# Patient Record
Sex: FEMALE | Race: BLACK OR AFRICAN AMERICAN | NOT HISPANIC OR LATINO | Employment: OTHER | URBAN - METROPOLITAN AREA
[De-identification: names, ages, dates, MRNs, and addresses within clinical notes are randomized per-mention and may not be internally consistent; named-entity substitution may affect disease eponyms.]

---

## 2017-02-03 ENCOUNTER — HOSPITAL ENCOUNTER (EMERGENCY)
Facility: HOSPITAL | Age: 82
Discharge: HOME | End: 2017-02-04
Attending: EMERGENCY MEDICINE
Payer: MEDICARE

## 2017-02-03 DIAGNOSIS — T82.9XXA PACEMAKER COMPLICATIONS, INITIAL ENCOUNTER: Primary | ICD-10-CM

## 2017-02-03 DIAGNOSIS — I50.9 CONGESTIVE HEART FAILURE, UNSPECIFIED CONGESTIVE HEART FAILURE CHRONICITY, UNSPECIFIED CONGESTIVE HEART FAILURE TYPE: ICD-10-CM

## 2017-02-03 DIAGNOSIS — Z95.0 PACEMAKER: ICD-10-CM

## 2017-02-03 DIAGNOSIS — R79.89 ELEVATED TROPONIN: ICD-10-CM

## 2017-02-03 LAB
ALBUMIN SERPL BCP-MCNC: 3.9 G/DL
ALP SERPL-CCNC: 96 U/L
ALT SERPL W/O P-5'-P-CCNC: 14 U/L
ANION GAP SERPL CALC-SCNC: 9 MMOL/L
APTT BLDCRRT: 28.6 SEC
AST SERPL-CCNC: 25 U/L
BASOPHILS # BLD AUTO: 0.04 K/UL
BASOPHILS NFR BLD: 0.7 %
BILIRUB SERPL-MCNC: 0.4 MG/DL
BNP SERPL-MCNC: 1046 PG/ML
BUN SERPL-MCNC: 29 MG/DL
CALCIUM SERPL-MCNC: 10 MG/DL
CHLORIDE SERPL-SCNC: 104 MMOL/L
CO2 SERPL-SCNC: 29 MMOL/L
CREAT SERPL-MCNC: 1.6 MG/DL
DIFFERENTIAL METHOD: ABNORMAL
EOSINOPHIL # BLD AUTO: 0.2 K/UL
EOSINOPHIL NFR BLD: 3.7 %
ERYTHROCYTE [DISTWIDTH] IN BLOOD BY AUTOMATED COUNT: 14.9 %
EST. GFR  (AFRICAN AMERICAN): 35 ML/MIN/1.73 M^2
EST. GFR  (NON AFRICAN AMERICAN): 30 ML/MIN/1.73 M^2
GLUCOSE SERPL-MCNC: 115 MG/DL
HCT VFR BLD AUTO: 36.4 %
HGB BLD-MCNC: 11.4 G/DL
INR PPP: 1
LYMPHOCYTES # BLD AUTO: 1.3 K/UL
LYMPHOCYTES NFR BLD: 24.3 %
MCH RBC QN AUTO: 29.8 PG
MCHC RBC AUTO-ENTMCNC: 31.3 %
MCV RBC AUTO: 95 FL
MONOCYTES # BLD AUTO: 0.7 K/UL
MONOCYTES NFR BLD: 12.6 %
NEUTROPHILS # BLD AUTO: 3.2 K/UL
NEUTROPHILS NFR BLD: 58.5 %
PLATELET # BLD AUTO: 126 K/UL
PMV BLD AUTO: 10.5 FL
POTASSIUM SERPL-SCNC: 3.9 MMOL/L
PROT SERPL-MCNC: 7.8 G/DL
PROTHROMBIN TIME: 10.8 SEC
RBC # BLD AUTO: 3.82 M/UL
SODIUM SERPL-SCNC: 142 MMOL/L
TROPONIN I SERPL DL<=0.01 NG/ML-MCNC: 0.1 NG/ML
WBC # BLD AUTO: 5.47 K/UL

## 2017-02-03 PROCEDURE — 81000 URINALYSIS NONAUTO W/SCOPE: CPT

## 2017-02-03 PROCEDURE — 85025 COMPLETE CBC W/AUTO DIFF WBC: CPT

## 2017-02-03 PROCEDURE — 85730 THROMBOPLASTIN TIME PARTIAL: CPT

## 2017-02-03 PROCEDURE — 99284 EMERGENCY DEPT VISIT MOD MDM: CPT

## 2017-02-03 PROCEDURE — 83880 ASSAY OF NATRIURETIC PEPTIDE: CPT

## 2017-02-03 PROCEDURE — 85610 PROTHROMBIN TIME: CPT

## 2017-02-03 PROCEDURE — 84484 ASSAY OF TROPONIN QUANT: CPT

## 2017-02-03 PROCEDURE — 80053 COMPREHEN METABOLIC PANEL: CPT

## 2017-02-03 NOTE — ED AVS SNAPSHOT
[facility]  [address] [place] [address]               Palmira Malave   2/3/2017  9:06 PM   ED    Description:  Female : 1935   Department:  [facility]           Your Care was Coordinated By:     Provider Role From To    Cheyenne Kay MD Attending Provider [phone] --      Reason for Visit     Pacemaker Problem           Diagnoses this Visit        Comments    Pacemaker complications, initial encounter    -  Primary     Pacemaker         Elevated troponin         Congestive heart failure, unspecified congestive heart failure chronicity, unspecified congestive heart failure type           ED Disposition     ED Disposition Condition Comment    Discharge             To Do List           Follow-up Information     Follow up with Tal Batres MD On 2017.    Specialty:  Cardiology    Why:  call for appointment ASAP    Contact information:    [address]  [place] [address]  [phone]        [facility] On Call     [facility] Nurse Care Line -  Assistance  Registered nurses in the [facility] provide clinical advisement, health education, appointment booking, and other advisory services.  Call for this free service at [phone].             Medications           Message regarding Medications     Verify the changes and/or additions to your medication regime listed below are the same as discussed with your clinician today.  If any of these changes or additions are incorrect, please notify your healthcare provider.             Verify that the below list of medications is an accurate representation of the medications you are currently taking.  If none reported, the list may be blank. If incorrect, please contact your healthcare provider. Carry this list with you in case of emergency.           Current Medications     allopurinol (ZYLOPRIM) 100 MG tablet Take 100 mg by mouth every morning.      "calcitRIOL (ROCALTROL) 0.25 MCG Cap     carvedilol (COREG) 25 MG tablet Take 0.5 tablets (12.5 mg total) by mouth 2 (two) times daily.    clopidogrel (PLAVIX) 75 mg tablet Take 75 mg by mouth once daily. On hold since 11-28-14, for procedure.    furosemide (LASIX) 40 MG tablet Take 0.5 tablets (20 mg total) by mouth once daily. Take half tablet twice daily    lovastatin (MEVACOR) 40 MG tablet Take 40 mg by mouth nightly. Takes 2 caps with supper every evening.    mirabegron (MYRBETRIQ) 50 mg Tb24 Take by mouth.    terazosin (HYTRIN) 2 MG capsule Take by mouth every evening.     albuterol 90 mcg/actuation inhaler Inhale 1-2 puffs into the lungs every 6 (six) hours as needed.    cranberry 400 mg Cap Take 1 capsule by mouth 2 (two) times daily.    fish oil-omega-3 fatty acids 300-1,000 mg capsule Take 2 g by mouth once daily. 2 caps every AM & 1 cap every PM.    gabapentin (NEURONTIN) 300 MG capsule Take 300 mg by mouth 2 (two) times daily.     hydrocodone-acetaminophen 5-325mg (NORCO) 5-325 mg per tablet     methylPREDNISolone (MEDROL DOSEPACK) 4 mg tablet     PREVNAR 13, PF, 0.5 mL Syrg     ranitidine (ZANTAC) 150 MG tablet     VOLTAREN 1 % Gel            Clinical Reference Information           Your Vitals Were     BP Pulse Temp Resp Height Weight    207/91 68 98.1 °F (36.7 °C) (Oral) 18 5' 3" (1.6 m) 90.7 kg (200 lb)    SpO2 BMI             98% 35.43 kg/m2         Allergies as of 2/4/2017        Reactions    Aspirin Swelling    Swelling and rash    Colace [Docusate Sodium] Rash    itching    Sulfa (Sulfonamide Antibiotics) Swelling    Swelling and rash    Iodine And Iodide Containing Products Rash    Penicillins Rash      Immunizations Administered on Date of Encounter - 2/4/2017     None      ED Micro, Lab, POCT     Start Ordered       Status Ordering Provider    02/03/17 2119 02/03/17 2119  CBC auto differential  STAT      Final result     02/03/17 2119 02/03/17 2119  Comprehensive metabolic panel  STAT      " Final result     02/03/17 2119 02/03/17 2119  Brain natriuretic peptide  STAT      Final result     02/03/17 2119 02/03/17 2119  Troponin I  STAT      Final result     02/03/17 2119 02/03/17 2119  Protime-INR  STAT      Final result     02/03/17 2119 02/03/17 2119  APTT  STAT      Final result     02/03/17 2119 02/03/17 2119  Urinalysis  STAT      In process     02/03/17 2119 02/03/17 2119  Urinalysis Microscopic  Once      In process       ED Imaging Orders     Start Ordered       Status Ordering Provider    02/03/17 2120 02/03/17 2119  X-Ray Chest 1 View  1 time imaging      Final result         Discharge Instructions       Call Dr. Batres regarding your ED visit on Monday morning to schedule an appointment.    Discharge References/Attachments     PACEMAKER MALFUNCTION (ENGLISH)      Your Scheduled Appointments     Feb 14, 2017  2:40 PM CST   Established Patient Visit with Tal Batres MD   [facility] ([facility])    [address]  [place] [address]   [phone]              [facility] Sign-Up     Activating your [facility] account is as easy as 1-2-3!     1) Visit [email or web address], select Sign Up Now, enter this activation code and your date of birth, then select Next.  [email or web address]  Expires: 3/21/2017 12:35 AM      2) Create a username and password to use when you visit [facility] in the future and select a security question in case you lose your password and select Next.    3) Enter your e-mail address and click Sign Up!    Additional Information  If you have questions, please e-mail [email or web address] or call [phone] to talk to our [facility] staff. Remember, [facility] is NOT to be used for urgent needs. For medical emergencies, dial 911.          [facility] complies with applicable Federal civil rights laws and does not discriminate on the basis of race, color, national origin, age, disability, or sex.        Language Assistance Services      ATTENTION: Language assistance services are available, free of charge. Please call [phone].      ATENCIÓN: Si habla español, tiene a rodriguez disposición servicios gratuitos de asistencia lingüística. Llame al [phone].     CHÚ Ý: N?u b?n nói Ti?ng Vi?t, có các d?ch v? h? tr? ngôn ng? mi?n phí dành cho b?n. G?i s? [phone].

## 2017-02-04 VITALS
OXYGEN SATURATION: 95 % | BODY MASS INDEX: 35.44 KG/M2 | HEART RATE: 68 BPM | TEMPERATURE: 98 F | WEIGHT: 200 LBS | DIASTOLIC BLOOD PRESSURE: 91 MMHG | SYSTOLIC BLOOD PRESSURE: 207 MMHG | RESPIRATION RATE: 17 BRPM | HEIGHT: 63 IN

## 2017-02-04 LAB
BACTERIA #/AREA URNS HPF: ABNORMAL /HPF
BILIRUB UR QL STRIP: NEGATIVE
CLARITY UR: CLEAR
COLOR UR: ABNORMAL
GLUCOSE UR QL STRIP: NEGATIVE
HGB UR QL STRIP: ABNORMAL
KETONES UR QL STRIP: NEGATIVE
LEUKOCYTE ESTERASE UR QL STRIP: NEGATIVE
MICROSCOPIC COMMENT: ABNORMAL
NITRITE UR QL STRIP: NEGATIVE
PH UR STRIP: 6 [PH] (ref 5–8)
PROT UR QL STRIP: NEGATIVE
RBC #/AREA URNS HPF: 3 /HPF (ref 0–4)
SP GR UR STRIP: 1.01 (ref 1–1.03)
SQUAMOUS #/AREA URNS HPF: 1 /HPF
URN SPEC COLLECT METH UR: ABNORMAL
UROBILINOGEN UR STRIP-ACNC: NEGATIVE EU/DL
WBC #/AREA URNS HPF: 2 /HPF (ref 0–5)

## 2017-02-04 NOTE — ED PROVIDER NOTES
"Encounter Date: 2/3/2017    SCRIBE #1 NOTE: I, Mari Morgan, am scribing for, and in the presence of,  Cheyenne Kay MD. I have scribed the following portions of the note - Other sections scribed: HPI, ROS, PE.       History     Chief Complaint   Patient presents with    Pacemaker Problem     "My pacemaker keep going off. I keep hearing a sound."     Review of patient's allergies indicates:   Allergen Reactions    Aspirin Swelling     Swelling and rash    Colace [docusate sodium] Rash     itching    Sulfa (sulfonamide antibiotics) Swelling     Swelling and rash    Iodine and iodide containing products Rash    Penicillins Rash     HPI Comments: CC: Defibrillator Problem    HPI: 81 year old female presents to the ED c/o her defibrillator alarming x 3 today. Pt states the alarm sounded like a long beep. The first episode occurred at 3:30 pm; second episode 4:00-4:30 pm; third episode 8:00 pm. Pt reports no pain or other complaints while the defibrillator alarmed. She denies feeling a shock or sharp pain in her chest. Approximately 4 months ago, she had an evaluation of the battery for her defibrillator and was also told of no changes or problems. She denies fever, chills, SOB, chest pain, numbness, dizziness, and any other associated symptoms.    She has had the defibrillator placed x 2. Her first one was placed in 2008; it was changed in 2014. She reports in 2008, the defibrillator alarmed and she notified her cardiologist. Her cardiologist told her there were no problems or changes. At baseline, pt is ambulatory with assistance of walker. Pt denies hx of MI.     Last echocardiogram 11/2015: EF 20-25%.    Cardiologist is Dr. Batres.    Pt has a history of CAD on Plavix, HTN, hypercholesteremia, norm disorder, and hx of gout.    [employer] ([phone]) cardiac defibrillator.  First implanted 10/13/08:  - Serial #: [id number] // Model #: [id number]; Serial #: [id number] // Model #: [id number]  Second implanted " 12/11/14:  - Serial #: [id number] // Model #: [id number]; Serial #: [id number] // Model #: [id number]    The history is provided by the patient. No  was used.     Past Medical History   Diagnosis Date    Anticoagulant long-term use     Coronary artery disease     Gout attack     Hypercholesteremia     Hypertension     Renal disorder      No past medical history pertinent negatives.  Past Surgical History   Procedure Laterality Date    Hysterectomy      Total knee arthroplasty Bilateral Lt.= 2005; Rt.=2006     Bilateral Knee scope    Defribillator Left 8/2008    Cholecystectomy      Appendectomy      Colonoscopy w/ polypectomy  10 or 11/ 2014     per Dr. Myrick    Joint replacement Bilateral 2005 & 2006     2 Knee Replacements=Lt. 1= 2005. Rt. 1= 2006    Cardiac defibrillator placement       2015     Family History   Problem Relation Age of Onset    Heart attack Mother 88    Hypertension Mother     Hyperlipidemia Mother     Diabetes Other     Hypertension Other      Social History   Substance Use Topics    Smoking status: Never Smoker    Smokeless tobacco: Never Used    Alcohol use No     Review of Systems   Constitutional: Negative for chills, diaphoresis and fever.   HENT: Negative for ear pain and sore throat.    Eyes: Negative for photophobia and visual disturbance.   Respiratory: Negative for cough and shortness of breath.    Cardiovascular: Negative for chest pain.        Defibrillator placement to left chest wall.   Gastrointestinal: Negative for abdominal pain, diarrhea, nausea and vomiting.   Genitourinary: Negative for dysuria.   Musculoskeletal: Negative for back pain and neck stiffness.   Skin: Negative for rash.   Neurological: Negative for headaches.       Physical Exam   Initial Vitals   BP Pulse Resp Temp SpO2   02/03/17 2105 02/03/17 2105 02/03/17 2105 02/03/17 2105 02/03/17 2105   187/84 82 18 98.1 °F (36.7 °C) 97 %     Physical Exam    Nursing note and vitals  reviewed.  Constitutional: She appears well-developed and well-nourished. She is not diaphoretic.  Non-toxic appearance. She does not appear ill. No distress.   Pt is awake and alert. Non-toxic appearing.   HENT:   Head: Normocephalic and atraumatic.   Mouth/Throat: Oropharynx is clear and moist.   Eyes: Conjunctivae and EOM are normal. Pupils are equal, round, and reactive to light.   Neck: Normal range of motion. Neck supple.   Cardiovascular: Normal rate and regular rhythm.   Murmur (2/6 systolic) heard.  Pulmonary/Chest: Effort normal and breath sounds normal. No respiratory distress. She has no wheezes. She has no rhonchi. She has no rales.   AICD to left chest wall.   Abdominal: Soft. Normal appearance and bowel sounds are normal. She exhibits no distension. There is no tenderness.   Musculoskeletal: Normal range of motion. She exhibits no edema or tenderness.   No lower extremity edema.   Neurological: She is alert and oriented to person, place, and time. She has normal strength.   Skin: Skin is warm and dry.   Psychiatric: She has a normal mood and affect.         ED Course   Procedures  Labs Reviewed   CBC W/ AUTO DIFFERENTIAL - Abnormal; Notable for the following:        Result Value    RBC 3.82 (*)     Hemoglobin 11.4 (*)     Hematocrit 36.4 (*)     MCHC 31.3 (*)     RDW 14.9 (*)     Platelets 126 (*)     All other components within normal limits   COMPREHENSIVE METABOLIC PANEL - Abnormal; Notable for the following:     Glucose 115 (*)     BUN, Bld 29 (*)     Creatinine 1.6 (*)     eGFR if  35 (*)     eGFR if non  30 (*)     All other components within normal limits   PROTIME-INR   APTT   B-TYPE NATRIURETIC PEPTIDE   TROPONIN I   URINALYSIS     EKG Readings: (Independently Interpreted)   AV dual paced rhythm, heart rate 70.  Nonspecific intraventricular conduction delay.  No STEMI.       X-Rays:   Independently Interpreted Readings:   Other Readings:  CXR cardiomegaly, L  "sided AICD, no significant pulmonary edema    Medical Decision Making:   History:   Old Medical Records: I decided to obtain old medical records.  Old Records Summarized: records from previous admission(s) and records from clinic visits.  Initial Assessment:   81 y.o. Female with alarm from AICD x 3 today. Was not shocked. No change to exercise tolerance. No CP. Feels well.  Differential Diagnosis:   Ddx includes lead fracture, battery failure, other impending or current device failure, malignant arrhythmia terminated by device,   Independently Interpreted Test(s):   I have ordered and independently interpreted X-rays - see prior notes.  I have ordered and independently interpreted EKG Reading(s) - see prior notes  Clinical Tests:   Lab Tests: Ordered and Reviewed  The following lab test(s) were unremarkable: Urinalysis, CMP, PTT, PT and CBC       <> Summary of Lab: BNP 1046, troponin 0.096.   Radiological Study: Reviewed and Ordered  Medical Tests: Ordered and Reviewed  ED Management:  Patient's labs show troponin 0.096; however, she has CKD, and troponins have been elevated here since 2014 (this is a lower troponin for her). BNP 1046, also within patient's range. Renal function, lytes, Hgb stable.   I called [employer] and device rep Khanda came to ED to interrogate device. She reported that there had been "Alarm trip for... Bad impedance on the SVC coil of the high voltage lead." She could not reproduce the event here in the ED despite doing calisthenics with the patient, etc. This alarm might be due to scar tissue on this particular coil, or because the coil has an impending fracture. There were no malignant arrhythmias on interrogation.  At this point, the patient's device is functioning as required, and she has no evidence of malignant arrhythmia or acutely decompensated CHF or ACS. She is stable for d/c with close f/u to Dr. Batres. I have sent Dr. Batres a message in the Epic system to alert him. I have " provided the patient and her daughter the originals of the [employer] interrogation report to bring to their appointment with Dr. Batres and a copy is to be placed in [facility] as well. The patient is aware her device may alert again, but as long as it makes the same noise and she is without symptoms, she is stable to be seen in cardiology clinic.   Other:   I have discussed this case with another health care provider.            Scribe Attestation:   Scribe #1: I performed the above scribed service and the documentation accurately describes the services I performed. I attest to the accuracy of the note.    Attending Attestation:           Physician Attestation for Scribe:  Physician Attestation Statement for Scribe #1: I, Cheyenne Kay MD, reviewed documentation, as scribed by Mari Mora in my presence, and it is both accurate and complete.                 ED Course     Clinical Impression:   The primary encounter diagnosis was Pacemaker complications, initial encounter. Diagnoses of Pacemaker, Elevated troponin, and Congestive heart failure, unspecified congestive heart failure chronicity, unspecified congestive heart failure type were also pertinent to this visit.          Cheyenne Kay MD  02/04/17 6518

## 2017-02-04 NOTE — ED TRIAGE NOTES
Pt presents to the ER because her defibrillator as alarmed 3 times this evening. Pt states no shock was delivered just a loud noise.  Pt denies any CP at this time.

## 2017-02-14 ENCOUNTER — OFFICE VISIT (OUTPATIENT)
Dept: CARDIOLOGY | Facility: CLINIC | Age: 82
End: 2017-02-14
Payer: MEDICARE

## 2017-02-14 VITALS
HEART RATE: 62 BPM | HEIGHT: 63 IN | WEIGHT: 200 LBS | BODY MASS INDEX: 35.44 KG/M2 | DIASTOLIC BLOOD PRESSURE: 77 MMHG | OXYGEN SATURATION: 95 % | SYSTOLIC BLOOD PRESSURE: 165 MMHG

## 2017-02-14 DIAGNOSIS — I25.10 CORONARY ARTERY DISEASE DUE TO CALCIFIED CORONARY LESION: ICD-10-CM

## 2017-02-14 DIAGNOSIS — E78.2 MIXED HYPERLIPIDEMIA: ICD-10-CM

## 2017-02-14 DIAGNOSIS — I50.22 CHRONIC SYSTOLIC HEART FAILURE: Primary | ICD-10-CM

## 2017-02-14 DIAGNOSIS — I10 ESSENTIAL HYPERTENSION: ICD-10-CM

## 2017-02-14 DIAGNOSIS — N18.30 CHRONIC KIDNEY DISEASE (CKD), STAGE III (MODERATE): ICD-10-CM

## 2017-02-14 DIAGNOSIS — I25.84 CORONARY ARTERY DISEASE DUE TO CALCIFIED CORONARY LESION: ICD-10-CM

## 2017-02-14 DIAGNOSIS — Z95.810 ICD (IMPLANTABLE CARDIOVERTER-DEFIBRILLATOR), BIVENTRICULAR, IN SITU: ICD-10-CM

## 2017-02-14 PROCEDURE — 99214 OFFICE O/P EST MOD 30 MIN: CPT | Mod: S$GLB,,, | Performed by: INTERNAL MEDICINE

## 2017-02-14 PROCEDURE — 1160F RVW MEDS BY RX/DR IN RCRD: CPT | Mod: S$GLB,,, | Performed by: INTERNAL MEDICINE

## 2017-02-14 PROCEDURE — 1157F ADVNC CARE PLAN IN RCRD: CPT | Mod: S$GLB,,, | Performed by: INTERNAL MEDICINE

## 2017-02-14 PROCEDURE — 3078F DIAST BP <80 MM HG: CPT | Mod: S$GLB,,, | Performed by: INTERNAL MEDICINE

## 2017-02-14 PROCEDURE — 1126F AMNT PAIN NOTED NONE PRSNT: CPT | Mod: S$GLB,,, | Performed by: INTERNAL MEDICINE

## 2017-02-14 PROCEDURE — 99999 PR PBB SHADOW E&M-EST. PATIENT-LVL III: CPT | Mod: PBBFAC,,, | Performed by: INTERNAL MEDICINE

## 2017-02-14 PROCEDURE — 1159F MED LIST DOCD IN RCRD: CPT | Mod: S$GLB,,, | Performed by: INTERNAL MEDICINE

## 2017-02-14 PROCEDURE — 93289 INTERROG DEVICE EVAL HEART: CPT | Mod: S$GLB,,, | Performed by: INTERNAL MEDICINE

## 2017-02-14 PROCEDURE — 3077F SYST BP >= 140 MM HG: CPT | Mod: S$GLB,,, | Performed by: INTERNAL MEDICINE

## 2017-02-14 RX ORDER — FLUTICASONE PROPIONATE 50 MCG
SPRAY, SUSPENSION (ML) NASAL
COMMUNITY
Start: 2017-01-23 | End: 2019-01-03

## 2017-02-14 NOTE — MR AVS SNAPSHOT
[facility] Cardiology  [address] [place] [address]  Phone: [phone]                  Palmira Malave   2017 2:40 PM   Office Visit    Description:  Female : 1935   Provider:  Tal Batres MD   Department:  [facility]           Reason for Visit     Coronary Artery Disease     Pacemaker Check                To Do List           Goals (5 Years of Data)     None      [facility]     [facility] On Call Nurse Care Line -  Assistance  Registered nurses in the [facility] provide clinical advisement, health education, appointment booking, and other advisory services.  Call for this free service at [phone].             Medications           Message regarding Medications     Verify the changes and/or additions to your medication regime listed below are the same as discussed with your clinician today.  If any of these changes or additions are incorrect, please notify your healthcare provider.        STOP taking these medications     albuterol 90 mcg/actuation inhaler Inhale 1-2 puffs into the lungs every 6 (six) hours as needed.    hydrocodone-acetaminophen 5-325mg (NORCO) 5-325 mg per tablet            Verify that the below list of medications is an accurate representation of the medications you are currently taking.  If none reported, the list may be blank. If incorrect, please contact your healthcare provider. Carry this list with you in case of emergency.           Current Medications     allopurinol (ZYLOPRIM) 100 MG tablet Take 100 mg by mouth every morning.     calcitRIOL (ROCALTROL) 0.25 MCG Cap     carvedilol (COREG) 25 MG tablet Take 0.5 tablets (12.5 mg total) by mouth 2 (two) times daily.    clopidogrel (PLAVIX) 75 mg tablet Take 75 mg by mouth once daily. On hold since 14, for procedure.    cranberry 400 mg Cap Take 1 capsule by mouth 2 (two) times daily.    fish oil-omega-3 fatty acids 300-1,000 mg capsule Take 2 g by mouth once  "daily. 2 caps every AM & 1 cap every PM.    fluticasone (FLONASE) 50 mcg/actuation nasal spray     furosemide (LASIX) 40 MG tablet Take 0.5 tablets (20 mg total) by mouth once daily. Take half tablet twice daily    gabapentin (NEURONTIN) 300 MG capsule Take 300 mg by mouth 2 (two) times daily.     lovastatin (MEVACOR) 40 MG tablet Take 40 mg by mouth nightly. Takes 2 caps with supper every evening.    methylPREDNISolone (MEDROL DOSEPACK) 4 mg tablet     mirabegron (MYRBETRIQ) 50 mg Tb24 Take by mouth.    PREVNAR 13, PF, 0.5 mL Syrg     ranitidine (ZANTAC) 150 MG tablet     terazosin (HYTRIN) 2 MG capsule Take by mouth every evening.     VOLTAREN 1 % Gel            Clinical Reference Information           Your Vitals Were     BP Pulse Height Weight SpO2 BMI    165/77 (BP Location: Left arm, Patient Position: Sitting, BP Method: Automatic) 62 5' 3" (1.6 m) 90.7 kg (200 lb) 95% 35.43 kg/m2      Blood Pressure          Most Recent Value    BP  (!)  165/77      Allergies as of 2/14/2017     Aspirin    Colace [Docusate Sodium]    Sulfa (Sulfonamide Antibiotics)    Iodine And Iodide Containing Products    Penicillins      Immunizations Administered on Date of Encounter - 2/14/2017     None      [facility] Sign-Up     Activating your [facility] account is as easy as 1-2-3!     1) Visit [email or web address], select Sign Up Now, enter this activation code and your date of birth, then select Next.  [email or web address]  Expires: 3/21/2017 12:35 AM      2) Create a username and password to use when you visit [facility] in the future and select a security question in case you lose your password and select Next.    3) Enter your e-mail address and click Sign Up!    Additional Information  If you have questions, please e-mail [email or web address] or call [phone] to talk to our [facility] staff. Remember, [facility] is NOT to be used for urgent needs. For medical emergencies, dial 911.         Language Assistance Services     ATTENTION: " Language assistance services are available, free of charge. Please call [phone].      ATENCIÓN: Si habla tay, tiene a rodriguez disposición servicios gratuitos de asistencia lingüística. Llame al [phone].     CHÚ Ý: N?u b?n nói Ti?ng Vi?t, có các d?ch v? h? tr? ngôn ng? mi?n phí dành cho b?n. G?i s? [phone].         [facility] complies with applicable Federal civil rights laws and does not discriminate on the basis of race, color, national origin, age, disability, or sex.

## 2017-02-21 NOTE — PROGRESS NOTES
Subjective:    Patient ID:  Palmira Malave is a 81 y.o. female who presents for follow-up of Coronary Artery Disease and Pacemaker Check      Coronary Artery Disease     Previous history:  Here for follow-up of congestive heart failure and device interrogation.  Feels good and can do everything she likes with upgrade to BIVE.  She isn't walking regularly due to construction front of her house but still feels like she is able to do all her for daily activities.  She denies any chest pain, shortness of breath or palpitations.  She's expands no PND, orthopnea or lower edema.  She denies any dizziness, presyncope or syncope.  Otherwise she's better usual state of health.  She's had no hospitalizations for volume overload since by V upgrade.    Today:  Here for follow-up of systolic heart failure and biventricular ICD interrogation.  She was recently in the emergency room and had an alarm sound on her device.  There was a high threshold noted in the previously placed right ventricular lead at outside hospital.  She recently undergone upgrade to biventricular device and postprocedure resting look good with the device check.  She is brought in for reevaluation and on device interrogation was found to have no significant issues at this juncture.  Some of her alarms Aguilar reconfigured.  Otherwise she denies any cardiopulmonary complaints.  She still feels well.  She denies any chest pain, shortness of breath or palpitations.  She's not expressing PND, orthopnea or lower edema.  She denies any dizziness, presyncope or syncope.    Review of Systems   Constitution: Negative.   HENT: Negative.    Eyes: Negative.    Cardiovascular: Negative for dyspnea on exertion, irregular heartbeat, near-syncope, orthopnea, paroxysmal nocturnal dyspnea and syncope.   Skin: Negative.    Musculoskeletal: Negative.    Gastrointestinal: Negative for abdominal pain, constipation and diarrhea.   Genitourinary: Negative for dysuria.   Neurological:  Negative.    Psychiatric/Behavioral: Negative.         Objective:    Physical Exam   Constitutional: She is oriented to person, place, and time. She appears well-developed and well-nourished.   HENT:   Head: Normocephalic and atraumatic.   Eyes: Conjunctivae and EOM are normal. Pupils are equal, round, and reactive to light.   Neck: Normal range of motion. Neck supple. No thyromegaly present.   Cardiovascular: Normal rate and regular rhythm.    No murmur heard.  Pulmonary/Chest: Effort normal and breath sounds normal. No respiratory distress.   Abdominal: Soft. Bowel sounds are normal.   Musculoskeletal: She exhibits no edema.   Neurological: She is alert and oriented to person, place, and time.   Skin: Skin is warm and dry.   Psychiatric: She has a normal mood and affect. Her behavior is normal.         Assessment:       1. Chronic systolic heart failure    2. ICD (implantable cardioverter-defibrillator), biventricular, in situ    3. Chronic kidney disease (CKD), stage III (moderate)    4. Coronary artery disease due to calcified coronary lesion    5. Essential hypertension    6. Mixed hyperlipidemia         Plan:       -cont med tx  -cont surveillance of device    RTC 6 mos          BIVE ICD ([employer])    DDDR 60     apced 24%, V paced 99%    A: 3.4 V, 437 ohms, 0.625 V @ 0.4 ms  RV: > 20 V, 494 ohms, 1 V @ 0.4 ms  LV: 399 ohms, 1 V @ 0.4 ms    No episodes  No changes    RTC 6 mos

## 2017-02-22 ENCOUNTER — TELEPHONE (OUTPATIENT)
Dept: CARDIOLOGY | Facility: CLINIC | Age: 82
End: 2017-02-22

## 2017-02-23 ENCOUNTER — TELEPHONE (OUTPATIENT)
Dept: CARDIOLOGY | Facility: CLINIC | Age: 82
End: 2017-02-23

## 2017-02-23 NOTE — TELEPHONE ENCOUNTER
----- Message from Alysa Randle sent at 2/23/2017  9:04 AM CST -----  Contact: self  Patient is calling for an appt today . Her alarm  defibrillator has been going off since yesterday . It is waking her up out of her sleep .     [phone]    LL

## 2017-05-09 ENCOUNTER — OFFICE VISIT (OUTPATIENT)
Dept: CARDIOLOGY | Facility: CLINIC | Age: 82
End: 2017-05-09
Payer: MEDICARE

## 2017-05-09 VITALS
SYSTOLIC BLOOD PRESSURE: 161 MMHG | HEART RATE: 82 BPM | DIASTOLIC BLOOD PRESSURE: 77 MMHG | BODY MASS INDEX: 34.02 KG/M2 | OXYGEN SATURATION: 100 % | WEIGHT: 192 LBS | HEIGHT: 63 IN

## 2017-05-09 DIAGNOSIS — I25.84 CORONARY ARTERY DISEASE DUE TO CALCIFIED CORONARY LESION: ICD-10-CM

## 2017-05-09 DIAGNOSIS — I25.10 CORONARY ARTERY DISEASE DUE TO CALCIFIED CORONARY LESION: ICD-10-CM

## 2017-05-09 DIAGNOSIS — E78.2 MIXED HYPERLIPIDEMIA: ICD-10-CM

## 2017-05-09 DIAGNOSIS — I10 ESSENTIAL HYPERTENSION: ICD-10-CM

## 2017-05-09 DIAGNOSIS — Z95.810 ICD (IMPLANTABLE CARDIOVERTER-DEFIBRILLATOR), BIVENTRICULAR, IN SITU: ICD-10-CM

## 2017-05-09 DIAGNOSIS — I50.22 CHRONIC SYSTOLIC HEART FAILURE: Primary | ICD-10-CM

## 2017-05-09 DIAGNOSIS — N18.30 CHRONIC KIDNEY DISEASE (CKD), STAGE III (MODERATE): ICD-10-CM

## 2017-05-09 PROCEDURE — 3078F DIAST BP <80 MM HG: CPT | Mod: S$GLB,,, | Performed by: INTERNAL MEDICINE

## 2017-05-09 PROCEDURE — 99214 OFFICE O/P EST MOD 30 MIN: CPT | Mod: S$GLB,,, | Performed by: INTERNAL MEDICINE

## 2017-05-09 PROCEDURE — 1160F RVW MEDS BY RX/DR IN RCRD: CPT | Mod: S$GLB,,, | Performed by: INTERNAL MEDICINE

## 2017-05-09 PROCEDURE — 93289 INTERROG DEVICE EVAL HEART: CPT | Mod: S$GLB,,, | Performed by: INTERNAL MEDICINE

## 2017-05-09 PROCEDURE — 1159F MED LIST DOCD IN RCRD: CPT | Mod: S$GLB,,, | Performed by: INTERNAL MEDICINE

## 2017-05-09 PROCEDURE — 1126F AMNT PAIN NOTED NONE PRSNT: CPT | Mod: S$GLB,,, | Performed by: INTERNAL MEDICINE

## 2017-05-09 PROCEDURE — 99999 PR PBB SHADOW E&M-EST. PATIENT-LVL III: CPT | Mod: PBBFAC,,, | Performed by: INTERNAL MEDICINE

## 2017-05-09 PROCEDURE — 3077F SYST BP >= 140 MM HG: CPT | Mod: S$GLB,,, | Performed by: INTERNAL MEDICINE

## 2017-05-09 NOTE — MR AVS SNAPSHOT
[facility]  [address] [place] [address]  Phone: [phone]                  Palmira Malave   2017 2:20 PM   Office Visit    Description:  Female : 1935   Provider:  Tal Batres MD   Department:  [facility]           Reason for Visit     Pacemaker Check                To Do List           Goals (5 Years of Data)     None      [facility]     [facility] Nurse Care Line -  Assistance  Unless otherwise directed by your provider, please contact [facility], our nurse care line that is available for  assistance.     Registered nurses in the [facility] provide: appointment scheduling, clinical advisement, health education, and other advisory services.  Call: [phone] (toll free)               Medications           Message regarding Medications     Verify the changes and/or additions to your medication regime listed below are the same as discussed with your clinician today.  If any of these changes or additions are incorrect, please notify your healthcare provider.             Verify that the below list of medications is an accurate representation of the medications you are currently taking.  If none reported, the list may be blank. If incorrect, please contact your healthcare provider. Carry this list with you in case of emergency.           Current Medications     allopurinol (ZYLOPRIM) 100 MG tablet Take 100 mg by mouth every morning.     calcitRIOL (ROCALTROL) 0.25 MCG Cap     carvedilol (COREG) 25 MG tablet Take 0.5 tablets (12.5 mg total) by mouth 2 (two) times daily.    clopidogrel (PLAVIX) 75 mg tablet Take 75 mg by mouth once daily. On hold since 14, for procedure.    cranberry 400 mg Cap Take 1 capsule by mouth 2 (two) times daily.    fish oil-omega-3 fatty acids 300-1,000 mg capsule Take 2 g by mouth once daily. 2 caps every AM & 1 cap every PM.    fluticasone (FLONASE) 50 mcg/actuation nasal spray      "furosemide (LASIX) 40 MG tablet Take 0.5 tablets (20 mg total) by mouth once daily. Take half tablet twice daily    gabapentin (NEURONTIN) 300 MG capsule Take 300 mg by mouth 2 (two) times daily.     lovastatin (MEVACOR) 40 MG tablet Take 40 mg by mouth nightly. Takes 2 caps with supper every evening.    methylPREDNISolone (MEDROL DOSEPACK) 4 mg tablet     mirabegron (MYRBETRIQ) 50 mg Tb24 Take by mouth.    PREVNAR 13, PF, 0.5 mL Syrg     ranitidine (ZANTAC) 150 MG tablet     terazosin (HYTRIN) 2 MG capsule Take by mouth every evening.     VOLTAREN 1 % Gel            Clinical Reference Information           Your Vitals Were     BP Pulse Height Weight SpO2 BMI    161/77 (BP Location: Left arm, Patient Position: Sitting, BP Method: Automatic) 82 5' 3" (1.6 m) 87.1 kg (192 lb 0.3 oz) 100% 34.01 kg/m2      Blood Pressure          Most Recent Value    BP  (!)  161/77      Allergies as of 5/9/2017     Aspirin    Colace [Docusate Sodium]    Sulfa (Sulfonamide Antibiotics)    Iodine And Iodide Containing Products    Penicillins      Immunizations Administered on Date of Encounter - 5/9/2017     None      [facility] Sign-Up     Activating your [facility] account is as easy as 1-2-3!     1) Visit [email or web address], select Sign Up Now, enter this activation code and your date of birth, then select Next.  [email or web address]  Expires: 6/23/2017  2:40 PM      2) Create a username and password to use when you visit [facility] in the future and select a security question in case you lose your password and select Next.    3) Enter your e-mail address and click Sign Up!    Additional Information  If you have questions, please e-mail [email or web address] or call [phone] to talk to our [facility] staff. Remember, [facility] is NOT to be used for urgent needs. For medical emergencies, dial 911.         Language Assistance Services     ATTENTION: Language assistance services are available, free of charge. Please call [phone].  "     ATENCIÓN: Si habla español, tiene a rodriguez disposición servicios gratuitos de asistencia lingüística. Alvin al [phone].     CHÚ Ý: N?u b?n nói Ti?ng Vi?t, có các d?ch v? h? tr? ngôn ng? mi?n phí dành cho b?n. G?i s? [phone].         [facility] complies with applicable Federal civil rights laws and does not discriminate on the basis of race, color, national origin, age, disability, or sex.

## 2017-05-09 NOTE — PROGRESS NOTES
Subjective:    Patient ID:  Palmira Malave is a 81 y.o. female who presents for follow-up of Pacemaker Check      Coronary Artery Disease       Previous history:  Here for follow-up of systolic heart failure and biventricular ICD interrogation.  She was recently in the emergency room and had an alarm sound on her device.  There was a high threshold noted in the previously placed right ventricular lead at outside hospital.  She recently undergone upgrade to biventricular device and postprocedure resting look good with the device check.  She is brought in for reevaluation and on device interrogation was found to have no significant issues at this juncture.  Some of her alarms Aguilar reconfigured.  Otherwise she denies any cardiopulmonary complaints.  She still feels well.  She denies any chest pain, shortness of breath or palpitations.  She's not expressing PND, orthopnea or lower edema.  She denies any dizziness, presyncope or syncope.    Today:  Here for follow-up of systolic heart failure and biventricular ICD interrogation.  She denies any worsening cardiopulmonary complaints since we last saw her.  She's had no issues with her device.  She is expressed no worsening cardiopulmonary complaints.  She denies any chest pain, shortness breath or palpitations.  She's not expressing PND, orthopnea or lower edema.  She denies any dizziness, presyncope or syncope.  Otherwise she's better usual state of health.    Review of Systems   Constitution: Negative.   HENT: Negative.    Eyes: Negative.    Cardiovascular: Negative for dyspnea on exertion, irregular heartbeat, near-syncope, orthopnea, paroxysmal nocturnal dyspnea and syncope.   Skin: Negative.    Musculoskeletal: Negative.    Gastrointestinal: Negative for abdominal pain, constipation and diarrhea.   Genitourinary: Negative for dysuria.   Neurological: Negative.    Psychiatric/Behavioral: Negative.         Objective:    Physical Exam   Constitutional: She is oriented  to person, place, and time. She appears well-developed and well-nourished.   HENT:   Head: Normocephalic and atraumatic.   Eyes: Conjunctivae and EOM are normal. Pupils are equal, round, and reactive to light.   Neck: Normal range of motion. Neck supple. No thyromegaly present.   Cardiovascular: Normal rate and regular rhythm.    No murmur heard.  Pulmonary/Chest: Effort normal and breath sounds normal. No respiratory distress.   Abdominal: Soft. Bowel sounds are normal.   Musculoskeletal: She exhibits no edema.   Neurological: She is alert and oriented to person, place, and time.   Skin: Skin is warm and dry.   Psychiatric: She has a normal mood and affect. Her behavior is normal.         Assessment:       1. Chronic systolic heart failure    2. ICD (implantable cardioverter-defibrillator), biventricular, in situ    3. Chronic kidney disease (CKD), stage III (moderate)    4. Coronary artery disease due to calcified coronary lesion    5. Essential hypertension    6. Mixed hyperlipidemia         Plan:       -cont med tx  -cont surveillance of device    RTC 6 mos          BIVE ICD (Medtronic)    DDDR 60     apced 24%, V paced 99%    A: 3.4 V, 437 ohms, 0.625 V @ 0.4 ms  RV: > 20 V, 494 ohms, 1 V @ 0.4 ms  LV: 399 ohms, 1 V @ 0.4 ms    No episodes  No changes    RTC 6 mos

## 2017-06-06 DIAGNOSIS — Z12.31 VISIT FOR SCREENING MAMMOGRAM: Primary | ICD-10-CM

## 2017-06-20 ENCOUNTER — HOSPITAL ENCOUNTER (OUTPATIENT)
Dept: RADIOLOGY | Facility: HOSPITAL | Age: 82
Discharge: HOME | End: 2017-06-20
Attending: INTERNAL MEDICINE
Payer: MEDICARE

## 2017-06-20 VITALS — WEIGHT: 187 LBS | HEIGHT: 63 IN | BODY MASS INDEX: 33.13 KG/M2

## 2017-06-20 DIAGNOSIS — Z12.31 VISIT FOR SCREENING MAMMOGRAM: ICD-10-CM

## 2017-06-20 PROCEDURE — 77067 SCR MAMMO BI INCL CAD: CPT | Mod: TC

## 2017-06-20 PROCEDURE — 77067 SCR MAMMO BI INCL CAD: CPT | Mod: 26,,, | Performed by: RADIOLOGY

## 2017-06-20 PROCEDURE — 77063 BREAST TOMOSYNTHESIS BI: CPT | Mod: 26,,, | Performed by: RADIOLOGY

## 2017-08-12 ENCOUNTER — HOSPITAL ENCOUNTER (EMERGENCY)
Facility: HOSPITAL | Age: 82
Discharge: HOME | End: 2017-08-12
Attending: EMERGENCY MEDICINE
Payer: MEDICARE

## 2017-08-12 VITALS
DIASTOLIC BLOOD PRESSURE: 64 MMHG | HEART RATE: 63 BPM | HEIGHT: 63 IN | OXYGEN SATURATION: 97 % | SYSTOLIC BLOOD PRESSURE: 136 MMHG | WEIGHT: 182 LBS | RESPIRATION RATE: 18 BRPM | TEMPERATURE: 99 F | BODY MASS INDEX: 32.25 KG/M2

## 2017-08-12 DIAGNOSIS — N39.0 URINARY TRACT INFECTION WITH HEMATURIA, SITE UNSPECIFIED: Primary | ICD-10-CM

## 2017-08-12 DIAGNOSIS — R31.9 URINARY TRACT INFECTION WITH HEMATURIA, SITE UNSPECIFIED: Primary | ICD-10-CM

## 2017-08-12 LAB
ALBUMIN SERPL BCP-MCNC: 3.7 G/DL
ALP SERPL-CCNC: 67 U/L
ALT SERPL W/O P-5'-P-CCNC: 13 U/L
ANION GAP SERPL CALC-SCNC: 10 MMOL/L
AST SERPL-CCNC: 31 U/L
BACTERIA #/AREA URNS HPF: ABNORMAL /HPF
BASOPHILS # BLD AUTO: 0.02 K/UL
BASOPHILS NFR BLD: 0.3 %
BILIRUB SERPL-MCNC: 0.8 MG/DL
BILIRUB UR QL STRIP: NEGATIVE
BUN SERPL-MCNC: 25 MG/DL
CALCIUM SERPL-MCNC: 10 MG/DL
CHLORIDE SERPL-SCNC: 106 MMOL/L
CLARITY UR: ABNORMAL
CO2 SERPL-SCNC: 25 MMOL/L
COLOR UR: YELLOW
CREAT SERPL-MCNC: 1.4 MG/DL
DIFFERENTIAL METHOD: ABNORMAL
EOSINOPHIL # BLD AUTO: 0.2 K/UL
EOSINOPHIL NFR BLD: 2.5 %
ERYTHROCYTE [DISTWIDTH] IN BLOOD BY AUTOMATED COUNT: 14.5 %
EST. GFR  (AFRICAN AMERICAN): 40 ML/MIN/1.73 M^2
EST. GFR  (NON AFRICAN AMERICAN): 35 ML/MIN/1.73 M^2
GLUCOSE SERPL-MCNC: 99 MG/DL
GLUCOSE UR QL STRIP: NEGATIVE
HCT VFR BLD AUTO: 34.1 %
HGB BLD-MCNC: 11 G/DL
HGB UR QL STRIP: ABNORMAL
HYALINE CASTS #/AREA URNS LPF: 0 /LPF
INR PPP: 1.1
KETONES UR QL STRIP: NEGATIVE
LEUKOCYTE ESTERASE UR QL STRIP: ABNORMAL
LYMPHOCYTES # BLD AUTO: 0.9 K/UL
LYMPHOCYTES NFR BLD: 14.3 %
MCH RBC QN AUTO: 30.7 PG
MCHC RBC AUTO-ENTMCNC: 32.3 G/DL
MCV RBC AUTO: 95 FL
MICROSCOPIC COMMENT: ABNORMAL
MONOCYTES # BLD AUTO: 0.6 K/UL
MONOCYTES NFR BLD: 8.8 %
NEUTROPHILS # BLD AUTO: 4.7 K/UL
NEUTROPHILS NFR BLD: 74.1 %
NITRITE UR QL STRIP: NEGATIVE
PH UR STRIP: 6 [PH] (ref 5–8)
PLATELET # BLD AUTO: 115 K/UL
PMV BLD AUTO: 11 FL
POTASSIUM SERPL-SCNC: 4.5 MMOL/L
PROT SERPL-MCNC: 7.6 G/DL
PROT UR QL STRIP: ABNORMAL
PROTHROMBIN TIME: 11.3 SEC
RBC # BLD AUTO: 3.58 M/UL
RBC #/AREA URNS HPF: >100 /HPF (ref 0–4)
SODIUM SERPL-SCNC: 141 MMOL/L
SP GR UR STRIP: 1.01 (ref 1–1.03)
URN SPEC COLLECT METH UR: ABNORMAL
UROBILINOGEN UR STRIP-ACNC: NEGATIVE EU/DL
WBC # BLD AUTO: 6.36 K/UL
WBC #/AREA URNS HPF: >100 /HPF (ref 0–5)

## 2017-08-12 PROCEDURE — 87077 CULTURE AEROBIC IDENTIFY: CPT

## 2017-08-12 PROCEDURE — 87186 SC STD MICRODIL/AGAR DIL: CPT

## 2017-08-12 PROCEDURE — 99283 EMERGENCY DEPT VISIT LOW MDM: CPT

## 2017-08-12 PROCEDURE — 81000 URINALYSIS NONAUTO W/SCOPE: CPT

## 2017-08-12 PROCEDURE — 80053 COMPREHEN METABOLIC PANEL: CPT

## 2017-08-12 PROCEDURE — 85025 COMPLETE CBC W/AUTO DIFF WBC: CPT

## 2017-08-12 PROCEDURE — 87086 URINE CULTURE/COLONY COUNT: CPT

## 2017-08-12 PROCEDURE — 87088 URINE BACTERIA CULTURE: CPT

## 2017-08-12 PROCEDURE — 85610 PROTHROMBIN TIME: CPT

## 2017-08-12 RX ORDER — CIPROFLOXACIN 250 MG/1
250 TABLET, FILM COATED ORAL 2 TIMES DAILY
Qty: 14 TABLET | Refills: 0 | Status: SHIPPED | OUTPATIENT
Start: 2017-08-12 | End: 2017-08-19

## 2017-08-12 NOTE — ED TRIAGE NOTES
"Pt arrives to ED via personal transportation with c/o one episode of hematuria this morning, also reports "when I got up from bed I had some lower back pain and then I went to urinate and saw blood in my urine today." denies dysuria, burning upon urination, chest pain, SOB, or any other symptoms at this time.  "

## 2017-08-14 LAB — BACTERIA UR CULT: NORMAL

## 2017-08-16 NOTE — ED PROVIDER NOTES
"Encounter Date: 8/12/2017       History     Chief Complaint   Patient presents with    Hematuria     2 episodes of bloody urine this morning; reports "pinkish urine;" reports lower back pain this morning; has cardiac defibrillator     Patient presents complaining of hematuria.  Occurred twice since this morning.  Mild burning associated.  No abdominal pain.  No flank pain.  No fever.  No nausea vomiting.  Denies any vaginal bleeding or rectal bleeding.  Denies any bleeding of the gums or bruising or other signs of bleeding.          Review of patient's allergies indicates:   Allergen Reactions    Aspirin Swelling     Swelling and rash    Colace [docusate sodium] Rash     itching    Sulfa (sulfonamide antibiotics) Swelling     Swelling and rash    Iodine and iodide containing products Rash    Penicillins Rash     Past Medical History:   Diagnosis Date    Anticoagulant long-term use     Coronary artery disease     Gout attack     Hypercholesteremia     Hypertension     Renal disorder      Past Surgical History:   Procedure Laterality Date    APPENDECTOMY      CARDIAC DEFIBRILLATOR PLACEMENT      2015    CHOLECYSTECTOMY      COLONOSCOPY W/ POLYPECTOMY  10 or 11/ 2014    per Dr. Elen ceja Left 8/2008    HYSTERECTOMY      JOINT REPLACEMENT Bilateral 2005 & 2006    2 Knee Replacements=Lt. 1= 2005. Rt. 1= 2006    OOPHORECTOMY      TOTAL KNEE ARTHROPLASTY Bilateral Lt.= 2005; Rt.=2006    Bilateral Knee scope     Family History   Problem Relation Age of Onset    Heart attack Mother 88    Hypertension Mother     Hyperlipidemia Mother     Diabetes Other     Hypertension Other      Social History   Substance Use Topics    Smoking status: Never Smoker    Smokeless tobacco: Never Used    Alcohol use Yes      Comment: rarely     Review of Systems   Constitutional: Negative for fever.   HENT: Negative for sore throat.    Eyes: Negative for visual disturbance.   Respiratory: Negative for " shortness of breath.    Cardiovascular: Negative for chest pain.   Gastrointestinal: Negative for abdominal pain.   Genitourinary: Negative for difficulty urinating.   Musculoskeletal: Negative for back pain.   Skin: Negative for rash.   Neurological: Negative for headaches.       Physical Exam     Initial Vitals [08/12/17 0821]   BP Pulse Resp Temp SpO2   (!) 167/78 76 20 99.4 °F (37.4 °C) 98 %      MAP       107.67         Physical Exam    Nursing note and vitals reviewed.  Constitutional: She appears well-developed and well-nourished.   Eyes: EOM are normal. Pupils are equal, round, and reactive to light.   Neck: Normal range of motion. Neck supple. No thyromegaly present. No JVD present.   Cardiovascular: Normal rate, regular rhythm, normal heart sounds and intact distal pulses. Exam reveals no gallop and no friction rub.    No murmur heard.  Pulmonary/Chest: Breath sounds normal. No respiratory distress.   Abdominal: Soft. Bowel sounds are normal. She exhibits no distension and no mass. There is no tenderness. There is no rebound and no guarding.   No CVA tenderness.   Musculoskeletal: Normal range of motion. She exhibits no edema or tenderness.   Neurological: She is alert and oriented to person, place, and time. She has normal strength.   Skin: Skin is warm and dry.   No bruising.         ED Course   Procedures  Labs Reviewed   URINALYSIS - Abnormal; Notable for the following:        Result Value    Appearance, UA Cloudy (*)     Protein, UA 1+ (*)     Occult Blood UA 3+ (*)     Leukocytes, UA 3+ (*)     All other components within normal limits   CBC W/ AUTO DIFFERENTIAL - Abnormal; Notable for the following:     RBC 3.58 (*)     Hemoglobin 11.0 (*)     Hematocrit 34.1 (*)     Platelets 115 (*)     Lymph # 0.9 (*)     Gran% 74.1 (*)     Lymph% 14.3 (*)     All other components within normal limits   COMPREHENSIVE METABOLIC PANEL - Abnormal; Notable for the following:     BUN, Bld 25 (*)     eGFR if African  American 40 (*)     eGFR if non  35 (*)     All other components within normal limits   URINALYSIS MICROSCOPIC - Abnormal; Notable for the following:     RBC, UA >100 (*)     WBC, UA >100 (*)     All other components within normal limits   CULTURE, URINE   PROTIME-INR        Patient is anemic and has mild renal insufficiency but is consistent with prior labs.  Urinalysis suggests infection.  We'll treat for UTI.  Likely hemorrhagic cystitis.  No clinical evidence of pyelonephritis.  Stable for discharge.  Patient is comfortable with plan.                       ED Course     Clinical Impression:   The encounter diagnosis was Urinary tract infection with hematuria, site unspecified.                           Nicola Meyer MD  08/16/17 2230

## 2017-11-14 ENCOUNTER — OFFICE VISIT (OUTPATIENT)
Dept: CARDIOLOGY | Facility: CLINIC | Age: 82
End: 2017-11-14
Payer: MEDICARE

## 2017-11-14 VITALS
OXYGEN SATURATION: 99 % | HEART RATE: 63 BPM | WEIGHT: 185.19 LBS | DIASTOLIC BLOOD PRESSURE: 73 MMHG | SYSTOLIC BLOOD PRESSURE: 151 MMHG | BODY MASS INDEX: 32.8 KG/M2

## 2017-11-14 DIAGNOSIS — I10 ESSENTIAL HYPERTENSION: ICD-10-CM

## 2017-11-14 DIAGNOSIS — Z95.810 ICD (IMPLANTABLE CARDIOVERTER-DEFIBRILLATOR), BIVENTRICULAR, IN SITU: ICD-10-CM

## 2017-11-14 DIAGNOSIS — I25.84 CORONARY ARTERY DISEASE DUE TO CALCIFIED CORONARY LESION: ICD-10-CM

## 2017-11-14 DIAGNOSIS — I50.22 CHRONIC SYSTOLIC HEART FAILURE: Primary | ICD-10-CM

## 2017-11-14 DIAGNOSIS — N18.30 CHRONIC KIDNEY DISEASE (CKD), STAGE III (MODERATE): ICD-10-CM

## 2017-11-14 DIAGNOSIS — E78.2 MIXED HYPERLIPIDEMIA: ICD-10-CM

## 2017-11-14 DIAGNOSIS — I25.10 CORONARY ARTERY DISEASE DUE TO CALCIFIED CORONARY LESION: ICD-10-CM

## 2017-11-14 PROCEDURE — 93289 INTERROG DEVICE EVAL HEART: CPT | Mod: S$GLB,,, | Performed by: INTERNAL MEDICINE

## 2017-11-14 PROCEDURE — 99214 OFFICE O/P EST MOD 30 MIN: CPT | Mod: 25,S$GLB,, | Performed by: INTERNAL MEDICINE

## 2017-11-14 PROCEDURE — 99999 PR PBB SHADOW E&M-EST. PATIENT-LVL III: CPT | Mod: PBBFAC,,, | Performed by: INTERNAL MEDICINE

## 2017-11-14 NOTE — PROGRESS NOTES
Subjective:    Patient ID:  Palmira Malave is a 82 y.o. female who presents for follow-up of No chief complaint on file.      Coronary Artery Disease       Previous history:  Here for follow-up of systolic heart failure and biventricular ICD interrogation.  She was recently in the emergency room and had an alarm sound on her device.  There was a high threshold noted in the previously placed right ventricular lead at outside hospital.  She recently undergone upgrade to biventricular device and postprocedure resting look good with the device check.  She is brought in for reevaluation and on device interrogation was found to have no significant issues at this juncture.  Some of her alarms Aguilar reconfigured.  Otherwise she denies any cardiopulmonary complaints.  She still feels well.  She denies any chest pain, shortness of breath or palpitations.  She's not expressing PND, orthopnea or lower edema.  She denies any dizziness, presyncope or syncope.    Today:  Here for follow-up of systolic heart failure and biventricular ICD interrogation.  She denies any worsening cardiopulmonary complaints since we last saw her.  She's had no issues with her device.  She is expressed no worsening cardiopulmonary complaints.  She denies any chest pain, shortness breath or palpitations.  She's not expressing PND, orthopnea or lower edema.  She denies any dizziness, presyncope or syncope.  Otherwise she's better usual state of health.    Review of Systems   Constitution: Negative.   HENT: Negative.    Eyes: Negative.    Cardiovascular: Negative for dyspnea on exertion, irregular heartbeat, near-syncope, orthopnea, paroxysmal nocturnal dyspnea and syncope.   Skin: Negative.    Musculoskeletal: Negative.    Gastrointestinal: Negative for abdominal pain, constipation and diarrhea.   Genitourinary: Negative for dysuria.   Neurological: Negative.    Psychiatric/Behavioral: Negative.         Objective:    Physical Exam   Constitutional: She  is oriented to person, place, and time. She appears well-developed and well-nourished.   HENT:   Head: Normocephalic and atraumatic.   Eyes: Conjunctivae and EOM are normal. Pupils are equal, round, and reactive to light.   Neck: Normal range of motion. Neck supple. No thyromegaly present.   Cardiovascular: Normal rate and regular rhythm.    No murmur heard.  Pulmonary/Chest: Effort normal and breath sounds normal. No respiratory distress.   Abdominal: Soft. Bowel sounds are normal.   Musculoskeletal: She exhibits no edema.   Neurological: She is alert and oriented to person, place, and time.   Skin: Skin is warm and dry.   Psychiatric: She has a normal mood and affect. Her behavior is normal.         Assessment:       1. Chronic systolic heart failure    2. ICD (implantable cardioverter-defibrillator), biventricular, in situ    3. Chronic kidney disease (CKD), stage III (moderate)    4. Coronary artery disease due to calcified coronary lesion    5. Essential hypertension    6. Mixed hyperlipidemia         Plan:       -cont med tx  -cont surveillance of device    RTC 6 mos          BIVE ICD (Medtronic)    DDDR 60     apced 24%, V paced 99%    A: 3.4 V, 437 ohms, 0.625 V @ 0.4 ms  RV: > 20 V, 494 ohms, 1 V @ 0.4 ms  LV: 399 ohms, 1 V @ 0.4 ms    No episodes  No changes    RTC 6 mos

## 2018-01-02 ENCOUNTER — HOSPITAL ENCOUNTER (EMERGENCY)
Facility: HOSPITAL | Age: 83
Discharge: HOME | End: 2018-01-02
Attending: EMERGENCY MEDICINE
Payer: MEDICARE

## 2018-01-02 VITALS
SYSTOLIC BLOOD PRESSURE: 142 MMHG | HEIGHT: 63 IN | BODY MASS INDEX: 31.89 KG/M2 | TEMPERATURE: 98 F | HEART RATE: 90 BPM | OXYGEN SATURATION: 95 % | DIASTOLIC BLOOD PRESSURE: 82 MMHG | RESPIRATION RATE: 16 BRPM | WEIGHT: 180 LBS

## 2018-01-02 DIAGNOSIS — S46.812A STRAIN OF LEFT TRAPEZIUS MUSCLE, INITIAL ENCOUNTER: Primary | ICD-10-CM

## 2018-01-02 PROCEDURE — 25000003 PHARM REV CODE 250: Performed by: PHYSICIAN ASSISTANT

## 2018-01-02 PROCEDURE — 99283 EMERGENCY DEPT VISIT LOW MDM: CPT

## 2018-01-02 RX ORDER — ACETAMINOPHEN 500 MG
500 TABLET ORAL
Status: COMPLETED | OUTPATIENT
Start: 2018-01-02 | End: 2018-01-02

## 2018-01-02 RX ORDER — ACETAMINOPHEN 500 MG
500 TABLET ORAL EVERY 6 HOURS PRN
Qty: 12 TABLET | Refills: 0 | Status: SHIPPED | OUTPATIENT
Start: 2018-01-02 | End: 2019-09-11

## 2018-01-02 RX ADMIN — ACETAMINOPHEN 500 MG: 500 TABLET ORAL at 10:01

## 2018-01-02 NOTE — ED TRIAGE NOTES
Pain to posterior neck left that radiates to back of head all day yesterday, denies dizziness , nausea, vomiting or diarrhea or changes in vision. Denies trauma but adds she's had whiplash problem for years.

## 2018-01-02 NOTE — ED PROVIDER NOTES
"Encounter Date: 1/2/2018    SCRIBE #1 NOTE: I, Ileana Kellogg, am scribing for, and in the presence of,  Martín Shaffer PA-C. I have scribed the following portions of the note - Other sections scribed: HPI and ROS.       History     Chief Complaint   Patient presents with    Neck Pain     left side of neck to back of head all day yesterday , better today.     CC: Neck Pain    HPI: This 82 y.o female who has CAD, Gout, Hypercholesteremia, Hypertension, Renal disorder, and Anticoagulant long term use presents to the ED c/o acute, constant "aching" left sided posterior neck pain for the past 4 days, which worsened last night.  Patient reports she has been having an intermittent sore throat, occipital headache, rhinorrhea, and cough, all of which she has been treating with Delsym.  Patient states she thinks her neck pain is a result of arthritis and reports having a whiplash injury a while ago, which did not required surgical intervention.  Patient denies ear pain, visual changes, chest pain, shortness of breath, abdominal pain, dysuria, urinary frequency, or any other associated symptoms.  She denies any recent falls, trauma, or injuries.  No alleviating factors.      The history is provided by the patient. No  was used.     Review of patient's allergies indicates:   Allergen Reactions    Aspirin Swelling     Swelling and rash    Colace [docusate sodium] Rash     itching    Sulfa (sulfonamide antibiotics) Swelling     Swelling and rash    Iodine and iodide containing products Rash    Penicillins Rash     Past Medical History:   Diagnosis Date    Anticoagulant long-term use     Coronary artery disease     Gout attack     Hypercholesteremia     Hypertension     Renal disorder      Past Surgical History:   Procedure Laterality Date    APPENDECTOMY      CARDIAC DEFIBRILLATOR PLACEMENT      2015    CHOLECYSTECTOMY      COLONOSCOPY W/ POLYPECTOMY  10 or 11/ 2014    per Dr. Myrick    " defribillator Left 8/2008    HYSTERECTOMY      JOINT REPLACEMENT Bilateral 2005 & 2006    2 Knee Replacements=Lt. 1= 2005. Rt. 1= 2006    OOPHORECTOMY      TOTAL KNEE ARTHROPLASTY Bilateral Lt.= 2005; Rt.=2006    Bilateral Knee scope     Family History   Problem Relation Age of Onset    Heart attack Mother 88    Hypertension Mother     Hyperlipidemia Mother     Diabetes Other     Hypertension Other      Social History   Substance Use Topics    Smoking status: Never Smoker    Smokeless tobacco: Never Used    Alcohol use Yes      Comment: rarely     Review of Systems   Constitutional: Negative for chills and fever.   HENT: Positive for rhinorrhea and sore throat. Negative for congestion and ear pain.    Eyes: Negative for pain and visual disturbance.   Respiratory: Positive for cough. Negative for shortness of breath.    Cardiovascular: Negative for chest pain.   Gastrointestinal: Negative for abdominal pain, diarrhea, nausea and vomiting.   Genitourinary: Negative for dysuria.   Musculoskeletal: Positive for neck pain. Negative for back pain.   Skin: Negative for rash.   Neurological: Positive for headaches. Negative for weakness and numbness.       Physical Exam     Initial Vitals [01/02/18 0930]   BP Pulse Resp Temp SpO2   (!) 169/80 67 16 98.3 °F (36.8 °C) 96 %      MAP       109.67         Physical Exam    Nursing note and vitals reviewed.  Constitutional: She appears well-developed and well-nourished. She is not diaphoretic. No distress.   HENT:   Head: Normocephalic and atraumatic.   Right Ear: Tympanic membrane, external ear and ear canal normal. No mastoid tenderness.   Left Ear: Tympanic membrane, external ear and ear canal normal. No mastoid tenderness.   Nose: Nose normal. No rhinorrhea. Right sinus exhibits no maxillary sinus tenderness and no frontal sinus tenderness. Left sinus exhibits no maxillary sinus tenderness and no frontal sinus tenderness.   Mouth/Throat: Uvula is midline and  oropharynx is clear and moist. No trismus in the jaw. No dental abscesses or uvula swelling. No oropharyngeal exudate, posterior oropharyngeal edema, posterior oropharyngeal erythema or tonsillar abscesses.   Eyes: Conjunctivae and EOM are normal. Right eye exhibits no discharge. Left eye exhibits no discharge.   Neck: Normal range of motion. No tracheal deviation present. No JVD present.   Cardiovascular: Normal rate, regular rhythm and normal heart sounds. Exam reveals no friction rub.    No murmur heard.  Pulmonary/Chest: Breath sounds normal. No stridor. No respiratory distress. She has no wheezes. She has no rhonchi. She has no rales. She exhibits no tenderness.   Musculoskeletal: Normal range of motion.   Mild reproducible TTP of the L trapezius muscle. No midline TTP to spine and has full cervical ROM. No skin changes.    Lymphadenopathy:     She has no cervical adenopathy.   Neurological: She is alert and oriented to person, place, and time. She displays no seizure activity. Coordination and gait normal. GCS eye subscore is 4. GCS verbal subscore is 5. GCS motor subscore is 6.   Skin: Skin is warm and dry. No rash and no abscess noted. No erythema. No pallor.         ED Course   Procedures  Labs Reviewed - No data to display          Medical Decision Making:   History:   Old Medical Records: I decided to obtain old medical records.    This is an emergent evaluation of a 82 y.o. female with a PMHx of CAD presenting to the ED for atraumatic L sided neck pain. Denies fever, emesis, visual disturbance. /80, vitals otherwise WNL, afebrile. Patient is non-toxic appearing and in no acute distress. Likely mild muscle strain s/p coughing from viral URI that appears to overall be improving; patient and family agree. I doubt meningitis, acute vertebral fracture, and HZV.     Discharged home with Tylenol. Instructed to follow up with PCP for reevaluation and management of symptoms.     I discussed with the patient  the diagnosis, treatment plan, indications for return to the emergency department, and for expected follow-up. The patient verbalized an understanding. The patient is asked if there are any questions or concerns. We discuss the case, until all issues are addressed to the patients satisfaction. Patient understands and is agreeable to the plan.     I discussed this patient with Dr. Son who is in agreement with my assessment and plan.           Scribe Attestation:   Scribe #1: I performed the above scribed service and the documentation accurately describes the services I performed. I attest to the accuracy of the note.    Attending Attestation:           Physician Attestation for Scribe:  Physician Attestation Statement for Scribe #1: I, Martín Shaffer PA-C, reviewed documentation, as scribed by Ileana Kellogg in my presence, and it is both accurate and complete.                 ED Course      Clinical Impression:   The encounter diagnosis was Strain of left trapezius muscle, initial encounter.    Disposition:   Disposition: Discharged  Condition: Stable                        Martín Shaffer PA-C  01/02/18 [id number]

## 2018-06-12 ENCOUNTER — OFFICE VISIT (OUTPATIENT)
Dept: CARDIOLOGY | Facility: CLINIC | Age: 83
End: 2018-06-12
Payer: MEDICARE

## 2018-06-12 VITALS
DIASTOLIC BLOOD PRESSURE: 78 MMHG | BODY MASS INDEX: 31.63 KG/M2 | SYSTOLIC BLOOD PRESSURE: 136 MMHG | OXYGEN SATURATION: 92 % | RESPIRATION RATE: 20 BRPM | HEART RATE: 81 BPM | WEIGHT: 178.56 LBS

## 2018-06-12 DIAGNOSIS — Z95.810 ICD (IMPLANTABLE CARDIOVERTER-DEFIBRILLATOR), BIVENTRICULAR, IN SITU: ICD-10-CM

## 2018-06-12 DIAGNOSIS — I10 ESSENTIAL HYPERTENSION: ICD-10-CM

## 2018-06-12 DIAGNOSIS — E78.2 MIXED HYPERLIPIDEMIA: ICD-10-CM

## 2018-06-12 DIAGNOSIS — I25.84 CORONARY ARTERY DISEASE DUE TO CALCIFIED CORONARY LESION: ICD-10-CM

## 2018-06-12 DIAGNOSIS — I50.22 CHRONIC SYSTOLIC HEART FAILURE: Primary | ICD-10-CM

## 2018-06-12 DIAGNOSIS — N18.30 CHRONIC KIDNEY DISEASE (CKD), STAGE III (MODERATE): ICD-10-CM

## 2018-06-12 DIAGNOSIS — I25.10 CORONARY ARTERY DISEASE DUE TO CALCIFIED CORONARY LESION: ICD-10-CM

## 2018-06-12 PROCEDURE — 99999 PR PBB SHADOW E&M-EST. PATIENT-LVL III: CPT | Mod: PBBFAC,,, | Performed by: INTERNAL MEDICINE

## 2018-06-12 PROCEDURE — 93289 INTERROG DEVICE EVAL HEART: CPT | Mod: 51,S$GLB,, | Performed by: INTERNAL MEDICINE

## 2018-06-12 PROCEDURE — 99214 OFFICE O/P EST MOD 30 MIN: CPT | Mod: 25,S$GLB,, | Performed by: INTERNAL MEDICINE

## 2018-06-12 PROCEDURE — 3078F DIAST BP <80 MM HG: CPT | Mod: CPTII,S$GLB,, | Performed by: INTERNAL MEDICINE

## 2018-06-12 PROCEDURE — 3075F SYST BP GE 130 - 139MM HG: CPT | Mod: CPTII,S$GLB,, | Performed by: INTERNAL MEDICINE

## 2018-06-12 NOTE — LETTER
June 12, 2018    Palmira Malave  [address] [id number]             [facility]  [address]  [facility] [address]  Phone: [phone]   Palmira Malave was seen by me on 6/12/2018 and is cleared for rehabilitation from cardiac stand point.     If you have any questions or concerns, please don't hesitate to call.    Sincerely,      Tal Batres MD

## 2018-06-12 NOTE — PROGRESS NOTES
Subjective:    Patient ID:  Palmira Malave is a 82 y.o. female who presents for follow-up of Follow-up ([employer]tr[employer]  cardiac rehab referral )      Coronary Artery Disease       Previous history:  Here for follow-up of systolic heart failure and biventricular ICD interrogation.  She was recently in the emergency room and had an alarm sound on her device.  There was a high threshold noted in the previously placed right ventricular lead at outside hospital.  She recently undergone upgrade to biventricular device and postprocedure resting look good with the device check.  She is brought in for reevaluation and on device interrogation was found to have no significant issues at this juncture.  Some of her alarms Aguilar reconfigured.  Otherwise she denies any cardiopulmonary complaints.  She still feels well.  She denies any chest pain, shortness of breath or palpitations.  She's not expressing PND, orthopnea or lower edema.  She denies any dizziness, presyncope or syncope.    Today:  Here for follow-up of systolic heart failure and biventricular ICD interrogation.  She denies any worsening cardiopulmonary complaints since we last saw her.  She's had no issues with her device.  She is expressed no worsening cardiopulmonary complaints.  She denies any chest pain, shortness breath or palpitations.  She's not expressing PND, orthopnea or lower edema.  She denies any dizziness, presyncope or syncope.  Otherwise she's better usual state of health.  He says she has a daughter the moved into scan early onset dementia she's having to take care of.  She tries to stay active by going to interactions with a female group.  She is seen by [facility] and does get injections.  He wants clearance for rehabilitation.    Review of Systems   Constitution: Negative.   HENT: Negative.    Eyes: Negative.    Cardiovascular: Negative for dyspnea on exertion, irregular heartbeat, near-syncope, orthopnea, paroxysmal nocturnal dyspnea and  syncope.   Skin: Negative.    Musculoskeletal: Positive for back pain.   Gastrointestinal: Negative for abdominal pain, constipation and diarrhea.   Genitourinary: Negative for dysuria.   Neurological: Negative.    Psychiatric/Behavioral: Negative.         Objective:    Physical Exam   Constitutional: She is oriented to person, place, and time. She appears well-developed and well-nourished.   HENT:   Head: Normocephalic and atraumatic.   Eyes: Conjunctivae and EOM are normal. Pupils are equal, round, and reactive to light.   Neck: Normal range of motion. Neck supple. No thyromegaly present.   Cardiovascular: Normal rate and regular rhythm.    No murmur heard.  Pulmonary/Chest: Effort normal and breath sounds normal. No respiratory distress.   Abdominal: Soft. Bowel sounds are normal.   Musculoskeletal: She exhibits no edema.   Neurological: She is alert and oriented to person, place, and time.   Skin: Skin is warm and dry.   Psychiatric: She has a normal mood and affect. Her behavior is normal.       catheter 2011 reported nonobstructive CAD    Assessment:       1. Chronic systolic heart failure    2. ICD (implantable cardioverter-defibrillator), biventricular, in situ    3. Chronic kidney disease (CKD), stage III (moderate)    4. Coronary artery disease due to calcified coronary lesion    5. Essential hypertension    6. Mixed hyperlipidemia         Plan:       -cont med tx for history of nonischemic heart myopathy  -Echo and carotid ultrasound  -cont surveillance of device  -Cleared for physical rehabilitation from CV standpoint    RTC 6 mos          BIVE ICD (Medtronic)    DDDR 60     Apaced 24%, V paced 99%    A: 3.4 V, 437 ohms, 0.625 V @ 0.4 ms  RV: > 20 V, 494 ohms, 1 V @ 0.4 ms  LV: 399 ohms, 1 V @ 0.4 ms    No episodes  No changes    RTC 6 mos

## 2018-06-20 DIAGNOSIS — Z12.39 SCREENING BREAST EXAMINATION: Primary | ICD-10-CM

## 2018-06-28 ENCOUNTER — HOSPITAL ENCOUNTER (OUTPATIENT)
Dept: RADIOLOGY | Facility: HOSPITAL | Age: 83
Discharge: HOME | End: 2018-06-28
Attending: INTERNAL MEDICINE
Payer: MEDICARE

## 2018-06-28 ENCOUNTER — HOSPITAL ENCOUNTER (OUTPATIENT)
Dept: CARDIOLOGY | Facility: HOSPITAL | Age: 83
Discharge: HOME | End: 2018-06-28
Attending: INTERNAL MEDICINE
Payer: MEDICARE

## 2018-06-28 DIAGNOSIS — I25.84 CORONARY ARTERY DISEASE DUE TO CALCIFIED CORONARY LESION: ICD-10-CM

## 2018-06-28 DIAGNOSIS — Z12.39 SCREENING BREAST EXAMINATION: ICD-10-CM

## 2018-06-28 DIAGNOSIS — I25.10 CORONARY ARTERY DISEASE DUE TO CALCIFIED CORONARY LESION: ICD-10-CM

## 2018-06-28 LAB
AORTIC VALVE STENOSIS: ABNORMAL
DIASTOLIC DYSFUNCTION: YES
ESTIMATED PA SYSTOLIC PRESSURE: 33.2
GLOBAL PERICARDIAL EFFUSION: ABNORMAL
MITRAL VALVE MOBILITY: NORMAL
MITRAL VALVE REGURGITATION: ABNORMAL
RETIRED EF AND QEF - SEE NOTES: 20 (ref 55–65)
TRICUSPID VALVE REGURGITATION: ABNORMAL

## 2018-06-28 PROCEDURE — 93306 TTE W/DOPPLER COMPLETE: CPT

## 2018-06-28 PROCEDURE — 77063 BREAST TOMOSYNTHESIS BI: CPT | Mod: 26,,, | Performed by: RADIOLOGY

## 2018-06-28 PROCEDURE — 77067 SCR MAMMO BI INCL CAD: CPT | Mod: 26,,, | Performed by: RADIOLOGY

## 2018-06-28 PROCEDURE — 93306 TTE W/DOPPLER COMPLETE: CPT | Mod: 26,,, | Performed by: INTERNAL MEDICINE

## 2018-06-28 PROCEDURE — 77067 SCR MAMMO BI INCL CAD: CPT | Mod: TC

## 2018-07-01 ENCOUNTER — HOSPITAL ENCOUNTER (EMERGENCY)
Facility: HOSPITAL | Age: 83
Discharge: HOME | End: 2018-07-01
Attending: EMERGENCY MEDICINE
Payer: MEDICARE

## 2018-07-01 VITALS
DIASTOLIC BLOOD PRESSURE: 70 MMHG | TEMPERATURE: 98 F | OXYGEN SATURATION: 99 % | HEART RATE: 64 BPM | BODY MASS INDEX: 31.89 KG/M2 | RESPIRATION RATE: 18 BRPM | HEIGHT: 63 IN | SYSTOLIC BLOOD PRESSURE: 160 MMHG | WEIGHT: 180 LBS

## 2018-07-01 DIAGNOSIS — R10.9 ABDOMINAL PAIN: ICD-10-CM

## 2018-07-01 DIAGNOSIS — R11.0 NAUSEA: Primary | ICD-10-CM

## 2018-07-01 LAB
ALBUMIN SERPL BCP-MCNC: 3.9 G/DL
ALP SERPL-CCNC: 71 U/L
ALT SERPL W/O P-5'-P-CCNC: 13 U/L
ANION GAP SERPL CALC-SCNC: 9 MMOL/L
AST SERPL-CCNC: 21 U/L
BASOPHILS # BLD AUTO: 0.02 K/UL
BASOPHILS NFR BLD: 0.4 %
BILIRUB SERPL-MCNC: 0.7 MG/DL
BUN SERPL-MCNC: 30 MG/DL
CALCIUM SERPL-MCNC: 10 MG/DL
CHLORIDE SERPL-SCNC: 103 MMOL/L
CO2 SERPL-SCNC: 27 MMOL/L
CREAT SERPL-MCNC: 1.5 MG/DL
DIFFERENTIAL METHOD: ABNORMAL
EOSINOPHIL # BLD AUTO: 0.1 K/UL
EOSINOPHIL NFR BLD: 2.2 %
ERYTHROCYTE [DISTWIDTH] IN BLOOD BY AUTOMATED COUNT: 13.4 %
EST. GFR  (AFRICAN AMERICAN): 37 ML/MIN/1.73 M^2
EST. GFR  (NON AFRICAN AMERICAN): 32 ML/MIN/1.73 M^2
GLUCOSE SERPL-MCNC: 124 MG/DL
HCT VFR BLD AUTO: 34.8 %
HGB BLD-MCNC: 11.2 G/DL
LIPASE SERPL-CCNC: 24 U/L
LYMPHOCYTES # BLD AUTO: 0.9 K/UL
LYMPHOCYTES NFR BLD: 17.4 %
MCH RBC QN AUTO: 31.5 PG
MCHC RBC AUTO-ENTMCNC: 32.2 G/DL
MCV RBC AUTO: 98 FL
MONOCYTES # BLD AUTO: 0.4 K/UL
MONOCYTES NFR BLD: 7.9 %
NEUTROPHILS # BLD AUTO: 3.6 K/UL
NEUTROPHILS NFR BLD: 72.1 %
PLATELET # BLD AUTO: 114 K/UL
PMV BLD AUTO: 10.5 FL
POTASSIUM SERPL-SCNC: 3.9 MMOL/L
PROT SERPL-MCNC: 7.2 G/DL
RBC # BLD AUTO: 3.55 M/UL
SODIUM SERPL-SCNC: 139 MMOL/L
WBC # BLD AUTO: 5.05 K/UL

## 2018-07-01 PROCEDURE — 96375 TX/PRO/DX INJ NEW DRUG ADDON: CPT

## 2018-07-01 PROCEDURE — 80053 COMPREHEN METABOLIC PANEL: CPT

## 2018-07-01 PROCEDURE — 85025 COMPLETE CBC W/AUTO DIFF WBC: CPT

## 2018-07-01 PROCEDURE — 25000003 PHARM REV CODE 250: Performed by: EMERGENCY MEDICINE

## 2018-07-01 PROCEDURE — 96372 THER/PROPH/DIAG INJ SC/IM: CPT | Mod: 59

## 2018-07-01 PROCEDURE — 96374 THER/PROPH/DIAG INJ IV PUSH: CPT

## 2018-07-01 PROCEDURE — 99285 EMERGENCY DEPT VISIT HI MDM: CPT | Mod: 25

## 2018-07-01 PROCEDURE — 63600175 PHARM REV CODE 636 W HCPCS: Performed by: EMERGENCY MEDICINE

## 2018-07-01 PROCEDURE — 83690 ASSAY OF LIPASE: CPT

## 2018-07-01 PROCEDURE — 96361 HYDRATE IV INFUSION ADD-ON: CPT

## 2018-07-01 RX ORDER — ONDANSETRON 2 MG/ML
4 INJECTION INTRAMUSCULAR; INTRAVENOUS
Status: COMPLETED | OUTPATIENT
Start: 2018-07-01 | End: 2018-07-01

## 2018-07-01 RX ORDER — ONDANSETRON 4 MG/1
4 TABLET, FILM COATED ORAL EVERY 8 HOURS PRN
Qty: 10 TABLET | Refills: 0 | Status: SHIPPED | OUTPATIENT
Start: 2018-07-01 | End: 2019-01-03

## 2018-07-01 RX ORDER — MORPHINE SULFATE 4 MG/ML
4 INJECTION, SOLUTION INTRAMUSCULAR; INTRAVENOUS
Status: COMPLETED | OUTPATIENT
Start: 2018-07-01 | End: 2018-07-01

## 2018-07-01 RX ORDER — DICYCLOMINE HYDROCHLORIDE 10 MG/ML
20 INJECTION INTRAMUSCULAR
Status: COMPLETED | OUTPATIENT
Start: 2018-07-01 | End: 2018-07-01

## 2018-07-01 RX ADMIN — SODIUM CHLORIDE 1000 ML: 0.9 INJECTION, SOLUTION INTRAVENOUS at 02:07

## 2018-07-01 RX ADMIN — MORPHINE SULFATE 4 MG: 4 INJECTION INTRAVENOUS at 02:07

## 2018-07-01 RX ADMIN — DICYCLOMINE HYDROCHLORIDE 20 MG: 10 INJECTION INTRAMUSCULAR at 02:07

## 2018-07-01 RX ADMIN — ONDANSETRON 4 MG: 2 INJECTION INTRAMUSCULAR; INTRAVENOUS at 02:07

## 2018-07-01 NOTE — DISCHARGE INSTRUCTIONS
Please follow up with your PCP this week for further evaluation and management.  Please return for new or worsening symptoms such as fever, black or bloody stools, intractable vomiting, severe worsening of abdominal pain.

## 2018-07-01 NOTE — ED NOTES
Pt laying in bed in nad. Pt denies any pain. Pt given cup of water, cup of apple juice, and santiago cracker for PO challenge.

## 2018-07-01 NOTE — ED NOTES
Pt ate crackers and drank juice with no issues. Pt laying in bed in no immediate distress. Pt states she feels much better. Dr. Escamilla made aware and states pt is ok for discharge and that urine test is not needed.

## 2018-07-01 NOTE — ED TRIAGE NOTES
Pt presents to ED with c/o generalazied abdominal cramping onset today around 11 AM. Pt also c/o nausea, denies vomiting or CP.

## 2018-07-01 NOTE — ED PROVIDER NOTES
Encounter Date: 7/1/2018  SORT MSE:  Pt is a 82 y.o. female who presents for emergent consideration for abd cramping. Pt will be moved to room when one is available, otherwise will wait in waiting room with triage nurse supervision.  Pt arrived by ambulatory. She is not in distress. Orders have been placed. LEANDRA Shields [facility]NP [facility] 07/01/2018 1342   SCRIBE #1 NOTE: IMartín, am scribing for, and in the presence of,  Erich Hickman Jr., MD. I have scribed the following portions of the note - Other sections scribed: HPI, ROS.       History     Chief Complaint   Patient presents with    Abdominal Pain     generalized abd cramping with nausea x2 hours     CC: Abdominal Pain    HPI: This 82 y.o. Female with CAD, gout attack, HTN and renal disorder presents to the ED c/o generalized abdominal pain and nausea which began this morning. Pt reports having mammogram and echocardiogram x3 days ago. Her most recent BM was yesterday. Pt has not taken any meds for her symptoms. She denies emesis, back pain, dysuria or black/bloody stool.      The history is provided by the patient. No  was used.     Review of patient's allergies indicates:   Allergen Reactions    Aspirin Swelling     Swelling and rash    Colace [docusate sodium] Rash     itching    Sulfa (sulfonamide antibiotics) Swelling     Swelling and rash    Iodine and iodide containing products Rash    Penicillins Rash     Past Medical History:   Diagnosis Date    Anticoagulant long-term use     Coronary artery disease     Gout attack     Hypercholesteremia     Hypertension     Renal disorder      Past Surgical History:   Procedure Laterality Date    APPENDECTOMY      CARDIAC DEFIBRILLATOR PLACEMENT      2015    CHOLECYSTECTOMY      COLONOSCOPY W/ POLYPECTOMY  10 or 11/ 2014    per Dr. Myrick    defglennlator Left 8/2008    HYSTERECTOMY      JOINT REPLACEMENT Bilateral 2005 & 2006    2 Knee Replacements=Lt. 1= 2005. Rt. 1=  2006    OOPHORECTOMY      TOTAL KNEE ARTHROPLASTY Bilateral Lt.= 2005; Rt.=2006    Bilateral Knee scope     Family History   Problem Relation Age of Onset    Heart attack Mother 88    Hypertension Mother     Hyperlipidemia Mother     Diabetes Other     Hypertension Other      Social History   Substance Use Topics    Smoking status: Never Smoker    Smokeless tobacco: Never Used    Alcohol use Yes      Comment: rarely     Review of Systems   Constitutional: Negative for chills and fever.   HENT: Negative for ear pain, rhinorrhea and sore throat.    Eyes: Negative for redness.   Respiratory: Negative for shortness of breath.    Cardiovascular: Negative for chest pain.   Gastrointestinal: Positive for abdominal pain and nausea. Negative for diarrhea and vomiting.   Genitourinary: Negative for dysuria and hematuria.   Musculoskeletal: Negative for back pain and neck pain.   Skin: Negative for rash.   Neurological: Negative for weakness, numbness and headaches.   Hematological: Does not bruise/bleed easily.   Psychiatric/Behavioral: The patient is not nervous/anxious.        Physical Exam     Initial Vitals [07/01/18 1346]   BP Pulse Resp Temp SpO2   (!) 142/90 94 18 98 °F (36.7 °C) 98 %      MAP       --         Physical Exam    Nursing note and vitals reviewed.  Constitutional: She appears well-developed and well-nourished. She is not diaphoretic. No distress.   HENT:   Head: Normocephalic and atraumatic.   Right Ear: External ear normal.   Left Ear: External ear normal.   Nose: Nose normal.   Mouth/Throat: Oropharynx is clear and moist.   Eyes: Conjunctivae and EOM are normal. Pupils are equal, round, and reactive to light. Right eye exhibits no discharge. Left eye exhibits no discharge. No scleral icterus.   Neck: Normal range of motion. Neck supple.   Cardiovascular: Normal rate, regular rhythm, normal heart sounds and intact distal pulses.   No murmur heard.  Pulmonary/Chest: Breath sounds normal. No  respiratory distress. She has no wheezes. She has no rhonchi. She has no rales.   Abdominal: Soft. Bowel sounds are normal. She exhibits no distension and no mass. There is no tenderness. There is no rebound and no guarding.   Abdomen is soft and nontender in all 4 quadrants.  No distention.  Normal bowel sounds. No focal tenderness.  No hernias or masses.   Musculoskeletal: Normal range of motion. She exhibits no edema or tenderness.   Neurological: She is alert and oriented to person, place, and time. She has normal strength. No cranial nerve deficit or sensory deficit.   Skin: Skin is warm and dry. No rash noted. No erythema. No pallor.   Psychiatric: She has a normal mood and affect. Her behavior is normal. Judgment and thought content normal.         ED Course   Procedures  Labs Reviewed   CBC W/ AUTO DIFFERENTIAL - Abnormal; Notable for the following:        Result Value    RBC 3.55 (*)     Hemoglobin 11.2 (*)     Hematocrit 34.8 (*)     MCH 31.5 (*)     Platelets 114 (*)     Lymph # 0.9 (*)     Lymph% 17.4 (*)     All other components within normal limits   COMPREHENSIVE METABOLIC PANEL - Abnormal; Notable for the following:     Glucose 124 (*)     BUN, Bld 30 (*)     Creatinine 1.5 (*)     eGFR if  37 (*)     eGFR if non  32 (*)     All other components within normal limits   LIPASE     EKG Readings: (Independently Interpreted)   Initial Reading: No STEMI.   EKG reviewed and interpreted by me shows a paced rhythm at a rate of 68.  Widened QRS and QT intervals.  Right axis.  No ST segment or T-wave ischemic findings.       Imaging Results    None          Medical Decision Making:   ED Management:  This is the emergent evaluation of a 82-year-old female presents emergency department with generalized abdominal pain and nausea that started this morning.  Differential diagnosis at the time of initial evaluation included, but was not limited to:   Small-bowel obstruction, viral  illness, metabolic disturbance, diverticulitis, I considered but doubt urinary tract infection.  Patient has no urinary symptoms at this time.  No significant findings of a baseline for this patient on laboratory evaluation.  Treatment of the patient's symptoms resulting complete resolution of symptoms. Re-examination of the patient's abdomen reveals no tenderness. I do not believe the patient needs emergent imaging given the above.  I believe she is safe and stable for discharge. She was advised to follow up with her PCP in take medications as prescribed.  She was advised return for new or worsening symptoms such as black or bloody stools, severe worsening abdominal pain, intractable vomiting, or fever.            Scribe Attestation:   Scribe #1: I performed the above scribed service and the documentation accurately describes the services I performed. I attest to the accuracy of the note.    Attending Attestation:           Physician Attestation for Scribe:  Physician Attestation Statement for Scribe #1: I, Erich Hickman Jr., MD, reviewed documentation, as scribed by Martín Walters in my presence, and it is both accurate and complete.                    Clinical Impression:   The primary encounter diagnosis was Nausea. A diagnosis of Abdominal pain was also pertinent to this visit.                             Erich Hickman Jr., MD  07/01/18 0826

## 2018-07-02 ENCOUNTER — PES CALL (OUTPATIENT)
Dept: ADMINISTRATIVE | Facility: CLINIC | Age: 83
End: 2018-07-02

## 2018-07-10 ENCOUNTER — HOSPITAL ENCOUNTER (OUTPATIENT)
Dept: CARDIOLOGY | Facility: HOSPITAL | Age: 83
Discharge: HOME | End: 2018-07-10
Attending: INTERNAL MEDICINE
Payer: MEDICARE

## 2018-07-10 DIAGNOSIS — I25.84 CORONARY ARTERY DISEASE DUE TO CALCIFIED CORONARY LESION: ICD-10-CM

## 2018-07-10 DIAGNOSIS — I25.10 CORONARY ARTERY DISEASE DUE TO CALCIFIED CORONARY LESION: ICD-10-CM

## 2018-07-10 LAB — INTERNAL CAROTID STENOSIS: ABNORMAL

## 2018-07-10 PROCEDURE — 93880 EXTRACRANIAL BILAT STUDY: CPT | Mod: 26,,, | Performed by: INTERNAL MEDICINE

## 2018-07-10 PROCEDURE — 93880 EXTRACRANIAL BILAT STUDY: CPT

## 2018-09-20 DIAGNOSIS — M54.50 LUMBAR BACK PAIN: Primary | ICD-10-CM

## 2018-10-02 ENCOUNTER — HOSPITAL ENCOUNTER (OUTPATIENT)
Facility: HOSPITAL | Age: 83
Discharge: HOME | End: 2018-10-03
Attending: EMERGENCY MEDICINE | Admitting: EMERGENCY MEDICINE
Payer: MEDICARE

## 2018-10-02 ENCOUNTER — NURSE TRIAGE (OUTPATIENT)
Dept: ADMINISTRATIVE | Facility: CLINIC | Age: 83
End: 2018-10-02

## 2018-10-02 DIAGNOSIS — I50.9 ACUTE ON CHRONIC CONGESTIVE HEART FAILURE, UNSPECIFIED HEART FAILURE TYPE: Primary | ICD-10-CM

## 2018-10-02 DIAGNOSIS — I50.9 CHF (CONGESTIVE HEART FAILURE): ICD-10-CM

## 2018-10-02 DIAGNOSIS — R06.02 SHORTNESS OF BREATH: ICD-10-CM

## 2018-10-02 LAB
ALBUMIN SERPL BCP-MCNC: 4.1 G/DL
ALP SERPL-CCNC: 79 U/L
ALT SERPL W/O P-5'-P-CCNC: 17 U/L
ANION GAP SERPL CALC-SCNC: 10 MMOL/L
AST SERPL-CCNC: 26 U/L
BASOPHILS # BLD AUTO: 0.02 K/UL
BASOPHILS NFR BLD: 0.3 %
BILIRUB SERPL-MCNC: 0.6 MG/DL
BILIRUB UR QL STRIP: NEGATIVE
BNP SERPL-MCNC: 1836 PG/ML
BUN SERPL-MCNC: 39 MG/DL
CALCIUM SERPL-MCNC: 10.4 MG/DL
CHLORIDE SERPL-SCNC: 102 MMOL/L
CLARITY UR: CLEAR
CO2 SERPL-SCNC: 26 MMOL/L
COLOR UR: COLORLESS
CREAT SERPL-MCNC: 1.5 MG/DL
DIFFERENTIAL METHOD: ABNORMAL
EOSINOPHIL # BLD AUTO: 0.2 K/UL
EOSINOPHIL NFR BLD: 3.3 %
ERYTHROCYTE [DISTWIDTH] IN BLOOD BY AUTOMATED COUNT: 14.5 %
EST. GFR  (AFRICAN AMERICAN): 37 ML/MIN/1.73 M^2
EST. GFR  (NON AFRICAN AMERICAN): 32 ML/MIN/1.73 M^2
GLUCOSE SERPL-MCNC: 88 MG/DL
GLUCOSE UR QL STRIP: NEGATIVE
HCT VFR BLD AUTO: 34.4 %
HGB BLD-MCNC: 11.3 G/DL
HGB UR QL STRIP: NEGATIVE
INR PPP: 1.1
KETONES UR QL STRIP: NEGATIVE
LEUKOCYTE ESTERASE UR QL STRIP: NEGATIVE
LYMPHOCYTES # BLD AUTO: 1.2 K/UL
LYMPHOCYTES NFR BLD: 19.6 %
MCH RBC QN AUTO: 31.3 PG
MCHC RBC AUTO-ENTMCNC: 32.8 G/DL
MCV RBC AUTO: 95 FL
MONOCYTES # BLD AUTO: 0.9 K/UL
MONOCYTES NFR BLD: 14.3 %
NEUTROPHILS # BLD AUTO: 3.8 K/UL
NEUTROPHILS NFR BLD: 62.5 %
NITRITE UR QL STRIP: NEGATIVE
PH UR STRIP: 6 [PH] (ref 5–8)
PLATELET # BLD AUTO: 127 K/UL
PMV BLD AUTO: 10.4 FL
POTASSIUM SERPL-SCNC: 3.7 MMOL/L
PROT SERPL-MCNC: 7.6 G/DL
PROT UR QL STRIP: NEGATIVE
PROTHROMBIN TIME: 11 SEC
RBC # BLD AUTO: 3.61 M/UL
SODIUM SERPL-SCNC: 138 MMOL/L
SP GR UR STRIP: 1 (ref 1–1.03)
TROPONIN I SERPL DL<=0.01 NG/ML-MCNC: 0.07 NG/ML
URN SPEC COLLECT METH UR: ABNORMAL
UROBILINOGEN UR STRIP-ACNC: NEGATIVE EU/DL
WBC # BLD AUTO: 6.02 K/UL

## 2018-10-02 PROCEDURE — 96374 THER/PROPH/DIAG INJ IV PUSH: CPT

## 2018-10-02 PROCEDURE — 83880 ASSAY OF NATRIURETIC PEPTIDE: CPT

## 2018-10-02 PROCEDURE — 80053 COMPREHEN METABOLIC PANEL: CPT

## 2018-10-02 PROCEDURE — 96376 TX/PRO/DX INJ SAME DRUG ADON: CPT

## 2018-10-02 PROCEDURE — 25000003 PHARM REV CODE 250: Performed by: PHYSICIAN ASSISTANT

## 2018-10-02 PROCEDURE — 85610 PROTHROMBIN TIME: CPT

## 2018-10-02 PROCEDURE — 25000003 PHARM REV CODE 250: Performed by: EMERGENCY MEDICINE

## 2018-10-02 PROCEDURE — 63600175 PHARM REV CODE 636 W HCPCS: Performed by: EMERGENCY MEDICINE

## 2018-10-02 PROCEDURE — 93010 ELECTROCARDIOGRAM REPORT: CPT | Mod: ,,, | Performed by: INTERNAL MEDICINE

## 2018-10-02 PROCEDURE — G0378 HOSPITAL OBSERVATION PER HR: HCPCS

## 2018-10-02 PROCEDURE — 93005 ELECTROCARDIOGRAM TRACING: CPT

## 2018-10-02 PROCEDURE — 99285 EMERGENCY DEPT VISIT HI MDM: CPT | Mod: 25

## 2018-10-02 PROCEDURE — 85025 COMPLETE CBC W/AUTO DIFF WBC: CPT

## 2018-10-02 PROCEDURE — 84484 ASSAY OF TROPONIN QUANT: CPT

## 2018-10-02 PROCEDURE — 81003 URINALYSIS AUTO W/O SCOPE: CPT

## 2018-10-02 RX ORDER — ONDANSETRON 2 MG/ML
4 INJECTION INTRAMUSCULAR; INTRAVENOUS EVERY 8 HOURS PRN
Status: DISCONTINUED | OUTPATIENT
Start: 2018-10-02 | End: 2018-10-03 | Stop reason: HOSPADM

## 2018-10-02 RX ORDER — LOVASTATIN 20 MG/1
40 TABLET ORAL NIGHTLY
Status: DISCONTINUED | OUTPATIENT
Start: 2018-10-02 | End: 2018-10-02

## 2018-10-02 RX ORDER — FUROSEMIDE 10 MG/ML
40 INJECTION INTRAMUSCULAR; INTRAVENOUS
Status: COMPLETED | OUTPATIENT
Start: 2018-10-02 | End: 2018-10-02

## 2018-10-02 RX ORDER — ALLOPURINOL 100 MG/1
100 TABLET ORAL NIGHTLY
Status: DISCONTINUED | OUTPATIENT
Start: 2018-10-02 | End: 2018-10-03 | Stop reason: HOSPADM

## 2018-10-02 RX ORDER — LOVASTATIN 20 MG/1
40 TABLET ORAL NIGHTLY
Status: DISCONTINUED | OUTPATIENT
Start: 2018-10-02 | End: 2018-10-03 | Stop reason: HOSPADM

## 2018-10-02 RX ORDER — CLOPIDOGREL BISULFATE 75 MG/1
75 TABLET ORAL DAILY
Status: DISCONTINUED | OUTPATIENT
Start: 2018-10-02 | End: 2018-10-03 | Stop reason: HOSPADM

## 2018-10-02 RX ORDER — CARVEDILOL 6.25 MG/1
12.5 TABLET ORAL 2 TIMES DAILY
Status: DISCONTINUED | OUTPATIENT
Start: 2018-10-02 | End: 2018-10-03 | Stop reason: HOSPADM

## 2018-10-02 RX ORDER — ACETAMINOPHEN 325 MG/1
650 TABLET ORAL EVERY 8 HOURS PRN
Status: DISCONTINUED | OUTPATIENT
Start: 2018-10-02 | End: 2018-10-03 | Stop reason: HOSPADM

## 2018-10-02 RX ORDER — FUROSEMIDE 10 MG/ML
40 INJECTION INTRAMUSCULAR; INTRAVENOUS 2 TIMES DAILY
Status: DISCONTINUED | OUTPATIENT
Start: 2018-10-02 | End: 2018-10-03 | Stop reason: HOSPADM

## 2018-10-02 RX ORDER — CLOPIDOGREL BISULFATE 75 MG/1
75 TABLET ORAL DAILY
Status: DISCONTINUED | OUTPATIENT
Start: 2018-10-03 | End: 2018-10-02

## 2018-10-02 RX ADMIN — NITROGLYCERIN 0.5 INCH: 20 OINTMENT TOPICAL at 07:10

## 2018-10-02 RX ADMIN — FUROSEMIDE 40 MG: 10 INJECTION, SOLUTION INTRAMUSCULAR; INTRAVENOUS at 07:10

## 2018-10-02 RX ADMIN — LOVASTATIN 40 MG: 20 TABLET ORAL at 09:10

## 2018-10-02 RX ADMIN — CARVEDILOL 12.5 MG: 6.25 TABLET, FILM COATED ORAL at 09:10

## 2018-10-02 NOTE — ED PROVIDER NOTES
"Encounter Date: 10/2/2018    SCRIBE #1 NOTE: I, Tony Mejias, am scribing for, and in the presence of,  Alden Anderson MD . I have scribed the following portions of the note - the EKG reading. Other sections scribed: HPI, ROS, PE .       History     Chief Complaint   Patient presents with    Shortness of Breath     x 3 nights. Reports having to sit up on 3 pillows to go to sleep. Denies chest pain     CC: Shortness of Breath    This 83 y.o Female with coronary artery disease, CHF, hypercholestermia, HTN and Renal disorder presents to the ED c/o acute onset SOB x3 days. She notes that her SOB is while she is laying down. She reports that she has a pacemaker (defibulator) that has never "shocked" her, and was put in because "my heart beats too slow". She reports compliance with her lasix and Plavix. She denies hx of heart attack. She denies leg swelling, fever and cough. No other symptoms to report.       The history is provided by the patient. No  was used.     Review of patient's allergies indicates:   Allergen Reactions    Aspirin Swelling     Swelling and rash    Colace [docusate sodium] Rash     itching    Sulfa (sulfonamide antibiotics) Swelling     Swelling and rash    Iodine and iodide containing products Rash    Penicillins Rash     Past Medical History:   Diagnosis Date    Anticoagulant long-term use     Coronary artery disease     Gout attack     Hypercholesteremia     Hypertension     Renal disorder      Past Surgical History:   Procedure Laterality Date    APPENDECTOMY      CARDIAC DEFIBRILLATOR PLACEMENT      2015    CHOLECYSTECTOMY      COLONOSCOPY W/ POLYPECTOMY  10 or 11/ 2014    per Dr. Elen stewartribillator Left 8/2008    HYSTERECTOMY      INSERTION-ICD-BIVENTRICULAR N/A 12/11/2014    Performed by Tal Batres MD at [facility] CATH LAB    JOINT REPLACEMENT Bilateral 2005 & 2006    2 Knee Replacements=Lt. 1= 2005. Rt. 1= 2006    OOPHORECTOMY      TOTAL KNEE " ARTHROPLASTY Bilateral Lt.= 2005; Rt.=2006    Bilateral Knee scope     Family History   Problem Relation Age of Onset    Heart attack Mother 88    Hypertension Mother     Hyperlipidemia Mother     Diabetes Other     Hypertension Other      Social History     Tobacco Use    Smoking status: Never Smoker    Smokeless tobacco: Never Used   Substance Use Topics    Alcohol use: Yes     Comment: rarely    Drug use: No     Review of Systems   Constitutional: Negative for fever.   HENT: Negative for sore throat.    Eyes: Negative for redness.   Respiratory: Positive for shortness of breath.    Cardiovascular: Negative for chest pain.   Gastrointestinal: Negative for nausea.   Genitourinary: Negative for dysuria.   Musculoskeletal: Negative for back pain.   Skin: Negative for rash.   Neurological: Negative for headaches.       Physical Exam     Initial Vitals [10/02/18 1723]   BP Pulse Resp Temp SpO2   (!) 164/80 79 (!) 28 98.4 °F (36.9 °C) 96 %      MAP       --         Physical Exam    Nursing note and vitals reviewed.  Constitutional: She is not diaphoretic. No distress.   HENT:   Head: Normocephalic and atraumatic.   Mouth/Throat: Oropharynx is clear and moist.   Eyes: EOM are normal. Pupils are equal, round, and reactive to light. No scleral icterus.   Neck: Normal range of motion. Neck supple. No JVD present.   Cardiovascular: Normal rate and regular rhythm.   Pulmonary/Chest: Breath sounds normal. No stridor. No respiratory distress.   Abdominal: Soft. Bowel sounds are normal. She exhibits no distension. There is no tenderness.   Musculoskeletal: Normal range of motion. She exhibits no edema or tenderness.   Neurological: She is alert and oriented to person, place, and time. She has normal strength. No cranial nerve deficit or sensory deficit.   Skin: Skin is warm and dry.   Psychiatric: She has a normal mood and affect.         ED Course   Critical Care  Date/Time: 10/15/2018 1:01 PM  Performed by: Alden TORIBIO  MD Justin  Authorized by: Alden Anderson MD   Direct patient critical care time: 20 minutes  Additional history critical care time: 5 minutes  Ordering / reviewing critical care time: 5 minutes  Documentation critical care time: 5 minutes  Total critical care time (exclusive of procedural time) : 35 minutes  Critical care time was exclusive of separately billable procedures and treating other patients.  Critical care was necessary to treat or prevent imminent or life-threatening deterioration of the following conditions: respiratory failure and cardiac failure.        Labs Reviewed   CBC W/ AUTO DIFFERENTIAL - Abnormal; Notable for the following components:       Result Value    RBC 3.61 (*)     Hemoglobin 11.3 (*)     Hematocrit 34.4 (*)     MCH 31.3 (*)     Platelets 127 (*)     All other components within normal limits   COMPREHENSIVE METABOLIC PANEL - Abnormal; Notable for the following components:    BUN, Bld 39 (*)     Creatinine 1.5 (*)     eGFR if  37 (*)     eGFR if non  32 (*)     All other components within normal limits   TROPONIN I - Abnormal; Notable for the following components:    Troponin I 0.067 (*)     All other components within normal limits   B-TYPE NATRIURETIC PEPTIDE - Abnormal; Notable for the following components:    BNP 1,836 (*)     All other components within normal limits   URINALYSIS, REFLEX TO URINE CULTURE - Abnormal; Notable for the following components:    Color, UA Colorless (*)     All other components within normal limits    Narrative:     Preferred Collection Type->Urine, Clean Catch   PROTIME-INR     EKG Readings: (Independently Interpreted)   6:50 PM:  Paced rhythm, heart rate 70, no acute changes       Imaging Results          X-Ray Chest AP Portable (Final result)  Result time 10/02/18 18:59:44    Final result by Mika Avila MD (10/02/18 18:59:44)                 Impression:      1. Overall, grossly stable chronic findings, shallow  inspiratory effort may account for interstitial findings, differential would include congestive change.      Electronically signed by: Mika Avila MD  Date:    10/02/2018  Time:    18:59             Narrative:    EXAMINATION:  XR CHEST AP PORTABLE    CLINICAL HISTORY:  CHF;    TECHNIQUE:  Single frontal view of the chest was performed.    COMPARISON:  02/03/2017    FINDINGS:  Left chest wall pacer noted.  The cardiomediastinal silhouette is prominent, stable.  There is slight obscuration of the left costophrenic angle, could reflect trace effusion or more likely, related to overlying soft tissue..  The trachea is midline.  The lungs are symmetrically expanded bilaterally with mildly coarse interstitial attenuation..  No large focal consolidation seen.  There is no pneumothorax.  The osseous structures are remarkable for degenerative change..                                 Medical Decision Making:   History:   Old Medical Records: I decided to obtain old medical records.  Old Records Summarized: records from clinic visits and records from previous admission(s).  Initial Assessment:   SOB, hx CHF, check labs CXR, EKG, lasix IV reassess  Clinical Tests:   Lab Tests: Reviewed  Radiological Study: Reviewed  ED Management:  Alert in NAD, Lasix, nitro given, will admit for further diuresis            Scribe Attestation:   Scribe #1: I performed the above scribed service and the documentation accurately describes the services I performed. I attest to the accuracy of the note.    Attending Attestation:           Physician Attestation for Scribe:  Physician Attestation Statement for Scribe #1: I, Alden Anderson MD, reviewed documentation, as scribed by Tony Mejias in my presence, and it is both accurate and complete.                    Clinical Impression:   The primary encounter diagnosis was Acute on chronic congestive heart failure, unspecified heart failure type. A diagnosis of Shortness of breath was also pertinent to  this visit.                             Alden Anderson MD  10/02/18 1937       Alden Anderson MD  10/15/18 1303

## 2018-10-02 NOTE — ED TRIAGE NOTES
Intermittent SOB x 3 days, pt denies CP. Pt has a defibrillator, denies it firing. Pt states she has had to sleep propped up on pillows for the last few nights.   Pt placed on cardiac monitor, BP and O2  Dr. Anderson at bedside.     Pt denies cough and fever.

## 2018-10-03 VITALS
BODY MASS INDEX: 31.71 KG/M2 | SYSTOLIC BLOOD PRESSURE: 121 MMHG | TEMPERATURE: 97 F | WEIGHT: 179 LBS | DIASTOLIC BLOOD PRESSURE: 57 MMHG | HEIGHT: 63 IN | RESPIRATION RATE: 18 BRPM | OXYGEN SATURATION: 94 % | HEART RATE: 67 BPM

## 2018-10-03 LAB
ALBUMIN SERPL BCP-MCNC: 4 G/DL
ALP SERPL-CCNC: 65 U/L
ALT SERPL W/O P-5'-P-CCNC: 15 U/L
ANION GAP SERPL CALC-SCNC: 11 MMOL/L
AORTIC ROOT ANNULUS: 3.47 CM
AORTIC VALVE CUSP SEPERATION: 1.55 CM
ASCENDING AORTA: 2.71 CM
AST SERPL-CCNC: 24 U/L
AV MEAN GRADIENT: 9.24 MMHG
AV PEAK GRADIENT: 19.97 MMHG
AV VALVE AREA: 1.33 CM2
BASOPHILS # BLD AUTO: 0.03 K/UL
BASOPHILS NFR BLD: 0.6 %
BILIRUB SERPL-MCNC: 0.9 MG/DL
BSA FOR ECHO PROCEDURE: 1.9 M2
BUN SERPL-MCNC: 37 MG/DL
CALCIUM SERPL-MCNC: 10.3 MG/DL
CHLORIDE SERPL-SCNC: 101 MMOL/L
CO2 SERPL-SCNC: 32 MMOL/L
CREAT SERPL-MCNC: 1.5 MG/DL
CV ECHO LV RWT: 0.39 CM
DIFFERENTIAL METHOD: ABNORMAL
DOP CALC AO PEAK VEL: 2.23 M/S
DOP CALC AO VTI: 51.63 CM
DOP CALC LVOT AREA: 3.63 CM2
DOP CALC LVOT DIAMETER: 2.15 CM
DOP CALC LVOT STROKE VOLUME: 68.51 CM3
DOP CALCLVOT PEAK VEL VTI: 18.88 CM
E WAVE DECELERATION TIME: 268.47 MSEC
E/A RATIO: 0.98
ECHO LV POSTERIOR WALL: 1.23 CM (ref 0.6–1.1)
EOSINOPHIL # BLD AUTO: 0.1 K/UL
EOSINOPHIL NFR BLD: 2.8 %
ERYTHROCYTE [DISTWIDTH] IN BLOOD BY AUTOMATED COUNT: 14.5 %
EST. GFR  (AFRICAN AMERICAN): 37 ML/MIN/1.73 M^2
EST. GFR  (NON AFRICAN AMERICAN): 32 ML/MIN/1.73 M^2
FRACTIONAL SHORTENING: 20 % (ref 28–44)
GLUCOSE SERPL-MCNC: 94 MG/DL
HCT VFR BLD AUTO: 35 %
HGB BLD-MCNC: 11.7 G/DL
INTERVENTRICULAR SEPTUM: 1.39 CM (ref 0.6–1.1)
LA MAJOR: 5.63 CM
LA MINOR: 5.84 CM
LA WIDTH: 4.33 CM
LEFT ATRIUM SIZE: 4.59 CM
LEFT ATRIUM VOLUME INDEX: 51 ML/M2
LEFT ATRIUM VOLUME: 96.85 CM3
LEFT INTERNAL DIMENSION IN SYSTOLE: 5.44 CM (ref 2.1–4)
LEFT VENTRICLE DIASTOLIC VOLUME INDEX: 125.09 ML/M2
LEFT VENTRICLE DIASTOLIC VOLUME: 237.68 ML
LEFT VENTRICLE MASS INDEX: 227.5 G/M2
LEFT VENTRICLE SYSTOLIC VOLUME INDEX: 75.7 ML/M2
LEFT VENTRICLE SYSTOLIC VOLUME: 143.91 ML
LEFT VENTRICULAR INTERNAL DIMENSION IN DIASTOLE: 6.78 CM (ref 3.5–6)
LEFT VENTRICULAR MASS: 432.34 G
LYMPHOCYTES # BLD AUTO: 1.1 K/UL
LYMPHOCYTES NFR BLD: 23.5 %
MCH RBC QN AUTO: 31.9 PG
MCHC RBC AUTO-ENTMCNC: 33.4 G/DL
MCV RBC AUTO: 95 FL
MONOCYTES # BLD AUTO: 0.5 K/UL
MONOCYTES NFR BLD: 10.7 %
MV PEAK A VEL: 1.1 M/S
MV PEAK E VEL: 1.08 M/S
MV STENOSIS PRESSURE HALF TIME: 142.98 MS
MV VALVE AREA P 1/2 METHOD: 1.54 CM2
NEUTROPHILS # BLD AUTO: 2.9 K/UL
NEUTROPHILS NFR BLD: 62.4 %
PISA TR MAX VEL: 2.44 M/S
PLATELET # BLD AUTO: 127 K/UL
PMV BLD AUTO: 10.8 FL
POTASSIUM SERPL-SCNC: 3.8 MMOL/L
PROT SERPL-MCNC: 7.5 G/DL
PV PEAK GRADIENT: 5.99 MMHG
PV PEAK VELOCITY: 1.22 CM/S
RA MAJOR: 4.77 CM
RA WIDTH: 4.27 CM
RBC # BLD AUTO: 3.67 M/UL
RETIRED EF AND QEF - SEE NOTES: 39.45 %
RIGHT VENTRICULAR END-DIASTOLIC DIMENSION: 3.88 CM
SINUS: 2.8 CM
SODIUM SERPL-SCNC: 144 MMOL/L
STJ: 1.56 CM
TR MAX PG: 23.81 MMHG
TRICUSPID ANNULAR PLANE SYSTOLIC EXCURSION: 0.02 CM
TROPONIN I SERPL DL<=0.01 NG/ML-MCNC: 0.07 NG/ML
TROPONIN I SERPL DL<=0.01 NG/ML-MCNC: 0.08 NG/ML
WBC # BLD AUTO: 4.68 K/UL

## 2018-10-03 PROCEDURE — 84484 ASSAY OF TROPONIN QUANT: CPT | Mod: 91

## 2018-10-03 PROCEDURE — G0378 HOSPITAL OBSERVATION PER HR: HCPCS

## 2018-10-03 PROCEDURE — 80053 COMPREHEN METABOLIC PANEL: CPT

## 2018-10-03 PROCEDURE — 25000003 PHARM REV CODE 250: Performed by: PHYSICIAN ASSISTANT

## 2018-10-03 PROCEDURE — 36415 COLL VENOUS BLD VENIPUNCTURE: CPT

## 2018-10-03 PROCEDURE — 63600175 PHARM REV CODE 636 W HCPCS: Performed by: PHYSICIAN ASSISTANT

## 2018-10-03 PROCEDURE — 85025 COMPLETE CBC W/AUTO DIFF WBC: CPT

## 2018-10-03 PROCEDURE — 84484 ASSAY OF TROPONIN QUANT: CPT

## 2018-10-03 PROCEDURE — 25000003 PHARM REV CODE 250: Performed by: EMERGENCY MEDICINE

## 2018-10-03 RX ORDER — FUROSEMIDE 40 MG/1
40 TABLET ORAL DAILY
Qty: 30 TABLET | Refills: 0 | Status: ON HOLD
Start: 2018-10-03 | End: 2020-08-08 | Stop reason: SDUPTHER

## 2018-10-03 RX ADMIN — CARVEDILOL 12.5 MG: 6.25 TABLET, FILM COATED ORAL at 11:10

## 2018-10-03 RX ADMIN — ALLOPURINOL 100 MG: 100 TABLET ORAL at 12:10

## 2018-10-03 RX ADMIN — ACETAMINOPHEN 650 MG: 325 TABLET, FILM COATED ORAL at 12:10

## 2018-10-03 RX ADMIN — CLOPIDOGREL BISULFATE 75 MG: 75 TABLET ORAL at 11:10

## 2018-10-03 RX ADMIN — FUROSEMIDE 40 MG: 10 INJECTION, SOLUTION INTRAMUSCULAR; INTRAVENOUS at 11:10

## 2018-10-03 NOTE — HPI
Palmira Malave 83 y.o. female with CHF s/p AICD placement, HLD, HTN, and GERD presenting to the hospital with a chief complaint of SOB. The patient reports SOB for the last 3-4 days that has slowly been progressing. 2 days ago she required 2 pillows to sleep at night and then progressed to 4 pillows. The SOB was initially present only with exertion, but worsened to being at rest. Her home lasix alleviated the symptoms minimally. She is compliant with her home diet, and reports no large weight gain. She states after the lasix in the ED the symptoms are greatly improved. This is how she felt when she was first diagnosed with CHF 3 years ago. She endorses SOB. She denies fever/chills, chest pain, abdominal pain, N/V/D, and dysuria. She has not felt her AICD discharge. She does not smoke, drink, or use recreational drugs.    In the ED, BNP of 1836, chest x-ray with concern for congestion, EKG paced NSR, and troponin 0.067.

## 2018-10-03 NOTE — ASSESSMENT & PLAN NOTE
Essential Hypertension  Hypertensive over course of admission.  SBP goal of <160 in hosp.  Continue home lasix and coreg

## 2018-10-03 NOTE — PLAN OF CARE
10/03/18 1210   Final Note   Assessment Type Final Discharge Note   Discharge Disposition Home   Hospital Follow Up  Appt(s) scheduled? Yes   Discharge plans and expectations educations in teach back method with documentation complete? Yes   Right Care Referral Info   Post Acute Recommendation No Care   Follow up appointment scheduled via [employer] for pt with Dr Batres.  All information (Date, time, location and contact info) placed in AVS and on DC sheet.  Information given and explained to pt. Pt also instructed to call 24-48 hours prior to scheduled time if rescheduling needed.  Patient instructed to bring all meds currently taking in their original bottles along with insurance card and picture ID to all appointments.   EDUCATION:  Things You are responsible For To Manage Your Care At Home:  1.    Getting your prescriptions filled   2.    Taking your medications as directed, DO NOT MISS ANY DOSES!  3.    Going to your follow-up doctor appointment. This is important because it  allow the doctor to monitor your progress and determine if  any changes need to made to your treatment plan.   NurseJordan notified that pt ok to DC from a CM standpoint

## 2018-10-03 NOTE — SUBJECTIVE & OBJECTIVE
Past Medical History:   Diagnosis Date    Anticoagulant long-term use     Coronary artery disease     Gout attack     Hypercholesteremia     Hypertension     Renal disorder        Past Surgical History:   Procedure Laterality Date    APPENDECTOMY      CARDIAC DEFIBRILLATOR PLACEMENT      2015    CHOLECYSTECTOMY      COLONOSCOPY W/ POLYPECTOMY  10 or 11/ 2014    per Dr. Elen ceja Left 8/2008    HYSTERECTOMY      INSERTION-ICD-BIVENTRICULAR N/A 12/11/2014    Performed by Tal Batres MD at [facility] CATH LAB    JOINT REPLACEMENT Bilateral 2005 & 2006    2 Knee Replacements=Lt. 1= 2005. Rt. 1= 2006    OOPHORECTOMY      TOTAL KNEE ARTHROPLASTY Bilateral Lt.= 2005; Rt.=2006    Bilateral Knee scope       Review of patient's allergies indicates:   Allergen Reactions    Aspirin Swelling     Swelling and rash    Colace [docusate sodium] Rash     itching    Sulfa (sulfonamide antibiotics) Swelling     Swelling and rash    Iodine and iodide containing products Rash    Penicillins Rash       No current facility-administered medications on file prior to encounter.      Current Outpatient Medications on File Prior to Encounter   Medication Sig    allopurinol (ZYLOPRIM) 100 MG tablet Take 100 mg by mouth every evening.     calcitRIOL (ROCALTROL) 0.25 MCG Cap     carvedilol (COREG) 25 MG tablet Take 0.5 tablets (12.5 mg total) by mouth 2 (two) times daily.    clopidogrel (PLAVIX) 75 mg tablet Take 75 mg by mouth every evening. On hold since 11-28-14, for procedure.    cranberry 400 mg Cap Take 1 capsule by mouth 2 (two) times daily.    fish oil-omega-3 fatty acids 300-1,000 mg capsule Take 2 g by mouth once daily. 2 caps every AM & 1 cap every PM.    furosemide (LASIX) 40 MG tablet Take 0.5 tablets (20 mg total) by mouth once daily. Take half tablet twice daily (Patient taking differently: Take 20 mg by mouth every morning. Take half tablet twice daily= 1/2 tab with breakfast & 1/2 tab with  lunch)    lovastatin (MEVACOR) 40 MG tablet Take 40 mg by mouth nightly. Takes 2 caps with supper every evening.    mirabegron (MYRBETRIQ) 50 mg Tb24 Take 50 mg by mouth once daily.     ranitidine (ZANTAC) 150 MG tablet 150 mg 2 (two) times daily.     acetaminophen (TYLENOL) 500 MG tablet Take 1 tablet (500 mg total) by mouth every 6 (six) hours as needed for Pain.    fluticasone (FLONASE) 50 mcg/actuation nasal spray     gabapentin (NEURONTIN) 300 MG capsule Take 300 mg by mouth 2 (two) times daily.     methylPREDNISolone (MEDROL DOSEPACK) 4 mg tablet     ondansetron (ZOFRAN) 4 MG tablet Take 1 tablet (4 mg total) by mouth every 8 (eight) hours as needed for Nausea.    PREVNAR 13, PF, 0.5 mL Syrg     terazosin (HYTRIN) 2 MG capsule Take by mouth every evening.     VOLTAREN 1 % Gel      Family History     Problem Relation (Age of Onset)    Diabetes Other    Heart attack Mother (88)    Hyperlipidemia Mother    Hypertension Mother, Other        Tobacco Use    Smoking status: Never Smoker    Smokeless tobacco: Never Used   Substance and Sexual Activity    Alcohol use: Yes     Comment: rarely    Drug use: No    Sexual activity: Not Currently     Review of Systems   Constitutional: Negative for chills and fever.   HENT: Negative for nosebleeds and tinnitus.    Eyes: Negative for photophobia and visual disturbance.   Respiratory: Positive for shortness of breath. Negative for wheezing.    Cardiovascular: Positive for leg swelling. Negative for chest pain and palpitations.        Orthopnea   Gastrointestinal: Negative for abdominal distention, abdominal pain, nausea and vomiting.   Genitourinary: Negative for dysuria, flank pain and hematuria.   Musculoskeletal: Negative for gait problem and joint swelling.   Skin: Negative for rash and wound.   Neurological: Negative for seizures and syncope.     Objective:     Vital Signs (Most Recent):  Temp: 97.8 °F (36.6 °C) (10/02/18 2121)  Pulse: 69 (10/02/18  2121)  Resp: 18 (10/02/18 2121)  BP: 132/72 (10/02/18 2121)  SpO2: 95 % (10/02/18 2121) Vital Signs (24h Range):  Temp:  [97.8 °F (36.6 °C)-98.4 °F (36.9 °C)] 97.8 °F (36.6 °C)  Pulse:  [68-79] 69  Resp:  [18-28] 18  SpO2:  [95 %-97 %] 95 %  BP: (132-164)/(72-80) 132/72     Weight: 81.6 kg (180 lb)  Body mass index is 31.89 kg/m².    Physical Exam   Constitutional: She is oriented to person, place, and time. She appears well-developed and well-nourished. No distress.   HENT:   Head: Normocephalic and atraumatic.   Eyes: Conjunctivae and EOM are normal. Right eye exhibits no discharge. Left eye exhibits no discharge.   Neck: Normal range of motion. No thyromegaly present.   Cardiovascular: Normal rate, regular rhythm and intact distal pulses.   No murmur heard.  Pulmonary/Chest: Effort normal and breath sounds normal. No respiratory distress.   Abdominal: Soft. Bowel sounds are normal. She exhibits no distension and no mass. There is no tenderness.   Musculoskeletal: She exhibits edema (trace edema bilateral ankles). She exhibits no deformity.   Neurological: She is alert and oriented to person, place, and time.   Skin: Skin is warm and dry.   Psychiatric: She has a normal mood and affect. Her behavior is normal.   Nursing note and vitals reviewed.        CRANIAL NERVES     CN III, IV, VI   Extraocular motions are normal.        Significant Labs:   CBC:   Recent Labs   Lab  10/02/18   1809   WBC  6.02   HGB  11.3*   HCT  34.4*   PLT  127*     CMP:   Recent Labs   Lab  10/02/18   1809   NA  138   K  3.7   CL  102   CO2  26   GLU  88   BUN  39*   CREATININE  1.5*   CALCIUM  10.4   PROT  7.6   ALBUMIN  4.1   BILITOT  0.6   ALKPHOS  79   AST  26   ALT  17   ANIONGAP  10   EGFRNONAA  32*     Cardiac Markers:   Recent Labs   Lab  10/02/18   1809   BNP  1,836*     Coagulation:   Recent Labs   Lab  10/02/18   1809   INR  1.1     Troponin:   Recent Labs   Lab  10/02/18   1809   TROPONINI  0.067*     Urine Studies:   Recent  Labs   Lab  10/02/18   1918   COLORU  Colorless*   APPEARANCEUA  Clear   PHUR  6.0   SPECGRAV  1.005   PROTEINUA  Negative   GLUCUA  Negative   KETONESU  Negative   BILIRUBINUA  Negative   OCCULTUA  Negative   NITRITE  Negative   UROBILINOGEN  Negative   LEUKOCYTESUR  Negative       Significant Imaging:   Imaging Results          X-Ray Chest AP Portable (Final result)  Result time 10/02/18 18:59:44    Final result by Mika Avila MD (10/02/18 18:59:44)                 Impression:      1. Overall, grossly stable chronic findings, shallow inspiratory effort may account for interstitial findings, differential would include congestive change.      Electronically signed by: Mika Avila MD  Date:    10/02/2018  Time:    18:59             Narrative:    EXAMINATION:  XR CHEST AP PORTABLE    CLINICAL HISTORY:  CHF;    TECHNIQUE:  Single frontal view of the chest was performed.    COMPARISON:  02/03/2017    FINDINGS:  Left chest wall pacer noted.  The cardiomediastinal silhouette is prominent, stable.  There is slight obscuration of the left costophrenic angle, could reflect trace effusion or more likely, related to overlying soft tissue..  The trachea is midline.  The lungs are symmetrically expanded bilaterally with mildly coarse interstitial attenuation..  No large focal consolidation seen.  There is no pneumothorax.  The osseous structures are remarkable for degenerative change..

## 2018-10-03 NOTE — ED NOTES
Pt resting in stretcher, call light within reach, family at bedside. Bed in low position. Pt medicated per order. Pt has no complaints at this time, A&O x 4, respirations are even and with ease

## 2018-10-03 NOTE — NURSING
Pt received from ER assisted by transport via stretcher. Assisted to bed and bed locked, lowered, SR up x2. Vitals obtained and documented. Tele monitor initiated. Pt is AAO x4. Resp are even, unlabored, diminished on room air. Pt is presenting V-paced on tele monitor. Abd is contour, active x4 quads and reports having last BM on 10/2. Skin is clean, dry and intact, however pt reports having trace edema to BLE. 20g PIV to right forearm saline locked. Pt denied pain at current time and is in no apparent distress. Assessment completed and documented. See doc flow sheet. Plan of care reviewed with pt and pt verbalized understanding. Call light placed within reach. Will continue to monitor pt closely.

## 2018-10-03 NOTE — H&P
[facility] Hospital Medicine  History & Physical    Patient Name: Palmira Malave  MRN: [id number]  Admission Date: 10/2/2018  Attending Physician: Raciel Mosley MD   Primary Care Provider: Qi Ramon MD         Patient information was obtained from patient and ER records.     Subjective:     Principal Problem:Acute on chronic congestive heart failure    Chief Complaint:   Chief Complaint   Patient presents with    Shortness of Breath     x 3 nights. Reports having to sit up on 3 pillows to go to sleep. Denies chest pain        HPI: Palmira Malave 83 y.o. female with CHF s/p AICD placement, HLD, HTN, and GERD presenting to the hospital with a chief complaint of SOB. The patient reports SOB for the last 3-4 days that has slowly been progressing. 2 days ago she required 2 pillows to sleep at night and then progressed to 4 pillows. The SOB was initially present only with exertion, but worsened to being at rest. Her home lasix alleviated the symptoms minimally. She is compliant with her home diet, and reports no large weight gain. She states after the lasix in the ED the symptoms are greatly improved. This is how she felt when she was first diagnosed with CHF 3 years ago. She endorses SOB. She denies fever/chills, chest pain, abdominal pain, N/V/D, and dysuria. She has not felt her AICD discharge. She does not smoke, drink, or use recreational drugs.    In the ED, BNP of 1836, chest x-ray with concern for congestion, EKG paced NSR, and troponin 0.067.     Past Medical History:   Diagnosis Date    Anticoagulant long-term use     Coronary artery disease     Gout attack     Hypercholesteremia     Hypertension     Renal disorder        Past Surgical History:   Procedure Laterality Date    APPENDECTOMY      CARDIAC DEFIBRILLATOR PLACEMENT      2015    CHOLECYSTECTOMY      COLONOSCOPY W/ POLYPECTOMY  10 or 11/ 2014    per Dr. Elen ceja Left 8/2008    HYSTERECTOMY       INSERTION-ICD-BIVENTRICULAR N/A 12/11/2014    Performed by Tal [facility]res MD at [facility] CATH LAB    JOINT REPLACEMENT Bilateral 2005 & 2006    2 Knee Replacements=Lt. 1= 2005. Rt. 1= 2006    OOPHORECTOMY      TOTAL KNEE ARTHROPLASTY Bilateral Lt.= 2005; Rt.=2006    Bilateral Knee scope       Review of patient's allergies indicates:   Allergen Reactions    Aspirin Swelling     Swelling and rash    Colace [docusate sodium] Rash     itching    Sulfa (sulfonamide antibiotics) Swelling     Swelling and rash    Iodine and iodide containing products Rash    Penicillins Rash       No current facility-administered medications on file prior to encounter.      Current Outpatient Medications on File Prior to Encounter   Medication Sig    allopurinol (ZYLOPRIM) 100 MG tablet Take 100 mg by mouth every evening.     calcitRIOL (ROCALTROL) 0.25 MCG Cap     carvedilol (COREG) 25 MG tablet Take 0.5 tablets (12.5 mg total) by mouth 2 (two) times daily.    clopidogrel (PLAVIX) 75 mg tablet Take 75 mg by mouth every evening. On hold since 11-28-14, for procedure.    cranberry 400 mg Cap Take 1 capsule by mouth 2 (two) times daily.    fish oil-omega-3 fatty acids 300-1,000 mg capsule Take 2 g by mouth once daily. 2 caps every AM & 1 cap every PM.    furosemide (LASIX) 40 MG tablet Take 0.5 tablets (20 mg total) by mouth once daily. Take half tablet twice daily (Patient taking differently: Take 20 mg by mouth every morning. Take half tablet twice daily= 1/2 tab with breakfast & 1/2 tab with lunch)    lovastatin (MEVACOR) 40 MG tablet Take 40 mg by mouth nightly. Takes 2 caps with supper every evening.    mirabegron (MYRBETRIQ) 50 mg Tb24 Take 50 mg by mouth once daily.     ranitidine (ZANTAC) 150 MG tablet 150 mg 2 (two) times daily.     acetaminophen (TYLENOL) 500 MG tablet Take 1 tablet (500 mg total) by mouth every 6 (six) hours as needed for Pain.    fluticasone (FLONASE) 50 mcg/actuation nasal spray      gabapentin (NEURONTIN) 300 MG capsule Take 300 mg by mouth 2 (two) times daily.     methylPREDNISolone (MEDROL DOSEPACK) 4 mg tablet     ondansetron (ZOFRAN) 4 MG tablet Take 1 tablet (4 mg total) by mouth every 8 (eight) hours as needed for Nausea.    PREVNAR 13, PF, 0.5 mL Syrg     terazosin (HYTRIN) 2 MG capsule Take by mouth every evening.     VOLTAREN 1 % Gel      Family History     Problem Relation (Age of Onset)    Diabetes Other    Heart attack Mother (88)    Hyperlipidemia Mother    Hypertension Mother, Other        Tobacco Use    Smoking status: Never Smoker    Smokeless tobacco: Never Used   Substance and Sexual Activity    Alcohol use: Yes     Comment: rarely    Drug use: No    Sexual activity: Not Currently     Review of Systems   Constitutional: Negative for chills and fever.   HENT: Negative for nosebleeds and tinnitus.    Eyes: Negative for photophobia and visual disturbance.   Respiratory: Positive for shortness of breath. Negative for wheezing.    Cardiovascular: Positive for leg swelling. Negative for chest pain and palpitations.        Orthopnea   Gastrointestinal: Negative for abdominal distention, abdominal pain, nausea and vomiting.   Genitourinary: Negative for dysuria, flank pain and hematuria.   Musculoskeletal: Negative for gait problem and joint swelling.   Skin: Negative for rash and wound.   Neurological: Negative for seizures and syncope.     Objective:     Vital Signs (Most Recent):  Temp: 97.8 °F (36.6 °C) (10/02/18 2121)  Pulse: 69 (10/02/18 2121)  Resp: 18 (10/02/18 2121)  BP: 132/72 (10/02/18 2121)  SpO2: 95 % (10/02/18 2121) Vital Signs (24h Range):  Temp:  [97.8 °F (36.6 °C)-98.4 °F (36.9 °C)] 97.8 °F (36.6 °C)  Pulse:  [68-79] 69  Resp:  [18-28] 18  SpO2:  [95 %-97 %] 95 %  BP: (132-164)/(72-80) 132/72     Weight: 81.6 kg (180 lb)  Body mass index is 31.89 kg/m².    Physical Exam   Constitutional: She is oriented to person, place, and time. She appears well-developed  and well-nourished. No distress.   HENT:   Head: Normocephalic and atraumatic.   Eyes: Conjunctivae and EOM are normal. Right eye exhibits no discharge. Left eye exhibits no discharge.   Neck: Normal range of motion. No thyromegaly present.   Cardiovascular: Normal rate, regular rhythm and intact distal pulses.   No murmur heard.  Pulmonary/Chest: Effort normal and breath sounds normal. No respiratory distress.   Abdominal: Soft. Bowel sounds are normal. She exhibits no distension and no mass. There is no tenderness.   Musculoskeletal: She exhibits edema (trace edema bilateral ankles). She exhibits no deformity.   Neurological: She is alert and oriented to person, place, and time.   Skin: Skin is warm and dry.   Psychiatric: She has a normal mood and affect. Her behavior is normal.   Nursing note and vitals reviewed.        CRANIAL NERVES     CN III, IV, VI   Extraocular motions are normal.        Significant Labs:   CBC:   Recent Labs   Lab  10/02/18   1809   WBC  6.02   HGB  11.3*   HCT  34.4*   PLT  127*     CMP:   Recent Labs   Lab  10/02/18   1809   NA  138   K  3.7   CL  102   CO2  26   GLU  88   BUN  39*   CREATININE  1.5*   CALCIUM  10.4   PROT  7.6   ALBUMIN  4.1   BILITOT  0.6   ALKPHOS  79   AST  26   ALT  17   ANIONGAP  10   EGFRNONAA  32*     Cardiac Markers:   Recent Labs   Lab  10/02/18   1809   BNP  1,836*     Coagulation:   Recent Labs   Lab  10/02/18   1809   INR  1.1     Troponin:   Recent Labs   Lab  10/02/18   1809   TROPONINI  0.067*     Urine Studies:   Recent Labs   Lab  10/02/18   1918   COLORU  Colorless*   APPEARANCEUA  Clear   PHUR  6.0   SPECGRAV  1.005   PROTEINUA  Negative   GLUCUA  Negative   KETONESU  Negative   BILIRUBINUA  Negative   OCCULTUA  Negative   NITRITE  Negative   UROBILINOGEN  Negative   LEUKOCYTESUR  Negative       Significant Imaging:   Imaging Results          X-Ray Chest AP Portable (Final result)  Result time 10/02/18 18:59:44    Final result by Mika Avila,  MD (10/02/18 18:59:44)                 Impression:      1. Overall, grossly stable chronic findings, shallow inspiratory effort may account for interstitial findings, differential would include congestive change.      Electronically signed by: Mika Avila MD  Date:    10/02/2018  Time:    18:59             Narrative:    EXAMINATION:  XR CHEST AP PORTABLE    CLINICAL HISTORY:  CHF;    TECHNIQUE:  Single frontal view of the chest was performed.    COMPARISON:  02/03/2017    FINDINGS:  Left chest wall pacer noted.  The cardiomediastinal silhouette is prominent, stable.  There is slight obscuration of the left costophrenic angle, could reflect trace effusion or more likely, related to overlying soft tissue..  The trachea is midline.  The lungs are symmetrically expanded bilaterally with mildly coarse interstitial attenuation..  No large focal consolidation seen.  There is no pneumothorax.  The osseous structures are remarkable for degenerative change..                                  Assessment/Plan:     * Acute on chronic congestive heart failure    Patient without rales on lung exam, BNP 1836,  chest x-ray with concern for congestion.  troponin elevated at 0.067.  Echo pending Previous Echo with EF of 20%  Continue home coreg  IV lasix  Daily weights  Strict I&O's   Low salt cardiac diet  Cardiac monitoring  Troponin trend        Chronic kidney disease (CKD), stage III (moderate)    At baseline BUN and CR compared to 3 months ago. Will monitor           Hyperlipidemia    Continue home lovastatin          Hypertension    Essential Hypertension  Hypertensive over course of admission.  SBP goal of <160 in hosp.  Continue home lasix and coreg            VTE Risk Mitigation (From admission, onward)        Ordered     IP VTE HIGH RISK PATIENT  Once      10/02/18 2040     Place DALE hose  Until discontinued      10/02/18 2040     Place sequential compression device  Until discontinued      10/02/18 2040        VTE:  DALE/SCD  Code: full  Diet: low salt cardiac diet  Dispo: pending improvement with fei Nguyễn PA-C  Department of Hospital Medicine   [facility]

## 2018-10-03 NOTE — ASSESSMENT & PLAN NOTE
Patient without rales on lung exam, BNP 1836,  chest x-ray with concern for congestion.  troponin elevated at 0.067.  Echo pending Previous Echo with EF of 20%  Continue home coreg  IV lasix  Daily weights  Strict I&O's   Low salt cardiac diet  Cardiac monitoring  Troponin trend

## 2018-10-03 NOTE — HOSPITAL COURSE
Palmira Malave 83 y.o. female placed in observation for management of acute on chronic systolic diastolic heart failure. In the ED, BNP of 1836, chest x-ray with concern for congestion, EKG paced NSR, and troponin trend flat pattern in setting of CKD and demand ischemia. IO not accurate in epic. Patient reports making plenty of urine and breathing at baseline after IV lasix to diuresis. 2 D echo showed EF 35% (improved from previous 20% 3 months ago). Patient instructed to take 40 mg PO BID today and tomorrow 10/4 then resume home dose 20 mg bid. Instruct to weight self daily. Patient stable for discharge with close follow up her Cardiologist Dr. Billingsley.

## 2018-10-03 NOTE — PLAN OF CARE
To patient's room to discuss patient managing her care at home.      TN Role Explained.  Patient identified by using 2 identifiers:  Name and date of birth    Patient stated that her granddaughter WILL HELP AT HOME WITH her RECOVERY.      TN name and contact info placed on the communication board    Preferred Pharmacy:  [facility] - [place] - [address]  [address]  [place] [address]  Phone: [phone] Fax: [phone]       10/03/18 1203   Discharge Assessment   Assessment Type Discharge Planning Assessment   Confirmed/corrected address and phone number on facesheet? Yes   Assessment information obtained from? Patient   Communicated expected length of stay with patient/caregiver yes   Prior to hospitilization cognitive status: Alert/Oriented   Prior to hospitalization functional status: Assistive Equipment   Current cognitive status: Alert/Oriented   Current Functional Status: Assistive Equipment   Lives With child(karan), adult  (daughter lives with her)   Able to Return to Prior Arrangements yes   Is patient able to care for self after discharge? Yes   Patient's perception of discharge disposition home or selfcare   Readmission Within The Last 30 Days no previous admission in last 30 days   Patient currently being followed by outpatient case management? No   Patient currently receives any other outside agency services? No   Equipment Currently Used at Home cane, straight   Do you have any problems affording any of your prescribed medications? No   Is the patient taking medications as prescribed? yes   Does the patient have transportation home? Yes   Transportation Available family or friend will provide   Does the patient receive services at the [facility]? No   Discharge Plan A Home with family   Patient/Family In Agreement With Plan yes   Does the patient have transportation to healthcare appointments? Yes

## 2018-10-05 NOTE — DISCHARGE SUMMARY
[facility] Hospital Medicine  Discharge Summary      Patient Name: Palmira Malave  MRN: [id number]  Admission Date: 10/2/2018  Hospital Length of Stay: 0 days  Discharge Date and Time: 10/3/18  Attending Physician: No att. providers found   Discharging Provider: Maddie Perez NP  Primary Care Provider: Qi Ramon MD      HPI:   Palmira Malave 83 y.o. female with CHF s/p AICD placement, HLD, HTN, and GERD presenting to the hospital with a chief complaint of SOB. The patient reports SOB for the last 3-4 days that has slowly been progressing. 2 days ago she required 2 pillows to sleep at night and then progressed to 4 pillows. The SOB was initially present only with exertion, but worsened to being at rest. Her home lasix alleviated the symptoms minimally. She is compliant with her home diet, and reports no large weight gain. She states after the lasix in the ED the symptoms are greatly improved. This is how she felt when she was first diagnosed with CHF 3 years ago. She endorses SOB. She denies fever/chills, chest pain, abdominal pain, N/V/D, and dysuria. She has not felt her AICD discharge. She does not smoke, drink, or use recreational drugs.    In the ED, BNP of 1836, chest x-ray with concern for congestion, EKG paced NSR, and troponin 0.067.     * No surgery found *      Hospital Course:   Palmira Malave 83 y.o. female placed in observation for management of acute on chronic systolic diastolic heart failure. In the ED, BNP of 1836, chest x-ray with concern for congestion, EKG paced NSR, and troponin trend flat pattern in setting of CKD and demand ischemia. IO not accurate in epic. Patient reports making plenty of urine and breathing at baseline after IV lasix to diuresis. 2 D echo showed EF 35% (improved from previous 20% 3 months ago). Patient instructed to take 40 mg PO BID today and tomorrow 10/4 then resume home dose 20 mg bid. Instruct to weight self daily. Patient  stable for discharge with close follow up her Cardiologist Dr. Billingsley.      Consults:     No new Assessment & Plan notes have been filed under this hospital service since the last note was generated.  Service: Hospital Medicine    Final Active Diagnoses:    Diagnosis Date Noted POA    PRINCIPAL PROBLEM:  Acute on chronic congestive heart failure [I50.9] 10/02/2018 Yes    Chronic kidney disease (CKD), stage III (moderate) [N18.3] 11/15/2014 Yes     Chronic    Hyperlipidemia [E78.5] 11/15/2014 Yes     Chronic    Hypertension [I10] 11/15/2014 Yes      Problems Resolved During this Admission:       Discharged Condition: good    Disposition: Home or Self Care    Follow Up:  Follow-up Information     Tal Batres MD On 10/4/2018.    Specialties:  INTERVENTIONAL CARDIOLOGY, Cardiology  Why:  @2:20pm, for Cardiology follow up  Contact information:  [address] [place] [id number]  [phone]                 Patient Instructions:   No discharge procedures on file.      Pending Diagnostic Studies:     None         Medications:  Reconciled Home Medications:      Medication List      CHANGE how you take these medications    furosemide 40 MG tablet  Commonly known as:  LASIX  Take 1 tablet (40 mg total) by mouth once daily. Take 40 mg twice a day on 10/3, 10/4 followed by 20 mg twice a day.  What changed:    · how much to take  · additional instructions        CONTINUE taking these medications    acetaminophen 500 MG tablet  Commonly known as:  TYLENOL  Take 1 tablet (500 mg total) by mouth every 6 (six) hours as needed for Pain.     allopurinol 100 MG tablet  Commonly known as:  ZYLOPRIM  Take 100 mg by mouth every evening.     calcitRIOL 0.25 MCG Cap  Commonly known as:  ROCALTROL     carvedilol 25 MG tablet  Commonly known as:  COREG  Take 0.5 tablets (12.5 mg total) by mouth 2 (two) times daily.     clopidogrel 75 mg tablet  Commonly known as:  PLAVIX  Take 75 mg by mouth every evening. On hold since  11-28-14, for procedure.     cranberry 400 mg Cap  Take 1 capsule by mouth 2 (two) times daily.     fish oil-omega-3 fatty acids 300-1,000 mg capsule  Take 2 g by mouth once daily. 2 caps every AM & 1 cap every PM.     fluticasone 50 mcg/actuation nasal spray  Commonly known as:  FLONASE     gabapentin 300 MG capsule  Commonly known as:  NEURONTIN  Take 300 mg by mouth 2 (two) times daily.     lovastatin 40 MG tablet  Commonly known as:  MEVACOR  Take 40 mg by mouth nightly. Takes 2 caps with supper every evening.     MYRBETRIQ 50 mg Tb24  Generic drug:  mirabegron  Take 50 mg by mouth once daily.     ondansetron 4 MG tablet  Commonly known as:  ZOFRAN  Take 1 tablet (4 mg total) by mouth every 8 (eight) hours as needed for Nausea.     PREVNAR 13 (PF) 0.5 mL Syrg  Generic drug:  pneumoc 13-jorge luis conj-dip cr(PF)     ranitidine 150 MG tablet  Commonly known as:  ZANTAC  150 mg 2 (two) times daily.     terazosin 2 MG capsule  Commonly known as:  HYTRIN  Take by mouth every evening.     VOLTAREN 1 % Gel  Generic drug:  diclofenac sodium        STOP taking these medications    methylPREDNISolone 4 mg tablet  Commonly known as:  MEDROL DOSEPACK            Indwelling Lines/Drains at time of discharge:   Lines/Drains/Airways          None          Time spent on the discharge of patient: 35 minutes  Patient was seen and examined on the date of discharge and determined to be suitable for discharge.         Maddie Perez NP  Department of Hospital Medicine  [facility]

## 2018-10-23 ENCOUNTER — OFFICE VISIT (OUTPATIENT)
Dept: CARDIOLOGY | Facility: CLINIC | Age: 83
End: 2018-10-23
Payer: MEDICARE

## 2018-10-23 VITALS
OXYGEN SATURATION: 96 % | SYSTOLIC BLOOD PRESSURE: 120 MMHG | DIASTOLIC BLOOD PRESSURE: 76 MMHG | WEIGHT: 177.56 LBS | RESPIRATION RATE: 20 BRPM | HEART RATE: 72 BPM | BODY MASS INDEX: 31.46 KG/M2

## 2018-10-23 DIAGNOSIS — I25.84 CORONARY ARTERY DISEASE DUE TO CALCIFIED CORONARY LESION: Primary | ICD-10-CM

## 2018-10-23 DIAGNOSIS — I25.10 CORONARY ARTERY DISEASE DUE TO CALCIFIED CORONARY LESION: Primary | ICD-10-CM

## 2018-10-23 DIAGNOSIS — I50.22 CHRONIC SYSTOLIC HEART FAILURE: ICD-10-CM

## 2018-10-23 DIAGNOSIS — E78.2 MIXED HYPERLIPIDEMIA: ICD-10-CM

## 2018-10-23 DIAGNOSIS — N18.30 CHRONIC KIDNEY DISEASE (CKD), STAGE III (MODERATE): ICD-10-CM

## 2018-10-23 DIAGNOSIS — I10 ESSENTIAL HYPERTENSION: ICD-10-CM

## 2018-10-23 DIAGNOSIS — Z95.810 ICD (IMPLANTABLE CARDIOVERTER-DEFIBRILLATOR), BIVENTRICULAR, IN SITU: ICD-10-CM

## 2018-10-23 PROCEDURE — 93289 INTERROG DEVICE EVAL HEART: CPT | Mod: 26,S$PBB,, | Performed by: INTERNAL MEDICINE

## 2018-10-23 PROCEDURE — 99999 PR PBB SHADOW E&M-EST. PATIENT-LVL III: CPT | Mod: PBBFAC,,, | Performed by: INTERNAL MEDICINE

## 2018-10-23 PROCEDURE — 99214 OFFICE O/P EST MOD 30 MIN: CPT | Mod: S$PBB,25,, | Performed by: INTERNAL MEDICINE

## 2018-10-23 PROCEDURE — 3074F SYST BP LT 130 MM HG: CPT | Mod: CPTII,,, | Performed by: INTERNAL MEDICINE

## 2018-10-23 PROCEDURE — 99213 OFFICE O/P EST LOW 20 MIN: CPT | Mod: PBBFAC | Performed by: INTERNAL MEDICINE

## 2018-10-23 PROCEDURE — 3078F DIAST BP <80 MM HG: CPT | Mod: CPTII,,, | Performed by: INTERNAL MEDICINE

## 2018-10-23 PROCEDURE — 93289 INTERROG DEVICE EVAL HEART: CPT | Mod: PBBFAC | Performed by: INTERNAL MEDICINE

## 2018-10-23 PROCEDURE — 1101F PT FALLS ASSESS-DOCD LE1/YR: CPT | Mod: CPTII,,, | Performed by: INTERNAL MEDICINE

## 2018-10-23 NOTE — LETTER
October 23, 2018        No Recipients             [facility]  [address]  [place] [address]  Phone: [phone]   Patient: Palmira Malave   MR Number: [id number]   YOB: 1935   Date of Visit: 10/23/2018     To whom it May concern,       The patient is followed in my clinic with history of severe cardiomyopathy status post biventricular ICD.  She has done fairly well apart from recent admission for mild volume overload.  She stable on all her current medicines with recent device check.  She is cleared without restrictions from cardiovascular standpoint for physical rehab.  Please do not hesitate to contact my office for any questions or concerns.      Sincerely,      Tla Batres MD            CC  No Recipients    Enclosure

## 2018-10-23 NOTE — PROGRESS NOTES
Subjective:    Patient ID:  Palmira Malave is a 83 y.o. female who presents for follow-up of Follow-up (medtronic )      Coronary Artery Disease       Previous history:  Here for follow-up of systolic heart failure and biventricular ICD interrogation.  She denies any worsening cardiopulmonary complaints since we last saw her.  She's had no issues with her device.  She is expressed no worsening cardiopulmonary complaints.  She denies any chest pain, shortness breath or palpitations.  She's not expressing PND, orthopnea or lower edema.  She denies any dizziness, presyncope or syncope.  Otherwise she's better usual state of health.  He says she has a daughter the moved into scan early onset dementia she's having to take care of.  She tries to stay active by going to interactions with a female group.  She is seen by bone and joint clinic and does get injections.  He wants clearance for rehabilitation.    Today:  Here for follow-up of systolic heart failure and previous biventricular ICD placement.  She had her device interrogated which shows no significant issues.  She had recent admission at the beginning of the month for mild volume overload.  She was diuresed overnight and discharged home the next day.  Her EF is stable.  She denies any PND, orthopnea or lower Veronica.  She has not experiencing dizziness, presyncope or syncope.  She on questioning says she is compliant with medicines and diet.    Review of Systems   Constitution: Negative.   HENT: Negative.    Eyes: Negative.    Cardiovascular: Negative for dyspnea on exertion, irregular heartbeat, near-syncope, orthopnea, paroxysmal nocturnal dyspnea and syncope.   Skin: Negative.    Musculoskeletal: Positive for back pain.   Gastrointestinal: Negative for abdominal pain, constipation and diarrhea.   Genitourinary: Negative for dysuria.   Neurological: Negative.    Psychiatric/Behavioral: Negative.         Objective:    Physical Exam   Constitutional: She is  oriented to person, place, and time. She appears well-developed and well-nourished.   HENT:   Head: Normocephalic and atraumatic.   Eyes: Conjunctivae and EOM are normal. Pupils are equal, round, and reactive to light.   Neck: Normal range of motion. Neck supple. No thyromegaly present.   Cardiovascular: Normal rate and regular rhythm.   No murmur heard.  Pulmonary/Chest: Effort normal and breath sounds normal. No respiratory distress.   Abdominal: Soft. Bowel sounds are normal.   Musculoskeletal: She exhibits no edema.   Neurological: She is alert and oriented to person, place, and time.   Skin: Skin is warm and dry.   Psychiatric: She has a normal mood and affect. Her behavior is normal.       catheter 2011 reported nonobstructive CAD    ECHO:  10-18  · Left ventricle ejection fraction is moderately decreased at 35%  · The left ventricle cavity is moderately dilated.  · Grade I (mild) left ventricular diastolic dysfunction consistent with impaired relaxation.  · Left atrium is moderately dilated.  · Right atrium is mildly dilated.  · RV systolic function is normal.  · Trace regurgitation is present in aortic valve.  · Tricuspid valve shows trace regurgitation.  · Pulmonic valve shows trace regurgitation.  · Mitral valve shows mild-to-moderate regurgitation.    Carotid ultrasound:  7-18  CONCLUSIONS   There is 40 - 49% right Internal Carotid stenosis.  There is 20 - 39% left Internal Carotid stenosis.  Assessment:       1. Coronary artery disease due to calcified coronary lesion    2. Chronic systolic heart failure    3. ICD (implantable cardioverter-defibrillator), biventricular, in situ    4. Chronic kidney disease (CKD), stage III (moderate)    5. Essential hypertension    6. Mixed hyperlipidemia         Plan:       -cont med tx for history of nonischemic heart myopathy  -Echo and carotid ultrasound  -cont surveillance of device  -Cleared for physical rehabilitation from CV standpoint    RTC 6 mos          BIVE  ICD (Medtronic)    DDDR 60     Apaced 24%, V paced 99%    A: 3.4 V, 437 ohms, 0.625 V @ 0.4 ms  RV: > 20 V, 494 ohms, 1 V @ 0.4 ms  LV: 399 ohms, 1 V @ 0.4 ms    No episodes  No changes    RTC 6 mos

## 2018-11-30 ENCOUNTER — TELEPHONE (OUTPATIENT)
Dept: CARDIOLOGY | Facility: CLINIC | Age: 83
End: 2018-11-30

## 2018-11-30 NOTE — TELEPHONE ENCOUNTER
Called no answer     ef    ----- Message from Tal Batres MD sent at 2018 12:15 PM CST -----  Contact: self - [phone]  Okay for travel.  Please present her device card at the airport for  security check      --SD  ----- Message -----  From: Linda Morales MA  Sent: 2018  11:16 AM  To: Tal Batres MD    plz advise     ef  ----- Message -----  From: Gabbi Carlos  Sent: 2018  11:00 AM  To: Gisel SAUL Staff    Pt would like to know if it is okay for her to travel by plane to [place] for a  -.

## 2018-12-27 PROCEDURE — 93010 ELECTROCARDIOGRAM REPORT: CPT | Mod: ,,, | Performed by: INTERNAL MEDICINE

## 2018-12-27 PROCEDURE — 93010 EKG 12-LEAD: ICD-10-PCS | Mod: ,,, | Performed by: INTERNAL MEDICINE

## 2018-12-27 PROCEDURE — 96374 THER/PROPH/DIAG INJ IV PUSH: CPT

## 2018-12-27 PROCEDURE — 93005 ELECTROCARDIOGRAM TRACING: CPT

## 2018-12-27 PROCEDURE — 99285 EMERGENCY DEPT VISIT HI MDM: CPT | Mod: 25

## 2018-12-28 ENCOUNTER — HOSPITAL ENCOUNTER (EMERGENCY)
Facility: HOSPITAL | Age: 83
Discharge: HOME | End: 2018-12-28
Attending: EMERGENCY MEDICINE
Payer: MEDICARE

## 2018-12-28 VITALS
OXYGEN SATURATION: 98 % | BODY MASS INDEX: 31.54 KG/M2 | TEMPERATURE: 98 F | HEIGHT: 63 IN | HEART RATE: 62 BPM | DIASTOLIC BLOOD PRESSURE: 66 MMHG | RESPIRATION RATE: 18 BRPM | WEIGHT: 178 LBS | SYSTOLIC BLOOD PRESSURE: 140 MMHG

## 2018-12-28 DIAGNOSIS — R06.02 SHORTNESS OF BREATH: Primary | ICD-10-CM

## 2018-12-28 LAB
ALBUMIN SERPL BCP-MCNC: 4 G/DL
ALP SERPL-CCNC: 85 U/L
ALT SERPL W/O P-5'-P-CCNC: 13 U/L
ANION GAP SERPL CALC-SCNC: 14 MMOL/L
AST SERPL-CCNC: 26 U/L
BASOPHILS # BLD AUTO: 0.04 K/UL
BASOPHILS NFR BLD: 0.7 %
BILIRUB SERPL-MCNC: 0.5 MG/DL
BNP SERPL-MCNC: 1503 PG/ML
BUN SERPL-MCNC: 33 MG/DL
CALCIUM SERPL-MCNC: 10.2 MG/DL
CHLORIDE SERPL-SCNC: 101 MMOL/L
CO2 SERPL-SCNC: 26 MMOL/L
CREAT SERPL-MCNC: 1.9 MG/DL
DIFFERENTIAL METHOD: ABNORMAL
EOSINOPHIL # BLD AUTO: 0.2 K/UL
EOSINOPHIL NFR BLD: 3.2 %
ERYTHROCYTE [DISTWIDTH] IN BLOOD BY AUTOMATED COUNT: 14.4 %
EST. GFR  (AFRICAN AMERICAN): 28 ML/MIN/1.73 M^2
EST. GFR  (NON AFRICAN AMERICAN): 24 ML/MIN/1.73 M^2
GLUCOSE SERPL-MCNC: 93 MG/DL
HCT VFR BLD AUTO: 36.8 %
HGB BLD-MCNC: 11.7 G/DL
LYMPHOCYTES # BLD AUTO: 1.2 K/UL
LYMPHOCYTES NFR BLD: 20.4 %
MCH RBC QN AUTO: 30.5 PG
MCHC RBC AUTO-ENTMCNC: 31.8 G/DL
MCV RBC AUTO: 96 FL
MONOCYTES # BLD AUTO: 0.7 K/UL
MONOCYTES NFR BLD: 11.6 %
NEUTROPHILS # BLD AUTO: 3.6 K/UL
NEUTROPHILS NFR BLD: 64.1 %
PLATELET # BLD AUTO: 120 K/UL
PMV BLD AUTO: 11.3 FL
POTASSIUM SERPL-SCNC: 4 MMOL/L
PROT SERPL-MCNC: 7.7 G/DL
RBC # BLD AUTO: 3.83 M/UL
SODIUM SERPL-SCNC: 141 MMOL/L
TROPONIN I SERPL DL<=0.01 NG/ML-MCNC: 0.07 NG/ML
WBC # BLD AUTO: 5.68 K/UL

## 2018-12-28 PROCEDURE — 25000242 PHARM REV CODE 250 ALT 637 W/ HCPCS: Performed by: EMERGENCY MEDICINE

## 2018-12-28 PROCEDURE — 63600175 PHARM REV CODE 636 W HCPCS: Performed by: EMERGENCY MEDICINE

## 2018-12-28 PROCEDURE — 85025 COMPLETE CBC W/AUTO DIFF WBC: CPT

## 2018-12-28 PROCEDURE — 94640 AIRWAY INHALATION TREATMENT: CPT

## 2018-12-28 PROCEDURE — 83880 ASSAY OF NATRIURETIC PEPTIDE: CPT

## 2018-12-28 PROCEDURE — 84484 ASSAY OF TROPONIN QUANT: CPT

## 2018-12-28 PROCEDURE — 80053 COMPREHEN METABOLIC PANEL: CPT

## 2018-12-28 RX ORDER — IPRATROPIUM BROMIDE AND ALBUTEROL SULFATE 2.5; .5 MG/3ML; MG/3ML
3 SOLUTION RESPIRATORY (INHALATION)
Status: COMPLETED | OUTPATIENT
Start: 2018-12-28 | End: 2018-12-28

## 2018-12-28 RX ORDER — FUROSEMIDE 10 MG/ML
20 INJECTION INTRAMUSCULAR; INTRAVENOUS
Status: COMPLETED | OUTPATIENT
Start: 2018-12-28 | End: 2018-12-28

## 2018-12-28 RX ADMIN — FUROSEMIDE 20 MG: 10 INJECTION, SOLUTION INTRAMUSCULAR; INTRAVENOUS at 02:12

## 2018-12-28 RX ADMIN — IPRATROPIUM BROMIDE AND ALBUTEROL SULFATE 3 ML: .5; 3 SOLUTION RESPIRATORY (INHALATION) at 01:12

## 2018-12-28 NOTE — ED PROVIDER NOTES
Encounter Date: 12/27/2018  This is a SORT/MSE of a 83 y.o. female with CAD, cardiac defibrillator, CHF presenting to the ED with c/o shortness of breath that began earlier this evening. Care will be transferred to an alternate provider when patient is roomed for a full evaluation and final disposition. Patient is aware that he/she is awaiting a room in the emergency department, where another provider will review results, evaluate and treat as needed. NAHOMY Llamas DNP     History     Chief Complaint   Patient presents with    Shortness of Breath     States SOB since earlier today.  Denies any coughing, congestion, chest pains     CC:  Patient presents to the emergency department for evaluation of shortness of breath.  The shortness of breath began earlier today.  She denies any chest pain or chest tightness.  She denies any fever chills nausea vomiting.  She denies any cough or productive sputum.  No prior treatments.          Review of patient's allergies indicates:   Allergen Reactions    Aspirin Swelling     Swelling and rash    Colace [docusate sodium] Rash     itching    Sulfa (sulfonamide antibiotics) Swelling     Swelling and rash    Iodine and iodide containing products Rash    Penicillins Rash     Past Medical History:   Diagnosis Date    Anticoagulant long-term use     Coronary artery disease     Gout attack     Hypercholesteremia     Hypertension     Renal disorder      Past Surgical History:   Procedure Laterality Date    APPENDECTOMY      CARDIAC DEFIBRILLATOR PLACEMENT      2015    CHOLECYSTECTOMY      COLONOSCOPY W/ POLYPECTOMY  10 or 11/ 2014    per Dr. Elen ceja Left 8/2008    HYSTERECTOMY      INSERTION-ICD-BIVENTRICULAR N/A 12/11/2014    Performed by [facility] at [facility] CATH LAB    JOINT REPLACEMENT Bilateral 2005 & 2006    2 Knee Replacements=Lt. 1= 2005. Rt. 1= 2006    OOPHORECTOMY      TOTAL KNEE ARTHROPLASTY Bilateral Lt.= 2005; Rt.=2006    Bilateral  Knee scope     Family History   Problem Relation Age of Onset    Heart attack Mother 88    Hypertension Mother     Hyperlipidemia Mother     Diabetes Other     Hypertension Other      Social History     Tobacco Use    Smoking status: Never Smoker    Smokeless tobacco: Never Used   Substance Use Topics    Alcohol use: Yes     Comment: rarely    Drug use: No     Review of Systems   Constitutional: Negative for chills, diaphoresis, fatigue and fever.   HENT: Negative for sore throat.    Respiratory: Positive for shortness of breath. Negative for apnea, cough, choking, chest tightness, wheezing and stridor.    Cardiovascular: Negative for chest pain, palpitations and leg swelling.   Gastrointestinal: Negative for abdominal pain and nausea.   Genitourinary: Negative for dysuria.   Musculoskeletal: Negative for back pain.   Skin: Negative for rash.   Neurological: Negative for weakness.   Hematological: Does not bruise/bleed easily.       Physical Exam     Initial Vitals [12/27/18 2329]   BP Pulse Resp Temp SpO2   (!) 141/67 69 18 98.8 °F (37.1 °C) 95 %      MAP       --         Physical Exam    Vitals reviewed.  Constitutional: She appears well-developed and well-nourished.  Non-toxic appearance. She does not have a sickly appearance. She does not appear ill.   HENT:   Head: Normocephalic and atraumatic.   Nose: Nose normal.   Mouth/Throat: No oropharyngeal exudate.   Eyes: EOM are normal. Pupils are equal, round, and reactive to light.   Neck: Normal range of motion. Neck supple. No JVD present.   Cardiovascular: Normal rate, regular rhythm and intact distal pulses.   Pulmonary/Chest: Breath sounds normal. No stridor. No respiratory distress. She has no wheezes. She has no rhonchi. She has no rales. She exhibits no tenderness.   Abdominal: Soft. Bowel sounds are normal. She exhibits no distension and no mass. There is no tenderness. There is no rebound and no guarding.   Musculoskeletal: Normal range of motion.  She exhibits no edema or tenderness.   Neurological: She is alert and oriented to person, place, and time. She has normal strength. No sensory deficit.   Skin: Skin is warm and dry. Capillary refill takes less than 2 seconds.   Psychiatric: She has a normal mood and affect. Thought content normal.         ED Course   Procedures  Labs Reviewed   CBC W/ AUTO DIFFERENTIAL - Abnormal; Notable for the following components:       Result Value    RBC 3.83 (*)     Hemoglobin 11.7 (*)     Hematocrit 36.8 (*)     MCHC 31.8 (*)     Platelets 120 (*)     All other components within normal limits   COMPREHENSIVE METABOLIC PANEL - Abnormal; Notable for the following components:    BUN, Bld 33 (*)     Creatinine 1.9 (*)     eGFR if  28 (*)     eGFR if non  24 (*)     All other components within normal limits   TROPONIN I - Abnormal; Notable for the following components:    Troponin I 0.075 (*)     All other components within normal limits   B-TYPE NATRIURETIC PEPTIDE - Abnormal; Notable for the following components:    BNP 1,503 (*)     All other components within normal limits     EKG Readings: (Independently Interpreted)   Initial Reading: No STEMI. Clinical Impression: Paced Rhythm       Imaging Results          X-Ray Chest AP Portable (Final result)  Result time 12/28/18 02:23:07    Final result by Sachi Warren MD (12/28/18 02:23:07)                 Impression:      As above described.      Electronically signed by: Sachi Warren  Date:    12/28/2018  Time:    02:23             Narrative:    EXAMINATION:  XR CHEST AP PORTABLE    CLINICAL HISTORY:  Shortness of breath    TECHNIQUE:  Single frontal view of the chest was performed.    COMPARISON:  None    FINDINGS:  There is a left subclavian pacer.  The heart is enlarged.  Vascular calcifications in the aortic knob.  There is no definite focal consolidation, pneumothorax, or pleural effusion.  Mild haziness of the lung parenchyma may be  due to the patient's body habitus rather than pulmonary vascular congestion.  Spondylitic changes are present.  There is moderate dextroscoliosis.                                 Medical Decision Making:   History:   Old Medical Records: I decided to obtain old medical records.  Initial Assessment:   Medical decision-making:    The patient received a medical screening exam. If performed, the EKG was independently evaluated by me and is pending final cardiology evaluation.  If performed, all radiographic studies were independently evaluated by me and are pending final radiology evaluation. If labs were ordered, they were reviewed. Vital signs are independently assessed by me.  If performed, the pulse oximetry was independently evaluated by me.  I decided to obtain the patient's past medical record.  If available, I reviewed the patient's past medical record, including most recent labs and radiology reports.    ED Management:  Patient presents to the emergency department for evaluation of shortness of breath.  Patient has a history of heart failure with AICD placement and an ejection fraction around 20%.  There is some haziness noted on the read of the chest x-ray which may represent early pulmonary edema. I will give her a dose of Lasix in the emergency department.  I feel that she is stable for discharge with continued diuresis at home.  She is afebrile and nontoxic-appearing.  She is not hypoxic.  She is satting 97 to 98% on room air.  She shows no pitting edema or JVD.  I do not think she is in severe decompensated heart failure.  Patient's troponin is elevated but it is always elevated in likely due to some demand.  She does not endorse any chest pain.  ST segments do not appear elevated on her EKG.  I do not think this is an atypical presentation for an acute myocardial infarction- though she will always be at risk for this.  This was explained to her.  She understands this.  I recommended that she have close  follow-up with her primary care and Cardiology.  I recommended that she continue a low-salt diet.  Return precautions were discussed.  Patient has no sign of DVT.  I do not think this is a pulmonary embolism.  She is chest pain-free and not hypoxic.    The results and physical exam findings were reviewed with the patient. Pt agrees with assessment, disposition and treatment plan and has no further questions or complaints at this time.    TRUDY Samuels M.D. 2:49 AM 12/28/2018                        Clinical Impression:   The encounter diagnosis was Shortness of breath.                             Edgardo Samuels MD  12/28/18 0250

## 2018-12-28 NOTE — DISCHARGE INSTRUCTIONS
"Return to the Emergency Department of any acute worsening of your symptoms or for any other concern.     You should return to the ED for fever/chills, shortness of breath, chest pain, weakness or "passing out".     Pt should take all medications as prescribed.    Pt should follow up with PCP as soon as possible.    The risks associated with not taking your medications as prescribed and not following up with your Primary Care doctor or sub specialist includes worsening of your condition, pain, disability, loss of function or livelihood, and death      TRUDY Samuels M.D. 2:44 AM 12/28/2018      Our goal in the emergency department is to always give you outstanding care and exceptional service. You may receive a survey by mail or e-mail in the next week regarding your experience in our ED. We would greatly appreciate your completing and returning the survey. Your feedback provides us with a way to recognize our staff who give very good care and it helps us learn how to improve when your experience was below our aspiration of excellence.     "

## 2019-01-03 ENCOUNTER — OFFICE VISIT (OUTPATIENT)
Dept: OPHTHALMOLOGY | Facility: CLINIC | Age: 84
End: 2019-01-03
Payer: MEDICARE

## 2019-01-03 DIAGNOSIS — H25.13 NUCLEAR SCLEROSIS OF BOTH EYES: Primary | ICD-10-CM

## 2019-01-03 DIAGNOSIS — I10 ESSENTIAL HYPERTENSION: ICD-10-CM

## 2019-01-03 DIAGNOSIS — H52.7 REFRACTIVE ERROR: ICD-10-CM

## 2019-01-03 DIAGNOSIS — H26.9 CORTICAL CATARACT OF BOTH EYES: ICD-10-CM

## 2019-01-03 PROCEDURE — 92136 BIOMETRY: ICD-10-PCS | Mod: LT,S$GLB,, | Performed by: OPHTHALMOLOGY

## 2019-01-03 PROCEDURE — 92004 COMPRE OPH EXAM NEW PT 1/>: CPT | Mod: S$GLB,,, | Performed by: OPHTHALMOLOGY

## 2019-01-03 PROCEDURE — 99999 PR PBB SHADOW E&M-EST. PATIENT-LVL II: CPT | Mod: PBBFAC,,, | Performed by: OPHTHALMOLOGY

## 2019-01-03 PROCEDURE — 92136 OPHTHALMIC BIOMETRY: CPT | Mod: LT,S$GLB,, | Performed by: OPHTHALMOLOGY

## 2019-01-03 PROCEDURE — 99999 PR PBB SHADOW E&M-EST. PATIENT-LVL II: ICD-10-PCS | Mod: PBBFAC,,, | Performed by: OPHTHALMOLOGY

## 2019-01-03 PROCEDURE — 92004 PR EYE EXAM, NEW PATIENT,COMPREHESV: ICD-10-PCS | Mod: S$GLB,,, | Performed by: OPHTHALMOLOGY

## 2019-01-03 RX ORDER — NEPAFENAC 3 MG/ML
1 SUSPENSION/ DROPS OPHTHALMIC DAILY
Qty: 3 ML | Refills: 1 | Status: SHIPPED | OUTPATIENT
Start: 2019-02-04 | End: 2019-03-06

## 2019-01-03 RX ORDER — DIFLUPREDNATE OPHTHALMIC 0.5 MG/ML
1 EMULSION OPHTHALMIC 4 TIMES DAILY
Qty: 5 ML | Refills: 1 | Status: SHIPPED | OUTPATIENT
Start: 2019-02-07 | End: 2019-03-09

## 2019-01-03 RX ORDER — PNEUMOCOCCAL VACCINE POLYVALENT 25; 25; 25; 25; 25; 25; 25; 25; 25; 25; 25; 25; 25; 25; 25; 25; 25; 25; 25; 25; 25; 25; 25 UG/.5ML; UG/.5ML; UG/.5ML; UG/.5ML; UG/.5ML; UG/.5ML; UG/.5ML; UG/.5ML; UG/.5ML; UG/.5ML; UG/.5ML; UG/.5ML; UG/.5ML; UG/.5ML; UG/.5ML; UG/.5ML; UG/.5ML; UG/.5ML; UG/.5ML; UG/.5ML; UG/.5ML; UG/.5ML; UG/.5ML
INJECTION, SOLUTION INTRAMUSCULAR; SUBCUTANEOUS
Status: ON HOLD | COMMUNITY
Start: 2018-11-24 | End: 2019-02-21 | Stop reason: CLARIF

## 2019-01-03 RX ORDER — OFLOXACIN 3 MG/ML
1 SOLUTION/ DROPS OPHTHALMIC 4 TIMES DAILY
Qty: 5 ML | Refills: 1 | Status: SHIPPED | OUTPATIENT
Start: 2019-02-04 | End: 2019-02-15

## 2019-01-04 NOTE — PROGRESS NOTES
Subjective:       Patient ID: Palmira Malave is a 83 y.o. female.    Chief Complaint: Cataract    HPI     Referred:     84 y/o female is here for CE OU. Pt states last eye exam was 11/18 and was   recommended its time for CE. Pt Va is blurry OS>>OD. Pt denies eye   allergies, floaters,glare, and flashes. Pt denies ocular sx or procedures.       Eyemeds  No gtts    Last edited by Sun Maier on 1/3/2019  2:54 PM. (History)             Assessment:       1. Nuclear sclerosis of both eyes    2. Cortical cataract of both eyes    3. Essential hypertension    4. Refractive error        Plan:       Visually significant cataract OU -Pt. Wants Sx.     HTN-No retinopathy OU.  RE      Cataract Surgery Consent: Patient with a visually significant cataract with difficulties of ADLs, reading, driving, night vision, glare (any and all).  Discussed with Patient/Family/Caregiver: options, risks and benefits, expectations of cataract surgery, utilized an eye model with questions and answers to facilitate discussion.  Discussed lens options and patient understands that glasses may be required for optimal vision for distance and/or near vision after cataract surgery.  The Patient/Family/Caregiver  voice good understanding and patient wishes to proceed with surgery.  The patient will likely benefit from surgery and patient signed consent for Left Eye.  CE OS 2/7/19 SN60WF 20.0,        OD 2/21/19 SN60WF 20.0.  Control HTN.

## 2019-01-08 ENCOUNTER — TELEPHONE (OUTPATIENT)
Dept: OPHTHALMOLOGY | Facility: CLINIC | Age: 84
End: 2019-01-08

## 2019-01-08 DIAGNOSIS — H25.12 NUCLEAR SCLEROSIS, LEFT: Primary | ICD-10-CM

## 2019-01-14 ENCOUNTER — HOSPITAL ENCOUNTER (EMERGENCY)
Facility: HOSPITAL | Age: 84
Discharge: HOME | End: 2019-01-14
Attending: EMERGENCY MEDICINE
Payer: MEDICARE

## 2019-01-14 VITALS
HEIGHT: 63 IN | RESPIRATION RATE: 18 BRPM | DIASTOLIC BLOOD PRESSURE: 67 MMHG | WEIGHT: 178 LBS | SYSTOLIC BLOOD PRESSURE: 132 MMHG | TEMPERATURE: 98 F | BODY MASS INDEX: 31.54 KG/M2 | OXYGEN SATURATION: 97 % | HEART RATE: 61 BPM

## 2019-01-14 DIAGNOSIS — I50.9 ACUTE ON CHRONIC CONGESTIVE HEART FAILURE, UNSPECIFIED HEART FAILURE TYPE: Primary | ICD-10-CM

## 2019-01-14 DIAGNOSIS — R06.02 SHORTNESS OF BREATH: ICD-10-CM

## 2019-01-14 LAB
ALBUMIN SERPL BCP-MCNC: 4.1 G/DL
ALP SERPL-CCNC: 85 U/L
ALT SERPL W/O P-5'-P-CCNC: 17 U/L
ANION GAP SERPL CALC-SCNC: 10 MMOL/L
APTT BLDCRRT: 30.8 SEC
AST SERPL-CCNC: 27 U/L
BASOPHILS # BLD AUTO: 0.03 K/UL
BASOPHILS NFR BLD: 0.6 %
BILIRUB SERPL-MCNC: 0.9 MG/DL
BNP SERPL-MCNC: 2609 PG/ML
BUN SERPL-MCNC: 26 MG/DL
CALCIUM SERPL-MCNC: 10.4 MG/DL
CHLORIDE SERPL-SCNC: 100 MMOL/L
CO2 SERPL-SCNC: 32 MMOL/L
CREAT SERPL-MCNC: 1.3 MG/DL
DIFFERENTIAL METHOD: ABNORMAL
EOSINOPHIL # BLD AUTO: 0.2 K/UL
EOSINOPHIL NFR BLD: 3.2 %
ERYTHROCYTE [DISTWIDTH] IN BLOOD BY AUTOMATED COUNT: 14.3 %
EST. GFR  (AFRICAN AMERICAN): 44 ML/MIN/1.73 M^2
EST. GFR  (NON AFRICAN AMERICAN): 38 ML/MIN/1.73 M^2
GLUCOSE SERPL-MCNC: 94 MG/DL
HCT VFR BLD AUTO: 36.7 %
HGB BLD-MCNC: 12 G/DL
LYMPHOCYTES # BLD AUTO: 0.9 K/UL
LYMPHOCYTES NFR BLD: 18.8 %
MAGNESIUM SERPL-MCNC: 1.9 MG/DL
MCH RBC QN AUTO: 30.9 PG
MCHC RBC AUTO-ENTMCNC: 32.7 G/DL
MCV RBC AUTO: 95 FL
MONOCYTES # BLD AUTO: 0.4 K/UL
MONOCYTES NFR BLD: 7.8 %
NEUTROPHILS # BLD AUTO: 3.3 K/UL
NEUTROPHILS NFR BLD: 69.6 %
PLATELET # BLD AUTO: 121 K/UL
PMV BLD AUTO: 11.1 FL
POTASSIUM SERPL-SCNC: 3.5 MMOL/L
PROT SERPL-MCNC: 7.6 G/DL
RBC # BLD AUTO: 3.88 M/UL
SODIUM SERPL-SCNC: 142 MMOL/L
TROPONIN I SERPL DL<=0.01 NG/ML-MCNC: 0.09 NG/ML
TROPONIN I SERPL DL<=0.01 NG/ML-MCNC: 0.09 NG/ML
WBC # BLD AUTO: 4.73 K/UL

## 2019-01-14 PROCEDURE — 99285 EMERGENCY DEPT VISIT HI MDM: CPT | Mod: 25

## 2019-01-14 PROCEDURE — 85025 COMPLETE CBC W/AUTO DIFF WBC: CPT

## 2019-01-14 PROCEDURE — 93010 ELECTROCARDIOGRAM REPORT: CPT | Mod: ,,, | Performed by: INTERNAL MEDICINE

## 2019-01-14 PROCEDURE — 93010 EKG 12-LEAD: ICD-10-PCS | Mod: ,,, | Performed by: INTERNAL MEDICINE

## 2019-01-14 PROCEDURE — 80053 COMPREHEN METABOLIC PANEL: CPT

## 2019-01-14 PROCEDURE — 93005 ELECTROCARDIOGRAM TRACING: CPT

## 2019-01-14 PROCEDURE — 85730 THROMBOPLASTIN TIME PARTIAL: CPT

## 2019-01-14 PROCEDURE — 84484 ASSAY OF TROPONIN QUANT: CPT | Mod: 91

## 2019-01-14 PROCEDURE — 96374 THER/PROPH/DIAG INJ IV PUSH: CPT

## 2019-01-14 PROCEDURE — 83735 ASSAY OF MAGNESIUM: CPT

## 2019-01-14 PROCEDURE — 63600175 PHARM REV CODE 636 W HCPCS: Performed by: EMERGENCY MEDICINE

## 2019-01-14 PROCEDURE — 83880 ASSAY OF NATRIURETIC PEPTIDE: CPT

## 2019-01-14 RX ORDER — FUROSEMIDE 10 MG/ML
40 INJECTION INTRAMUSCULAR; INTRAVENOUS
Status: COMPLETED | OUTPATIENT
Start: 2019-01-14 | End: 2019-01-14

## 2019-01-14 RX ADMIN — FUROSEMIDE 40 MG: 10 INJECTION, SOLUTION INTRAVENOUS at 11:01

## 2019-01-14 NOTE — ED PROVIDER NOTES
"Encounter Date: 1/14/2019    SCRIBE #1 NOTE: I, CrystalJeffersonMiroslava Calderonang, am scribing for, and in the presence of,  Marino Antonio MD. I have scribed the following portions of the note - Other sections scribed: HPI, ROS, PE.       History     Chief Complaint   Patient presents with    Shortness of Breath     sob since last night.  states "Unable to catch my breath."  denies pain throughout.  denies significant cough.     CC: SOB    82 y/o female with Anticoagulant long-term use, CAD, Hypercholesteremia, HTN, renal disorder, cardiac defibrillator placement, and insertion of biventricular ICD presents to the ED for emergent evaluation of acute onset SOB that started yesterday night. The symptoms worsen whenever she lies down. The patient reports sitting upright in her recliner when she noticed the onset of her symptoms, which alleviated her symptoms. The patient reports sleeping on x2 small pillows at night. The patient states that she's been drinking more fluids lately, so she attempted another dose of her 40 mg Lasix, which she's supposed to take once daily, at 11PM yesterday night. The patient reports taking all of her prescribed medications this morning, except for the Lasix. The patient denies any SOB currently. The patient denies smoking cigarettes, drinking EtOH, or illicit drug abuse. The patient denies any recent long distance travel. The patient denies any leg swelling, fever, or cough. No other symptoms reported.      The history is provided by the patient. No  was used.     Review of patient's allergies indicates:   Allergen Reactions    Aspirin Swelling     Swelling and rash    Colace [docusate sodium] Rash     itching    Sulfa (sulfonamide antibiotics) Swelling     Swelling and rash    Iodine and iodide containing products Rash    Penicillins Rash     Past Medical History:   Diagnosis Date    Anticoagulant long-term use     Coronary artery disease     Gout attack     " Hypercholesteremia     Hypertension     Renal disorder      Past Surgical History:   Procedure Laterality Date    APPENDECTOMY      CARDIAC DEFIBRILLATOR PLACEMENT      2015    CHOLECYSTECTOMY      COLONOSCOPY W/ POLYPECTOMY  10 or 11/ 2014    per Dr. Elen jaylator Left 8/2008    HYSTERECTOMY      INSERTION-ICD-BIVENTRICULAR N/A 12/11/2014    Performed by Tal [facility]res MD at [facility] CATH LAB    JOINT REPLACEMENT Bilateral 2005 & 2006    2 Knee Replacements=Lt. 1= 2005. Rt. 1= 2006    OOPHORECTOMY      TOTAL KNEE ARTHROPLASTY Bilateral Lt.= 2005; Rt.=2006    Bilateral Knee scope     Family History   Problem Relation Age of Onset    Heart attack Mother 88    Hypertension Mother     Hyperlipidemia Mother     Diabetes Other     Hypertension Other      Social History     Tobacco Use    Smoking status: Never Smoker    Smokeless tobacco: Never Used   Substance Use Topics    Alcohol use: Yes     Comment: rarely    Drug use: No     Review of Systems   Constitutional: Negative for chills and fever.   HENT: Negative for congestion, ear pain, rhinorrhea and sore throat.    Eyes: Negative for redness.   Respiratory: Positive for shortness of breath (currently resolved). Negative for cough.    Cardiovascular: Negative for chest pain and leg swelling.   Gastrointestinal: Negative for abdominal pain, diarrhea, nausea and vomiting.   Genitourinary: Negative for decreased urine volume, difficulty urinating, dysuria, frequency, hematuria and urgency.   Musculoskeletal: Negative for back pain and neck pain.   Skin: Negative for rash.   Neurological: Negative for headaches.   Psychiatric/Behavioral: Negative for confusion.   All other systems reviewed and are negative.      Physical Exam     Initial Vitals [01/14/19 0918]   BP Pulse Resp Temp SpO2   (!) 147/73 75 18 98 °F (36.7 °C) 96 %      MAP       --         Physical Exam    Nursing note and vitals reviewed.  Constitutional: She appears well-developed  and well-nourished.  Non-toxic appearance. No distress.   The patient is laughing, smiling, and interactive.    HENT:   Head: Normocephalic and atraumatic.   Right Ear: External ear normal.   Left Ear: External ear normal.   Nose: Nose normal.   Eyes: Conjunctivae and EOM are normal. Pupils are equal, round, and reactive to light. Right conjunctiva is not injected. Left conjunctiva is not injected. No scleral icterus.   Neck: Normal range of motion. Neck supple. Carotid bruit is not present. No JVD present.   Cardiovascular: Normal rate, regular rhythm, S1 normal and S2 normal. Exam reveals no gallop and no friction rub.    No murmur heard.  Pulmonary/Chest: Effort normal and breath sounds normal. No accessory muscle usage. No respiratory distress. She has no wheezes. She has no rhonchi. She has no rales.   Abdominal: Soft. Bowel sounds are normal. She exhibits no distension and no mass. There is no hepatosplenomegaly. There is no tenderness. There is no rebound, no guarding, no CVA tenderness and negative Ochoa's sign.   Musculoskeletal: Normal range of motion. She exhibits no edema.   Good active ROM of all extremities. No extremity deformity or extremity TTP, no C/T/L SPine TTP, no lower extremity edema or cyanosis.    Lymphadenopathy: No inguinal adenopathy noted on the right or left side.   Neurological: She is alert and oriented to person, place, and time. She has normal strength. She displays no tremor. No cranial nerve deficit or sensory deficit. She displays a negative Romberg sign. Gait normal.   Normal speech. Symmetric facial movements. Negative finger to nose.    Skin: Skin is warm and dry. No ecchymosis and no rash noted.   Psychiatric: She has a normal mood and affect. She expresses no suicidal plans and no homicidal plans.    No [facility]         ED Course   Procedures  Labs Reviewed   B-TYPE NATRIURETIC PEPTIDE - Abnormal; Notable for the following components:       Result Value    BNP 2,609 (*)     All  other components within normal limits   CBC W/ AUTO DIFFERENTIAL - Abnormal; Notable for the following components:    RBC 3.88 (*)     Hematocrit 36.7 (*)     Platelets 121 (*)     Lymph # 0.9 (*)     All other components within normal limits   COMPREHENSIVE METABOLIC PANEL - Abnormal; Notable for the following components:    CO2 32 (*)     BUN, Bld 26 (*)     eGFR if  44 (*)     eGFR if non  38 (*)     All other components within normal limits   TROPONIN I - Abnormal; Notable for the following components:    Troponin I 0.092 (*)     All other components within normal limits   TROPONIN I - Abnormal; Notable for the following components:    Troponin I 0.085 (*)     All other components within normal limits   APTT   MAGNESIUM     EKG Readings: (Independently Interpreted)   EKG done at 9:42 a.m..  Normal sinus rhythm with right atrial enlargement with a rate of 68.  Left bundle branch block.  Flipped T-waves laterally.  QRS is 182.  Compared to previous EKGs in almost exactly the same as previous EKGs.  In comparison to the most recent EKG the flipped T-waves laterally are new.  However elected previous EKGs before this and she has had this multiple times before.       Imaging Results          X-Ray Chest AP Portable (Final result)  Result time 01/14/19 10:38:27    Final result by Andrzej James DO (01/14/19 10:38:27)                 Impression:      Please see above      Electronically signed by: Andrzej James DO  Date:    01/14/2019  Time:    10:38             Narrative:    EXAMINATION:  XR CHEST AP PORTABLE    CLINICAL HISTORY:  shortness of breath;    TECHNIQUE:  Single frontal view of the chest was performed.    COMPARISON:  12/28/2018    FINDINGS:  Three lead left-sided pacer device stable.  There is continued slight prominence of the hilar vasculature similar prior which may be related to technique component of vascular congestion not excluded.  There is no new lung opacity.   Subtle left basilar opacity suggestive for atelectasis versus scarring.  Small effusion not excluded.  No large pneumothorax.  Clinical correlation and follow-up advised                                 Medical Decision Making:   History:   Old Medical Records: I decided to obtain old medical records.  Initial Assessment:   Pt presenting 2/2  shortness of breath consistent with CHF exacerbation which has resolved on arrival to the emergency department due to patient adding an extra Lasix last night..  Patient is given IV Lasix.  Considered nitroglycerin if worsening respiratory status.  If worsening respiratory status will consider BiPAP and/or intubation.    Also considered but less likely:  Acs: ekg doesn't show stemi. Troponin ordered  Pna: symptoms bilaterally and no fevers.   Bronchitis: considered but hpi most c/w CHF  COPD:  Considered but less likely  Pneumothorax: bilateral breath sounds    Chest x-ray shows some mild pulmonary edema.  Also cardiomegaly.  Patient's troponin is elevated but is chronically elevated.  Slightly elevated higher than her baseline and BNP is high leave a did higher than her baseline.  Will get a repeat troponin.  I discussed admission with the patient she would prefer to go home and take another Lasix tonight and then go back to her normal Lasix schedule.  I discussed with her return if she has any worsening symptoms. Pending repeat troponin    Repeat troponin is lower than the 1st and closer to baseline.  Patient would like to go home and follow up outpatient. I discussed with the patient the diagnosis, treatment plan, indications for return to the emergency department, and for expected follow-up. The patient verbalized an understanding. The patient is asked if there are any questions or concerns. We discuss the case, until all issues are addressed to the patients satisfaction. Patient understands and is agreeable to the plan.   Marino Antonio    Clinical Tests:   Lab Tests:  Ordered and Reviewed  Radiological Study: Ordered and Reviewed  Medical Tests: Ordered and Reviewed            Scribe Attestation:   Scribe #1: I performed the above scribed service and the documentation accurately describes the services I performed. I attest to the accuracy of the note.    Attending Attestation:           Physician Attestation for Scribe:  Physician Attestation Statement for Scribe #1: I, Marino Antonio MD, reviewed documentation, as scribed by Channing Vivas in my presence, and it is both accurate and complete.                    Clinical Impression:   The primary encounter diagnosis was Acute on chronic congestive heart failure, unspecified heart failure type. A diagnosis of Shortness of breath was also pertinent to this visit.                             Marino Antonio MD  01/14/19 1257

## 2019-01-14 NOTE — ED TRIAGE NOTES
Pt reports shortness of breath last night when she went to bed after cooking dinner and washing dishes.

## 2019-01-15 ENCOUNTER — TELEPHONE (OUTPATIENT)
Dept: OPTOMETRY | Facility: CLINIC | Age: 84
End: 2019-01-15

## 2019-01-15 ENCOUNTER — PES CALL (OUTPATIENT)
Dept: ADMINISTRATIVE | Facility: CLINIC | Age: 84
End: 2019-01-15

## 2019-01-15 DIAGNOSIS — H25.11 NS (NUCLEAR SCLEROSIS), RIGHT: Primary | ICD-10-CM

## 2019-02-06 ENCOUNTER — ANESTHESIA EVENT (OUTPATIENT)
Dept: SURGERY | Facility: HOSPITAL | Age: 84
End: 2019-02-06
Payer: MEDICARE

## 2019-02-07 ENCOUNTER — ANESTHESIA (OUTPATIENT)
Dept: SURGERY | Facility: HOSPITAL | Age: 84
End: 2019-02-07
Payer: MEDICARE

## 2019-02-07 ENCOUNTER — HOSPITAL ENCOUNTER (OUTPATIENT)
Facility: HOSPITAL | Age: 84
Discharge: HOME | End: 2019-02-08
Attending: EMERGENCY MEDICINE | Admitting: EMERGENCY MEDICINE
Payer: MEDICARE

## 2019-02-07 DIAGNOSIS — R79.89 ELEVATED TROPONIN: ICD-10-CM

## 2019-02-07 DIAGNOSIS — T82.118A ICD (IMPLANTABLE CARDIOVERTER-DEFIBRILLATOR) MALFUNCTION, INITIAL ENCOUNTER: ICD-10-CM

## 2019-02-07 DIAGNOSIS — N18.30 CHRONIC KIDNEY DISEASE (CKD), STAGE III (MODERATE): Chronic | ICD-10-CM

## 2019-02-07 DIAGNOSIS — I50.23 ACUTE ON CHRONIC SYSTOLIC CONGESTIVE HEART FAILURE: Primary | ICD-10-CM

## 2019-02-07 DIAGNOSIS — I10 ESSENTIAL HYPERTENSION: ICD-10-CM

## 2019-02-07 DIAGNOSIS — R07.9 CHEST PAIN, UNSPECIFIED TYPE: ICD-10-CM

## 2019-02-07 DIAGNOSIS — I25.10 CORONARY ARTERY DISEASE INVOLVING NATIVE CORONARY ARTERY OF NATIVE HEART WITHOUT ANGINA PECTORIS: ICD-10-CM

## 2019-02-07 DIAGNOSIS — R06.02 SHORTNESS OF BREATH: ICD-10-CM

## 2019-02-07 DIAGNOSIS — E78.2 MIXED HYPERLIPIDEMIA: Chronic | ICD-10-CM

## 2019-02-07 DIAGNOSIS — Z95.810 ICD (IMPLANTABLE CARDIOVERTER-DEFIBRILLATOR) IN PLACE: ICD-10-CM

## 2019-02-07 PROBLEM — H25.10 SENILE NUCLEAR SCLEROSIS: Status: ACTIVE | Noted: 2019-02-07

## 2019-02-07 LAB
ALBUMIN SERPL BCP-MCNC: 4.1 G/DL
ALP SERPL-CCNC: 74 U/L
ALT SERPL W/O P-5'-P-CCNC: 14 U/L
ANION GAP SERPL CALC-SCNC: 10 MMOL/L
AST SERPL-CCNC: 23 U/L
BASOPHILS # BLD AUTO: 0.03 K/UL
BASOPHILS NFR BLD: 0.6 %
BILIRUB SERPL-MCNC: 0.9 MG/DL
BNP SERPL-MCNC: 2475 PG/ML
BUN SERPL-MCNC: 28 MG/DL
CALCIUM SERPL-MCNC: 10.5 MG/DL
CHLORIDE SERPL-SCNC: 102 MMOL/L
CO2 SERPL-SCNC: 28 MMOL/L
CREAT SERPL-MCNC: 1.3 MG/DL
DIFFERENTIAL METHOD: ABNORMAL
EOSINOPHIL # BLD AUTO: 0.1 K/UL
EOSINOPHIL NFR BLD: 2.5 %
ERYTHROCYTE [DISTWIDTH] IN BLOOD BY AUTOMATED COUNT: 14.7 %
EST. GFR  (AFRICAN AMERICAN): 44 ML/MIN/1.73 M^2
EST. GFR  (NON AFRICAN AMERICAN): 38 ML/MIN/1.73 M^2
GLUCOSE SERPL-MCNC: 92 MG/DL
HCT VFR BLD AUTO: 36.5 %
HGB BLD-MCNC: 11.4 G/DL
LYMPHOCYTES # BLD AUTO: 1.3 K/UL
LYMPHOCYTES NFR BLD: 24.5 %
MCH RBC QN AUTO: 30.3 PG
MCHC RBC AUTO-ENTMCNC: 31.2 G/DL
MCV RBC AUTO: 97 FL
MONOCYTES # BLD AUTO: 0.5 K/UL
MONOCYTES NFR BLD: 9.7 %
NEUTROPHILS # BLD AUTO: 3.3 K/UL
NEUTROPHILS NFR BLD: 62.7 %
PLATELET # BLD AUTO: 128 K/UL
PMV BLD AUTO: 10.5 FL
POTASSIUM SERPL-SCNC: 3.7 MMOL/L
PROT SERPL-MCNC: 7.3 G/DL
RBC # BLD AUTO: 3.76 M/UL
SODIUM SERPL-SCNC: 140 MMOL/L
TROPONIN I SERPL DL<=0.01 NG/ML-MCNC: 0.07 NG/ML
TROPONIN I SERPL DL<=0.01 NG/ML-MCNC: 0.07 NG/ML
WBC # BLD AUTO: 5.26 K/UL

## 2019-02-07 PROCEDURE — 99285 EMERGENCY DEPT VISIT HI MDM: CPT | Mod: 25

## 2019-02-07 PROCEDURE — 84484 ASSAY OF TROPONIN QUANT: CPT | Mod: 91

## 2019-02-07 PROCEDURE — 93010 EKG 12-LEAD: ICD-10-PCS | Mod: ,,, | Performed by: INTERNAL MEDICINE

## 2019-02-07 PROCEDURE — 93005 ELECTROCARDIOGRAM TRACING: CPT | Mod: 59

## 2019-02-07 PROCEDURE — 83880 ASSAY OF NATRIURETIC PEPTIDE: CPT

## 2019-02-07 PROCEDURE — 25000003 PHARM REV CODE 250: Performed by: ANESTHESIOLOGY

## 2019-02-07 PROCEDURE — D9220A PRA ANESTHESIA: ICD-10-PCS | Mod: CRNA,,, | Performed by: NURSE ANESTHETIST, CERTIFIED REGISTERED

## 2019-02-07 PROCEDURE — 63600175 PHARM REV CODE 636 W HCPCS: Performed by: NURSE ANESTHETIST, CERTIFIED REGISTERED

## 2019-02-07 PROCEDURE — D9220A PRA ANESTHESIA: Mod: ANES,,, | Performed by: ANESTHESIOLOGY

## 2019-02-07 PROCEDURE — D9220A PRA ANESTHESIA: ICD-10-PCS | Mod: ANES,,, | Performed by: ANESTHESIOLOGY

## 2019-02-07 PROCEDURE — G0378 HOSPITAL OBSERVATION PER HR: HCPCS

## 2019-02-07 PROCEDURE — 80053 COMPREHEN METABOLIC PANEL: CPT

## 2019-02-07 PROCEDURE — D9220A PRA ANESTHESIA: Mod: CRNA,,, | Performed by: NURSE ANESTHETIST, CERTIFIED REGISTERED

## 2019-02-07 PROCEDURE — 85025 COMPLETE CBC W/AUTO DIFF WBC: CPT

## 2019-02-07 PROCEDURE — 93010 ELECTROCARDIOGRAM REPORT: CPT | Mod: ,,, | Performed by: INTERNAL MEDICINE

## 2019-02-07 RX ORDER — FENTANYL CITRATE 50 UG/ML
INJECTION, SOLUTION INTRAMUSCULAR; INTRAVENOUS
Status: DISCONTINUED | OUTPATIENT
Start: 2019-02-07 | End: 2019-02-07

## 2019-02-07 RX ADMIN — FENTANYL CITRATE 25 MCG: 50 INJECTION INTRAMUSCULAR; INTRAVENOUS at 07:02

## 2019-02-07 RX ADMIN — SODIUM CHLORIDE, SODIUM LACTATE, POTASSIUM CHLORIDE, AND CALCIUM CHLORIDE: .6; .31; .03; .02 INJECTION, SOLUTION INTRAVENOUS at 06:02

## 2019-02-07 NOTE — ANESTHESIA POSTPROCEDURE EVALUATION
"Anesthesia Post Evaluation    Patient: Palmira Malave    Procedure(s) Performed: Procedure(s) (LRB):  INSERTION, IOL PROSTHESIS (Left)  PHACOEMULSIFICATION, CATARACT (Left)    Final Anesthesia Type: MAC  Patient location during evaluation: PACU  Patient participation: Yes- Able to Participate  Level of consciousness: awake and alert and oriented  Post-procedure vital signs: reviewed and stable  Pain management: adequate  Airway patency: patent  PONV status at discharge: No PONV  Anesthetic complications: no      Cardiovascular status: blood pressure returned to baseline, hemodynamically stable and stable  Respiratory status: unassisted, spontaneous ventilation and room air  Hydration status: euvolemic  Follow-up not needed.        Visit Vitals  BP (!) 142/74   Pulse 60   Temp 36.6 °C (97.9 °F) (Oral)   Resp 16   Ht 5' 3" (1.6 m)   Wt 80.7 kg (178 lb)   SpO2 (!) 94%   Breastfeeding? No   BMI 31.53 kg/m²       Pain/Barron Score: Barron Score: 10 (2/7/2019  8:30 AM)        "

## 2019-02-07 NOTE — TRANSFER OF CARE
"Anesthesia Transfer of Care Note    Patient: Palmira Malave    Procedure(s) Performed: Procedure(s) (LRB):  INSERTION, IOL PROSTHESIS (Left)  PHACOEMULSIFICATION, CATARACT (Left)    Patient location: [facility]    Anesthesia Type: MAC    Transport from OR: Transported from OR on room air with adequate spontaneous ventilation    Post pain: adequate analgesia    Post assessment: no apparent anesthetic complications and tolerated procedure well    Post vital signs: stable    Level of consciousness: awake, alert and oriented    Nausea/Vomiting: no nausea/vomiting    Complications: none    Transfer of care protocol was followed      Last vitals:   Visit Vitals  BP (!) 142/74   Pulse 60   Temp 36.6 °C (97.9 °F) (Oral)   Resp 16   Ht 5' 3" (1.6 m)   Wt 80.7 kg (178 lb)   SpO2 (!) 94%   Breastfeeding? No   BMI 31.53 kg/m²     "

## 2019-02-07 NOTE — TRANSFER OF CARE
"Anesthesia Transfer of Care Note    Patient: Palmira Malave    Procedure(s) Performed: Procedure(s) (LRB):  INSERTION, IOL PROSTHESIS (Left)  PHACOEMULSIFICATION, CATARACT (Left)    Patient location: PACU    Anesthesia Type: general    Transport from OR: Transported from OR on room air with adequate spontaneous ventilation    Post pain: adequate analgesia    Post assessment: no apparent anesthetic complications and tolerated procedure well    Post vital signs: stable    Level of consciousness: awake, alert and oriented    Nausea/Vomiting: no nausea/vomiting    Complications: none    Transfer of care protocol was followed      Last vitals:   Visit Vitals  BP (!) 142/74   Pulse 60   Temp 36.6 °C (97.9 °F) (Oral)   Resp 16   Ht 5' 3" (1.6 m)   Wt 80.7 kg (178 lb)   SpO2 (!) 94%   Breastfeeding? No   BMI 31.53 kg/m²     "

## 2019-02-07 NOTE — ANESTHESIA PREPROCEDURE EVALUATION
02/07/2019    Pre-operative evaluation for Procedure(s) (LRB):  INSERTION, IOL PROSTHESIS (Left)  PHACOEMULSIFICATION, CATARACT (Left)    Palmira Malave is a 83 y.o. female     Patient Active Problem List   Diagnosis    Elevated troponin    Essential hypertension    CAD (coronary artery disease)    Hyperlipidemia    Acute-on-chronic renal failure    Chronic kidney disease (CKD), stage III (moderate)    Bacteremia    E. coli UTI    Complicated UTI (urinary tract infection)    E coli bacteremia    Systolic congestive heart failure    Systolic congestive heart failure    Acute on chronic congestive heart failure    Refractive error    Cortical cataract of both eyes    Nuclear sclerosis of both eyes    Senile nuclear sclerosis       Review of patient's allergies indicates:   Allergen Reactions    Aspirin Swelling     Swelling and rash    Colace [docusate sodium] Rash     itching    Sulfa (sulfonamide antibiotics) Swelling     Swelling and rash    Iodine and iodide containing products Rash    Penicillins Rash       No current facility-administered medications on file prior to encounter.      Current Outpatient Medications on File Prior to Encounter   Medication Sig Dispense Refill    acetaminophen (TYLENOL) 500 MG tablet Take 1 tablet (500 mg total) by mouth every 6 (six) hours as needed for Pain. 12 tablet 0    allopurinol (ZYLOPRIM) 100 MG tablet Take 100 mg by mouth every evening.       calcitRIOL (ROCALTROL) 0.25 MCG Cap 0.25 mcg.       carvedilol (COREG) 25 MG tablet Take 0.5 tablets (12.5 mg total) by mouth 2 (two) times daily.      clopidogrel (PLAVIX) 75 mg tablet Take 75 mg by mouth every evening. On hold since 11-28-14, for procedure.      cranberry 400 mg Cap Take 1 capsule by mouth 2 (two) times daily.      fish oil-omega-3 fatty acids 300-1,000 mg capsule Take 2 g by  mouth once daily. 2 caps every AM & 1 cap every PM.      furosemide (LASIX) 40 MG tablet Take 1 tablet (40 mg total) by mouth once daily. Take 40 mg twice a day on 10/3, 10/4 followed by 20 mg twice a day. 30 tablet 0    ILEVRO 0.3 % DrpS Place 1 drop into both eyes once daily. For 30 days 3 mL 1    lovastatin (MEVACOR) 40 MG tablet Take 40 mg by mouth nightly. Takes 2 caps with supper every evening.      mirabegron (MYRBETRIQ) 50 mg Tb24 Take 50 mg by mouth once daily.       ofloxacin (OCUFLOX) 0.3 % ophthalmic solution Place 1 drop into the left eye 4 (four) times daily. for 10 days 5 mL 1    ranitidine (ZANTAC) 150 MG tablet 150 mg 2 (two) times daily.       terazosin (HYTRIN) 2 MG capsule Take by mouth every evening.       difluprednate (DUREZOL) 0.05 % Drop ophthalmic solution Place 1 drop into the left eye 4 (four) times daily. For 30 days 5 mL 1    FLUZONE HIGH-DOSE 2018-19, PF, 180 mcg/0.5 mL vaccine       PNEUMOVAX 23 25 mcg/0.5 mL       PREVNAR 13, PF, 0.5 mL Syrg          Past Surgical History:   Procedure Laterality Date    APPENDECTOMY      CARDIAC DEFIBRILLATOR PLACEMENT      2015    CHOLECYSTECTOMY      COLONOSCOPY W/ POLYPECTOMY  10 or 11/ 2014    per Dr. Elen ceja Left 8/2008    HYSTERECTOMY      INSERTION-ICD-BIVENTRICULAR N/A 12/11/2014    Performed by [facility] at [facility] CATH LAB    JOINT REPLACEMENT Bilateral 2005 & 2006    2 Knee Replacements=Lt. 1= 2005. Rt. 1= 2006    OOPHORECTOMY      TOTAL KNEE ARTHROPLASTY Bilateral Lt.= 2005; Rt.=2006    Bilateral Knee scope       VITALS  Vitals:    02/07/19 0605   BP: 127/61   Pulse: 64   Resp: (!) 24   Temp: 36.6 °C (97.9 °F)       Lab Results   Component Value Date    WBC 4.73 01/14/2019    HGB 12.0 01/14/2019    HCT 36.7 (L) 01/14/2019    MCV 95 01/14/2019     (L) 01/14/2019     BMP  Lab Results   Component Value Date     01/14/2019    K 3.5 01/14/2019     01/14/2019    CO2 32 (H)  01/14/2019    BUN 26 (H) 01/14/2019    CREATININE 1.3 01/14/2019    CALCIUM 10.4 01/14/2019    ANIONGAP 10 01/14/2019    ESTGFRAFRICA 44 (A) 01/14/2019    EGFRNONAA 38 (A) 01/14/2019         2D Echo:  Results for orders placed or performed during the hospital encounter of 06/28/18   2D echo with color flow doppler   Result Value Ref Range    QEF 20 (A) 55 - 65    Mitral Valve Regurgitation MILD TO MODERATE     Diastolic Dysfunction Yes (A)     Aortic Valve Stenosis MILD (A)     Est. PA Systolic Pressure 33.2     Pericardial Effusion NONE     Mitral Valve Mobility NORMAL     Tricuspid Valve Regurgitation TRIVIAL TO MILD              Anesthesia Evaluation    I have reviewed the Patient Summary Reports.    I have reviewed the Nursing Notes.      Review of Systems  Anesthesia Hx:  No problems with previous Anesthesia  History of prior surgery of interest to airway management or planning: Denies Family Hx of Anesthesia complications.   Denies Personal Hx of Anesthesia complications.   Social:  Non-Smoker, No Alcohol Use    Hematology/Oncology:     Oncology Normal    -- Anemia:   EENT/Dental:EENT/Dental Normal   Cardiovascular:   Hypertension, well controlled CAD   CHF hyperlipidemia ECG has been reviewed. EF 20%   Pulmonary:  Pulmonary Normal    Renal/:   Chronic Renal Disease, CRI    Hepatic/GI:   GERD, well controlled    Musculoskeletal:  Musculoskeletal Normal    Neurological:  Neurology Normal    Endocrine:  Endocrine Normal    Dermatological:  Skin Normal    Psych:  Psychiatric Normal           Physical Exam  General:  Well nourished, Obesity    Airway/Jaw/Neck:  Airway Findings: Mouth Opening: Normal Tongue: Normal  General Airway Assessment: Adult  Mallampati: II  TM Distance: Normal, at least 6 cm  Jaw/Neck Findings:  Neck ROM: Normal ROM     Eyes/Ears/Nose:  Eyes/Ears/Nose Findings:    Dental:  Dental Findings: Periodontal disease, Mild   Chest/Lungs:  Chest/Lungs Findings: Clear to auscultation, Normal  Respiratory Rate     Heart/Vascular:  Heart Findings: Rate: Normal  Rhythm: Regular Rhythm  Sounds: Normal        Mental Status:  Mental Status Findings:  Cooperative         Anesthesia Plan  Type of Anesthesia, risks & benefits discussed:  Anesthesia Type:  MAC  Patient's Preference:   Intra-op Monitoring Plan: standard ASA monitors  Intra-op Monitoring Plan Comments:   Post Op Pain Control Plan: per primary service following discharge from PACU  Post Op Pain Control Plan Comments:   Induction:   IV  Beta Blocker:  Patient is on a Beta-Blocker and has received one dose within the past 24 hours (No further documentation required).       Informed Consent: Patient understands risks and agrees with Anesthesia plan.  Questions answered. Anesthesia consent signed with patient.  ASA Score: 3     Day of Surgery Review of History & Physical: I have interviewed and examined the patient. I have reviewed the patient's H&P dated:  There are no significant changes.          Ready For Surgery From Anesthesia Perspective.

## 2019-02-08 ENCOUNTER — OFFICE VISIT (OUTPATIENT)
Dept: OPHTHALMOLOGY | Facility: CLINIC | Age: 84
End: 2019-02-08
Payer: MEDICARE

## 2019-02-08 VITALS
HEART RATE: 70 BPM | TEMPERATURE: 98 F | DIASTOLIC BLOOD PRESSURE: 58 MMHG | OXYGEN SATURATION: 93 % | BODY MASS INDEX: 30.66 KG/M2 | RESPIRATION RATE: 16 BRPM | WEIGHT: 173.06 LBS | HEIGHT: 63 IN | SYSTOLIC BLOOD PRESSURE: 123 MMHG

## 2019-02-08 VITALS — HEART RATE: 72 BPM | DIASTOLIC BLOOD PRESSURE: 49 MMHG | SYSTOLIC BLOOD PRESSURE: 90 MMHG

## 2019-02-08 DIAGNOSIS — Z98.890 POST-OPERATIVE STATE: Primary | ICD-10-CM

## 2019-02-08 PROBLEM — Z95.810 ICD (IMPLANTABLE CARDIOVERTER-DEFIBRILLATOR) IN PLACE: Status: ACTIVE | Noted: 2019-02-08

## 2019-02-08 PROBLEM — I50.23 ACUTE ON CHRONIC SYSTOLIC CONGESTIVE HEART FAILURE: Status: ACTIVE | Noted: 2018-10-02

## 2019-02-08 LAB
ANION GAP SERPL CALC-SCNC: 10 MMOL/L
BASOPHILS # BLD AUTO: 0.02 K/UL
BASOPHILS NFR BLD: 0.4 %
BUN SERPL-MCNC: 25 MG/DL
CALCIUM SERPL-MCNC: 10.1 MG/DL
CHLORIDE SERPL-SCNC: 103 MMOL/L
CO2 SERPL-SCNC: 28 MMOL/L
CREAT SERPL-MCNC: 1.2 MG/DL
DIFFERENTIAL METHOD: ABNORMAL
EOSINOPHIL # BLD AUTO: 0.1 K/UL
EOSINOPHIL NFR BLD: 2.9 %
ERYTHROCYTE [DISTWIDTH] IN BLOOD BY AUTOMATED COUNT: 14.4 %
EST. GFR  (AFRICAN AMERICAN): 48 ML/MIN/1.73 M^2
EST. GFR  (NON AFRICAN AMERICAN): 42 ML/MIN/1.73 M^2
GLUCOSE SERPL-MCNC: 85 MG/DL
HCT VFR BLD AUTO: 34.8 %
HGB BLD-MCNC: 10.7 G/DL
LYMPHOCYTES # BLD AUTO: 0.9 K/UL
LYMPHOCYTES NFR BLD: 18.9 %
MCH RBC QN AUTO: 29.6 PG
MCHC RBC AUTO-ENTMCNC: 30.7 G/DL
MCV RBC AUTO: 96 FL
MONOCYTES # BLD AUTO: 0.6 K/UL
MONOCYTES NFR BLD: 12.7 %
NEUTROPHILS # BLD AUTO: 3.1 K/UL
NEUTROPHILS NFR BLD: 65.1 %
PLATELET # BLD AUTO: 122 K/UL
PMV BLD AUTO: 11.1 FL
POTASSIUM SERPL-SCNC: 3.6 MMOL/L
RBC # BLD AUTO: 3.61 M/UL
SODIUM SERPL-SCNC: 141 MMOL/L
TROPONIN I SERPL DL<=0.01 NG/ML-MCNC: 0.06 NG/ML
WBC # BLD AUTO: 4.81 K/UL

## 2019-02-08 PROCEDURE — 99220 PR INITIAL OBSERVATION CARE,LEVL III: ICD-10-PCS | Mod: ,,, | Performed by: INTERNAL MEDICINE

## 2019-02-08 PROCEDURE — 99220 PR INITIAL OBSERVATION CARE,LEVL III: CPT | Mod: ,,, | Performed by: INTERNAL MEDICINE

## 2019-02-08 PROCEDURE — 85025 COMPLETE CBC W/AUTO DIFF WBC: CPT

## 2019-02-08 PROCEDURE — 84484 ASSAY OF TROPONIN QUANT: CPT

## 2019-02-08 PROCEDURE — 25000003 PHARM REV CODE 250: Performed by: INTERNAL MEDICINE

## 2019-02-08 PROCEDURE — G0378 HOSPITAL OBSERVATION PER HR: HCPCS

## 2019-02-08 PROCEDURE — 25000003 PHARM REV CODE 250: Performed by: EMERGENCY MEDICINE

## 2019-02-08 PROCEDURE — 99999 PR PBB SHADOW E&M-EST. PATIENT-LVL II: CPT | Mod: PBBFAC,,, | Performed by: OPHTHALMOLOGY

## 2019-02-08 PROCEDURE — 80048 BASIC METABOLIC PNL TOTAL CA: CPT

## 2019-02-08 PROCEDURE — 99999 PR PBB SHADOW E&M-EST. PATIENT-LVL II: ICD-10-PCS | Mod: PBBFAC,,, | Performed by: OPHTHALMOLOGY

## 2019-02-08 PROCEDURE — 99024 POSTOP FOLLOW-UP VISIT: CPT | Mod: S$GLB,,, | Performed by: OPHTHALMOLOGY

## 2019-02-08 PROCEDURE — 99024 PR POST-OP FOLLOW-UP VISIT: ICD-10-PCS | Mod: S$GLB,,, | Performed by: OPHTHALMOLOGY

## 2019-02-08 RX ORDER — MORPHINE SULFATE 10 MG/ML
2 INJECTION INTRAMUSCULAR; INTRAVENOUS; SUBCUTANEOUS EVERY 4 HOURS PRN
Status: DISCONTINUED | OUTPATIENT
Start: 2019-02-08 | End: 2019-02-08

## 2019-02-08 RX ORDER — CLOPIDOGREL BISULFATE 75 MG/1
75 TABLET ORAL NIGHTLY
Status: DISCONTINUED | OUTPATIENT
Start: 2019-02-08 | End: 2019-02-08 | Stop reason: HOSPADM

## 2019-02-08 RX ORDER — FAMOTIDINE 20 MG/1
20 TABLET, FILM COATED ORAL 2 TIMES DAILY
Status: DISCONTINUED | OUTPATIENT
Start: 2019-02-08 | End: 2019-02-08 | Stop reason: DRUGHIGH

## 2019-02-08 RX ORDER — ONDANSETRON 2 MG/ML
4 INJECTION INTRAMUSCULAR; INTRAVENOUS EVERY 8 HOURS PRN
Status: DISCONTINUED | OUTPATIENT
Start: 2019-02-08 | End: 2019-02-08 | Stop reason: HOSPADM

## 2019-02-08 RX ORDER — CARVEDILOL 6.25 MG/1
12.5 TABLET ORAL 2 TIMES DAILY
Status: DISCONTINUED | OUTPATIENT
Start: 2019-02-08 | End: 2019-02-08 | Stop reason: HOSPADM

## 2019-02-08 RX ORDER — LOVASTATIN 20 MG/1
40 TABLET ORAL NIGHTLY
Status: DISCONTINUED | OUTPATIENT
Start: 2019-02-08 | End: 2019-02-08 | Stop reason: HOSPADM

## 2019-02-08 RX ORDER — FUROSEMIDE 40 MG/1
40 TABLET ORAL DAILY
Status: DISCONTINUED | OUTPATIENT
Start: 2019-02-08 | End: 2019-02-08 | Stop reason: HOSPADM

## 2019-02-08 RX ORDER — ACETAMINOPHEN 325 MG/1
650 TABLET ORAL EVERY 8 HOURS PRN
Status: DISCONTINUED | OUTPATIENT
Start: 2019-02-08 | End: 2019-02-08 | Stop reason: HOSPADM

## 2019-02-08 RX ORDER — POLYETHYLENE GLYCOL 3350 17 G/17G
17 POWDER, FOR SOLUTION ORAL DAILY
Status: DISCONTINUED | OUTPATIENT
Start: 2019-02-08 | End: 2019-02-08 | Stop reason: HOSPADM

## 2019-02-08 RX ORDER — SODIUM CHLORIDE 0.9 % (FLUSH) 0.9 %
5 SYRINGE (ML) INJECTION
Status: DISCONTINUED | OUTPATIENT
Start: 2019-02-08 | End: 2019-02-08 | Stop reason: HOSPADM

## 2019-02-08 RX ORDER — FAMOTIDINE 20 MG/1
20 TABLET, FILM COATED ORAL DAILY
Status: DISCONTINUED | OUTPATIENT
Start: 2019-02-08 | End: 2019-02-08 | Stop reason: HOSPADM

## 2019-02-08 RX ADMIN — LOVASTATIN 40 MG: 20 TABLET ORAL at 03:02

## 2019-02-08 RX ADMIN — FUROSEMIDE 40 MG: 40 TABLET ORAL at 09:02

## 2019-02-08 RX ADMIN — FAMOTIDINE 20 MG: 20 TABLET ORAL at 09:02

## 2019-02-08 RX ADMIN — CLOPIDOGREL BISULFATE 75 MG: 75 TABLET ORAL at 03:02

## 2019-02-08 RX ADMIN — CARVEDILOL 12.5 MG: 6.25 TABLET, FILM COATED ORAL at 09:02

## 2019-02-08 NOTE — ASSESSMENT & PLAN NOTE
"Pt has known ICM s/p MDT ICD  Presented with sxs of CHF in setting of missing lasix dose d/t surgical procedure  Sxs now resolved  Minimal trop elev, similar to prior hospitalizations, no anginal sxs, doubt ACS  MDT ICD "beeping", rep called to interrogate  Resume med rx, incl PO lasix  No need to repeat echo or plan for inpat isch eval  Assuming no significant abnormality on interrogation of device, dispo planning appropriate and follow up with Dr. Batres in 1-2 weeks.    "

## 2019-02-08 NOTE — DISCHARGE SUMMARY
[facility] Hospital Medicine  Discharge Summary      Patient Name: Palmira Malave  MRN: [id number]  Admission Date: 2/7/2019  Hospital Length of Stay: 0 days  Discharge Date and Time: 2/8/2019 12:42 PM  Attending Physician: Lashawn Rubio MD  Discharging Provider: Len Perez Jr, NP  Primary Care Provider: Qi Ramon MD      HPI:   83 y.o. female hypertension, hyperlipidemia, CKD stage 3, CAD, chronic systolic heart failure, and ICD in place presents with a complaint acute onset shortness of breath that began yesterday evening.  She had a cataract procedure performed yesterday morning was instructed to hold her Lasix dose yesterday.  She denies fever, chills, cough, chest pain, palpitations, dizziness, syncope, nausea, vomiting, diarrhea, abdominal pain, bloody or black stools, dysuria, frequency, or urgency.  The ED her BNP was 2475, chest x-ray with evidence of pulmonary edema, troponin mildly elevated at 0.071 which has trended flat x3, consistent with acute on chronic systolic heart failure.  She received Lasix with improvement her symptoms, reports she is currently at her usual baseline.    * No surgery found *      Hospital Course:   Ms. Palmira Malave was placed in observation further evaluation and treatment of shortness of breath due to acute on chronic systolic heart failure.  She had held her home Lasix for a cataract procedure.  She received Lasix with improvement to her usual baseline.  She was seen and evaluated by Cardiology and her defibrillator was interrogated by [employer].  No acute abnormality or malfunction identified. Cardiology recommends outpatient follow-up.  Findings and plan discussed with the patient, all questions answered, she agrees and endorses understanding.  Discharged home in stable condition.     Consults:   Consults (From admission, onward)        Status Ordering Provider     Inpatient consult to Cardiology  Once     Provider:  Parviz Myers,  MD    Completed JENNIFER MOROCHO JR          No new Assessment & Plan notes have been filed under this hospital service since the last note was generated.  Service: Hospital Medicine    Final Active Diagnoses:    Diagnosis Date Noted POA    PRINCIPAL PROBLEM:  Acute on chronic systolic congestive heart failure [I50.23] 10/02/2018 Yes    ICD (implantable cardioverter-defibrillator) in place [Z95.810] 02/08/2019 Yes    Chronic kidney disease (CKD), stage III (moderate) [N18.3] 11/15/2014 Yes     Chronic    Hyperlipidemia [E78.5] 11/15/2014 Yes     Chronic    Elevated troponin [R74.8] 11/15/2014 Yes    Essential hypertension [I10] 11/15/2014 Yes    Coronary artery disease involving native coronary artery of native heart without angina pectoris [I25.10] 11/15/2014 Yes      Problems Resolved During this Admission:       Discharged Condition: stable    Disposition: Home or Self Care    Follow Up:  Follow-up Information     Tal Batres MD. Go on 2/12/2019.    Specialties:  INTERVENTIONAL CARDIOLOGY, Cardiology  Why:  Keep your previously scheduled appoointment at 3:20 PM   Contact information:  [address] [place] [address]  [phone]                 Patient Instructions:      Diet Cardiac     Notify your health care provider if you experience any of the following:  temperature >100.4     Notify your health care provider if you experience any of the following:  persistent nausea and vomiting or diarrhea     Notify your health care provider if you experience any of the following:  severe uncontrolled pain     Notify your health care provider if you experience any of the following:  difficulty breathing or increased cough     Notify your health care provider if you experience any of the following:  severe persistent headache     Notify your health care provider if you experience any of the following:  persistent dizziness, light-headedness, or visual disturbances     Notify your health care  provider if you experience any of the following:  increased confusion or weakness     Activity as tolerated       Significant Diagnostic Studies: Labs:   CMP   Recent Labs   Lab 02/07/19 2136 02/08/19  0613    141   K 3.7 3.6    103   CO2 28 28   GLU 92 85   BUN 28* 25*   CREATININE 1.3 1.2   CALCIUM 10.5 10.1   PROT 7.3  --    ALBUMIN 4.1  --    BILITOT 0.9  --    ALKPHOS 74  --    AST 23  --    ALT 14  --    ANIONGAP 10 10   ESTGFRAFRICA 44* 48*   EGFRNONAA 38* 42*   , CBC   Recent Labs   Lab 02/07/19 2136 02/08/19 0613   WBC 5.26 4.81   HGB 11.4* 10.7*   HCT 36.5* 34.8*   * 122*    and Troponin   Recent Labs   Lab 02/08/19 0613   TROPONINI 0.062*       Pending Diagnostic Studies:     None         Medications:  Reconciled Home Medications:      Medication List      CONTINUE taking these medications    acetaminophen 500 MG tablet  Commonly known as:  TYLENOL  Take 1 tablet (500 mg total) by mouth every 6 (six) hours as needed for Pain.     allopurinol 100 MG tablet  Commonly known as:  ZYLOPRIM  Take 100 mg by mouth every evening.     calcitRIOL 0.25 MCG Cap  Commonly known as:  ROCALTROL  0.25 mcg.     carvedilol 25 MG tablet  Commonly known as:  COREG  Take 0.5 tablets (12.5 mg total) by mouth 2 (two) times daily.     clopidogrel 75 mg tablet  Commonly known as:  PLAVIX  Take 75 mg by mouth every evening. On hold since 11-28-14, for procedure.     cranberry 400 mg Cap  Take 1 capsule by mouth 2 (two) times daily.     difluprednate 0.05 % Drop ophthalmic solution  Commonly known as:  DUREZOL  Place 1 drop into the left eye 4 (four) times daily. For 30 days     fish oil-omega-3 fatty acids 300-1,000 mg capsule  Take 2 g by mouth once daily. 2 caps every AM & 1 cap every PM.     FLUZONE HIGH-DOSE 2018-19 (PF) 180 mcg/0.5 mL vaccine  Generic drug:  influenza     furosemide 40 MG tablet  Commonly known as:  LASIX  Take 1 tablet (40 mg total) by mouth once daily. Take 40 mg twice a day on 10/3,  10/4 followed by 20 mg twice a day.     ILEVRO 0.3 % Drps  Generic drug:  nepafenac  Place 1 drop into both eyes once daily. For 30 days     lovastatin 40 MG tablet  Commonly known as:  MEVACOR  Take 40 mg by mouth nightly. Takes 2 caps with supper every evening.     MYRBETRIQ 50 mg Tb24  Generic drug:  mirabegron  Take 50 mg by mouth once daily.     ofloxacin 0.3 % ophthalmic solution  Commonly known as:  OCUFLOX  Place 1 drop into the left eye 4 (four) times daily. for 10 days     PNEUMOVAX 23 25 mcg/0.5 mL  Generic drug:  pneumococcal 23-jorge luis ps vaccine     PREVNAR 13 (PF) 0.5 mL Syrg  Generic drug:  pneumoc 13-jorge luis conj-dip cr(PF)     ranitidine 150 MG tablet  Commonly known as:  ZANTAC  150 mg 2 (two) times daily.     terazosin 2 MG capsule  Commonly known as:  HYTRIN  Take by mouth every evening.            Indwelling Lines/Drains at time of discharge:   Lines/Drains/Airways     Airway                 Airway - Non-Surgical Nasal Cannula -- days                Time spent on the discharge of patient: less than 30 minutes  Patient was seen and examined on the date of discharge and determined to be suitable for discharge.         Len Perez Jr NP  Department of Hospital Medicine  [facility]

## 2019-02-08 NOTE — NURSING
Received patient from ER to room via wheelchair. Patient accompanied by transport and family. Transferred patient to bed. Evaluated general patient appearance and condition. Admit assessment initiated. Tele monitoring initiated. Saline lock at right antecubital, and is iIntact. No apparent distress noted at this time.

## 2019-02-08 NOTE — H&P
"[facility] Hospital Medicine  History & Physical    Patient Name: Palmira Malave  MRN: [id number]  Admission Date: 2/7/2019  Attending Physician: Lashawn Rubio MD   Primary Care Provider: Qi Ramon MD         Patient information was obtained from patient, past medical records and ER records.     Subjective:     Principal Problem:Acute on chronic systolic congestive heart failure    Chief Complaint:   Chief Complaint   Patient presents with    Shortness of Breath     cataract surgery this morning, sob started this evening around 5pm; reports pacemaker went off "just the noise" no shock during surgery; denies pain, denies chest pain        HPI: 83 y.o. female hypertension, hyperlipidemia, CKD stage 3, CAD, chronic systolic heart failure, and ICD in place presents with a complaint acute onset shortness of breath that began yesterday evening.  She had a cataract procedure performed yesterday morning was instructed to hold her Lasix dose yesterday.  She denies fever, chills, cough, chest pain, palpitations, dizziness, syncope, nausea, vomiting, diarrhea, abdominal pain, bloody or black stools, dysuria, frequency, or urgency.  The ED her BNP was 2475, chest x-ray with evidence of pulmonary edema, troponin mildly elevated at 0.071 which has trended flat x3, consistent with acute on chronic systolic heart failure.  She received Lasix with improvement her symptoms, reports she is currently at her usual baseline.    Past Medical History:   Diagnosis Date    Anticoagulant long-term use     Coronary artery disease     Gout attack     Hypercholesteremia     Hypertension     Renal disorder     Vaginal delivery     x4       Past Surgical History:   Procedure Laterality Date    APPENDECTOMY      CARDIAC DEFIBRILLATOR PLACEMENT      2015    CHOLECYSTECTOMY      COLONOSCOPY W/ POLYPECTOMY  10 or 11/ 2014    per Dr. Elen ceja Left 8/2008    EYE SURGERY      HYSTERECTOMY      " INSERTION, IOL PROSTHESIS Left 2/7/2019    [facility]    INSERTION-ICD-BIVENTRICULAR N/A 12/11/2014    [facility] Tal Batres [facility] at [facility] CATH LAB    JOINT REPLACEMENT Bilateral 2005 & 2006    2 Knee Replacements=Lt. 1= 2005. Rt. 1= 2006    OOPHORECTOMY      PHACOEMULSIFICATION, CATARACT Left 2/7/2019    [facility]    TOTAL KNEE ARTHROPLASTY Bilateral Lt.= 2005; Rt.=2006    Bilateral Knee scope       Review of patient's allergies indicates:   Allergen Reactions    Aspirin Swelling     Swelling and rash    Colace [docusate sodium] Rash     itching    Sulfa (sulfonamide antibiotics) Swelling     Swelling and rash    Iodine and iodide containing products Rash    Penicillins Rash       Current Facility-Administered Medications on File Prior to Encounter   Medication    [DISCONTINUED] 0.9%  NaCl infusion    [DISCONTINUED] acetaminophen tablet 650 mg    [DISCONTINUED] cyclopentolate 1% ophthalmic solution 1 drop    [DISCONTINUED] HYDROcodone-acetaminophen 5-325 mg per tablet 1 tablet    [DISCONTINUED] lactated ringers infusion    [DISCONTINUED] lidocaine (PF) 10 mg/ml (1%) injection 10 mg    [DISCONTINUED] phenylephrine HCL 2.5% ophthalmic solution 1 drop    [DISCONTINUED] proparacaine 0.5 % ophthalmic solution 1 drop    [DISCONTINUED] sodium chloride 0.9% flush 3 mL    [DISCONTINUED] tropicamide 1% ophthalmic solution 1 drop     Current Outpatient Medications on File Prior to Encounter   Medication Sig    acetaminophen (TYLENOL) 500 MG tablet Take 1 tablet (500 mg total) by mouth every 6 (six) hours as needed for Pain.    allopurinol (ZYLOPRIM) 100 MG tablet Take 100 mg by mouth every evening.     calcitRIOL (ROCALTROL) 0.25 MCG Cap 0.25 mcg.     carvedilol (COREG) 25 MG tablet Take 0.5 tablets (12.5 mg total) by mouth 2 (two) times daily.    clopidogrel (PLAVIX) 75 mg tablet Take 75 mg by mouth every evening. On  hold since 11-28-14, for procedure.    cranberry 400 mg Cap Take 1 capsule by mouth 2 (two) times daily.    difluprednate (DUREZOL) 0.05 % Drop ophthalmic solution Place 1 drop into the left eye 4 (four) times daily. For 30 days    furosemide (LASIX) 40 MG tablet Take 1 tablet (40 mg total) by mouth once daily. Take 40 mg twice a day on 10/3, 10/4 followed by 20 mg twice a day.    ILEVRO 0.3 % DrpS Place 1 drop into both eyes once daily. For 30 days    lovastatin (MEVACOR) 40 MG tablet Take 40 mg by mouth nightly. Takes 2 caps with supper every evening.    ofloxacin (OCUFLOX) 0.3 % ophthalmic solution Place 1 drop into the left eye 4 (four) times daily. for 10 days    ranitidine (ZANTAC) 150 MG tablet 150 mg 2 (two) times daily.     fish oil-omega-3 fatty acids 300-1,000 mg capsule Take 2 g by mouth once daily. 2 caps every AM & 1 cap every PM.    FLUZONE HIGH-DOSE 2018-19, PF, 180 mcg/0.5 mL vaccine     mirabegron (MYRBETRIQ) 50 mg Tb24 Take 50 mg by mouth once daily.     PNEUMOVAX 23 25 mcg/0.5 mL     PREVNAR 13, PF, 0.5 mL Syrg     terazosin (HYTRIN) 2 MG capsule Take by mouth every evening.      Family History     Problem Relation (Age of Onset)    Diabetes Other    Heart attack Mother (88)    Hyperlipidemia Mother    Hypertension Mother, Other        Tobacco Use    Smoking status: Never Smoker    Smokeless tobacco: Never Used   Substance and Sexual Activity    Alcohol use: Yes     Comment: rarely    Drug use: No    Sexual activity: Not Currently     Partners: Male     Review of Systems   Constitutional: Negative for chills, fatigue and fever.   Eyes: Negative for photophobia and visual disturbance.   Respiratory: Positive for shortness of breath. Negative for cough.    Cardiovascular: Negative for chest pain, palpitations and leg swelling.   Gastrointestinal: Negative for abdominal pain, diarrhea, nausea and vomiting.   Genitourinary: Negative for dysuria, frequency and urgency.   Skin:  Negative for pallor, rash and wound.   Neurological: Negative for light-headedness and headaches.   Psychiatric/Behavioral: Negative for confusion and decreased concentration.     Objective:     Vital Signs (Most Recent):  Temp: 98.3 °F (36.8 °C) (02/08/19 0729)  Pulse: 65 (02/08/19 0729)  Resp: 16 (02/08/19 0729)  BP: 128/70 (02/08/19 0729)  SpO2: 96 % (02/08/19 0729) Vital Signs (24h Range):  Temp:  [97.6 °F (36.4 °C)-98.6 °F (37 °C)] 98.3 °F (36.8 °C)  Pulse:  [61-72] 65  Resp:  [16-38] 16  SpO2:  [93 %-99 %] 96 %  BP: (128-157)/(66-83) 128/70     Weight: 78.5 kg (173 lb 1 oz)  Body mass index is 30.66 kg/m².    Physical Exam   Constitutional: She is oriented to person, place, and time. She appears well-developed and well-nourished. No distress.   HENT:   Head: Normocephalic and atraumatic.   Right Ear: External ear normal.   Left Ear: External ear normal.   Nose: Nose normal.   Mouth/Throat: Oropharynx is clear and moist.   Eyes: Conjunctivae and EOM are normal. Pupils are equal, round, and reactive to light.   Neck: Normal range of motion. Neck supple.   Cardiovascular: Normal rate, regular rhythm and intact distal pulses.   Pulmonary/Chest: Effort normal and breath sounds normal. No respiratory distress. She has no wheezes.   Abdominal: Soft. Bowel sounds are normal. She exhibits no distension. There is no tenderness.   No palpable hepatomegaly or splenomegaly    Musculoskeletal: Normal range of motion. She exhibits no edema or tenderness.   Neurological: She is alert and oriented to person, place, and time.   Skin: Skin is warm and dry.   Psychiatric: She has a normal mood and affect. Thought content normal.   Nursing note and vitals reviewed.        CRANIAL NERVES     CN III, IV, VI   Pupils are equal, round, and reactive to light.  Extraocular motions are normal.        Significant Labs: All pertinent labs within the past 24 hours have been reviewed.    Significant Imaging: I have reviewed all pertinent  imaging results/findings within the past 24 hours.    Assessment/Plan:     * Acute on chronic systolic congestive heart failure    Complaint of dyspnea with elevated BNP and evidence of edema on chest x-ray and exam, Improved with Lasix, resume home meds and regimen, [employer] has interrogated her defibrillator and found no evidence acute malfunction or complication.  Appreciate Cardiology recs.     ICD (implantable cardioverter-defibrillator) in place    Interrogated, no acute issue.     Chronic kidney disease (CKD), stage III (moderate)    Stable, at baseline, maintain euvolemic state, strict I/O's, monitor renal function and electrolytes, avoid nephrotoxic agents.     Hyperlipidemia    Continue statin     Coronary artery disease involving native coronary artery of native heart without angina pectoris    Continue med management, monitor on tele.     Essential hypertension    Well controlled, continue home medications and monitor blood pressure, adjust as needed.     Elevated troponin    Mild elevation, flat trend x3, consistent with baseline in setting of CKD, appreciate Cardiology recommendations.       VTE Risk Mitigation (From admission, onward)        Ordered     IP VTE HIGH RISK PATIENT  Once      02/08/19 0243     Place DALE hose  Until discontinued      02/08/19 0243     Place sequential compression device  Until discontinued      02/08/19 0243        Len Perez Jr., APRN, AGACNP-BC  Hospitalist - Department of Hospital Medicine  [facility] [address] [place] [address]  Office #: [phone]; Pager #: [phone]

## 2019-02-08 NOTE — HPI
"83 y.o. F who has CAD, HTN, Hypercholesteremia, Renal disorder, and Gout who presents to the ED for emergent evaluation of acute episode of SOB that occurred approximately 1 hour and 30 minutes PTA. Pt states that the SOB lasted 30 minutes, in which she felt like she was running a marathon. She states that her "defibrillator made a noise" during her eye procedure this morning. Pt reports that defibrillator was placed years ago due to heart murmur and slow heart rate. Pt is on blood thinners. Pt denies CP, diaphoresis, cough, runny nose, sore throat, leg swelling, or Hx of MI.    Pt follows with Dr. Batres for ICM s/p BiVICD ([employer]), last seen 10/23/18.    The patient is a very pleasant 83-year-old woman who underwent left eye surgery yesterday without incident.  She was told by her surgeon to hold her Lasix prior to the operation and had not taken at all day after the operation.  She subsequently developed shortness of breath and presented herself to the emergency room.  She denied any anginal symptoms.  Her defibrillator was noted to beep, but did not shock the patient.  She is currently feeling back to baseline and is having no chest pain or shortness of breath.  She otherwise denies palpitations, lightheadedness, dizziness, syncope, or other defibrillator discharges.  There has been no PND, orthopnea, or lower extremity edema.  "

## 2019-02-08 NOTE — PLAN OF CARE
02/08/19 1735   Final Note   Assessment Type Final Discharge Note   Anticipated Discharge Disposition Home   What phone number can be called within the next 1-3 days to see how you are doing after discharge? ([phone])   Hospital Follow Up  Appt(s) scheduled? Yes   Discharge plans and expectations educations in teach back method with documentation complete? Yes   Right Care Referral Info   Post Acute Recommendation No Care

## 2019-02-08 NOTE — PLAN OF CARE
To patient's room to discuss patient managing her care at home.      TN Role Explained.  Patient identified by using 2 identifiers:  Name and date of birth    Patient stated that her daughter and son WILL HELP AT HOME WITH her RECOVERY.      TN name and contact info placed on the communication board    Preferred Pharmacy:  [facility] - [place] - [address]  [address]  [place] [address]  Phone: [phone] Fax: [phone]       02/08/19 113   Discharge Assessment   Assessment Type Discharge Planning Assessment   Confirmed/corrected address and phone number on facesheet? Yes   Assessment information obtained from? Patient   Expected Length of Stay (days) 2   Communicated expected length of stay with patient/caregiver yes   Prior to hospitilization cognitive status: Alert/Oriented   Prior to hospitalization functional status: Independent   Current cognitive status: Alert/Oriented   Current Functional Status: Independent   Lives With child(karan), adult   Able to Return to Prior Arrangements yes   Is patient able to care for self after discharge? Yes   Patient's perception of discharge disposition home or selfcare   Readmission Within the Last 30 Days no previous admission in last 30 days   Patient currently being followed by outpatient case management? No   Patient currently receives any other outside agency services? No   Equipment Currently Used at Home none   Do you have any problems affording any of your prescribed medications? No   Is the patient taking medications as prescribed? yes   Does the patient have transportation home? Yes   Transportation Anticipated family or friend will provide   Does the patient receive services at the Coumadin Clinic? No   Discharge Plan A Home with family   DME Needed Upon Discharge  none   Patient/Family in Agreement with Plan yes

## 2019-02-08 NOTE — PROGRESS NOTES
Subjective:       Patient ID: Palmira Malave is a 83 y.o. female.    Chief Complaint: Post-op Evaluation (1 day PO OS. CE IOL 2/07/2019 OS. )    HPI     Post-op Evaluation      Additional comments: 1 day PO OS. CE IOL 2/07/2019 OS.               Comments     83 y.o. Female is here for 1 day PO OS. CE IOL 2/07/2019 OS. Denies eye   pain and f/f. No discomfort. Pt had shortness on breath after the surgery.   Pt was discharged at 1:00 PM  today from [facility].     Eye Meds: Did not put any drops           Last edited by Demetrius Velazco MD on 2/8/2019  3:03 PM. (History)             Assessment:       1. Post-operative state        Plan:       S/p CE OS- Doing well.         CPM OS.  RTC 1 wk.

## 2019-02-08 NOTE — PROGRESS NOTES
[facility]    WRITTEN HEALTHCARE AND DISCHARGE INFORMATION    Follow-up Information     Tal Batres MD. Go on 2/12/2019.    Specialties:  INTERVENTIONAL CARDIOLOGY, Cardiology  Why:  Keep your previously scheduled appoointment at 3:20 PM   Contact information:  [address]   [address]  [place] [address]  [phone]                     Help at Home           [phone]  After discharge for assistance [facility] On Call Nurse Care Line 24/7  Assistance     Things You are responsible For To Manage Your Care At Home:  1.    Getting your prescriptions filled   2.    Taking your medications as directed, DO NOT MISS ANY DOSES!  3.    Going to your follow-up doctor appointment. This is important because it  allow the doctor to monitor your progress and determine if  any changes need to made to your treatment plan.     Thank you for choosing [facility] for your care.  Please answer any calls you may receive from [facility] we want to continue to support you as you manage your healthcare needs. [facility] is happy to have the opportunity to serve you.      Sincerely,  Your [facility] Team,  CHAGO Moore, ACM-RN; [facility]  [phone]

## 2019-02-08 NOTE — ED TRIAGE NOTES
"Pt reports cataract surgery this morning, sob started this evening around 5pm; reports pacemaker went off "just the noise" no shock during surgery; denies pain, denies chest pain.  "

## 2019-02-08 NOTE — CONSULTS
"[facility]  Cardiology  Consult Note    Patient Name: Palmira Malave  MRN: [id number]  Admission Date: 2/7/2019  Hospital Length of Stay: 0 days  Code Status: Full Code   Attending Provider: Lashawn Rubio MD   Consulting Provider: Parviz Myers MD  Primary Care Physician: Qi Ramon MD  Principal Problem:Acute on chronic congestive heart failure    Patient information was obtained from patient and ER records.     Inpatient consult to Cardiology  Consult performed by: Parviz Myers MD  Consult ordered by: Len Perez Jr., NP  Reason for consult: SOB, CHF, ICD, trop        Subjective:     Chief Complaint:  SOB     HPI:   83 y.o. F who has CAD, HTN, Hypercholesteremia, Renal disorder, and Gout who presents to the ED for emergent evaluation of acute episode of SOB that occurred approximately 1 hour and 30 minutes PTA. Pt states that the SOB lasted 30 minutes, in which she felt like she was running a marathon. She states that her "defibrillator made a noise" during her eye procedure this morning. Pt reports that defibrillator was placed years ago due to heart murmur and slow heart rate. Pt is on blood thinners. Pt denies CP, diaphoresis, cough, runny nose, sore throat, leg swelling, or Hx of MI.    Pt follows with Dr. Batres for ICM s/p BiVICD ([employer]), last seen 10/23/18.    The patient is a very pleasant 83-year-old woman who underwent left eye surgery yesterday without incident.  She was told by her surgeon to hold her Lasix prior to the operation and had not taken at all day after the operation.  She subsequently developed shortness of breath and presented herself to the emergency room.  She denied any anginal symptoms.  Her defibrillator was noted to beep, but did not shock the patient.  She is currently feeling back to baseline and is having no chest pain or shortness of breath.  She otherwise denies palpitations, lightheadedness, dizziness, syncope, or other " defibrillator discharges.  There has been no PND, orthopnea, or lower extremity edema.    Past Medical History:   Diagnosis Date    Anticoagulant long-term use     Coronary artery disease     Gout attack     Hypercholesteremia     Hypertension     Renal disorder     Vaginal delivery     x4       Past Surgical History:   Procedure Laterality Date    APPENDECTOMY      CARDIAC DEFIBRILLATOR PLACEMENT      2015    CHOLECYSTECTOMY      COLONOSCOPY W/ POLYPECTOMY  10 or 11/ 2014    per Dr. Elen ceja Left 8/2008    EYE SURGERY      HYSTERECTOMY      INSERTION-ICD-BIVENTRICULAR N/A 12/11/2014    Performed by Tal Batres MD at [facility] CATH LAB    JOINT REPLACEMENT Bilateral 2005 & 2006    2 Knee Replacements=Lt. 1= 2005. Rt. 1= 2006    OOPHORECTOMY      TOTAL KNEE ARTHROPLASTY Bilateral Lt.= 2005; Rt.=2006    Bilateral Knee scope       Review of patient's allergies indicates:   Allergen Reactions    Aspirin Swelling     Swelling and rash    Colace [docusate sodium] Rash     itching    Sulfa (sulfonamide antibiotics) Swelling     Swelling and rash    Iodine and iodide containing products Rash    Penicillins Rash       Current Facility-Administered Medications on File Prior to Encounter   Medication    [DISCONTINUED] 0.9%  NaCl infusion    [DISCONTINUED] acetaminophen tablet 650 mg    [DISCONTINUED] balanced salt    [DISCONTINUED] cyclopentolate 1% ophthalmic solution 1 drop    [DISCONTINUED] fentaNYL injection    [DISCONTINUED] fluorescein ophthalmic strip    [DISCONTINUED] HYDROcodone-acetaminophen 5-325 mg per tablet 1 tablet    [DISCONTINUED] lactated ringers infusion    [DISCONTINUED] lidocaine (PF) 10 mg/ml (1%) injection 10 mg    [DISCONTINUED] lidocaine HCL 20 mg/ml (2%) injection    [DISCONTINUED] phenylephrine HCL 2.5% ophthalmic solution 1 drop    [DISCONTINUED] phenylephrine-ketorolac (OMIDRIA) 1-0.3 % 500 mL irrigation    [DISCONTINUED] povidone-iodine 5%  ophthalmic solution    [DISCONTINUED] prednisoLONE acetate 1 % ophthalmic suspension    [DISCONTINUED] proparacaine 0.5 % ophthalmic solution 1 drop    [DISCONTINUED] sod hyaluronate-sod chondroitin-sod hyaluronate intra-ocular kit    [DISCONTINUED] sodium chloride 0.9% flush 3 mL    [DISCONTINUED] sodium-potassium-NaCl    [DISCONTINUED] tropicamide 1% ophthalmic solution 1 drop     Current Outpatient Medications on File Prior to Encounter   Medication Sig    acetaminophen (TYLENOL) 500 MG tablet Take 1 tablet (500 mg total) by mouth every 6 (six) hours as needed for Pain.    allopurinol (ZYLOPRIM) 100 MG tablet Take 100 mg by mouth every evening.     calcitRIOL (ROCALTROL) 0.25 MCG Cap 0.25 mcg.     carvedilol (COREG) 25 MG tablet Take 0.5 tablets (12.5 mg total) by mouth 2 (two) times daily.    clopidogrel (PLAVIX) 75 mg tablet Take 75 mg by mouth every evening. On hold since 11-28-14, for procedure.    cranberry 400 mg Cap Take 1 capsule by mouth 2 (two) times daily.    difluprednate (DUREZOL) 0.05 % Drop ophthalmic solution Place 1 drop into the left eye 4 (four) times daily. For 30 days    furosemide (LASIX) 40 MG tablet Take 1 tablet (40 mg total) by mouth once daily. Take 40 mg twice a day on 10/3, 10/4 followed by 20 mg twice a day.    ILEVRO 0.3 % DrpS Place 1 drop into both eyes once daily. For 30 days    lovastatin (MEVACOR) 40 MG tablet Take 40 mg by mouth nightly. Takes 2 caps with supper every evening.    ofloxacin (OCUFLOX) 0.3 % ophthalmic solution Place 1 drop into the left eye 4 (four) times daily. for 10 days    ranitidine (ZANTAC) 150 MG tablet 150 mg 2 (two) times daily.     fish oil-omega-3 fatty acids 300-1,000 mg capsule Take 2 g by mouth once daily. 2 caps every AM & 1 cap every PM.    FLUZONE HIGH-DOSE 2018-19, PF, 180 mcg/0.5 mL vaccine     mirabegron (MYRBETRIQ) 50 mg Tb24 Take 50 mg by mouth once daily.     PNEUMOVAX 23 25 mcg/0.5 mL     PREVNAR 13, PF, 0.5 mL Syrg      terazosin (HYTRIN) 2 MG capsule Take by mouth every evening.      Family History     Problem Relation (Age of Onset)    Diabetes Other    Heart attack Mother (88)    Hyperlipidemia Mother    Hypertension Mother, Other        Tobacco Use    Smoking status: Never Smoker    Smokeless tobacco: Never Used   Substance and Sexual Activity    Alcohol use: Yes     Comment: rarely    Drug use: No    Sexual activity: Not Currently     Partners: Male     Review of Systems   Constitution: Negative for chills, diaphoresis, fever, weakness and malaise/fatigue.   HENT: Negative for nosebleeds.    Eyes: Negative for blurred vision and double vision.   Cardiovascular: Negative for chest pain, claudication, cyanosis, dyspnea on exertion, leg swelling, orthopnea, palpitations, paroxysmal nocturnal dyspnea and syncope.   Respiratory: Positive for shortness of breath. Negative for cough and wheezing.    Skin: Negative for dry skin and poor wound healing.   Musculoskeletal: Negative for back pain, joint swelling and myalgias.   Gastrointestinal: Negative for abdominal pain, nausea and vomiting.   Genitourinary: Negative for hematuria.   Neurological: Negative for dizziness, headaches, numbness and seizures.   Psychiatric/Behavioral: Negative for altered mental status and depression.     Objective:     Vital Signs (Most Recent):  Temp: 98.6 °F (37 °C) (02/08/19 0543)  Pulse: 72 (02/08/19 0543)  Resp: 16 (02/08/19 0543)  BP: 135/66 (02/08/19 0543)  SpO2: (!) 93 % (02/08/19 0543) Vital Signs (24h Range):  Temp:  [97.6 °F (36.4 °C)-98.6 °F (37 °C)] 98.6 °F (37 °C)  Pulse:  [60-72] 72  Resp:  [16-38] 16  SpO2:  [93 %-99 %] 93 %  BP: (134-157)/(66-83) 135/66     Weight: 78.5 kg (173 lb 1 oz)  Body mass index is 30.66 kg/m².    SpO2: (!) 93 %  O2 Device (Oxygen Therapy): room air    No intake or output data in the 24 hours ending 02/08/19 0640    Lines/Drains/Airways     Airway                 Airway - Non-Surgical Nasal Cannula --  days          Peripheral Intravenous Line                 Peripheral IV - Single Lumen 01/14/19 0957 Right Forearm 24 days         Peripheral IV - Single Lumen 02/07/19 2136 Right Antecubital less than 1 day                Physical Exam   Constitutional: She is oriented to person, place, and time. She appears well-developed and well-nourished. No distress.   HENT:   Head: Normocephalic and atraumatic.   Mouth/Throat: No oropharyngeal exudate.   Eyes:   R eye pupil reactive, no scleral icterus  L eye covered by patch s/p surgery 2/7/19   Neck: Normal range of motion. Neck supple. No JVD present. No tracheal deviation present. No thyromegaly present.   Cardiovascular: Normal rate, regular rhythm, S1 normal and S2 normal. Exam reveals no gallop and no friction rub.   No murmur heard.  Pulmonary/Chest: Effort normal and breath sounds normal. No respiratory distress. She has no wheezes. She has no rales. She exhibits no tenderness.   Abdominal: Soft. She exhibits no distension.   Musculoskeletal: Normal range of motion. She exhibits no edema.   Neurological: She is alert and oriented to person, place, and time. No cranial nerve deficit.   Skin: Skin is warm and dry. She is not diaphoretic.   Psychiatric: She has a normal mood and affect. Her behavior is normal. Judgment normal.       Current Medications:   carvedilol  12.5 mg Oral BID    clopidogrel  75 mg Oral QHS    famotidine  20 mg Oral Daily    lovastatin  40 mg Oral Nightly    polyethylene glycol  17 g Oral Daily       acetaminophen, acetaminophen, morphine, ondansetron, ondansetron, sodium chloride 0.9%    Laboratory (all labs reviewed):  CBC:  Recent Labs   Lab 10/02/18  1809 10/03/18  0553 12/28/18  0016 01/14/19  0957 02/07/19  2136   WHITE BLOOD CELL COUNT 6.02 4.68 5.68 4.73 5.26   HEMOGLOBIN 11.3 L 11.7 L 11.7 L 12.0 11.4 L   HEMATOCRIT 34.4 L 35.0 L 36.8 L 36.7 L 36.5 L   PLATELETS 127 L 127 L 120 L 121 L 128 L       CHEMISTRIES:  Recent Labs   Lab  10/02/18  1809 10/03/18  0553 12/28/18  0016 01/14/19  0957 02/07/19  2136   GLUCOSE 88 94 93 94 92   SODIUM 138 144 141 142 140   POTASSIUM 3.7 3.8 4.0 3.5 3.7   BUN BLD 39 H 37 H 33 H 26 H 28 H   CREATININE 1.5 H 1.5 H 1.9 H 1.3 1.3   EGFR IF  37 A 37 A 28 A 44 A 44 A   EGFR IF NON- 32 A 32 A 24 A 38 A 38 A   CALCIUM 10.4 10.3 10.2 10.4 10.5   MAGNESIUM  --   --   --  1.9  --        CARDIAC BIOMARKERS:  Recent Labs   Lab 12/28/18  0016 01/14/19  0957 01/14/19  1206 02/07/19  2136 02/07/19  2248   TROPONIN I 0.075 H 0.092 H 0.085 H 0.072 H 0.071 H       COAGS:  Recent Labs   Lab 02/03/17 2135 08/12/17  0905 10/02/18  1809   INR 1.0 1.1 1.1       LIPIDS/LFTS:  Recent Labs   Lab 10/02/18  1809 10/03/18  0553 12/28/18  0016 01/14/19  0957 02/07/19  2136   AST 26 24 26 27 23   ALT 17 15 13 17 14       BNP:  Recent Labs   Lab 02/03/17  2135 10/02/18  1809 12/28/18  0016 01/14/19  0957 02/07/19  2136   BNP 1,046 H 1,836 H 1,503 H 2,609 H 2,475 H       TSH:        Free T4:        Diagnostic Results:  ECG (personally reviewed and interpreted tracing(s)):  2/7/19 1852 SR 65, BiV paced, similar to 1/14/19    Chest X-Ray (personally reviewed and interpreted image(s)): 2/7/19 NAD, L BiVICD    Echo: 10/3/18  · Left ventricle ejection fraction is moderately decreased at 35%  · The left ventricle cavity is moderately dilated.  · Grade I (mild) left ventricular diastolic dysfunction consistent with impaired relaxation.  · Left atrium is moderately dilated.  · Right atrium is mildly dilated.  · RV systolic function is normal.  · Trace regurgitation is present in aortic valve.  · Tricuspid valve shows trace regurgitation.  · Pulmonic valve shows trace regurgitation.  · Mitral valve shows mild-to-moderate regurgitation.    Carotid US 7/10/18  There is 40 - 49% right Internal Carotid stenosis.  There is 20 - 39% left Internal Carotid stenosis.      Assessment and Plan:     * Acute on chronic congestive  "heart failure    Pt has known ICM s/p MDT ICD  Presented with sxs of CHF in setting of missing lasix dose d/t surgical procedure  Sxs now resolved  Minimal trop elev, similar to prior hospitalizations, no anginal sxs, doubt ACS  MDT ICD "beeping", rep called to interrogate  Resume med rx, incl PO lasix  No need to repeat echo or plan for inpat isch eval  Assuming no significant abnormality on interrogation of device, dispo planning appropriate and follow up with Dr. Batres in 1-2 weeks.       Elevated troponin    As above, doubt ACS     Coronary artery disease involving native coronary artery of native heart without angina pectoris    Presumed based on hx  No anginal sxs  Cont med rx: Plavix/Statin/Coreg  Consider addition of ARB  ASA allergy noted     Chronic kidney disease (CKD), stage III (moderate)    Stable  Mgmt per IM     Hyperlipidemia    Cont statin  Consider more potent agent     Essential hypertension    Controlled  Cont med rx     ICD (implantable cardioverter-defibrillator) in place    MDT BiV ICD in place  No discharges, but beeping noted  Interrogation requested         VTE Risk Mitigation (From admission, onward)        Ordered     IP VTE HIGH RISK PATIENT  Once      02/08/19 0243     Place DALE hose  Until discontinued      02/08/19 0243     Place sequential compression device  Until discontinued      02/08/19 0243          Thank you for your consult. I will follow-up with patient. Please contact us if you have any additional questions.    Parviz Myers MD  Cardiology   [facility]    Addendum 10am:  [employer]tronic device interrogated.  Normal device fxn, no events.  Apparently the device did not alarm, and the patient has been seen in the past for these "phantom" alarms.  The device alert tone was played for the patient.  In the future, if the patient complains of device alarms, pls call [employer] to get instructions about how to make the device alarm play for the " patient.    Cardiology will sign off, pls call with questions.  Pt to follow up with Dr. Batres.

## 2019-02-08 NOTE — ASSESSMENT & PLAN NOTE
Mild elevation, flat trend x3, consistent with baseline in setting of CKD, appreciate Cardiology recommendations.

## 2019-02-08 NOTE — HPI
83 y.o. female hypertension, hyperlipidemia, CKD stage 3, CAD, chronic systolic heart failure, and ICD in place presents with a complaint acute onset shortness of breath that began yesterday evening.  She had a cataract procedure performed yesterday morning was instructed to hold her Lasix dose yesterday.  She denies fever, chills, cough, chest pain, palpitations, dizziness, syncope, nausea, vomiting, diarrhea, abdominal pain, bloody or black stools, dysuria, frequency, or urgency.  The ED her BNP was 2475, chest x-ray with evidence of pulmonary edema, troponin mildly elevated at 0.071 which has trended flat x3, consistent with acute on chronic systolic heart failure.  She received Lasix with improvement her symptoms, reports she is currently at her usual baseline.

## 2019-02-08 NOTE — SUBJECTIVE & OBJECTIVE
Past Medical History:   Diagnosis Date    Anticoagulant long-term use     Coronary artery disease     Gout attack     Hypercholesteremia     Hypertension     Renal disorder     Vaginal delivery     x4       Past Surgical History:   Procedure Laterality Date    APPENDECTOMY      CARDIAC DEFIBRILLATOR PLACEMENT      2015    CHOLECYSTECTOMY      COLONOSCOPY W/ POLYPECTOMY  10 or 11/ 2014    per Dr. Elen ceja Left 8/2008    EYE SURGERY      HYSTERECTOMY      INSERTION-ICD-BIVENTRICULAR N/A 12/11/2014    Performed by Tal Batres MD at [facility] CATH LAB    JOINT REPLACEMENT Bilateral 2005 & 2006    2 Knee Replacements=Lt. 1= 2005. Rt. 1= 2006    OOPHORECTOMY      TOTAL KNEE ARTHROPLASTY Bilateral Lt.= 2005; Rt.=2006    Bilateral Knee scope       Review of patient's allergies indicates:   Allergen Reactions    Aspirin Swelling     Swelling and rash    Colace [docusate sodium] Rash     itching    Sulfa (sulfonamide antibiotics) Swelling     Swelling and rash    Iodine and iodide containing products Rash    Penicillins Rash       Current Facility-Administered Medications on File Prior to Encounter   Medication    [DISCONTINUED] 0.9%  NaCl infusion    [DISCONTINUED] acetaminophen tablet 650 mg    [DISCONTINUED] balanced salt    [DISCONTINUED] cyclopentolate 1% ophthalmic solution 1 drop    [DISCONTINUED] fentaNYL injection    [DISCONTINUED] fluorescein ophthalmic strip    [DISCONTINUED] HYDROcodone-acetaminophen 5-325 mg per tablet 1 tablet    [DISCONTINUED] lactated ringers infusion    [DISCONTINUED] lidocaine (PF) 10 mg/ml (1%) injection 10 mg    [DISCONTINUED] lidocaine HCL 20 mg/ml (2%) injection    [DISCONTINUED] phenylephrine HCL 2.5% ophthalmic solution 1 drop    [DISCONTINUED] phenylephrine-ketorolac (OMIDRIA) 1-0.3 % 500 mL irrigation    [DISCONTINUED] povidone-iodine 5% ophthalmic solution    [DISCONTINUED] prednisoLONE acetate 1 % ophthalmic suspension     [DISCONTINUED] proparacaine 0.5 % ophthalmic solution 1 drop    [DISCONTINUED] sod hyaluronate-sod chondroitin-sod hyaluronate intra-ocular kit    [DISCONTINUED] sodium chloride 0.9% flush 3 mL    [DISCONTINUED] sodium-potassium-NaCl    [DISCONTINUED] tropicamide 1% ophthalmic solution 1 drop     Current Outpatient Medications on File Prior to Encounter   Medication Sig    acetaminophen (TYLENOL) 500 MG tablet Take 1 tablet (500 mg total) by mouth every 6 (six) hours as needed for Pain.    allopurinol (ZYLOPRIM) 100 MG tablet Take 100 mg by mouth every evening.     calcitRIOL (ROCALTROL) 0.25 MCG Cap 0.25 mcg.     carvedilol (COREG) 25 MG tablet Take 0.5 tablets (12.5 mg total) by mouth 2 (two) times daily.    clopidogrel (PLAVIX) 75 mg tablet Take 75 mg by mouth every evening. On hold since 11-28-14, for procedure.    cranberry 400 mg Cap Take 1 capsule by mouth 2 (two) times daily.    difluprednate (DUREZOL) 0.05 % Drop ophthalmic solution Place 1 drop into the left eye 4 (four) times daily. For 30 days    furosemide (LASIX) 40 MG tablet Take 1 tablet (40 mg total) by mouth once daily. Take 40 mg twice a day on 10/3, 10/4 followed by 20 mg twice a day.    ILEVRO 0.3 % DrpS Place 1 drop into both eyes once daily. For 30 days    lovastatin (MEVACOR) 40 MG tablet Take 40 mg by mouth nightly. Takes 2 caps with supper every evening.    ofloxacin (OCUFLOX) 0.3 % ophthalmic solution Place 1 drop into the left eye 4 (four) times daily. for 10 days    ranitidine (ZANTAC) 150 MG tablet 150 mg 2 (two) times daily.     fish oil-omega-3 fatty acids 300-1,000 mg capsule Take 2 g by mouth once daily. 2 caps every AM & 1 cap every PM.    FLUZONE HIGH-DOSE 2018-19, PF, 180 mcg/0.5 mL vaccine     mirabegron (MYRBETRIQ) 50 mg Tb24 Take 50 mg by mouth once daily.     PNEUMOVAX 23 25 mcg/0.5 mL     PREVNAR 13, PF, 0.5 mL Syrg     terazosin (HYTRIN) 2 MG capsule Take by mouth every evening.      Family History      Problem Relation (Age of Onset)    Diabetes Other    Heart attack Mother (88)    Hyperlipidemia Mother    Hypertension Mother, Other        Tobacco Use    Smoking status: Never Smoker    Smokeless tobacco: Never Used   Substance and Sexual Activity    Alcohol use: Yes     Comment: rarely    Drug use: No    Sexual activity: Not Currently     Partners: Male     Review of Systems   Constitution: Negative for chills, diaphoresis, fever, weakness and malaise/fatigue.   HENT: Negative for nosebleeds.    Eyes: Negative for blurred vision and double vision.   Cardiovascular: Negative for chest pain, claudication, cyanosis, dyspnea on exertion, leg swelling, orthopnea, palpitations, paroxysmal nocturnal dyspnea and syncope.   Respiratory: Positive for shortness of breath. Negative for cough and wheezing.    Skin: Negative for dry skin and poor wound healing.   Musculoskeletal: Negative for back pain, joint swelling and myalgias.   Gastrointestinal: Negative for abdominal pain, nausea and vomiting.   Genitourinary: Negative for hematuria.   Neurological: Negative for dizziness, headaches, numbness and seizures.   Psychiatric/Behavioral: Negative for altered mental status and depression.     Objective:     Vital Signs (Most Recent):  Temp: 98.6 °F (37 °C) (02/08/19 0543)  Pulse: 72 (02/08/19 0543)  Resp: 16 (02/08/19 0543)  BP: 135/66 (02/08/19 0543)  SpO2: (!) 93 % (02/08/19 0543) Vital Signs (24h Range):  Temp:  [97.6 °F (36.4 °C)-98.6 °F (37 °C)] 98.6 °F (37 °C)  Pulse:  [60-72] 72  Resp:  [16-38] 16  SpO2:  [93 %-99 %] 93 %  BP: (134-157)/(66-83) 135/66     Weight: 78.5 kg (173 lb 1 oz)  Body mass index is 30.66 kg/m².    SpO2: (!) 93 %  O2 Device (Oxygen Therapy): room air    No intake or output data in the 24 hours ending 02/08/19 0640    Lines/Drains/Airways     Airway                 Airway - Non-Surgical Nasal Cannula -- days          Peripheral Intravenous Line                 Peripheral IV - Single Lumen  01/14/19 0957 Right Forearm 24 days         Peripheral IV - Single Lumen 02/07/19 2136 Right Antecubital less than 1 day                Physical Exam   Constitutional: She is oriented to person, place, and time. She appears well-developed and well-nourished. No distress.   HENT:   Head: Normocephalic and atraumatic.   Mouth/Throat: No oropharyngeal exudate.   Eyes:   R eye pupil reactive, no scleral icterus  L eye covered by patch s/p surgery 2/7/19   Neck: Normal range of motion. Neck supple. No JVD present. No tracheal deviation present. No thyromegaly present.   Cardiovascular: Normal rate, regular rhythm, S1 normal and S2 normal. Exam reveals no gallop and no friction rub.   No murmur heard.  Pulmonary/Chest: Effort normal and breath sounds normal. No respiratory distress. She has no wheezes. She has no rales. She exhibits no tenderness.   Abdominal: Soft. She exhibits no distension.   Musculoskeletal: Normal range of motion. She exhibits no edema.   Neurological: She is alert and oriented to person, place, and time. No cranial nerve deficit.   Skin: Skin is warm and dry. She is not diaphoretic.   Psychiatric: She has a normal mood and affect. Her behavior is normal. Judgment normal.       Current Medications:   carvedilol  12.5 mg Oral BID    clopidogrel  75 mg Oral QHS    famotidine  20 mg Oral Daily    lovastatin  40 mg Oral Nightly    polyethylene glycol  17 g Oral Daily       acetaminophen, acetaminophen, morphine, ondansetron, ondansetron, sodium chloride 0.9%    Laboratory (all labs reviewed):  CBC:  Recent Labs   Lab 10/02/18  1809 10/03/18  0553 12/28/18 0016 01/14/19 0957 02/07/19 2136   WHITE BLOOD CELL COUNT 6.02 4.68 5.68 4.73 5.26   HEMOGLOBIN 11.3 L 11.7 L 11.7 L 12.0 11.4 L   HEMATOCRIT 34.4 L 35.0 L 36.8 L 36.7 L 36.5 L   PLATELETS 127 L 127 L 120 L 121 L 128 L       CHEMISTRIES:  Recent Labs   Lab 10/02/18  1809 10/03/18  0553 12/28/18  0016 01/14/19 0957 02/07/19 2136   GLUCOSE 88 94  93 94 92   SODIUM 138 144 141 142 140   POTASSIUM 3.7 3.8 4.0 3.5 3.7   BUN BLD 39 H 37 H 33 H 26 H 28 H   CREATININE 1.5 H 1.5 H 1.9 H 1.3 1.3   EGFR IF  37 A 37 A 28 A 44 A 44 A   EGFR IF NON- 32 A 32 A 24 A 38 A 38 A   CALCIUM 10.4 10.3 10.2 10.4 10.5   MAGNESIUM  --   --   --  1.9  --        CARDIAC BIOMARKERS:  Recent Labs   Lab 12/28/18  0016 01/14/19  0957 01/14/19  1206 02/07/19  2136 02/07/19  2248   TROPONIN I 0.075 H 0.092 H 0.085 H 0.072 H 0.071 H       COAGS:  Recent Labs   Lab 02/03/17  2135 08/12/17  0905 10/02/18  1809   INR 1.0 1.1 1.1       LIPIDS/LFTS:  Recent Labs   Lab 10/02/18  1809 10/03/18  0553 12/28/18  0016 01/14/19  0957 02/07/19  2136   AST 26 24 26 27 23   ALT 17 15 13 17 14       BNP:  Recent Labs   Lab 02/03/17  2135 10/02/18  1809 12/28/18  0016 01/14/19  0957 02/07/19  2136   BNP 1,046 H 1,836 H 1,503 H 2,609 H 2,475 H       TSH:        Free T4:        Diagnostic Results:  ECG (personally reviewed and interpreted tracing(s)):  2/7/19 1852 SR 65, BiV paced, similar to 1/14/19    Chest X-Ray (personally reviewed and interpreted image(s)): 2/7/19 NAD, L BiVICD    Echo: 10/3/18  · Left ventricle ejection fraction is moderately decreased at 35%  · The left ventricle cavity is moderately dilated.  · Grade I (mild) left ventricular diastolic dysfunction consistent with impaired relaxation.  · Left atrium is moderately dilated.  · Right atrium is mildly dilated.  · RV systolic function is normal.  · Trace regurgitation is present in aortic valve.  · Tricuspid valve shows trace regurgitation.  · Pulmonic valve shows trace regurgitation.  · Mitral valve shows mild-to-moderate regurgitation.    Carotid US 7/10/18  There is 40 - 49% right Internal Carotid stenosis.  There is 20 - 39% left Internal Carotid stenosis.

## 2019-02-08 NOTE — ASSESSMENT & PLAN NOTE
Complaint of dyspnea with elevated BNP and evidence of edema on chest x-ray and exam, Improved with Lasix, resume home meds and regimen, [employer] has interrogated her defibrillator and found no evidence acute malfunction or complication.  Appreciate Cardiology recs.

## 2019-02-08 NOTE — ASSESSMENT & PLAN NOTE
Presumed based on hx  No anginal sxs  Cont med rx: Plavix/Statin/Coreg  Consider addition of ARB  ASA allergy noted

## 2019-02-08 NOTE — SUBJECTIVE & OBJECTIVE
Past Medical History:   Diagnosis Date    Anticoagulant long-term use     Coronary artery disease     Gout attack     Hypercholesteremia     Hypertension     Renal disorder     Vaginal delivery     x4       Past Surgical History:   Procedure Laterality Date    APPENDECTOMY      CARDIAC DEFIBRILLATOR PLACEMENT      2015    CHOLECYSTECTOMY      COLONOSCOPY W/ POLYPECTOMY  10 or 11/ 2014    per Dr. Elen ceja Left 8/2008    EYE SURGERY      HYSTERECTOMY      INSERTION, IOL PROSTHESIS Left 2/7/2019    Performed by [facility] Velazco [facility] at [facility] OR    INSERTION-ICD-BIVENTRICULAR N/A 12/11/2014    Performed by Tal Batres MD at [facility] CATH LAB    JOINT REPLACEMENT Bilateral 2005 & 2006    2 Knee Replacements=Lt. 1= 2005. Rt. 1= 2006    OOPHORECTOMY      PHACOEMULSIFICATION, CATARACT Left 2/7/2019    Performed by Demetrius Velazco MD at [facility] OR    TOTAL KNEE ARTHROPLASTY Bilateral Lt.= 2005; Rt.=2006    Bilateral Knee scope       Review of patient's allergies indicates:   Allergen Reactions    Aspirin Swelling     Swelling and rash    Colace [docusate sodium] Rash     itching    Sulfa (sulfonamide antibiotics) Swelling     Swelling and rash    Iodine and iodide containing products Rash    Penicillins Rash       Current Facility-Administered Medications on File Prior to Encounter   Medication    [DISCONTINUED] 0.9%  NaCl infusion    [DISCONTINUED] acetaminophen tablet 650 mg    [DISCONTINUED] cyclopentolate 1% ophthalmic solution 1 drop    [DISCONTINUED] HYDROcodone-acetaminophen 5-325 mg per tablet 1 tablet    [DISCONTINUED] lactated ringers infusion    [DISCONTINUED] lidocaine (PF) 10 mg/ml (1%) injection 10 mg    [DISCONTINUED] phenylephrine HCL 2.5% ophthalmic solution 1 drop    [DISCONTINUED] proparacaine 0.5 % ophthalmic solution 1 drop    [DISCONTINUED] sodium chloride 0.9% flush 3 mL    [DISCONTINUED] tropicamide 1% ophthalmic solution 1 drop     Current  Outpatient Medications on File Prior to Encounter   Medication Sig    acetaminophen (TYLENOL) 500 MG tablet Take 1 tablet (500 mg total) by mouth every 6 (six) hours as needed for Pain.    allopurinol (ZYLOPRIM) 100 MG tablet Take 100 mg by mouth every evening.     calcitRIOL (ROCALTROL) 0.25 MCG Cap 0.25 mcg.     carvedilol (COREG) 25 MG tablet Take 0.5 tablets (12.5 mg total) by mouth 2 (two) times daily.    clopidogrel (PLAVIX) 75 mg tablet Take 75 mg by mouth every evening. On hold since 11-28-14, for procedure.    cranberry 400 mg Cap Take 1 capsule by mouth 2 (two) times daily.    difluprednate (DUREZOL) 0.05 % Drop ophthalmic solution Place 1 drop into the left eye 4 (four) times daily. For 30 days    furosemide (LASIX) 40 MG tablet Take 1 tablet (40 mg total) by mouth once daily. Take 40 mg twice a day on 10/3, 10/4 followed by 20 mg twice a day.    ILEVRO 0.3 % DrpS Place 1 drop into both eyes once daily. For 30 days    lovastatin (MEVACOR) 40 MG tablet Take 40 mg by mouth nightly. Takes 2 caps with supper every evening.    ofloxacin (OCUFLOX) 0.3 % ophthalmic solution Place 1 drop into the left eye 4 (four) times daily. for 10 days    ranitidine (ZANTAC) 150 MG tablet 150 mg 2 (two) times daily.     fish oil-omega-3 fatty acids 300-1,000 mg capsule Take 2 g by mouth once daily. 2 caps every AM & 1 cap every PM.    FLUZONE HIGH-DOSE 2018-19, PF, 180 mcg/0.5 mL vaccine     mirabegron (MYRBETRIQ) 50 mg Tb24 Take 50 mg by mouth once daily.     PNEUMOVAX 23 25 mcg/0.5 mL     PREVNAR 13, PF, 0.5 mL Syrg     terazosin (HYTRIN) 2 MG capsule Take by mouth every evening.      Family History     Problem Relation (Age of Onset)    Diabetes Other    Heart attack Mother (88)    Hyperlipidemia Mother    Hypertension Mother, Other        Tobacco Use    Smoking status: Never Smoker    Smokeless tobacco: Never Used   Substance and Sexual Activity    Alcohol use: Yes     Comment: rarely    Drug use: No     Sexual activity: Not Currently     Partners: Male     Review of Systems   Constitutional: Negative for chills, fatigue and fever.   Eyes: Negative for photophobia and visual disturbance.   Respiratory: Positive for shortness of breath. Negative for cough.    Cardiovascular: Negative for chest pain, palpitations and leg swelling.   Gastrointestinal: Negative for abdominal pain, diarrhea, nausea and vomiting.   Genitourinary: Negative for dysuria, frequency and urgency.   Skin: Negative for pallor, rash and wound.   Neurological: Negative for light-headedness and headaches.   Psychiatric/Behavioral: Negative for confusion and decreased concentration.     Objective:     Vital Signs (Most Recent):  Temp: 98.3 °F (36.8 °C) (02/08/19 0729)  Pulse: 65 (02/08/19 0729)  Resp: 16 (02/08/19 0729)  BP: 128/70 (02/08/19 0729)  SpO2: 96 % (02/08/19 0729) Vital Signs (24h Range):  Temp:  [97.6 °F (36.4 °C)-98.6 °F (37 °C)] 98.3 °F (36.8 °C)  Pulse:  [61-72] 65  Resp:  [16-38] 16  SpO2:  [93 %-99 %] 96 %  BP: (128-157)/(66-83) 128/70     Weight: 78.5 kg (173 lb 1 oz)  Body mass index is 30.66 kg/m².    Physical Exam   Constitutional: She is oriented to person, place, and time. She appears well-developed and well-nourished. No distress.   HENT:   Head: Normocephalic and atraumatic.   Right Ear: External ear normal.   Left Ear: External ear normal.   Nose: Nose normal.   Mouth/Throat: Oropharynx is clear and moist.   Eyes: Conjunctivae and EOM are normal. Pupils are equal, round, and reactive to light.   Neck: Normal range of motion. Neck supple.   Cardiovascular: Normal rate, regular rhythm and intact distal pulses.   Pulmonary/Chest: Effort normal and breath sounds normal. No respiratory distress. She has no wheezes.   Abdominal: Soft. Bowel sounds are normal. She exhibits no distension. There is no tenderness.   No palpable hepatomegaly or splenomegaly    Musculoskeletal: Normal range of motion. She exhibits no edema or  tenderness.   Neurological: She is alert and oriented to person, place, and time.   Skin: Skin is warm and dry.   Psychiatric: She has a normal mood and affect. Thought content normal.   Nursing note and vitals reviewed.        CRANIAL NERVES     CN III, IV, VI   Pupils are equal, round, and reactive to light.  Extraocular motions are normal.        Significant Labs: All pertinent labs within the past 24 hours have been reviewed.    Significant Imaging: I have reviewed all pertinent imaging results/findings within the past 24 hours.

## 2019-02-08 NOTE — ED PROVIDER NOTES
"Encounter Date: 2/7/2019    This is a SORT/MSE of a 83 y.o. female presenting to the ED with c/o shortness of breath that began this afternoon. Patient had cataract surgery this morning. On Plavix - did not hold prior to surgery. Care will be transferred to an alternate provider when patient is roomed for a full evaluation and final disposition. TREVOR Pickens, FNP-C 2/7/2019 6:41 PM    SCRIBE #1 NOTE: I, Rd Boyd, am scribing for, and in the presence of,  Dwight Eric MD. I have scribed the following portions of the note - Other sections scribed: HPI, ROS, PE.       History     Chief Complaint   Patient presents with    Shortness of Breath     cataract surgery this morning, sob started this evening around 5pm; reports pacemaker went off "just the noise" no shock during surgery; denies pain, denies chest pain     CC: Shortness of Breath    HPI: This is an 83 y.o. F who has CAD, HTN, Hypercholesteremia, Renal disorder, and Gout who presents to the ED for emergent evaluation of acute episode of SOB that occurred approximately 1 hour and 30 minutes PTA. Pt states that the SOB lasted 30 minutes, in which she felt like she was running a marathon. She states that her "defibrillator made a noise" during her eye procedure this morning. Pt reports that defibrillator was placed years ago due to heart murmur and slow heart rate. Pt is on blood thinners. Pt denies CP, diaphoresis, cough, runny nose, sore throat, leg swelling, or Hx of MI.      The history is provided by the patient. No  was used.     Review of patient's allergies indicates:   Allergen Reactions    Aspirin Swelling     Swelling and rash    Colace [docusate sodium] Rash     itching    Sulfa (sulfonamide antibiotics) Swelling     Swelling and rash    Iodine and iodide containing products Rash    Penicillins Rash     Past Medical History:   Diagnosis Date    Anticoagulant long-term use     Coronary artery disease     Gout " attack     Hypercholesteremia     Hypertension     Renal disorder     Vaginal delivery     x4     Past Surgical History:   Procedure Laterality Date    APPENDECTOMY      CARDIAC DEFIBRILLATOR PLACEMENT      2015    CHOLECYSTECTOMY      COLONOSCOPY W/ POLYPECTOMY  10 or 11/ 2014    per Dr. Elen ceja Left 8/2008    EYE SURGERY      HYSTERECTOMY      INSERTION, IOL PROSTHESIS Left 2/7/2019    [facility]    INSERTION-ICD-BIVENTRICULAR N/A 12/11/2014    [facility] CATH LAB    JOINT REPLACEMENT Bilateral 2005 & 2006    2 Knee Replacements=Lt. 1= 2005. Rt. 1= 2006    OOPHORECTOMY      PHACOEMULSIFICATION, CATARACT Left 2/7/2019    [facility]    TOTAL KNEE ARTHROPLASTY Bilateral Lt.= 2005; Rt.=2006    Bilateral Knee scope     Family History   Problem Relation Age of Onset    Heart attack Mother 88    Hypertension Mother     Hyperlipidemia Mother     Diabetes Other     Hypertension Other      Social History     Tobacco Use    Smoking status: Never Smoker    Smokeless tobacco: Never Used   Substance Use Topics    Alcohol use: Yes     Comment: rarely    Drug use: No     Review of Systems   Constitutional: Negative for chills, diaphoresis and fever.   HENT: Negative for ear pain, rhinorrhea and sore throat.    Eyes: Negative for pain.   Respiratory: Positive for shortness of breath. Negative for cough.    Cardiovascular: Negative for chest pain and leg swelling.   Gastrointestinal: Negative for abdominal pain, diarrhea, nausea and vomiting.   Genitourinary: Negative for dysuria.   Musculoskeletal: Negative for back pain.   Skin: Negative for rash.   Neurological: Negative for headaches.   Psychiatric/Behavioral: Negative for confusion.       Physical Exam     Initial Vitals [02/07/19 1842]   BP Pulse Resp Temp SpO2   (!) 148/70 68 20 97.6 °F (36.4 °C) 98 %      MAP       --          Physical Exam    Nursing note and vitals reviewed.  Constitutional: She appears well-developed and well-nourished. She is not diaphoretic. No distress.   HENT:   Head: Normocephalic and atraumatic.   Nose: Nose normal.   Eyes: EOM are normal. Pupils are equal, round, and reactive to light. No scleral icterus.   Neck: Normal range of motion. Neck supple.   Cardiovascular: Normal rate, regular rhythm, normal heart sounds and intact distal pulses. Exam reveals no gallop and no friction rub.    No murmur heard.  Pulmonary/Chest: Breath sounds normal. No stridor. No respiratory distress. She has no wheezes. She has no rhonchi. She has no rales. She exhibits no tenderness.   Abdominal: Soft. Normal appearance and bowel sounds are normal. She exhibits no distension. There is no tenderness. There is no rebound and no guarding.   Musculoskeletal: Normal range of motion. She exhibits no edema or tenderness.   Neurological: She is alert and oriented to person, place, and time. She has normal strength. No cranial nerve deficit. GCS score is 15. GCS eye subscore is 4. GCS verbal subscore is 5. GCS motor subscore is 6.   Skin: Skin is warm and dry. No rash noted.   Psychiatric: She has a normal mood and affect. Her behavior is normal.         ED Course   Procedures  Labs Reviewed   CBC W/ AUTO DIFFERENTIAL - Abnormal; Notable for the following components:       Result Value    RBC 3.76 (*)     Hemoglobin 11.4 (*)     Hematocrit 36.5 (*)     MCHC 31.2 (*)     RDW 14.7 (*)     Platelets 128 (*)     All other components within normal limits   COMPREHENSIVE METABOLIC PANEL - Abnormal; Notable for the following components:    BUN, Bld 28 (*)     eGFR if  44 (*)     eGFR if non  38 (*)     All other components within normal limits   TROPONIN I - Abnormal; Notable for the following components:    Troponin I 0.072 (*)     All other components within normal limits   TROPONIN I - Abnormal; Notable for  the following components:    Troponin I 0.071 (*)     All other components within normal limits   B-TYPE NATRIURETIC PEPTIDE - Abnormal; Notable for the following components:    BNP 2,475 (*)     All other components within normal limits        ECG Results          EKG 12-lead (Final result)  Result time 02/09/19 14:19:51    Final result by Interface, Lab In [facility] (02/09/19 14:19:51)                 Narrative:    Test Reason : R06.02,    Vent. Rate : 065 BPM     Atrial Rate : 065 BPM     P-R Int : 184 ms          QRS Dur : 190 ms      QT Int : 514 ms       P-R-T Axes : 064 053 -63 degrees     QTc Int : 534 ms    Atrial-sensed ventricular-paced rhythm  Abnormal ECG  When compared with ECG of 14-JAN-2019 09:42,  No significant change was found    Confirmed by Parviz Myers MD ([id number]) on 2/9/2019 2:19:38 PM    Referred By: [email or web address]   SELF           Confirmed By:Parviz Myers MD                            Imaging Results          X-Ray Chest AP Portable (Final result)  Result time 02/07/19 21:17:06    Final result by Sachi Warren MD (02/07/19 21:17:06)                 Impression:      No significant change.  Stable cardiomegaly.      Electronically signed by: Sachi Warren  Date:    02/07/2019  Time:    21:17             Narrative:    EXAMINATION:  XR CHEST AP PORTABLE    CLINICAL HISTORY:  Chest Pain;    TECHNIQUE:  Single frontal view of the chest was performed.    COMPARISON:  01/14/2019    FINDINGS:  AP portable chest radiograph demonstrates a left subclavian pacer.  The heart is enlarged, but stable.  Vascular calcification is seen at the aortic knob.  No focal consolidation, pneumothorax, or pleural effusion is detected.  Cholecystectomy clips are present.  Dextroscoliosis and spondylitic changes are present.                                 Medical Decision Making:   Initial Assessment:   83-year-old female with a history of coronary artery disease, pacer implantation, symptomatic bradycardia,  presenting with sudden onset shortness of breath and heart palpitations while at rest.  Patient states the symptoms lasted approximately 30 min and she felt like she was running a marathon.  Denies chest pain, nausea, vomiting, diaphoresis.  Symptoms resolved shortly after arriving in the emergency department.  EKG shows an atrial sensed ventricular paced rhythm at a rate of 65, similar to prior EKG in January, widened QRS, and ST segment depression and elevation within normal limits for ventricular pacing.  No acute findings for ischemia.  No findings consistent with sgarbossa criteria concerning for STEMI or equivalent.  Differential includes ACS, arrhythmia, pacer malfunction, less likely PE, pneumonia, pneumothorax.  Labs relatively unremarkable with exception of elevated troponin which is near baseline.  BNP elevated at 2600, which has been similar to in the past.  The patient took a dose of Lasix at home prior to arrival.  She has minimal crackles in her lungs, no respiratory distress, no hypoxia, and no lower extremity edema.  ED Management:  Given her concerning symptoms, we will admit for further observation, cardiology consult, pacer interrogation, and ACS rule out.    Additional MDM:   Heart Score:    History:          Moderately suspicious.  ECG:             Normal  Age:               >65 years  Risk factors: >= 3 risk factors or history of atherosclerotic disease  Troponin:       1-2x normal limit  Final Score: 6             Scribe Attestation:   Scribe #1: I performed the above scribed service and the documentation accurately describes the services I performed. I attest to the accuracy of the note.    Attending Attestation:           Physician Attestation for Scribe:  Physician Attestation Statement for Scribe #1: I, Dwight Eric MD, reviewed documentation, as scribed by Rd Boyd in my presence, and it is both accurate and complete.                    Clinical Impression:   The primary  encounter diagnosis was Acute on chronic systolic congestive heart failure. Diagnoses of Shortness of breath, Chest pain, unspecified type, ICD (implantable cardioverter-defibrillator) malfunction, initial encounter, Elevated troponin, Essential hypertension, Coronary artery disease involving native coronary artery of native heart without angina pectoris, Mixed hyperlipidemia, Chronic kidney disease (CKD), stage III (moderate), and ICD (implantable cardioverter-defibrillator) in place were also pertinent to this visit.      Disposition:   Disposition: Placed in Observation  Condition: Stable                        Dwight Eric MD  02/11/19 6031

## 2019-02-08 NOTE — PLAN OF CARE
02/08/19 1150   Post-Acute Status   Post-Acute Authorization (home self care)   NurseJordan notified that all CM needs are met

## 2019-02-12 ENCOUNTER — OFFICE VISIT (OUTPATIENT)
Dept: CARDIOLOGY | Facility: CLINIC | Age: 84
End: 2019-02-12
Payer: MEDICARE

## 2019-02-12 VITALS
SYSTOLIC BLOOD PRESSURE: 122 MMHG | HEART RATE: 66 BPM | WEIGHT: 171.94 LBS | OXYGEN SATURATION: 96 % | RESPIRATION RATE: 16 BRPM | BODY MASS INDEX: 30.46 KG/M2 | DIASTOLIC BLOOD PRESSURE: 76 MMHG

## 2019-02-12 DIAGNOSIS — I50.22 CHRONIC SYSTOLIC HEART FAILURE: Primary | ICD-10-CM

## 2019-02-12 DIAGNOSIS — I10 ESSENTIAL HYPERTENSION: ICD-10-CM

## 2019-02-12 DIAGNOSIS — I25.10 CORONARY ARTERY DISEASE DUE TO CALCIFIED CORONARY LESION: ICD-10-CM

## 2019-02-12 DIAGNOSIS — E78.2 MIXED HYPERLIPIDEMIA: ICD-10-CM

## 2019-02-12 DIAGNOSIS — N18.30 CHRONIC KIDNEY DISEASE (CKD), STAGE III (MODERATE): ICD-10-CM

## 2019-02-12 DIAGNOSIS — I25.84 CORONARY ARTERY DISEASE DUE TO CALCIFIED CORONARY LESION: ICD-10-CM

## 2019-02-12 DIAGNOSIS — Z95.810 ICD (IMPLANTABLE CARDIOVERTER-DEFIBRILLATOR), BIVENTRICULAR, IN SITU: ICD-10-CM

## 2019-02-12 PROCEDURE — 3078F DIAST BP <80 MM HG: CPT | Mod: CPTII,S$GLB,, | Performed by: INTERNAL MEDICINE

## 2019-02-12 PROCEDURE — 99214 PR OFFICE/OUTPT VISIT, EST, LEVL IV, 30-39 MIN: ICD-10-PCS | Mod: 25,S$GLB,, | Performed by: INTERNAL MEDICINE

## 2019-02-12 PROCEDURE — 3074F SYST BP LT 130 MM HG: CPT | Mod: CPTII,S$GLB,, | Performed by: INTERNAL MEDICINE

## 2019-02-12 PROCEDURE — 93289 INTERROG DEVICE EVAL HEART: CPT | Mod: 26,,, | Performed by: INTERNAL MEDICINE

## 2019-02-12 PROCEDURE — 93289 PR INTERROG EVAL, IN PERSON,CARDVERT/DEFIB: ICD-10-PCS | Mod: 26,,, | Performed by: INTERNAL MEDICINE

## 2019-02-12 PROCEDURE — 3078F PR MOST RECENT DIASTOLIC BLOOD PRESSURE < 80 MM HG: ICD-10-PCS | Mod: CPTII,S$GLB,, | Performed by: INTERNAL MEDICINE

## 2019-02-12 PROCEDURE — 99999 PR PBB SHADOW E&M-EST. PATIENT-LVL III: CPT | Mod: PBBFAC,,, | Performed by: INTERNAL MEDICINE

## 2019-02-12 PROCEDURE — 99214 OFFICE O/P EST MOD 30 MIN: CPT | Mod: 25,S$GLB,, | Performed by: INTERNAL MEDICINE

## 2019-02-12 PROCEDURE — 3074F PR MOST RECENT SYSTOLIC BLOOD PRESSURE < 130 MM HG: ICD-10-PCS | Mod: CPTII,S$GLB,, | Performed by: INTERNAL MEDICINE

## 2019-02-12 PROCEDURE — 99999 PR PBB SHADOW E&M-EST. PATIENT-LVL III: ICD-10-PCS | Mod: PBBFAC,,, | Performed by: INTERNAL MEDICINE

## 2019-02-12 PROCEDURE — 1101F PT FALLS ASSESS-DOCD LE1/YR: CPT | Mod: CPTII,S$GLB,, | Performed by: INTERNAL MEDICINE

## 2019-02-12 PROCEDURE — 1101F PR PT FALLS ASSESS DOC 0-1 FALLS W/OUT INJ PAST YR: ICD-10-PCS | Mod: CPTII,S$GLB,, | Performed by: INTERNAL MEDICINE

## 2019-02-12 NOTE — PROGRESS NOTES
Subjective:    Patient ID:  Palmira Malave is a 83 y.o. female who presents for follow-up of Follow-up ([facility] )      Coronary Artery Disease       Previous history:  Here for follow-up of systolic heart failure and biventricular ICD interrogation.  She denies any worsening cardiopulmonary complaints since we last saw her.  She's had no issues with her device.  She is expressed no worsening cardiopulmonary complaints.  She denies any chest pain, shortness breath or palpitations.  She's not expressing PND, orthopnea or lower edema.  She denies any dizziness, presyncope or syncope.  Otherwise she's better usual state of health.  He says she has a daughter the moved into scan early onset dementia she's having to take care of.  She tries to stay active by going to interactions with a female group.  She is seen by [facility] and does get injections.  He wants clearance for rehabilitation.    Today:  Here for follow-up of systolic heart failure and previous biventricular ICD placement.  She had her device interrogated which shows no significant issues.  She had recent admission at the beginning of the month for mild volume overload.  She was diuresed overnight and discharged home the next day.  Her EF is stable.  She denies any PND, orthopnea or lower Veronica.  She has not experiencing dizziness, presyncope or syncope.  She on questioning says she is compliant with medicines and diet.    Review of Systems   Constitution: Negative.   HENT: Negative.    Eyes: Negative.    Cardiovascular: Negative for dyspnea on exertion, irregular heartbeat, near-syncope, orthopnea, paroxysmal nocturnal dyspnea and syncope.   Skin: Negative.    Musculoskeletal: Positive for back pain.   Gastrointestinal: Negative for abdominal pain, constipation and diarrhea.   Genitourinary: Negative for dysuria.   Neurological: Negative.    Psychiatric/Behavioral: Negative.         Objective:    Physical Exam   Constitutional: She is oriented  to person, place, and time. She appears well-developed and well-nourished.   HENT:   Head: Normocephalic and atraumatic.   Eyes: Conjunctivae and EOM are normal. Pupils are equal, round, and reactive to light.   Neck: Normal range of motion. Neck supple. No thyromegaly present.   Cardiovascular: Normal rate and regular rhythm.   No murmur heard.  Pulmonary/Chest: Effort normal and breath sounds normal. No respiratory distress.   Abdominal: Soft. Bowel sounds are normal.   Musculoskeletal: She exhibits no edema.   Neurological: She is alert and oriented to person, place, and time.   Skin: Skin is warm and dry.   Psychiatric: She has a normal mood and affect. Her behavior is normal.       catheter 2011 reported nonobstructive CAD    ECHO:  10-18  · Left ventricle ejection fraction is moderately decreased at 35%  · The left ventricle cavity is moderately dilated.  · Grade I (mild) left ventricular diastolic dysfunction consistent with impaired relaxation.  · Left atrium is moderately dilated.  · Right atrium is mildly dilated.  · RV systolic function is normal.  · Trace regurgitation is present in aortic valve.  · Tricuspid valve shows trace regurgitation.  · Pulmonic valve shows trace regurgitation.  · Mitral valve shows mild-to-moderate regurgitation.    Carotid ultrasound:  7-18  CONCLUSIONS   There is 40 - 49% right Internal Carotid stenosis.  There is 20 - 39% left Internal Carotid stenosis.    Assessment:       1. Chronic systolic heart failure    2. Coronary artery disease due to calcified coronary lesion    3. ICD (implantable cardioverter-defibrillator), biventricular, in situ    4. Chronic kidney disease (CKD), stage III (moderate)    5. Essential hypertension    6. Mixed hyperlipidemia         Plan:       -cont med tx for history of nonischemic heart myopathy  -cont surveillance of device  -increased Lasix to 40 b.i.d.      RTC 6 mos          BIVE ICD ([employer])    DDDR 60     Apaced 24%, V paced 99%    A:  3.4 V, 437 ohms, 0.625 V @ 0.4 ms  RV: > 20 V, 494 ohms, 1 V @ 0.4 ms  LV: 399 ohms, 1 V @ 0.4 ms    SVC lead greater than 200 Ohms  Fluid level index crossed on multiple occasions  No changes    RTC 6 mos

## 2019-02-14 ENCOUNTER — OFFICE VISIT (OUTPATIENT)
Dept: OPHTHALMOLOGY | Facility: CLINIC | Age: 84
End: 2019-02-14
Payer: MEDICARE

## 2019-02-14 DIAGNOSIS — Z98.890 POST-OPERATIVE STATE: Primary | ICD-10-CM

## 2019-02-14 DIAGNOSIS — H25.11 NUCLEAR SCLEROSIS OF RIGHT EYE: ICD-10-CM

## 2019-02-14 PROCEDURE — 99024 PR POST-OP FOLLOW-UP VISIT: ICD-10-PCS | Mod: S$GLB,,, | Performed by: OPHTHALMOLOGY

## 2019-02-14 PROCEDURE — 99999 PR PBB SHADOW E&M-EST. PATIENT-LVL II: ICD-10-PCS | Mod: PBBFAC,,, | Performed by: OPHTHALMOLOGY

## 2019-02-14 PROCEDURE — 92136 PR OPHTHAL BIOMETRY,INTRAOC LENS POW CALC: ICD-10-PCS | Mod: 26,RT,S$GLB, | Performed by: OPHTHALMOLOGY

## 2019-02-14 PROCEDURE — 99999 PR PBB SHADOW E&M-EST. PATIENT-LVL II: CPT | Mod: PBBFAC,,, | Performed by: OPHTHALMOLOGY

## 2019-02-14 PROCEDURE — 99024 POSTOP FOLLOW-UP VISIT: CPT | Mod: S$GLB,,, | Performed by: OPHTHALMOLOGY

## 2019-02-14 PROCEDURE — 92136 OPHTHALMIC BIOMETRY: CPT | Mod: 26,RT,S$GLB, | Performed by: OPHTHALMOLOGY

## 2019-02-14 NOTE — PROGRESS NOTES
Subjective:       Patient ID: Palmira Malave is a 83 y.o. female.    Chief Complaint: Post-op Evaluation (1 week po os)    HPI     Post-op Evaluation      Additional comments: 1 week po os              Comments     1 week po os    Pt states Va OS is well. Pt denies pain and discomfort.    Eyemeds  Durezol BID OS  Ilevro QD OS          Last edited by Sun Maier on 2/14/2019  8:57 AM. (History)             Assessment:       1. Post-operative state    2. Nuclear sclerosis of right eye        Plan:       S/p CE OS- Doing well.     Visually significant cataract OD -Pt. Wants Sx.        Taper gtts OS.  Cataract Surgery Consent: Patient with a visually significant cataract with difficulties of ADLs, reading, driving, night vision, glare (any and all).  Discussed with Patient/Family/Caregiver: options, risks and benefits, expectations of cataract surgery, utilized an eye model with questions and answers to facilitate discussion.  Discussed lens options and patient understands that glasses may be required for optimal vision for distance and/or near vision after cataract surgery.  The Patient/Family/Caregiver  voice good understanding and patient wishes to proceed with surgery.  The patient will likely benefit from surgery and patient signed consent for Right Eye.  CE OD 2/21/19.

## 2019-02-15 NOTE — PRE ADMISSION SCREENING
RN Phone Pre op.  Instructed to take medications as directed by Dr. Velazco.  Expressed understanding.  Arrival 05:30 AM.

## 2019-02-18 NOTE — H&P
[facility]  History & Physical    Subjective:      Chief Complaint/Reason for Admission:     Palmira Malave is a 83 y.o. female.    Past Medical History:   Diagnosis Date    Anticoagulant long-term use     Coronary artery disease     Gout attack     Hypercholesteremia     Hypertension     Renal disorder     Vaginal delivery     x4     Past Surgical History:   Procedure Laterality Date    APPENDECTOMY      CARDIAC DEFIBRILLATOR PLACEMENT      2015    CHOLECYSTECTOMY      COLONOSCOPY W/ POLYPECTOMY  10 or 11/ 2014    per Dr. Myrick    defribillator Left 8/2008    EYE SURGERY      HYSTERECTOMY      INSERTION, IOL PROSTHESIS Left 2/7/2019    [facility] by [facility] at [facility] OR    INSERTION-ICD-BIVENTRICULAR N/A 12/11/2014    Performed by Tal Batres MD at [facility] CATH LAB    JOINT REPLACEMENT Bilateral 2005 & 2006    2 Knee Replacements=Lt. 1= 2005. Rt. 1= 2006    OOPHORECTOMY      PHACOEMULSIFICATION, CATARACT Left 2/7/2019    Performed by [facility] at [facility] OR    TOTAL KNEE ARTHROPLASTY Bilateral Lt.= 2005; Rt.=2006    Bilateral Knee scope     Family History   Problem Relation Age of Onset    Heart attack Mother 88    Hypertension Mother     Hyperlipidemia Mother     Diabetes Other     Hypertension Other      Social History     Tobacco Use    Smoking status: Never Smoker    Smokeless tobacco: Never Used   Substance Use Topics    Alcohol use: Yes     Comment: rarely    Drug use: No       No medications prior to admission.     Review of patient's allergies indicates:   Allergen Reactions    Aspirin Swelling     Swelling and rash    Colace [docusate sodium] Rash     itching    Sulfa (sulfonamide antibiotics) Swelling     Swelling and rash    Iodine and iodide containing products Rash    Penicillins Rash        Review of Systems   Eyes: Positive for blurred vision.   All other systems reviewed and are negative.      Objective:       Vital Signs (Most Recent)       Vital Signs Range (Last 24H):       Physical Exam   Constitutional: She is oriented to person, place, and time. She appears well-developed and well-nourished.   HENT:   Head: Normocephalic.   Eyes: Conjunctivae and EOM are normal. Pupils are equal, round, and reactive to light.   Neck: Normal range of motion. Neck supple.   Cardiovascular: Normal rate, regular rhythm and normal heart sounds.   Pulmonary/Chest: Effort normal and breath sounds normal.   Abdominal: Soft. Bowel sounds are normal.   Musculoskeletal: Normal range of motion.   Neurological: She is alert and oriented to person, place, and time.   Skin: Skin is warm.   Psychiatric: She has a normal mood and affect.       Data Review:    ECG:     Assessment:      Cataract OD.    Plan:    CE OD.

## 2019-02-20 ENCOUNTER — ANESTHESIA EVENT (OUTPATIENT)
Dept: SURGERY | Facility: HOSPITAL | Age: 84
End: 2019-02-20
Payer: MEDICARE

## 2019-02-21 ENCOUNTER — ANESTHESIA (OUTPATIENT)
Dept: SURGERY | Facility: HOSPITAL | Age: 84
End: 2019-02-21
Payer: MEDICARE

## 2019-02-21 ENCOUNTER — HOSPITAL ENCOUNTER (OUTPATIENT)
Facility: HOSPITAL | Age: 84
Discharge: HOME | End: 2019-02-21
Attending: OPHTHALMOLOGY | Admitting: OPHTHALMOLOGY
Payer: MEDICARE

## 2019-02-21 VITALS
HEART RATE: 70 BPM | HEIGHT: 63 IN | RESPIRATION RATE: 16 BRPM | SYSTOLIC BLOOD PRESSURE: 142 MMHG | WEIGHT: 168.19 LBS | TEMPERATURE: 97 F | OXYGEN SATURATION: 96 % | BODY MASS INDEX: 29.8 KG/M2 | DIASTOLIC BLOOD PRESSURE: 66 MMHG

## 2019-02-21 DIAGNOSIS — H25.10 SENILE NUCLEAR SCLEROSIS: ICD-10-CM

## 2019-02-21 PROCEDURE — 25000003 PHARM REV CODE 250: Performed by: OPHTHALMOLOGY

## 2019-02-21 PROCEDURE — 66984 PR REMOVAL, CATARACT, W/INSRT INTRAOC LENS, W/O ENDO CYCLO: ICD-10-PCS | Mod: 79,RT,, | Performed by: OPHTHALMOLOGY

## 2019-02-21 PROCEDURE — 36000706: Performed by: OPHTHALMOLOGY

## 2019-02-21 PROCEDURE — 25000003 PHARM REV CODE 250: Performed by: NURSE ANESTHETIST, CERTIFIED REGISTERED

## 2019-02-21 PROCEDURE — D9220A PRA ANESTHESIA: ICD-10-PCS | Mod: ANES,,, | Performed by: ANESTHESIOLOGY

## 2019-02-21 PROCEDURE — 66984 XCAPSL CTRC RMVL W/O ECP: CPT | Mod: 79,RT,, | Performed by: OPHTHALMOLOGY

## 2019-02-21 PROCEDURE — 71000015 HC POSTOP RECOV 1ST HR: Performed by: OPHTHALMOLOGY

## 2019-02-21 PROCEDURE — V2632 POST CHMBR INTRAOCULAR LENS: HCPCS | Performed by: OPHTHALMOLOGY

## 2019-02-21 PROCEDURE — D9220A PRA ANESTHESIA: Mod: CRNA,,, | Performed by: NURSE ANESTHETIST, CERTIFIED REGISTERED

## 2019-02-21 PROCEDURE — 36000707: Performed by: OPHTHALMOLOGY

## 2019-02-21 PROCEDURE — D9220A PRA ANESTHESIA: Mod: ANES,,, | Performed by: ANESTHESIOLOGY

## 2019-02-21 PROCEDURE — C9447 INJ, PHENYLEPHRINE KETOROLAC: HCPCS | Performed by: OPHTHALMOLOGY

## 2019-02-21 PROCEDURE — D9220A PRA ANESTHESIA: ICD-10-PCS | Mod: CRNA,,, | Performed by: NURSE ANESTHETIST, CERTIFIED REGISTERED

## 2019-02-21 PROCEDURE — 63600175 PHARM REV CODE 636 W HCPCS: Performed by: OPHTHALMOLOGY

## 2019-02-21 PROCEDURE — 37000009 HC ANESTHESIA EA ADD 15 MINS: Performed by: OPHTHALMOLOGY

## 2019-02-21 PROCEDURE — 63600175 PHARM REV CODE 636 W HCPCS: Performed by: NURSE ANESTHETIST, CERTIFIED REGISTERED

## 2019-02-21 PROCEDURE — 37000008 HC ANESTHESIA 1ST 15 MINUTES: Performed by: OPHTHALMOLOGY

## 2019-02-21 DEVICE — LENS 20.0: Type: IMPLANTABLE DEVICE | Site: EYE | Status: FUNCTIONAL

## 2019-02-21 RX ORDER — SODIUM CHLORIDE 9 MG/ML
INJECTION, SOLUTION INTRAVENOUS CONTINUOUS
Status: ACTIVE | OUTPATIENT
Start: 2019-02-21

## 2019-02-21 RX ORDER — SODIUM CHLORIDE, SODIUM LACTATE, POTASSIUM CHLORIDE, CALCIUM CHLORIDE 600; 310; 30; 20 MG/100ML; MG/100ML; MG/100ML; MG/100ML
INJECTION, SOLUTION INTRAVENOUS CONTINUOUS
Status: DISCONTINUED | OUTPATIENT
Start: 2019-02-21 | End: 2019-02-21 | Stop reason: HOSPADM

## 2019-02-21 RX ORDER — CYCLOPENTOLATE HYDROCHLORIDE 10 MG/ML
1 SOLUTION/ DROPS OPHTHALMIC
Status: COMPLETED | OUTPATIENT
Start: 2019-02-21 | End: 2019-02-21

## 2019-02-21 RX ORDER — PROPARACAINE HYDROCHLORIDE 5 MG/ML
1 SOLUTION/ DROPS OPHTHALMIC
Status: DISPENSED | OUTPATIENT
Start: 2019-02-21

## 2019-02-21 RX ORDER — LIDOCAINE HYDROCHLORIDE 20 MG/ML
INJECTION, SOLUTION INFILTRATION; PERINEURAL
Status: DISCONTINUED | OUTPATIENT
Start: 2019-02-21 | End: 2019-02-21 | Stop reason: HOSPADM

## 2019-02-21 RX ORDER — SODIUM CHLORIDE, SODIUM LACTATE, POTASSIUM CHLORIDE, CALCIUM CHLORIDE 600; 310; 30; 20 MG/100ML; MG/100ML; MG/100ML; MG/100ML
INJECTION, SOLUTION INTRAVENOUS CONTINUOUS PRN
Status: DISCONTINUED | OUTPATIENT
Start: 2019-02-21 | End: 2019-02-21

## 2019-02-21 RX ORDER — PHENYLEPHRINE HYDROCHLORIDE 25 MG/ML
1 SOLUTION/ DROPS OPHTHALMIC
Status: DISPENSED | OUTPATIENT
Start: 2019-02-21

## 2019-02-21 RX ORDER — PREDNISOLONE ACETATE 10 MG/ML
SUSPENSION/ DROPS OPHTHALMIC
Status: DISCONTINUED | OUTPATIENT
Start: 2019-02-21 | End: 2019-02-21 | Stop reason: HOSPADM

## 2019-02-21 RX ORDER — FENTANYL CITRATE 50 UG/ML
INJECTION, SOLUTION INTRAMUSCULAR; INTRAVENOUS
Status: DISCONTINUED | OUTPATIENT
Start: 2019-02-21 | End: 2019-02-21

## 2019-02-21 RX ORDER — HYDROCODONE BITARTRATE AND ACETAMINOPHEN 5; 325 MG/1; MG/1
1 TABLET ORAL EVERY 4 HOURS PRN
Status: CANCELLED | OUTPATIENT
Start: 2019-02-21

## 2019-02-21 RX ORDER — LIDOCAINE HYDROCHLORIDE 10 MG/ML
1 INJECTION, SOLUTION EPIDURAL; INFILTRATION; INTRACAUDAL; PERINEURAL ONCE
Status: DISCONTINUED | OUTPATIENT
Start: 2019-02-21 | End: 2019-02-21 | Stop reason: HOSPADM

## 2019-02-21 RX ORDER — OFLOXACIN 3 MG/ML
1 SOLUTION/ DROPS OPHTHALMIC
Status: COMPLETED | OUTPATIENT
Start: 2019-02-21 | End: 2019-02-21

## 2019-02-21 RX ORDER — POVIDONE-IODINE 5 %
SOLUTION, NON-ORAL OPHTHALMIC (EYE)
Status: DISCONTINUED | OUTPATIENT
Start: 2019-02-21 | End: 2019-02-21 | Stop reason: HOSPADM

## 2019-02-21 RX ORDER — ACETAMINOPHEN 325 MG/1
650 TABLET ORAL EVERY 4 HOURS PRN
Status: CANCELLED | OUTPATIENT
Start: 2019-02-21

## 2019-02-21 RX ORDER — TROPICAMIDE 10 MG/ML
1 SOLUTION/ DROPS OPHTHALMIC
Status: DISPENSED | OUTPATIENT
Start: 2019-02-21

## 2019-02-21 RX ADMIN — TROPICAMIDE 1 DROP: 10 SOLUTION/ DROPS OPHTHALMIC at 07:02

## 2019-02-21 RX ADMIN — OFLOXACIN 1 DROP: 3 SOLUTION OPHTHALMIC at 07:02

## 2019-02-21 RX ADMIN — CYCLOPENTOLATE HYDROCHLORIDE 1 DROP: 10 SOLUTION/ DROPS OPHTHALMIC at 07:02

## 2019-02-21 RX ADMIN — FENTANYL CITRATE 25 MCG: 50 INJECTION INTRAMUSCULAR; INTRAVENOUS at 08:02

## 2019-02-21 RX ADMIN — PHENYLEPHRINE HYDROCHLORIDE 1 DROP: 25 SOLUTION/ DROPS OPHTHALMIC at 07:02

## 2019-02-21 RX ADMIN — SODIUM CHLORIDE, SODIUM LACTATE, POTASSIUM CHLORIDE, AND CALCIUM CHLORIDE: .6; .31; .03; .02 INJECTION, SOLUTION INTRAVENOUS at 07:02

## 2019-02-21 RX ADMIN — FENTANYL CITRATE 25 MCG: 50 INJECTION INTRAMUSCULAR; INTRAVENOUS at 07:02

## 2019-02-21 RX ADMIN — PROPARACAINE HYDROCHLORIDE 1 DROP: 5 SOLUTION/ DROPS OPHTHALMIC at 07:02

## 2019-02-21 NOTE — ANESTHESIA PREPROCEDURE EVALUATION
02/21/2019    Pre-operative evaluation for Procedure(s) (LRB):  INSERTION, IOL PROSTHESIS (Left)  PHACOEMULSIFICATION, CATARACT (Left)    Palmira Malave is a 83 y.o. female     Patient Active Problem List   Diagnosis    Elevated troponin    Essential hypertension    Coronary artery disease involving native coronary artery of native heart without angina pectoris    Hyperlipidemia    Acute-on-chronic renal failure    Chronic kidney disease (CKD), stage III (moderate)    Bacteremia    E. coli UTI    Complicated UTI (urinary tract infection)    E coli bacteremia    Systolic congestive heart failure    Systolic congestive heart failure    Acute on chronic systolic congestive heart failure    Refractive error    Cortical cataract of both eyes    Nuclear sclerosis of right eye    Senile nuclear sclerosis    Chest pain    ICD (implantable cardioverter-defibrillator) in place    Post-operative state       Review of patient's allergies indicates:   Allergen Reactions    Aspirin Swelling     Swelling and rash    Colace [docusate sodium] Rash     itching    Sulfa (sulfonamide antibiotics) Swelling     Swelling and rash    Iodine and iodide containing products Rash    Penicillins Rash       Current Facility-Administered Medications on File Prior to Visit   Medication Dose Route Frequency Provider Last Rate Last Dose    0.9%  NaCl infusion   Intravenous Continuous Demetrius Velazco MD        lactated ringers infusion   Intravenous Continuous Tiffanie Dominguez MD        lidocaine (PF) 10 mg/ml (1%) injection 10 mg  1 mL Intradermal Once Tiffanie Dominguez MD        phenylephrine HCL 2.5% ophthalmic solution 1 drop  1 drop Right Eye On Call Procedure Demetrius Velazco MD   1 drop at 02/21/19 0708    proparacaine 0.5 % ophthalmic solution 1 drop  1 drop Right Eye On Call Procedure  Demetrius Velazco MD   1 drop at 02/21/19 0706    tropicamide 1% ophthalmic solution 1 drop  1 drop Right Eye On Call Procedure Demetrius Velazco MD   1 drop at 02/21/19 0708     Current Outpatient Medications on File Prior to Visit   Medication Sig Dispense Refill    acetaminophen (TYLENOL) 500 MG tablet Take 1 tablet (500 mg total) by mouth every 6 (six) hours as needed for Pain. 12 tablet 0    allopurinol (ZYLOPRIM) 100 MG tablet Take 100 mg by mouth every evening.       calcitRIOL (ROCALTROL) 0.25 MCG Cap 0.25 mcg.       carvedilol (COREG) 25 MG tablet Take 0.5 tablets (12.5 mg total) by mouth 2 (two) times daily.      clopidogrel (PLAVIX) 75 mg tablet Take 75 mg by mouth every evening. On hold since 11-28-14, for procedure.      cranberry 400 mg Cap Take 1 capsule by mouth 2 (two) times daily.      difluprednate (DUREZOL) 0.05 % Drop ophthalmic solution Place 1 drop into the left eye 4 (four) times daily. For 30 days 5 mL 1    fish oil-omega-3 fatty acids 300-1,000 mg capsule Take 2 g by mouth once daily. 2 caps every AM & 1 cap every PM.      furosemide (LASIX) 40 MG tablet Take 1 tablet (40 mg total) by mouth once daily. Take 40 mg twice a day on 10/3, 10/4 followed by 20 mg twice a day. 30 tablet 0    ILEVRO 0.3 % DrpS Place 1 drop into both eyes once daily. For 30 days 3 mL 1    lovastatin (MEVACOR) 40 MG tablet Take 40 mg by mouth nightly. Takes 2 caps with supper every evening.      mirabegron (MYRBETRIQ) 50 mg Tb24 Take 50 mg by mouth once daily.       ranitidine (ZANTAC) 150 MG tablet 150 mg 2 (two) times daily.       terazosin (HYTRIN) 2 MG capsule Take by mouth every evening.          Past Surgical History:   Procedure Laterality Date    APPENDECTOMY      CARDIAC DEFIBRILLATOR PLACEMENT      2015    CHOLECYSTECTOMY      COLONOSCOPY W/ POLYPECTOMY  10 or 11/ 2014    per Dr. Elen ceja Left 8/2008    EYE SURGERY      HYSTERECTOMY      INSERTION, IOL  PROSTHESIS Left 2/7/2019    Performed by [facility]us [facility]co [facility] at [facility] OR    INSERTION-ICD-BIVENTRICULAR N/A 12/11/2014    Performed by Tal Batres MD at [facility] CATH LAB    JOINT REPLACEMENT Bilateral 2005 & 2006    2 Knee Replacements=Lt. 1= 2005. Rt. 1= 2006    OOPHORECTOMY      PHACOEMULSIFICATION, CATARACT Left 2/7/2019    Performed by [facility]us [facility]co [facility] at [facility] OR    TOTAL KNEE ARTHROPLASTY Bilateral Lt.= 2005; Rt.=2006    Bilateral Knee scope       VITALS  Wt Readings from Last 3 Encounters:   02/15/19 76.3 kg (168 lb 3 oz)   02/12/19 78 kg (171 lb 15.3 oz)   02/08/19 78.5 kg (173 lb 1 oz)     Temp Readings from Last 3 Encounters:   02/21/19 36.2 °C (97.2 °F) (Oral)   02/08/19 36.7 °C (98 °F) (Oral)   02/07/19 36.6 °C (97.9 °F) (Oral)     BP Readings from Last 3 Encounters:   02/21/19 (!) 112/58   02/12/19 122/76   02/08/19 (!) 90/49     Pulse Readings from Last 3 Encounters:   02/21/19 62   02/12/19 66   02/08/19 72           2D Echo:  Results for orders placed or performed during the hospital encounter of 06/28/18   2D echo with color flow doppler   Result Value Ref Range    QEF 20 (A) 55 - 65    Mitral Valve Regurgitation MILD TO MODERATE     Diastolic Dysfunction Yes (A)     Aortic Valve Stenosis MILD (A)     Est. PA Systolic Pressure 33.2     Pericardial Effusion NONE     Mitral Valve Mobility NORMAL     Tricuspid Valve Regurgitation TRIVIAL TO MILD      Lab Results   Component Value Date    WBC 4.81 02/08/2019    HGB 10.7 (L) 02/08/2019    HCT 34.8 (L) 02/08/2019    MCV 96 02/08/2019     (L) 02/08/2019     CMP  Sodium   Date Value Ref Range Status   02/08/2019 141 136 - 145 mmol/L Final     Potassium   Date Value Ref Range Status   02/08/2019 3.6 3.5 - 5.1 mmol/L Final     Chloride   Date Value Ref Range Status   02/08/2019 103 95 - 110 mmol/L Final     CO2   Date Value Ref Range Status   02/08/2019 28 23 - 29 mmol/L Final     Glucose   Date Value Ref Range Status   02/08/2019  85 70 - 110 mg/dL Final     BUN, Bld   Date Value Ref Range Status   02/08/2019 25 (H) 8 - 23 mg/dL Final     Creatinine   Date Value Ref Range Status   02/08/2019 1.2 0.5 - 1.4 mg/dL Final     Calcium   Date Value Ref Range Status   02/08/2019 10.1 8.7 - 10.5 mg/dL Final     Total Protein   Date Value Ref Range Status   02/07/2019 7.3 6.0 - 8.4 g/dL Final     Albumin   Date Value Ref Range Status   02/07/2019 4.1 3.5 - 5.2 g/dL Final     Total Bilirubin   Date Value Ref Range Status   02/07/2019 0.9 0.1 - 1.0 mg/dL Final     Comment:     For infants and newborns, interpretation of results should be based  on gestational age, weight and in agreement with clinical  observations.  Premature Infant recommended reference ranges:  Up to 24 hours.............<8.0 mg/dL  Up to 48 hours............<12.0 mg/dL  3-5 days..................<15.0 mg/dL  6-29 days.................<15.0 mg/dL       Alkaline Phosphatase   Date Value Ref Range Status   02/07/2019 74 55 - 135 U/L Final     AST   Date Value Ref Range Status   02/07/2019 23 10 - 40 U/L Final     ALT   Date Value Ref Range Status   02/07/2019 14 10 - 44 U/L Final     Anion Gap   Date Value Ref Range Status   02/08/2019 10 8 - 16 mmol/L Final     eGFR if    Date Value Ref Range Status   02/08/2019 48 (A) >60 mL/min/1.73 m^2 Final     eGFR if non    Date Value Ref Range Status   02/08/2019 42 (A) >60 mL/min/1.73 m^2 Final     Comment:     Calculation used to obtain the estimated glomerular filtration  rate (eGFR) is the CKD-EPI equation.          INTERVAL HISTORY: Pt had left eye surgery on 2/5/19.  Went to ED on 2/7/19 for worsening SOB.  She was diuresed and discharged overnight.  She also followed up with cardiology after discharge, and had AICD interrogated, without any major issue.  Current cardiology recs are to increase Lasix to bid and continue medical management.  She is currently without any complaints of CP, SOB, EVANS, and feels  back to her health baseline.     Pre-op Assessment    I have reviewed the Patient Summary Reports.     I have reviewed the Nursing Notes.      Review of Systems  Anesthesia Hx:  No problems with previous Anesthesia  History of prior surgery of interest to airway management or planning: Denies Family Hx of Anesthesia complications.   Denies Personal Hx of Anesthesia complications.   Social:  Non-Smoker, No Alcohol Use    Hematology/Oncology:     Oncology Normal    -- Anemia:   EENT/Dental:EENT/Dental Normal   Cardiovascular:   Hypertension, well controlled CAD   CHF hyperlipidemia ECG has been reviewed. EF 20%   Pulmonary:  Pulmonary Normal    Renal/:   Chronic Renal Disease, CRI    Hepatic/GI:   GERD, well controlled    Musculoskeletal:  Musculoskeletal Normal    Neurological:  Neurology Normal    Endocrine:  Endocrine Normal    Dermatological:  Skin Normal    Psych:  Psychiatric Normal           Physical Exam  General:  Well nourished, Obesity    Airway/Jaw/Neck:  Airway Findings: Mouth Opening: Normal Tongue: Normal  General Airway Assessment: Adult  Mallampati: II  TM Distance: Normal, at least 6 cm  Jaw/Neck Findings:  Neck ROM: Normal ROM     Eyes/Ears/Nose:  Eyes/Ears/Nose Findings:    Dental:  Dental Findings: Periodontal disease, Mild   Chest/Lungs:  Chest/Lungs Findings: Clear to auscultation, Normal Respiratory Rate     Heart/Vascular:  Heart Findings: Rate: Normal  Rhythm: Regular Rhythm  Sounds: Normal        Mental Status:  Mental Status Findings:  Cooperative         Anesthesia Plan  Type of Anesthesia, risks & benefits discussed:  Anesthesia Type:  MAC  Patient's Preference:   Intra-op Monitoring Plan: standard ASA monitors  Intra-op Monitoring Plan Comments:   Post Op Pain Control Plan: per primary service following discharge from PACU  Post Op Pain Control Plan Comments:   Induction:   IV  Beta Blocker:  Patient is on a Beta-Blocker and has received one dose within the past 24 hours (No further  documentation required).       Informed Consent: Patient understands risks and agrees with Anesthesia plan.  Questions answered. Anesthesia consent signed with patient.  ASA Score: 3     Day of Surgery Review of History & Physical: I have interviewed and examined the patient. I have reviewed the patient's H&P dated:  There are no significant changes.          Ready For Surgery From Anesthesia Perspective.

## 2019-02-21 NOTE — TRANSFER OF CARE
"Anesthesia Transfer of Care Note    Patient: Palmira Malave    Procedure(s) Performed: Procedure(s) (LRB):  PHACOEMULSIFICATION, CATARACT (Right)  INSERTION, IOL PROSTHESIS (Right)    Patient location: PACU    Anesthesia Type: general    Transport from OR: Transported from OR on room air with adequate spontaneous ventilation    Post pain: adequate analgesia    Post assessment: no apparent anesthetic complications and tolerated procedure well    Post vital signs: stable    Level of consciousness: awake, alert and oriented    Nausea/Vomiting: no nausea/vomiting    Complications: none    Transfer of care protocol was followed      Last vitals:   Visit Vitals  BP (!) 142/66 (BP Location: Right arm, Patient Position: Lying)   Pulse 70   Temp 36.3 °C (97.3 °F) (Oral)   Resp 16   Ht 5' 3" (1.6 m)   Wt 76.3 kg (168 lb 3 oz)   SpO2 96%   Breastfeeding? No   BMI 29.79 kg/m²     "

## 2019-02-21 NOTE — BRIEF OP NOTE
Operative Note     SUMMARY     Surgery Date: 2/21/2019    Surgeon(s) and Role:      Demetrius Velazco MD - Primary    Pre-op Diagnosis:  Nuclear sclerosis     Post-op/ Diagnosis:  Same    Final Diagnosis: Cataract    Procedure(s) (LRB):  PHACOEMULSIFICATION-ASPIRATION-CATARACT   INSERTION-INTRAOCULAR LENS (IOL)     Anesthesia: Monitored Anesthesia Care    Findings/Key Components:  Cataract    Outcome: Tolerated procedure well    Estimated Blood Loss: None         Specimens     None          Follow-up:  Tomorrow in clinic      Discharge Note      SUMMARY     Admit Date: 2/21/2019    Attending Physician: Demetrius Velazco MD    Discharge Physician: Demetrius Velazco MD    Discharge Date: 2/21/2019    Final Diagnosis: Cataract    Outcome: Tolerated procedure well    Disposition: Discharge to Home.    Medications:       Palmira Malave   Home Medication Instructions [id number]    Printed on:02/21/19 0823   Medication Information                      acetaminophen (TYLENOL) 500 MG tablet  Take 1 tablet (500 mg total) by mouth every 6 (six) hours as needed for Pain.             allopurinol (ZYLOPRIM) 100 MG tablet  Take 100 mg by mouth every evening.              calcitRIOL (ROCALTROL) 0.25 MCG Cap  0.25 mcg.              carvedilol (COREG) 25 MG tablet  Take 0.5 tablets (12.5 mg total) by mouth 2 (two) times daily.             clopidogrel (PLAVIX) 75 mg tablet  Take 75 mg by mouth every evening. On hold since 11-28-14, for procedure.             cranberry 400 mg Cap  Take 1 capsule by mouth 2 (two) times daily.             difluprednate (DUREZOL) 0.05 % Drop ophthalmic solution  Place 1 drop into the left eye 4 (four) times daily. For 30 days             fish oil-omega-3 fatty acids 300-1,000 mg capsule  Take 2 g by mouth once daily. 2 caps every AM & 1 cap every PM.             folic acid/multivit-min/lutein (CENTRUM SILVER ORAL)  Take by mouth once daily.             furosemide (LASIX) 40 MG  tablet  Take 1 tablet (40 mg total) by mouth once daily. Take 40 mg twice a day on 10/3, 10/4 followed by 20 mg twice a day.             ILEVRO 0.3 % DrpS  Place 1 drop into both eyes once daily. For 30 days             lovastatin (MEVACOR) 40 MG tablet  Take 40 mg by mouth nightly. Takes 2 caps with supper every evening.             mirabegron (MYRBETRIQ) 50 mg Tb24  Take 50 mg by mouth once daily.              ranitidine (ZANTAC) 150 MG tablet  150 mg 2 (two) times daily.              terazosin (HYTRIN) 2 MG capsule  Take by mouth every evening.                    Patient Discharge Instructions:     Keep Ballard Shield over operated eye when not using drops.     DIET:  Regular    Activity: No heavy lifting or bending X 1 week.    Follow-up:  Tomorrow in clinic

## 2019-02-21 NOTE — DISCHARGE INSTRUCTIONS
ACTIVITY LEVEL:  If you received sedation or an anesthetic, you may feel sleepy for several hours. Rest until you are more awake. Gradually resume your normal activities. Normal activity will cause no undue risk to your eye. The white part of your eye might be red - this is nothing to worry about. Wear your old glasses or sunglasses that were given to you for protection during the day.    RESTRICTIONS - for the next 7 days  · Do not lift anything over 10 pounds.  · When bending, bend at the knees not the waist.  · Do not rub the eye.  · Do not get water in the eye.  · Do not sleep on the side that had surgery.  · Protect your eye at bedtime with the shield provided.    DIET: At home, continue with liquids. If there is no nausea, you may eat a soft diet and gradually resume your normal diet. Limit alcohol intake for 24 hours.    BATHING: You may shower or bathe as normal. You may take a warm wash cloth, which has been wrung out to remove excess water, and gently remove crusting on the lashes.    MEDICATIONS: You may take Extra Strength Tylenol every 4 hours for pain/headache.     Use your drops     Today: Pink-                   12 pm,   4 pm,  8 pm     Beige -                12 pm,   4pm,    8 pm      Vera-                   12 pm    Tomorrow:  Resume pink and beige cap drops four times a day.  Resume grey cap drops once a day.    Place one drop in the eye that had surgery from the first bottle. Wait 5 minutes and then use the second bottle. (It does not matter which bottle is used first.) CONTINUE all glaucoma drops.   No driving, alcoholic beverages or signing legal documents for next 24 hours or while taking pain medication      WHEN TO CALL THE DOCTOR:  · Redness that increases significantly  · Pain not relieved by Tylenol  RETURN APPOINTMENT:  You will need to see Dr. Velazco on Friday at the [facility] at____9:00 am ___________. Bring your eye kit with you on your follow-up visit. Your kit contains  sunglasses, eye shield, tape and your eye drops.  FOR EMERGENCIES:  If any unusual problems or difficulties occur, contact Dr. Velazco at the Clinic office at [phone] during normal business hours. If after hours, call [phone].        Fall Prevention  Millions of people fall every year and injure themselves. You may have had anesthesia or sedation which may increase your risk of falling. You may have health issues that put you at an increased risk of falling.     Here are ways to reduce your risk of falling.  ·   · Make your home safe by keeping walkways clear of objects you may trip over.  · Use non-slip pads under rugs. Do not use area rugs or small throw rugs.  · Use non-slip mats in bathtubs and showers.  · Install handrails and lights on staircases.  · Do not walk in poorly lit areas.  · Do not stand on chairs or wobbly ladders.  · Use caution when reaching overhead or looking upward. This position can cause a loss of balance.  · Be sure your shoes fit properly, have non-slip bottoms and are in good condition.   · Wear shoes both inside and out. Avoid going barefoot or wearing slippers.  · Be cautious when going up and down stairs, curbs, and when walking on uneven sidewalks.  · If your balance is poor, consider using a cane or walker.  · If your fall was related to alcohol use, stop or limit alcohol intake.   · If your fall was related to use of sleeping medicines, talk to your doctor about this. You may need to reduce your dosage at bedtime if you awaken during the night to go to the bathroom.    · To reduce the need for nighttime bathroom trips:  ¨ Avoid drinking fluids for several hours before going to bed  ¨ Empty your bladder before going to bed  ¨ Men can keep a urinal at the bedside  · Stay as active as you can. Balance, flexibility, strength, and endurance all come from exercise. They all play a role in preventing falls. Ask your healthcare provider which types of activity are right for  you.  · Get your vision checked on a regular basis.  · If you have pets, know where they are before you stand up or walk so you don't trip over them.  · Use night lights.

## 2019-02-22 ENCOUNTER — OFFICE VISIT (OUTPATIENT)
Dept: OPHTHALMOLOGY | Facility: CLINIC | Age: 84
End: 2019-02-22
Payer: MEDICARE

## 2019-02-22 VITALS — SYSTOLIC BLOOD PRESSURE: 101 MMHG | DIASTOLIC BLOOD PRESSURE: 65 MMHG | HEART RATE: 65 BPM

## 2019-02-22 DIAGNOSIS — Z98.890 POST-OPERATIVE STATE: Primary | ICD-10-CM

## 2019-02-22 PROCEDURE — 99024 PR POST-OP FOLLOW-UP VISIT: ICD-10-PCS | Mod: S$GLB,,, | Performed by: OPHTHALMOLOGY

## 2019-02-22 PROCEDURE — 99999 PR PBB SHADOW E&M-EST. PATIENT-LVL II: CPT | Mod: PBBFAC,,, | Performed by: OPHTHALMOLOGY

## 2019-02-22 PROCEDURE — 99024 POSTOP FOLLOW-UP VISIT: CPT | Mod: S$GLB,,, | Performed by: OPHTHALMOLOGY

## 2019-02-22 PROCEDURE — 99999 PR PBB SHADOW E&M-EST. PATIENT-LVL II: ICD-10-PCS | Mod: PBBFAC,,, | Performed by: OPHTHALMOLOGY

## 2019-02-22 NOTE — OP NOTE
DATE OF PROCEDURE:  02/21/2019.    SURGEON:  Demetrius Velazco M.D.    PREOPERATIVE DIAGNOSIS:  Nuclear sclerotic cataract, right eye.    POSTOPERATIVE DIAGNOSIS:  Nuclear sclerotic cataract, right eye.    ANESTHESIA:  Monitored anesthesia care with 4% lidocaine topically.    PROCEDURE:  Clear corneal phacoemulsification with posterior chamber intraocular   lens implant, right eye.    PROCEDURE IN DETAIL:  After the appropriate preoperative consent was obtained,   the patient had the 2% Xylocaine jelly applied to the cornea.  The patient was   then brought to the operating room and placed into the supine position.  The   right periorbit was then prepped and draped in the usual sterile ophthalmic   fashion.  A lid speculum was then inserted into the right eye.  Several drops of   the 4% lidocaine were placed onto the right cornea.  The 4% lidocaine was also   applied to the perilimbal region with the Weck-nneka sponge.  Using the 0.12-mm   forceps and the Super Sharp blade, a paracentesis site was made at the 12   o'clock position.  Approximately 0.5 mL of the 4% lidocaine was injected into   the anterior chamber.  Next, Viscoat was injected into the anterior chamber   through the paracentesis site.  The 2.75-mm keratome and the cyclodialysis   spatula were used to create a 2.75-mm clear corneal temporal groove.  The   cystitomy needle and Utrata forceps were then used to create a continuous tear   360-degree capsulorrhexis.  BSS in a cannula was then used for hydrodissection.    Phacoemulsification then proceeded in a stop-and-chop fashion without any   complications.  Another 0.5 mL of the 4% lidocaine was injected into the   anterior chamber.  The curved tip irrigation aspiration handpiece was then used   to remove the residual cortical material from the capsular bag.  Again, the 4%   lidocaine was applied to the perilimbal region with the Weck-nneka sponge.  Healon   GV was then injected into the anterior chamber  and capsular bag.  An [employer] intraocular lens model SN60WF, 20.0 diopters in power and serial   #[id number] was injected into the capsular bag with the lens injector.  The   Sinskey hook was used to position the lens into its proper place.  The residual   viscoelastic material was removed from the anterior chamber and capsular bag   with the curved tip irrigation aspiration handpiece.  Both wounds were hydrated   with BSS on a cannula.  Both wounds were checked and found to be watertight.    The lid speculum was then removed from the right eye.  The patient then had 1   drop of Vigamox ophthalmic drop and 1 drop of Econopred +1% ophthalmic drop   instilled onto the right cornea.  The eye was then shielded.  The patient   tolerated the procedure well and was then brought to the recovery room in good   condition.      MK  dd: 02/21/2019 18:32:19 (CST)  td: 02/21/2019 18:46:46 (CST)  Doc ID   #[id number]  Job ID #[id number]    CC:

## 2019-02-22 NOTE — PROGRESS NOTES
Subjective:       Patient ID: Palmira Malave is a 83 y.o. female.    Chief Complaint: Post-op Evaluation (1 day PO OD. CE IOL 2/21/2019 OD. )    HPI     Post-op Evaluation      Additional comments: 1 day PO OD. CE IOL 2/21/2019 OD.               Comments     83 y.o. Female is here for 1 day PO OD. CE IOL 2/21/2019 OD. Denies eye   pain and f/f. No discomfort.     Eye Meds: Ofloxacin QID OD                    Durezol QID OD                    Ilevro qd OD           Last edited by GEOVANNI Buenrostro on 2/22/2019  8:53 AM. (History)             Assessment:       1. Post-operative state        Plan:       S/p CE OU- Doing well.      CPM OD.  Taper gtts OS.  RTC 1 wk.

## 2019-02-25 ENCOUNTER — HOSPITAL ENCOUNTER (EMERGENCY)
Facility: HOSPITAL | Age: 84
Discharge: HOME | End: 2019-02-25
Attending: EMERGENCY MEDICINE
Payer: MEDICARE

## 2019-02-25 VITALS
OXYGEN SATURATION: 96 % | RESPIRATION RATE: 20 BRPM | HEIGHT: 63 IN | TEMPERATURE: 98 F | SYSTOLIC BLOOD PRESSURE: 130 MMHG | DIASTOLIC BLOOD PRESSURE: 64 MMHG | BODY MASS INDEX: 29.77 KG/M2 | WEIGHT: 168 LBS | HEART RATE: 63 BPM

## 2019-02-25 DIAGNOSIS — J40 BRONCHITIS: Primary | ICD-10-CM

## 2019-02-25 DIAGNOSIS — R05.9 COUGH: ICD-10-CM

## 2019-02-25 LAB
CTP QC/QA: YES
FLUAV AG NPH QL: NEGATIVE
FLUBV AG NPH QL: NEGATIVE

## 2019-02-25 PROCEDURE — 93005 ELECTROCARDIOGRAM TRACING: CPT | Mod: ER

## 2019-02-25 PROCEDURE — 25000242 PHARM REV CODE 250 ALT 637 W/ HCPCS: Mod: ER | Performed by: EMERGENCY MEDICINE

## 2019-02-25 PROCEDURE — 93010 EKG 12-LEAD: ICD-10-PCS | Mod: ,,, | Performed by: INTERNAL MEDICINE

## 2019-02-25 PROCEDURE — 94640 AIRWAY INHALATION TREATMENT: CPT | Mod: ER

## 2019-02-25 PROCEDURE — 99284 EMERGENCY DEPT VISIT MOD MDM: CPT | Mod: 25,ER

## 2019-02-25 PROCEDURE — 87804 INFLUENZA ASSAY W/OPTIC: CPT | Mod: ER

## 2019-02-25 PROCEDURE — 93010 ELECTROCARDIOGRAM REPORT: CPT | Mod: ,,, | Performed by: INTERNAL MEDICINE

## 2019-02-25 RX ORDER — AZITHROMYCIN 250 MG/1
TABLET, FILM COATED ORAL
Qty: 6 TABLET | Refills: 0 | Status: SHIPPED | OUTPATIENT
Start: 2019-02-25 | End: 2019-03-22 | Stop reason: ALTCHOICE

## 2019-02-25 RX ORDER — IPRATROPIUM BROMIDE AND ALBUTEROL SULFATE 2.5; .5 MG/3ML; MG/3ML
3 SOLUTION RESPIRATORY (INHALATION)
Status: COMPLETED | OUTPATIENT
Start: 2019-02-25 | End: 2019-02-25

## 2019-02-25 RX ORDER — ALBUTEROL SULFATE 90 UG/1
1-2 AEROSOL, METERED RESPIRATORY (INHALATION) EVERY 6 HOURS PRN
Qty: 1 INHALER | Refills: 0 | Status: SHIPPED | OUTPATIENT
Start: 2019-02-25 | End: 2019-03-22

## 2019-02-25 RX ORDER — PROMETHAZINE HYDROCHLORIDE AND DEXTROMETHORPHAN HYDROBROMIDE 6.25; 15 MG/5ML; MG/5ML
5 SYRUP ORAL 4 TIMES DAILY PRN
Qty: 180 ML | Refills: 0 | Status: SHIPPED | OUTPATIENT
Start: 2019-02-25 | End: 2019-03-02

## 2019-02-25 RX ADMIN — IPRATROPIUM BROMIDE AND ALBUTEROL SULFATE 3 ML: .5; 3 SOLUTION RESPIRATORY (INHALATION) at 11:02

## 2019-02-25 NOTE — DISCHARGE INSTRUCTIONS
Rest.  Drink plenty of fluids.  Return here at any time.  Call your doctor for close follow-up.  The cough medicine will make you sleepy.

## 2019-02-25 NOTE — ED PROVIDER NOTES
Encounter Date: 2/25/2019    SCRIBE #1 NOTE: I, Fallon Kern, am scribing for, and in the presence of, .       History     Chief Complaint   Patient presents with    Cough     pt reports she has had a cough and congestion x 3 days. pt reports she has been taking delsym but denies any relief. denies any chest pain, SOB, dizziness or any other associated symptoms. NAD noted at this time     Palmira Malave is a 83 y.o. female who presents to the ED complaining of cough for 4 days. Pt denies coughing anything up. Pt took delsym around 4 pm yesterday. She denies smoking.   She endorses rhinorrhea and medial chest pain only when she coughs but denies SOB, fever, congestion, abd pain, urinary problems, bilateral leg swelling, and current headache.   Pt allergic to penicillin and aspirin.   States she recentyl had cataract surgery and her eyes are not currently bothering her.       The history is provided by the patient.     Review of patient's allergies indicates:   Allergen Reactions    Aspirin Swelling     Swelling and rash    Colace [docusate sodium] Rash     itching    Sulfa (sulfonamide antibiotics) Swelling     Swelling and rash    Iodine and iodide containing products Rash    Penicillins Rash     Past Medical History:   Diagnosis Date    Anticoagulant long-term use     Cataract     Coronary artery disease     Gout attack     Hypercholesteremia     Hypertension     Renal disorder     Vaginal delivery     x4     Past Surgical History:   Procedure Laterality Date    APPENDECTOMY      CARDIAC DEFIBRILLATOR PLACEMENT      2015    CATARACT EXTRACTION W/  INTRAOCULAR LENS IMPLANT Left 02/07/2019    Dr. Velazco    CATARACT EXTRACTION W/  INTRAOCULAR LENS IMPLANT Right 02/21/2019    Dr. Velazco    CHOLECYSTECTOMY      COLONOSCOPY W/ POLYPECTOMY  10 or 11/ 2014    per Dr. Myrick    defribillator Left 8/2008    EYE SURGERY      HYSTERECTOMY      INSERTION, IOL PROSTHESIS Right  2/21/2019    [facility] at [facility] OR    INSERTION, IOL PROSTHESIS Left 2/7/2019    [facility] by [facility] at [facility] OR    INSERTION-ICD-BIVENTRICULAR N/A 12/11/2014    Performed by Tal Batres MD at [facility] CATH LAB    JOINT REPLACEMENT Bilateral 2005 & 2006    2 Knee Replacements=Lt. 1= 2005. Rt. 1= 2006    OOPHORECTOMY      PHACOEMULSIFICATION, CATARACT Right 2/21/2019    Performed by [facility] at [facility]    PHACOEMULSIFICATION, CATARACT Left 2/7/2019    Performed by [facility] at [facility] OR    TOTAL KNEE ARTHROPLASTY Bilateral Lt.= 2005; Rt.=2006    Bilateral Knee scope     Family History   Problem Relation Age of Onset    Heart attack Mother 88    Hypertension Mother     Hyperlipidemia Mother     Diabetes Other     Hypertension Other     Amblyopia Son     Blindness Neg Hx     Cataracts Neg Hx     Glaucoma Neg Hx     Macular degeneration Neg Hx     Retinal detachment Neg Hx     Strabismus Neg Hx      Social History     Tobacco Use    Smoking status: Never Smoker    Smokeless tobacco: Never Used   Substance Use Topics    Alcohol use: Yes     Comment: rarely    Drug use: No     Review of Systems   Constitutional: Negative for fever.   HENT: Positive for rhinorrhea. Negative for congestion.    Eyes: Negative for pain.   Respiratory: Positive for cough. Negative for shortness of breath.    Cardiovascular: Positive for chest pain. Negative for leg swelling.   Gastrointestinal: Negative for abdominal pain.   Genitourinary: Negative for difficulty urinating.   Musculoskeletal: Negative for back pain and joint swelling.   Neurological: Negative for headaches.       Physical Exam     Initial Vitals [02/25/19 1122]   BP Pulse Resp Temp SpO2   (!) 113/58 70 17 98.3 °F (36.8 °C) 98 %      MAP       --         Physical Exam    Nursing note and vitals reviewed.  Constitutional: She appears well-developed and well-nourished.   HENT:    Head: Normocephalic and atraumatic.   Nose: Nose normal.   + postnasal drip   Eyes: Conjunctivae are normal.   Neck: Normal range of motion. Neck supple.   Cardiovascular: Normal rate, normal heart sounds and intact distal pulses. Exam reveals no friction rub.    No murmur heard.  Pulmonary/Chest: Effort normal. No respiratory distress. She has wheezes ( faint and expiratory ) in the right lower field and the left lower field.   Musculoskeletal: Normal range of motion.   Neurological: She is alert and oriented to person, place, and time. GCS score is 15. GCS eye subscore is 4. GCS verbal subscore is 5. GCS motor subscore is 6.   Skin: Skin is warm and dry.   Psychiatric: She has a normal mood and affect.         ED Course   Procedures  Labs Reviewed   POCT INFLUENZA A/B     EKG Readings: (Independently Interpreted)   Paced rhythm, heart rate 78, normal QT, normal axis     ECG Results          EKG 12-lead (In process)  Result time 02/25/19 12:01:05    In process by Interface, Lab In [facility] (02/25/19 12:01:05)                 Narrative:    Test Reason :     Vent. Rate : 070 BPM     Atrial Rate : 070 BPM     P-R Int : 164 ms          QRS Dur : 196 ms      QT Int : 518 ms       P-R-T Axes : 052 263 065 degrees     QTc Int : 559 ms    Atrial-sensed ventricular-paced rhythm with occasional Premature  ventricular complexes  Biventricular pacemaker detected  Abnormal ECG  When compared with ECG of 07-FEB-2019 18:52,  Premature ventricular complexes are now Present  Vent. rate has increased BY   5 BPM    Referred By: [email or web address]   SELF           Confirmed By:                             Imaging Results          X-Ray Chest PA And Lateral (Final result)  Result time 02/25/19 12:37:16    Final result by Jamil Wynne MD (02/25/19 12:37:16)                 Impression:      As above.      Electronically signed by: Jamil Wynne MD  Date:    02/25/2019  Time:    12:37             Narrative:    EXAMINATION:  XR CHEST PA AND  LATERAL    CLINICAL HISTORY:  Cough    TECHNIQUE:  PA and lateral views of the chest were performed.    COMPARISON:  02/07/2019    FINDINGS:  Cardiac defibrillator noted in the left chest.  Surgical clips are seen in the right upper quadrant.  Stable pulmonary nodule in the left lower lung zone, partially obscured by device, unchanged since 2014.  No consolidation, pleural effusion, or pneumothorax.  Cardiac silhouette is prominent.  Thoracic spondylosis noted.                                 Medical Decision Making:   History:   Old Medical Records: I decided to obtain old medical records.  Initial Assessment:   This is a 83 y.o. female who presents to the ED complaining of cough for 4 days.    Patient denies SOB, fever, congestion, abd pain, urinary problems, bilateral leg swelling, and current headache.     Physical exam significant for faint and expiratory wheezing.     Clinical Tests:   Lab Tests: Ordered and Reviewed  Radiological Study: Ordered and Reviewed  Medical Tests: Ordered and Reviewed  ED Management:  Will order flu test, EKG, and chest xray.   Will treat with albuterol.   Chest x-ray shows no acute abnormalities.  Patient felt better after the albuterol treatment.  Will discharge patient home with z-neville, promethazine, and albuterol.               Scribe Attestation:   Scribe #1: I performed the above scribed service and the documentation accurately describes the services I performed. I attest to the accuracy of the note.       I, Dr. Qi Bailey, personally performed the services described in this documentation. All medical record entries made by the scribe were at my direction and in my presence.  I have reviewed the chart and agree that the record reflects my personal performance and is accurate and complete. Qi Bailey MD.  3:37 PM 02/25/2019             Clinical Impression:     1. Bronchitis    2. Cough                                   Qi Bailey MD  02/25/19 1537

## 2019-03-01 ENCOUNTER — OFFICE VISIT (OUTPATIENT)
Dept: OPHTHALMOLOGY | Facility: CLINIC | Age: 84
End: 2019-03-01
Payer: MEDICARE

## 2019-03-01 DIAGNOSIS — Z98.890 POST-OPERATIVE STATE: Primary | ICD-10-CM

## 2019-03-01 PROCEDURE — 99024 POSTOP FOLLOW-UP VISIT: CPT | Mod: S$GLB,,, | Performed by: OPHTHALMOLOGY

## 2019-03-01 PROCEDURE — 99999 PR PBB SHADOW E&M-EST. PATIENT-LVL II: CPT | Mod: PBBFAC,,, | Performed by: OPHTHALMOLOGY

## 2019-03-01 PROCEDURE — 99024 PR POST-OP FOLLOW-UP VISIT: ICD-10-PCS | Mod: S$GLB,,, | Performed by: OPHTHALMOLOGY

## 2019-03-01 PROCEDURE — 99999 PR PBB SHADOW E&M-EST. PATIENT-LVL II: ICD-10-PCS | Mod: PBBFAC,,, | Performed by: OPHTHALMOLOGY

## 2019-03-01 NOTE — PROGRESS NOTES
Subjective:       Patient ID: Palmira Malave is a 83 y.o. female.    Chief Complaint: Post-op Evaluation (1 week po od)    HPI     Post-op Evaluation      Additional comments: 1 week po od              Comments     1 week po os    Pt states Va OD is well. Pt denies pain and discomfort.     Eyemeds  Durezol BID OD  Ilevro QD OD          Last edited by Sun Maier on 3/1/2019 10:29 AM. (History)             Assessment:       1. Post-operative state        Plan:       S/p CE OU- Doing well.        Taper gtts OU.  RTC 3 wks.

## 2019-03-22 ENCOUNTER — OFFICE VISIT (OUTPATIENT)
Dept: OPHTHALMOLOGY | Facility: CLINIC | Age: 84
End: 2019-03-22
Payer: MEDICARE

## 2019-03-22 DIAGNOSIS — Z98.890 POST-OPERATIVE STATE: Primary | ICD-10-CM

## 2019-03-22 PROCEDURE — 99024 PR POST-OP FOLLOW-UP VISIT: ICD-10-PCS | Mod: S$GLB,,, | Performed by: OPHTHALMOLOGY

## 2019-03-22 PROCEDURE — 99024 POSTOP FOLLOW-UP VISIT: CPT | Mod: S$GLB,,, | Performed by: OPHTHALMOLOGY

## 2019-03-22 PROCEDURE — 99999 PR PBB SHADOW E&M-EST. PATIENT-LVL II: ICD-10-PCS | Mod: PBBFAC,,, | Performed by: OPHTHALMOLOGY

## 2019-03-22 PROCEDURE — 99999 PR PBB SHADOW E&M-EST. PATIENT-LVL II: CPT | Mod: PBBFAC,,, | Performed by: OPHTHALMOLOGY

## 2019-03-22 NOTE — PROGRESS NOTES
Subjective:       Patient ID: Palmira Malave is a 83 y.o. female.    Chief Complaint: Post-op Evaluation (3 week PO OU )    HPI     Post-op Evaluation      Additional comments: 3 week PO OU               Comments     83 y.o. Female is here for 3 week PO OU. Denies eye pain and f/f. No   discomfort or blurred vision.     Eye Meds: No gtt           Last edited by GEOVANNI Buenrostro on 3/22/2019  9:06 AM. (History)             Assessment:       1. Post-operative state        Plan:       S/p CE OU- Doing well.      Readers.  RTC 6 mos.

## 2019-07-18 ENCOUNTER — OFFICE VISIT (OUTPATIENT)
Dept: OPHTHALMOLOGY | Facility: CLINIC | Age: 84
End: 2019-07-18
Payer: MEDICARE

## 2019-07-18 DIAGNOSIS — H01.00A BLEPHARITIS OF UPPER AND LOWER EYELIDS OF BOTH EYES, UNSPECIFIED TYPE: Primary | ICD-10-CM

## 2019-07-18 DIAGNOSIS — H01.00B BLEPHARITIS OF UPPER AND LOWER EYELIDS OF BOTH EYES, UNSPECIFIED TYPE: Primary | ICD-10-CM

## 2019-07-18 DIAGNOSIS — Z96.1 PSEUDOPHAKIA: ICD-10-CM

## 2019-07-18 PROCEDURE — 92012 INTRM OPH EXAM EST PATIENT: CPT | Mod: S$GLB,,, | Performed by: OPHTHALMOLOGY

## 2019-07-18 PROCEDURE — 99999 PR PBB SHADOW E&M-EST. PATIENT-LVL II: CPT | Mod: PBBFAC,,, | Performed by: OPHTHALMOLOGY

## 2019-07-18 PROCEDURE — 99999 PR PBB SHADOW E&M-EST. PATIENT-LVL II: ICD-10-PCS | Mod: PBBFAC,,, | Performed by: OPHTHALMOLOGY

## 2019-07-18 PROCEDURE — 92012 PR EYE EXAM, EST PATIENT,INTERMED: ICD-10-PCS | Mod: S$GLB,,, | Performed by: OPHTHALMOLOGY

## 2019-07-18 NOTE — PROGRESS NOTES
Subjective:       Patient ID: Palmira Malave is a 84 y.o. female.    Chief Complaint: Itchy Eye (Itching SOHAN more than RUL )    HPI     Itchy Eye      Additional comments: Itching SOHAN more than RUL               Comments     84 y.o. Female is here for Itching SOHAN more than RUL. Itching started on   Saturday. No blurred vision at distance. Denies eye pain and f/f.     Eye Meds: No gtt           Last edited by GEOVANNI Buenrostro on 7/18/2019  2:46 PM. (History)             Assessment:       1. Blepharitis of upper and lower eyelids of both eyes, unspecified type    2. Pseudophakia        Plan:       Blepharitis OU-Needs LS & abx zion.        Start LS's with JBS bid x 1 wk, then qod.  Start EES zion bid to all 4 lids after LS's x 1 wk.     RTC as scheduled.

## 2019-08-01 DIAGNOSIS — Z12.31 ENCOUNTER FOR SCREENING MAMMOGRAM FOR BREAST CANCER: Primary | ICD-10-CM

## 2019-08-16 ENCOUNTER — HOSPITAL ENCOUNTER (OUTPATIENT)
Dept: RADIOLOGY | Facility: HOSPITAL | Age: 84
Discharge: HOME | End: 2019-08-16
Attending: INTERNAL MEDICINE
Payer: MEDICARE

## 2019-08-16 DIAGNOSIS — Z12.31 ENCOUNTER FOR SCREENING MAMMOGRAM FOR BREAST CANCER: ICD-10-CM

## 2019-08-16 PROCEDURE — 77067 MAMMO DIGITAL SCREENING BILAT WITH TOMOSYNTHESIS_CAD: ICD-10-PCS | Mod: 26,,, | Performed by: RADIOLOGY

## 2019-08-16 PROCEDURE — 77067 SCR MAMMO BI INCL CAD: CPT | Mod: TC

## 2019-08-16 PROCEDURE — 77063 BREAST TOMOSYNTHESIS BI: CPT | Mod: 26,,, | Performed by: RADIOLOGY

## 2019-08-16 PROCEDURE — 77063 MAMMO DIGITAL SCREENING BILAT WITH TOMOSYNTHESIS_CAD: ICD-10-PCS | Mod: 26,,, | Performed by: RADIOLOGY

## 2019-08-16 PROCEDURE — 77067 SCR MAMMO BI INCL CAD: CPT | Mod: 26,,, | Performed by: RADIOLOGY

## 2019-08-24 PROCEDURE — 99284 EMERGENCY DEPT VISIT MOD MDM: CPT | Mod: 25

## 2019-08-25 ENCOUNTER — HOSPITAL ENCOUNTER (EMERGENCY)
Facility: HOSPITAL | Age: 84
Discharge: HOME | End: 2019-08-25
Attending: EMERGENCY MEDICINE
Payer: MEDICARE

## 2019-08-25 VITALS
RESPIRATION RATE: 18 BRPM | HEART RATE: 76 BPM | HEIGHT: 63 IN | DIASTOLIC BLOOD PRESSURE: 69 MMHG | TEMPERATURE: 32 F | BODY MASS INDEX: 31.54 KG/M2 | SYSTOLIC BLOOD PRESSURE: 144 MMHG | WEIGHT: 178 LBS | OXYGEN SATURATION: 97 %

## 2019-08-25 DIAGNOSIS — S61.219A FINGER LACERATION, INITIAL ENCOUNTER: Primary | ICD-10-CM

## 2019-08-25 PROCEDURE — 90715 TDAP VACCINE 7 YRS/> IM: CPT | Performed by: EMERGENCY MEDICINE

## 2019-08-25 PROCEDURE — 12001 RPR S/N/AX/GEN/TRNK 2.5CM/<: CPT

## 2019-08-25 PROCEDURE — 90471 IMMUNIZATION ADMIN: CPT | Performed by: EMERGENCY MEDICINE

## 2019-08-25 PROCEDURE — 25000003 PHARM REV CODE 250: Performed by: EMERGENCY MEDICINE

## 2019-08-25 PROCEDURE — 63600175 PHARM REV CODE 636 W HCPCS: Performed by: EMERGENCY MEDICINE

## 2019-08-25 RX ORDER — LIDOCAINE HYDROCHLORIDE AND EPINEPHRINE 20; 10 MG/ML; UG/ML
10 INJECTION, SOLUTION INFILTRATION; PERINEURAL ONCE
Status: DISCONTINUED | OUTPATIENT
Start: 2019-08-25 | End: 2019-08-25 | Stop reason: HOSPADM

## 2019-08-25 RX ADMIN — CLOSTRIDIUM TETANI TOXOID ANTIGEN (FORMALDEHYDE INACTIVATED), CORYNEBACTERIUM DIPHTHERIAE TOXOID ANTIGEN (FORMALDEHYDE INACTIVATED), BORDETELLA PERTUSSIS TOXOID ANTIGEN (GLUTARALDEHYDE INACTIVATED), BORDETELLA PERTUSSIS FILAMENTOUS HEMAGGLUTININ ANTIGEN (FORMALDEHYDE INACTIVATED), BORDETELLA PERTUSSIS PERTACTIN ANTIGEN, AND BORDETELLA PERTUSSIS FIMBRIAE 2/3 ANTIGEN 0.5 ML: 5; 2; 2.5; 5; 3; 5 INJECTION, SUSPENSION INTRAMUSCULAR at 01:08

## 2019-08-25 NOTE — DISCHARGE INSTRUCTIONS
You were seen in the emergency department for a laceration to your finger.  We washed the wound and were able to close it using glue.  Do not get it wet for 24 hr.  After this, it is okay to get wet lightly, but do not submerge it or place in a bath.  Please follow up with a provider in 1 week for suture removal and wound check as needed.  Please return for any new or worsening redness, pain, swelling, purulent discharge, fevers, chills, numbness, weakness or other concerns.

## 2019-08-25 NOTE — ED TRIAGE NOTES
Pt presents to ER via personal transportation from home with c/o laceration to the distal aspect of right 4th digit. Pt reports she accidentl;y cut it with a naeem around 2200 last night. Pt takes blood thinner. Bleeding is controlled at this time. Unsure of last tetanus

## 2019-08-25 NOTE — ED PROVIDER NOTES
Encounter Date: 8/24/2019    SCRIBE #1 NOTE: IDavid, am scribing for, and in the presence of, Dwight Eric MD. Other sections scribed: HPI, ROS, PE.       History     Chief Complaint   Patient presents with    Laceration     pt here for lac to right 4th digit. cut on razor. on blood thinners; will not stop bleeding. unknown last tetanus. minimal bleeding noted in triage.      This is a 84 y.o. female who presents to the ED complaining of a laceration to right 4th digit. cut on a safety razor and bled continuously. Denies any other worsening or alleviating factors. This pt takes blood thinners. Last tetanus is unknown. This pt is allergic to ASA, Colace, Sulfa, Iodine, and penicillin. The pt does not smoke. Denies any drug or alcohol use.     The history is provided by the patient and medical records.     Review of patient's allergies indicates:   Allergen Reactions    Aspirin Swelling     Swelling and rash    Colace [docusate sodium] Rash     itching    Sulfa (sulfonamide antibiotics) Swelling     Swelling and rash    Iodine and iodide containing products Rash    Penicillins Rash     Past Medical History:   Diagnosis Date    Anticoagulant long-term use     Cataract     Coronary artery disease     Gout attack     Hypercholesteremia     Hypertension     Renal disorder     Vaginal delivery     x4     Past Surgical History:   Procedure Laterality Date    APPENDECTOMY      CARDIAC DEFIBRILLATOR PLACEMENT      2015    CATARACT EXTRACTION W/  INTRAOCULAR LENS IMPLANT Left 02/07/2019    Dr. Velazco    CATARACT EXTRACTION W/  INTRAOCULAR LENS IMPLANT Right 02/21/2019    Dr. Velazco    CHOLECYSTECTOMY      COLONOSCOPY W/ POLYPECTOMY  10 or 11/ 2014    per Dr. Myrick    defribillator Left 8/2008    EYE SURGERY      HYSTERECTOMY      INSERTION, IOL PROSTHESIS Right 2/21/2019    Per[facility] at [facility] OR    INSERTION, IOL PROSTHESIS Left 2/7/2019    Performed by  [facility]    INSERTION-ICD-BIVENTRICULAR N/A 12/11/2014    Performed by Tal Batres [facility] at [facility] CATH LAB    JOINT REPLACEMENT Bilateral 2005 & 2006    2 Knee Replacements=Lt. 1= 2005. Rt. 1= 2006    OOPHORECTOMY      PHACOEMULSIFICATION, CATARACT Right 2/21/2019    Performed by [facility] at [facility]    PHACOEMULSIFICATION, CATARACT Left 2/7/2019    Performed by [facility] at [facility]    TOTAL KNEE ARTHROPLASTY Bilateral Lt.= 2005; Rt.=2006    Bilateral Knee scope     Family History   Problem Relation Age of Onset    Heart attack Mother 88    Hypertension Mother     Hyperlipidemia Mother     Diabetes Other     Hypertension Other     Amblyopia Son     Blindness Neg Hx     Cataracts Neg Hx     Glaucoma Neg Hx     Macular degeneration Neg Hx     Retinal detachment Neg Hx     Strabismus Neg Hx      Social History     Tobacco Use    Smoking status: Never Smoker    Smokeless tobacco: Never Used   Substance Use Topics    Alcohol use: Yes     Comment: rarely    Drug use: No     Review of Systems   Constitutional: Negative for chills and fever.   HENT: Negative for congestion.    Eyes: Negative for pain.   Respiratory: Negative for cough and shortness of breath.    Cardiovascular: Negative for chest pain.   Gastrointestinal: Negative for abdominal pain, nausea and vomiting.   Endocrine: Negative for cold intolerance and heat intolerance.   Genitourinary: Negative for dysuria.   Musculoskeletal: Negative for back pain.   Skin: Positive for wound. Negative for rash.   Neurological: Negative for dizziness, syncope and light-headedness.   Psychiatric/Behavioral: Negative for confusion.   All other systems reviewed and are negative.      Physical Exam     Initial Vitals [08/24/19 2334]   BP Pulse Resp Temp SpO2   (!) 144/69 76 18 98.3 °F (36.8 °C) 97 %      MAP       --         Physical Exam    Nursing note and vitals reviewed.  Constitutional: She  appears well-developed and well-nourished. She is not diaphoretic. No distress.   HENT:   Mouth/Throat: Oropharynx is clear and moist.   Eyes: Pupils are equal, round, and reactive to light.   Neck: Neck supple.   Cardiovascular: Normal rate and regular rhythm.   Pulmonary/Chest: Breath sounds normal.   Abdominal: Soft. There is no tenderness.   Musculoskeletal: She exhibits no edema.   Neurological: She is alert and oriented to person, place, and time.   Skin: Skin is warm and dry. Laceration (to right 4th digit) noted.   Superficial 2 cm laceration to ring finger   Psychiatric: She has a normal mood and affect.         ED Course   Lac Repair  Date/Time: 8/25/2019 9:28 PM  Performed by: Dwight Eric MD  Authorized by: Dwight Eric MD   Consent Done: Yes  Consent: Verbal consent obtained. Written consent not obtained.  Risks and benefits: risks, benefits and alternatives were discussed  Consent given by: patient  Patient understanding: patient states understanding of the procedure being performed  Patient consent: the patient's understanding of the procedure matches consent given  Procedure consent: procedure consent matches procedure scheduled  Relevant documents: relevant documents present and verified  Test results: test results available and properly labeled  Site marked: the operative site was marked  Imaging studies: imaging studies available  Patient identity confirmed: name and verbally with patient  Body area: upper extremity  Location details: right ring finger  Foreign bodies: no foreign bodies  Tendon involvement: none  Nerve involvement: none  Vascular damage: no  Irrigation solution: saline  Amount of cleaning: standard  Debridement: none  Degree of undermining: none  Skin closure: glue  Approximation: close  Approximation difficulty: simple  Dressing: adhesive bandage        Labs Reviewed - No data to display       Imaging Results    None          Medical Decision Making:   History:    Old Medical Records: I decided to obtain old medical records.  Initial Assessment:   84-year-old female presenting with laceration to finger.  Bleeding controlled at this time.  The wound was explored in a clean and bloodless field to the base without evidence of vascular injury, neurologic injury, foreign body, or contamination prior to being closed.  Wound is not deep, relatively superficial.  Would benefit from skin glue for comfort and more rapid healing.  Wound irrigated and then closed with skin glue.  Safe for discharge home.  Tetanus shot updated.  No evidence of injury to deeper structures common neurovascularly intact.            Scribe Attestation:   Scribe #1: I performed the above scribed service and the documentation accurately describes the services I performed. I attest to the accuracy of the note.               Clinical Impression:       ICD-10-CM ICD-9-CM   1. Finger laceration, initial encounter S61.219A 883.0         Disposition:   Disposition: Discharged  Condition: Stable    I, Dwight Eric, personally performed the services described in this documentation. All medical record entries made by the scribe were at my direction and in my presence.  I have reviewed the chart and agree that the record reflects my personal performance and is accurate and complete                       Dwight Eric MD  08/25/19 [id number]

## 2019-09-11 ENCOUNTER — HOSPITAL ENCOUNTER (EMERGENCY)
Facility: HOSPITAL | Age: 84
Discharge: HOME | End: 2019-09-11
Attending: EMERGENCY MEDICINE
Payer: MEDICARE

## 2019-09-11 VITALS
WEIGHT: 178 LBS | TEMPERATURE: 99 F | DIASTOLIC BLOOD PRESSURE: 69 MMHG | OXYGEN SATURATION: 98 % | HEART RATE: 71 BPM | BODY MASS INDEX: 31.54 KG/M2 | RESPIRATION RATE: 16 BRPM | HEIGHT: 63 IN | SYSTOLIC BLOOD PRESSURE: 141 MMHG

## 2019-09-11 DIAGNOSIS — N39.0 ACUTE URINARY TRACT INFECTION: Primary | ICD-10-CM

## 2019-09-11 LAB
BILIRUBIN, POC UA: NEGATIVE
BLOOD, POC UA: ABNORMAL
CLARITY, POC UA: ABNORMAL
COLOR, POC UA: YELLOW
GLUCOSE, POC UA: NEGATIVE
KETONES, POC UA: NEGATIVE
LEUKOCYTE EST, POC UA: ABNORMAL
NITRITE, POC UA: NEGATIVE
PH UR STRIP: 5.5 [PH]
PROTEIN, POC UA: ABNORMAL
SPECIFIC GRAVITY, POC UA: 1.02
UROBILINOGEN, POC UA: 0.2 E.U./DL

## 2019-09-11 PROCEDURE — 87086 URINE CULTURE/COLONY COUNT: CPT

## 2019-09-11 PROCEDURE — 81003 URINALYSIS AUTO W/O SCOPE: CPT | Mod: ER

## 2019-09-11 PROCEDURE — 99284 EMERGENCY DEPT VISIT MOD MDM: CPT | Mod: ER

## 2019-09-11 PROCEDURE — 25000003 PHARM REV CODE 250: Mod: ER | Performed by: EMERGENCY MEDICINE

## 2019-09-11 PROCEDURE — 87077 CULTURE AEROBIC IDENTIFY: CPT

## 2019-09-11 PROCEDURE — 87186 SC STD MICRODIL/AGAR DIL: CPT

## 2019-09-11 PROCEDURE — 87088 URINE BACTERIA CULTURE: CPT

## 2019-09-11 RX ORDER — CIPROFLOXACIN 250 MG/1
250 TABLET, FILM COATED ORAL 2 TIMES DAILY
Qty: 6 TABLET | Refills: 0 | Status: SHIPPED | OUTPATIENT
Start: 2019-09-11 | End: 2019-09-14

## 2019-09-11 RX ORDER — PHENAZOPYRIDINE HYDROCHLORIDE 100 MG/1
100 TABLET, FILM COATED ORAL
Status: COMPLETED | OUTPATIENT
Start: 2019-09-11 | End: 2019-09-11

## 2019-09-11 RX ORDER — CIPROFLOXACIN 500 MG/1
500 TABLET ORAL
Status: COMPLETED | OUTPATIENT
Start: 2019-09-11 | End: 2019-09-11

## 2019-09-11 RX ORDER — PHENAZOPYRIDINE HYDROCHLORIDE 100 MG/1
100 TABLET, FILM COATED ORAL 3 TIMES DAILY PRN
Qty: 6 TABLET | Refills: 0 | Status: SHIPPED | OUTPATIENT
Start: 2019-09-11 | End: 2019-09-13

## 2019-09-11 RX ORDER — ACETAMINOPHEN 500 MG
500 TABLET ORAL EVERY 6 HOURS PRN
Qty: 30 TABLET | Refills: 0 | Status: ON HOLD | OUTPATIENT
Start: 2019-09-11 | End: 2022-12-03 | Stop reason: HOSPADM

## 2019-09-11 RX ADMIN — PHENAZOPYRIDINE HYDROCHLORIDE 100 MG: 100 TABLET, FILM COATED ORAL at 10:09

## 2019-09-11 RX ADMIN — CIPROFLOXACIN HYDROCHLORIDE 500 MG: 500 TABLET, FILM COATED ORAL at 10:09

## 2019-09-11 NOTE — ED NOTES
Pt presented to er with c/o urinary frequency on Monday and dysuria today. No acute distress noted. Will continue to monitor.

## 2019-09-11 NOTE — ED PROVIDER NOTES
Encounter Date: 9/11/2019    SCRIBE #1 NOTE: I, Philip Woodson, am scribing for, and in the presence of,  Dr. Irene. I have scribed the following portions of the note - Other sections scribed: HPI, ROS, PE.       History     Chief Complaint   Patient presents with    Urinary Tract Infection     c/o urinary frequency and burning on urination     Palmira Malave is a 84 y.o. female with CAD, HTN, hypercholesteremia, and renal disorder who presents to the ED complaining of urinary frequency and burning sensation when urinating onset yesterday. Pt is allergic to aspirin, colace, sulfa, iodine, and penicillin with rash and itching as a reaction. Pt's brother is in the hospital because he had a heart attack. Denies DM. Denies fever, chill, nausea, vomiting, abdominal pain, CP, SOB, or headache.    The history is provided by the patient. No  was used.     Review of patient's allergies indicates:   Allergen Reactions    Aspirin Swelling     Swelling and rash    Colace [docusate sodium] Rash     itching    Sulfa (sulfonamide antibiotics) Swelling     Swelling and rash    Iodine and iodide containing products Rash    Penicillins Rash     Past Medical History:   Diagnosis Date    Anticoagulant long-term use     Cataract     Coronary artery disease     Gout attack     Hypercholesteremia     Hypertension     Renal disorder     Vaginal delivery     x4     Past Surgical History:   Procedure Laterality Date    APPENDECTOMY      CARDIAC DEFIBRILLATOR PLACEMENT      2015    CATARACT EXTRACTION W/  INTRAOCULAR LENS IMPLANT Left 02/07/2019    Dr. Velazco    CATARACT EXTRACTION W/  INTRAOCULAR LENS IMPLANT Right 02/21/2019    Dr. Velazco    CHOLECYSTECTOMY      COLONOSCOPY W/ POLYPECTOMY  10 or 11/ 2014    per Dr. Myrick    defribillator Left 8/2008    EYE SURGERY      HYSTERECTOMY      INSERTION, IOL PROSTHESIS Right 2/21/2019    [facility] OR     INSERTION, IOL PROSTHESIS Left 2/7/2019    [facility] at [facility] OR    INSERTION-ICD-BIVENTRICULAR N/A 12/11/2014    Performed by Tal Batres MD at [facility] CATH LAB    JOINT REPLACEMENT Bilateral 2005 & 2006    2 Knee Replacements=Lt. 1= 2005. Rt. 1= 2006    OOPHORECTOMY      PHACOEMULSIFICATION, CATARACT Right 2/21/2019    Performed by Dem[facility]rius Velazco [facility] at [facility] OR    PHACOEMULSIFICATION, CATARACT Left 2/7/2019    [facility] at [facility] OR    TOTAL KNEE ARTHROPLASTY Bilateral Lt.= 2005; Rt.=2006    Bilateral Knee scope     Family History   Problem Relation Age of Onset    Heart attack Mother 88    Hypertension Mother     Hyperlipidemia Mother     Diabetes Other     Hypertension Other     Amblyopia Son     Blindness Neg Hx     Cataracts Neg Hx     Glaucoma Neg Hx     Macular degeneration Neg Hx     Retinal detachment Neg Hx     Strabismus Neg Hx      Social History     Tobacco Use    Smoking status: Never Smoker    Smokeless tobacco: Never Used   Substance Use Topics    Alcohol use: Yes     Comment: rarely    Drug use: No     Review of Systems   Constitutional: Negative for chills and fever.   HENT: Negative for rhinorrhea and sore throat.    Eyes: Negative for redness.   Respiratory: Negative for shortness of breath.    Cardiovascular: Negative for chest pain and leg swelling.   Gastrointestinal: Negative for abdominal pain, diarrhea, nausea and vomiting.   Genitourinary: Positive for dysuria and frequency.   Musculoskeletal: Negative for back pain.   Skin: Negative for rash.   Neurological: Negative for syncope and headaches.   All other systems reviewed and are negative.      Physical Exam     Initial Vitals [09/11/19 0853]   BP Pulse Resp Temp SpO2   (!) 147/67 69 18 99.5 °F (37.5 °C) 99 %      MAP       --         Patient gave consent to have physical exam performed.    Physical Exam    Nursing note and vitals  reviewed.  Constitutional: She appears well-developed and well-nourished. No distress.   HENT:   Head: Normocephalic and atraumatic.   Right Ear: External ear normal.   Left Ear: External ear normal.   Nose: Nose normal.   Mouth/Throat: Oropharynx is clear and moist.   Eyes: Conjunctivae and EOM are normal. Pupils are equal, round, and reactive to light.   Neck: Normal range of motion and phonation normal. Neck supple.   Cardiovascular: Normal rate, regular rhythm, normal heart sounds and intact distal pulses. Exam reveals no gallop and no friction rub.    No murmur heard.  Pulmonary/Chest: Effort normal and breath sounds normal. No stridor. No respiratory distress. She has no wheezes. She has no rhonchi. She has no rales. She exhibits no tenderness.   Abdominal: Soft. Bowel sounds are normal. She exhibits no distension. There is no tenderness. There is no rigidity, no rebound and no guarding.   Musculoskeletal: Normal range of motion. She exhibits no edema or tenderness.   Neurological: She is alert and oriented to person, place, and time. She has normal strength. No cranial nerve deficit or sensory deficit. GCS score is 15. GCS eye subscore is 4. GCS verbal subscore is 5. GCS motor subscore is 6.   Skin: Skin is warm and dry. Capillary refill takes less than 2 seconds. No rash noted.   Psychiatric: She has a normal mood and affect. Her behavior is normal.         ED Course   Procedures  Labs Reviewed   POCT URINALYSIS W/O SCOPE - Abnormal; Notable for the following components:       Result Value    Glucose, UA Negative (*)     Bilirubin, UA Negative (*)     Ketones, UA Negative (*)     Blood, UA 2+ (*)     Protein, UA 2+ (*)     Nitrite, UA Negative (*)     Leukocytes, UA 2+ (*)     All other components within normal limits   CULTURE, URINE   POCT URINALYSIS W/O SCOPE               Medical Decision Making:   History:   Old Medical Records: I decided to obtain old medical records.  Clinical Tests:   Lab Tests:  Ordered and Reviewed  ED Management:  Will order urine culture and UA.    Will treat with ciprofloxacin HCl tablet 500 mg and phenazopyridine tablet 100 mg.  Chief complaint: frequency and dysuria.  Differential diagnosis:  Urinary tract infection, dysuria, and hematuria  Treatment in the ED, physical exam, Cipro and Pyridium.  Patient reports feeling better after treatment in the ER.    Discussed treatment, prescriptions, lab results.  Fill and take prescriptions as directed.  Return to the ED if symptoms worsen or do not resolve.   Answered questions and discussed discharge plan.    Patient feels better and is ready for discharge.  Follow up with PCP/specialist in 1 day.            Scribe Attestation:   Scribe #1: I performed the above scribed service and the documentation accurately describes the services I performed. I attest to the accuracy of the note.     I, Dr. An Irene, personally performed the services described in this documentation. This document was produced by a scribe under my direction and in my presence. All medical record entries made by the scribe were at my direction and in my presence.  I have reviewed the chart and agree that the record reflects my personal performance and is accurate and complete. An Irene DO.     09/13/2019 3:53 PM             Clinical Impression:     1. Acute urinary tract infection                                   An Irene,   09/13/19 1554

## 2019-09-13 LAB — BACTERIA UR CULT: ABNORMAL

## 2019-09-17 NOTE — TELEPHONE ENCOUNTER
Patient is coming in today at 1:00 at [facility]     Partially impaired: cannot see medication labels or newsprint, but can see obstacles in path, and the surrounding layout; can count fingers at arm's length/wears glasses

## 2019-09-18 ENCOUNTER — OFFICE VISIT (OUTPATIENT)
Dept: CARDIOLOGY | Facility: CLINIC | Age: 84
End: 2019-09-18
Payer: MEDICARE

## 2019-09-18 VITALS
OXYGEN SATURATION: 96 % | HEIGHT: 63 IN | WEIGHT: 174.19 LBS | SYSTOLIC BLOOD PRESSURE: 116 MMHG | DIASTOLIC BLOOD PRESSURE: 56 MMHG | HEART RATE: 62 BPM | BODY MASS INDEX: 30.86 KG/M2

## 2019-09-18 DIAGNOSIS — I50.23 ACUTE ON CHRONIC SYSTOLIC CONGESTIVE HEART FAILURE: ICD-10-CM

## 2019-09-18 DIAGNOSIS — E78.2 MIXED HYPERLIPIDEMIA: Primary | Chronic | ICD-10-CM

## 2019-09-18 DIAGNOSIS — I25.10 CORONARY ARTERY DISEASE INVOLVING NATIVE CORONARY ARTERY OF NATIVE HEART WITHOUT ANGINA PECTORIS: ICD-10-CM

## 2019-09-18 DIAGNOSIS — Z95.810 ICD (IMPLANTABLE CARDIOVERTER-DEFIBRILLATOR) IN PLACE: ICD-10-CM

## 2019-09-18 DIAGNOSIS — I10 ESSENTIAL HYPERTENSION: ICD-10-CM

## 2019-09-18 PROCEDURE — 93000 EKG 12-LEAD: ICD-10-PCS | Mod: S$GLB,,, | Performed by: INTERNAL MEDICINE

## 2019-09-18 PROCEDURE — 1101F PR PT FALLS ASSESS DOC 0-1 FALLS W/OUT INJ PAST YR: ICD-10-PCS | Mod: CPTII,S$GLB,, | Performed by: INTERNAL MEDICINE

## 2019-09-18 PROCEDURE — 3074F SYST BP LT 130 MM HG: CPT | Mod: CPTII,S$GLB,, | Performed by: INTERNAL MEDICINE

## 2019-09-18 PROCEDURE — 93000 ELECTROCARDIOGRAM COMPLETE: CPT | Mod: S$GLB,,, | Performed by: INTERNAL MEDICINE

## 2019-09-18 PROCEDURE — 99999 PR PBB SHADOW E&M-EST. PATIENT-LVL III: CPT | Mod: PBBFAC,,, | Performed by: INTERNAL MEDICINE

## 2019-09-18 PROCEDURE — 1101F PT FALLS ASSESS-DOCD LE1/YR: CPT | Mod: CPTII,S$GLB,, | Performed by: INTERNAL MEDICINE

## 2019-09-18 PROCEDURE — 99999 PR PBB SHADOW E&M-EST. PATIENT-LVL III: ICD-10-PCS | Mod: PBBFAC,,, | Performed by: INTERNAL MEDICINE

## 2019-09-18 PROCEDURE — 99214 PR OFFICE/OUTPT VISIT, EST, LEVL IV, 30-39 MIN: ICD-10-PCS | Mod: S$GLB,,, | Performed by: INTERNAL MEDICINE

## 2019-09-18 PROCEDURE — 3074F PR MOST RECENT SYSTOLIC BLOOD PRESSURE < 130 MM HG: ICD-10-PCS | Mod: CPTII,S$GLB,, | Performed by: INTERNAL MEDICINE

## 2019-09-18 PROCEDURE — 3078F DIAST BP <80 MM HG: CPT | Mod: CPTII,S$GLB,, | Performed by: INTERNAL MEDICINE

## 2019-09-18 PROCEDURE — 3078F PR MOST RECENT DIASTOLIC BLOOD PRESSURE < 80 MM HG: ICD-10-PCS | Mod: CPTII,S$GLB,, | Performed by: INTERNAL MEDICINE

## 2019-09-18 PROCEDURE — 99214 OFFICE O/P EST MOD 30 MIN: CPT | Mod: S$GLB,,, | Performed by: INTERNAL MEDICINE

## 2019-09-18 NOTE — PROGRESS NOTES
Subjective:    Patient ID:  Palmira Malave is a 84 y.o. female who presents for follow-up of [facility]      HPI     NICM, Medtronic BiV AICD, HTN, HLD      Previously followed by Dr Batres  Previous history:  Here for follow-up of systolic heart failure and biventricular ICD interrogation.  She denies any worsening cardiopulmonary complaints since we last saw her.  She's had no issues with her device.  She is expressed no worsening cardiopulmonary complaints.  She denies any chest pain, shortness breath or palpitations.  She's not expressing PND, orthopnea or lower edema.  She denies any dizziness, presyncope or syncope.  Otherwise she's better usual state of health.  He says she has a daughter the moved into scan early onset dementia she's having to take care of.  She tries to stay active by going to interactions with a female group.  She is seen by [facility] and does get injections.  He wants clearance for rehabilitation.     Today:  Here for follow-up of systolic heart failure and previous biventricular ICD placement.  She had her device interrogated which shows no significant issues.  She had recent admission at the beginning of the month for mild volume overload.  She was diuresed overnight and discharged home the next day.  Her EF is stable.  She denies any PND, orthopnea or lower Veronica.  She has not experiencing dizziness, presyncope or syncope.  She on questioning says she is compliant with medicines and diet.         catheter 2011 reported nonobstructive CAD     ECHO:  10-18  · Left ventricle ejection fraction is moderately decreased at 35%  · The left ventricle cavity is moderately dilated.  · Grade I (mild) left ventricular diastolic dysfunction consistent with impaired relaxation.  · Left atrium is moderately dilated.  · Right atrium is mildly dilated.  · RV systolic function is normal.  · Trace regurgitation is present in aortic valve.  · Tricuspid valve shows trace  regurgitation.  · Pulmonic valve shows trace regurgitation.  · Mitral valve shows mild-to-moderate regurgitation.     Carotid ultrasound:  7-18  CONCLUSIONS   There is 40 - 49% right Internal Carotid stenosis.  There is 20 - 39% left Internal Carotid stenosis.    Went to the ER 9/11/19  Palmira Malave is a 84 y.o. female with CAD, HTN, hypercholesteremia, and renal disorder who presents to the ED complaining of urinary frequency and burning sensation when urinating onset yesterday. Pt is allergic to aspirin, colace, sulfa, iodine, and penicillin with rash and itching as a reaction. Pt's brother is in the hospital because he had a heart attack. Denies DM. Denies fever, chill, nausea, vomiting, abdominal pain, CP, SOB, or headache.    Denies recent CP or SOB  EKG A-sensed V-paced    Review of Systems   Constitution: Negative for decreased appetite.   HENT: Negative for ear discharge.    Eyes: Negative for blurred vision.   Respiratory: Negative for hemoptysis.    Endocrine: Negative for polyphagia.   Hematologic/Lymphatic: Negative for adenopathy.   Skin: Negative for color change.   Musculoskeletal: Negative for joint swelling.   Genitourinary: Negative for bladder incontinence.   Neurological: Negative for brief paralysis.   Psychiatric/Behavioral: Negative for hallucinations.   Allergic/Immunologic: Negative for hives.        Objective:    Physical Exam   Constitutional: She is oriented to person, place, and time. She appears well-developed and well-nourished.   HENT:   Head: Normocephalic and atraumatic.   Eyes: Pupils are equal, round, and reactive to light. Conjunctivae and EOM are normal.   Neck: Normal range of motion. Neck supple. No thyromegaly present.   Cardiovascular: Normal rate and regular rhythm.   No murmur heard.  Pulmonary/Chest: Effort normal and breath sounds normal. No respiratory distress.   Abdominal: Soft. Bowel sounds are normal.   Musculoskeletal: She exhibits no edema.   Neurological:  She is alert and oriented to person, place, and time.   Skin: Skin is warm and dry.   Psychiatric: She has a normal mood and affect. Her behavior is normal.         Assessment:       1. Mixed hyperlipidemia    2. Essential hypertension    3. Coronary artery disease involving native coronary artery of native heart without angina pectoris    4. ICD (implantable cardioverter-defibrillator) in place    5. Acute on chronic systolic congestive heart failure         Plan:       Cardiac stable  OV 3 months with echo and Medtronic AICD check

## 2019-09-26 ENCOUNTER — OFFICE VISIT (OUTPATIENT)
Dept: OPHTHALMOLOGY | Facility: CLINIC | Age: 84
End: 2019-09-26
Payer: MEDICARE

## 2019-09-26 DIAGNOSIS — Z96.1 PSEUDOPHAKIA: ICD-10-CM

## 2019-09-26 DIAGNOSIS — H52.7 REFRACTIVE ERROR: ICD-10-CM

## 2019-09-26 DIAGNOSIS — I10 ESSENTIAL HYPERTENSION: Primary | ICD-10-CM

## 2019-09-26 PROCEDURE — 92014 COMPRE OPH EXAM EST PT 1/>: CPT | Mod: S$GLB,,, | Performed by: OPHTHALMOLOGY

## 2019-09-26 PROCEDURE — 92014 PR EYE EXAM, EST PATIENT,COMPREHESV: ICD-10-PCS | Mod: S$GLB,,, | Performed by: OPHTHALMOLOGY

## 2019-09-26 PROCEDURE — 99999 PR PBB SHADOW E&M-EST. PATIENT-LVL II: CPT | Mod: PBBFAC,,, | Performed by: OPHTHALMOLOGY

## 2019-09-26 PROCEDURE — 99999 PR PBB SHADOW E&M-EST. PATIENT-LVL II: ICD-10-PCS | Mod: PBBFAC,,, | Performed by: OPHTHALMOLOGY

## 2019-09-26 NOTE — PROGRESS NOTES
Subjective:       Patient ID: Palmira Malave is a 84 y.o. female.    Chief Complaint: After Cataract (Pt is here for 2 months F/U S/P CE OU )    HPI     After Cataract      Additional comments: Pt is here for 2 months F/U S/P CE OU               Comments     84 y.o. Female is here for Pt is here for 2 months F/U S/P CE OU. Denies   eye pain and f/f. No discomfort or blurred vision. No problems with glare.       Eye Meds: No gtt           Last edited by GEOVANNI Buenrostro on 9/26/2019  8:05 AM. (History)             Assessment:       1. Essential hypertension    2. Refractive error    3. Pseudophakia        Plan:       HTN-No retinopathy OU.  RE-Doing well with readers.        Control HTN.  RTC 1 yr.

## 2019-09-30 ENCOUNTER — TELEPHONE (OUTPATIENT)
Dept: CARDIOLOGY | Facility: CLINIC | Age: 84
End: 2019-09-30

## 2019-12-07 ENCOUNTER — HOSPITAL ENCOUNTER (OUTPATIENT)
Facility: HOSPITAL | Age: 84
Discharge: HOME | End: 2019-12-08
Attending: EMERGENCY MEDICINE | Admitting: INTERNAL MEDICINE
Payer: MEDICARE

## 2019-12-07 DIAGNOSIS — I50.9 CHF (CONGESTIVE HEART FAILURE): ICD-10-CM

## 2019-12-07 DIAGNOSIS — I50.43 ACUTE ON CHRONIC COMBINED SYSTOLIC AND DIASTOLIC HEART FAILURE: Primary | ICD-10-CM

## 2019-12-07 DIAGNOSIS — R06.02 SHORTNESS OF BREATH: ICD-10-CM

## 2019-12-07 DIAGNOSIS — E78.2 MIXED HYPERLIPIDEMIA: Chronic | ICD-10-CM

## 2019-12-07 DIAGNOSIS — I10 HYPERTENSION: ICD-10-CM

## 2019-12-07 DIAGNOSIS — R06.02 SOB (SHORTNESS OF BREATH): ICD-10-CM

## 2019-12-07 DIAGNOSIS — R07.9 CHEST PAIN: ICD-10-CM

## 2019-12-07 DIAGNOSIS — R79.89 ELEVATED TROPONIN: ICD-10-CM

## 2019-12-07 DIAGNOSIS — I50.9 CONGESTIVE HEART FAILURE, UNSPECIFIED HF CHRONICITY, UNSPECIFIED HEART FAILURE TYPE: ICD-10-CM

## 2019-12-07 DIAGNOSIS — I10 ESSENTIAL HYPERTENSION: Chronic | ICD-10-CM

## 2019-12-07 LAB
ALBUMIN SERPL BCP-MCNC: 3.9 G/DL (ref 3.5–5.2)
ALP SERPL-CCNC: 96 U/L (ref 55–135)
ALT SERPL W/O P-5'-P-CCNC: 16 U/L (ref 10–44)
ANION GAP SERPL CALC-SCNC: 10 MMOL/L (ref 8–16)
AST SERPL-CCNC: 23 U/L (ref 10–40)
BASOPHILS # BLD AUTO: 0.04 K/UL (ref 0–0.2)
BASOPHILS NFR BLD: 0.8 % (ref 0–1.9)
BILIRUB SERPL-MCNC: 0.5 MG/DL (ref 0.1–1)
BNP SERPL-MCNC: 1355 PG/ML (ref 0–99)
BUN SERPL-MCNC: 36 MG/DL (ref 8–23)
CALCIUM SERPL-MCNC: 9.8 MG/DL (ref 8.7–10.5)
CHLORIDE SERPL-SCNC: 104 MMOL/L (ref 95–110)
CO2 SERPL-SCNC: 28 MMOL/L (ref 23–29)
CREAT SERPL-MCNC: 2 MG/DL (ref 0.5–1.4)
DIFFERENTIAL METHOD: ABNORMAL
EOSINOPHIL # BLD AUTO: 0.2 K/UL (ref 0–0.5)
EOSINOPHIL NFR BLD: 3.6 % (ref 0–8)
ERYTHROCYTE [DISTWIDTH] IN BLOOD BY AUTOMATED COUNT: 14.6 % (ref 11.5–14.5)
EST. GFR  (AFRICAN AMERICAN): 26 ML/MIN/1.73 M^2
EST. GFR  (NON AFRICAN AMERICAN): 22 ML/MIN/1.73 M^2
GLUCOSE SERPL-MCNC: 104 MG/DL (ref 70–110)
HCT VFR BLD AUTO: 34.5 % (ref 37–48.5)
HGB BLD-MCNC: 10.7 G/DL (ref 12–16)
IMM GRANULOCYTES # BLD AUTO: 0.01 K/UL (ref 0–0.04)
IMM GRANULOCYTES NFR BLD AUTO: 0.2 % (ref 0–0.5)
INR PPP: 1 (ref 0.8–1.2)
LYMPHOCYTES # BLD AUTO: 1.1 K/UL (ref 1–4.8)
LYMPHOCYTES NFR BLD: 20.1 % (ref 18–48)
MCH RBC QN AUTO: 30.4 PG (ref 27–31)
MCHC RBC AUTO-ENTMCNC: 31 G/DL (ref 32–36)
MCV RBC AUTO: 98 FL (ref 82–98)
MONOCYTES # BLD AUTO: 0.7 K/UL (ref 0.3–1)
MONOCYTES NFR BLD: 13.8 % (ref 4–15)
NEUTROPHILS # BLD AUTO: 3.2 K/UL (ref 1.8–7.7)
NEUTROPHILS NFR BLD: 61.5 % (ref 38–73)
NRBC BLD-RTO: 0 /100 WBC
PLATELET # BLD AUTO: 110 K/UL (ref 150–350)
PMV BLD AUTO: 10.6 FL (ref 9.2–12.9)
POTASSIUM SERPL-SCNC: 3.8 MMOL/L (ref 3.5–5.1)
PROT SERPL-MCNC: 7.1 G/DL (ref 6–8.4)
PROTHROMBIN TIME: 10.9 SEC (ref 9–12.5)
RBC # BLD AUTO: 3.52 M/UL (ref 4–5.4)
SODIUM SERPL-SCNC: 142 MMOL/L (ref 136–145)
TROPONIN I SERPL DL<=0.01 NG/ML-MCNC: 0.09 NG/ML (ref 0–0.03)
WBC # BLD AUTO: 5.23 K/UL (ref 3.9–12.7)

## 2019-12-07 PROCEDURE — 84484 ASSAY OF TROPONIN QUANT: CPT

## 2019-12-07 PROCEDURE — 85610 PROTHROMBIN TIME: CPT

## 2019-12-07 PROCEDURE — 63600175 PHARM REV CODE 636 W HCPCS: Performed by: EMERGENCY MEDICINE

## 2019-12-07 PROCEDURE — 85025 COMPLETE CBC W/AUTO DIFF WBC: CPT

## 2019-12-07 PROCEDURE — 93005 ELECTROCARDIOGRAM TRACING: CPT

## 2019-12-07 PROCEDURE — 93010 ELECTROCARDIOGRAM REPORT: CPT | Mod: ,,, | Performed by: INTERNAL MEDICINE

## 2019-12-07 PROCEDURE — 96374 THER/PROPH/DIAG INJ IV PUSH: CPT

## 2019-12-07 PROCEDURE — 99285 EMERGENCY DEPT VISIT HI MDM: CPT | Mod: 25

## 2019-12-07 PROCEDURE — 83880 ASSAY OF NATRIURETIC PEPTIDE: CPT

## 2019-12-07 PROCEDURE — 93010 EKG 12-LEAD: ICD-10-PCS | Mod: ,,, | Performed by: INTERNAL MEDICINE

## 2019-12-07 PROCEDURE — 80053 COMPREHEN METABOLIC PANEL: CPT

## 2019-12-07 RX ORDER — FUROSEMIDE 10 MG/ML
40 INJECTION INTRAMUSCULAR; INTRAVENOUS
Status: COMPLETED | OUTPATIENT
Start: 2019-12-07 | End: 2019-12-07

## 2019-12-07 RX ADMIN — FUROSEMIDE 40 MG: 10 INJECTION, SOLUTION INTRAMUSCULAR; INTRAVENOUS at 11:12

## 2019-12-08 VITALS
HEART RATE: 60 BPM | BODY MASS INDEX: 29.92 KG/M2 | HEIGHT: 63 IN | DIASTOLIC BLOOD PRESSURE: 68 MMHG | SYSTOLIC BLOOD PRESSURE: 128 MMHG | TEMPERATURE: 98 F | WEIGHT: 168.88 LBS | RESPIRATION RATE: 18 BRPM | OXYGEN SATURATION: 96 %

## 2019-12-08 PROBLEM — K21.9 GERD (GASTROESOPHAGEAL REFLUX DISEASE): Chronic | Status: ACTIVE | Noted: 2019-12-08

## 2019-12-08 PROBLEM — I50.9 CONGESTIVE HEART FAILURE: Status: RESOLVED | Noted: 2019-12-08 | Resolved: 2019-12-08

## 2019-12-08 PROBLEM — N17.9 ACUTE RENAL FAILURE SUPERIMPOSED ON STAGE 3 CHRONIC KIDNEY DISEASE: Status: ACTIVE | Noted: 2019-12-08

## 2019-12-08 PROBLEM — N18.30 ACUTE RENAL FAILURE SUPERIMPOSED ON STAGE 3 CHRONIC KIDNEY DISEASE: Status: ACTIVE | Noted: 2019-12-08

## 2019-12-08 PROBLEM — I50.42 CHRONIC COMBINED SYSTOLIC AND DIASTOLIC HEART FAILURE: Chronic | Status: ACTIVE | Noted: 2019-12-08

## 2019-12-08 PROBLEM — I50.43 ACUTE ON CHRONIC COMBINED SYSTOLIC AND DIASTOLIC HEART FAILURE: Status: ACTIVE | Noted: 2019-12-08

## 2019-12-08 PROBLEM — I50.42 CHRONIC COMBINED SYSTOLIC AND DIASTOLIC HEART FAILURE: Status: ACTIVE | Noted: 2019-12-08

## 2019-12-08 PROBLEM — Z95.810 ICD (IMPLANTABLE CARDIOVERTER-DEFIBRILLATOR), BIVENTRICULAR, IN SITU: Status: ACTIVE | Noted: 2019-12-08

## 2019-12-08 PROBLEM — K21.9 GERD (GASTROESOPHAGEAL REFLUX DISEASE): Status: ACTIVE | Noted: 2019-12-08

## 2019-12-08 PROBLEM — D63.8 ANEMIA OF CHRONIC DISEASE: Chronic | Status: ACTIVE | Noted: 2019-12-08

## 2019-12-08 PROBLEM — D63.8 ANEMIA OF CHRONIC DISEASE: Status: ACTIVE | Noted: 2019-12-08

## 2019-12-08 PROBLEM — Z95.810 ICD (IMPLANTABLE CARDIOVERTER-DEFIBRILLATOR), BIVENTRICULAR, IN SITU: Chronic | Status: ACTIVE | Noted: 2019-12-08

## 2019-12-08 PROBLEM — I50.9 CONGESTIVE HEART FAILURE: Status: ACTIVE | Noted: 2019-12-08

## 2019-12-08 LAB
ALBUMIN SERPL BCP-MCNC: 3.8 G/DL (ref 3.5–5.2)
ALP SERPL-CCNC: 86 U/L (ref 55–135)
ALT SERPL W/O P-5'-P-CCNC: 18 U/L (ref 10–44)
ANION GAP SERPL CALC-SCNC: 13 MMOL/L (ref 8–16)
AORTIC ROOT ANNULUS: 2.97 CM
AORTIC VALVE CUSP SEPERATION: 1.98 CM
ASCENDING AORTA: 2.88 CM
AST SERPL-CCNC: 26 U/L (ref 10–40)
AV INDEX (PROSTH): 0.75
AV MEAN GRADIENT: 8 MMHG
AV PEAK GRADIENT: 17 MMHG
AV VALVE AREA: 2.35 CM2
AV VELOCITY RATIO: 0.73
BASOPHILS # BLD AUTO: 0.04 K/UL (ref 0–0.2)
BASOPHILS NFR BLD: 0.7 % (ref 0–1.9)
BILIRUB SERPL-MCNC: 0.4 MG/DL (ref 0.1–1)
BILIRUB UR QL STRIP: NEGATIVE
BSA FOR ECHO PROCEDURE: 1.89 M2
BUN SERPL-MCNC: 35 MG/DL (ref 8–23)
CALCIUM SERPL-MCNC: 9.9 MG/DL (ref 8.7–10.5)
CHLORIDE SERPL-SCNC: 103 MMOL/L (ref 95–110)
CLARITY UR: CLEAR
CO2 SERPL-SCNC: 27 MMOL/L (ref 23–29)
COLOR UR: COLORLESS
CREAT SERPL-MCNC: 1.7 MG/DL (ref 0.5–1.4)
CREAT UR-MCNC: 9.3 MG/DL (ref 15–325)
CV ECHO LV RWT: 0.36 CM
DIFFERENTIAL METHOD: ABNORMAL
DOP CALC AO PEAK VEL: 2.07 M/S
DOP CALC AO VTI: 39.07 CM
DOP CALC LVOT AREA: 3.1 CM2
DOP CALC LVOT DIAMETER: 2 CM
DOP CALC LVOT PEAK VEL: 1.52 M/S
DOP CALC LVOT STROKE VOLUME: 91.97 CM3
DOP CALCLVOT PEAK VEL VTI: 29.29 CM
E WAVE DECELERATION TIME: 156.98 MSEC
E/A RATIO: 0.96
ECHO LV POSTERIOR WALL: 1.26 CM (ref 0.6–1.1)
EOSINOPHIL # BLD AUTO: 0.2 K/UL (ref 0–0.5)
EOSINOPHIL NFR BLD: 2.9 % (ref 0–8)
ERYTHROCYTE [DISTWIDTH] IN BLOOD BY AUTOMATED COUNT: 14.5 % (ref 11.5–14.5)
EST. GFR  (AFRICAN AMERICAN): 31 ML/MIN/1.73 M^2
EST. GFR  (NON AFRICAN AMERICAN): 27 ML/MIN/1.73 M^2
FRACTIONAL SHORTENING: 14 % (ref 28–44)
GLUCOSE SERPL-MCNC: 99 MG/DL (ref 70–110)
GLUCOSE UR QL STRIP: NEGATIVE
HCT VFR BLD AUTO: 34.7 % (ref 37–48.5)
HGB BLD-MCNC: 10.9 G/DL (ref 12–16)
HGB UR QL STRIP: NEGATIVE
IMM GRANULOCYTES # BLD AUTO: 0.02 K/UL (ref 0–0.04)
IMM GRANULOCYTES NFR BLD AUTO: 0.3 % (ref 0–0.5)
INTERVENTRICULAR SEPTUM: 1.27 CM (ref 0.6–1.1)
IVRT: 0.08 MSEC
KETONES UR QL STRIP: NEGATIVE
LA MAJOR: 6.7 CM
LA MINOR: 6.93 CM
LA WIDTH: 3.38 CM
LEFT ATRIUM SIZE: 5.56 CM
LEFT ATRIUM VOLUME INDEX: 60.5 ML/M2
LEFT ATRIUM VOLUME: 108.83 CM3
LEFT INTERNAL DIMENSION IN SYSTOLE: 5.92 CM (ref 2.1–4)
LEFT VENTRICLE DIASTOLIC VOLUME INDEX: 138.19 ML/M2
LEFT VENTRICLE DIASTOLIC VOLUME: 248.7 ML
LEFT VENTRICLE MASS INDEX: 237 G/M2
LEFT VENTRICLE SYSTOLIC VOLUME INDEX: 97 ML/M2
LEFT VENTRICLE SYSTOLIC VOLUME: 174.49 ML
LEFT VENTRICULAR INTERNAL DIMENSION IN DIASTOLE: 6.92 CM (ref 3.5–6)
LEFT VENTRICULAR MASS: 427.03 G
LEUKOCYTE ESTERASE UR QL STRIP: NEGATIVE
LYMPHOCYTES # BLD AUTO: 1.2 K/UL (ref 1–4.8)
LYMPHOCYTES NFR BLD: 20.8 % (ref 18–48)
MAGNESIUM SERPL-MCNC: 2.1 MG/DL (ref 1.6–2.6)
MCH RBC QN AUTO: 30.4 PG (ref 27–31)
MCHC RBC AUTO-ENTMCNC: 31.4 G/DL (ref 32–36)
MCV RBC AUTO: 97 FL (ref 82–98)
MONOCYTES # BLD AUTO: 0.8 K/UL (ref 0.3–1)
MONOCYTES NFR BLD: 13.2 % (ref 4–15)
MV PEAK A VEL: 1.54 M/S
MV PEAK E VEL: 1.48 M/S
NEUTROPHILS # BLD AUTO: 3.7 K/UL (ref 1.8–7.7)
NEUTROPHILS NFR BLD: 62.1 % (ref 38–73)
NITRITE UR QL STRIP: NEGATIVE
NRBC BLD-RTO: 0 /100 WBC
PH UR STRIP: 7 [PH] (ref 5–8)
PHOSPHATE SERPL-MCNC: 3.7 MG/DL (ref 2.7–4.5)
PISA TR MAX VEL: 2.91 M/S
PLATELET # BLD AUTO: 113 K/UL (ref 150–350)
PMV BLD AUTO: 10.7 FL (ref 9.2–12.9)
POTASSIUM SERPL-SCNC: 3.5 MMOL/L (ref 3.5–5.1)
PROT SERPL-MCNC: 7.1 G/DL (ref 6–8.4)
PROT UR QL STRIP: NEGATIVE
PULM VEIN S/D RATIO: 1.44
PV PEAK D VEL: 0.34 M/S
PV PEAK S VEL: 0.49 M/S
PV PEAK VELOCITY: 1.13 CM/S
RA MAJOR: 3.65 CM
RA PRESSURE: 3 MMHG
RA WIDTH: 2.7 CM
RBC # BLD AUTO: 3.59 M/UL (ref 4–5.4)
RIGHT VENTRICULAR END-DIASTOLIC DIMENSION: 3.8 CM
RV TISSUE DOPPLER FREE WALL SYSTOLIC VELOCITY 1 (APICAL 4 CHAMBER VIEW): 9.85 CM/S
SINUS: 2.65 CM
SODIUM SERPL-SCNC: 143 MMOL/L (ref 136–145)
SODIUM UR-SCNC: 110 MMOL/L (ref 20–250)
SP GR UR STRIP: 1 (ref 1–1.03)
STJ: 1.96 CM
TR MAX PG: 34 MMHG
TRICUSPID ANNULAR PLANE SYSTOLIC EXCURSION: 2.56 CM
TROPONIN I SERPL DL<=0.01 NG/ML-MCNC: 0.09 NG/ML (ref 0–0.03)
TROPONIN I SERPL DL<=0.01 NG/ML-MCNC: 0.1 NG/ML (ref 0–0.03)
TROPONIN I SERPL DL<=0.01 NG/ML-MCNC: 0.1 NG/ML (ref 0–0.03)
TV REST PULMONARY ARTERY PRESSURE: 37 MMHG
URN SPEC COLLECT METH UR: ABNORMAL
UROBILINOGEN UR STRIP-ACNC: NEGATIVE EU/DL
WBC # BLD AUTO: 5.92 K/UL (ref 3.9–12.7)

## 2019-12-08 PROCEDURE — 83735 ASSAY OF MAGNESIUM: CPT

## 2019-12-08 PROCEDURE — 96372 THER/PROPH/DIAG INJ SC/IM: CPT

## 2019-12-08 PROCEDURE — 25000003 PHARM REV CODE 250: Performed by: INTERNAL MEDICINE

## 2019-12-08 PROCEDURE — 84484 ASSAY OF TROPONIN QUANT: CPT | Mod: 91

## 2019-12-08 PROCEDURE — 85025 COMPLETE CBC W/AUTO DIFF WBC: CPT

## 2019-12-08 PROCEDURE — 81003 URINALYSIS AUTO W/O SCOPE: CPT

## 2019-12-08 PROCEDURE — 96376 TX/PRO/DX INJ SAME DRUG ADON: CPT | Mod: 59

## 2019-12-08 PROCEDURE — 82570 ASSAY OF URINE CREATININE: CPT

## 2019-12-08 PROCEDURE — 84100 ASSAY OF PHOSPHORUS: CPT

## 2019-12-08 PROCEDURE — 99220 PR INITIAL OBSERVATION CARE,LEVL III: CPT | Mod: 25,,, | Performed by: INTERNAL MEDICINE

## 2019-12-08 PROCEDURE — 84300 ASSAY OF URINE SODIUM: CPT

## 2019-12-08 PROCEDURE — 63600175 PHARM REV CODE 636 W HCPCS: Performed by: EMERGENCY MEDICINE

## 2019-12-08 PROCEDURE — 99220 PR INITIAL OBSERVATION CARE,LEVL III: ICD-10-PCS | Mod: 25,,, | Performed by: INTERNAL MEDICINE

## 2019-12-08 PROCEDURE — 80053 COMPREHEN METABOLIC PANEL: CPT

## 2019-12-08 PROCEDURE — G0378 HOSPITAL OBSERVATION PER HR: HCPCS

## 2019-12-08 PROCEDURE — 36415 COLL VENOUS BLD VENIPUNCTURE: CPT

## 2019-12-08 PROCEDURE — 63600175 PHARM REV CODE 636 W HCPCS: Performed by: INTERNAL MEDICINE

## 2019-12-08 RX ORDER — SODIUM CHLORIDE 0.9 % (FLUSH) 0.9 %
10 SYRINGE (ML) INJECTION
Status: DISCONTINUED | OUTPATIENT
Start: 2019-12-08 | End: 2019-12-08

## 2019-12-08 RX ORDER — ACETAMINOPHEN 500 MG
500 TABLET ORAL EVERY 6 HOURS PRN
Status: DISCONTINUED | OUTPATIENT
Start: 2019-12-08 | End: 2019-12-08 | Stop reason: HOSPADM

## 2019-12-08 RX ORDER — CARVEDILOL 6.25 MG/1
12.5 TABLET ORAL 2 TIMES DAILY
Status: DISCONTINUED | OUTPATIENT
Start: 2019-12-08 | End: 2019-12-08

## 2019-12-08 RX ORDER — TALC
9 POWDER (GRAM) TOPICAL NIGHTLY PRN
Status: DISCONTINUED | OUTPATIENT
Start: 2019-12-08 | End: 2019-12-08 | Stop reason: HOSPADM

## 2019-12-08 RX ORDER — FUROSEMIDE 10 MG/ML
40 INJECTION INTRAMUSCULAR; INTRAVENOUS 2 TIMES DAILY
Status: DISCONTINUED | OUTPATIENT
Start: 2019-12-08 | End: 2019-12-08 | Stop reason: HOSPADM

## 2019-12-08 RX ORDER — LOVASTATIN 20 MG/1
40 TABLET ORAL NIGHTLY
Status: DISCONTINUED | OUTPATIENT
Start: 2019-12-08 | End: 2019-12-08 | Stop reason: HOSPADM

## 2019-12-08 RX ORDER — FAMOTIDINE 20 MG/1
20 TABLET, FILM COATED ORAL DAILY
Status: DISCONTINUED | OUTPATIENT
Start: 2019-12-08 | End: 2019-12-08 | Stop reason: HOSPADM

## 2019-12-08 RX ORDER — CLOPIDOGREL BISULFATE 75 MG/1
75 TABLET ORAL DAILY
Status: DISCONTINUED | OUTPATIENT
Start: 2019-12-08 | End: 2019-12-08 | Stop reason: HOSPADM

## 2019-12-08 RX ORDER — CLONIDINE HYDROCHLORIDE 0.1 MG/1
0.1 TABLET ORAL 3 TIMES DAILY PRN
Status: DISCONTINUED | OUTPATIENT
Start: 2019-12-08 | End: 2019-12-08 | Stop reason: HOSPADM

## 2019-12-08 RX ORDER — ALLOPURINOL 100 MG/1
100 TABLET ORAL NIGHTLY
Status: DISCONTINUED | OUTPATIENT
Start: 2019-12-08 | End: 2019-12-08 | Stop reason: HOSPADM

## 2019-12-08 RX ORDER — TERAZOSIN 2 MG/1
2 CAPSULE ORAL NIGHTLY
Status: DISCONTINUED | OUTPATIENT
Start: 2019-12-08 | End: 2019-12-08 | Stop reason: HOSPADM

## 2019-12-08 RX ORDER — ENOXAPARIN SODIUM 100 MG/ML
1 INJECTION SUBCUTANEOUS
Status: DISCONTINUED | OUTPATIENT
Start: 2019-12-08 | End: 2019-12-08 | Stop reason: HOSPADM

## 2019-12-08 RX ORDER — ONDANSETRON 2 MG/ML
8 INJECTION INTRAMUSCULAR; INTRAVENOUS EVERY 8 HOURS PRN
Status: DISCONTINUED | OUTPATIENT
Start: 2019-12-08 | End: 2019-12-08 | Stop reason: HOSPADM

## 2019-12-08 RX ADMIN — FUROSEMIDE 40 MG: 10 INJECTION, SOLUTION INTRAVENOUS at 08:12

## 2019-12-08 RX ADMIN — FAMOTIDINE 20 MG: 20 TABLET ORAL at 08:12

## 2019-12-08 RX ADMIN — ENOXAPARIN SODIUM 80 MG: 100 INJECTION SUBCUTANEOUS at 03:12

## 2019-12-08 RX ADMIN — CLOPIDOGREL BISULFATE 75 MG: 75 TABLET ORAL at 08:12

## 2019-12-08 NOTE — ED PROVIDER NOTES
"Encounter Date: 12/7/2019    SCRIBE #1 NOTE: I, Josefina Nelson, am scribing for, and in the presence of,  Gabbie Schreiber MD. I have scribed the following portions of the note - Other sections scribed: HPI, ROS, PE.       History     Chief Complaint   Patient presents with    Shortness of Breath     Pt c/o SOB x 1 week.  Reports hx of heart problems.       CC:     HPI:  This is a 84 y.o. female with a PMHx of coronary artery disease, hypertension, hypercholesteremia, anticoagulant long-term use, renal disorder, chf (Ef 35% 10/2018) s/p AICD/pacemaker placement who presents to the Emergency Department with a cc of shortness of breath beginning one week ago. The patient states that her symptoms worsened yesterday while she was walking to receive her mail. She states that prior to last week she could walk to her mail box without feeling short of breath. The patient also mentions that she feels short winded when she walk to her bathroom at night. She states " I have to lay on the side of my bed to catch my breath until I fall asleep". The patient reports sleeping on two small pillows at night. Denies leg swelling, cough, fever, nausea, vomiting, or diarrhea. She states that her current symptoms feels similar to the symptoms she had when she had excessive fluid build up. The patients medication list include lasix and she report being compliant with her medication, however patient is unsure of her medications and reports taking "100 of the fluid pill,twice per day." Her last documented echogram (EF 35%) was October of 2018 by Dr. Richmond. Allergic to aspirin, colace, sulfa, iodine, and penicillin. She denies smoking cigarettes, illicit drug, or alcohol use.     The history is provided by the patient.     Review of patient's allergies indicates:   Allergen Reactions    Aspirin Swelling     Swelling and rash    Colace [docusate sodium] Rash     itching    Sulfa (sulfonamide antibiotics) Swelling     Swelling and rash    " Iodine and iodide containing products Rash    Penicillins Rash     Past Medical History:   Diagnosis Date    Anticoagulant long-term use     Cataract     CHF (congestive heart failure)     Coronary artery disease     Gout attack     Hypercholesteremia     Hypertension     Renal disorder     Vaginal delivery     x4     Past Surgical History:   Procedure Laterality Date    APPENDECTOMY      CARDIAC DEFIBRILLATOR PLACEMENT      2015    CATARACT EXTRACTION W/  INTRAOCULAR LENS IMPLANT Left 02/07/2019    Dr. Velazco    CATARACT EXTRACTION W/  INTRAOCULAR LENS IMPLANT Right 02/21/2019    Dr. Velazco    CHOLECYSTECTOMY      COLONOSCOPY W/ POLYPECTOMY  10 or 11/ 2014    per Dr. Myrick    defribillator Left 8/2008    EYE SURGERY      HYSTERECTOMY      INTRAOCULAR PROSTHESES INSERTION Left 2/7/2019    Procedure: INSERTION, IOL PROSTHESIS;  Surgeon: Demetrius Velazco MD;  Location: [facility] OR;  Service: Ophthalmology;  Laterality: Left;  RN Phone Pre OP 1-30-19.  Arrival 05:30 AM    INTRAOCULAR PROSTHESES INSERTION Right 2/21/2019    Procedure: INSERTION, IOL PROSTHESIS;  Surgeon: Demetrius Velazco MD;  Location: [facility] OR;  Service: Ophthalmology;  Laterality: Right;    JOINT REPLACEMENT Bilateral 2005 & 2006    2 Knee Replacements=Lt. 1= 2005. Rt. 1= 2006    OOPHORECTOMY      PHACOEMULSIFICATION OF CATARACT Left 2/7/2019    Procedure: PHACOEMULSIFICATION, CATARACT;  Surgeon: Demetrius Velazco MD;  Location: [facility] OR;  Service: Ophthalmology;  Laterality: Left;    PHACOEMULSIFICATION OF CATARACT Right 2/21/2019    Procedure: PHACOEMULSIFICATION, CATARACT;  Surgeon: Demetrius Velazco MD;  Location: [facility] OR;  Service: Ophthalmology;  Laterality: Right;  RN Phone Pre op 2-15-19.  Arrival 05:30 AM    TOTAL KNEE ARTHROPLASTY Bilateral Lt.= 2005; Rt.=2006    Bilateral Knee scope     Family History   Problem Relation Age of Onset    Heart attack Mother 88    Hypertension Mother      "Hyperlipidemia Mother     Diabetes Other     Hypertension Other     Amblyopia Son     Blindness Neg Hx     Cataracts Neg Hx     Glaucoma Neg Hx     Macular degeneration Neg Hx     Retinal detachment Neg Hx     Strabismus Neg Hx      Social History     Tobacco Use    Smoking status: Never Smoker    Smokeless tobacco: Never Used   Substance Use Topics    Alcohol use: Yes     Comment: rarely    Drug use: No     Review of Systems   Constitutional: Negative for chills, diaphoresis and fever.   HENT: Negative for sore throat.    Eyes: Negative for photophobia and visual disturbance.   Respiratory: Positive for shortness of breath ("Short winded"). Negative for cough.    Cardiovascular: Negative for chest pain and leg swelling.   Gastrointestinal: Negative for abdominal pain, blood in stool, constipation, diarrhea, nausea and vomiting.   Genitourinary: Negative for dysuria, flank pain, frequency, hematuria and urgency.   Musculoskeletal: Negative for back pain, neck pain and neck stiffness.   Skin: Negative for rash and wound.   Neurological: Negative for weakness, light-headedness, numbness and headaches.   Hematological: Does not bruise/bleed easily.   Psychiatric/Behavioral: Negative for confusion and suicidal ideas.   All other systems reviewed and are negative.      Physical Exam     Initial Vitals [12/07/19 2134]   BP Pulse Resp Temp SpO2   (!) 162/77 74 20 98.4 °F (36.9 °C) 97 %      MAP       --         Physical Exam    Nursing note and vitals reviewed.  Constitutional: She appears well-developed and well-nourished. She is not diaphoretic. No distress.   Patient in NAD, resting comfortably in bed, speaking in full sentences   HENT:   Head: Normocephalic and atraumatic.   Right Ear: External ear normal.   Left Ear: External ear normal.   Nose: Nose normal.   Mouth/Throat: Oropharynx is clear and moist. No oropharyngeal exudate.   Eyes: Conjunctivae and EOM are normal. Pupils are equal, round, and " reactive to light. Right eye exhibits no discharge. Left eye exhibits no discharge. No scleral icterus.   Neck: Normal range of motion. Neck supple. JVD (mild) present.   Cardiovascular: Normal rate, regular rhythm, normal heart sounds and intact distal pulses. Exam reveals no gallop and no friction rub.    No murmur heard.  Peripheral perfusion appropriate   Pulmonary/Chest: No stridor. No respiratory distress. She has no wheezes. She has no rhonchi. She has rales (mild) in the right lower field and the left lower field. She exhibits no tenderness.   Abdominal: Soft. Bowel sounds are normal. She exhibits no distension. There is no tenderness. There is no rebound and no guarding.   Musculoskeletal: Normal range of motion. She exhibits no edema or tenderness.   Gait normal  Little to no lower extremity edema   Lymphadenopathy:     She has no cervical adenopathy.   Neurological: She is alert and oriented to person, place, and time. She has normal strength. No cranial nerve deficit.   Skin: Skin is warm and dry. Capillary refill takes less than 2 seconds. No rash noted. No pallor.   Psychiatric: She has a normal mood and affect. Thought content normal.         ED Course   Procedures  Labs Reviewed   CBC W/ AUTO DIFFERENTIAL - Abnormal; Notable for the following components:       Result Value    RBC 3.52 (*)     Hemoglobin 10.7 (*)     Hematocrit 34.5 (*)     Mean Corpuscular Hemoglobin Conc 31.0 (*)     RDW 14.6 (*)     Platelets 110 (*)     All other components within normal limits   COMPREHENSIVE METABOLIC PANEL - Abnormal; Notable for the following components:    BUN, Bld 36 (*)     Creatinine 2.0 (*)     eGFR if  26 (*)     eGFR if non  22 (*)     All other components within normal limits   TROPONIN I - Abnormal; Notable for the following components:    Troponin I 0.094 (*)     All other components within normal limits   B-TYPE NATRIURETIC PEPTIDE - Abnormal; Notable for the  following components:    BNP 1,355 (*)     All other components within normal limits   PROTIME-INR   URINALYSIS, REFLEX TO URINE CULTURE     EKG Readings: (Independently Interpreted)   Atrial sensed, ventricular paced rhythm at 74. No concerning concordant ST elevation or depression.       Imaging Results          X-Ray Chest PA And Lateral (Final result)  Result time 12/07/19 22:52:48    Final result by Po Brennan MD (12/07/19 22:52:48)                 Impression:      Cardiomegaly with possible mild perihilar edema.      Electronically signed by: Po Brennan MD  Date:    12/07/2019  Time:    22:52             Narrative:    EXAMINATION:  XR CHEST PA AND LATERAL    CLINICAL HISTORY:  Shortness of breath    TECHNIQUE:  PA and lateral views of the chest were performed.    COMPARISON:  02/25/2019.    FINDINGS:  Cardiac silhouette is enlarged but stable in size.  Left-sided pacer device is seen.  Lungs are symmetrically expanded.  Mild perihilar opacity is visualized.  No evidence of focal consolidative process, pneumothorax, or significant effusion.  No acute osseous abnormality identified.                            MDM  Pt presenting 2/2 rales in fluid overload and shortness of breath consistent with CHF exacerbation.  Patient is given IV Lasix. Mild rales, mild JVD, increasing EVANS. Consistent with her previous CHF exacerbation per patient. +salty diet during Thanksgiving (gumbo). Denies chest pain, denies SOB at rest. Reports adherence to medications.     Also considered but less likely:  Acs: ekg doesn't show stemi. Troponin ordered and elevated, patient denies CP,EKG paced with [place] negative. However patient allergic to aspirin, will given lovenox 1 mg/kg as we are not able to give her asa and trend troponins.    Pna: symptoms bilaterally and no fevers.   Bronchitis: considered but hpi most c/w CHF  COPD:  Considered but less likely  Pneumothorax: bilateral breath sounds    Labs significant for  elevated crea to 2.0. BP WNL. Lasix ordered. Will place patient in observation for further evaluation. Spoke with Dr. Vivas who agrees with plan. Will place echo and cardiology consult. Will continue patient home plavix.                     Scribe Attestation:   Scribe #1: I performed the above scribed service and the documentation accurately describes the services I performed. I attest to the accuracy of the note.                          Clinical Impression:       ICD-10-CM ICD-9-CM   1. Congestive heart failure, unspecified HF chronicity, unspecified heart failure type I50.9 428.0   2. Shortness of breath R06.02 786.05   3. SOB (shortness of breath) R06.02 786.05   4. Elevated troponin R79.89 790.6            Scribe attestation: I,Gabbie Schreiber, personally performed the services described in this documentation. All medical record entries made by the scribe were at my direction and in my presence. I have reviewed the chart and agree that the record reflects my personal performance and is accurate and complete                     Gabbie Schreiber MD  12/08/19 0259

## 2019-12-08 NOTE — ASSESSMENT & PLAN NOTE
Patient's baseline creatinine is around 1.3; today it is 2.0.  Her urinalysis is significant for a specific gravity of 1.005 otherwise benign.  Urine output has been fair.  Will defer IV fluid rehydration this time due to acute heart failure and obtain additional urine studies;monitor the urine output; recheck the renal function in the morning; and avoid nephrotoxins.

## 2019-12-08 NOTE — PLAN OF CARE
12/08/19 0824   Discharge Assessment   Assessment Type Discharge Planning Assessment   Confirmed/corrected address and phone number on facesheet? Yes   Assessment information obtained from? Patient   Communicated expected length of stay with patient/caregiver yes   Prior to hospitilization cognitive status: Alert/Oriented   Prior to hospitalization functional status: Independent;Assistive Equipment  (Uses cane; family transports)   Current cognitive status: Alert/Oriented   Current Functional Status: Independent;Assistive Equipment  (Cane; family transports)   Facility Arrived From: Home   Lives With child(karan), adult  (Usama)   Able to Return to Prior Arrangements yes   Is patient able to care for self after discharge? Yes   Who are your caregiver(s) and their phone number(s)? Usama-daughter: [phone]   Patient's perception of discharge disposition home or selfcare   Readmission Within the Last 30 Days no previous admission in last 30 days   Patient currently being followed by outpatient case management? No   Patient currently receives any other outside agency services? No   Equipment Currently Used at Home cane, straight   Do you have any problems affording any of your prescribed medications? No   Is the patient taking medications as prescribed? yes   Does the patient have transportation home? Yes   Transportation Anticipated family or friend will provide   Does the patient receive services at the [facility]? No   Discharge Plan A Home with family   Discharge Plan B Other  (TBD)   DME Needed Upon Discharge  other (see comments)  (TBD)   Patient/Family in Agreement with Plan yes   SW Role explained to patient; two patient identifiers recognized; SW contact information placed on Communication board. Discussed patient managing health care at home; determined who would be helping patient at home with recovery: Adult daughter-Usama lives with patient and helps at home    PCP: Qi Ramon MD Prefers  morning appointments    Extended Emergency Contact Information  Primary Emergency Contact: Keyla Morel   [facility]  Home Phone: [phone]  Mobile Phone: [phone]  Relation: Daughter  Secondary Emergency Contact: Usama Malave  Address: [address] [place] [address] [facility]  Home Phone: [phone]  Mobile Phone: [phone]  Relation: Daughter     [facility] [place] - [address]  [address]  [place] [address]  Phone: [phone] Fax: [phone]    Payor: [employer] MANAGED MEDICARE / Plan: [employer] [facility] HEALTH / Product Type: Medicare Advantage /

## 2019-12-08 NOTE — HOSPITAL COURSE
84-year-old female presented for shortness of breath found to be in acute on chronic heart failure received Lasix IV overnight and this morning in stable condition.  There is no lower extremity edema no JVD appreciated lungs are clear.  She tells me that she thinks she over did it for Thanksgiving and has been progressively more fatigued since that time but hesitated to tell her family due to other sick family members.  She is followed by Dr. Richmond.  Her last 2D echo repeated today 12/08/2019 shows LVEF = 25% with grade 2 diastolic dysfunction.  Cardiology was consulted based on patient's risk factors was seen by Dr. Hall did not have any further recommendations.  The patient is grossly stable and wishes to be discharged.  She was discharged to home in stable condition, her vitals are grossly stable, her oxygen sat are stable 96% room air.  She has no fever or white count, her renal functions improved from 2.0--> 1.7 overnight and is expected to continue to improve.  She did have a bump in troponin.  Patient has known history of nonischemic cardiomyopathy her troponins trended flat.  She was discharged home can follow with Dr. You in the clinic.

## 2019-12-08 NOTE — ASSESSMENT & PLAN NOTE
Patient does have a significant cardiomyopathy with recent LVEF of 35% via an echocardiogram one year ago.  Today her symptoms are consistent with acute congestive heart failure.  Personally reviewed the plain chest radiograph which was significant for mild pulmonary edema bilaterally. I also personally reviewed the EKG which was significant for a ventricularly paced rhythm. She has an elevated BNP of 1355 an a elevated troponin 0.094 which appears to be near baseline.  She has been started on intravenous diuresis with furosemide with careful monitoring of her urine output.  Will repeat her echocardiogram in the morning.    Of note, due to her mildly elevated troponin level she has been started on full-dose anticoagulation with enoxaparin.  She has not been started on aspirin due to a swelling allergy.

## 2019-12-08 NOTE — CONSULTS
[facility]  Cardiology  Consult Note    Patient Name: Palmira Malave  MRN: [id number]  Admission Date: 12/7/2019  Hospital Length of Stay: 0 days  Code Status: Full Code   Attending Provider: Lashawn Rubio MD   Consulting Provider: Judson Hall MD  Primary Care Physician: Qi Ramon MD  Principal Problem:Acute on chronic combined systolic and diastolic heart failure    Patient information was obtained from patient, past medical records and ER records.     Consults  Subjective:     Chief Complaint:  Shortness of breath     HPI:   Palmira Malave is an 85 yo female who presents with shortness of breath, progressive over the past few days. +EVANS.  She has a history of a NICM. Medronic BiV AICD. HTN. HLP. Last seen 9/18/19. Recent interrogation of device. [facility] 2011 - non obstructive CAD. Last seen 9/18/2019 by Dr. Richmond. States that she has not been monitoring her weight or salt.     ECHO 12/8/19:    · Eccentric left ventricular hypertrophy.  · Severe left ventricular enlargement.  · Severely decreased left ventricular systolic function. The estimated ejection fraction is 25%  · Grade II (moderate) left ventricular diastolic dysfunction consistent with pseudonormalization.  · Severe global hypokinetic wall motion.  · Normal right ventricular systolic function.  · Severe left atrial enlargement.  · Moderate mitral regurgitation.  · Mild pulmonary hypertension present.  · Mild tricuspid regurgitation.  · Normal central venous pressure (3 mm Hg).  · The estimated PA systolic pressure is 37 mm Hg    ECHO:  10-18  · Left ventricle ejection fraction is moderately decreased at 35%  · The left ventricle cavity is moderately dilated.  · Grade I (mild) left ventricular diastolic dysfunction consistent with impaired relaxation.  · Left atrium is moderately dilated.  · Right atrium is mildly dilated.  · RV systolic function is normal.  · Trace regurgitation is present in aortic valve.  · Tricuspid  valve shows trace regurgitation.  · Pulmonic valve shows trace regurgitation.  · Mitral valve shows mild-to-moderate regurgitation.     Carotid ultrasound:  7-18   There is 40 - 49% right Internal Carotid stenosis.  There is 20 - 39% left Internal Carotid stenosis.       Past Medical History:   Diagnosis Date    Anticoagulant long-term use     Cataract     CHF (congestive heart failure)     Coronary artery disease     Gout attack     Hypercholesteremia     Hypertension     Renal disorder     Vaginal delivery     x4       Past Surgical History:   Procedure Laterality Date    APPENDECTOMY      CARDIAC DEFIBRILLATOR PLACEMENT      2015    CATARACT EXTRACTION W/  INTRAOCULAR LENS IMPLANT Left 02/07/2019    Dr. Velazco    CATARACT EXTRACTION W/  INTRAOCULAR LENS IMPLANT Right 02/21/2019    Dr. Velazco    CHOLECYSTECTOMY      COLONOSCOPY W/ POLYPECTOMY  10 or 11/ 2014    per Dr. Elen ceja Left 8/2008    EYE SURGERY      HYSTERECTOMY      INTRAOCULAR PROSTHESES INSERTION Left 2/7/2019    Procedure: INSERTION, IOL PROSTHESIS;  Surgeon: Demetrius Velazco MD;  Location: [facility] OR;  Service: Ophthalmology;  Laterality: Left;  RN Phone Pre OP 1-30-19.  Arrival 05:30 AM    INTRAOCULAR PROSTHESES INSERTION Right 2/21/2019    Procedure: INSERTION, IOL PROSTHESIS;  Surgeon: Demetrius Velazco MD;  Location: [facility] OR;  Service: Ophthalmology;  Laterality: Right;    JOINT REPLACEMENT Bilateral 2005 & 2006    2 Knee Replacements=Lt. 1= 2005. Rt. 1= 2006    OOPHORECTOMY      PHACOEMULSIFICATION OF CATARACT Left 2/7/2019    Procedure: PHACOEMULSIFICATION, CATARACT;  Surgeon: Demetrius Velazco MD;  Location: [facility] OR;  Service: Ophthalmology;  Laterality: Left;    PHACOEMULSIFICATION OF CATARACT Right 2/21/2019    Procedure: PHACOEMULSIFICATION, CATARACT;  Surgeon: Demetrius Velazoc MD;  Location: [facility] OR;  Service: Ophthalmology;  Laterality: Right;  RN Phone Pre op 2-15-19.   Arrival 05:30 AM    TOTAL KNEE ARTHROPLASTY Bilateral Lt.= 2005; Rt.=2006    Bilateral Knee scope       Review of patient's allergies indicates:   Allergen Reactions    Aspirin Swelling     Swelling and rash    Colace [docusate sodium] Rash     itching    Sulfa (sulfonamide antibiotics) Swelling     Swelling and rash    Iodine and iodide containing products Rash    Penicillins Rash       Current Facility-Administered Medications on File Prior to Encounter   Medication    0.9%  NaCl infusion    phenylephrine HCL 2.5% ophthalmic solution 1 drop    proparacaine 0.5 % ophthalmic solution 1 drop    tropicamide 1% ophthalmic solution 1 drop     Current Outpatient Medications on File Prior to Encounter   Medication Sig    acetaminophen (TYLENOL) 500 MG tablet Take 1 tablet (500 mg total) by mouth every 6 (six) hours as needed for Pain.    allopurinol (ZYLOPRIM) 100 MG tablet Take 100 mg by mouth every evening.     calcitRIOL (ROCALTROL) 0.25 MCG Cap 0.25 mcg once daily.     carvedilol (COREG) 25 MG tablet Take 0.5 tablets (12.5 mg total) by mouth 2 (two) times daily.    clopidogrel (PLAVIX) 75 mg tablet Take 75 mg by mouth once daily. On hold since 11-28-14, for procedure.    cranberry 400 mg Cap Take 1 capsule by mouth 2 (two) times daily.    fish oil-omega-3 fatty acids 300-1,000 mg capsule Take 2 g by mouth once daily. 2 caps every AM & 1 cap every PM.    folic acid/multivit-min/lutein (CENTRUM SILVER ORAL) Take by mouth once daily.    furosemide (LASIX) 40 MG tablet Take 1 tablet (40 mg total) by mouth once daily. Take 40 mg twice a day on 10/3, 10/4 followed by 20 mg twice a day.    influenza (FLUZONE HIGH-DOSE 2018-19, PF,) 180 mcg/0.5 mL vaccine     lovastatin (MEVACOR) 40 MG tablet Take 40 mg by mouth nightly. Takes 2 caps with supper every evening.    mirabegron (MYRBETRIQ) 50 mg Tb24 Take 50 mg by mouth once daily.     pneumoc 13-jorge luis conj-dip cr,PF, (PREVNAR 13, PF,) 0.5 mL Syrg      pneumococcal 23-jorge luis ps vaccine (PNEUMOVAX 23) 25 mcg/0.5 mL     ranitidine (ZANTAC) 150 MG tablet 150 mg 2 (two) times daily.     terazosin (HYTRIN) 2 MG capsule Take by mouth every evening.      Family History     Problem Relation (Age of Onset)    Amblyopia Son    Diabetes Other    Heart attack Mother (88)    Hyperlipidemia Mother    Hypertension Mother, Other        Tobacco Use    Smoking status: Never Smoker    Smokeless tobacco: Never Used   Substance and Sexual Activity    Alcohol use: Yes     Comment: rarely    Drug use: No    Sexual activity: Not Currently     Partners: Male     Review of Systems   Cardiovascular: Positive for dyspnea on exertion. Negative for chest pain, irregular heartbeat, leg swelling, orthopnea and paroxysmal nocturnal dyspnea.   Respiratory: Positive for shortness of breath. Negative for wheezing.      Objective:     Vital Signs (Most Recent):  Temp: 98 °F (36.7 °C) (12/08/19 1115)  Pulse: 60 (12/08/19 1115)  Resp: 18 (12/08/19 1115)  BP: 128/68 (12/08/19 1115)  SpO2: 96 % (12/08/19 1115) Vital Signs (24h Range):  Temp:  [98 °F (36.7 °C)-98.4 °F (36.9 °C)] 98 °F (36.7 °C)  Pulse:  [60-74] 60  Resp:  [18-25] 18  SpO2:  [95 %-98 %] 96 %  BP: (128-162)/(63-77) 128/68     Weight: 76.6 kg (168 lb 14 oz)  Body mass index is 29.91 kg/m².    SpO2: 96 %  O2 Device (Oxygen Therapy): room air    No intake or output data in the 24 hours ending 12/08/19 1445    Lines/Drains/Airways     Peripheral Intravenous Line                 Peripheral IV - Single Lumen 12/07/19 2200 20 G Left Hand less than 1 day                Physical Exam   Constitutional: She is oriented to person, place, and time. No distress.   Neck: JVD present.   Cardiovascular: Intact distal pulses.   Murmur heard.  High-pitched blowing holosystolic murmur is present with a grade of 3/6 at the apex.  Pulmonary/Chest: She has decreased breath sounds in the right lower field and the left lower field.   Abdominal: Soft. Bowel  sounds are normal. There is no tenderness.   Musculoskeletal:        Right lower leg: She exhibits no edema.        Left lower leg: She exhibits no edema.   Neurological: She is alert and oriented to person, place, and time.   Skin: She is not diaphoretic.   Psychiatric: She has a normal mood and affect. Her speech is normal and behavior is normal.   Vitals reviewed.      Significant Labs:   CMP   Recent Labs   Lab 12/07/19 2202 12/08/19  0351    143   K 3.8 3.5    103   CO2 28 27    99   BUN 36* 35*   CREATININE 2.0* 1.7*   CALCIUM 9.8 9.9   PROT 7.1 7.1   ALBUMIN 3.9 3.8   BILITOT 0.5 0.4   ALKPHOS 96 86   AST 23 26   ALT 16 18   ANIONGAP 10 13   ESTGFRAFRICA 26* 31*   EGFRNONAA 22* 27*   , CBC   Recent Labs   Lab 12/07/19 2202 12/08/19  0351   WBC 5.23 5.92   HGB 10.7* 10.9*   HCT 34.5* 34.7*   * 113*   , Lipid Panel No results for input(s): CHOL, HDL, LDLCALC, TRIG, CHOLHDL in the last 48 hours. and Troponin   Recent Labs   Lab 12/08/19  0348 12/08/19  0739 12/08/19  1125   TROPONINI 0.091* 0.101* 0.101*       Significant Imaging: Echocardiogram:   Transthoracic echo (TTE) complete (Cupid Only):   Results for orders placed or performed during the hospital encounter of 12/07/19   Echo Color Flow Doppler? Yes   Result Value Ref Range    BSA 1.89 m2    LA WIDTH 3.38 cm    AORTIC VALVE CUSP SEPERATION 1.98 cm    PV PEAK VELOCITY 1.13 cm/s    LVIDD 6.92 (A) 3.5 - 6.0 cm    IVS 1.27 (A) 0.6 - 1.1 cm    PW 1.26 (A) 0.6 - 1.1 cm    Ao root annulus 2.97 cm    LVIDS 5.92 (A) 2.1 - 4.0 cm    FS 14 28 - 44 %    LA volume 108.83 cm3    Sinus 2.65 cm    STJ 1.96 cm    Ascending aorta 2.88 cm    LV mass 427.03 g    LA size 5.56 cm    RVDD 3.80 cm    TAPSE 2.56 cm    RV S' 9.85 cm/s    Left Ventricle Relative Wall Thickness 0.36 cm    AV mean gradient 8 mmHg    AV valve area 2.35 cm2    AV Velocity Ratio 0.73     AV index (prosthetic) 0.75     E/A ratio 0.96     E wave decelartion time 156.98 msec     IVRT 0.08 msec    Pulm vein S/D ratio 1.44     LVOT diameter 2.00 cm    LVOT area 3.1 cm2    LVOT peak rakesh 1.52 m/s    LVOT peak VTI 29.29 cm    Ao peak rakesh 2.07 m/s    Ao VTI 39.07 cm    LVOT stroke volume 91.97 cm3    AV peak gradient 17 mmHg    MV Peak E Rakesh 1.48 m/s    TR Max Rakesh 2.91 m/s    MV Peak A Rakesh 1.54 m/s    PV Peak S Rakesh 0.49 m/s    PV Peak D Rakesh 0.34 m/s    LV Systolic Volume 174.49 mL    LV Systolic Volume Index 97.0 mL/m2    LV Diastolic Volume 248.70 mL    LV Diastolic Volume Index 138.19 mL/m2    LA Volume Index 60.5 mL/m2    LV Mass Index 237 g/m2    RA Major Axis 3.65 cm    Left Atrium Minor Axis 6.93 cm    Left Atrium Major Axis 6.70 cm    Triscuspid Valve Regurgitation Peak Gradient 34 mmHg    RA Width 2.70 cm    Right Atrial Pressure (from IVC) 3 mmHg    TV rest pulmonary artery pressure 37 mmHg    Narrative    · Eccentric left ventricular hypertrophy.  · Severe left ventricular enlargement.  · Severely decreased left ventricular systolic function. The estimated   ejection fraction is 25%  · Grade II (moderate) left ventricular diastolic dysfunction consistent   with pseudonormalization.  · Severe global hypokinetic wall motion.  · Normal right ventricular systolic function.  · Severe left atrial enlargement.  · Moderate mitral regurgitation.  · Mild pulmonary hypertension present.  · Mild tricuspid regurgitation.  · Normal central venous pressure (3 mm Hg).  · The estimated PA systolic pressure is 37 mm Hg        Assessment and Plan:     * Acute on chronic combined systolic and diastolic heart failure  Diuresis. Strict I/Os. Daily weights.    Essential hypertension  Continue home regimen.    Elevated troponin  Hx of NICM. Troponins flat.         VTE Risk Mitigation (From admission, onward)         Ordered     enoxaparin injection 80 mg  Every 24 hours (non-standard times)      12/08/19 5121                Thank you for your consult. I will follow-up with patient. Please contact us if you  have any additional questions.    Judson Hall MD  Cardiology   [facility]

## 2019-12-08 NOTE — DISCHARGE SUMMARY
[facility] Hospital Medicine  Discharge Summary      Patient Name: Palmira Malave  MRN: [id number]  Admission Date: 12/7/2019  Hospital Length of Stay: 0 days  Discharge Date and Time:  12/08/2019 4:31 PM  Attending Physician: No att. providers found   Discharging Provider: DEBBY Estrada  Primary Care Provider: Qi Ramon MD      HPI:   Mrs. Palmira Malave is a 84 y.o. female with essential hypertension, hyperlipidemia (LDL 81.8 Nov 2014), chronic combined systolic and diastolic heart failure (LVEF 35% Oct 2018), CKD stage 3, AICD in place, anemia of chronic disease, and GERD who presents to [facility] ED with complaints of dyspnea for the past four days.  She reports that the dyspnea is mainly on exertion and has been worsening progressively since onset.  She denies any fevers, chills, nausea, vomiting, chest pain, palpitations, diaphoresis, hemoptysis, nor any lower extremity pain or swelling.  She also denies any PND nor orthopnea.  She has not had any recent sick contacts or recent travel.  She does report a history of congestive heart failure and reports that she is compliant with her medications and her diet.    * No surgery found *      Hospital Course:   84-year-old female presented for shortness of breath found to be in acute on chronic heart failure received Lasix IV overnight and this morning in stable condition.  There is no lower extremity edema no JVD appreciated lungs are clear.  She tells me that she thinks she over did it for Thanksgiving and has been progressively more fatigued since that time but hesitated to tell her family due to other sick family members.  She is followed by Dr. Richmond.  Her last 2D echo repeated today 12/08/2019 shows LVEF = 25% with grade 2 diastolic dysfunction.  Cardiology was consulted based on patient's risk factors was seen by Dr. Hall did not have any further recommendations.  The patient is grossly stable and wishes to be  discharged.  She was discharged to home in stable condition, her vitals are grossly stable, her oxygen sat are stable 96% room air.  She has no fever or white count, her renal functions improved from 2.0--> 1.7 overnight and is expected to continue to improve.  She did have a bump in troponin.  Patient has known history of nonischemic cardiomyopathy her troponins trended flat.  She was discharged home can follow with Dr. You in the clinic.     Consults:   Consults (From admission, onward)        Status Ordering Provider     Inpatient consult to Cardiology  Once     Provider:  (Not yet assigned)    Acknowledged PATO KHANNA     IP consult to case management  Once     Provider:  (Not yet assigned)    Acknowledged PATO KHANNA          No new Assessment & Plan notes have been filed under this hospital service since the last note was generated.  Service: Hospital Medicine    Final Active Diagnoses:    Diagnosis Date Noted POA    PRINCIPAL PROBLEM:  Acute on chronic combined systolic and diastolic heart failure [I50.43] 12/08/2019 Yes    Acute on CKD stage 3 [N17.9, N18.3] 12/08/2019 Yes    Chronic combined systolic and diastolic heart failure [I50.42] 12/08/2019 Yes     Chronic    Biventricular AICD in place [Z95.810] 12/08/2019 Yes     Chronic    Anemia of chronic disease [D63.8] 12/08/2019 Yes     Chronic    GERD (gastroesophageal reflux disease) [K21.9] 12/08/2019 Yes     Chronic    Essential hypertension [I10] 11/15/2014 Yes     Chronic    Hyperlipidemia [E78.5] 11/15/2014 Yes     Chronic    CKD stage 3 [N18.3] 11/15/2014 Yes     Chronic    Elevated troponin [R79.89] 11/15/2014 Yes      Problems Resolved During this Admission:    Diagnosis Date Noted Date Resolved POA    Congestive heart failure [I50.9] 12/08/2019 12/08/2019 Yes       Discharged Condition: stable    Disposition: Home or Self Care    Follow Up:  Follow-up Information     Qi Ramon MD.    Specialty:  Internal Medicine  Why:   Call the office to schedule appointment, For outpatient follow-up/post hospitalizaton  Contact information:  [address] [place], [place] [address]  [id number]  [place] [id number] [phone]                 Patient Instructions:      Diet Cardiac     Activity as tolerated       Significant Diagnostic Studies: Labs:   CMP   Recent Labs   Lab 12/07/19 2202 12/08/19  0351    143   K 3.8 3.5    103   CO2 28 27    99   BUN 36* 35*   CREATININE 2.0* 1.7*   CALCIUM 9.8 9.9   PROT 7.1 7.1   ALBUMIN 3.9 3.8   BILITOT 0.5 0.4   ALKPHOS 96 86   AST 23 26   ALT 16 18   ANIONGAP 10 13   ESTGFRAFRICA 26* 31*   EGFRNONAA 22* 27*   , CBC   Recent Labs   Lab 12/07/19 2202 12/08/19  0351   WBC 5.23 5.92   HGB 10.7* 10.9*   HCT 34.5* 34.7*   * 113*   , INR   Lab Results   Component Value Date    INR 1.0 12/07/2019    INR 1.1 10/02/2018    INR 1.1 08/12/2017   , Lipid Panel   Lab Results   Component Value Date    CHOL 148 11/15/2014    HDL 49 11/15/2014    LDLCALC 81.8 11/15/2014    TRIG 86 11/15/2014    CHOLHDL 33.1 11/15/2014   , Troponin   Recent Labs   Lab 12/08/19  1125   TROPONINI 0.101*    and A1C: No results for input(s): HGBA1C in the last 4320 hours.    Pending Diagnostic Studies:     None         Medications:  Reconciled Home Medications:      Medication List      CONTINUE taking these medications    acetaminophen 500 MG tablet  Commonly known as:  TYLENOL  Take 1 tablet (500 mg total) by mouth every 6 (six) hours as needed for Pain.     allopurinol 100 MG tablet  Commonly known as:  ZYLOPRIM  Take 100 mg by mouth every evening.     calcitRIOL 0.25 MCG Cap  Commonly known as:  ROCALTROL  0.25 mcg once daily.     carvedilol 25 MG tablet  Commonly known as:  COREG  Take 0.5 tablets (12.5 mg total) by mouth 2 (two) times daily.     CENTRUM SILVER ORAL  Take by mouth once daily.     clopidogrel 75 mg tablet  Commonly known as:  PLAVIX  Take 75 mg by mouth once daily. On hold since 11-28-14, for procedure.      cranberry 400 mg Cap  Take 1 capsule by mouth 2 (two) times daily.     fish oil-omega-3 fatty acids 300-1,000 mg capsule  Take 2 g by mouth once daily. 2 caps every AM & 1 cap every PM.     Fluzone High-Dose 2018-19 (PF) 180 mcg/0.5 mL vaccine  Generic drug:  influenza     furosemide 40 MG tablet  Commonly known as:  LASIX  Take 1 tablet (40 mg total) by mouth once daily. Take 40 mg twice a day on 10/3, 10/4 followed by 20 mg twice a day.     lovastatin 40 MG tablet  Commonly known as:  MEVACOR  Take 40 mg by mouth nightly. Takes 2 caps with supper every evening.     Myrbetriq 50 mg Tb24  Generic drug:  mirabegron  Take 50 mg by mouth once daily.     pneumoc 13-jorge luis conj-dip cr(PF) 0.5 mL Syrg  Commonly known as:  PREVNAR 13 (PF)     Pneumovax 23 25 mcg/0.5 mL  Generic drug:  pneumococcal 23-jorge luis ps     ranitidine 150 MG tablet  Commonly known as:  ZANTAC  150 mg 2 (two) times daily.     terazosin 2 MG capsule  Commonly known as:  HYTRIN  Take by mouth every evening.            Indwelling Lines/Drains at time of discharge:   Lines/Drains/Airways     None                 Time spent on the discharge of patient: 35 minutes  Patient was seen and examined on the date of discharge and determined to be suitable for discharge.         TREVOR Szymanski, FNP-C  Hospitalist - Department of Hospital Medicine  [address] [place] [address]  Office [phone]; Pager [phone]

## 2019-12-08 NOTE — PLAN OF CARE
Problem: Adult Inpatient Plan of Care  Goal: Plan of Care Review  Outcome: Ongoing, Progressing      Problem: Fall Injury Risk  Goal: Absence of Fall and Fall-Related Injury  Outcome: Ongoing, Progressing     Pt remained free of falls during current shift. Plan of care and fall precautions reviewed with patient. No pain meds required during shift. Verbalized understanding. Bed locked and in lowest position. SR up x 2, call light in reach. IV lasix to be admin BID to help reduce fluid. Strict I&O and 1.5L fluid restriction.

## 2019-12-08 NOTE — SUBJECTIVE & OBJECTIVE
Past Medical History:   Diagnosis Date    Anticoagulant long-term use     Cataract     CHF (congestive heart failure)     Coronary artery disease     Gout attack     Hypercholesteremia     Hypertension     Renal disorder     Vaginal delivery     x4       Past Surgical History:   Procedure Laterality Date    APPENDECTOMY      CARDIAC DEFIBRILLATOR PLACEMENT      2015    CATARACT EXTRACTION W/  INTRAOCULAR LENS IMPLANT Left 02/07/2019    Dr. Velazco    CATARACT EXTRACTION W/  INTRAOCULAR LENS IMPLANT Right 02/21/2019    Dr. Velazco    CHOLECYSTECTOMY      COLONOSCOPY W/ POLYPECTOMY  10 or 11/ 2014    per Dr. Myrick    defribillator Left 8/2008    EYE SURGERY      HYSTERECTOMY      INTRAOCULAR PROSTHESES INSERTION Left 2/7/2019    Procedure: INSERTION, IOL PROSTHESIS;  Surgeon: Demetrius Velazco MD;  Location: [facility] OR;  Service: Ophthalmology;  Laterality: Left;  RN Phone Pre OP 1-30-19.  Arrival 05:30 AM    INTRAOCULAR PROSTHESES INSERTION Right 2/21/2019    Procedure: INSERTION, IOL PROSTHESIS;  Surgeon: Demetrius Velazco MD;  Location: [facility] OR;  Service: Ophthalmology;  Laterality: Right;    JOINT REPLACEMENT Bilateral 2005 & 2006    2 Knee Replacements=Lt. 1= 2005. Rt. 1= 2006    OOPHORECTOMY      PHACOEMULSIFICATION OF CATARACT Left 2/7/2019    Procedure: PHACOEMULSIFICATION, CATARACT;  Surgeon: Demetrius Velazco MD;  Location: [facility] OR;  Service: Ophthalmology;  Laterality: Left;    PHACOEMULSIFICATION OF CATARACT Right 2/21/2019    Procedure: PHACOEMULSIFICATION, CATARACT;  Surgeon: Demetrius Velazco MD;  Location: [facility] OR;  Service: Ophthalmology;  Laterality: Right;  RN Phone Pre op 2-15-19.  Arrival 05:30 AM    TOTAL KNEE ARTHROPLASTY Bilateral Lt.= 2005; Rt.=2006    Bilateral Knee scope       Review of patient's allergies indicates:   Allergen Reactions    Aspirin Swelling     Swelling and rash    Colace [docusate sodium] Rash     itching    Sulfa  (sulfonamide antibiotics) Swelling     Swelling and rash    Iodine and iodide containing products Rash    Penicillins Rash       Current Facility-Administered Medications on File Prior to Encounter   Medication    0.9%  NaCl infusion    phenylephrine HCL 2.5% ophthalmic solution 1 drop    proparacaine 0.5 % ophthalmic solution 1 drop    tropicamide 1% ophthalmic solution 1 drop     Current Outpatient Medications on File Prior to Encounter   Medication Sig    acetaminophen (TYLENOL) 500 MG tablet Take 1 tablet (500 mg total) by mouth every 6 (six) hours as needed for Pain.    allopurinol (ZYLOPRIM) 100 MG tablet Take 100 mg by mouth every evening.     calcitRIOL (ROCALTROL) 0.25 MCG Cap 0.25 mcg once daily.     carvedilol (COREG) 25 MG tablet Take 0.5 tablets (12.5 mg total) by mouth 2 (two) times daily.    clopidogrel (PLAVIX) 75 mg tablet Take 75 mg by mouth once daily. On hold since 11-28-14, for procedure.    cranberry 400 mg Cap Take 1 capsule by mouth 2 (two) times daily.    fish oil-omega-3 fatty acids 300-1,000 mg capsule Take 2 g by mouth once daily. 2 caps every AM & 1 cap every PM.    folic acid/multivit-min/lutein (CENTRUM SILVER ORAL) Take by mouth once daily.    furosemide (LASIX) 40 MG tablet Take 1 tablet (40 mg total) by mouth once daily. Take 40 mg twice a day on 10/3, 10/4 followed by 20 mg twice a day.    influenza (FLUZONE HIGH-DOSE 2018-19, PF,) 180 mcg/0.5 mL vaccine     lovastatin (MEVACOR) 40 MG tablet Take 40 mg by mouth nightly. Takes 2 caps with supper every evening.    mirabegron (MYRBETRIQ) 50 mg Tb24 Take 50 mg by mouth once daily.     pneumoc 13-jorge luis conj-dip cr,PF, (PREVNAR 13, PF,) 0.5 mL Syrg     pneumococcal 23-jorge luis ps vaccine (PNEUMOVAX 23) 25 mcg/0.5 mL     ranitidine (ZANTAC) 150 MG tablet 150 mg 2 (two) times daily.     terazosin (HYTRIN) 2 MG capsule Take by mouth every evening.      Family History     Problem Relation (Age of Onset)    Amblyopia Son     Diabetes Other    Heart attack Mother (88)    Hyperlipidemia Mother    Hypertension Mother, Other        Tobacco Use    Smoking status: Never Smoker    Smokeless tobacco: Never Used   Substance and Sexual Activity    Alcohol use: Yes     Comment: rarely    Drug use: No    Sexual activity: Not Currently     Partners: Male     Review of Systems   Cardiovascular: Positive for dyspnea on exertion. Negative for chest pain, irregular heartbeat, leg swelling, orthopnea and paroxysmal nocturnal dyspnea.   Respiratory: Positive for shortness of breath. Negative for wheezing.      Objective:     Vital Signs (Most Recent):  Temp: 98 °F (36.7 °C) (12/08/19 1115)  Pulse: 60 (12/08/19 1115)  Resp: 18 (12/08/19 1115)  BP: 128/68 (12/08/19 1115)  SpO2: 96 % (12/08/19 1115) Vital Signs (24h Range):  Temp:  [98 °F (36.7 °C)-98.4 °F (36.9 °C)] 98 °F (36.7 °C)  Pulse:  [60-74] 60  Resp:  [18-25] 18  SpO2:  [95 %-98 %] 96 %  BP: (128-162)/(63-77) 128/68     Weight: 76.6 kg (168 lb 14 oz)  Body mass index is 29.91 kg/m².    SpO2: 96 %  O2 Device (Oxygen Therapy): room air    No intake or output data in the 24 hours ending 12/08/19 1445    Lines/Drains/Airways     Peripheral Intravenous Line                 Peripheral IV - Single Lumen 12/07/19 2200 20 G Left Hand less than 1 day                Physical Exam   Constitutional: She is oriented to person, place, and time. No distress.   Neck: JVD present.   Cardiovascular: Intact distal pulses.   Murmur heard.  High-pitched blowing holosystolic murmur is present with a grade of 3/6 at the apex.  Pulmonary/Chest: She has decreased breath sounds in the right lower field and the left lower field.   Abdominal: Soft. Bowel sounds are normal. There is no tenderness.   Musculoskeletal:        Right lower leg: She exhibits no edema.        Left lower leg: She exhibits no edema.   Neurological: She is alert and oriented to person, place, and time.   Skin: She is not diaphoretic.   Psychiatric:  She has a normal mood and affect. Her speech is normal and behavior is normal.   Vitals reviewed.      Significant Labs:   CMP   Recent Labs   Lab 12/07/19 2202 12/08/19  0351    143   K 3.8 3.5    103   CO2 28 27    99   BUN 36* 35*   CREATININE 2.0* 1.7*   CALCIUM 9.8 9.9   PROT 7.1 7.1   ALBUMIN 3.9 3.8   BILITOT 0.5 0.4   ALKPHOS 96 86   AST 23 26   ALT 16 18   ANIONGAP 10 13   ESTGFRAFRICA 26* 31*   EGFRNONAA 22* 27*   , CBC   Recent Labs   Lab 12/07/19 2202 12/08/19  0351   WBC 5.23 5.92   HGB 10.7* 10.9*   HCT 34.5* 34.7*   * 113*   , Lipid Panel No results for input(s): CHOL, HDL, LDLCALC, TRIG, CHOLHDL in the last 48 hours. and Troponin   Recent Labs   Lab 12/08/19  0348 12/08/19  0739 12/08/19  1125   TROPONINI 0.091* 0.101* 0.101*       Significant Imaging: Echocardiogram:   Transthoracic echo (TTE) complete (Cupid Only):   Results for orders placed or performed during the hospital encounter of 12/07/19   Echo Color Flow Doppler? Yes   Result Value Ref Range    BSA 1.89 m2    LA WIDTH 3.38 cm    AORTIC VALVE CUSP SEPERATION 1.98 cm    PV PEAK VELOCITY 1.13 cm/s    LVIDD 6.92 (A) 3.5 - 6.0 cm    IVS 1.27 (A) 0.6 - 1.1 cm    PW 1.26 (A) 0.6 - 1.1 cm    Ao root annulus 2.97 cm    LVIDS 5.92 (A) 2.1 - 4.0 cm    FS 14 28 - 44 %    LA volume 108.83 cm3    Sinus 2.65 cm    STJ 1.96 cm    Ascending aorta 2.88 cm    LV mass 427.03 g    LA size 5.56 cm    RVDD 3.80 cm    TAPSE 2.56 cm    RV S' 9.85 cm/s    Left Ventricle Relative Wall Thickness 0.36 cm    AV mean gradient 8 mmHg    AV valve area 2.35 cm2    AV Velocity Ratio 0.73     AV index (prosthetic) 0.75     E/A ratio 0.96     E wave decelartion time 156.98 msec    IVRT 0.08 msec    Pulm vein S/D ratio 1.44     LVOT diameter 2.00 cm    LVOT area 3.1 cm2    LVOT peak rakesh 1.52 m/s    LVOT peak VTI 29.29 cm    Ao peak rakesh 2.07 m/s    Ao VTI 39.07 cm    LVOT stroke volume 91.97 cm3    AV peak gradient 17 mmHg    MV Peak E Rakesh 1.48 m/s     TR Max Rakesh 2.91 m/s    MV Peak A Rakesh 1.54 m/s    PV Peak S Rakesh 0.49 m/s    PV Peak D Rakesh 0.34 m/s    LV Systolic Volume 174.49 mL    LV Systolic Volume Index 97.0 mL/m2    LV Diastolic Volume 248.70 mL    LV Diastolic Volume Index 138.19 mL/m2    LA Volume Index 60.5 mL/m2    LV Mass Index 237 g/m2    RA Major Axis 3.65 cm    Left Atrium Minor Axis 6.93 cm    Left Atrium Major Axis 6.70 cm    Triscuspid Valve Regurgitation Peak Gradient 34 mmHg    RA Width 2.70 cm    Right Atrial Pressure (from IVC) 3 mmHg    TV rest pulmonary artery pressure 37 mmHg    Narrative    · Eccentric left ventricular hypertrophy.  · Severe left ventricular enlargement.  · Severely decreased left ventricular systolic function. The estimated   ejection fraction is 25%  · Grade II (moderate) left ventricular diastolic dysfunction consistent   with pseudonormalization.  · Severe global hypokinetic wall motion.  · Normal right ventricular systolic function.  · Severe left atrial enlargement.  · Moderate mitral regurgitation.  · Mild pulmonary hypertension present.  · Mild tricuspid regurgitation.  · Normal central venous pressure (3 mm Hg).  · The estimated PA systolic pressure is 37 mm Hg

## 2019-12-08 NOTE — HPI
Palmira Malave is an 85 yo female who presents with shortness of breath, progressive over the past few days. +EVANS.  She has a history of a NICM. [employer] BiV AICD. HTN. HLP. Last seen 9/18/19. Recent interrogation of device. [facility] 2011 - non obstructive CAD. Last seen 9/18/2019 by Dr. Richmond. States that she has not been monitoring her weight or salt.     ECHO 12/8/19:    · Eccentric left ventricular hypertrophy.  · Severe left ventricular enlargement.  · Severely decreased left ventricular systolic function. The estimated ejection fraction is 25%  · Grade II (moderate) left ventricular diastolic dysfunction consistent with pseudonormalization.  · Severe global hypokinetic wall motion.  · Normal right ventricular systolic function.  · Severe left atrial enlargement.  · Moderate mitral regurgitation.  · Mild pulmonary hypertension present.  · Mild tricuspid regurgitation.  · Normal central venous pressure (3 mm Hg).  · The estimated PA systolic pressure is 37 mm Hg    ECHO:  10-18  · Left ventricle ejection fraction is moderately decreased at 35%  · The left ventricle cavity is moderately dilated.  · Grade I (mild) left ventricular diastolic dysfunction consistent with impaired relaxation.  · Left atrium is moderately dilated.  · Right atrium is mildly dilated.  · RV systolic function is normal.  · Trace regurgitation is present in aortic valve.  · Tricuspid valve shows trace regurgitation.  · Pulmonic valve shows trace regurgitation.  · Mitral valve shows mild-to-moderate regurgitation.     Carotid ultrasound:  7-18   There is 40 - 49% right Internal Carotid stenosis.  There is 20 - 39% left Internal Carotid stenosis.

## 2019-12-08 NOTE — H&P
[facility] Medicine  History & Physical    Patient Name: Palmira Malave  MRN: [id number]  Admission Date: 12/7/2019  Attending Physician: Lashawn Rubio MD   Primary Care Provider: Qi Ramon MD         Patient information was obtained from patient.     Subjective:     Principal Problem:Acute on chronic combined systolic and diastolic heart failure    Chief Complaint:  Shortness of breath for four days.    HPI: Mrs. Palmira Malave is a 84 y.o. female with essential hypertension, hyperlipidemia (LDL 81.8 Nov 2014), chronic combined systolic and diastolic heart failure (LVEF 35% Oct 2018), CKD stage 3, AICD in place, anemia of chronic disease, and GERD who presents to [facility] ED with complaints of dyspnea for the past four days.  She reports that the dyspnea is mainly on exertion and has been worsening progressively since onset.  She denies any fevers, chills, nausea, vomiting, chest pain, palpitations, diaphoresis, hemoptysis, nor any lower extremity pain or swelling.  She also denies any PND nor orthopnea.  She has not had any recent sick contacts or recent travel.  She does report a history of congestive heart failure and reports that she is compliant with her medications and her diet.    Chart Review:  Previous Hospitalizations  Date Hospital Diagnosis   Feb 2019 [facility]- Acute heart failure   Oct 22 [facility]- Acute heart failure   Dec 2014 [facility]- Biventricular AICD insertion   Nov 2014 [facility] Acute heart failure     Outpatient Follow-Up  Date of Visit Physician Service   Sept 2019 Kelechi Richmond MD Cardiology   Sept 2019 Zenon Lofton MD Gynecology   Jan 2015 SONJA Moran MD Urology     Past Medical History:   Diagnosis Date    Anticoagulant long-term use     Cataract     CHF (congestive heart failure)     Coronary artery disease     Gout attack     Hypercholesteremia     Hypertension     Renal disorder     Vaginal delivery     x4       Past Surgical History:    Procedure Laterality Date    APPENDECTOMY      CARDIAC DEFIBRILLATOR PLACEMENT      2015    CATARACT EXTRACTION W/  INTRAOCULAR LENS IMPLANT Left 02/07/2019    Dr. Velazco    CATARACT EXTRACTION W/  INTRAOCULAR LENS IMPLANT Right 02/21/2019    Dr. Velazco    CHOLECYSTECTOMY      COLONOSCOPY W/ POLYPECTOMY  10 or 11/ 2014    per Dr. Myrick    defribillator Left 8/2008    EYE SURGERY      HYSTERECTOMY      INTRAOCULAR PROSTHESES INSERTION Left 2/7/2019    Procedure: INSERTION, IOL PROSTHESIS;  Surgeon: Demetrius Velazco MD;  Location: [facility] OR;  Service: Ophthalmology;  Laterality: Left;  RN Phone Pre OP 1-30-19.  Arrival 05:30 AM    INTRAOCULAR PROSTHESES INSERTION Right 2/21/2019    Procedure: INSERTION, IOL PROSTHESIS;  Surgeon: Demetrius Velazco MD;  Location: [facility] OR;  Service: Ophthalmology;  Laterality: Right;    JOINT REPLACEMENT Bilateral 2005 & 2006    2 Knee Replacements=Lt. 1= 2005. Rt. 1= 2006    OOPHORECTOMY      PHACOEMULSIFICATION OF CATARACT Left 2/7/2019    Procedure: PHACOEMULSIFICATION, CATARACT;  Surgeon: Demetrius Velazco MD;  Location: [facility] OR;  Service: Ophthalmology;  Laterality: Left;    PHACOEMULSIFICATION OF CATARACT Right 2/21/2019    Procedure: PHACOEMULSIFICATION, CATARACT;  Surgeon: Demetrius Velazco MD;  Location: [facility] OR;  Service: Ophthalmology;  Laterality: Right;  RN Phone Pre op 2-15-19.  Arrival 05:30 AM    TOTAL KNEE ARTHROPLASTY Bilateral Lt.= 2005; Rt.=2006    Bilateral Knee scope       Review of patient's allergies indicates:   Allergen Reactions    Aspirin Swelling     Swelling and rash    Colace [docusate sodium] Rash     itching    Sulfa (sulfonamide antibiotics) Swelling     Swelling and rash    Iodine and iodide containing products Rash    Penicillins Rash       Current Facility-Administered Medications on File Prior to Encounter   Medication    0.9%  NaCl infusion    phenylephrine HCL 2.5% ophthalmic solution 1 drop     proparacaine 0.5 % ophthalmic solution 1 drop    tropicamide 1% ophthalmic solution 1 drop     Current Outpatient Medications on File Prior to Encounter   Medication Sig    acetaminophen (TYLENOL) 500 MG tablet Take 1 tablet (500 mg total) by mouth every 6 (six) hours as needed for Pain.    allopurinol (ZYLOPRIM) 100 MG tablet Take 100 mg by mouth every evening.     calcitRIOL (ROCALTROL) 0.25 MCG Cap 0.25 mcg once daily.     carvedilol (COREG) 25 MG tablet Take 0.5 tablets (12.5 mg total) by mouth 2 (two) times daily.    clopidogrel (PLAVIX) 75 mg tablet Take 75 mg by mouth once daily. On hold since 11-28-14, for procedure.    cranberry 400 mg Cap Take 1 capsule by mouth 2 (two) times daily.    fish oil-omega-3 fatty acids 300-1,000 mg capsule Take 2 g by mouth once daily. 2 caps every AM & 1 cap every PM.    folic acid/multivit-min/lutein (CENTRUM SILVER ORAL) Take by mouth once daily.    furosemide (LASIX) 40 MG tablet Take 1 tablet (40 mg total) by mouth once daily. Take 40 mg twice a day on 10/3, 10/4 followed by 20 mg twice a day.    influenza (FLUZONE HIGH-DOSE 2018-19, PF,) 180 mcg/0.5 mL vaccine     lovastatin (MEVACOR) 40 MG tablet Take 40 mg by mouth nightly. Takes 2 caps with supper every evening.    mirabegron (MYRBETRIQ) 50 mg Tb24 Take 50 mg by mouth once daily.     pneumoc 13-jorge luis conj-dip cr,PF, (PREVNAR 13, PF,) 0.5 mL Syrg     pneumococcal 23-jorge luis ps vaccine (PNEUMOVAX 23) 25 mcg/0.5 mL     ranitidine (ZANTAC) 150 MG tablet 150 mg 2 (two) times daily.     terazosin (HYTRIN) 2 MG capsule Take by mouth every evening.      Family History     Problem Relation (Age of Onset)    Amblyopia Son    Diabetes Other    Heart attack Mother (88)    Hyperlipidemia Mother    Hypertension Mother, Other        Tobacco Use    Smoking status: Never Smoker    Smokeless tobacco: Never Used   Substance and Sexual Activity    Alcohol use: Yes     Comment: rarely    Drug use: No    Sexual activity: Not  Currently     Partners: Male     Review of Systems   Constitutional: Positive for activity change and fatigue. Negative for appetite change, chills, diaphoresis, fever and unexpected weight change.   HENT: Negative.    Eyes: Negative.    Respiratory: Positive for shortness of breath. Negative for cough, chest tightness and wheezing.    Cardiovascular: Negative for chest pain, palpitations and leg swelling.   Gastrointestinal: Negative for abdominal distention, abdominal pain, blood in stool, constipation, diarrhea, nausea and vomiting.   Genitourinary: Negative for dysuria and hematuria.   Musculoskeletal: Negative.    Skin: Negative.    Neurological: Positive for weakness. Negative for dizziness, seizures, syncope and light-headedness.   Psychiatric/Behavioral: Negative.      Objective:     Vital Signs (Most Recent):  Temp: 98.3 °F (36.8 °C) (12/08/19 0336)  Pulse: 67 (12/08/19 0336)  Resp: 18 (12/08/19 0336)  BP: (!) 140/70 (12/08/19 0336)  SpO2: 96 % (12/08/19 0336) Vital Signs (24h Range):  Temp:  [98.3 °F (36.8 °C)-98.4 °F (36.9 °C)] 98.3 °F (36.8 °C)  Pulse:  [63-74] 67  Resp:  [18-25] 18  SpO2:  [95 %-98 %] 96 %  BP: (137-162)/(63-77) 140/70     Weight: 76.6 kg (168 lb 14 oz)  Body mass index is 29.91 kg/m².    Physical Exam   Constitutional: She is oriented to person, place, and time. She appears well-developed and well-nourished. No distress.   HENT:   Head: Normocephalic and atraumatic.   Right Ear: External ear normal.   Left Ear: External ear normal.   Nose: Nose normal.   Eyes: Right eye exhibits no discharge. Left eye exhibits no discharge.   Neck: Normal range of motion.   Cardiovascular:   Regular rate and rhythm with a grade I/VI holosystolic murmur, no gallops, no JVD, trace pitting edema to the knees bilaterally   Pulmonary/Chest:   Minimally increased respiratory effort with bibasilar crackles, no wheezing   Abdominal: Soft. Bowel sounds are normal. She exhibits no distension. There is no  tenderness. There is no rebound and no guarding.   Musculoskeletal: Normal range of motion.   Neurological: She is alert and oriented to person, place, and time.   Skin: Skin is warm and dry. She is not diaphoretic. No erythema.   Psychiatric: She has a normal mood and affect. Her behavior is normal. Judgment and thought content normal.   Nursing note and vitals reviewed.          Significant Labs: All pertinent labs within the past 24 hours have been reviewed.    Significant Imaging: I have reviewed and interpreted all pertinent imaging results/findings within the past 24 hours.    Assessment/Plan:     * Acute on chronic combined systolic and diastolic heart failure  Patient does have a significant cardiomyopathy with recent LVEF of 35% via an echocardiogram one year ago.  Today her symptoms are consistent with acute congestive heart failure.  Personally reviewed the plain chest radiograph which was significant for mild pulmonary edema bilaterally. I also personally reviewed the EKG which was significant for a ventricularly paced rhythm. She has an elevated BNP of 1355 an a elevated troponin 0.094 which appears to be near baseline.  She has been started on intravenous diuresis with furosemide with careful monitoring of her urine output.  Will repeat her echocardiogram in the morning.    Of note, due to her mildly elevated troponin level she has been started on full-dose anticoagulation with enoxaparin.  She has not been started on aspirin due to a swelling allergy.    Acute on CKD stage 3  Patient's baseline creatinine is around 1.3; today it is 2.0.  Her urinalysis is significant for a specific gravity of 1.005 otherwise benign.  Urine output has been fair.  Will defer IV fluid rehydration this time due to acute heart failure and obtain additional urine studies;monitor the urine output; recheck the renal function in the morning; and avoid nephrotoxins.    Essential hypertension  Patient's blood pressure is  fairly-controlled; will continue home regimen of furosemide and terazosin, and provide as-needed clonidine.  Will hold carvedilol for now due to acute heart failure.    Hyperlipidemia  Well controlled; will continue her home regimen of lovastatin.    Chronic combined systolic and diastolic heart failure  As addressed above.    CKD stage 3  As addressed above.    Biventricular AICD in place  Stable; there are no acute issues.    Anemia of chronic disease  The patient's H/H is stable and consistent with previous laboratory measurements, and the patient exhibits no signs or symptoms of acute bleeding; there is no indication for transfusion.  Will continue to monitor.    GERD (gastroesophageal reflux disease)  Will substitute her home regimen of ranitidine for famotidine while she is inpatient.    VTE Risk Mitigation (From admission, onward)         Ordered     enoxaparin injection 80 mg  Every 24 hours (non-standard times)      12/08/19 0330                   The patient will be placed in observation status.          Ysura Vivas M.D.  [facility]  Department of Hospital Medicine  [facility]  Pager: [phone]          N.B.: Portions of this note was dictated using M*Modal Fluency Direct--there may be voice recognition errors occasionally missed on review.

## 2019-12-08 NOTE — ASSESSMENT & PLAN NOTE
Patient's blood pressure is fairly-controlled; will continue home regimen of furosemide and terazosin, and provide as-needed clonidine.  Will hold carvedilol for now due to acute heart failure.

## 2019-12-08 NOTE — HPI
Mrs. Palmira Malave is a 84 y.o. female with essential hypertension, hyperlipidemia (LDL 81.8 Nov 2014), chronic combined systolic and diastolic heart failure (LVEF 35% Oct 2018), CKD stage 3, AICD in place, anemia of chronic disease, and GERD who presents to [facility] ED with complaints of dyspnea for the past four days.  She reports that the dyspnea is mainly on exertion and has been worsening progressively since onset.  She denies any fevers, chills, nausea, vomiting, chest pain, palpitations, diaphoresis, hemoptysis, nor any lower extremity pain or swelling.  She also denies any PND nor orthopnea.  She has not had any recent sick contacts or recent travel.  She does report a history of congestive heart failure and reports that she is compliant with her medications and her diet.

## 2019-12-08 NOTE — SUBJECTIVE & OBJECTIVE
Past Medical History:   Diagnosis Date    Anticoagulant long-term use     Cataract     CHF (congestive heart failure)     Coronary artery disease     Gout attack     Hypercholesteremia     Hypertension     Renal disorder     Vaginal delivery     x4       Past Surgical History:   Procedure Laterality Date    APPENDECTOMY      CARDIAC DEFIBRILLATOR PLACEMENT      2015    CATARACT EXTRACTION W/  INTRAOCULAR LENS IMPLANT Left 02/07/2019    Dr. Velazco    CATARACT EXTRACTION W/  INTRAOCULAR LENS IMPLANT Right 02/21/2019    Dr. Velazco    CHOLECYSTECTOMY      COLONOSCOPY W/ POLYPECTOMY  10 or 11/ 2014    per Dr. Myrick    defribillator Left 8/2008    EYE SURGERY      HYSTERECTOMY      INTRAOCULAR PROSTHESES INSERTION Left 2/7/2019    Procedure: INSERTION, IOL PROSTHESIS;  Surgeon: Demetrius Velazco MD;  Location: [facility] OR;  Service: Ophthalmology;  Laterality: Left;  RN Phone Pre OP 1-30-19.  Arrival 05:30 AM    INTRAOCULAR PROSTHESES INSERTION Right 2/21/2019    Procedure: INSERTION, IOL PROSTHESIS;  Surgeon: Demetrius Velazco MD;  Location: [facility] OR;  Service: Ophthalmology;  Laterality: Right;    JOINT REPLACEMENT Bilateral 2005 & 2006    2 Knee Replacements=Lt. 1= 2005. Rt. 1= 2006    OOPHORECTOMY      PHACOEMULSIFICATION OF CATARACT Left 2/7/2019    Procedure: PHACOEMULSIFICATION, CATARACT;  Surgeon: Demetrius Velazco MD;  Location: [facility] OR;  Service: Ophthalmology;  Laterality: Left;    PHACOEMULSIFICATION OF CATARACT Right 2/21/2019    Procedure: PHACOEMULSIFICATION, CATARACT;  Surgeon: Demetrius Velazco MD;  Location: [facility] OR;  Service: Ophthalmology;  Laterality: Right;  RN Phone Pre op 2-15-19.  Arrival 05:30 AM    TOTAL KNEE ARTHROPLASTY Bilateral Lt.= 2005; Rt.=2006    Bilateral Knee scope       Review of patient's allergies indicates:   Allergen Reactions    Aspirin Swelling     Swelling and rash    Colace [docusate sodium] Rash     itching    Sulfa  (sulfonamide antibiotics) Swelling     Swelling and rash    Iodine and iodide containing products Rash    Penicillins Rash       Current Facility-Administered Medications on File Prior to Encounter   Medication    0.9%  NaCl infusion    phenylephrine HCL 2.5% ophthalmic solution 1 drop    proparacaine 0.5 % ophthalmic solution 1 drop    tropicamide 1% ophthalmic solution 1 drop     Current Outpatient Medications on File Prior to Encounter   Medication Sig    acetaminophen (TYLENOL) 500 MG tablet Take 1 tablet (500 mg total) by mouth every 6 (six) hours as needed for Pain.    allopurinol (ZYLOPRIM) 100 MG tablet Take 100 mg by mouth every evening.     calcitRIOL (ROCALTROL) 0.25 MCG Cap 0.25 mcg once daily.     carvedilol (COREG) 25 MG tablet Take 0.5 tablets (12.5 mg total) by mouth 2 (two) times daily.    clopidogrel (PLAVIX) 75 mg tablet Take 75 mg by mouth once daily. On hold since 11-28-14, for procedure.    cranberry 400 mg Cap Take 1 capsule by mouth 2 (two) times daily.    fish oil-omega-3 fatty acids 300-1,000 mg capsule Take 2 g by mouth once daily. 2 caps every AM & 1 cap every PM.    folic acid/multivit-min/lutein (CENTRUM SILVER ORAL) Take by mouth once daily.    furosemide (LASIX) 40 MG tablet Take 1 tablet (40 mg total) by mouth once daily. Take 40 mg twice a day on 10/3, 10/4 followed by 20 mg twice a day.    influenza (FLUZONE HIGH-DOSE 2018-19, PF,) 180 mcg/0.5 mL vaccine     lovastatin (MEVACOR) 40 MG tablet Take 40 mg by mouth nightly. Takes 2 caps with supper every evening.    mirabegron (MYRBETRIQ) 50 mg Tb24 Take 50 mg by mouth once daily.     pneumoc 13-jorge luis conj-dip cr,PF, (PREVNAR 13, PF,) 0.5 mL Syrg     pneumococcal 23-jorge luis ps vaccine (PNEUMOVAX 23) 25 mcg/0.5 mL     ranitidine (ZANTAC) 150 MG tablet 150 mg 2 (two) times daily.     terazosin (HYTRIN) 2 MG capsule Take by mouth every evening.      Family History     Problem Relation (Age of Onset)    Amblyopia Son     Diabetes Other    Heart attack Mother (88)    Hyperlipidemia Mother    Hypertension Mother, Other        Tobacco Use    Smoking status: Never Smoker    Smokeless tobacco: Never Used   Substance and Sexual Activity    Alcohol use: Yes     Comment: rarely    Drug use: No    Sexual activity: Not Currently     Partners: Male     Review of Systems   Constitutional: Positive for activity change and fatigue. Negative for appetite change, chills, diaphoresis, fever and unexpected weight change.   HENT: Negative.    Eyes: Negative.    Respiratory: Positive for shortness of breath. Negative for cough, chest tightness and wheezing.    Cardiovascular: Negative for chest pain, palpitations and leg swelling.   Gastrointestinal: Negative for abdominal distention, abdominal pain, blood in stool, constipation, diarrhea, nausea and vomiting.   Genitourinary: Negative for dysuria and hematuria.   Musculoskeletal: Negative.    Skin: Negative.    Neurological: Positive for weakness. Negative for dizziness, seizures, syncope and light-headedness.   Psychiatric/Behavioral: Negative.      Objective:     Vital Signs (Most Recent):  Temp: 98.3 °F (36.8 °C) (12/08/19 0336)  Pulse: 67 (12/08/19 0336)  Resp: 18 (12/08/19 0336)  BP: (!) 140/70 (12/08/19 0336)  SpO2: 96 % (12/08/19 0336) Vital Signs (24h Range):  Temp:  [98.3 °F (36.8 °C)-98.4 °F (36.9 °C)] 98.3 °F (36.8 °C)  Pulse:  [63-74] 67  Resp:  [18-25] 18  SpO2:  [95 %-98 %] 96 %  BP: (137-162)/(63-77) 140/70     Weight: 76.6 kg (168 lb 14 oz)  Body mass index is 29.91 kg/m².    Physical Exam   Constitutional: She is oriented to person, place, and time. She appears well-developed and well-nourished. No distress.   HENT:   Head: Normocephalic and atraumatic.   Right Ear: External ear normal.   Left Ear: External ear normal.   Nose: Nose normal.   Eyes: Right eye exhibits no discharge. Left eye exhibits no discharge.   Neck: Normal range of motion.   Cardiovascular:   Regular rate and  rhythm with a grade I/VI holosystolic murmur, no gallops, no JVD, trace pitting edema to the knees bilaterally   Pulmonary/Chest:   Minimally increased respiratory effort with bibasilar crackles, no wheezing   Abdominal: Soft. Bowel sounds are normal. She exhibits no distension. There is no tenderness. There is no rebound and no guarding.   Musculoskeletal: Normal range of motion.   Neurological: She is alert and oriented to person, place, and time.   Skin: Skin is warm and dry. She is not diaphoretic. No erythema.   Psychiatric: She has a normal mood and affect. Her behavior is normal. Judgment and thought content normal.   Nursing note and vitals reviewed.          Significant Labs: All pertinent labs within the past 24 hours have been reviewed.    Significant Imaging: I have reviewed and interpreted all pertinent imaging results/findings within the past 24 hours.

## 2019-12-08 NOTE — ED TRIAGE NOTES
Pt c/o intermittent sob with exertion since Tuesday along with infrequent nonproductive cough. Pt denies any pain & nadn at this time

## 2020-03-09 ENCOUNTER — HOSPITAL ENCOUNTER (OUTPATIENT)
Dept: CARDIOLOGY | Facility: HOSPITAL | Age: 85
Discharge: HOME | End: 2020-03-09
Attending: INTERNAL MEDICINE
Payer: MEDICARE

## 2020-03-09 DIAGNOSIS — I10 ESSENTIAL HYPERTENSION: ICD-10-CM

## 2020-03-09 DIAGNOSIS — I50.23 ACUTE ON CHRONIC SYSTOLIC CONGESTIVE HEART FAILURE: ICD-10-CM

## 2020-03-09 DIAGNOSIS — Z95.810 ICD (IMPLANTABLE CARDIOVERTER-DEFIBRILLATOR) IN PLACE: ICD-10-CM

## 2020-03-09 DIAGNOSIS — E78.2 MIXED HYPERLIPIDEMIA: ICD-10-CM

## 2020-03-09 DIAGNOSIS — I25.10 CORONARY ARTERY DISEASE INVOLVING NATIVE CORONARY ARTERY OF NATIVE HEART WITHOUT ANGINA PECTORIS: ICD-10-CM

## 2020-03-09 LAB
AORTIC ROOT ANNULUS: 2.6 CM
AORTIC VALVE CUSP SEPERATION: 2.01 CM
ASCENDING AORTA: 2.97 CM
AV INDEX (PROSTH): 0.64
AV MEAN GRADIENT: 11 MMHG
AV PEAK GRADIENT: 20 MMHG
AV VALVE AREA: 1.65 CM2
AV VELOCITY RATIO: 0.7
CV ECHO LV RWT: 0.39 CM
DOP CALC AO PEAK VEL: 2.25 M/S
DOP CALC AO VTI: 49.33 CM
DOP CALC LVOT AREA: 2.6 CM2
DOP CALC LVOT DIAMETER: 1.82 CM
DOP CALC LVOT PEAK VEL: 1.58 M/S
DOP CALC LVOT STROKE VOLUME: 81.52 CM3
DOP CALCLVOT PEAK VEL VTI: 31.35 CM
E WAVE DECELERATION TIME: 251.8 MSEC
E/A RATIO: 1.1
ECHO LV POSTERIOR WALL: 1.33 CM (ref 0.6–1.1)
FRACTIONAL SHORTENING: 10 % (ref 28–44)
INTERVENTRICULAR SEPTUM: 1.33 CM (ref 0.6–1.1)
IVRT: 134.95 MSEC
LA MAJOR: 7.57 CM
LA MINOR: 7.83 CM
LA WIDTH: 3.92 CM
LEFT ATRIUM SIZE: 4.69 CM
LEFT ATRIUM VOLUME: 120.29 CM3
LEFT INTERNAL DIMENSION IN SYSTOLE: 6.1 CM (ref 2.1–4)
LEFT VENTRICLE DIASTOLIC VOLUME: 239.71 ML
LEFT VENTRICLE SYSTOLIC VOLUME: 187.02 ML
LEFT VENTRICULAR INTERNAL DIMENSION IN DIASTOLE: 6.81 CM (ref 3.5–6)
LEFT VENTRICULAR MASS: 444.44 G
MV PEAK A VEL: 1.23 M/S
MV PEAK E VEL: 1.35 M/S
PISA TR MAX VEL: 2.49 M/S
PULM VEIN S/D RATIO: 1.28
PV PEAK D VEL: 0.29 M/S
PV PEAK S VEL: 0.37 M/S
PV PEAK VELOCITY: 1.26 CM/S
RA MAJOR: 4.25 CM
RA PRESSURE: 8 MMHG
RA WIDTH: 2.33 CM
RIGHT VENTRICULAR END-DIASTOLIC DIMENSION: 2.64 CM
RV TISSUE DOPPLER FREE WALL SYSTOLIC VELOCITY 1 (APICAL 4 CHAMBER VIEW): 10.49 CM/S
SINUS: 2.58 CM
STJ: 1.48 CM
TR MAX PG: 25 MMHG
TRICUSPID ANNULAR PLANE SYSTOLIC EXCURSION: 2.51 CM
TV REST PULMONARY ARTERY PRESSURE: 33 MMHG

## 2020-03-09 PROCEDURE — 93306 ECHO (CUPID ONLY): ICD-10-PCS | Mod: 26,,, | Performed by: INTERNAL MEDICINE

## 2020-03-09 PROCEDURE — 93306 TTE W/DOPPLER COMPLETE: CPT

## 2020-03-09 PROCEDURE — 93306 TTE W/DOPPLER COMPLETE: CPT | Mod: 26,,, | Performed by: INTERNAL MEDICINE

## 2020-06-09 ENCOUNTER — HOSPITAL ENCOUNTER (OUTPATIENT)
Facility: HOSPITAL | Age: 85
Discharge: HOME | End: 2020-06-10
Attending: EMERGENCY MEDICINE | Admitting: HOSPITALIST
Payer: MEDICARE

## 2020-06-09 DIAGNOSIS — R06.02 SHORTNESS OF BREATH: ICD-10-CM

## 2020-06-09 DIAGNOSIS — I50.43 ACUTE ON CHRONIC COMBINED SYSTOLIC AND DIASTOLIC CONGESTIVE HEART FAILURE: Primary | ICD-10-CM

## 2020-06-09 DIAGNOSIS — I50.23 ACUTE ON CHRONIC SYSTOLIC CONGESTIVE HEART FAILURE: ICD-10-CM

## 2020-06-09 DIAGNOSIS — I50.43 ACUTE ON CHRONIC COMBINED SYSTOLIC AND DIASTOLIC HEART FAILURE: ICD-10-CM

## 2020-06-09 LAB
ALBUMIN SERPL BCP-MCNC: 3.9 G/DL (ref 3.5–5.2)
ALP SERPL-CCNC: 62 U/L (ref 55–135)
ALT SERPL W/O P-5'-P-CCNC: 29 U/L (ref 10–44)
ANION GAP SERPL CALC-SCNC: 9 MMOL/L (ref 8–16)
AST SERPL-CCNC: 35 U/L (ref 10–40)
BASOPHILS # BLD AUTO: 0.01 K/UL (ref 0–0.2)
BASOPHILS NFR BLD: 0.2 % (ref 0–1.9)
BILIRUB SERPL-MCNC: 0.5 MG/DL (ref 0.1–1)
BILIRUB UR QL STRIP: NEGATIVE
BNP SERPL-MCNC: 2706 PG/ML (ref 0–99)
BUN SERPL-MCNC: 48 MG/DL (ref 8–23)
CALCIUM SERPL-MCNC: 10 MG/DL (ref 8.7–10.5)
CHLORIDE SERPL-SCNC: 100 MMOL/L (ref 95–110)
CHOLEST SERPL-MCNC: 165 MG/DL (ref 120–199)
CHOLEST/HDLC SERPL: 2.5 {RATIO} (ref 2–5)
CLARITY UR: CLEAR
CO2 SERPL-SCNC: 32 MMOL/L (ref 23–29)
COLOR UR: NORMAL
CREAT SERPL-MCNC: 1.6 MG/DL (ref 0.5–1.4)
DIFFERENTIAL METHOD: ABNORMAL
EOSINOPHIL # BLD AUTO: 0 K/UL (ref 0–0.5)
EOSINOPHIL NFR BLD: 0.5 % (ref 0–8)
ERYTHROCYTE [DISTWIDTH] IN BLOOD BY AUTOMATED COUNT: 15 % (ref 11.5–14.5)
EST. GFR  (AFRICAN AMERICAN): 34 ML/MIN/1.73 M^2
EST. GFR  (NON AFRICAN AMERICAN): 29 ML/MIN/1.73 M^2
GLUCOSE SERPL-MCNC: 96 MG/DL (ref 70–110)
GLUCOSE UR QL STRIP: NEGATIVE
HCT VFR BLD AUTO: 36.6 % (ref 37–48.5)
HDLC SERPL-MCNC: 66 MG/DL (ref 40–75)
HDLC SERPL: 40 % (ref 20–50)
HGB BLD-MCNC: 11.2 G/DL (ref 12–16)
HGB UR QL STRIP: NEGATIVE
IMM GRANULOCYTES # BLD AUTO: 0.02 K/UL (ref 0–0.04)
IMM GRANULOCYTES NFR BLD AUTO: 0.3 % (ref 0–0.5)
KETONES UR QL STRIP: NEGATIVE
LDLC SERPL CALC-MCNC: 52.4 MG/DL (ref 63–159)
LEUKOCYTE ESTERASE UR QL STRIP: NEGATIVE
LYMPHOCYTES # BLD AUTO: 0.9 K/UL (ref 1–4.8)
LYMPHOCYTES NFR BLD: 13.8 % (ref 18–48)
MCH RBC QN AUTO: 29.5 PG (ref 27–31)
MCHC RBC AUTO-ENTMCNC: 30.6 G/DL (ref 32–36)
MCV RBC AUTO: 96 FL (ref 82–98)
MONOCYTES # BLD AUTO: 0.7 K/UL (ref 0.3–1)
MONOCYTES NFR BLD: 11.8 % (ref 4–15)
NEUTROPHILS # BLD AUTO: 4.6 K/UL (ref 1.8–7.7)
NEUTROPHILS NFR BLD: 73.4 % (ref 38–73)
NITRITE UR QL STRIP: NEGATIVE
NONHDLC SERPL-MCNC: 99 MG/DL
NRBC BLD-RTO: 0 /100 WBC
PH UR STRIP: 6 [PH] (ref 5–8)
PLATELET # BLD AUTO: 129 K/UL (ref 150–350)
PMV BLD AUTO: 10.5 FL (ref 9.2–12.9)
POTASSIUM SERPL-SCNC: 4.1 MMOL/L (ref 3.5–5.1)
PROT SERPL-MCNC: 7.5 G/DL (ref 6–8.4)
PROT UR QL STRIP: NEGATIVE
RBC # BLD AUTO: 3.8 M/UL (ref 4–5.4)
SARS-COV-2 RDRP RESP QL NAA+PROBE: NEGATIVE
SODIUM SERPL-SCNC: 141 MMOL/L (ref 136–145)
SP GR UR STRIP: 1.01 (ref 1–1.03)
TRIGL SERPL-MCNC: 233 MG/DL (ref 30–150)
TROPONIN I SERPL DL<=0.01 NG/ML-MCNC: 0.14 NG/ML (ref 0–0.03)
TROPONIN I SERPL DL<=0.01 NG/ML-MCNC: 0.14 NG/ML (ref 0–0.03)
TROPONIN I SERPL DL<=0.01 NG/ML-MCNC: 0.16 NG/ML (ref 0–0.03)
TSH SERPL DL<=0.005 MIU/L-ACNC: 1.59 UIU/ML (ref 0.4–4)
URN SPEC COLLECT METH UR: NORMAL
UROBILINOGEN UR STRIP-ACNC: NEGATIVE EU/DL
WBC # BLD AUTO: 6.25 K/UL (ref 3.9–12.7)

## 2020-06-09 PROCEDURE — 80053 COMPREHEN METABOLIC PANEL: CPT

## 2020-06-09 PROCEDURE — 63600175 PHARM REV CODE 636 W HCPCS: Performed by: PHYSICIAN ASSISTANT

## 2020-06-09 PROCEDURE — 96376 TX/PRO/DX INJ SAME DRUG ADON: CPT

## 2020-06-09 PROCEDURE — 36415 COLL VENOUS BLD VENIPUNCTURE: CPT

## 2020-06-09 PROCEDURE — 83880 ASSAY OF NATRIURETIC PEPTIDE: CPT

## 2020-06-09 PROCEDURE — G0378 HOSPITAL OBSERVATION PER HR: HCPCS

## 2020-06-09 PROCEDURE — 80061 LIPID PANEL: CPT

## 2020-06-09 PROCEDURE — U0002 COVID-19 LAB TEST NON-CDC: HCPCS

## 2020-06-09 PROCEDURE — 25000003 PHARM REV CODE 250: Performed by: PHYSICIAN ASSISTANT

## 2020-06-09 PROCEDURE — 84484 ASSAY OF TROPONIN QUANT: CPT | Mod: 91

## 2020-06-09 PROCEDURE — 85025 COMPLETE CBC W/AUTO DIFF WBC: CPT

## 2020-06-09 PROCEDURE — 94761 N-INVAS EAR/PLS OXIMETRY MLT: CPT

## 2020-06-09 PROCEDURE — 93010 ELECTROCARDIOGRAM REPORT: CPT | Mod: ,,, | Performed by: INTERNAL MEDICINE

## 2020-06-09 PROCEDURE — 84443 ASSAY THYROID STIM HORMONE: CPT

## 2020-06-09 PROCEDURE — 99285 EMERGENCY DEPT VISIT HI MDM: CPT | Mod: 25

## 2020-06-09 PROCEDURE — 84484 ASSAY OF TROPONIN QUANT: CPT

## 2020-06-09 PROCEDURE — 93010 EKG 12-LEAD: ICD-10-PCS | Mod: ,,, | Performed by: INTERNAL MEDICINE

## 2020-06-09 PROCEDURE — 96374 THER/PROPH/DIAG INJ IV PUSH: CPT

## 2020-06-09 PROCEDURE — 93005 ELECTROCARDIOGRAM TRACING: CPT

## 2020-06-09 PROCEDURE — 81003 URINALYSIS AUTO W/O SCOPE: CPT

## 2020-06-09 RX ORDER — SODIUM CHLORIDE 0.9 % (FLUSH) 0.9 %
10 SYRINGE (ML) INJECTION
Status: DISCONTINUED | OUTPATIENT
Start: 2020-06-09 | End: 2020-06-10 | Stop reason: HOSPADM

## 2020-06-09 RX ORDER — LOVASTATIN 20 MG/1
40 TABLET ORAL NIGHTLY
Status: DISCONTINUED | OUTPATIENT
Start: 2020-06-09 | End: 2020-06-10 | Stop reason: HOSPADM

## 2020-06-09 RX ORDER — HYDRALAZINE HYDROCHLORIDE 20 MG/ML
10 INJECTION INTRAMUSCULAR; INTRAVENOUS EVERY 8 HOURS PRN
Status: DISCONTINUED | OUTPATIENT
Start: 2020-06-09 | End: 2020-06-10 | Stop reason: HOSPADM

## 2020-06-09 RX ORDER — TERAZOSIN 1 MG/1
2 CAPSULE ORAL NIGHTLY
Status: DISCONTINUED | OUTPATIENT
Start: 2020-06-09 | End: 2020-06-10 | Stop reason: HOSPADM

## 2020-06-09 RX ORDER — ACETAMINOPHEN 325 MG/1
650 TABLET ORAL EVERY 8 HOURS PRN
Status: DISCONTINUED | OUTPATIENT
Start: 2020-06-09 | End: 2020-06-10

## 2020-06-09 RX ORDER — MORPHINE SULFATE 10 MG/ML
4 INJECTION INTRAMUSCULAR; INTRAVENOUS; SUBCUTANEOUS EVERY 4 HOURS PRN
Status: DISCONTINUED | OUTPATIENT
Start: 2020-06-09 | End: 2020-06-09

## 2020-06-09 RX ORDER — ONDANSETRON 2 MG/ML
4 INJECTION INTRAMUSCULAR; INTRAVENOUS EVERY 8 HOURS PRN
Status: DISCONTINUED | OUTPATIENT
Start: 2020-06-09 | End: 2020-06-10 | Stop reason: HOSPADM

## 2020-06-09 RX ORDER — PANTOPRAZOLE SODIUM 40 MG/1
40 TABLET, DELAYED RELEASE ORAL DAILY
Status: DISCONTINUED | OUTPATIENT
Start: 2020-06-10 | End: 2020-06-10 | Stop reason: HOSPADM

## 2020-06-09 RX ORDER — CARVEDILOL 12.5 MG/1
12.5 TABLET ORAL 2 TIMES DAILY
Status: DISCONTINUED | OUTPATIENT
Start: 2020-06-09 | End: 2020-06-10 | Stop reason: HOSPADM

## 2020-06-09 RX ORDER — METHYLPREDNISOLONE 4 MG/1
4 TABLET ORAL
Status: ON HOLD | COMMUNITY
Start: 2020-06-05 | End: 2020-06-10 | Stop reason: HOSPADM

## 2020-06-09 RX ORDER — FUROSEMIDE 10 MG/ML
80 INJECTION INTRAMUSCULAR; INTRAVENOUS
Status: COMPLETED | OUTPATIENT
Start: 2020-06-09 | End: 2020-06-09

## 2020-06-09 RX ORDER — CLOPIDOGREL BISULFATE 75 MG/1
75 TABLET ORAL DAILY
Status: DISCONTINUED | OUTPATIENT
Start: 2020-06-10 | End: 2020-06-10 | Stop reason: HOSPADM

## 2020-06-09 RX ORDER — MORPHINE SULFATE 10 MG/ML
2 INJECTION INTRAMUSCULAR; INTRAVENOUS; SUBCUTANEOUS EVERY 4 HOURS PRN
Status: DISCONTINUED | OUTPATIENT
Start: 2020-06-09 | End: 2020-06-09

## 2020-06-09 RX ORDER — FUROSEMIDE 10 MG/ML
40 INJECTION INTRAMUSCULAR; INTRAVENOUS 2 TIMES DAILY
Status: DISCONTINUED | OUTPATIENT
Start: 2020-06-09 | End: 2020-06-10 | Stop reason: HOSPADM

## 2020-06-09 RX ADMIN — LOVASTATIN 40 MG: 20 TABLET ORAL at 08:06

## 2020-06-09 RX ADMIN — CARVEDILOL 12.5 MG: 12.5 TABLET, FILM COATED ORAL at 08:06

## 2020-06-09 RX ADMIN — FUROSEMIDE 80 MG: 10 INJECTION, SOLUTION INTRAVENOUS at 12:06

## 2020-06-09 RX ADMIN — TERAZOSIN HYDROCHLORIDE 2 MG: 1 CAPSULE ORAL at 08:06

## 2020-06-09 RX ADMIN — FUROSEMIDE 40 MG: 10 INJECTION, SOLUTION INTRAMUSCULAR; INTRAVENOUS at 06:06

## 2020-06-09 NOTE — ASSESSMENT & PLAN NOTE
Non-obstructive CAD per chart review. Continue home plavix/beta blocker/statin. Telemetry. Troponin trend

## 2020-06-09 NOTE — HPI
Palmira Malave 84 y.o. female with CHF last EF 20% in 3/2020, HTN, CKD, GERD, AICD, and gout in place presents to the hospital with a chief complaint of SOB. She reports last night she became SOB that made it difficult to ambulate and sleep. She normally has to sleep in her recliner, but was unable to sleep despite sitting upright. She recently started a steroid pack for a Gout flare. She has completed all steroids but the last day, and her gout symptoms have resolved. Her breathing has improved with lasix in the ED, but not returned to baseline. She is compliant with home lasix, fluid restriction, and salt restriction. She denies fever, chest pain, N/V, abdominal pain, syncope, dysuria, melena, hematuria. She has noticed minimal lower extremity edema.     In the ED, troponin 0.141, BNP 2700, COVID negative, chest x-ray with pulmonary congestion, Cr of 1.6.

## 2020-06-09 NOTE — ASSESSMENT & PLAN NOTE
Cr today of 1.6. Baseline 1.6-1.8. Will renal dose medications and avoid nephrotoxic drugs repeat labs in AM while undergoing diuresis

## 2020-06-09 NOTE — ASSESSMENT & PLAN NOTE
With increasing SOB, BNP of 2700, troponin 0.1, and pulmonary edema on chest x-ray. Recently started steroids for gout, possibly contributory to fluid retention. EF of 20% with Grade 1 DD in 3/2020  -Continue IV lasix  -continue home coreg, not currently on ACE  -Daily weights/strict intake and output/telemetry

## 2020-06-09 NOTE — SUBJECTIVE & OBJECTIVE
Past Medical History:   Diagnosis Date    Anticoagulant long-term use     Cataract     CHF (congestive heart failure)     Coronary artery disease     Gout attack     Hypercholesteremia     Hypertension     Renal disorder     Vaginal delivery     x4       Past Surgical History:   Procedure Laterality Date    APPENDECTOMY      CARDIAC DEFIBRILLATOR PLACEMENT      2015    CATARACT EXTRACTION W/  INTRAOCULAR LENS IMPLANT Left 02/07/2019    Dr. Velazco    CATARACT EXTRACTION W/  INTRAOCULAR LENS IMPLANT Right 02/21/2019    Dr. Velazco    CHOLECYSTECTOMY      COLONOSCOPY W/ POLYPECTOMY  10 or 11/ 2014    per Dr. Myrick    defribillator Left 8/2008    EYE SURGERY      HYSTERECTOMY      INTRAOCULAR PROSTHESES INSERTION Left 2/7/2019    Procedure: INSERTION, IOL PROSTHESIS;  Surgeon: Demetrius Velazco MD;  Location: [facility] OR;  Service: Ophthalmology;  Laterality: Left;  RN Phone Pre OP 1-30-19.  Arrival 05:30 AM    INTRAOCULAR PROSTHESES INSERTION Right 2/21/2019    Procedure: INSERTION, IOL PROSTHESIS;  Surgeon: Demetrius Velazco MD;  Location: [facility] OR;  Service: Ophthalmology;  Laterality: Right;    JOINT REPLACEMENT Bilateral 2005 & 2006    2 Knee Replacements=Lt. 1= 2005. Rt. 1= 2006    OOPHORECTOMY      PHACOEMULSIFICATION OF CATARACT Left 2/7/2019    Procedure: PHACOEMULSIFICATION, CATARACT;  Surgeon: Demetrius Velazco MD;  Location: [facility] OR;  Service: Ophthalmology;  Laterality: Left;    PHACOEMULSIFICATION OF CATARACT Right 2/21/2019    Procedure: PHACOEMULSIFICATION, CATARACT;  Surgeon: Demetrius Velazco MD;  Location: [facility] OR;  Service: Ophthalmology;  Laterality: Right;  RN Phone Pre op 2-15-19.  Arrival 05:30 AM    TOTAL KNEE ARTHROPLASTY Bilateral Lt.= 2005; Rt.=2006    Bilateral Knee scope       Review of patient's allergies indicates:   Allergen Reactions    Aspirin Swelling     Swelling and rash    Colace [docusate sodium] Rash     itching    Sulfa  (sulfonamide antibiotics) Swelling     Swelling and rash    Iodine and iodide containing products Rash    Penicillins Rash       Current Facility-Administered Medications on File Prior to Encounter   Medication    0.9%  NaCl infusion    phenylephrine HCL 2.5% ophthalmic solution 1 drop    proparacaine 0.5 % ophthalmic solution 1 drop    tropicamide 1% ophthalmic solution 1 drop     Current Outpatient Medications on File Prior to Encounter   Medication Sig    allopurinol (ZYLOPRIM) 100 MG tablet Take 100 mg by mouth every evening.     calcitRIOL (ROCALTROL) 0.25 MCG Cap 0.25 mcg once daily.     carvedilol (COREG) 25 MG tablet Take 0.5 tablets (12.5 mg total) by mouth 2 (two) times daily.    clopidogrel (PLAVIX) 75 mg tablet Take 75 mg by mouth once daily. On hold since 11-28-14, for procedure.    cranberry 400 mg Cap Take 1 capsule by mouth 2 (two) times daily.    fish oil-omega-3 fatty acids 300-1,000 mg capsule Take 2 g by mouth once daily. 2 caps every AM & 1 cap every PM.    folic acid/multivit-min/lutein (CENTRUM SILVER ORAL) Take by mouth once daily.    furosemide (LASIX) 40 MG tablet Take 1 tablet (40 mg total) by mouth once daily. Take 40 mg twice a day on 10/3, 10/4 followed by 20 mg twice a day.    lovastatin (MEVACOR) 40 MG tablet Take 40 mg by mouth nightly. Takes 2 caps with supper every evening.    methylPREDNISolone (MEDROL DOSEPACK) 4 mg tablet Take 4 mg by mouth. use as directed    mirabegron (MYRBETRIQ) 50 mg Tb24 Take 50 mg by mouth once daily.     ranitidine (ZANTAC) 150 MG tablet 150 mg 2 (two) times daily.     terazosin (HYTRIN) 2 MG capsule Take by mouth every evening.     acetaminophen (TYLENOL) 500 MG tablet Take 1 tablet (500 mg total) by mouth every 6 (six) hours as needed for Pain.    influenza (FLUZONE HIGH-DOSE 2018-19, PF,) 180 mcg/0.5 mL vaccine     pneumoc 13-jorge luis conj-dip cr,PF, (PREVNAR 13, PF,) 0.5 mL Syrg     pneumococcal 23-jorge luis ps vaccine (PNEUMOVAX 23) 25  mcg/0.5 mL      Family History     Problem Relation (Age of Onset)    Amblyopia Son    Diabetes Other    Heart attack Mother (88)    Hyperlipidemia Mother    Hypertension Mother, Other        Tobacco Use    Smoking status: Never Smoker    Smokeless tobacco: Never Used   Substance and Sexual Activity    Alcohol use: Yes     Comment: rarely    Drug use: No    Sexual activity: Not Currently     Partners: Male     Review of Systems   Constitutional: Negative for chills and fever.   HENT: Negative for nosebleeds and tinnitus.    Eyes: Negative for photophobia and visual disturbance.   Respiratory: Positive for shortness of breath. Negative for wheezing.    Cardiovascular: Positive for leg swelling (minimal per patient). Negative for chest pain and palpitations.   Gastrointestinal: Negative for abdominal distention, nausea and vomiting.   Genitourinary: Negative for dysuria, flank pain and hematuria.   Musculoskeletal: Negative for gait problem and joint swelling.   Skin: Negative for rash and wound.   Neurological: Negative for seizures and syncope.     Objective:     Vital Signs (Most Recent):  Temp: 98.1 °F (36.7 °C) (06/09/20 1022)  Pulse: 63 (06/09/20 1529)  Resp: 19 (06/09/20 1447)  BP: 126/73 (06/09/20 1531)  SpO2: 99 % (06/09/20 1447) Vital Signs (24h Range):  Temp:  [98.1 °F (36.7 °C)] 98.1 °F (36.7 °C)  Pulse:  [60-65] 63  Resp:  [17-19] 19  SpO2:  [95 %-99 %] 99 %  BP: (123-153)/(68-90) 126/73     Weight: 77.1 kg (170 lb)  Body mass index is 30.11 kg/m².    Physical Exam   Constitutional: She is oriented to person, place, and time. She appears well-developed and well-nourished. No distress.   HENT:   Head: Normocephalic and atraumatic.   Eyes: Conjunctivae and EOM are normal. Right eye exhibits no discharge. Left eye exhibits no discharge.   Neck: Normal range of motion. No thyromegaly present.   Cardiovascular: Normal rate and regular rhythm.   No murmur heard.  Pulmonary/Chest: Effort normal and breath  sounds normal. No respiratory distress. She has no rales.   Abdominal: Soft. Bowel sounds are normal. She exhibits no distension and no mass. There is no tenderness.   Musculoskeletal: She exhibits edema. She exhibits no deformity.   Trace lower extremity edema bilaterally   Neurological: She is alert and oriented to person, place, and time.   Skin: Skin is warm and dry.   Psychiatric: She has a normal mood and affect. Her behavior is normal.   Nursing note and vitals reviewed.        CRANIAL NERVES     CN III, IV, VI   Extraocular motions are normal.        Significant Labs:   CBC:   Recent Labs   Lab 06/09/20  1040   WBC 6.25   HGB 11.2*   HCT 36.6*   *     CMP:   Recent Labs   Lab 06/09/20  1040      K 4.1      CO2 32*   GLU 96   BUN 48*   CREATININE 1.6*   CALCIUM 10.0   PROT 7.5   ALBUMIN 3.9   BILITOT 0.5   ALKPHOS 62   AST 35   ALT 29   ANIONGAP 9   EGFRNONAA 29*     Cardiac Markers:   Recent Labs   Lab 06/09/20  1040   BNP 2,706*     Troponin:   Recent Labs   Lab 06/09/20  1040   TROPONINI 0.141*     Urine Studies:   Recent Labs   Lab 06/09/20  1151   COLORU Straw   APPEARANCEUA Clear   PHUR 6.0   SPECGRAV 1.010   PROTEINUA Negative   GLUCUA Negative   KETONESU Negative   BILIRUBINUA Negative   OCCULTUA Negative   NITRITE Negative   UROBILINOGEN Negative   LEUKOCYTESUR Negative       Significant Imaging:   Imaging Results          X-Ray Chest AP Portable (Final result)  Result time 06/09/20 13:18:32    Final result by Armin Rodriguez MD (06/09/20 13:18:32)                 Impression:      Cardiac defibrillator.    Cardiomegaly, stable.    Pulmonary vascular congestion.      Electronically signed by: Armin Rodriguez MD  Date:    06/09/2020  Time:    13:18             Narrative:    EXAMINATION:  XR CHEST AP PORTABLE    CLINICAL HISTORY:  shortness of breath;    TECHNIQUE:  Single frontal view of the chest was performed.    COMPARISON:  12/07/2019.    FINDINGS:  There is a cardiac  defibrillator present.  The cardiac silhouette appears enlarged, stable.  Atherosclerotic calcification is present within the thoracic aorta.  There is prominence of the central pulmonary vasculature.  The lungs appear free of focal consolidations.  There is no evidence for pneumothorax or large pleural effusions.  Bony structures appear intact.

## 2020-06-09 NOTE — ED TRIAGE NOTES
Patient awoke at 2am to use the restroom. Once she was back to bed she noted she was SOB. Patient sleeps on 2 pillows or in recliner as baseline. SOB continued thru am so she presents to ED for eval. Upon arrival in room pulsox 97% on room air, respirations 22.  No visible signs of distress noted. Placed on cardiac monitor.

## 2020-06-09 NOTE — ASSESSMENT & PLAN NOTE
Troponin 0.141 baseline troponin appears to be 0.9-0.1. Holding aspirin as previous allergy. Continue home plavix, statin, and coreg. Troponin trend

## 2020-06-09 NOTE — ED PROVIDER NOTES
Encounter Date: 6/9/2020    SCRIBE #1 NOTE: I, Priya Vega, am scribing for, and in the presence of,  MATTHEW Willis. I have scribed the following portions of the note - Other sections scribed: HPI,ROS,PE.       History     Chief Complaint   Patient presents with    Shortness of Breath     started this am,took fluid pill between 5-6 am     84-year-old female with past medical history of CHF, CAD, and Hypertension presenting with shortness of breath that started this morning at 2am. Patient reports she has a doctor appointment next week for a biopsy results that was done in her colon that may be cancer. She denies chest pain, fever, chills, nausea, vomiting, diarrhea, abdominal pain, cough, sore throat, rhinorrhea, leg swelling, or any further complaints. Patient reports compliance with fluid pills. No modifying factors.     The history is provided by the patient. No  was used.     Review of patient's allergies indicates:   Allergen Reactions    Aspirin Swelling     Swelling and rash    Colace [docusate sodium] Rash     itching    Sulfa (sulfonamide antibiotics) Swelling     Swelling and rash    Iodine and iodide containing products Rash    Penicillins Rash     Past Medical History:   Diagnosis Date    Anticoagulant long-term use     Cataract     CHF (congestive heart failure)     Coronary artery disease     Gout attack     Hypercholesteremia     Hypertension     Renal disorder     Vaginal delivery     x4     Past Surgical History:   Procedure Laterality Date    APPENDECTOMY      CARDIAC DEFIBRILLATOR PLACEMENT      2015    CATARACT EXTRACTION W/  INTRAOCULAR LENS IMPLANT Left 02/07/2019    Dr. Velazco    CATARACT EXTRACTION W/  INTRAOCULAR LENS IMPLANT Right 02/21/2019    Dr. Velazco    CHOLECYSTECTOMY      COLONOSCOPY W/ POLYPECTOMY  10 or 11/ 2014    per Dr. Myrick    defribrandonlator Left 8/2008    EYE SURGERY      HYSTERECTOMY      INTRAOCULAR PROSTHESES  INSERTION Left 2/7/2019    Procedure: INSERTION, IOL PROSTHESIS;  Surgeon: Demetrius Velazco MD;  Location: [facility] OR;  Service: Ophthalmology;  Laterality: Left;  RN Phone Pre OP 1-30-19.  Arrival 05:30 AM    INTRAOCULAR PROSTHESES INSERTION Right 2/21/2019    Procedure: INSERTION, IOL PROSTHESIS;  Surgeon: Demetrius Velazco MD;  Location: [facility] OR;  Service: Ophthalmology;  Laterality: Right;    JOINT REPLACEMENT Bilateral 2005 & 2006    2 Knee Replacements=Lt. 1= 2005. Rt. 1= 2006    OOPHORECTOMY      PHACOEMULSIFICATION OF CATARACT Left 2/7/2019    Procedure: PHACOEMULSIFICATION, CATARACT;  Surgeon: Demetrius Velazco MD;  Location: [facility] OR;  Service: Ophthalmology;  Laterality: Left;    PHACOEMULSIFICATION OF CATARACT Right 2/21/2019    Procedure: PHACOEMULSIFICATION, CATARACT;  Surgeon: Demetrius Velazco MD;  Location: [facility] OR;  Service: Ophthalmology;  Laterality: Right;  RN Phone Pre op 2-15-19.  Arrival 05:30 AM    TOTAL KNEE ARTHROPLASTY Bilateral Lt.= 2005; Rt.=2006    Bilateral Knee scope     Family History   Problem Relation Age of Onset    Heart attack Mother 88    Hypertension Mother     Hyperlipidemia Mother     Diabetes Other     Hypertension Other     Amblyopia Son     Blindness Neg Hx     Cataracts Neg Hx     Glaucoma Neg Hx     Macular degeneration Neg Hx     Retinal detachment Neg Hx     Strabismus Neg Hx      Social History     Tobacco Use    Smoking status: Never Smoker    Smokeless tobacco: Never Used   Substance Use Topics    Alcohol use: Yes     Comment: rarely    Drug use: No     Review of Systems   Constitutional: Negative for chills and fever.   HENT: Negative for congestion, rhinorrhea and sore throat.    Eyes: Negative for visual disturbance.   Respiratory: Positive for shortness of breath. Negative for cough.    Cardiovascular: Negative for chest pain and leg swelling.   Gastrointestinal: Negative for abdominal pain, diarrhea, nausea and vomiting.    Genitourinary: Negative for dysuria and vaginal discharge.   Skin: Negative for rash.   Neurological: Negative for headaches.   Psychiatric/Behavioral: Negative for decreased concentration.       Physical Exam     Initial Vitals [06/09/20 1022]   BP Pulse Resp Temp SpO2   123/68 64 18 98.1 °F (36.7 °C) 97 %      MAP       --         Physical Exam    Nursing note and vitals reviewed.  Constitutional: She appears well-developed and well-nourished. She is not diaphoretic. No distress.   HENT:   Mouth/Throat: Oropharynx is clear and moist.   Eyes: Pupils are equal, round, and reactive to light.   Neck: Neck supple.   Cardiovascular: Normal rate and regular rhythm.   Pulmonary/Chest: No respiratory distress. She has no wheezes. She has no rhonchi. She has no rales.   Diminished breath sounds in bilateral lower lobes.    Abdominal: Soft. There is no tenderness.   Musculoskeletal: She exhibits no edema.   No bilateral lower extremity edema.    Neurological: She is alert and oriented to person, place, and time.   Skin: Skin is warm and dry.   Psychiatric: She has a normal mood and affect.         ED Course   Procedures  Labs Reviewed   CBC W/ AUTO DIFFERENTIAL - Abnormal; Notable for the following components:       Result Value    RBC 3.80 (*)     Hemoglobin 11.2 (*)     Hematocrit 36.6 (*)     Mean Corpuscular Hemoglobin Conc 30.6 (*)     RDW 15.0 (*)     Platelets 129 (*)     Lymph # 0.9 (*)     Gran% 73.4 (*)     Lymph% 13.8 (*)     All other components within normal limits   COMPREHENSIVE METABOLIC PANEL - Abnormal; Notable for the following components:    CO2 32 (*)     BUN, Bld 48 (*)     Creatinine 1.6 (*)     eGFR if  34 (*)     eGFR if non  29 (*)     All other components within normal limits   B-TYPE NATRIURETIC PEPTIDE - Abnormal; Notable for the following components:    BNP 2,706 (*)     All other components within normal limits   TROPONIN I - Abnormal; Notable for the following  components:    Troponin I 0.141 (*)     All other components within normal limits   URINALYSIS, REFLEX TO URINE CULTURE    Narrative:     Preferred Collection Type->Urine, Clean Catch   SARS-COV-2 RNA AMPLIFICATION, QUAL   TROPONIN I          Imaging Results          X-Ray Chest AP Portable (Final result)  Result time 06/09/20 13:18:32    Final result by Armin Rodriguez MD (06/09/20 13:18:32)                 Impression:      Cardiac defibrillator.    Cardiomegaly, stable.    Pulmonary vascular congestion.      Electronically signed by: Armin Rodriguez MD  Date:    06/09/2020  Time:    13:18             Narrative:    EXAMINATION:  XR CHEST AP PORTABLE    CLINICAL HISTORY:  shortness of breath;    TECHNIQUE:  Single frontal view of the chest was performed.    COMPARISON:  12/07/2019.    FINDINGS:  There is a cardiac defibrillator present.  The cardiac silhouette appears enlarged, stable.  Atherosclerotic calcification is present within the thoracic aorta.  There is prominence of the central pulmonary vasculature.  The lungs appear free of focal consolidations.  There is no evidence for pneumothorax or large pleural effusions.  Bony structures appear intact.                                 Medical Decision Making:   Clinical Tests:   Lab Tests: Ordered and Reviewed  Radiological Study: Ordered and Reviewed  Medical Tests: Reviewed and Ordered  ED Management:  Hemodynamically stable.  Nontoxic and in no acute distress.  Patient overall well-appearing, pleasant, conversational.  COVID negative.  Troponin elevated at 0.141.  BNP 2706.  Will give 80 mg IV Lasix.  Discussed patient with Hospital Medicine and patient will be placed in observation for further treatment and management of acute on chronic CHF exacerbation.  Plan results discussed and explained to patient who verbalized understanding and is agreeable with plan.            Scribe Attestation:   Scribe #1: I performed the above scribed service and the documentation  accurately describes the services I performed. I attest to the accuracy of the note.                          Clinical Impression:       ICD-10-CM ICD-9-CM   1. Acute on chronic combined systolic and diastolic congestive heart failure I50.43 428.43     428.0   2. Shortness of breath R06.02 786.05         Disposition:   Disposition: Placed in Observation  Condition: Stable     ED Disposition Condition    Observation            I, MIMA CARRILLO, personally performed the services described in this documentation. All medical record entries made by the scribe were at my direction and in my presence. I have reviewed the chart and agree that the record reflects my personal performance and is accurate and complete.                 Mima Carrillo PA-C  06/09/20 1539

## 2020-06-09 NOTE — NURSING
Pt arrived to unit by .stretcher accompanied by transport.  .............. Assisted patient to bed. Tele monitoring initiated . Admit assessment initiated. Pt is AAO ...... No distress noted. Respirations even non-labored and on R/A. KNAPP #20 g to the rac.

## 2020-06-09 NOTE — HOSPITAL COURSE
"Palmira Malave 84 y.o. female placed in observation for acute on chronic systolic diastolic heart failure. Per chart review, patient with history of non obstructive CAD per [facility] 2011, NICM with biAICD 2014.  6/10, patient reported breathing back to baseline after IV lasix in ED. Good UOP "a lot" but not documented in epic. Denies chest pain and ready for discharge. Elevated troponin trend flat pattern due to demand ischemia.  Discussed with patient obtaining a new scale (scale broke x 1 week), low salt diet, and fluid intake 1 L a day. CKD stable. Continue home lasix and coreg (not on ACEI/ARB).  Patient stable for discharge home with close follow up Dr. Richmond.  "

## 2020-06-09 NOTE — H&P
[facility] Medicine  History & Physical    Patient Name: Palmira Malave  MRN: [id number]  Admission Date: 6/9/2020  Attending Physician: Mary Garcia   Primary Care Provider: Qi Ramon MD         Patient information was obtained from patient, past medical records and ER records.     Subjective:     Principal Problem:Acute on chronic combined systolic and diastolic congestive heart failure    Chief Complaint:   Chief Complaint   Patient presents with    Shortness of Breath     started this am,took fluid pill between 5-6 am        HPI: Palmira Malave 84 y.o. female with CHF last EF 20% in 3/2020, HTN, CKD, GERD, AICD, and gout in place presents to the hospital with a chief complaint of SOB. She reports last night she became SOB that made it difficult to ambulate and sleep. She normally has to sleep in her recliner, but was unable to sleep despite sitting upright. She recently started a steroid pack for a Gout flare. She has completed all steroids but the last day, and her gout symptoms have resolved. Her breathing has improved with lasix in the ED, but not returned to baseline. She is compliant with home lasix, fluid restriction, and salt restriction. She denies fever, chest pain, N/V, abdominal pain, syncope, dysuria, melena, hematuria. She has noticed minimal lower extremity edema.     In the ED, troponin 0.141, BNP 2700, COVID negative, chest x-ray with pulmonary congestion, Cr of 1.6.     Past Medical History:   Diagnosis Date    Anticoagulant long-term use     Cataract     CHF (congestive heart failure)     Coronary artery disease     Gout attack     Hypercholesteremia     Hypertension     Renal disorder     Vaginal delivery     x4       Past Surgical History:   Procedure Laterality Date    APPENDECTOMY      CARDIAC DEFIBRILLATOR PLACEMENT      2015    CATARACT EXTRACTION W/  INTRAOCULAR LENS IMPLANT Left 02/07/2019    Dr. Velazco    CATARACT EXTRACTION  W/  INTRAOCULAR LENS IMPLANT Right 02/21/2019    Dr. Velazco    CHOLECYSTECTOMY      COLONOSCOPY W/ POLYPECTOMY  10 or 11/ 2014    per Dr. Elen ceja Left 8/2008    EYE SURGERY      HYSTERECTOMY      INTRAOCULAR PROSTHESES INSERTION Left 2/7/2019    Procedure: INSERTION, IOL PROSTHESIS;  Surgeon: Demetrius Velazco MD;  Location: [facility] OR;  Service: Ophthalmology;  Laterality: Left;  RN Phone Pre OP 1-30-19.  Arrival 05:30 AM    INTRAOCULAR PROSTHESES INSERTION Right 2/21/2019    Procedure: INSERTION, IOL PROSTHESIS;  Surgeon: Demetrius Velazco MD;  Location: [facility] OR;  Service: Ophthalmology;  Laterality: Right;    JOINT REPLACEMENT Bilateral 2005 & 2006    2 Knee Replacements=Lt. 1= 2005. Rt. 1= 2006    OOPHORECTOMY      PHACOEMULSIFICATION OF CATARACT Left 2/7/2019    Procedure: PHACOEMULSIFICATION, CATARACT;  Surgeon: Demetrius Velazco MD;  Location: [facility] OR;  Service: Ophthalmology;  Laterality: Left;    PHACOEMULSIFICATION OF CATARACT Right 2/21/2019    Procedure: PHACOEMULSIFICATION, CATARACT;  Surgeon: Demetrius Velazco MD;  Location: [facility] OR;  Service: Ophthalmology;  Laterality: Right;  RN Phone Pre op 2-15-19.  Arrival 05:30 AM    TOTAL KNEE ARTHROPLASTY Bilateral Lt.= 2005; Rt.=2006    Bilateral Knee scope       Review of patient's allergies indicates:   Allergen Reactions    Aspirin Swelling     Swelling and rash    Colace [docusate sodium] Rash     itching    Sulfa (sulfonamide antibiotics) Swelling     Swelling and rash    Iodine and iodide containing products Rash    Penicillins Rash       Current Facility-Administered Medications on File Prior to Encounter   Medication    0.9%  NaCl infusion    phenylephrine HCL 2.5% ophthalmic solution 1 drop    proparacaine 0.5 % ophthalmic solution 1 drop    tropicamide 1% ophthalmic solution 1 drop     Current Outpatient Medications on File Prior to Encounter   Medication Sig    allopurinol (ZYLOPRIM) 100 MG  tablet Take 100 mg by mouth every evening.     calcitRIOL (ROCALTROL) 0.25 MCG Cap 0.25 mcg once daily.     carvedilol (COREG) 25 MG tablet Take 0.5 tablets (12.5 mg total) by mouth 2 (two) times daily.    clopidogrel (PLAVIX) 75 mg tablet Take 75 mg by mouth once daily. On hold since 11-28-14, for procedure.    cranberry 400 mg Cap Take 1 capsule by mouth 2 (two) times daily.    fish oil-omega-3 fatty acids 300-1,000 mg capsule Take 2 g by mouth once daily. 2 caps every AM & 1 cap every PM.    folic acid/multivit-min/lutein (CENTRUM SILVER ORAL) Take by mouth once daily.    furosemide (LASIX) 40 MG tablet Take 1 tablet (40 mg total) by mouth once daily. Take 40 mg twice a day on 10/3, 10/4 followed by 20 mg twice a day.    lovastatin (MEVACOR) 40 MG tablet Take 40 mg by mouth nightly. Takes 2 caps with supper every evening.    methylPREDNISolone (MEDROL DOSEPACK) 4 mg tablet Take 4 mg by mouth. use as directed    mirabegron (MYRBETRIQ) 50 mg Tb24 Take 50 mg by mouth once daily.     ranitidine (ZANTAC) 150 MG tablet 150 mg 2 (two) times daily.     terazosin (HYTRIN) 2 MG capsule Take by mouth every evening.     acetaminophen (TYLENOL) 500 MG tablet Take 1 tablet (500 mg total) by mouth every 6 (six) hours as needed for Pain.    influenza (FLUZONE HIGH-DOSE 2018-19, PF,) 180 mcg/0.5 mL vaccine     pneumoc 13-jorge luis conj-dip cr,PF, (PREVNAR 13, PF,) 0.5 mL Syrg     pneumococcal 23-jorge luis ps vaccine (PNEUMOVAX 23) 25 mcg/0.5 mL      Family History     Problem Relation (Age of Onset)    Amblyopia Son    Diabetes Other    Heart attack Mother (88)    Hyperlipidemia Mother    Hypertension Mother, Other        Tobacco Use    Smoking status: Never Smoker    Smokeless tobacco: Never Used   Substance and Sexual Activity    Alcohol use: Yes     Comment: rarely    Drug use: No    Sexual activity: Not Currently     Partners: Male     Review of Systems   Constitutional: Negative for chills and fever.   HENT:  Negative for nosebleeds and tinnitus.    Eyes: Negative for photophobia and visual disturbance.   Respiratory: Positive for shortness of breath. Negative for wheezing.    Cardiovascular: Positive for leg swelling (minimal per patient). Negative for chest pain and palpitations.   Gastrointestinal: Negative for abdominal distention, nausea and vomiting.   Genitourinary: Negative for dysuria, flank pain and hematuria.   Musculoskeletal: Negative for gait problem and joint swelling.   Skin: Negative for rash and wound.   Neurological: Negative for seizures and syncope.     Objective:     Vital Signs (Most Recent):  Temp: 98.1 °F (36.7 °C) (06/09/20 1022)  Pulse: 63 (06/09/20 1529)  Resp: 19 (06/09/20 1447)  BP: 126/73 (06/09/20 1531)  SpO2: 99 % (06/09/20 1447) Vital Signs (24h Range):  Temp:  [98.1 °F (36.7 °C)] 98.1 °F (36.7 °C)  Pulse:  [60-65] 63  Resp:  [17-19] 19  SpO2:  [95 %-99 %] 99 %  BP: (123-153)/(68-90) 126/73     Weight: 77.1 kg (170 lb)  Body mass index is 30.11 kg/m².    Physical Exam   Constitutional: She is oriented to person, place, and time. She appears well-developed and well-nourished. No distress.   HENT:   Head: Normocephalic and atraumatic.   Eyes: Conjunctivae and EOM are normal. Right eye exhibits no discharge. Left eye exhibits no discharge.   Neck: Normal range of motion. No thyromegaly present.   Cardiovascular: Normal rate and regular rhythm.   No murmur heard.  Pulmonary/Chest: Effort normal and breath sounds normal. No respiratory distress. She has no rales.   Abdominal: Soft. Bowel sounds are normal. She exhibits no distension and no mass. There is no tenderness.   Musculoskeletal: She exhibits edema. She exhibits no deformity.   Trace lower extremity edema bilaterally   Neurological: She is alert and oriented to person, place, and time.   Skin: Skin is warm and dry.   Psychiatric: She has a normal mood and affect. Her behavior is normal.   Nursing note and vitals  reviewed.        CRANIAL NERVES     CN III, IV, VI   Extraocular motions are normal.        Significant Labs:   CBC:   Recent Labs   Lab 06/09/20  1040   WBC 6.25   HGB 11.2*   HCT 36.6*   *     CMP:   Recent Labs   Lab 06/09/20  1040      K 4.1      CO2 32*   GLU 96   BUN 48*   CREATININE 1.6*   CALCIUM 10.0   PROT 7.5   ALBUMIN 3.9   BILITOT 0.5   ALKPHOS 62   AST 35   ALT 29   ANIONGAP 9   EGFRNONAA 29*     Cardiac Markers:   Recent Labs   Lab 06/09/20  1040   BNP 2,706*     Troponin:   Recent Labs   Lab 06/09/20  1040   TROPONINI 0.141*     Urine Studies:   Recent Labs   Lab 06/09/20  1151   COLORU Straw   APPEARANCEUA Clear   PHUR 6.0   SPECGRAV 1.010   PROTEINUA Negative   GLUCUA Negative   KETONESU Negative   BILIRUBINUA Negative   OCCULTUA Negative   NITRITE Negative   UROBILINOGEN Negative   LEUKOCYTESUR Negative       Significant Imaging:   Imaging Results          X-Ray Chest AP Portable (Final result)  Result time 06/09/20 13:18:32    Final result by Armin Rodriguez MD (06/09/20 13:18:32)                 Impression:      Cardiac defibrillator.    Cardiomegaly, stable.    Pulmonary vascular congestion.      Electronically signed by: Armin Rodriguez MD  Date:    06/09/2020  Time:    13:18             Narrative:    EXAMINATION:  XR CHEST AP PORTABLE    CLINICAL HISTORY:  shortness of breath;    TECHNIQUE:  Single frontal view of the chest was performed.    COMPARISON:  12/07/2019.    FINDINGS:  There is a cardiac defibrillator present.  The cardiac silhouette appears enlarged, stable.  Atherosclerotic calcification is present within the thoracic aorta.  There is prominence of the central pulmonary vasculature.  The lungs appear free of focal consolidations.  There is no evidence for pneumothorax or large pleural effusions.  Bony structures appear intact.                                  Assessment/Plan:     * Acute on chronic combined systolic and diastolic congestive heart failure  With  increasing SOB, BNP of 2700, troponin 0.1, and pulmonary edema on chest x-ray. Recently started steroids for gout, possibly contributory to fluid retention. EF of 20% with Grade 1 DD in 3/2020  -Continue IV lasix  -continue home coreg, not currently on ACE  -Daily weights/strict intake and output/telemetry    GERD (gastroesophageal reflux disease)  substitute home H2 inhibitor with protonix while inpatient.         Biventricular AICD in place  Stable no complaints of discharge. [employer] per chart review    CKD stage 3  Cr today of 1.6. Baseline 1.6-1.8. Will renal dose medications and avoid nephrotoxic drugs repeat labs in AM while undergoing diuresis      Hyperlipidemia  Continue home statin      Coronary artery disease involving native coronary artery of native heart without angina pectoris  Non-obstructive CAD per chart review. Continue home plavix/beta blocker/statin. Telemetry. Troponin trend    Essential hypertension  Well controlled continue home coreg and terazosin. PRN hydralazine    Elevated troponin  Troponin 0.141 baseline troponin appears to be 0.9-0.1. Holding aspirin as previous allergy. Continue home plavix, statin, and coreg. Troponin trend      VTE Risk Mitigation (From admission, onward)         Ordered     IP VTE HIGH RISK PATIENT  Once      06/09/20 1545     Place sequential compression device  Until discontinued      06/09/20 1545     Place DALE hose  Until discontinued      06/09/20 1546              VTE: DALE/SCD  Code: Full  Diet: Cardiac  Dispo: pending further diuresis  Patient will be placed in observation with hospital medicine.     Reji Nguyễn PA-C  Department of Hospital Medicine   [facility]

## 2020-06-10 VITALS
OXYGEN SATURATION: 99 % | RESPIRATION RATE: 18 BRPM | SYSTOLIC BLOOD PRESSURE: 121 MMHG | BODY MASS INDEX: 28.59 KG/M2 | HEIGHT: 63 IN | HEART RATE: 62 BPM | WEIGHT: 161.38 LBS | DIASTOLIC BLOOD PRESSURE: 65 MMHG | TEMPERATURE: 98 F

## 2020-06-10 LAB
ALBUMIN SERPL BCP-MCNC: 3.7 G/DL (ref 3.5–5.2)
ALP SERPL-CCNC: 67 U/L (ref 55–135)
ALT SERPL W/O P-5'-P-CCNC: 28 U/L (ref 10–44)
ANION GAP SERPL CALC-SCNC: 8 MMOL/L (ref 8–16)
AST SERPL-CCNC: 29 U/L (ref 10–40)
BASOPHILS # BLD AUTO: 0.03 K/UL (ref 0–0.2)
BASOPHILS NFR BLD: 0.5 % (ref 0–1.9)
BILIRUB SERPL-MCNC: 0.5 MG/DL (ref 0.1–1)
BUN SERPL-MCNC: 55 MG/DL (ref 8–23)
CALCIUM SERPL-MCNC: 10.3 MG/DL (ref 8.7–10.5)
CHLORIDE SERPL-SCNC: 97 MMOL/L (ref 95–110)
CO2 SERPL-SCNC: 34 MMOL/L (ref 23–29)
CREAT SERPL-MCNC: 1.6 MG/DL (ref 0.5–1.4)
DIFFERENTIAL METHOD: ABNORMAL
EOSINOPHIL # BLD AUTO: 0.2 K/UL (ref 0–0.5)
EOSINOPHIL NFR BLD: 2.4 % (ref 0–8)
ERYTHROCYTE [DISTWIDTH] IN BLOOD BY AUTOMATED COUNT: 14.9 % (ref 11.5–14.5)
EST. GFR  (AFRICAN AMERICAN): 34 ML/MIN/1.73 M^2
EST. GFR  (NON AFRICAN AMERICAN): 29 ML/MIN/1.73 M^2
GLUCOSE SERPL-MCNC: 106 MG/DL (ref 70–110)
HCT VFR BLD AUTO: 36.8 % (ref 37–48.5)
HGB BLD-MCNC: 11.5 G/DL (ref 12–16)
IMM GRANULOCYTES # BLD AUTO: 0.02 K/UL (ref 0–0.04)
IMM GRANULOCYTES NFR BLD AUTO: 0.3 % (ref 0–0.5)
LYMPHOCYTES # BLD AUTO: 1.1 K/UL (ref 1–4.8)
LYMPHOCYTES NFR BLD: 18 % (ref 18–48)
MCH RBC QN AUTO: 29.9 PG (ref 27–31)
MCHC RBC AUTO-ENTMCNC: 31.3 G/DL (ref 32–36)
MCV RBC AUTO: 96 FL (ref 82–98)
MONOCYTES # BLD AUTO: 0.7 K/UL (ref 0.3–1)
MONOCYTES NFR BLD: 10.6 % (ref 4–15)
NEUTROPHILS # BLD AUTO: 4.3 K/UL (ref 1.8–7.7)
NEUTROPHILS NFR BLD: 68.2 % (ref 38–73)
NRBC BLD-RTO: 0 /100 WBC
PLATELET # BLD AUTO: 125 K/UL (ref 150–350)
PMV BLD AUTO: 10.6 FL (ref 9.2–12.9)
POTASSIUM SERPL-SCNC: 4.3 MMOL/L (ref 3.5–5.1)
PROT SERPL-MCNC: 7.1 G/DL (ref 6–8.4)
RBC # BLD AUTO: 3.85 M/UL (ref 4–5.4)
SODIUM SERPL-SCNC: 139 MMOL/L (ref 136–145)
TROPONIN I SERPL DL<=0.01 NG/ML-MCNC: 0.13 NG/ML (ref 0–0.03)
WBC # BLD AUTO: 6.35 K/UL (ref 3.9–12.7)

## 2020-06-10 PROCEDURE — 84484 ASSAY OF TROPONIN QUANT: CPT

## 2020-06-10 PROCEDURE — G0378 HOSPITAL OBSERVATION PER HR: HCPCS

## 2020-06-10 PROCEDURE — 63600175 PHARM REV CODE 636 W HCPCS: Performed by: PHYSICIAN ASSISTANT

## 2020-06-10 PROCEDURE — 36415 COLL VENOUS BLD VENIPUNCTURE: CPT

## 2020-06-10 PROCEDURE — 25000003 PHARM REV CODE 250: Performed by: PHYSICIAN ASSISTANT

## 2020-06-10 PROCEDURE — 80053 COMPREHEN METABOLIC PANEL: CPT

## 2020-06-10 PROCEDURE — 85025 COMPLETE CBC W/AUTO DIFF WBC: CPT

## 2020-06-10 PROCEDURE — 94761 N-INVAS EAR/PLS OXIMETRY MLT: CPT

## 2020-06-10 PROCEDURE — 96375 TX/PRO/DX INJ NEW DRUG ADDON: CPT

## 2020-06-10 PROCEDURE — 96376 TX/PRO/DX INJ SAME DRUG ADON: CPT

## 2020-06-10 RX ORDER — ACETAMINOPHEN 325 MG/1
650 TABLET ORAL EVERY 8 HOURS PRN
Status: DISCONTINUED | OUTPATIENT
Start: 2020-06-10 | End: 2020-06-10 | Stop reason: HOSPADM

## 2020-06-10 RX ADMIN — PANTOPRAZOLE SODIUM 40 MG: 40 TABLET, DELAYED RELEASE ORAL at 09:06

## 2020-06-10 RX ADMIN — CLOPIDOGREL BISULFATE 75 MG: 75 TABLET ORAL at 09:06

## 2020-06-10 RX ADMIN — ACETAMINOPHEN 650 MG: 325 TABLET ORAL at 02:06

## 2020-06-10 RX ADMIN — ONDANSETRON HYDROCHLORIDE 4 MG: 2 SOLUTION INTRAMUSCULAR; INTRAVENOUS at 02:06

## 2020-06-10 RX ADMIN — FUROSEMIDE 40 MG: 10 INJECTION, SOLUTION INTRAMUSCULAR; INTRAVENOUS at 09:06

## 2020-06-10 RX ADMIN — CARVEDILOL 12.5 MG: 12.5 TABLET, FILM COATED ORAL at 09:06

## 2020-06-10 NOTE — NURSING
Patient given discharge instructions and medication record. Patient educated on all instructions. Verbalized understanding of all. SL removed tele removed. Off  with PCT in wheelchair.Personal belonging  With patient.

## 2020-06-10 NOTE — PLAN OF CARE
06/10/20 1020   Discharge Assessment   Assessment Type Discharge Planning Assessment   Assessment information obtained from? Medical Record   Prior to hospitilization cognitive status: Alert/Oriented   Prior to hospitalization functional status: Independent;Assistive Equipment   Current cognitive status: Alert/Oriented   Current Functional Status: Independent;Assistive Equipment   Facility Arrived From: home   Lives With child(karan), adult   Able to Return to Prior Arrangements yes   Is patient able to care for self after discharge? Yes   Who are your caregiver(s) and their phone number(s)? Usama [phone]   Patient's perception of discharge disposition home or selfcare   Readmission Within the Last 30 Days no previous admission in last 30 days   Patient currently being followed by outpatient case management? No   Patient currently receives any other outside agency services? No   Equipment Currently Used at Home cane, quad   Do you have any problems affording any of your prescribed medications? No   Is the patient taking medications as prescribed? yes   Does the patient have transportation home? Yes   Transportation Anticipated family or friend will provide   Does the patient receive services at the [facility]mad[facility] Clinic? No   Discharge Plan A Home with family  (with follow up )   DME Needed Upon Discharge  none   Patient/Family in Agreement with Plan yes     [facility] [place] - [address]  [address]  [place] [address]  Phone: [phone] Fax: [phone]

## 2020-06-10 NOTE — PLAN OF CARE
Problem: Fall Injury Risk  Goal: Absence of Fall and Fall-Related Injury  Outcome: Ongoing, Progressing  Intervention: Identify and Manage Contributors to Fall Injury Risk  Flowsheets (Taken 6/10/2020 7003)  Self-Care Promotion: independence encouraged; BADL personal objects within reach; BADL personal routines maintained  Medication Review/Management: medications reviewed

## 2020-06-10 NOTE — PROGRESS NOTES
Left- or Right- Side Congestive Heart Failure (CHF)    The heart is a large muscle. It is a pump that circulates blood throughout the body. Blood carries oxygen to all of the organs, including the brain, muscles, and skin. After your body takes the oxygen out of the blood, the blood returns to the heart. The right side of the heart collects the blood from the body and pumps it to the lungs. In the lungs, it gets fresh oxygen and gives up carbon dioxide. The oxygen-rich blood from the lungs then returns to the left side of the heart, where it is pumped back out to the rest of your body, starting the process all over.  Congestive heart failure (CHF) occurs when the heart muscle is weakened. This affects the pumping action of the heart. Heart failure can affect the right side of the heart or the left side. But heart failure may affect not only the right side of the heart or only the left side. Although it may have started on one side, it can and often eventually does affect both sides.  Right-side heart failure  When the right side of the heart is weakened, it cant handle the blood it is getting from the rest of the body. This blood returns to the heart through veins. When too much pressure builds up in the veins, fluid leaks out into the tissues. Gravity then causes that fluid to move to those parts of the body that are the lowest. So one of the first symptoms of right-side CHF can include swelling in the feet and ankles. If the condition gets worse, the swelling can even go up past the knees. Sometimes it gets so severe, the liver can get congested as well.  Left-side heart failure  When the left side of the heart is weakened, it cant handle the blood it gets from the lungs. Pressure then builds up in the veins of the lungs, causing fluid to leak into the lung tissues. This may cause CHF and pulmonary edema. This causes you to feel short of breath, weak, or dizzy. These symptoms are often worse with exertion, such  as when climbing stairs or walking up hills. Lying with your head flat is uncomfortable and can make your breathing worse. This may make sleeping difficult. You may need to use extra pillows to elevate your upper body to sleep well. The same is true when just resting during the daytime.  There are many causes of heart failure including:  · Coronary artery disease  · Past heart attack (also known as acute myocardial infarction, or AMI)  · High blood pressure  · Damaged heart valve  · Diabetes  · Obesity  · Cigarette smoking  · Alcohol abuse  Heart failure is a chronic condition. There is no cure. The purpose of medical treatment is to improve the pumping action of the heart. The main way to do this is to remove excess water from the body. A number of medicines can help reach this goal, improve symptoms, and prevent the heart from becoming weaker. Sometimes, heart failure can become so severe that a device is placed in the heart to help with pumping. Another major goal is to better treat the causes of heart failure, such as diabetes and high blood pressure, by making changes in your lifestyle and maximizing medical control when needed.  Home care  Follow these guidelines when caring for yourself at home:  · Check your weight every day. This is very important because a sudden increase in weight gain could mean worsening heart failure. Keep these things in mind:  ? Use the same scale every day.  ? Weigh yourself at the same time every day.  ? Make sure the scale is on a hard floor surface, not on a rug or carpet.  ? Keep a record of your weight every day so your healthcare provider can see it. If you are not given a log sheet for this, keep a separate journal for this purpose.   · Cut back on the amount of salt (sodium) you eat. Follow your healthcare provider's recommendation on how much salt or sodium you should have each day.  ? Avoid high-salt foods. These include olives, pickles, smoked meats, salted potato chips,  and most prepared foods.  ? Don't add salt to your food at the table. Use only small amounts of salt when cooking.  ? Read the labels carefully on food packages to learn how much salt or sodium is in each serving in the package. Remember, a can or package of food may contain more than 1 serving. So if you eat all the food in the package, you may be getting more salt than you think.  · Follow your healthcare provider's recommendations about how much fluid you should have. Be aware that some foods, such as soup, pudding, and juicy fruits like oranges or melons, contain liquid. You'll need to count the liquid in those foods as part of your daily fluid intake. Your provider can help you with this.  · Stop smoking.  · Cut back on how much alcohol you drink.  · Lose weight if you are overweight. The excess weight adds a lot of stress on the workload of the heart.  · Stay active. Talk with your provider about an exercise program that is safe for your heart.  · Keep your feet elevated to reduce swelling. Ask your provider about support hose as a preventive treatment for daytime leg swelling.  Besides taking your medicine as instructed, an important part of treatment is lifestyle changes. These include diet, physical activity, stopping smoking, and weight control.  Improve your diet by including more fresh foods, cutting back on how much sugar and saturated fat you eat, and eating fewer processed foods and less salt.  Follow-up care  Follow up with your healthcare provider, or as advised.  Make sure to keep any appointments that were made for you. These can help better control your congestive heart failure. You will need to follow up with your provider on a routine basis to make sure your heart failure is well managed.  If an X-ray, ECG, or other tests were done, you will be told of any new findings that may affect your care.  Call 911  Call 911 if you:  · Become severely short of breath  · Feel lightheaded, or feel like you  might pass out or faint  · Have chest pain or discomfort that is different than usual, the medicines your doctor told you to use for this don't help, or the pain lasts longer than 10 to 15 minutes  · You suddenly develop a rapid heart rate  When to seek medical advice  The following may be signs that your heart failure is getting worse. Call your healthcare provider right away if any of these happen:  · Sudden weight gain. This means 3 or more pounds in one day, or 5 or more pounds in 1 week.  · Trouble breathing not related to being active  · New or increased swelling of your legs or ankles  · Swelling or pain in your abdomen  · Breathing trouble at night. This means waking up short of breath or needing more pillows to breathe.  · Frequent coughing that doesnt go away  · Feeling much more tired than usual

## 2020-06-10 NOTE — PROGRESS NOTES
WRITTEN HEALTHCARE DISCHARGE INFORMATION      Things that YOU are RESPONSIBLE for to Manage Your Care At Home:     1. Getting your prescriptions filled.  2. Taking you medications as directed. DO NOT MISS ANY DOSES!  3. Going to your follow-up doctor appointments. This is important because it allows the doctor to monitor your progress and to determine if any changes need to be made to your treatment plan.     If you are unable to make your follow up appointments, please call the number listed and reschedule this appointment.      ____________HELP AT HOME____________________     Experiencing any SIGNS or SYMPTOMS: YOU CAN     Schedule a same day appopintment with your Primary Care Doctor or  you can call [facility] Line for 24/7 assistance at [phone]     If you are experience any signs or symptoms that have become severe, Call 911 and come to your nearest Emergency Room.     Thank you for choosing [facility] and allowing us to care for you.   From your care management team:      You should receive a call from [facility] within 48-72 hours to help manage your care after discharge. Please try to make sure that you answer your phone for this important phone call.    Follow-up Information     Kelechi Richmond MD On 6/22/2020.    Specialty:  Cardiology  Why:  Appointment scheduled for June 22nd at 9:30am  Contact information:  [address]  [address]  [place] [address]  [phone]             Qi Ramon MD.    Specialty:  Internal Medicine  Why:  call to schedule follow up with PCP as needed post hospital discharge.  Contact information:  [address] [place] [address]  [address]  [place] [address]  [phone]

## 2020-06-10 NOTE — DISCHARGE SUMMARY
"[facility]  Discharge Summary      Patient Name: Palmira Malave  MRN: [id number]  Admission Date: 6/9/2020  Hospital Length of Stay: 0 days  Discharge Date and Time:  06/10/2020 10:33 AM  Attending Physician: Mary Garcia MD   Discharging Provider: Maddie Perez NP  Primary Care Provider: Qi Ramon MD      HPI:   Palmira Malave 84 y.o. female with CHF last EF 20% in 3/2020, HTN, CKD, GERD, AICD, and gout in place presents to the hospital with a chief complaint of SOB. She reports last night she became SOB that made it difficult to ambulate and sleep. She normally has to sleep in her recliner, but was unable to sleep despite sitting upright. She recently started a steroid pack for a Gout flare. She has completed all steroids but the last day, and her gout symptoms have resolved. Her breathing has improved with lasix in the ED, but not returned to baseline. She is compliant with home lasix, fluid restriction, and salt restriction. She denies fever, chest pain, N/V, abdominal pain, syncope, dysuria, melena, hematuria. She has noticed minimal lower extremity edema.     In the ED, troponin 0.141, BNP 2700, COVID negative, chest x-ray with pulmonary congestion, Cr of 1.6.     * No surgery found *      Hospital Course:   Palmira Malave 84 y.o. female placed in observation for acute on chronic systolic diastolic heart failure. Per chart review, patient with history of non obstructive CAD per [facility] 2011, NICM with biAICD 2014.  6/10, patient reported breathing back to baseline after IV lasix in ED. Good UOP "a lot" but not documented in epic. Denies chest pain and ready for discharge. Elevated troponin trend flat pattern due to demand ischemia.  Discussed with patient obtaining a new scale (scale broke x 1 week), low salt diet, and fluid intake 1 L a day. CKD stable. Continue home lasix and coreg (not on ACEI/ARB.  Patient stable for discharge home with close follow " up Dr. Richmond.     Consults:     No new Assessment & Plan notes have been filed under this hospital service since the last note was generated.  Service: Hospital Medicine    Final Active Diagnoses:    Diagnosis Date Noted POA    PRINCIPAL PROBLEM:  Acute on chronic combined systolic and diastolic congestive heart failure [I50.43] 06/09/2020 Yes    Biventricular AICD in place [Z95.810] 12/08/2019 Yes     Chronic    GERD (gastroesophageal reflux disease) [K21.9] 12/08/2019 Yes     Chronic    CKD stage 3 [N18.3] 11/15/2014 Yes     Chronic    Coronary artery disease involving native coronary artery of native heart without angina pectoris [I25.10] 11/15/2014 Yes    Essential hypertension [I10] 11/15/2014 Yes     Chronic    Hyperlipidemia [E78.5] 11/15/2014 Yes     Chronic    Elevated troponin [R79.89] 11/15/2014 Yes      Problems Resolved During this Admission:       Discharged Condition: good    Disposition: Home or Self Care    Follow Up:  Follow-up Information     Kelechi Richmond MD.    Specialty:  Cardiology  Contact information:  [address]  [address]  [facility] [address]  [phone]                 Patient Instructions:      Diet Cardiac     Diet Cardiac     Notify your health care provider if you experience any of the following:  temperature >100.4     Notify your health care provider if you experience any of the following:  difficulty breathing or increased cough     Notify your health care provider if you experience any of the following:  increased confusion or weakness     Notify your health care provider if you experience any of the following:  temperature >100.4     Notify your health care provider if you experience any of the following:  difficulty breathing or increased cough     Notify your health care provider if you experience any of the following:  increased confusion or weakness     Activity as tolerated     Activity as tolerated       Significant Diagnostic Studies: Labs: none    Pending  Diagnostic Studies:     None         Medications:  Reconciled Home Medications:      Medication List      CONTINUE taking these medications    acetaminophen 500 MG tablet  Commonly known as:  TYLENOL  Take 1 tablet (500 mg total) by mouth every 6 (six) hours as needed for Pain.     allopurinoL 100 MG tablet  Commonly known as:  ZYLOPRIM  Take 100 mg by mouth every evening.     calcitRIOL 0.25 MCG Cap  Commonly known as:  ROCALTROL  0.25 mcg once daily.     carvediloL 25 MG tablet  Commonly known as:  COREG  Take 0.5 tablets (12.5 mg total) by mouth 2 (two) times daily.     CENTRUM SILVER ORAL  Take by mouth once daily.     clopidogreL 75 mg tablet  Commonly known as:  PLAVIX  Take 75 mg by mouth once daily. On hold since 11-28-14, for procedure.     cranberry 400 mg Cap  Take 1 capsule by mouth 2 (two) times daily.     fish oil-omega-3 fatty acids 300-1,000 mg capsule  Take 2 g by mouth once daily. 2 caps every AM & 1 cap every PM.     FLUZONE HIGH-DOSE 2018-19 (PF) 180 mcg/0.5 mL vaccine  Generic drug:  influenza     furosemide 40 MG tablet  Commonly known as:  LASIX  Take 1 tablet (40 mg total) by mouth once daily. Take 40 mg twice a day on 10/3, 10/4 followed by 20 mg twice a day.     lovastatin 40 MG tablet  Commonly known as:  MEVACOR  Take 40 mg by mouth nightly. Takes 2 caps with supper every evening.     MYRBETRIQ 50 mg Tb24  Generic drug:  mirabegron  Take 50 mg by mouth once daily.     ranitidine 150 MG tablet  Commonly known as:  ZANTAC  150 mg 2 (two) times daily.     terazosin 2 MG capsule  Commonly known as:  HYTRIN  Take by mouth every evening.        STOP taking these medications    methylPREDNISolone 4 mg tablet  Commonly known as:  MEDROL DOSEPACK     pneumoc 13-jorge luis conj-dip cr(PF) 0.5 mL Syrg  Commonly known as:  PREVNAR 13 (PF)     PNEUMOVAX-23 25 mcg/0.5 mL vaccine  Generic drug:  pneumococcal 23-jorge luis ps            Indwelling Lines/Drains at time of discharge:   Lines/Drains/Airways     None                  Time spent on the discharge of patient: 30 minutes  Patient was seen and examined on the date of discharge and determined to be suitable for discharge.         Maddie Perez NP  Department of Hospital Medicine  [facility]

## 2020-06-22 ENCOUNTER — OFFICE VISIT (OUTPATIENT)
Dept: CARDIOLOGY | Facility: CLINIC | Age: 85
End: 2020-06-22
Payer: MEDICARE

## 2020-06-22 VITALS
HEART RATE: 69 BPM | DIASTOLIC BLOOD PRESSURE: 61 MMHG | WEIGHT: 167.56 LBS | SYSTOLIC BLOOD PRESSURE: 131 MMHG | HEIGHT: 63 IN | BODY MASS INDEX: 29.69 KG/M2 | OXYGEN SATURATION: 96 %

## 2020-06-22 DIAGNOSIS — E78.00 PURE HYPERCHOLESTEROLEMIA: Chronic | ICD-10-CM

## 2020-06-22 DIAGNOSIS — I50.42 CHRONIC COMBINED SYSTOLIC AND DIASTOLIC HEART FAILURE: Chronic | ICD-10-CM

## 2020-06-22 DIAGNOSIS — I50.23 ACUTE ON CHRONIC SYSTOLIC CONGESTIVE HEART FAILURE: ICD-10-CM

## 2020-06-22 DIAGNOSIS — I10 ESSENTIAL HYPERTENSION: Primary | Chronic | ICD-10-CM

## 2020-06-22 DIAGNOSIS — I20.89 OTHER FORMS OF ANGINA PECTORIS: ICD-10-CM

## 2020-06-22 DIAGNOSIS — Z95.810 ICD (IMPLANTABLE CARDIOVERTER-DEFIBRILLATOR), BIVENTRICULAR, IN SITU: Chronic | ICD-10-CM

## 2020-06-22 PROCEDURE — 1101F PR PT FALLS ASSESS DOC 0-1 FALLS W/OUT INJ PAST YR: ICD-10-PCS | Mod: CPTII,S$GLB,, | Performed by: INTERNAL MEDICINE

## 2020-06-22 PROCEDURE — 1101F PT FALLS ASSESS-DOCD LE1/YR: CPT | Mod: CPTII,S$GLB,, | Performed by: INTERNAL MEDICINE

## 2020-06-22 PROCEDURE — 1159F MED LIST DOCD IN RCRD: CPT | Mod: S$GLB,,, | Performed by: INTERNAL MEDICINE

## 2020-06-22 PROCEDURE — 99999 PR PBB SHADOW E&M-EST. PATIENT-LVL IV: CPT | Mod: PBBFAC,,, | Performed by: INTERNAL MEDICINE

## 2020-06-22 PROCEDURE — 3075F SYST BP GE 130 - 139MM HG: CPT | Mod: CPTII,S$GLB,, | Performed by: INTERNAL MEDICINE

## 2020-06-22 PROCEDURE — 1159F PR MEDICATION LIST DOCUMENTED IN MEDICAL RECORD: ICD-10-PCS | Mod: S$GLB,,, | Performed by: INTERNAL MEDICINE

## 2020-06-22 PROCEDURE — 1126F PR PAIN SEVERITY QUANTIFIED, NO PAIN PRESENT: ICD-10-PCS | Mod: S$GLB,,, | Performed by: INTERNAL MEDICINE

## 2020-06-22 PROCEDURE — 99999 PR PBB SHADOW E&M-EST. PATIENT-LVL IV: ICD-10-PCS | Mod: PBBFAC,,, | Performed by: INTERNAL MEDICINE

## 2020-06-22 PROCEDURE — 3078F DIAST BP <80 MM HG: CPT | Mod: CPTII,S$GLB,, | Performed by: INTERNAL MEDICINE

## 2020-06-22 PROCEDURE — 3075F PR MOST RECENT SYSTOLIC BLOOD PRESS GE 130-139MM HG: ICD-10-PCS | Mod: CPTII,S$GLB,, | Performed by: INTERNAL MEDICINE

## 2020-06-22 PROCEDURE — 1126F AMNT PAIN NOTED NONE PRSNT: CPT | Mod: S$GLB,,, | Performed by: INTERNAL MEDICINE

## 2020-06-22 PROCEDURE — 99214 PR OFFICE/OUTPT VISIT, EST, LEVL IV, 30-39 MIN: ICD-10-PCS | Mod: S$GLB,,, | Performed by: INTERNAL MEDICINE

## 2020-06-22 PROCEDURE — 3078F PR MOST RECENT DIASTOLIC BLOOD PRESSURE < 80 MM HG: ICD-10-PCS | Mod: CPTII,S$GLB,, | Performed by: INTERNAL MEDICINE

## 2020-06-22 PROCEDURE — 99214 OFFICE O/P EST MOD 30 MIN: CPT | Mod: S$GLB,,, | Performed by: INTERNAL MEDICINE

## 2020-06-22 NOTE — PROGRESS NOTES
Subjective:    Patient ID:  Palmira Malave is a 84 y.o. female who presents for follow-up of Coronary Artery Disease      HPI     NICM, [employer]tronic BiV AICD, HTN, HLD        Previously followed by Dr Batres  Previous history:  Here for follow-up of systolic heart failure and biventricular ICD interrogation.  She denies any worsening cardiopulmonary complaints since we last saw her.  She's had no issues with her device.  She is expressed no worsening cardiopulmonary complaints.  She denies any chest pain, shortness breath or palpitations.  She's not expressing PND, orthopnea or lower edema.  She denies any dizziness, presyncope or syncope.  Otherwise she's better usual state of health.  He says she has a daughter the moved into scan early onset dementia she's having to take care of.  She tries to stay active by going to interactions with a female group.  She is seen by [facility] and does get injections.  He wants clearance for rehabilitation.     Here for follow-up of systolic heart failure and previous biventricular ICD placement.  She had her device interrogated which shows no significant issues.  She had recent admission at the beginning of the month for mild volume overload.  She was diuresed overnight and discharged home the next day.  Her EF is stable.  She denies any PND, orthopnea or lower Veronica.  She has not experiencing dizziness, presyncope or syncope.  She on questioning says she is compliant with medicines and diet.          catheter 2011 reported nonobstructive CAD     Echo 3/9/20  · Severely decreased left ventricular systolic function. The estimated ejection fraction is 20%.  · Mild concentric left ventricular hypertrophy.  · Grade I (mild) left ventricular diastolic dysfunction consistent with impaired relaxation.  · Moderate left ventricular enlargement.  · Moderate mitral regurgitation.  · Normal right ventricular systolic function.  · Severe left atrial enlargement.  · Mild right  atrial enlargement.  · Intermediate central venous pressure (8 mmHg).  · The estimated PA systolic pressure is 33 mmHg.     Carotid ultrasound:  7-18  CONCLUSIONS   There is 40 - 49% right Internal Carotid stenosis.  There is 20 - 39% left Internal Carotid stenosis.     Went to the ER 9/11/19  Palmira Malave is a 84 y.o. female with CAD, HTN, hypercholesteremia, and renal disorder who presents to the ED complaining of urinary frequency and burning sensation when urinating onset yesterday. Pt is allergic to aspirin, colace, sulfa, iodine, and penicillin with rash and itching as a reaction. Pt's brother is in the hospital because he had a heart attack. Denies DM. Denies fever, chill, nausea, vomiting, abdominal pain, CP, SOB, or headache.     9/18/19 Denies recent CP or SOB  EKG A-sensed V-paced     Admitted 6/9/20  Palmira Malave 84 y.o. female with CHF last EF 20% in 3/2020, HTN, CKD, GERD, AICD, and gout in place presents to the hospital with a chief complaint of SOB. She reports last night she became SOB that made it difficult to ambulate and sleep. She normally has to sleep in her recliner, but was unable to sleep despite sitting upright. She recently started a steroid pack for a Gout flare. She has completed all steroids but the last day, and her gout symptoms have resolved. Her breathing has improved with lasix in the ED, but not returned to baseline. She is compliant with home lasix, fluid restriction, and salt restriction. She denies fever, chest pain, N/V, abdominal pain, syncope, dysuria, melena, hematuria. She has noticed minimal lower extremity edema.      In the ED, troponin 0.141, BNP 2700, COVID negative, chest x-ray with pulmonary congestion, Cr of 1.6.     Hospital Course:   Palmiar Malave 84 y.o. female placed in observation for acute on chronic systolic diastolic heart failure. Per chart review, patient with history of non obstructive CAD per [facility] 2011, NICM with biAICD 2014.  6/10, patient  "reported breathing back to baseline after IV lasix in ED. Good UOP "a lot" but not documented in epic. Denies chest pain and ready for discharge. Elevated troponin trend flat pattern due to demand ischemia.  Discussed with patient obtaining a new scale (scale broke x 1 week), low salt diet, and fluid intake 1 L a day. CKD stable. Continue home lasix and coreg (not on ACEI/ARB.  Patient stable for discharge home with close follow up Dr. Richmond.      6/22/20 Recently Dx with colon CA. Less SOB since discharge  Denies CP      Review of Systems   Constitution: Negative for decreased appetite.   HENT: Negative for ear discharge.    Eyes: Negative for blurred vision.   Respiratory: Negative for hemoptysis.    Endocrine: Negative for polyphagia.   Hematologic/Lymphatic: Negative for adenopathy.   Skin: Negative for color change.   Musculoskeletal: Negative for joint swelling.   Genitourinary: Negative for bladder incontinence.   Neurological: Negative for brief paralysis.   Psychiatric/Behavioral: Negative for hallucinations.   Allergic/Immunologic: Negative for hives.        Objective:    Physical Exam   Constitutional: She is oriented to person, place, and time. She appears well-developed and well-nourished.   HENT:   Head: Normocephalic and atraumatic.   Eyes: Pupils are equal, round, and reactive to light. Conjunctivae and EOM are normal.   Neck: Normal range of motion. Neck supple. No thyromegaly present.   Cardiovascular: Normal rate and regular rhythm.   No murmur heard.  Pulmonary/Chest: Effort normal and breath sounds normal. No respiratory distress.   Abdominal: Soft. Bowel sounds are normal.   Musculoskeletal:         General: No edema.   Neurological: She is alert and oriented to person, place, and time.   Skin: Skin is warm and dry.   Psychiatric: She has a normal mood and affect. Her behavior is normal.         Assessment:       1. Essential hypertension    2. Pure hypercholesterolemia    3. Biventricular " AICD in place    4. Chronic combined systolic and diastolic heart failure    5. Acute on chronic systolic congestive heart failure    6. Other forms of angina pectoris         Plan:       Cleared for colon surgery at moderate cardiac risk  OK to hold plavix 7 days before  OV 3 months with [employer] AICD check

## 2020-08-08 ENCOUNTER — HOSPITAL ENCOUNTER (OUTPATIENT)
Facility: HOSPITAL | Age: 85
Discharge: HOME | End: 2020-08-08
Attending: EMERGENCY MEDICINE | Admitting: HOSPITALIST
Payer: MEDICARE

## 2020-08-08 VITALS
BODY MASS INDEX: 29.34 KG/M2 | WEIGHT: 165.56 LBS | RESPIRATION RATE: 18 BRPM | HEIGHT: 63 IN | SYSTOLIC BLOOD PRESSURE: 125 MMHG | HEART RATE: 65 BPM | DIASTOLIC BLOOD PRESSURE: 59 MMHG | OXYGEN SATURATION: 96 % | TEMPERATURE: 98 F

## 2020-08-08 DIAGNOSIS — R06.02 SHORTNESS OF BREATH: ICD-10-CM

## 2020-08-08 DIAGNOSIS — I50.23 ACUTE ON CHRONIC SYSTOLIC CONGESTIVE HEART FAILURE: ICD-10-CM

## 2020-08-08 DIAGNOSIS — R09.02 HYPOXIA: ICD-10-CM

## 2020-08-08 DIAGNOSIS — I50.9 CONGESTIVE HEART FAILURE, UNSPECIFIED HF CHRONICITY, UNSPECIFIED HEART FAILURE TYPE: Primary | ICD-10-CM

## 2020-08-08 LAB
ALBUMIN SERPL BCP-MCNC: 3.9 G/DL (ref 3.5–5.2)
ALP SERPL-CCNC: 97 U/L (ref 55–135)
ALT SERPL W/O P-5'-P-CCNC: 18 U/L (ref 10–44)
ANION GAP SERPL CALC-SCNC: 11 MMOL/L (ref 8–16)
AST SERPL-CCNC: 30 U/L (ref 10–40)
BASOPHILS # BLD AUTO: 0.04 K/UL (ref 0–0.2)
BASOPHILS NFR BLD: 0.7 % (ref 0–1.9)
BILIRUB SERPL-MCNC: 0.4 MG/DL (ref 0.1–1)
BILIRUB UR QL STRIP: NEGATIVE
BNP SERPL-MCNC: 1615 PG/ML (ref 0–99)
BUN SERPL-MCNC: 48 MG/DL (ref 8–23)
CALCIUM SERPL-MCNC: 9.2 MG/DL (ref 8.7–10.5)
CHLORIDE SERPL-SCNC: 102 MMOL/L (ref 95–110)
CLARITY UR: CLEAR
CO2 SERPL-SCNC: 28 MMOL/L (ref 23–29)
COLOR UR: COLORLESS
CREAT SERPL-MCNC: 1.8 MG/DL (ref 0.5–1.4)
DIFFERENTIAL METHOD: ABNORMAL
EOSINOPHIL # BLD AUTO: 0.2 K/UL (ref 0–0.5)
EOSINOPHIL NFR BLD: 2.7 % (ref 0–8)
ERYTHROCYTE [DISTWIDTH] IN BLOOD BY AUTOMATED COUNT: 15.4 % (ref 11.5–14.5)
EST. GFR  (AFRICAN AMERICAN): 29 ML/MIN/1.73 M^2
EST. GFR  (NON AFRICAN AMERICAN): 25 ML/MIN/1.73 M^2
FOLATE SERPL-MCNC: 15.6 NG/ML (ref 4–24)
GLUCOSE SERPL-MCNC: 98 MG/DL (ref 70–110)
GLUCOSE UR QL STRIP: NEGATIVE
HCT VFR BLD AUTO: 34.7 % (ref 37–48.5)
HGB BLD-MCNC: 10.5 G/DL (ref 12–16)
HGB UR QL STRIP: NEGATIVE
IMM GRANULOCYTES # BLD AUTO: 0.02 K/UL (ref 0–0.04)
IMM GRANULOCYTES NFR BLD AUTO: 0.3 % (ref 0–0.5)
INR PPP: 1 (ref 0.8–1.2)
KETONES UR QL STRIP: NEGATIVE
LEUKOCYTE ESTERASE UR QL STRIP: NEGATIVE
LYMPHOCYTES # BLD AUTO: 1 K/UL (ref 1–4.8)
LYMPHOCYTES NFR BLD: 16.1 % (ref 18–48)
MCH RBC QN AUTO: 29.8 PG (ref 27–31)
MCHC RBC AUTO-ENTMCNC: 30.3 G/DL (ref 32–36)
MCV RBC AUTO: 99 FL (ref 82–98)
MONOCYTES # BLD AUTO: 0.6 K/UL (ref 0.3–1)
MONOCYTES NFR BLD: 9.7 % (ref 4–15)
NEUTROPHILS # BLD AUTO: 4.2 K/UL (ref 1.8–7.7)
NEUTROPHILS NFR BLD: 70.5 % (ref 38–73)
NITRITE UR QL STRIP: NEGATIVE
NRBC BLD-RTO: 0 /100 WBC
PH UR STRIP: 7 [PH] (ref 5–8)
PLATELET # BLD AUTO: 109 K/UL (ref 150–350)
PMV BLD AUTO: 10.4 FL (ref 9.2–12.9)
POTASSIUM SERPL-SCNC: 4 MMOL/L (ref 3.5–5.1)
PROT SERPL-MCNC: 7 G/DL (ref 6–8.4)
PROT UR QL STRIP: NEGATIVE
PROTHROMBIN TIME: 11 SEC (ref 9–12.5)
RBC # BLD AUTO: 3.52 M/UL (ref 4–5.4)
SARS-COV-2 RDRP RESP QL NAA+PROBE: NEGATIVE
SODIUM SERPL-SCNC: 141 MMOL/L (ref 136–145)
SP GR UR STRIP: 1 (ref 1–1.03)
TROPONIN I SERPL DL<=0.01 NG/ML-MCNC: 0.09 NG/ML (ref 0–0.03)
TROPONIN I SERPL DL<=0.01 NG/ML-MCNC: 0.11 NG/ML (ref 0–0.03)
URN SPEC COLLECT METH UR: ABNORMAL
UROBILINOGEN UR STRIP-ACNC: NEGATIVE EU/DL
VIT B12 SERPL-MCNC: 684 PG/ML (ref 210–950)
WBC # BLD AUTO: 5.97 K/UL (ref 3.9–12.7)

## 2020-08-08 PROCEDURE — 80053 COMPREHEN METABOLIC PANEL: CPT

## 2020-08-08 PROCEDURE — 93010 EKG 12-LEAD: ICD-10-PCS | Mod: ,,, | Performed by: INTERNAL MEDICINE

## 2020-08-08 PROCEDURE — 84484 ASSAY OF TROPONIN QUANT: CPT

## 2020-08-08 PROCEDURE — G0378 HOSPITAL OBSERVATION PER HR: HCPCS

## 2020-08-08 PROCEDURE — 82746 ASSAY OF FOLIC ACID SERUM: CPT

## 2020-08-08 PROCEDURE — 85610 PROTHROMBIN TIME: CPT

## 2020-08-08 PROCEDURE — U0002 COVID-19 LAB TEST NON-CDC: HCPCS

## 2020-08-08 PROCEDURE — 85025 COMPLETE CBC W/AUTO DIFF WBC: CPT

## 2020-08-08 PROCEDURE — 93010 ELECTROCARDIOGRAM REPORT: CPT | Mod: ,,, | Performed by: INTERNAL MEDICINE

## 2020-08-08 PROCEDURE — 83880 ASSAY OF NATRIURETIC PEPTIDE: CPT

## 2020-08-08 PROCEDURE — 63600175 PHARM REV CODE 636 W HCPCS: Performed by: EMERGENCY MEDICINE

## 2020-08-08 PROCEDURE — 93005 ELECTROCARDIOGRAM TRACING: CPT

## 2020-08-08 PROCEDURE — 96376 TX/PRO/DX INJ SAME DRUG ADON: CPT

## 2020-08-08 PROCEDURE — 96374 THER/PROPH/DIAG INJ IV PUSH: CPT

## 2020-08-08 PROCEDURE — 82607 VITAMIN B-12: CPT

## 2020-08-08 PROCEDURE — 99291 CRITICAL CARE FIRST HOUR: CPT | Mod: 25

## 2020-08-08 PROCEDURE — 25000003 PHARM REV CODE 250: Performed by: EMERGENCY MEDICINE

## 2020-08-08 PROCEDURE — 81003 URINALYSIS AUTO W/O SCOPE: CPT

## 2020-08-08 RX ORDER — SODIUM CHLORIDE 0.9 % (FLUSH) 0.9 %
10 SYRINGE (ML) INJECTION
Status: DISCONTINUED | OUTPATIENT
Start: 2020-08-08 | End: 2020-08-08 | Stop reason: HOSPADM

## 2020-08-08 RX ORDER — CLOPIDOGREL BISULFATE 75 MG/1
75 TABLET ORAL DAILY
Status: DISCONTINUED | OUTPATIENT
Start: 2020-08-08 | End: 2020-08-08 | Stop reason: HOSPADM

## 2020-08-08 RX ORDER — LOVASTATIN 20 MG/1
40 TABLET ORAL NIGHTLY
Status: DISCONTINUED | OUTPATIENT
Start: 2020-08-08 | End: 2020-08-08 | Stop reason: HOSPADM

## 2020-08-08 RX ORDER — CARVEDILOL 12.5 MG/1
12.5 TABLET ORAL 2 TIMES DAILY
Status: DISCONTINUED | OUTPATIENT
Start: 2020-08-08 | End: 2020-08-08 | Stop reason: HOSPADM

## 2020-08-08 RX ORDER — ALLOPURINOL 100 MG/1
100 TABLET ORAL NIGHTLY
Status: DISCONTINUED | OUTPATIENT
Start: 2020-08-08 | End: 2020-08-08 | Stop reason: HOSPADM

## 2020-08-08 RX ORDER — FUROSEMIDE 40 MG/1
40 TABLET ORAL 2 TIMES DAILY
Qty: 30 TABLET | Refills: 0
Start: 2020-08-08 | End: 2020-11-03 | Stop reason: SDUPTHER

## 2020-08-08 RX ORDER — FUROSEMIDE 10 MG/ML
40 INJECTION INTRAMUSCULAR; INTRAVENOUS
Status: COMPLETED | OUTPATIENT
Start: 2020-08-08 | End: 2020-08-08

## 2020-08-08 RX ORDER — FUROSEMIDE 10 MG/ML
40 INJECTION INTRAMUSCULAR; INTRAVENOUS
Status: DISCONTINUED | OUTPATIENT
Start: 2020-08-08 | End: 2020-08-08 | Stop reason: HOSPADM

## 2020-08-08 RX ADMIN — CARVEDILOL 12.5 MG: 12.5 TABLET, FILM COATED ORAL at 09:08

## 2020-08-08 RX ADMIN — FUROSEMIDE 40 MG: 10 INJECTION, SOLUTION INTRAMUSCULAR; INTRAVENOUS at 06:08

## 2020-08-08 RX ADMIN — FUROSEMIDE 40 MG: 10 INJECTION, SOLUTION INTRAMUSCULAR; INTRAVENOUS at 03:08

## 2020-08-08 RX ADMIN — CLOPIDOGREL 75 MG: 75 TABLET, FILM COATED ORAL at 09:08

## 2020-08-08 NOTE — ED TRIAGE NOTES
Pt reports to ED via personal transportation with c/o SOB ongoing a few days but worse at night; pt denies pain, fever, cough, or any other symptoms; pt has hx of CHF and gets these SOB exacerbations; pt SpO2 currently 98% on room air; pt reports compliance with all prescribed medications; pt AAOx4

## 2020-08-08 NOTE — PLAN OF CARE
"Minnie VARGAS notified that all CM needs are met    EDUCATION:  Ms Ananda provided with educational information on CHF.  Information reviewed and placed in :My Healthcare Packet" to be brought home for her and daughter to use as resource after discharge.  Information included:  problems and symptoms to look for and call the doctor if experiencing, and symptoms that may indicate a medical emergency: CALL 911.      All questions answered.  Teach back method used.    Patient stated, "CP / SOB call 911".       08/08/20 1058   Final Note   Assessment Type Final Discharge Note   Anticipated Discharge Disposition Home   Hospital Follow Up  Appt(s) scheduled? Yes   Discharge plans and expectations educations in teach back method with documentation complete? Yes   Right Care Referral Info   Post Acute Recommendation No Care   Post-Acute Status   Post-Acute Authorization Other   Other Status No Post-Acute Service Needs   Discharge Delays None known at this time          "

## 2020-08-08 NOTE — NURSING
Patient discharge with instructions, stated that she understand what her doctor what her to do while at home.

## 2020-08-08 NOTE — NURSING
Patient arrived to room via stretcher with transport.  Patient alert, oriented and voice no complaint of pain or discomfort.

## 2020-08-08 NOTE — ED PROVIDER NOTES
Encounter Date: 8/8/2020    SCRIBE #1 NOTE: I, Wanda Rey, am scribing for, and in the presence of,  Marino Antonio MD. I have scribed the entire note.       History     Chief Complaint   Patient presents with    Shortness of Breath     Patient c/o sob x 1 day.  Reports hx of CHF and implanted defibrillator.  Denies chest pain, cough, congestion, n/v, diarrhea, fever, or being around anyone sick.     This is an 84 y/o female with a PMHx of Coronary artery disease, Hypertension, Hypercholesteremia, and CHF. She presents to the ED with a CC of shortness of breath that began a few days ago. Patient reports she has trouble sleeping because her breathing gets worse at night. She has to sleep sitting upright. Patient does not have home O2 or a sleep cap machine. The patient has been eating and drinking as normal. She denies any weight change, but has not weighed herself in several days. Patient takes her medications every day. She denies any fever, cough, pain, dysuria, frequency, or problems with bowel movements. Patient has a defibrillator implant. The patient has allergies to Colace, Asprin, Sulfa, Iodine, and Penicillins. Patient does not consume alcohol, smoke cigarettes, or participate in recreational drug use.     3/2020  · Severely decreased left ventricular systolic function. The estimated ejection fraction is 20%.  · Mild concentric left ventricular hypertrophy.  · Grade I (mild) left ventricular diastolic dysfunction consistent with impaired relaxation.  · Moderate left ventricular enlargement.  · Moderate mitral regurgitation.  · Normal right ventricular systolic function.  · Severe left atrial enlargement.  · Mild right atrial enlargement.  · Intermediate central venous pressure (8 mmHg).  · The estimated PA systolic pressure is 33 mmHg.    The history is provided by the patient.     Review of patient's allergies indicates:   Allergen Reactions    Aspirin Swelling     Swelling and rash    Colace  [docusate sodium] Rash     itching    Sulfa (sulfonamide antibiotics) Swelling     Swelling and rash    Iodine and iodide containing products Rash    Penicillins Rash     Past Medical History:   Diagnosis Date    Anticoagulant long-term use     Cataract     CHF (congestive heart failure)     Coronary artery disease     Gout attack     Hypercholesteremia     Hypertension     Renal disorder     Vaginal delivery     x4     Past Surgical History:   Procedure Laterality Date    APPENDECTOMY      CARDIAC DEFIBRILLATOR PLACEMENT      2015    CATARACT EXTRACTION W/  INTRAOCULAR LENS IMPLANT Left 02/07/2019    Dr. Velazco    CATARACT EXTRACTION W/  INTRAOCULAR LENS IMPLANT Right 02/21/2019    Dr. Velazco    CHOLECYSTECTOMY      COLONOSCOPY W/ POLYPECTOMY  10 or 11/ 2014    per Dr. Myrick    defribillator Left 8/2008    EYE SURGERY      HYSTERECTOMY      INTRAOCULAR PROSTHESES INSERTION Left 2/7/2019    Procedure: INSERTION, IOL PROSTHESIS;  Surgeon: Demetrius Velazco MD;  Location: [facility] OR;  Service: Ophthalmology;  Laterality: Left;  RN Phone Pre OP 1-30-19.  Arrival 05:30 AM    INTRAOCULAR PROSTHESES INSERTION Right 2/21/2019    Procedure: INSERTION, IOL PROSTHESIS;  Surgeon: Demetrius Velazco MD;  Location: [facility] OR;  Service: Ophthalmology;  Laterality: Right;    JOINT REPLACEMENT Bilateral 2005 & 2006    2 Knee Replacements=Lt. 1= 2005. Rt. 1= 2006    OOPHORECTOMY      PHACOEMULSIFICATION OF CATARACT Left 2/7/2019    Procedure: PHACOEMULSIFICATION, CATARACT;  Surgeon: Demetrius Velazco MD;  Location: [facility] OR;  Service: Ophthalmology;  Laterality: Left;    PHACOEMULSIFICATION OF CATARACT Right 2/21/2019    Procedure: PHACOEMULSIFICATION, CATARACT;  Surgeon: Demetrius Velazco MD;  Location: [facility] OR;  Service: Ophthalmology;  Laterality: Right;  RN Phone Pre op 2-15-19.  Arrival 05:30 AM    TOTAL KNEE ARTHROPLASTY Bilateral Lt.= 2005; Rt.=2006    Bilateral Knee scope      Family History   Problem Relation Age of Onset    Heart attack Mother 88    Hypertension Mother     Hyperlipidemia Mother     Diabetes Other     Hypertension Other     Amblyopia Son     Blindness Neg Hx     Cataracts Neg Hx     Glaucoma Neg Hx     Macular degeneration Neg Hx     Retinal detachment Neg Hx     Strabismus Neg Hx      Social History     Tobacco Use    Smoking status: Never Smoker    Smokeless tobacco: Never Used   Substance Use Topics    Alcohol use: Yes     Comment: rarely    Drug use: No     Review of Systems   Constitutional: Negative for chills and fever.   HENT: Negative for congestion and sore throat.    Eyes: Negative for pain and visual disturbance.   Respiratory: Positive for shortness of breath. Negative for cough and chest tightness.    Cardiovascular: Negative for chest pain.   Gastrointestinal: Negative for blood in stool, constipation, diarrhea and nausea.   Endocrine: Negative for polydipsia and polyphagia.   Genitourinary: Negative for dysuria, flank pain and frequency.   Musculoskeletal: Negative for back pain, neck pain and neck stiffness.   Skin: Negative for rash.   Allergic/Immunologic: Negative for immunocompromised state.   Neurological: Negative for dizziness and weakness.   Hematological: Does not bruise/bleed easily.   Psychiatric/Behavioral: Negative for agitation and behavioral problems.   All other systems reviewed and are negative.      Physical Exam     Initial Vitals [08/08/20 0241]   BP Pulse Resp Temp SpO2   138/67 70 (!) 22 98.5 °F (36.9 °C) 95 %      MAP       --         Physical Exam    Nursing note and vitals reviewed.  Constitutional: Vital signs are normal. She appears well-developed and well-nourished.   HENT:   Head: Normocephalic.   Right Ear: External ear normal.   Left Ear: External ear normal.   Mouth/Throat: Oropharynx is clear and moist.   Eyes: EOM are normal. Pupils are equal, round, and reactive to light.   Neck: Normal range of  motion. JVD present.   Cardiovascular: Normal rate and regular rhythm.   Pulmonary/Chest: Breath sounds normal. No respiratory distress. She has no wheezes.   Abdominal: Soft. She exhibits no distension. There is no abdominal tenderness.   Musculoskeletal: Normal range of motion. No tenderness.      Comments: 1+ pitting edema up to upper bilateral calves   Neurological: She is alert and oriented to person, place, and time. GCS eye subscore is 4. GCS verbal subscore is 5. GCS motor subscore is 6.   Skin: Skin is warm and dry. Capillary refill takes less than 2 seconds.   Psychiatric: She has a normal mood and affect. Thought content normal.         ED Course   Procedures  Labs Reviewed   CBC W/ AUTO DIFFERENTIAL - Abnormal; Notable for the following components:       Result Value    RBC 3.52 (*)     Hemoglobin 10.5 (*)     Hematocrit 34.7 (*)     Mean Corpuscular Volume 99 (*)     Mean Corpuscular Hemoglobin Conc 30.3 (*)     RDW 15.4 (*)     Platelets 109 (*)     Lymph% 16.1 (*)     All other components within normal limits   COMPREHENSIVE METABOLIC PANEL - Abnormal; Notable for the following components:    BUN, Bld 48 (*)     Creatinine 1.8 (*)     eGFR if  29 (*)     eGFR if non  25 (*)     All other components within normal limits   TROPONIN I - Abnormal; Notable for the following components:    Troponin I 0.111 (*)     All other components within normal limits   B-TYPE NATRIURETIC PEPTIDE - Abnormal; Notable for the following components:    BNP 1,615 (*)     All other components within normal limits   SARS-COV-2 RNA AMPLIFICATION, QUAL   PROTIME-INR     EKG Readings: (Independently Interpreted)   EKG done 2:54 a.m. showed ventricularly paced with nonspecific intraventricular block.   eating QTC is 557.  Flipped T-waves laterally and inferiorly.  Abnormal EKG.  Compared to previous and similar.  Patient prolonged QTC before in the past.       Imaging Results          X-Ray  Chest AP Portable (Final result)  Result time 08/08/20 04:07:28    Final result by Fernando Galvan MD (08/08/20 04:07:28)                 Impression:      Mild prominence of the pulmonary vascular with mild perihilar infiltrate/edema there is no dense consolidation or significant pleural effusion.      Electronically signed by: Fernando Galvan  Date:    08/08/2020  Time:    04:07             Narrative:    EXAMINATION:  XR CHEST AP PORTABLE    CLINICAL HISTORY:  CHF;    TECHNIQUE:  Single frontal view of the chest was performed.    COMPARISON:  June 9, 2020    FINDINGS:  Single chest view is submitted.  There is diminished depth of inspiration and mild rotation.  Cardiac pacemaker again noted.  The cardiomediastinal silhouette appears stable.  There is mild prominence of the pulmonary vascular, there is appearance that is thought to represent mild perihilar infiltrate there is no dense consolidation, significant pleural effusion or pneumothorax.                                 Medical Decision Making:   Initial Assessment:   Pt presenting 2/2 rales in fluid overload and shortness of breath consistent with CHF exacerbation.  Patient is given IV Lasix.  Considered nitroglycerin.  If worsening respiratory status will consider BiPAP and/or intubation.    Also considered but less likely:  Acs: ekg doesn't show stemi. Troponin ordered  Pna: symptoms bilaterally and no fevers.   Bronchitis: considered but hpi most c/w CHF  COPD:  Considered but less likely  Pneumothorax: bilateral breath sounds    Labs are notable for an elevated BNP in the 1600s.  Troponin is elevated but actually lower than her baseline.  I do not think this is ACS.  I think this is a chronic troponin leak.  I did a ambulatory O2 sat which decreased O2 level to 89-90% with a respiratory rate in the low 30s.  Oxygen level increased whenever I get patient back in the bed.  She is urinating.  Still no pain.  Willing to be admitted.  Review chest x-ray  which looks like pulmonary edema and cardiomegaly.  Baseline anemia and chronic kidney disease.    I spoke to the hospitalist team and admitted the patient to their service.    Please put in 35 minutes of critical care due to patient having a high risk of respiratory failure.   Separate from teaching and exclusive of procedure and ekg time  Includes:  Time at bedside  Time reviewing test results  Time discussing case with staff  Time documenting the medical record  Time spent with family members  Time spent with consults  Management      Clinical Tests:   Lab Tests: Reviewed and Ordered  Radiological Study: Ordered and Reviewed  Medical Tests: Ordered and Reviewed            Scribe Attestation:   Scribe #1: I performed the above scribed service and the documentation accurately describes the services I performed. I attest to the accuracy of the note.                          Clinical Impression:       ICD-10-CM ICD-9-CM   1. Congestive heart failure, unspecified HF chronicity, unspecified heart failure type  I50.9 428.0   2. Shortness of breath  R06.02 786.05   3. Hypoxia  R09.02 799.02             I, Marino Antonio, personally performed the services described in this documentation. All medical record entries made by the scribe were at my direction and in my presence.  I have reviewed the chart and agree that the record reflects my personal performance and is accurate and complete.                      Marino Antonio MD  08/08/20 [id number]

## 2020-08-08 NOTE — ED NOTES
Attempted to call pt's daughter Usama (per pt request) to give update on pt's admission status but no answer on phone

## 2020-08-08 NOTE — ASSESSMENT & PLAN NOTE
Initial troponin 0.1 then trended down 0.09 second set. With chronic elevated troponin with baseline at 0.1. Chest pain free.  EKG NSR 69 nonspecific intraventricular block, no ischemia.     - telemetry

## 2020-08-08 NOTE — ASSESSMENT & PLAN NOTE
sCr 1.8 midly elevated from baseline 1.6-1.7. CHF exacerbation today with fluid overload.     - Recheck BNP while undergoing diuresis  - Avoid nephrotoxic drugs and renal dose medications

## 2020-08-08 NOTE — ASSESSMENT & PLAN NOTE
Lab Results   Component Value Date    LDLCALC 52.4 (L) 06/09/2020   Controlled. Continue home statin

## 2020-08-08 NOTE — H&P
[facility] Hospital Medicine  History & Physical    Patient Name: Palmira Malave  MRN: [id number]  Admission Date: 8/8/2020  Attending Physician: Mary Garcia MD   Primary Care Provider: Qi Ramon MD         Patient information was obtained from patient and ER records.     Subjective:     Principal Problem:Acute on chronic combined systolic and diastolic heart failure    Chief Complaint:   Chief Complaint   Patient presents with    Shortness of Breath     Patient c/o sob x 1 day.  Reports hx of CHF and implanted defibrillator.  Denies chest pain, cough, congestion, n/v, diarrhea, fever, or being around anyone sick.        HPI: This is a 85 y.o female with CHF (EF 20% in 3/9/2020), AICD, HTN, CKD, HLD, GERD, and gout who presents to the hospital with a chief complaint of SOB for 2 days. Patient reports she has trouble sleeping because her breathing gets worse at night. She has to sleep sitting upright. Patient states she takes her medications and lasix every day as prescribed.  She denies any fever, chill, cough, chest pain, dysuria, leg edema. She denies any weight change, but has not weighed herself in several days. The patient has been eating and drinking as normal. She was admitted here for the same issue on 6/9/2020.  Patient does not consume alcohol, smoke cigarettes, or participate in recreational drug use.    In ED, Covid negative. Chest xray with mild prominence of the pulmonary vascular with mild perihilar infiltrate/edema. BNP elevated 1615.  Troponin 0.1 at her baseline. sCr 1.8. Patient doesn't require oxygen while resting but ambulatory O2 sat dropped to 89-90% with a respiratory rate in the low 30s  Patient reports SOB improved after IV lasix given in ED.    Patient is placed in Observation for further evaluation and treatment SOB/fluid overload.      Past Medical History:   Diagnosis Date    Anticoagulant long-term use     Cataract     CHF (congestive heart failure)      Coronary artery disease     Gout attack     Hypercholesteremia     Hypertension     Renal disorder     Vaginal delivery     x4       Past Surgical History:   Procedure Laterality Date    APPENDECTOMY      CARDIAC DEFIBRILLATOR PLACEMENT      2015    CATARACT EXTRACTION W/  INTRAOCULAR LENS IMPLANT Left 02/07/2019    Dr. Velazco    CATARACT EXTRACTION W/  INTRAOCULAR LENS IMPLANT Right 02/21/2019    Dr. Velazco    CHOLECYSTECTOMY      COLONOSCOPY W/ POLYPECTOMY  10 or 11/ 2014    per Dr. Myrick    defribillator Left 8/2008    EYE SURGERY      HYSTERECTOMY      INTRAOCULAR PROSTHESES INSERTION Left 2/7/2019    Procedure: INSERTION, IOL PROSTHESIS;  Surgeon: Demetrius Velazco MD;  Location: [facility] OR;  Service: Ophthalmology;  Laterality: Left;  RN Phone Pre OP 1-30-19.  Arrival 05:30 AM    INTRAOCULAR PROSTHESES INSERTION Right 2/21/2019    Procedure: INSERTION, IOL PROSTHESIS;  Surgeon: Demetrius Velazco MD;  Location: [facility] OR;  Service: Ophthalmology;  Laterality: Right;    JOINT REPLACEMENT Bilateral 2005 & 2006    2 Knee Replacements=Lt. 1= 2005. Rt. 1= 2006    OOPHORECTOMY      PHACOEMULSIFICATION OF CATARACT Left 2/7/2019    Procedure: PHACOEMULSIFICATION, CATARACT;  Surgeon: Demetrius Velazco MD;  Location: [facility] OR;  Service: Ophthalmology;  Laterality: Left;    PHACOEMULSIFICATION OF CATARACT Right 2/21/2019    Procedure: PHACOEMULSIFICATION, CATARACT;  Surgeon: Demetrius Velazco MD;  Location: [facility] OR;  Service: Ophthalmology;  Laterality: Right;  RN Phone Pre op 2-15-19.  Arrival 05:30 AM    TOTAL KNEE ARTHROPLASTY Bilateral Lt.= 2005; Rt.=2006    Bilateral Knee scope       Review of patient's allergies indicates:   Allergen Reactions    Aspirin Swelling     Swelling and rash    Colace [docusate sodium] Rash     itching    Sulfa (sulfonamide antibiotics) Swelling     Swelling and rash    Iodine and iodide containing products Rash    Penicillins Rash        Current Facility-Administered Medications on File Prior to Encounter   Medication    0.9%  NaCl infusion    phenylephrine HCL 2.5% ophthalmic solution 1 drop    proparacaine 0.5 % ophthalmic solution 1 drop    tropicamide 1% ophthalmic solution 1 drop     Current Outpatient Medications on File Prior to Encounter   Medication Sig    acetaminophen (TYLENOL) 500 MG tablet Take 1 tablet (500 mg total) by mouth every 6 (six) hours as needed for Pain.    allopurinol (ZYLOPRIM) 100 MG tablet Take 100 mg by mouth every evening.     calcitRIOL (ROCALTROL) 0.25 MCG Cap 0.25 mcg once daily.     carvedilol (COREG) 25 MG tablet Take 0.5 tablets (12.5 mg total) by mouth 2 (two) times daily.    clopidogrel (PLAVIX) 75 mg tablet Take 75 mg by mouth once daily. On hold since 11-28-14, for procedure.    cranberry 400 mg Cap Take 1 capsule by mouth 2 (two) times daily.    fish oil-omega-3 fatty acids 300-1,000 mg capsule Take 2 g by mouth once daily. 2 caps every AM & 1 cap every PM.    folic acid/multivit-min/lutein (CENTRUM SILVER ORAL) Take by mouth once daily.    furosemide (LASIX) 40 MG tablet Take 1 tablet (40 mg total) by mouth once daily. Take 40 mg twice a day on 10/3, 10/4 followed by 20 mg twice a day.    influenza (FLUZONE HIGH-DOSE 2018-19, PF,) 180 mcg/0.5 mL vaccine     lovastatin (MEVACOR) 40 MG tablet Take 40 mg by mouth nightly. Takes 2 caps with supper every evening.    mirabegron (MYRBETRIQ) 50 mg Tb24 Take 50 mg by mouth once daily.     ranitidine (ZANTAC) 150 MG tablet 150 mg 2 (two) times daily.     terazosin (HYTRIN) 2 MG capsule Take by mouth every evening.      Family History     Problem Relation (Age of Onset)    Amblyopia Son    Diabetes Other    Heart attack Mother (88)    Hyperlipidemia Mother    Hypertension Mother, Other        Tobacco Use    Smoking status: Never Smoker    Smokeless tobacco: Never Used   Substance and Sexual Activity    Alcohol use: Yes     Comment: rarely     Drug use: No    Sexual activity: Not Currently     Partners: Male     Review of Systems   Constitutional: Negative for appetite change, chills, diaphoresis, fatigue and fever.   HENT: Negative for congestion and sore throat.    Eyes: Negative for visual disturbance.   Respiratory: Positive for shortness of breath. Negative for cough, chest tightness and wheezing.    Cardiovascular: Negative for chest pain, palpitations and leg swelling.   Gastrointestinal: Negative for abdominal pain, blood in stool, diarrhea, nausea and vomiting.   Genitourinary: Negative for dysuria and flank pain.   Musculoskeletal: Negative for gait problem, joint swelling and myalgias.   Skin: Negative for rash.   Neurological: Negative for dizziness, seizures, syncope, weakness and headaches.   Psychiatric/Behavioral: Negative for confusion.     Objective:     Vital Signs (Most Recent):  Temp: 98.3 °F (36.8 °C) (08/08/20 0636)  Pulse: 66 (08/08/20 0635)  Resp: 19 (08/08/20 0501)  BP: (!) 150/72 (08/08/20 0631)  SpO2: 98 % (08/08/20 0635) Vital Signs (24h Range):  Temp:  [98.3 °F (36.8 °C)-98.5 °F (36.9 °C)] 98.3 °F (36.8 °C)  Pulse:  [61-70] 66  Resp:  [19-22] 19  SpO2:  [95 %-98 %] 98 %  BP: (125-150)/(60-72) 150/72     Weight: 80.7 kg (178 lb)  Body mass index is 31.53 kg/m².    Physical Exam  Constitutional:       General: She is not in acute distress.     Appearance: Normal appearance. She is not ill-appearing or diaphoretic.   HENT:      Head: Normocephalic and atraumatic.      Nose: No congestion or rhinorrhea.      Mouth/Throat:      Mouth: Mucous membranes are moist.   Eyes:      Pupils: Pupils are equal, round, and reactive to light.   Neck:      Musculoskeletal: Normal range of motion and neck supple.   Cardiovascular:      Rate and Rhythm: Normal rate and regular rhythm.      Pulses: Normal pulses.      Heart sounds: Normal heart sounds.   Pulmonary:      Effort: Pulmonary effort is normal.      Breath sounds: Normal breath  sounds. No wheezing, rhonchi or rales.   Chest:      Chest wall: No tenderness.   Abdominal:      General: Bowel sounds are normal.      Palpations: Abdomen is soft.      Tenderness: There is no abdominal tenderness. There is no right CVA tenderness, left CVA tenderness, guarding or rebound.   Musculoskeletal: Normal range of motion.         General: No tenderness.      Right lower leg: No edema.      Left lower leg: No edema.   Skin:     General: Skin is warm and dry.      Capillary Refill: Capillary refill takes less than 2 seconds.   Neurological:      Mental Status: She is alert and oriented to person, place, and time.   Psychiatric:         Mood and Affect: Mood normal.         Thought Content: Thought content normal.           CRANIAL NERVES     CN III, IV, VI   Pupils are equal, round, and reactive to light.       Significant Labs:   CBC:   Recent Labs   Lab 08/08/20 0310   WBC 5.97   HGB 10.5*   HCT 34.7*   *     CMP:   Recent Labs   Lab 08/08/20 0310      K 4.0      CO2 28   GLU 98   BUN 48*   CREATININE 1.8*   CALCIUM 9.2   PROT 7.0   ALBUMIN 3.9   BILITOT 0.4   ALKPHOS 97   AST 30   ALT 18   ANIONGAP 11   EGFRNONAA 25*     Cardiac Markers:   Recent Labs   Lab 08/08/20 0310   BNP 1,615*     Coagulation:   Recent Labs   Lab 08/08/20 0310   INR 1.0     Troponin:   Recent Labs   Lab 08/08/20 0310   TROPONINI 0.111*     Urine Studies: No results for input(s): COLORU, APPEARANCEUA, PHUR, SPECGRAV, PROTEINUA, GLUCUA, KETONESU, BILIRUBINUA, OCCULTUA, NITRITE, UROBILINOGEN, LEUKOCYTESUR, RBCUA, WBCUA, BACTERIA, SQUAMEPITHEL, HYALINECASTS in the last 48 hours.    Invalid input(s): WRIGHTSUR  All pertinent labs within the past 24 hours have been reviewed.    Significant Imaging: I have reviewed and interpreted all pertinent imaging results/findings within the past 24 hours.    Assessment/Plan:     * Acute on chronic combined systolic and diastolic heart failure  Orthopnea the last 2 days.  Chest xray with pulmonary edema. BNP elevated 1615. Lung sounds clear on exam.  Denies chest pain, cough, fever, leg swelling. Mild hypoxic with ambulatory. SOB improved with lasix IV in ED.  Last echo 3/9/2020 EF 20% grade I DD. Last seen her cardiologist Dr. Richmond 6/2020. Admitted 6/9/2020 for same issue.    - Continue IV lasix  - Cardiac diet with fluid restriction  - Weight daily  - Strict I/Os  - continue home coreg. Not currently on ACE      Acute on CKD stage 3  sCr 1.8 midly elevated from baseline 1.6-1.7. CHF exacerbation today with fluid overload.     - Recheck BNP while undergoing diuresis  - Avoid nephrotoxic drugs and renal dose medications      Elevated troponin  Initial troponin 0.1 then trended down 0.09 second set. With chronic elevated troponin with baseline at 0.1. Chest pain free.  EKG NSR 69 nonspecific intraventricular block, no ischemia.     - telemetry      Chronic combined systolic and diastolic heart failure  Acute CHF with fluid overload today. On home Lasix and BB. See above      CKD stage 3  Acute on chronic kidney failure today with slightly elevated sCr 1.8.  See above      Anemia of chronic disease  Hgb 10.5 near baseline. Macrocytic anemia. Denies any overt bleeding. On Plavix. Will check folic acid and Vit B 12 levels.      Essential hypertension  Fairly controlled. Continue home BB      Hyperlipidemia  Lab Results   Component Value Date    LDLCALC 52.4 (L) 06/09/2020   Controlled. Continue home statin      Biventricular AICD in place  Stable no complaints of discharge.      Coronary artery disease involving native coronary artery of native heart without angina pectoris  Non-obstructive CAD. Chest pain free. Continue home plavix/statin/BB        VTE Risk Mitigation (From admission, onward)         Ordered     IP VTE HIGH RISK PATIENT  Once      08/08/20 0538     Place sequential compression device  Until discontinued      08/08/20 0538     Place DALE hose  Until discontinued       08/08/20 0538                   Shin Perez NP  Department of Hospital Medicine   [facility]

## 2020-08-08 NOTE — HOSPITAL COURSE
"Mrs. 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 SOB significant improved after 80 mg IV in ED. UOP not documented but patient reported "a lot of urine." Reported not weight self since July and at that time wt 169 lb.  Again discussed low salt , fluid intake 1 L  A day and daily weights. Discharge weight 165 lbs. Patient stable for discharge home with close follow up with Cardiology.    "

## 2020-08-08 NOTE — SUBJECTIVE & OBJECTIVE
Past Medical History:   Diagnosis Date    Anticoagulant long-term use     Cataract     CHF (congestive heart failure)     Coronary artery disease     Gout attack     Hypercholesteremia     Hypertension     Renal disorder     Vaginal delivery     x4       Past Surgical History:   Procedure Laterality Date    APPENDECTOMY      CARDIAC DEFIBRILLATOR PLACEMENT      2015    CATARACT EXTRACTION W/  INTRAOCULAR LENS IMPLANT Left 02/07/2019    Dr. Velazco    CATARACT EXTRACTION W/  INTRAOCULAR LENS IMPLANT Right 02/21/2019    Dr. Velazco    CHOLECYSTECTOMY      COLONOSCOPY W/ POLYPECTOMY  10 or 11/ 2014    per Dr. Myrick    defribillator Left 8/2008    EYE SURGERY      HYSTERECTOMY      INTRAOCULAR PROSTHESES INSERTION Left 2/7/2019    Procedure: INSERTION, IOL PROSTHESIS;  Surgeon: Demetrius Velazco MD;  Location: [facility] OR;  Service: Ophthalmology;  Laterality: Left;  RN Phone Pre OP 1-30-19.  Arrival 05:30 AM    INTRAOCULAR PROSTHESES INSERTION Right 2/21/2019    Procedure: INSERTION, IOL PROSTHESIS;  Surgeon: Demetrius Velazco MD;  Location: [facility] OR;  Service: Ophthalmology;  Laterality: Right;    JOINT REPLACEMENT Bilateral 2005 & 2006    2 Knee Replacements=Lt. 1= 2005. Rt. 1= 2006    OOPHORECTOMY      PHACOEMULSIFICATION OF CATARACT Left 2/7/2019    Procedure: PHACOEMULSIFICATION, CATARACT;  Surgeon: Demetrius Velazco MD;  Location: [facility] OR;  Service: Ophthalmology;  Laterality: Left;    PHACOEMULSIFICATION OF CATARACT Right 2/21/2019    Procedure: PHACOEMULSIFICATION, CATARACT;  Surgeon: Demetrius Velazco MD;  Location: [facility] OR;  Service: Ophthalmology;  Laterality: Right;  RN Phone Pre op 2-15-19.  Arrival 05:30 AM    TOTAL KNEE ARTHROPLASTY Bilateral Lt.= 2005; Rt.=2006    Bilateral Knee scope       Review of patient's allergies indicates:   Allergen Reactions    Aspirin Swelling     Swelling and rash    Colace [docusate sodium] Rash     itching    Sulfa  (sulfonamide antibiotics) Swelling     Swelling and rash    Iodine and iodide containing products Rash    Penicillins Rash       Current Facility-Administered Medications on File Prior to Encounter   Medication    0.9%  NaCl infusion    phenylephrine HCL 2.5% ophthalmic solution 1 drop    proparacaine 0.5 % ophthalmic solution 1 drop    tropicamide 1% ophthalmic solution 1 drop     Current Outpatient Medications on File Prior to Encounter   Medication Sig    acetaminophen (TYLENOL) 500 MG tablet Take 1 tablet (500 mg total) by mouth every 6 (six) hours as needed for Pain.    allopurinol (ZYLOPRIM) 100 MG tablet Take 100 mg by mouth every evening.     calcitRIOL (ROCALTROL) 0.25 MCG Cap 0.25 mcg once daily.     carvedilol (COREG) 25 MG tablet Take 0.5 tablets (12.5 mg total) by mouth 2 (two) times daily.    clopidogrel (PLAVIX) 75 mg tablet Take 75 mg by mouth once daily. On hold since 11-28-14, for procedure.    cranberry 400 mg Cap Take 1 capsule by mouth 2 (two) times daily.    fish oil-omega-3 fatty acids 300-1,000 mg capsule Take 2 g by mouth once daily. 2 caps every AM & 1 cap every PM.    folic acid/multivit-min/lutein (CENTRUM SILVER ORAL) Take by mouth once daily.    furosemide (LASIX) 40 MG tablet Take 1 tablet (40 mg total) by mouth once daily. Take 40 mg twice a day on 10/3, 10/4 followed by 20 mg twice a day.    influenza (FLUZONE HIGH-DOSE 2018-19, PF,) 180 mcg/0.5 mL vaccine     lovastatin (MEVACOR) 40 MG tablet Take 40 mg by mouth nightly. Takes 2 caps with supper every evening.    mirabegron (MYRBETRIQ) 50 mg Tb24 Take 50 mg by mouth once daily.     ranitidine (ZANTAC) 150 MG tablet 150 mg 2 (two) times daily.     terazosin (HYTRIN) 2 MG capsule Take by mouth every evening.      Family History     Problem Relation (Age of Onset)    Amblyopia Son    Diabetes Other    Heart attack Mother (88)    Hyperlipidemia Mother    Hypertension Mother, Other        Tobacco Use    Smoking  status: Never Smoker    Smokeless tobacco: Never Used   Substance and Sexual Activity    Alcohol use: Yes     Comment: rarely    Drug use: No    Sexual activity: Not Currently     Partners: Male     Review of Systems   Constitutional: Negative for appetite change, chills, diaphoresis, fatigue and fever.   HENT: Negative for congestion and sore throat.    Eyes: Negative for visual disturbance.   Respiratory: Positive for shortness of breath. Negative for cough, chest tightness and wheezing.    Cardiovascular: Negative for chest pain, palpitations and leg swelling.   Gastrointestinal: Negative for abdominal pain, blood in stool, diarrhea, nausea and vomiting.   Genitourinary: Negative for dysuria and flank pain.   Musculoskeletal: Negative for gait problem, joint swelling and myalgias.   Skin: Negative for rash.   Neurological: Negative for dizziness, seizures, syncope, weakness and headaches.   Psychiatric/Behavioral: Negative for confusion.     Objective:     Vital Signs (Most Recent):  Temp: 98.3 °F (36.8 °C) (08/08/20 0636)  Pulse: 66 (08/08/20 0635)  Resp: 19 (08/08/20 0501)  BP: (!) 150/72 (08/08/20 0631)  SpO2: 98 % (08/08/20 0635) Vital Signs (24h Range):  Temp:  [98.3 °F (36.8 °C)-98.5 °F (36.9 °C)] 98.3 °F (36.8 °C)  Pulse:  [61-70] 66  Resp:  [19-22] 19  SpO2:  [95 %-98 %] 98 %  BP: (125-150)/(60-72) 150/72     Weight: 80.7 kg (178 lb)  Body mass index is 31.53 kg/m².    Physical Exam  Constitutional:       General: She is not in acute distress.     Appearance: Normal appearance. She is not ill-appearing or diaphoretic.   HENT:      Head: Normocephalic and atraumatic.      Nose: No congestion or rhinorrhea.      Mouth/Throat:      Mouth: Mucous membranes are moist.   Eyes:      Pupils: Pupils are equal, round, and reactive to light.   Neck:      Musculoskeletal: Normal range of motion and neck supple.   Cardiovascular:      Rate and Rhythm: Normal rate and regular rhythm.      Pulses: Normal pulses.       Heart sounds: Normal heart sounds.   Pulmonary:      Effort: Pulmonary effort is normal.      Breath sounds: Normal breath sounds. No wheezing, rhonchi or rales.   Chest:      Chest wall: No tenderness.   Abdominal:      General: Bowel sounds are normal.      Palpations: Abdomen is soft.      Tenderness: There is no abdominal tenderness. There is no right CVA tenderness, left CVA tenderness, guarding or rebound.   Musculoskeletal: Normal range of motion.         General: No tenderness.      Right lower leg: No edema.      Left lower leg: No edema.   Skin:     General: Skin is warm and dry.      Capillary Refill: Capillary refill takes less than 2 seconds.   Neurological:      Mental Status: She is alert and oriented to person, place, and time.   Psychiatric:         Mood and Affect: Mood normal.         Thought Content: Thought content normal.           CRANIAL NERVES     CN III, IV, VI   Pupils are equal, round, and reactive to light.       Significant Labs:   CBC:   Recent Labs   Lab 08/08/20 0310   WBC 5.97   HGB 10.5*   HCT 34.7*   *     CMP:   Recent Labs   Lab 08/08/20 0310      K 4.0      CO2 28   GLU 98   BUN 48*   CREATININE 1.8*   CALCIUM 9.2   PROT 7.0   ALBUMIN 3.9   BILITOT 0.4   ALKPHOS 97   AST 30   ALT 18   ANIONGAP 11   EGFRNONAA 25*     Cardiac Markers:   Recent Labs   Lab 08/08/20 0310   BNP 1,615*     Coagulation:   Recent Labs   Lab 08/08/20 0310   INR 1.0     Troponin:   Recent Labs   Lab 08/08/20 0310   TROPONINI 0.111*     Urine Studies: No results for input(s): COLORU, APPEARANCEUA, PHUR, SPECGRAV, PROTEINUA, GLUCUA, KETONESU, BILIRUBINUA, OCCULTUA, NITRITE, UROBILINOGEN, LEUKOCYTESUR, RBCUA, WBCUA, BACTERIA, SQUAMEPITHEL, HYALINECASTS in the last 48 hours.    Invalid input(s): ANNASUR  All pertinent labs within the past 24 hours have been reviewed.    Significant Imaging: I have reviewed and interpreted all pertinent imaging results/findings within the past 24  hours.

## 2020-08-08 NOTE — PLAN OF CARE
Problem: Fall Injury Risk  Goal: Absence of Fall and Fall-Related Injury  Outcome: Adequate for Care Transition     Problem: Adult Inpatient Plan of Care  Goal: Plan of Care Review  Outcome: Adequate for Care Transition  Goal: Patient-Specific Goal (Individualization)  Outcome: Adequate for Care Transition  Goal: Absence of Hospital-Acquired Illness or Injury  Outcome: Adequate for Care Transition  Goal: Optimal Comfort and Wellbeing  Outcome: Adequate for Care Transition  Goal: Readiness for Transition of Care  Outcome: Adequate for Care Transition  Goal: Rounds/Family Conference  Outcome: Adequate for Care Transition     Problem: Electrolyte Imbalance (Acute Kidney Injury/Impairment)  Goal: Serum Electrolyte Balance  Outcome: Adequate for Care Transition     Problem: Fluid Imbalance (Acute Kidney Injury/Impairment)  Goal: Optimal Fluid Balance  Outcome: Adequate for Care Transition     Problem: Hematologic Alteration (Acute Kidney Injury/Impairment)  Goal: Hemoglobin, Hematocrit and Platelets Within Normal Range  Outcome: Adequate for Care Transition     Problem: Oral Intake Inadequate (Acute Kidney Injury/Impairment)  Goal: Optimal Nutrition Intake  Outcome: Adequate for Care Transition     Problem: Renal Function Impairment (Acute Kidney Injury/Impairment)  Goal: Effective Renal Function  Outcome: Adequate for Care Transition

## 2020-08-08 NOTE — PLAN OF CARE
08/08/20 1045   Discharge Assessment   Assessment Type Discharge Planning Assessment   Confirmed/corrected address and phone number on facesheet? Yes   Assessment information obtained from? Patient   Expected Length of Stay (days)   (discharged)   Communicated expected length of stay with patient/caregiver yes   Prior to hospitilization cognitive status: Alert/Oriented   Prior to hospitalization functional status: Independent   Current cognitive status: Alert/Oriented   Current Functional Status: Independent   Lives With child(karan), adult   Able to Return to Prior Arrangements yes   Is patient able to care for self after discharge? Yes   Readmission Within the Last 30 Days no previous admission in last 30 days   Patient currently being followed by outpatient case management? No   Patient currently receives any other outside agency services? No   Equipment Currently Used at Home cane, straight   Do you have any problems affording any of your prescribed medications? No   Is the patient taking medications as prescribed? yes   Does the patient have transportation home? Yes   Transportation Anticipated family or friend will provide   Does the patient receive services at the Coumadin Clinic? No   DME Needed Upon Discharge  none   Patient/Family in Agreement with Plan yes

## 2020-08-08 NOTE — DISCHARGE SUMMARY
"[facility] Medicine  Discharge Summary      Patient Name: Palmira Malave  MRN: [id number]  Admission Date: 8/8/2020  Hospital Length of Stay: 0 days  Discharge Date and Time:  08/08/2020 1:58 PM  Attending Physician: Mary Garcia MD  Discharging Provider: Maddie Perez NP  Primary Care Provider: Qi Ramon MD      HPI:   This is a 85 y.o female with CHF (EF 20% in 3/9/2020), AICD, HTN, CKD, HLD, GERD, and gout who presents to the hospital with a chief complaint of SOB for 2 days. Patient reports she has trouble sleeping because her breathing gets worse at night. She has to sleep sitting upright. Patient states she takes her medications and lasix every day as prescribed.  She denies any fever, chill, cough, chest pain, dysuria, leg edema. She denies any weight change, but has not weighed herself in several days. The patient has been eating and drinking as normal. She was admitted here for the same issue on 6/9/2020.  Patient does not consume alcohol, smoke cigarettes, or participate in recreational drug use.    In ED, Covid negative. Chest xray with mild prominence of the pulmonary vascular with mild perihilar infiltrate/edema. BNP elevated 1615.  Troponin 0.1 at her baseline. sCr 1.8. Patient doesn't require oxygen while resting but ambulatory O2 sat dropped to 89-90% with a respiratory rate in the low 30s  Patient reports SOB improved after IV lasix given in ED.    Patient is placed in Observation for further evaluation and treatment SOB/fluid overload.      * No surgery found *      Hospital Course:   Mrs. Malave is a 84 yo female placed in observation for acute on chronic systolic and diastolic heart failure EF 20%. SOB significant improved after 80 mg IV in ED. UOP not documented but patient reported "a lot of urine." Reported not weight self since July and at that time wt 169 lb.  Again discussed low salt , fluid intake 1 L  A day and daily weights. Discharge weight " 165 lbs. Patient stable for discharge home with close follow up with Cardiology.       Consults:     No new Assessment & Plan notes have been filed under this hospital service since the last note was generated.  Service: Hospital Medicine    Final Active Diagnoses:    Diagnosis Date Noted POA    PRINCIPAL PROBLEM:  Acute on chronic combined systolic and diastolic heart failure [I50.43] 12/08/2019 Yes    Chronic combined systolic and diastolic heart failure [I50.42] 12/08/2019 Yes     Chronic    Biventricular AICD in place [Z95.810] 12/08/2019 Yes     Chronic    Anemia of chronic disease [D63.8] 12/08/2019 Yes     Chronic    Acute on CKD stage 3 [N17.9, N18.3] 12/08/2019 Yes    Essential hypertension [I10] 11/15/2014 Yes     Chronic    Hyperlipidemia [E78.5] 11/15/2014 Yes     Chronic    CKD stage 3 [N18.3] 11/15/2014 Yes     Chronic    Elevated troponin [R79.89] 11/15/2014 Yes    Coronary artery disease involving native coronary artery of native heart without angina pectoris [I25.10] 11/15/2014 Yes      Problems Resolved During this Admission:       Discharged Condition: good    Disposition: Home or Self Care    Follow Up:  Follow-up Information     Qi Ramon MD On 8/25/2020.    Specialty: Internal Medicine  Why: As previously scheduled  Contact information:  [address] [place] [address]  [id number]  [place] [address] [phone]                 Patient Instructions:      Diet Cardiac     Notify your health care provider if you experience any of the following:  temperature >100.4     Notify your health care provider if you experience any of the following:  difficulty breathing or increased cough     Notify your health care provider if you experience any of the following:  increased confusion or weakness     Activity as tolerated       Significant Diagnostic Studies: Labs: All labs within the past 24 hours have been reviewed    Pending Diagnostic Studies:     Procedure Component Value Units Date/Time     Troponin I [[id number]]     Order Status: Sent Lab Status: No result     Specimen: Blood          Medications:  Reconciled Home Medications:      Medication List      CHANGE how you take these medications    furosemide 40 MG tablet  Commonly known as: LASIX  Take 1 tablet (40 mg total) by mouth 2 (two) times a day.  What changed:   · when to take this  · additional instructions        CONTINUE taking these medications    acetaminophen 500 MG tablet  Commonly known as: TYLENOL  Take 1 tablet (500 mg total) by mouth every 6 (six) hours as needed for Pain.     allopurinoL 100 MG tablet  Commonly known as: ZYLOPRIM  Take 100 mg by mouth every evening.     calcitRIOL 0.25 MCG Cap  Commonly known as: ROCALTROL  0.25 mcg once daily.     carvediloL 25 MG tablet  Commonly known as: COREG  Take 0.5 tablets (12.5 mg total) by mouth 2 (two) times daily.     CENTRUM SILVER ORAL  Take by mouth once daily.     clopidogreL 75 mg tablet  Commonly known as: PLAVIX  Take 75 mg by mouth once daily. On hold since 11-28-14, for procedure.     cranberry 400 mg Cap  Take 1 capsule by mouth 2 (two) times daily.     fish oil-omega-3 fatty acids 300-1,000 mg capsule  Take 2 g by mouth once daily. 2 caps every AM & 1 cap every PM.     lovastatin 40 MG tablet  Commonly known as: MEVACOR  Take 40 mg by mouth nightly. Takes 2 caps with supper every evening.     MYRBETRIQ 50 mg Tb24  Generic drug: mirabegron  Take 50 mg by mouth once daily.        STOP taking these medications    FLUZONE HIGH-DOSE 2018-19 (PF) 180 mcg/0.5 mL vaccine  Generic drug: influenza     ranitidine 150 MG tablet  Commonly known as: ZANTAC     terazosin 2 MG capsule  Commonly known as: HYTRIN            Indwelling Lines/Drains at time of discharge:   Lines/Drains/Airways     None                 Time spent on the discharge of patient: 30 minutes  Patient was seen and examined on the date of discharge and determined to be suitable for discharge.         Maddie Perez,  NP  Department of Hospital Medicine  [facility]

## 2020-08-08 NOTE — ASSESSMENT & PLAN NOTE
Orthopnea the last 2 days. Chest xray with pulmonary edema. BNP elevated 1615. Lung sounds clear on exam.  Denies chest pain, cough, fever, leg swelling. Mild hypoxic with ambulatory. SOB improved with lasix IV in ED.  Last echo 3/9/2020 EF 20% grade I DD. Last seen her cardiologist Dr. Richmond 6/2020. Admitted 6/9/2020 for same issue.    - Continue IV lasix  - Cardiac diet with fluid restriction  - Weight daily  - Strict I/Os  - continue home coreg. Not currently on ACE

## 2020-08-08 NOTE — PROGRESS NOTES
[facility] CASE MANAGEMENT                  WRITTEN DISCHARGE INFORMATION      APPOINTMENTS AND RESOURCES TO HELP YOU MANAGE YOUR CARE AT HOME BASED ON YOUR PREFERENCES:  (If an appointment is not scheduled for you when you leave the hospital, call your doctor to schedule a follow up visit within a week)    Follow-up Information     Qi Ramon MD On 8/25/2020.    Specialty: Internal Medicine  Why: As previously scheduled  Contact information:  [address] [place] [address]  [address]  [place] [address]  [phone]                   Healthy Living Instructions to HELP MANAGE YOUR CARE AT HOME:  Things You are responsible for:  1.    Getting your prescriptions filled   2.    Taking your medications as directed, DO NOT MISS ANY DOSES!  3.    Following the diet and exercise recommended by your doctor  4.    Going to your follow-up doctor appointment. This is important because it allows the doctor to monitor your progress and determine if any changes need to made to your treatment plan.  5. If you have any questions about MANAGING YOUR CARE AT HOME Call the Nurse Care Line for 24/7 Assistance [phone]       Please answer any calls you may receive from [facility]. We want to continue to support you as you manage your healthcare needs. [facility] is happy to have the opportunity to serve you.      Thank you for choosing [facility] for your healthcare needs!  Your [facility] Case Management Team,

## 2020-08-08 NOTE — ASSESSMENT & PLAN NOTE
Hgb 10.5 near baseline. Macrocytic anemia. Denies any overt bleeding. On Plavix. Will check folic acid and Vit B 12 levels.

## 2020-08-08 NOTE — HPI
This is a 85 y.o female with CHF (EF 20% in 3/9/2020), AICD, HTN, CKD, HLD, GERD, and gout who presents to the hospital with a chief complaint of SOB for 2 days. Patient reports she has trouble sleeping because her breathing gets worse at night. She has to sleep sitting upright. Patient states she takes her medications and lasix every day as prescribed.  She denies any fever, chill, cough, chest pain, dysuria, leg edema. She denies any weight change, but has not weighed herself in several days. The patient has been eating and drinking as normal. She was admitted here for the same issue on 6/9/2020.  Patient does not consume alcohol, smoke cigarettes, or participate in recreational drug use.    In ED, Covid negative. Chest xray with mild prominence of the pulmonary vascular with mild perihilar infiltrate/edema. BNP elevated 1615.  Troponin 0.1 at her baseline. sCr 1.8. Patient doesn't require oxygen while resting but ambulatory O2 sat dropped to 89-90% with a respiratory rate in the low 30s  Patient reports SOB improved after IV lasix given in ED.    Patient is placed in Observation for further evaluation and treatment SOB/fluid overload.

## 2020-09-10 ENCOUNTER — HOSPITAL ENCOUNTER (OUTPATIENT)
Facility: HOSPITAL | Age: 85
Discharge: HOME | End: 2020-09-12
Attending: EMERGENCY MEDICINE | Admitting: EMERGENCY MEDICINE
Payer: MEDICARE

## 2020-09-10 DIAGNOSIS — Z95.810 ICD (IMPLANTABLE CARDIOVERTER-DEFIBRILLATOR), BIVENTRICULAR, IN SITU: Chronic | ICD-10-CM

## 2020-09-10 DIAGNOSIS — N18.4 CKD (CHRONIC KIDNEY DISEASE) STAGE 4, GFR 15-29 ML/MIN: ICD-10-CM

## 2020-09-10 DIAGNOSIS — I25.10 CORONARY ARTERY DISEASE INVOLVING NATIVE CORONARY ARTERY OF NATIVE HEART WITHOUT ANGINA PECTORIS: ICD-10-CM

## 2020-09-10 DIAGNOSIS — R79.89 ELEVATED TROPONIN: ICD-10-CM

## 2020-09-10 DIAGNOSIS — I50.43 ACUTE ON CHRONIC COMBINED SYSTOLIC AND DIASTOLIC CHF (CONGESTIVE HEART FAILURE): ICD-10-CM

## 2020-09-10 DIAGNOSIS — I50.33 ACUTE ON CHRONIC DIASTOLIC CONGESTIVE HEART FAILURE: ICD-10-CM

## 2020-09-10 DIAGNOSIS — I10 ESSENTIAL HYPERTENSION: Chronic | ICD-10-CM

## 2020-09-10 DIAGNOSIS — N18.9 CHRONIC KIDNEY DISEASE, UNSPECIFIED CKD STAGE: ICD-10-CM

## 2020-09-10 DIAGNOSIS — R06.02 SOB (SHORTNESS OF BREATH): ICD-10-CM

## 2020-09-10 DIAGNOSIS — E78.2 MIXED HYPERLIPIDEMIA: Chronic | ICD-10-CM

## 2020-09-10 DIAGNOSIS — I50.23 ACUTE ON CHRONIC SYSTOLIC CONGESTIVE HEART FAILURE: Primary | ICD-10-CM

## 2020-09-10 LAB
BASOPHILS # BLD AUTO: 0.02 K/UL (ref 0–0.2)
BASOPHILS NFR BLD: 0.3 % (ref 0–1.9)
DIFFERENTIAL METHOD: ABNORMAL
EOSINOPHIL # BLD AUTO: 0.2 K/UL (ref 0–0.5)
EOSINOPHIL NFR BLD: 2.6 % (ref 0–8)
ERYTHROCYTE [DISTWIDTH] IN BLOOD BY AUTOMATED COUNT: 14.6 % (ref 11.5–14.5)
HCT VFR BLD AUTO: 35.7 % (ref 37–48.5)
HGB BLD-MCNC: 11.3 G/DL (ref 12–16)
IMM GRANULOCYTES # BLD AUTO: 0.02 K/UL (ref 0–0.04)
IMM GRANULOCYTES NFR BLD AUTO: 0.3 % (ref 0–0.5)
LYMPHOCYTES # BLD AUTO: 1.2 K/UL (ref 1–4.8)
LYMPHOCYTES NFR BLD: 19.9 % (ref 18–48)
MCH RBC QN AUTO: 30.4 PG (ref 27–31)
MCHC RBC AUTO-ENTMCNC: 31.7 G/DL (ref 32–36)
MCV RBC AUTO: 96 FL (ref 82–98)
MONOCYTES # BLD AUTO: 0.6 K/UL (ref 0.3–1)
MONOCYTES NFR BLD: 10 % (ref 4–15)
NEUTROPHILS # BLD AUTO: 4.1 K/UL (ref 1.8–7.7)
NEUTROPHILS NFR BLD: 66.9 % (ref 38–73)
NRBC BLD-RTO: 0 /100 WBC
PLATELET # BLD AUTO: 109 K/UL (ref 150–350)
PMV BLD AUTO: 10.6 FL (ref 9.2–12.9)
RBC # BLD AUTO: 3.72 M/UL (ref 4–5.4)
WBC # BLD AUTO: 6.12 K/UL (ref 3.9–12.7)

## 2020-09-10 PROCEDURE — 99285 EMERGENCY DEPT VISIT HI MDM: CPT | Mod: 25

## 2020-09-10 PROCEDURE — 83880 ASSAY OF NATRIURETIC PEPTIDE: CPT

## 2020-09-10 PROCEDURE — 80053 COMPREHEN METABOLIC PANEL: CPT

## 2020-09-10 PROCEDURE — 85025 COMPLETE CBC W/AUTO DIFF WBC: CPT

## 2020-09-10 PROCEDURE — 85610 PROTHROMBIN TIME: CPT

## 2020-09-10 PROCEDURE — 93010 ELECTROCARDIOGRAM REPORT: CPT | Mod: ,,, | Performed by: INTERNAL MEDICINE

## 2020-09-10 PROCEDURE — 85730 THROMBOPLASTIN TIME PARTIAL: CPT

## 2020-09-10 PROCEDURE — 93010 EKG 12-LEAD: ICD-10-PCS | Mod: ,,, | Performed by: INTERNAL MEDICINE

## 2020-09-10 PROCEDURE — 93005 ELECTROCARDIOGRAM TRACING: CPT

## 2020-09-10 PROCEDURE — 96374 THER/PROPH/DIAG INJ IV PUSH: CPT

## 2020-09-10 PROCEDURE — 84484 ASSAY OF TROPONIN QUANT: CPT

## 2020-09-11 PROBLEM — R06.02 SOB (SHORTNESS OF BREATH): Status: ACTIVE | Noted: 2020-09-11

## 2020-09-11 LAB
ALBUMIN SERPL BCP-MCNC: 4.2 G/DL (ref 3.5–5.2)
ALP SERPL-CCNC: 74 U/L (ref 55–135)
ALT SERPL W/O P-5'-P-CCNC: 16 U/L (ref 10–44)
ANION GAP SERPL CALC-SCNC: 11 MMOL/L (ref 8–16)
ANION GAP SERPL CALC-SCNC: 11 MMOL/L (ref 8–16)
AORTIC ROOT ANNULUS: 3.17 CM
AORTIC VALVE CUSP SEPERATION: 1.69 CM
APTT BLDCRRT: 29.6 SEC (ref 21–32)
AST SERPL-CCNC: 21 U/L (ref 10–40)
AV INDEX (PROSTH): 0.37
AV MEAN GRADIENT: 7 MMHG
AV PEAK GRADIENT: 15 MMHG
AV VALVE AREA: 1.55 CM2
AV VELOCITY RATIO: 0.35
BASOPHILS # BLD AUTO: 0.03 K/UL (ref 0–0.2)
BASOPHILS NFR BLD: 0.5 % (ref 0–1.9)
BILIRUB SERPL-MCNC: 0.5 MG/DL (ref 0.1–1)
BILIRUB UR QL STRIP: NEGATIVE
BNP SERPL-MCNC: 2450 PG/ML (ref 0–99)
BSA FOR ECHO PROCEDURE: 1.83 M2
BUN SERPL-MCNC: 49 MG/DL (ref 8–23)
BUN SERPL-MCNC: 54 MG/DL (ref 8–23)
CALCIUM SERPL-MCNC: 10.1 MG/DL (ref 8.7–10.5)
CALCIUM SERPL-MCNC: 10.2 MG/DL (ref 8.7–10.5)
CHLORIDE SERPL-SCNC: 100 MMOL/L (ref 95–110)
CHLORIDE SERPL-SCNC: 101 MMOL/L (ref 95–110)
CLARITY UR: CLEAR
CO2 SERPL-SCNC: 28 MMOL/L (ref 23–29)
CO2 SERPL-SCNC: 30 MMOL/L (ref 23–29)
COLOR UR: NORMAL
CREAT SERPL-MCNC: 1.7 MG/DL (ref 0.5–1.4)
CREAT SERPL-MCNC: 1.9 MG/DL (ref 0.5–1.4)
CV ECHO LV RWT: 0.31 CM
DIFFERENTIAL METHOD: ABNORMAL
DOP CALC AO PEAK VEL: 1.92 M/S
DOP CALC AO VTI: 33 CM
DOP CALC LVOT AREA: 4.2 CM2
DOP CALC LVOT DIAMETER: 2.31 CM
DOP CALC LVOT PEAK VEL: 0.68 M/S
DOP CALC LVOT STROKE VOLUME: 51.23 CM3
DOP CALCLVOT PEAK VEL VTI: 12.23 CM
E WAVE DECELERATION TIME: 241.99 MSEC
E/A RATIO: 0.67
E/E' RATIO: 25.6 M/S
ECHO LV POSTERIOR WALL: 0.88 CM (ref 0.6–1.1)
EOSINOPHIL # BLD AUTO: 0.1 K/UL (ref 0–0.5)
EOSINOPHIL NFR BLD: 2.2 % (ref 0–8)
ERYTHROCYTE [DISTWIDTH] IN BLOOD BY AUTOMATED COUNT: 14.5 % (ref 11.5–14.5)
EST. GFR  (AFRICAN AMERICAN): 27 ML/MIN/1.73 M^2
EST. GFR  (AFRICAN AMERICAN): 31 ML/MIN/1.73 M^2
EST. GFR  (NON AFRICAN AMERICAN): 24 ML/MIN/1.73 M^2
EST. GFR  (NON AFRICAN AMERICAN): 27 ML/MIN/1.73 M^2
FERRITIN SERPL-MCNC: 467 NG/ML (ref 20–300)
FRACTIONAL SHORTENING: 8 % (ref 28–44)
GLUCOSE SERPL-MCNC: 102 MG/DL (ref 70–110)
GLUCOSE SERPL-MCNC: 99 MG/DL (ref 70–110)
GLUCOSE UR QL STRIP: NEGATIVE
HCT VFR BLD AUTO: 35.9 % (ref 37–48.5)
HGB BLD-MCNC: 11.2 G/DL (ref 12–16)
HGB UR QL STRIP: NEGATIVE
IMM GRANULOCYTES # BLD AUTO: 0.02 K/UL (ref 0–0.04)
IMM GRANULOCYTES NFR BLD AUTO: 0.3 % (ref 0–0.5)
INR PPP: 1 (ref 0.8–1.2)
INTERVENTRICULAR SEPTUM: 1.28 CM (ref 0.6–1.1)
IRON SERPL-MCNC: 49 UG/DL (ref 30–160)
IVRT: 169.93 MSEC
KETONES UR QL STRIP: NEGATIVE
LA MAJOR: 5.93 CM
LA MINOR: 5.86 CM
LA WIDTH: 4.73 CM
LEFT ATRIUM SIZE: 4.51 CM
LEFT ATRIUM VOLUME INDEX: 59.7 ML/M2
LEFT ATRIUM VOLUME: 106.89 CM3
LEFT INTERNAL DIMENSION IN SYSTOLE: 5.32 CM (ref 2.1–4)
LEFT VENTRICLE DIASTOLIC VOLUME INDEX: 91.77 ML/M2
LEFT VENTRICLE DIASTOLIC VOLUME: 164.41 ML
LEFT VENTRICLE MASS INDEX: 143 G/M2
LEFT VENTRICLE SYSTOLIC VOLUME INDEX: 76.1 ML/M2
LEFT VENTRICLE SYSTOLIC VOLUME: 136.33 ML
LEFT VENTRICULAR INTERNAL DIMENSION IN DIASTOLE: 5.77 CM (ref 3.5–6)
LEFT VENTRICULAR MASS: 255.67 G
LEUKOCYTE ESTERASE UR QL STRIP: NEGATIVE
LV LATERAL E/E' RATIO: 32 M/S
LV SEPTAL E/E' RATIO: 21.33 M/S
LYMPHOCYTES # BLD AUTO: 1.1 K/UL (ref 1–4.8)
LYMPHOCYTES NFR BLD: 18.8 % (ref 18–48)
MAGNESIUM SERPL-MCNC: 2.3 MG/DL (ref 1.6–2.6)
MCH RBC QN AUTO: 30.3 PG (ref 27–31)
MCHC RBC AUTO-ENTMCNC: 31.2 G/DL (ref 32–36)
MCV RBC AUTO: 97 FL (ref 82–98)
MONOCYTES # BLD AUTO: 0.7 K/UL (ref 0.3–1)
MONOCYTES NFR BLD: 11.8 % (ref 4–15)
MV PEAK A VEL: 0.95 M/S
MV PEAK E VEL: 0.64 M/S
MV STENOSIS PRESSURE HALF TIME: 80.48 MS
MV VALVE AREA P 1/2 METHOD: 2.73 CM2
NEUTROPHILS # BLD AUTO: 3.9 K/UL (ref 1.8–7.7)
NEUTROPHILS NFR BLD: 66.4 % (ref 38–73)
NITRITE UR QL STRIP: NEGATIVE
NRBC BLD-RTO: 0 /100 WBC
PH UR STRIP: 5 [PH] (ref 5–8)
PHOSPHATE SERPL-MCNC: 3.5 MG/DL (ref 2.7–4.5)
PISA TR MAX VEL: 1.82 M/S
PLATELET # BLD AUTO: 111 K/UL (ref 150–350)
PMV BLD AUTO: 11.2 FL (ref 9.2–12.9)
POTASSIUM SERPL-SCNC: 3.9 MMOL/L (ref 3.5–5.1)
POTASSIUM SERPL-SCNC: 4.2 MMOL/L (ref 3.5–5.1)
PROT SERPL-MCNC: 7.5 G/DL (ref 6–8.4)
PROT UR QL STRIP: NEGATIVE
PROTHROMBIN TIME: 10.9 SEC (ref 9–12.5)
PV PEAK VELOCITY: 0.94 CM/S
RA MAJOR: 4.71 CM
RA WIDTH: 3.47 CM
RBC # BLD AUTO: 3.7 M/UL (ref 4–5.4)
RETICS/RBC NFR AUTO: 1.2 % (ref 0.5–2.5)
RIGHT VENTRICULAR END-DIASTOLIC DIMENSION: 2.86 CM
RV TISSUE DOPPLER FREE WALL SYSTOLIC VELOCITY 1 (APICAL 4 CHAMBER VIEW): 5.26 CM/S
SARS-COV-2 RDRP RESP QL NAA+PROBE: NEGATIVE
SATURATED IRON: 14 % (ref 20–50)
SINUS: 2.66 CM
SODIUM SERPL-SCNC: 139 MMOL/L (ref 136–145)
SODIUM SERPL-SCNC: 142 MMOL/L (ref 136–145)
SP GR UR STRIP: 1.01 (ref 1–1.03)
STJ: 2.44 CM
TDI LATERAL: 0.02 M/S
TDI SEPTAL: 0.03 M/S
TDI: 0.03 M/S
TOTAL IRON BINDING CAPACITY: 360 UG/DL (ref 250–450)
TR MAX PG: 13 MMHG
TRANSFERRIN SERPL-MCNC: 243 MG/DL (ref 200–375)
TRICUSPID ANNULAR PLANE SYSTOLIC EXCURSION: 1.11 CM
TROPONIN I SERPL DL<=0.01 NG/ML-MCNC: 0.09 NG/ML (ref 0–0.03)
TROPONIN I SERPL DL<=0.01 NG/ML-MCNC: 0.1 NG/ML (ref 0–0.03)
TROPONIN I SERPL DL<=0.01 NG/ML-MCNC: 0.12 NG/ML (ref 0–0.03)
URN SPEC COLLECT METH UR: NORMAL
UROBILINOGEN UR STRIP-ACNC: NEGATIVE EU/DL
WBC # BLD AUTO: 5.85 K/UL (ref 3.9–12.7)

## 2020-09-11 PROCEDURE — 84484 ASSAY OF TROPONIN QUANT: CPT | Mod: 91

## 2020-09-11 PROCEDURE — 83735 ASSAY OF MAGNESIUM: CPT

## 2020-09-11 PROCEDURE — 63600175 PHARM REV CODE 636 W HCPCS: Performed by: HOSPITALIST

## 2020-09-11 PROCEDURE — 96376 TX/PRO/DX INJ SAME DRUG ADON: CPT | Mod: 59

## 2020-09-11 PROCEDURE — 82728 ASSAY OF FERRITIN: CPT

## 2020-09-11 PROCEDURE — 99220 PR INITIAL OBSERVATION CARE,LEVL III: CPT | Mod: 25,,, | Performed by: INTERNAL MEDICINE

## 2020-09-11 PROCEDURE — 85025 COMPLETE CBC W/AUTO DIFF WBC: CPT

## 2020-09-11 PROCEDURE — 63600175 PHARM REV CODE 636 W HCPCS: Performed by: PHYSICIAN ASSISTANT

## 2020-09-11 PROCEDURE — G0378 HOSPITAL OBSERVATION PER HR: HCPCS

## 2020-09-11 PROCEDURE — 36415 COLL VENOUS BLD VENIPUNCTURE: CPT

## 2020-09-11 PROCEDURE — 99220 PR INITIAL OBSERVATION CARE,LEVL III: ICD-10-PCS | Mod: 25,,, | Performed by: INTERNAL MEDICINE

## 2020-09-11 PROCEDURE — 81003 URINALYSIS AUTO W/O SCOPE: CPT

## 2020-09-11 PROCEDURE — 84100 ASSAY OF PHOSPHORUS: CPT

## 2020-09-11 PROCEDURE — 83540 ASSAY OF IRON: CPT

## 2020-09-11 PROCEDURE — U0002 COVID-19 LAB TEST NON-CDC: HCPCS

## 2020-09-11 PROCEDURE — S0028 INJECTION, FAMOTIDINE, 20 MG: HCPCS | Performed by: HOSPITALIST

## 2020-09-11 PROCEDURE — 63600175 PHARM REV CODE 636 W HCPCS: Performed by: INTERNAL MEDICINE

## 2020-09-11 PROCEDURE — 80048 BASIC METABOLIC PNL TOTAL CA: CPT

## 2020-09-11 PROCEDURE — 25000003 PHARM REV CODE 250: Performed by: HOSPITALIST

## 2020-09-11 PROCEDURE — 25000003 PHARM REV CODE 250: Performed by: INTERNAL MEDICINE

## 2020-09-11 PROCEDURE — 85045 AUTOMATED RETICULOCYTE COUNT: CPT

## 2020-09-11 PROCEDURE — 96375 TX/PRO/DX INJ NEW DRUG ADDON: CPT

## 2020-09-11 RX ORDER — ONDANSETRON 2 MG/ML
4 INJECTION INTRAMUSCULAR; INTRAVENOUS EVERY 8 HOURS PRN
Status: DISCONTINUED | OUTPATIENT
Start: 2020-09-11 | End: 2020-09-12 | Stop reason: HOSPADM

## 2020-09-11 RX ORDER — ACETAMINOPHEN 325 MG/1
650 TABLET ORAL EVERY 4 HOURS PRN
Status: DISCONTINUED | OUTPATIENT
Start: 2020-09-11 | End: 2020-09-12 | Stop reason: HOSPADM

## 2020-09-11 RX ORDER — FUROSEMIDE 10 MG/ML
40 INJECTION INTRAMUSCULAR; INTRAVENOUS
Status: COMPLETED | OUTPATIENT
Start: 2020-09-11 | End: 2020-09-11

## 2020-09-11 RX ORDER — SODIUM CHLORIDE 0.9 % (FLUSH) 0.9 %
10 SYRINGE (ML) INJECTION
Status: DISCONTINUED | OUTPATIENT
Start: 2020-09-11 | End: 2020-09-12 | Stop reason: HOSPADM

## 2020-09-11 RX ORDER — CLOPIDOGREL BISULFATE 75 MG/1
75 TABLET ORAL DAILY
Status: DISCONTINUED | OUTPATIENT
Start: 2020-09-11 | End: 2020-09-12 | Stop reason: HOSPADM

## 2020-09-11 RX ORDER — FUROSEMIDE 10 MG/ML
20 INJECTION INTRAMUSCULAR; INTRAVENOUS
Status: DISCONTINUED | OUTPATIENT
Start: 2020-09-11 | End: 2020-09-11

## 2020-09-11 RX ORDER — LOVASTATIN 20 MG/1
40 TABLET ORAL NIGHTLY
Status: DISCONTINUED | OUTPATIENT
Start: 2020-09-11 | End: 2020-09-12 | Stop reason: HOSPADM

## 2020-09-11 RX ORDER — CARVEDILOL 12.5 MG/1
12.5 TABLET ORAL 2 TIMES DAILY
Status: DISCONTINUED | OUTPATIENT
Start: 2020-09-11 | End: 2020-09-12 | Stop reason: HOSPADM

## 2020-09-11 RX ORDER — ISOSORBIDE DINITRATE AND HYDRALAZINE HYDROCHLORIDE 37.5; 2 MG/1; MG/1
1 TABLET ORAL 2 TIMES DAILY
Status: DISCONTINUED | OUTPATIENT
Start: 2020-09-11 | End: 2020-09-12 | Stop reason: HOSPADM

## 2020-09-11 RX ORDER — FUROSEMIDE 10 MG/ML
40 INJECTION INTRAMUSCULAR; INTRAVENOUS
Status: DISCONTINUED | OUTPATIENT
Start: 2020-09-11 | End: 2020-09-12 | Stop reason: HOSPADM

## 2020-09-11 RX ORDER — FAMOTIDINE 10 MG/ML
20 INJECTION INTRAVENOUS DAILY
Status: DISCONTINUED | OUTPATIENT
Start: 2020-09-11 | End: 2020-09-12 | Stop reason: HOSPADM

## 2020-09-11 RX ADMIN — FUROSEMIDE 20 MG: 10 INJECTION, SOLUTION INTRAMUSCULAR; INTRAVENOUS at 05:09

## 2020-09-11 RX ADMIN — CARVEDILOL 12.5 MG: 12.5 TABLET, FILM COATED ORAL at 09:09

## 2020-09-11 RX ADMIN — CLOPIDOGREL 75 MG: 75 TABLET, FILM COATED ORAL at 09:09

## 2020-09-11 RX ADMIN — FAMOTIDINE 20 MG: 10 INJECTION INTRAVENOUS at 09:09

## 2020-09-11 RX ADMIN — FUROSEMIDE 40 MG: 10 INJECTION, SOLUTION INTRAMUSCULAR; INTRAVENOUS at 01:09

## 2020-09-11 RX ADMIN — HYDRALAZINE HYDROCHLORIDE AND ISOSORBIDE DINITRATE 1 TABLET: 37.5; 2 TABLET, FILM COATED ORAL at 09:09

## 2020-09-11 RX ADMIN — LOVASTATIN 40 MG: 20 TABLET ORAL at 09:09

## 2020-09-11 RX ADMIN — LOVASTATIN 40 MG: 20 TABLET ORAL at 05:09

## 2020-09-11 RX ADMIN — FUROSEMIDE 40 MG: 10 INJECTION, SOLUTION INTRAVENOUS at 05:09

## 2020-09-11 NOTE — HPI
85 year old female, history of combined systolic/diastolic CHF, CAD, chronic gout, essential hypertension, hypercholesterolemia, CKD, OAB, presents to ED complaining of acute onset shortness of breath round 5:00 p.m. today.  She states shortness of breath felt similar to previous admit due to CHF exacerbation.  She denies shortness of breath at rest, she admits to dyspnea on exertion.  She admits to chronic 2 pillow orthopnea, no recent worsening.  She denies new cough.  Denies worsening leg swelling.  No fever, no chills, no recent illness or sick contacts.  No change in medications.  No abdominal pain.  No nausea vomiting.  She denies any associated lightheadedness dizziness, syncope or near-syncope, diaphoresis.  She denies chest pain at any time.  No home oxygen.  Patient states that the shortness of breath resolved when she arrived to the ED. No history of VTE.  Patient also states today she began with some pressure to her suprapubic region, relieved with urination.  No dysuria, hematuria, strong odor to urine.  No flank pain.  She does admit to chronic history of difficulty sleeping but denies paroxysmal nocturnal dyspnea.  Previously diagnosed with likely colon cancer, however no evidence on CT.  Followed by Dr. Myrick.  Biventricular AICD in place.      Pt follows with Dr. Richmond, last seen 6/22/20: NICM, [employer] BiV AICD, HTN, HLD.  Per his note had neg cath 2011.    The patient presents the emergency room with complaints of 1 day of progressive shortness of breath.  She has since been diuresed in states she is feeling better, but not back to baseline.  She has been urinating quite a bit.  She denies any chest discomfort, palpitations, syncope, or defibrillator discharges.  She confirms negative heart catheterization back in 2011.  She denies any medication or dietary noncompliance.

## 2020-09-11 NOTE — PLAN OF CARE
09/11/20 1802   Discharge Assessment   Assessment Type Discharge Planning Assessment   Confirmed/corrected address and phone number on facesheet? Yes   Assessment information obtained from? Patient   Expected Length of Stay (days) 2   Communicated expected length of stay with patient/caregiver yes   Prior to hospitilization cognitive status: Alert/Oriented   Prior to hospitalization functional status: Assistive Equipment   Current cognitive status: Alert/Oriented   Current Functional Status: Assistive Equipment   Lives With alone   Able to Return to Prior Arrangements yes   Is patient able to care for self after discharge? Yes   Patient's perception of discharge disposition home or selfcare   Readmission Within the Last 30 Days no previous admission in last 30 days   Patient currently being followed by outpatient case management? No   Patient currently receives any other outside agency services? No   Equipment Currently Used at Home cane, quad;rollator   Part D Coverage PHN   Do you have any problems affording any of your prescribed medications? No   Is the patient taking medications as prescribed? yes   Does the patient have transportation home? Yes   Transportation Anticipated family or friend will provide   Does the patient receive services at the Coumadin Clinic? No   Discharge Plan A Home Health   Discharge Plan B Home   DME Needed Upon Discharge  none   Patient/Family in Agreement with Plan yes

## 2020-09-11 NOTE — SUBJECTIVE & OBJECTIVE
Past Medical History:   Diagnosis Date    Anticoagulant long-term use     Cataract     CHF (congestive heart failure)     Coronary artery disease     Gout attack     Hypercholesteremia     Hypertension     Renal disorder     Vaginal delivery     x4       Past Surgical History:   Procedure Laterality Date    APPENDECTOMY      CARDIAC DEFIBRILLATOR PLACEMENT      2015    CATARACT EXTRACTION W/  INTRAOCULAR LENS IMPLANT Left 02/07/2019    Dr. Velazco    CATARACT EXTRACTION W/  INTRAOCULAR LENS IMPLANT Right 02/21/2019    Dr. Velazco    CHOLECYSTECTOMY      COLONOSCOPY W/ POLYPECTOMY  10 or 11/ 2014    per Dr. Myrick    defribillator Left 8/2008    EYE SURGERY      HYSTERECTOMY      INTRAOCULAR PROSTHESES INSERTION Left 2/7/2019    Procedure: INSERTION, IOL PROSTHESIS;  Surgeon: Demetrius Velazco MD;  Location: [facility] OR;  Service: Ophthalmology;  Laterality: Left;  RN Phone Pre OP 1-30-19.  Arrival 05:30 AM    INTRAOCULAR PROSTHESES INSERTION Right 2/21/2019    Procedure: INSERTION, IOL PROSTHESIS;  Surgeon: Demetrius Velazco MD;  Location: [facility] OR;  Service: Ophthalmology;  Laterality: Right;    JOINT REPLACEMENT Bilateral 2005 & 2006    2 Knee Replacements=Lt. 1= 2005. Rt. 1= 2006    OOPHORECTOMY      PHACOEMULSIFICATION OF CATARACT Left 2/7/2019    Procedure: PHACOEMULSIFICATION, CATARACT;  Surgeon: Demetrius Velazco MD;  Location: [facility] OR;  Service: Ophthalmology;  Laterality: Left;    PHACOEMULSIFICATION OF CATARACT Right 2/21/2019    Procedure: PHACOEMULSIFICATION, CATARACT;  Surgeon: Demetrius Velazco MD;  Location: [facility] OR;  Service: Ophthalmology;  Laterality: Right;  RN Phone Pre op 2-15-19.  Arrival 05:30 AM    TOTAL KNEE ARTHROPLASTY Bilateral Lt.= 2005; Rt.=2006    Bilateral Knee scope       Review of patient's allergies indicates:   Allergen Reactions    Aspirin Swelling     Swelling and rash    Colace [docusate sodium] Rash     itching    Sulfa  (sulfonamide antibiotics) Swelling     Swelling and rash    Iodine and iodide containing products Rash    Penicillins Rash       Current Facility-Administered Medications on File Prior to Encounter   Medication    0.9%  NaCl infusion    phenylephrine HCL 2.5% ophthalmic solution 1 drop    proparacaine 0.5 % ophthalmic solution 1 drop    tropicamide 1% ophthalmic solution 1 drop     Current Outpatient Medications on File Prior to Encounter   Medication Sig    acetaminophen (TYLENOL) 500 MG tablet Take 1 tablet (500 mg total) by mouth every 6 (six) hours as needed for Pain.    allopurinol (ZYLOPRIM) 100 MG tablet Take 100 mg by mouth every evening.     calcitRIOL (ROCALTROL) 0.25 MCG Cap 0.25 mcg once daily.     carvedilol (COREG) 25 MG tablet Take 0.5 tablets (12.5 mg total) by mouth 2 (two) times daily.    clopidogrel (PLAVIX) 75 mg tablet Take 75 mg by mouth once daily. On hold since 11-28-14, for procedure.    cranberry 400 mg Cap Take 1 capsule by mouth 2 (two) times daily.    fish oil-omega-3 fatty acids 300-1,000 mg capsule Take 2 g by mouth once daily. 2 caps every AM & 1 cap every PM.    folic acid/multivit-min/lutein (CENTRUM SILVER ORAL) Take by mouth once daily.    furosemide (LASIX) 40 MG tablet Take 1 tablet (40 mg total) by mouth 2 (two) times a day.    lovastatin (MEVACOR) 40 MG tablet Take 40 mg by mouth nightly. Takes 2 caps with supper every evening.    mirabegron (MYRBETRIQ) 50 mg Tb24 Take 50 mg by mouth once daily.      Family History     Problem Relation (Age of Onset)    Amblyopia Son    Diabetes Other    Heart attack Mother (88)    Hyperlipidemia Mother    Hypertension Mother, Other        Tobacco Use    Smoking status: Never Smoker    Smokeless tobacco: Never Used   Substance and Sexual Activity    Alcohol use: Yes     Comment: rarely    Drug use: No    Sexual activity: Not Currently     Partners: Male     Review of Systems   Constitution: Negative for chills,  diaphoresis, fever and malaise/fatigue.   HENT: Negative for nosebleeds.    Eyes: Negative for blurred vision and double vision.   Cardiovascular: Positive for dyspnea on exertion and leg swelling. Negative for chest pain, claudication, cyanosis, orthopnea, palpitations, paroxysmal nocturnal dyspnea and syncope.   Respiratory: Negative for cough, shortness of breath and wheezing.    Skin: Negative for dry skin and poor wound healing.   Musculoskeletal: Negative for back pain, joint swelling and myalgias.   Gastrointestinal: Negative for abdominal pain, nausea and vomiting.   Genitourinary: Negative for hematuria.   Neurological: Negative for dizziness, headaches, numbness, seizures and weakness.   Psychiatric/Behavioral: Negative for altered mental status and depression.     Objective:     Vital Signs (Most Recent):  Temp: 98.4 °F (36.9 °C) (09/11/20 0534)  Pulse: 77 (09/11/20 0536)  Resp: 18 (09/11/20 0534)  BP: (!) 147/70 (09/11/20 0536)  SpO2: (!) 94 % (09/11/20 0536) Vital Signs (24h Range):  Temp:  [98.1 °F (36.7 °C)-98.4 °F (36.9 °C)] 98.4 °F (36.9 °C)  Pulse:  [66-77] 77  Resp:  [18-26] 18  SpO2:  [94 %-98 %] 94 %  BP: (131-155)/(67-75) 147/70     Weight: 75.8 kg (167 lb 1.7 oz)  Body mass index is 29.6 kg/m².    SpO2: (!) 94 %  O2 Device (Oxygen Therapy): room air    No intake or output data in the 24 hours ending 09/11/20 0653    Lines/Drains/Airways     Peripheral Intravenous Line                 Peripheral IV - Single Lumen 20 G Right Forearm -- days                Physical Exam   Constitutional: She is oriented to person, place, and time. She appears well-developed and well-nourished. No distress.   HENT:   Head: Normocephalic and atraumatic.   Mouth/Throat: No oropharyngeal exudate.   Eyes: Pupils are equal, round, and reactive to light. Conjunctivae and EOM are normal. No scleral icterus.   Neck: Normal range of motion. Neck supple. No JVD present. No tracheal deviation present. No thyromegaly present.    Cardiovascular: Normal rate, regular rhythm, S1 normal and S2 normal. Exam reveals no gallop and no friction rub.   Murmur heard.   Systolic murmur is present with a grade of 2/6.  Pulmonary/Chest: Effort normal and breath sounds normal. No respiratory distress. She has no wheezes. She has no rales. She exhibits no tenderness.   Abdominal: Soft. She exhibits no distension.   Musculoskeletal: Normal range of motion.         General: Edema present.   Neurological: She is alert and oriented to person, place, and time. No cranial nerve deficit.   Skin: Skin is warm and dry. She is not diaphoretic.   Psychiatric: She has a normal mood and affect. Her behavior is normal. Judgment normal.       Current Medications:   famotidine (PF)  20 mg Intravenous Daily    furosemide (LASIX) IV  20 mg Intravenous Q12H    lovastatin  40 mg Oral Nightly       acetaminophen, ondansetron, sodium chloride 0.9%    Laboratory (all labs reviewed):  CBC:  Recent Labs   Lab 06/09/20  1040 06/10/20  0313 08/08/20  0310 09/10/20  2330 09/11/20  0558   WBC 6.25 6.35 5.97 6.12 5.85   Hemoglobin 11.2 L 11.5 L 10.5 L 11.3 L 11.2 L   Hematocrit 36.6 L 36.8 L 34.7 L 35.7 L 35.9 L   Platelets 129 L 125 L 109 L 109 L 111 L       CHEMISTRIES:  Recent Labs   Lab 01/14/19  0957  12/08/19  0351 06/09/20  1040 06/10/20  0313 08/08/20  0310 09/10/20  2330 09/11/20  0558   Glucose 94   < > 99 96 106 98 102 99   Sodium 142   < > 143 141 139 141 139 142   Potassium 3.5   < > 3.5 4.1 4.3 4.0 4.2 3.9   BUN, Bld 26 H   < > 35 H 48 H 55 H 48 H 54 H 49 H   Creatinine 1.3   < > 1.7 H 1.6 H 1.6 H 1.8 H 1.9 H 1.7 H   eGFR if  44 A   < > 31 A 34 A 34 A 29 A 27 A 31 A   eGFR if non  38 A   < > 27 A 29 A 29 A 25 A 24 A 27 A   Calcium 10.4   < > 9.9 10.0 10.3 9.2 10.1 10.2   Magnesium 1.9  --  2.1  --   --   --   --  2.3    < > = values in this interval not displayed.       CARDIAC BIOMARKERS:  Recent Labs   Lab 06/09/20 2040  06/10/20  0313 08/08/20  0310 08/08/20  0630 09/10/20  2330   Troponin I 0.141 H 0.132 H 0.111 H 0.092 H 0.088 H       COAGS:  Recent Labs   Lab 10/02/18  1809 12/07/19 2202 08/08/20  0310 09/10/20  2330   INR 1.1 1.0 1.0 1.0       LIPIDS/LFTS:  Recent Labs   Lab 12/08/19  0351 06/09/20  1040 06/09/20  2040 06/10/20  0313 08/08/20  0310 09/10/20  2330   Cholesterol  --   --  165  --   --   --    Triglycerides  --   --  233 H  --   --   --    HDL  --   --  66  --   --   --    LDL Cholesterol  --   --  52.4 L  --   --   --    Non-HDL Cholesterol  --   --  99  --   --   --    AST 26 35  --  29 30 21   ALT 18 29  --  28 18 16       BNP:  Recent Labs   Lab 02/07/19 2136 12/07/19 2202 06/09/20  1040 08/08/20  0310 09/10/20  2330   BNP 2,475 H 1,355 H 2,706 H 1,615 H 2,450 H       TSH:  Recent Labs   Lab 06/09/20  2040   TSH 1.588       Free T4:        Diagnostic Results:  ECG (personally reviewed and interpreted tracing(s)):  9/10/20 AS 72, BiV paced, similar to 8/8/20    Chest X-Ray (personally reviewed and interpreted image(s)): 9/10/20 CMeg, mild CHF, L BiV ICD in place    Echo: 3/9/20 (repeat ordered)  · Severely decreased left ventricular systolic function. The estimated ejection fraction is 20%.  · Mild concentric left ventricular hypertrophy.  · Grade I (mild) left ventricular diastolic dysfunction consistent with impaired relaxation.  · Moderate left ventricular enlargement.  · Moderate mitral regurgitation.  · Normal right ventricular systolic function.  · Severe left atrial enlargement.  · Mild right atrial enlargement.  · Intermediate central venous pressure (8 mmHg).  · The estimated PA systolic pressure is 33 mmHg.    Carotid US 7/10/18  There is 40 - 49% right Internal Carotid stenosis.   There is 20 - 39% left Internal Carotid stenosis.     Cath: per Dr. Richmond note 6/2020, neg cath 2011

## 2020-09-11 NOTE — ASSESSMENT & PLAN NOTE
By hx, however with neg cath 2011 per Dr. Richmond notes  Cont plavix/statin/BBL  No anginal sxs noted

## 2020-09-11 NOTE — CARE UPDATE
I have read and reviewed the H&P from the admitting provider and agree with the ROS, PE, and Plan from  DEBBY Doty on the morning of 9/11/20---    85 y.o. AAF, very pleasant with known CHF presents with progressively worsening SOB and found to be in FVO. Repeat 2D echo showed findings consistent with severely depressed LVEF 20% + GiDD and LV hypertrophy. BNP >2400; Has chronic troponin emia, however, presenting trops = 0.088 peaked at 0.115 --Cards consulted and following    She was started on IV diuretics with some relief. Continued diureses, strict I&Os, and daily weights with FR.      PLAN:     Continue to diuresis overnight with IV lasix  Strict I&Os, FR, Daily weight  Supplemental oxygen as warranted for comfort  Cardiology following  Tight BP control  Cardiac Monitoring    Present on Admission:   SOB (shortness of breath)   Coronary artery disease involving native coronary artery of native heart without angina pectoris   Acute on chronic systolic congestive heart failure   Essential hypertension   Hyperlipidemia   CKD (chronic kidney disease) stage 4, GFR 15-29 ml/min   Biventricular AICD in place                  TREVOR Szymanski, DEBBY-C  Hospitalist - Department of Hospital Medicine  [address], [place] [address]  Office [phone]; Pager [phone]

## 2020-09-11 NOTE — ASSESSMENT & PLAN NOTE
Known NICM (apparent neg cath 2011)  S/p MDT BiV ICD, appears to be normally functioning, no recent shocks  Pt states sxs have improved with diuresis, but not completely resolved  Will cont diuresis for 1 more day, then switch back to PO lasix  Resume coreg 12.5mg bid  Add BiDil 1 tab bid  No plan for isch eval/cath at this juncture (esvin with CKD4)  Check echo (syst murmur noted c/w MR)

## 2020-09-11 NOTE — SUBJECTIVE & OBJECTIVE
Past Medical History:   Diagnosis Date    Anticoagulant long-term use     Cataract     CHF (congestive heart failure)     Coronary artery disease     Gout attack     Hypercholesteremia     Hypertension     Renal disorder     Vaginal delivery     x4       Past Surgical History:   Procedure Laterality Date    APPENDECTOMY      CARDIAC DEFIBRILLATOR PLACEMENT      2015    CATARACT EXTRACTION W/  INTRAOCULAR LENS IMPLANT Left 02/07/2019    Dr. Velazco    CATARACT EXTRACTION W/  INTRAOCULAR LENS IMPLANT Right 02/21/2019    Dr. Velazco    CHOLECYSTECTOMY      COLONOSCOPY W/ POLYPECTOMY  10 or 11/ 2014    per Dr. Myrick    defribillator Left 8/2008    EYE SURGERY      HYSTERECTOMY      INTRAOCULAR PROSTHESES INSERTION Left 2/7/2019    Procedure: INSERTION, IOL PROSTHESIS;  Surgeon: Demetrius Velazco MD;  Location: [facility] OR;  Service: Ophthalmology;  Laterality: Left;  RN Phone Pre OP 1-30-19.  Arrival 05:30 AM    INTRAOCULAR PROSTHESES INSERTION Right 2/21/2019    Procedure: INSERTION, IOL PROSTHESIS;  Surgeon: Demetrius Velazco MD;  Location: [facility] OR;  Service: Ophthalmology;  Laterality: Right;    JOINT REPLACEMENT Bilateral 2005 & 2006    2 Knee Replacements=Lt. 1= 2005. Rt. 1= 2006    OOPHORECTOMY      PHACOEMULSIFICATION OF CATARACT Left 2/7/2019    Procedure: PHACOEMULSIFICATION, CATARACT;  Surgeon: Demetrius Velazco MD;  Location: [facility] OR;  Service: Ophthalmology;  Laterality: Left;    PHACOEMULSIFICATION OF CATARACT Right 2/21/2019    Procedure: PHACOEMULSIFICATION, CATARACT;  Surgeon: Demetrius Velazco MD;  Location: [facility] OR;  Service: Ophthalmology;  Laterality: Right;  RN Phone Pre op 2-15-19.  Arrival 05:30 AM    TOTAL KNEE ARTHROPLASTY Bilateral Lt.= 2005; Rt.=2006    Bilateral Knee scope       Review of patient's allergies indicates:   Allergen Reactions    Aspirin Swelling     Swelling and rash    Colace [docusate sodium] Rash     itching    Sulfa  (sulfonamide antibiotics) Swelling     Swelling and rash    Iodine and iodide containing products Rash    Penicillins Rash       Current Facility-Administered Medications on File Prior to Encounter   Medication    0.9%  NaCl infusion    phenylephrine HCL 2.5% ophthalmic solution 1 drop    proparacaine 0.5 % ophthalmic solution 1 drop    tropicamide 1% ophthalmic solution 1 drop     Current Outpatient Medications on File Prior to Encounter   Medication Sig    acetaminophen (TYLENOL) 500 MG tablet Take 1 tablet (500 mg total) by mouth every 6 (six) hours as needed for Pain.    allopurinol (ZYLOPRIM) 100 MG tablet Take 100 mg by mouth every evening.     calcitRIOL (ROCALTROL) 0.25 MCG Cap 0.25 mcg once daily.     carvedilol (COREG) 25 MG tablet Take 0.5 tablets (12.5 mg total) by mouth 2 (two) times daily.    clopidogrel (PLAVIX) 75 mg tablet Take 75 mg by mouth once daily. On hold since 11-28-14, for procedure.    cranberry 400 mg Cap Take 1 capsule by mouth 2 (two) times daily.    fish oil-omega-3 fatty acids 300-1,000 mg capsule Take 2 g by mouth once daily. 2 caps every AM & 1 cap every PM.    folic acid/multivit-min/lutein (CENTRUM SILVER ORAL) Take by mouth once daily.    furosemide (LASIX) 40 MG tablet Take 1 tablet (40 mg total) by mouth 2 (two) times a day.    lovastatin (MEVACOR) 40 MG tablet Take 40 mg by mouth nightly. Takes 2 caps with supper every evening.    mirabegron (MYRBETRIQ) 50 mg Tb24 Take 50 mg by mouth once daily.      Family History     Problem Relation (Age of Onset)    Amblyopia Son    Diabetes Other    Heart attack Mother (88)    Hyperlipidemia Mother    Hypertension Mother, Other        Tobacco Use    Smoking status: Never Smoker    Smokeless tobacco: Never Used   Substance and Sexual Activity    Alcohol use: Yes     Comment: rarely    Drug use: No    Sexual activity: Not Currently     Partners: Male     Review of Systems   Constitutional: Negative for chills and  fever.   HENT: Negative for congestion, rhinorrhea and sore throat.    Eyes: Negative for visual disturbance.   Respiratory: Positive for shortness of breath.    Cardiovascular: Negative for chest pain and palpitations.   Gastrointestinal: Negative for abdominal pain, diarrhea, nausea and vomiting.   Genitourinary: Negative for dysuria, flank pain and hematuria.   Musculoskeletal: Negative for arthralgias and myalgias.   Skin: Negative for rash and wound.   Neurological: Negative for dizziness, weakness and headaches.   Hematological: Does not bruise/bleed easily.   Psychiatric/Behavioral: Negative for confusion.     Objective:     Vital Signs (Most Recent):  Temp: 98.4 °F (36.9 °C) (09/11/20 0534)  Pulse: 77 (09/11/20 0536)  Resp: 18 (09/11/20 0534)  BP: (!) 147/70 (09/11/20 0536)  SpO2: (!) 94 % (09/11/20 0536) Vital Signs (24h Range):  Temp:  [98.1 °F (36.7 °C)-98.4 °F (36.9 °C)] 98.4 °F (36.9 °C)  Pulse:  [66-77] 77  Resp:  [18-26] 18  SpO2:  [94 %-98 %] 94 %  BP: (131-155)/(67-75) 147/70     Weight: 75.8 kg (167 lb 1.7 oz)  Body mass index is 29.6 kg/m².    Physical Exam  Vitals signs and nursing note reviewed.   Constitutional:       Appearance: Normal appearance.   HENT:      Head: Normocephalic and atraumatic.      Nose: Nose normal.      Mouth/Throat:      Mouth: Mucous membranes are dry.   Eyes:      Conjunctiva/sclera: Conjunctivae normal.   Neck:      Musculoskeletal: Normal range of motion.      Vascular: JVD ( slight) present.   Cardiovascular:      Rate and Rhythm: Normal rate and regular rhythm.      Pulses: Normal pulses.      Heart sounds: Murmur present. Systolic murmur present.      Comments: Left chest wall AICD-Atrial Paced.  Pulmonary:      Effort: Pulmonary effort is normal.      Breath sounds: Normal breath sounds. No wheezing, rhonchi or rales.   Chest:      Chest wall: No tenderness.   Abdominal:      General: Bowel sounds are normal.      Palpations: Abdomen is soft.      Tenderness:  There is no abdominal tenderness. There is no guarding or rebound.   Musculoskeletal:         General: No tenderness.      Right lower leg: Edema (Slight) present.      Left lower leg: Edema ( slight) present.   Skin:     Capillary Refill: Capillary refill takes less than 2 seconds.   Neurological:      Mental Status: She is alert and oriented to person, place, and time.   Psychiatric:         Mood and Affect: Mood normal.         Thought Content: Thought content normal.         Judgment: Judgment normal.             Significant Labs:   CBC:   Recent Labs   Lab 09/10/20  2330 09/11/20  0558   WBC 6.12 5.85   HGB 11.3* 11.2*   HCT 35.7* 35.9*   * 111*     CMP:   Recent Labs   Lab 09/10/20  2330 09/11/20  0558    142   K 4.2 3.9    101   CO2 28 30*    99   BUN 54* 49*   CREATININE 1.9* 1.7*   CALCIUM 10.1 10.2   PROT 7.5  --    ALBUMIN 4.2  --    BILITOT 0.5  --    ALKPHOS 74  --    AST 21  --    ALT 16  --    ANIONGAP 11 11   EGFRNONAA 24* 27*     Cardiac Markers:   Recent Labs   Lab 09/10/20  2330   BNP 2,450*     Coagulation:   Recent Labs   Lab 09/10/20  2330   INR 1.0   APTT 29.6     Lipid Panel: No results for input(s): CHOL, HDL, LDLCALC, TRIG, CHOLHDL in the last 48 hours.  Troponin:   Recent Labs   Lab 09/10/20  2330 09/11/20  0558   TROPONINI 0.088* 0.103*     Urine Studies:   Recent Labs   Lab 09/11/20  0019   COLORU Straw   APPEARANCEUA Clear   PHUR 5.0   SPECGRAV 1.010   PROTEINUA Negative   GLUCUA Negative   KETONESU Negative   BILIRUBINUA Negative   OCCULTUA Negative   NITRITE Negative   UROBILINOGEN Negative   LEUKOCYTESUR Negative     All pertinent labs within the past 24 hours have been reviewed.    Significant Imaging: I have reviewed all pertinent imaging results/findings within the past 24 hours.

## 2020-09-11 NOTE — HOSPITAL COURSE
85 y.o. AAF, very pleasant with known CHF presents with progressively worsening SOB and found to be in FVO. Repeat 2D echo showed findings consistent with severely depressed LVEF 20% + GiDD and LV hypertrophy. BNP >2400; Has chronic troponin emia, however, presenting trops = 0.088 peaked at 0.115 --Cards consulted. She was started on IV diuretics, responded well to treatment reports relief of activity intolerance, Room air sat 96%, and vitals stable with the addition of new med [id number]-hydrala. BP stable, coreg adjusted down for bp accomodatation new med.

## 2020-09-11 NOTE — ASSESSMENT & PLAN NOTE
Systolic BP is 131-155.Continue home medication treatment regimen; adjust as needed for tighter control.

## 2020-09-11 NOTE — NURSING
Patient awake and alert no distress.patient oriented by by 4   Echo completed.  Lasix given. patient placed on fluid restriction educated on fluid restriction   Needs reinforcement.

## 2020-09-11 NOTE — NURSING
Pt arrive to the unit by wheelchair accompanied by transport.  Assisted pt to bed. Tele monitoring initiated.  Admit assessment initiated.  Pt is AAO.  NO distress noted.

## 2020-09-11 NOTE — CONSULTS
[facility]  Cardiology  Consult Note    Patient Name: Palmira Malave  MRN: [id number]  Admission Date: 9/10/2020  Hospital Length of Stay: 0 days  Code Status: Full Code   Attending Provider: Lashawn Rubio MD   Consulting Provider: Parviz Myers MD  Primary Care Physician: Qi Ramon MD  Principal Problem:Acute on chronic systolic congestive heart failure    Patient information was obtained from patient and ER records.     Inpatient consult to Cardiology  Consult performed by: Parviz Myers MD  Consult ordered by: Ric Marquis NP  Reason for consult: ADHF        Subjective:     Chief Complaint:  SOB     HPI:   85 year old female, history of combined systolic/diastolic CHF, CAD, chronic gout, essential hypertension, hypercholesterolemia, CKD, OAB, presents to ED complaining of acute onset shortness of breath round 5:00 p.m. today.  She states shortness of breath felt similar to previous admit due to CHF exacerbation.  She denies shortness of breath at rest, she admits to dyspnea on exertion.  She admits to chronic 2 pillow orthopnea, no recent worsening.  She denies new cough.  Denies worsening leg swelling.  No fever, no chills, no recent illness or sick contacts.  No change in medications.  No abdominal pain.  No nausea vomiting.  She denies any associated lightheadedness dizziness, syncope or near-syncope, diaphoresis.  She denies chest pain at any time.  No home oxygen.  Patient states that the shortness of breath resolved when she arrived to the ED. No history of VTE.  Patient also states today she began with some pressure to her suprapubic region, relieved with urination.  No dysuria, hematuria, strong odor to urine.  No flank pain.  She does admit to chronic history of difficulty sleeping but denies paroxysmal nocturnal dyspnea.  Previously diagnosed with likely colon cancer, however no evidence on CT.  Followed by Dr. Myrick.  Biventricular AICD in place.      Pt  follows with Dr. Richmond, last seen 6/22/20: NICM, Medtronic BiV AICD, HTN, HLD.  Per his note had neg cath 2011.    The patient presents the emergency room with complaints of 1 day of progressive shortness of breath.  She has since been diuresed in states she is feeling better, but not back to baseline.  She has been urinating quite a bit.  She denies any chest discomfort, palpitations, syncope, or defibrillator discharges.  She confirms negative heart catheterization back in 2011.  She denies any medication or dietary noncompliance.    Past Medical History:   Diagnosis Date    Anticoagulant long-term use     Cataract     CHF (congestive heart failure)     Coronary artery disease     Gout attack     Hypercholesteremia     Hypertension     Renal disorder     Vaginal delivery     x4       Past Surgical History:   Procedure Laterality Date    APPENDECTOMY      CARDIAC DEFIBRILLATOR PLACEMENT      2015    CATARACT EXTRACTION W/  INTRAOCULAR LENS IMPLANT Left 02/07/2019    Dr. Velazco    CATARACT EXTRACTION W/  INTRAOCULAR LENS IMPLANT Right 02/21/2019    Dr. Velazco    CHOLECYSTECTOMY      COLONOSCOPY W/ POLYPECTOMY  10 or 11/ 2014    per Dr. Myrick    defribillator Left 8/2008    EYE SURGERY      HYSTERECTOMY      INTRAOCULAR PROSTHESES INSERTION Left 2/7/2019    Procedure: INSERTION, IOL PROSTHESIS;  Surgeon: Demetrius Velazco MD;  Location: [facility] OR;  Service: Ophthalmology;  Laterality: Left;  RN Phone Pre OP 1-30-19.  Arrival 05:30 AM    INTRAOCULAR PROSTHESES INSERTION Right 2/21/2019    Procedure: INSERTION, IOL PROSTHESIS;  Surgeon: Demetrius Velazco MD;  Location: [facility] OR;  Service: Ophthalmology;  Laterality: Right;    JOINT REPLACEMENT Bilateral 2005 & 2006    2 Knee Replacements=Lt. 1= 2005. Rt. 1= 2006    OOPHORECTOMY      PHACOEMULSIFICATION OF CATARACT Left 2/7/2019    Procedure: PHACOEMULSIFICATION, CATARACT;  Surgeon: Demetrius Velazco MD;  Location: [facility] OR;   Service: Ophthalmology;  Laterality: Left;    PHACOEMULSIFICATION OF CATARACT Right 2/21/2019    Procedure: PHACOEMULSIFICATION, CATARACT;  Surgeon: Demetrius Velazco MD;  Location: [facility];  Service: Ophthalmology;  Laterality: Right;  RN Phone Pre op 2-15-19.  Arrival 05:30 AM    TOTAL KNEE ARTHROPLASTY Bilateral Lt.= 2005; Rt.=2006    Bilateral Knee scope       Review of patient's allergies indicates:   Allergen Reactions    Aspirin Swelling     Swelling and rash    Colace [docusate sodium] Rash     itching    Sulfa (sulfonamide antibiotics) Swelling     Swelling and rash    Iodine and iodide containing products Rash    Penicillins Rash       Current Facility-Administered Medications on File Prior to Encounter   Medication    0.9%  NaCl infusion    phenylephrine HCL 2.5% ophthalmic solution 1 drop    proparacaine 0.5 % ophthalmic solution 1 drop    tropicamide 1% ophthalmic solution 1 drop     Current Outpatient Medications on File Prior to Encounter   Medication Sig    acetaminophen (TYLENOL) 500 MG tablet Take 1 tablet (500 mg total) by mouth every 6 (six) hours as needed for Pain.    allopurinol (ZYLOPRIM) 100 MG tablet Take 100 mg by mouth every evening.     calcitRIOL (ROCALTROL) 0.25 MCG Cap 0.25 mcg once daily.     carvedilol (COREG) 25 MG tablet Take 0.5 tablets (12.5 mg total) by mouth 2 (two) times daily.    clopidogrel (PLAVIX) 75 mg tablet Take 75 mg by mouth once daily. On hold since 11-28-14, for procedure.    cranberry 400 mg Cap Take 1 capsule by mouth 2 (two) times daily.    fish oil-omega-3 fatty acids 300-1,000 mg capsule Take 2 g by mouth once daily. 2 caps every AM & 1 cap every PM.    folic acid/multivit-min/lutein (CENTRUM SILVER ORAL) Take by mouth once daily.    furosemide (LASIX) 40 MG tablet Take 1 tablet (40 mg total) by mouth 2 (two) times a day.    lovastatin (MEVACOR) 40 MG tablet Take 40 mg by mouth nightly. Takes 2 caps with supper every evening.     mirabegron (MYRBETRIQ) 50 mg Tb24 Take 50 mg by mouth once daily.      Family History     Problem Relation (Age of Onset)    Amblyopia Son    Diabetes Other    Heart attack Mother (88)    Hyperlipidemia Mother    Hypertension Mother, Other        Tobacco Use    Smoking status: Never Smoker    Smokeless tobacco: Never Used   Substance and Sexual Activity    Alcohol use: Yes     Comment: rarely    Drug use: No    Sexual activity: Not Currently     Partners: Male     Review of Systems   Constitution: Negative for chills, diaphoresis, fever and malaise/fatigue.   HENT: Negative for nosebleeds.    Eyes: Negative for blurred vision and double vision.   Cardiovascular: Positive for dyspnea on exertion and leg swelling. Negative for chest pain, claudication, cyanosis, orthopnea, palpitations, paroxysmal nocturnal dyspnea and syncope.   Respiratory: Negative for cough, shortness of breath and wheezing.    Skin: Negative for dry skin and poor wound healing.   Musculoskeletal: Negative for back pain, joint swelling and myalgias.   Gastrointestinal: Negative for abdominal pain, nausea and vomiting.   Genitourinary: Negative for hematuria.   Neurological: Negative for dizziness, headaches, numbness, seizures and weakness.   Psychiatric/Behavioral: Negative for altered mental status and depression.     Objective:     Vital Signs (Most Recent):  Temp: 98.4 °F (36.9 °C) (09/11/20 0534)  Pulse: 77 (09/11/20 0536)  Resp: 18 (09/11/20 0534)  BP: (!) 147/70 (09/11/20 0536)  SpO2: (!) 94 % (09/11/20 0536) Vital Signs (24h Range):  Temp:  [98.1 °F (36.7 °C)-98.4 °F (36.9 °C)] 98.4 °F (36.9 °C)  Pulse:  [66-77] 77  Resp:  [18-26] 18  SpO2:  [94 %-98 %] 94 %  BP: (131-155)/(67-75) 147/70     Weight: 75.8 kg (167 lb 1.7 oz)  Body mass index is 29.6 kg/m².    SpO2: (!) 94 %  O2 Device (Oxygen Therapy): room air    No intake or output data in the 24 hours ending 09/11/20 0653    Lines/Drains/Airways     Peripheral Intravenous Line                  Peripheral IV - Single Lumen 20 G Right Forearm -- days                Physical Exam   Constitutional: She is oriented to person, place, and time. She appears well-developed and well-nourished. No distress.   HENT:   Head: Normocephalic and atraumatic.   Mouth/Throat: No oropharyngeal exudate.   Eyes: Pupils are equal, round, and reactive to light. Conjunctivae and EOM are normal. No scleral icterus.   Neck: Normal range of motion. Neck supple. No JVD present. No tracheal deviation present. No thyromegaly present.   Cardiovascular: Normal rate, regular rhythm, S1 normal and S2 normal. Exam reveals no gallop and no friction rub.   Murmur heard.   Systolic murmur is present with a grade of 2/6.  Pulmonary/Chest: Effort normal and breath sounds normal. No respiratory distress. She has no wheezes. She has no rales. She exhibits no tenderness.   Abdominal: Soft. She exhibits no distension.   Musculoskeletal: Normal range of motion.         General: Edema present.   Neurological: She is alert and oriented to person, place, and time. No cranial nerve deficit.   Skin: Skin is warm and dry. She is not diaphoretic.   Psychiatric: She has a normal mood and affect. Her behavior is normal. Judgment normal.       Current Medications:   famotidine (PF)  20 mg Intravenous Daily    furosemide (LASIX) IV  20 mg Intravenous Q12H    lovastatin  40 mg Oral Nightly       acetaminophen, ondansetron, sodium chloride 0.9%    Laboratory (all labs reviewed):  CBC:  Recent Labs   Lab 06/09/20  1040 06/10/20  0313 08/08/20  0310 09/10/20  2330 09/11/20  0558   WBC 6.25 6.35 5.97 6.12 5.85   Hemoglobin 11.2 L 11.5 L 10.5 L 11.3 L 11.2 L   Hematocrit 36.6 L 36.8 L 34.7 L 35.7 L 35.9 L   Platelets 129 L 125 L 109 L 109 L 111 L       CHEMISTRIES:  Recent Labs   Lab 01/14/19  0957  12/08/19  0351 06/09/20  1040 06/10/20  0313 08/08/20  0310 09/10/20  2330 09/11/20  0558   Glucose 94   < > 99 96 106 98 102 99   Sodium 142   < > 143 141  139 141 139 142   Potassium 3.5   < > 3.5 4.1 4.3 4.0 4.2 3.9   BUN, Bld 26 H   < > 35 H 48 H 55 H 48 H 54 H 49 H   Creatinine 1.3   < > 1.7 H 1.6 H 1.6 H 1.8 H 1.9 H 1.7 H   eGFR if  44 A   < > 31 A 34 A 34 A 29 A 27 A 31 A   eGFR if non  38 A   < > 27 A 29 A 29 A 25 A 24 A 27 A   Calcium 10.4   < > 9.9 10.0 10.3 9.2 10.1 10.2   Magnesium 1.9  --  2.1  --   --   --   --  2.3    < > = values in this interval not displayed.       CARDIAC BIOMARKERS:  Recent Labs   Lab 06/09/20  2040 06/10/20  0313 08/08/20  0310 08/08/20  0630 09/10/20  2330   Troponin I 0.141 H 0.132 H 0.111 H 0.092 H 0.088 H       COAGS:  Recent Labs   Lab 10/02/18  1809 12/07/19  2202 08/08/20  0310 09/10/20  2330   INR 1.1 1.0 1.0 1.0       LIPIDS/LFTS:  Recent Labs   Lab 12/08/19  0351 06/09/20  1040 06/09/20  2040 06/10/20  0313 08/08/20  0310 09/10/20  2330   Cholesterol  --   --  165  --   --   --    Triglycerides  --   --  233 H  --   --   --    HDL  --   --  66  --   --   --    LDL Cholesterol  --   --  52.4 L  --   --   --    Non-HDL Cholesterol  --   --  99  --   --   --    AST 26 35  --  29 30 21   ALT 18 29  --  28 18 16       BNP:  Recent Labs   Lab 02/07/19  2136 12/07/19  2202 06/09/20  1040 08/08/20  0310 09/10/20  2330   BNP 2,475 H 1,355 H 2,706 H 1,615 H 2,450 H       TSH:  Recent Labs   Lab 06/09/20 2040   TSH 1.588       Free T4:        Diagnostic Results:  ECG (personally reviewed and interpreted tracing(s)):  9/10/20 AS 72, BiV paced, similar to 8/8/20    Chest X-Ray (personally reviewed and interpreted image(s)): 9/10/20 CMeg, mild CHF, L BiV ICD in place    Echo: 3/9/20 (repeat ordered)  · Severely decreased left ventricular systolic function. The estimated ejection fraction is 20%.  · Mild concentric left ventricular hypertrophy.  · Grade I (mild) left ventricular diastolic dysfunction consistent with impaired relaxation.  · Moderate left ventricular enlargement.  · Moderate mitral  regurgitation.  · Normal right ventricular systolic function.  · Severe left atrial enlargement.  · Mild right atrial enlargement.  · Intermediate central venous pressure (8 mmHg).  · The estimated PA systolic pressure is 33 mmHg.    Carotid US 7/10/18  There is 40 - 49% right Internal Carotid stenosis.   There is 20 - 39% left Internal Carotid stenosis.     Cath: per Dr. Richmond note 6/2020, neg cath 2011      Assessment and Plan:     * Acute on chronic systolic congestive heart failure  Known NICM (apparent neg cath 2011)  S/p MDT BiV ICD, appears to be normally functioning, no recent shocks  Pt states sxs have improved with diuresis, but not completely resolved  Will cont diuresis for 1 more day, then switch back to PO lasix  Resume coreg 12.5mg bid  Add BiDil 1 tab bid  No plan for isch eval/cath at this juncture (esvin with CKD4)  Check echo (syst murmur noted c/w MR)    Elevated troponin  Nondiagnostic in setting of CKD4  Hx NICM  No plan for cath/MPI at this juncture    Coronary artery disease involving native coronary artery of native heart without angina pectoris  By hx, however with neg cath 2011 per Dr. Richmond notes  Cont plavix/statin/BBL  No anginal sxs noted      CKD (chronic kidney disease) stage 4, GFR 15-29 ml/min  Creat stable  Monitor with diuresis    Hyperlipidemia  Cont statin rx    Essential hypertension  Cont med rx  Resume coreg and start bidil    Biventricular AICD in place  [employer]tr[employer], appears to be normally functioning        VTE Risk Mitigation (From admission, onward)         Ordered     IP VTE HIGH RISK PATIENT  Once      09/11/20 0321     Place sequential compression device  Until discontinued      09/11/20 0321                Thank you for your consult. I will follow-up with patient. Please contact us if you have any additional questions.    Parviz Myers MD  Cardiology   [facility]

## 2020-09-11 NOTE — PLAN OF CARE
Problem: Adult Inpatient Plan of Care  Goal: Plan of Care Review  Outcome: Ongoing, Progressing  Goal: Patient-Specific Goal (Individualization)  Outcome: Ongoing, Progressing  Goal: Absence of Hospital-Acquired Illness or Injury  Outcome: Ongoing, Progressing  Goal: Optimal Comfort and Wellbeing  Outcome: Ongoing, Progressing  Goal: Readiness for Transition of Care  Outcome: Ongoing, Progressing  Goal: Rounds/Family Conference  Outcome: Ongoing, Progressing     Problem: Electrolyte Imbalance (Acute Kidney Injury/Impairment)  Goal: Serum Electrolyte Balance  Outcome: Ongoing, Progressing     Problem: Fluid Imbalance (Acute Kidney Injury/Impairment)  Goal: Optimal Fluid Balance  Outcome: Ongoing, Progressing

## 2020-09-11 NOTE — ASSESSMENT & PLAN NOTE
BUN/Cr=/541.9 on admit,patient's baseline BUN/ Cr=55/1.6   - I/O's  -Renally dose medications  -Avoid nephrotoxic agents  -BMP.

## 2020-09-11 NOTE — FIRST PROVIDER EVALUATION
Emergency Department TeleTriage Encounter Note      CHIEF COMPLAINT    Chief Complaint   Patient presents with    Shortness of Breath     Pt here with c/o SOB since 5pm while trying to cook dinner.       VITAL SIGNS   Initial Vitals [09/10/20 2210]   BP Pulse Resp Temp SpO2   (!) 155/74 72 (!) 22 98.1 °F (36.7 °C) 97 %      MAP       --            ALLERGIES    Review of patient's allergies indicates:   Allergen Reactions    Aspirin Swelling     Swelling and rash    Colace [docusate sodium] Rash     itching    Sulfa (sulfonamide antibiotics) Swelling     Swelling and rash    Iodine and iodide containing products Rash    Penicillins Rash       PROVIDER TRIAGE NOTE    Patient is a 85 y.o. female presenting with SOB. Worsening since this afternoon. History of CHF. No CP. Denies significant edema. Labs, EKG, CXR ordered.      ORDERS  Labs Reviewed   CBC W/ AUTO DIFFERENTIAL   COMPREHENSIVE METABOLIC PANEL   B-TYPE NATRIURETIC PEPTIDE       ED Orders (720h ago, onward)    Start Ordered     Status Ordering Provider    09/10/20 2214 09/10/20 2214  CBC auto differential  STAT  Collect    Ordered PRERNA CASTILLO    09/10/20 2214 09/10/20 2214  Comprehensive metabolic panel  STAT  Collect    Ordered PRERNA CASTILLO    09/10/20 2214 09/10/20 2214  Brain natriuretic peptide  STAT  Collect    Ordered PRERNA CASTILLO    09/10/20 2214 09/10/20 2214  EKG 12-lead  Once      Ordered PRERNA CASTILLO    09/10/20 2214 09/10/20 2214  Saline lock IV  Once      Ordered PRERNA CASTILLO    09/10/20 2214 09/10/20 2214  X-Ray Chest PA And Lateral  1 time imaging      Ordered PRERNA CASTILLO            Virtual Visit Note: The provider triage portion of this emergency department evaluation and documentation was performed via [employer], a HIPAA-compliant telemedicine application, in concert with a tele-presenter in the room. A face to face patient evaluation with one of my colleagues will occur once the patient is placed in an emergency  department room.      DISCLAIMER: This note was prepared with [employer] voice recognition transcription software. Garbled syntax, mangled pronouns, and other bizarre constructions may be attributed to that software system.

## 2020-09-11 NOTE — ASSESSMENT & PLAN NOTE
BNP/troponin= 2450/0.088 on admit patient's baseline 1300/0.1001. EKG exhibits no findings of acute ischemia.CXR exhibits findings of cardiomegaly with suspected central pulmonary vascular congestion.  -Telemetry  -Pulse OX Q4 with vitals  -Supplemental O2 therapy p.r.n.  - mg daily  -Furosemide 20 mg IVP q.12 hours  -I&0's  -Serial cardiac enzymes  -BMP  -2D Echo  -Cardiology consult; appreciate recs.

## 2020-09-11 NOTE — H&P
[facility] Medicine  History & Physical    Patient Name: Palmira Malave  MRN: [id number]  Admission Date: 9/10/2020  Attending Physician: Lashawn Rubio MD   Primary Care Provider: Qi Ramon MD         Patient information was obtained from patient, past medical records and ER records.     Subjective:     Principal Problem:Acute on chronic systolic congestive heart failure    Chief Complaint:   Chief Complaint   Patient presents with    Shortness of Breath     Pt here with c/o SOB since 5pm while trying to cook dinner.        HPI: Palmira Malave 85 y.o. female PMHX: CAD,hypercholesteremia,CKD,hypertension and Combined systolic/diastolic HF.  Presents complaining of shortness of breath.  She reports acute onset of shortness of breath around 5:00 p.m. on 09/10/2020. She endorses chronic orthopnea, with nightly 2 pillows usage,similar symptoms with previous CHF exacerbation and compliance with medication treatment regimen. Additionally endorses followed by Dr. Richmond(Cardiology).She denies recent illness,URI symptoms,fever/chills,N/V/D, CP/palpitations,abd pain,dysuria or any other associated symptoms. Also denies  ETOH/Smoking/Illicit drug use.  Placed in observation to hospital medicine for further evaluation and treatment.    03/09/20 TTE  · Severely decreased left ventricular systolic function. The estimated ejection fraction is 20%.  · Mild concentric left ventricular hypertrophy.  · Grade I (mild) left ventricular diastolic dysfunction consistent with impaired relaxation.  · Moderate left ventricular enlargement.  · Moderate mitral regurgitation.  · Normal right ventricular systolic function.  · Severe left atrial enlargement.  · Mild right atrial enlargement.  · Intermediate central venous pressure (8 mmHg).  · The estimated PA systolic pressure is 33 mmHg.          Past Medical History:   Diagnosis Date    Anticoagulant long-term use     Cataract     CHF (congestive  heart failure)     Coronary artery disease     Gout attack     Hypercholesteremia     Hypertension     Renal disorder     Vaginal delivery     x4       Past Surgical History:   Procedure Laterality Date    APPENDECTOMY      CARDIAC DEFIBRILLATOR PLACEMENT      2015    CATARACT EXTRACTION W/  INTRAOCULAR LENS IMPLANT Left 02/07/2019    Dr. Velazco    CATARACT EXTRACTION W/  INTRAOCULAR LENS IMPLANT Right 02/21/2019    Dr. Velazco    CHOLECYSTECTOMY      COLONOSCOPY W/ POLYPECTOMY  10 or 11/ 2014    per Dr. Myrick    defribillator Left 8/2008    EYE SURGERY      HYSTERECTOMY      INTRAOCULAR PROSTHESES INSERTION Left 2/7/2019    Procedure: INSERTION, IOL PROSTHESIS;  Surgeon: Demetrius Velazco MD;  Location: [facility] OR;  Service: Ophthalmology;  Laterality: Left;  RN Phone Pre OP 1-30-19.  Arrival 05:30 AM    INTRAOCULAR PROSTHESES INSERTION Right 2/21/2019    Procedure: INSERTION, IOL PROSTHESIS;  Surgeon: Demetrius Velazco MD;  Location: [facility] OR;  Service: Ophthalmology;  Laterality: Right;    JOINT REPLACEMENT Bilateral 2005 & 2006    2 Knee Replacements=Lt. 1= 2005. Rt. 1= 2006    OOPHORECTOMY      PHACOEMULSIFICATION OF CATARACT Left 2/7/2019    Procedure: PHACOEMULSIFICATION, CATARACT;  Surgeon: Demetrius Velazco MD;  Location: [facility] OR;  Service: Ophthalmology;  Laterality: Left;    PHACOEMULSIFICATION OF CATARACT Right 2/21/2019    Procedure: PHACOEMULSIFICATION, CATARACT;  Surgeon: Demetrius Velazco MD;  Location: [facility] OR;  Service: Ophthalmology;  Laterality: Right;  RN Phone Pre op 2-15-19.  Arrival 05:30 AM    TOTAL KNEE ARTHROPLASTY Bilateral Lt.= 2005; Rt.=2006    Bilateral Knee scope       Review of patient's allergies indicates:   Allergen Reactions    Aspirin Swelling     Swelling and rash    Colace [docusate sodium] Rash     itching    Sulfa (sulfonamide antibiotics) Swelling     Swelling and rash    Iodine and iodide containing products Rash     Penicillins Rash       Current Facility-Administered Medications on File Prior to Encounter   Medication    0.9%  NaCl infusion    phenylephrine HCL 2.5% ophthalmic solution 1 drop    proparacaine 0.5 % ophthalmic solution 1 drop    tropicamide 1% ophthalmic solution 1 drop     Current Outpatient Medications on File Prior to Encounter   Medication Sig    acetaminophen (TYLENOL) 500 MG tablet Take 1 tablet (500 mg total) by mouth every 6 (six) hours as needed for Pain.    allopurinol (ZYLOPRIM) 100 MG tablet Take 100 mg by mouth every evening.     calcitRIOL (ROCALTROL) 0.25 MCG Cap 0.25 mcg once daily.     carvedilol (COREG) 25 MG tablet Take 0.5 tablets (12.5 mg total) by mouth 2 (two) times daily.    clopidogrel (PLAVIX) 75 mg tablet Take 75 mg by mouth once daily. On hold since 11-28-14, for procedure.    cranberry 400 mg Cap Take 1 capsule by mouth 2 (two) times daily.    fish oil-omega-3 fatty acids 300-1,000 mg capsule Take 2 g by mouth once daily. 2 caps every AM & 1 cap every PM.    folic acid/multivit-min/lutein (CENTRUM SILVER ORAL) Take by mouth once daily.    furosemide (LASIX) 40 MG tablet Take 1 tablet (40 mg total) by mouth 2 (two) times a day.    lovastatin (MEVACOR) 40 MG tablet Take 40 mg by mouth nightly. Takes 2 caps with supper every evening.    mirabegron (MYRBETRIQ) 50 mg Tb24 Take 50 mg by mouth once daily.      Family History     Problem Relation (Age of Onset)    Amblyopia Son    Diabetes Other    Heart attack Mother (88)    Hyperlipidemia Mother    Hypertension Mother, Other        Tobacco Use    Smoking status: Never Smoker    Smokeless tobacco: Never Used   Substance and Sexual Activity    Alcohol use: Yes     Comment: rarely    Drug use: No    Sexual activity: Not Currently     Partners: Male     Review of Systems   Constitutional: Negative for chills and fever.   HENT: Negative for congestion, rhinorrhea and sore throat.    Eyes: Negative for visual disturbance.    Respiratory: Positive for shortness of breath.    Cardiovascular: Negative for chest pain and palpitations.   Gastrointestinal: Negative for abdominal pain, diarrhea, nausea and vomiting.   Genitourinary: Negative for dysuria, flank pain and hematuria.   Musculoskeletal: Negative for arthralgias and myalgias.   Skin: Negative for rash and wound.   Neurological: Negative for dizziness, weakness and headaches.   Hematological: Does not bruise/bleed easily.   Psychiatric/Behavioral: Negative for confusion.     Objective:     Vital Signs (Most Recent):  Temp: 98.4 °F (36.9 °C) (09/11/20 0534)  Pulse: 77 (09/11/20 0536)  Resp: 18 (09/11/20 0534)  BP: (!) 147/70 (09/11/20 0536)  SpO2: (!) 94 % (09/11/20 0536) Vital Signs (24h Range):  Temp:  [98.1 °F (36.7 °C)-98.4 °F (36.9 °C)] 98.4 °F (36.9 °C)  Pulse:  [66-77] 77  Resp:  [18-26] 18  SpO2:  [94 %-98 %] 94 %  BP: (131-155)/(67-75) 147/70     Weight: 75.8 kg (167 lb 1.7 oz)  Body mass index is 29.6 kg/m².    Physical Exam  Vitals signs and nursing note reviewed.   Constitutional:       Appearance: Normal appearance.   HENT:      Head: Normocephalic and atraumatic.      Nose: Nose normal.      Mouth/Throat:      Mouth: Mucous membranes are dry.   Eyes:      Conjunctiva/sclera: Conjunctivae normal.   Neck:      Musculoskeletal: Normal range of motion.      Vascular: JVD ( slight) present.   Cardiovascular:      Rate and Rhythm: Normal rate and regular rhythm.      Pulses: Normal pulses.      Heart sounds: Murmur present. Systolic murmur present.      Comments: Left chest wall AICD-Atrial Paced.  Pulmonary:      Effort: Pulmonary effort is normal.      Breath sounds: Normal breath sounds. No wheezing, rhonchi or rales.   Chest:      Chest wall: No tenderness.   Abdominal:      General: Bowel sounds are normal.      Palpations: Abdomen is soft.      Tenderness: There is no abdominal tenderness. There is no guarding or rebound.   Musculoskeletal:         General: No  tenderness.      Right lower leg: Edema (Slight) present.      Left lower leg: Edema ( slight) present.   Skin:     Capillary Refill: Capillary refill takes less than 2 seconds.   Neurological:      Mental Status: She is alert and oriented to person, place, and time.   Psychiatric:         Mood and Affect: Mood normal.         Thought Content: Thought content normal.         Judgment: Judgment normal.             Significant Labs:   CBC:   Recent Labs   Lab 09/10/20  2330 09/11/20  0558   WBC 6.12 5.85   HGB 11.3* 11.2*   HCT 35.7* 35.9*   * 111*     CMP:   Recent Labs   Lab 09/10/20  2330 09/11/20  0558    142   K 4.2 3.9    101   CO2 28 30*    99   BUN 54* 49*   CREATININE 1.9* 1.7*   CALCIUM 10.1 10.2   PROT 7.5  --    ALBUMIN 4.2  --    BILITOT 0.5  --    ALKPHOS 74  --    AST 21  --    ALT 16  --    ANIONGAP 11 11   EGFRNONAA 24* 27*     Cardiac Markers:   Recent Labs   Lab 09/10/20  2330   BNP 2,450*     Coagulation:   Recent Labs   Lab 09/10/20  2330   INR 1.0   APTT 29.6     Lipid Panel: No results for input(s): CHOL, HDL, LDLCALC, TRIG, CHOLHDL in the last 48 hours.  Troponin:   Recent Labs   Lab 09/10/20  2330 09/11/20  0558   TROPONINI 0.088* 0.103*     Urine Studies:   Recent Labs   Lab 09/11/20  0019   COLORU Straw   APPEARANCEUA Clear   PHUR 5.0   SPECGRAV 1.010   PROTEINUA Negative   GLUCUA Negative   KETONESU Negative   BILIRUBINUA Negative   OCCULTUA Negative   NITRITE Negative   UROBILINOGEN Negative   LEUKOCYTESUR Negative     All pertinent labs within the past 24 hours have been reviewed.    Significant Imaging: I have reviewed all pertinent imaging results/findings within the past 24 hours.    Assessment/Plan:     * Acute on chronic systolic congestive heart failure  BNP/troponin= 2450/0.088 on admit patient's baseline 1300/0.1001. EKG exhibits no findings of acute ischemia.CXR exhibits findings of cardiomegaly with suspected central pulmonary vascular  congestion.  -Telemetry  -Pulse OX Q4 with vitals  -Supplemental O2 therapy p.r.n.  - mg daily  -Furosemide 20 mg IVP q.12 hours  -I&0's  -Serial cardiac enzymes  -BMP  -2D Echo  -Cardiology consult; appreciate recs.    SOB (shortness of breath)  See above #1.      Biventricular AICD in place  No acute issues. EKG exhibits atrial paced rhythm..    ICD (implantable cardioverter-defibrillator) in place  No acute issues.      CKD (chronic kidney disease) stage 4, GFR 15-29 ml/min  BUN/Cr=/541.9 on admit,patient's baseline BUN/ Cr=55/1.6   - I/O's  -Renally dose medications  -Avoid nephrotoxic agents  -BMP.    Hyperlipidemia  Continue home Lovastatin//ASA.    Coronary artery disease involving native coronary artery of native heart without angina pectoris  Continue home statin/Plavix/BB.      Essential hypertension  Systolic BP is 131-155.Continue home medication treatment regimen; adjust as needed for tighter control.    VTE Risk Mitigation (From admission, onward)         Ordered     IP VTE HIGH RISK PATIENT  Once      09/11/20 0321     Place sequential compression device  Until discontinued      09/11/20 0321                   TREVOR Mi, FNP-C  Hospitalist - Department of Hospital Medicine  [address], [place] [address]  Office [phone]; Pager [phone]

## 2020-09-11 NOTE — ED PROVIDER NOTES
Encounter Date: 9/10/2020       History     Chief Complaint   Patient presents with    Shortness of Breath     Pt here with c/o SOB since 5pm while trying to cook dinner.     Year old female, history of combined systolic/diastolic CHF, CAD, chronic gout, essential hypertension, hypercholesterolemia, CKD, OAB, presents to ED complaining of acute onset shortness of breath round 5:00 p.m. today.  She states shortness of breath felt similar to previous admit due to CHF exacerbation.  She denies shortness of breath at rest, she admits to dyspnea on exertion.  She admits to chronic 2 pillow orthopnea, no recent worsening.  She denies new cough.  Denies worsening leg swelling.  No fever, no chills, no recent illness or sick contacts.  No change in medications.  No abdominal pain.  No nausea vomiting.  She denies any associated lightheadedness dizziness, syncope or near-syncope, diaphoresis.  She denies chest pain at any time.  No home oxygen.  Patient states that the shortness of breath resolved when she arrived to the ED. No history of VTE.    Patient also states today she began with some pressure to her suprapubic region, relieved with urination.  No dysuria, hematuria, strong odor to urine.  No flank pain.    She does admit to chronic history of difficulty sleeping but denies paroxysmal nocturnal dyspnea.  Previously diagnosed with likely colon cancer, however no evidence on CT.  Followed by Dr. Myrick.    Biventricular AICD in place.     Cardiologist: Dr. Richmond        Review of patient's allergies indicates:   Allergen Reactions    Aspirin Swelling     Swelling and rash    Colace [docusate sodium] Rash     itching    Sulfa (sulfonamide antibiotics) Swelling     Swelling and rash    Iodine and iodide containing products Rash    Penicillins Rash     Past Medical History:   Diagnosis Date    Anticoagulant long-term use     Cataract     CHF (congestive heart failure)     Coronary artery disease     Gout attack      Hypercholesteremia     Hypertension     Renal disorder     Vaginal delivery     x4     Past Surgical History:   Procedure Laterality Date    APPENDECTOMY      CARDIAC DEFIBRILLATOR PLACEMENT      2015    CATARACT EXTRACTION W/  INTRAOCULAR LENS IMPLANT Left 02/07/2019    Dr. Velazco    CATARACT EXTRACTION W/  INTRAOCULAR LENS IMPLANT Right 02/21/2019    Dr. Velazco    CHOLECYSTECTOMY      COLONOSCOPY W/ POLYPECTOMY  10 or 11/ 2014    per Dr. Myrick    defribillator Left 8/2008    EYE SURGERY      HYSTERECTOMY      INTRAOCULAR PROSTHESES INSERTION Left 2/7/2019    Procedure: INSERTION, IOL PROSTHESIS;  Surgeon: Demetrius Velazco MD;  Location: [facility] OR;  Service: Ophthalmology;  Laterality: Left;  RN Phone Pre OP 1-30-19.  Arrival 05:30 AM    INTRAOCULAR PROSTHESES INSERTION Right 2/21/2019    Procedure: INSERTION, IOL PROSTHESIS;  Surgeon: Demetrius Velazco MD;  Location: [facility] OR;  Service: Ophthalmology;  Laterality: Right;    JOINT REPLACEMENT Bilateral 2005 & 2006    2 Knee Replacements=Lt. 1= 2005. Rt. 1= 2006    OOPHORECTOMY      PHACOEMULSIFICATION OF CATARACT Left 2/7/2019    Procedure: PHACOEMULSIFICATION, CATARACT;  Surgeon: Demetrius Velazco MD;  Location: [facility] OR;  Service: Ophthalmology;  Laterality: Left;    PHACOEMULSIFICATION OF CATARACT Right 2/21/2019    Procedure: PHACOEMULSIFICATION, CATARACT;  Surgeon: Demetrius Velazco MD;  Location: [facility] OR;  Service: Ophthalmology;  Laterality: Right;  RN Phone Pre op 2-15-19.  Arrival 05:30 AM    TOTAL KNEE ARTHROPLASTY Bilateral Lt.= 2005; Rt.=2006    Bilateral Knee scope     Family History   Problem Relation Age of Onset    Heart attack Mother 88    Hypertension Mother     Hyperlipidemia Mother     Diabetes Other     Hypertension Other     Amblyopia Son     Blindness Neg Hx     Cataracts Neg Hx     Glaucoma Neg Hx     Macular degeneration Neg Hx     Retinal detachment Neg Hx     Strabismus Neg Hx       Social History     Tobacco Use    Smoking status: Never Smoker    Smokeless tobacco: Never Used   Substance Use Topics    Alcohol use: Yes     Comment: rarely    Drug use: No     Review of Systems   Constitutional: Negative for appetite change, chills, fatigue and fever.   Respiratory: Positive for shortness of breath. Negative for cough and wheezing.    Cardiovascular: Negative for chest pain and leg swelling.   Gastrointestinal: Negative for abdominal pain, nausea and vomiting.   Genitourinary:        Suprapubic pressure   Musculoskeletal: Negative for myalgias.   Neurological: Negative for dizziness, syncope, weakness and light-headedness.       Physical Exam     Initial Vitals [09/10/20 2210]   BP Pulse Resp Temp SpO2   (!) 155/74 72 (!) 22 98.1 °F (36.7 °C) 97 %      MAP       --         Physical Exam    Nursing note and vitals reviewed.  Constitutional: She appears well-developed and well-nourished. She is not diaphoretic. No distress.   Well-appearing nontoxic.  Resting comfortably recumbent slightly inclined on exam table.  Speaking in full sentences without pause or difficulty.   HENT:   Head: Normocephalic and atraumatic.   Eyes: EOM are normal.   Neck: Neck supple.   Cardiovascular: Intact distal pulses.   Mild JVD.  Normal sinus rhythm with sinus arrhythmia.  Trace pitting pretibial edema bilaterally.  No unilateral leg swelling or calf tenderness.   Pulmonary/Chest: Breath sounds normal. No respiratory distress.   There is faint expiratory wheeze to left upper lung zone.  No hypoxia on room air.  No tachypnea.  No accessory muscle use.  Palpable left chest wall AICD.   Abdominal: Soft. Bowel sounds are normal. She exhibits no distension. There is no abdominal tenderness.   Musculoskeletal: Normal range of motion.   Neurological: She is alert and oriented to person, place, and time.   Skin: Skin is warm.   Psychiatric: She has a normal mood and affect. Thought content normal.         ED Course    Procedures  Labs Reviewed   CBC W/ AUTO DIFFERENTIAL - Abnormal; Notable for the following components:       Result Value    RBC 3.72 (*)     Hemoglobin 11.3 (*)     Hematocrit 35.7 (*)     Mean Corpuscular Hemoglobin Conc 31.7 (*)     RDW 14.6 (*)     Platelets 109 (*)     All other components within normal limits   COMPREHENSIVE METABOLIC PANEL - Abnormal; Notable for the following components:    BUN, Bld 54 (*)     Creatinine 1.9 (*)     eGFR if  27 (*)     eGFR if non  24 (*)     All other components within normal limits   B-TYPE NATRIURETIC PEPTIDE - Abnormal; Notable for the following components:    BNP 2,450 (*)     All other components within normal limits   TROPONIN I - Abnormal; Notable for the following components:    Troponin I 0.088 (*)     All other components within normal limits   TROPONIN I   URINALYSIS, REFLEX TO URINE CULTURE    Narrative:     Specimen Source->Urine   SARS-COV-2 RNA AMPLIFICATION, QUAL   PROTIME-INR   APTT   APTT   PROTIME-INR     EKG Readings: (Independently Interpreted)   Atrial sensed ventricular paced rhythm, occasional PVCs.  Previous inverted T-waves in 3, AVF, V5, V6 have resolved.  No ST elevation.           Echo 3/9/20  · Severely decreased left ventricular systolic function. The estimated ejection fraction is 20%.  · Mild concentric left ventricular hypertrophy.  · Grade I (mild) left ventricular diastolic dysfunction consistent with impaired relaxation.  · Moderate left ventricular enlargement.  · Moderate mitral regurgitation.  · Normal right ventricular systolic function.  · Severe left atrial enlargement.  · Mild right atrial enlargement.  · Intermediate central venous pressure (8 mmHg).  · The estimated PA systolic pressure is 33 mmHg.        Imaging Results          X-Ray Chest PA And Lateral (Final result)  Result time 09/10/20 22:52:11    Final result by Po Brennan MD (09/10/20 22:52:11)                 Impression:       Cardiomegaly with suspected central pulmonary vascular congestion.  No significant change.      Electronically signed by: Po Brennan MD  Date:    09/10/2020  Time:    22:52             Narrative:    EXAMINATION:  XR CHEST PA AND LATERAL    CLINICAL HISTORY:  Shortness of breath    TECHNIQUE:  PA and lateral views of the chest were performed.    COMPARISON:  08/08/2020.    FINDINGS:  Cardiac silhouette is enlarged but stable in size.  Left-sided pacer device is seen.  Lungs are symmetrically expanded.  There is bilateral perihilar prominence which may reflect central pulmonary vascular congestion, similar to previous exams.  No evidence of new focal consolidative process, pneumothorax, or significant pleural effusion.  No acute osseous abnormality identified.                              X-Rays:   Independently Interpreted Readings:   Chest X-Ray: Cardiomegaly with hilar congestion, grossly similar to previous.     Medical Decision Making:   Differential Diagnosis:   Acute on chronic CHF, myocardial ischemia, arrhythmia, viral illness  Clinical Tests:   Lab Tests: Ordered and Reviewed  Radiological Study: Ordered and Reviewed  Medical Tests: Ordered and Reviewed  ED Management:  85-year-old female with suspected acute on chronic CHF exacerbation.  There is dyspnea on exertion, no significant hypoxia.  Lungs grossly clear.  Chest x-ray similar to previous, without congestion.  EKG reassuring, stable. Troponin with chronic elevation, improved from most recent. There is increased work of breathing with ambulation. Offered diuresis and outpatient f/u vs admit and observation. Pt concerned about EVANS, prefers admit. I think it is reasonable to diurese and ensure improved comfort with breathing, improved EVANS.     BUN/creatinine grossly similar previous.                             Clinical Impression:       ICD-10-CM ICD-9-CM   1. Acute on chronic combined systolic and diastolic CHF (congestive heart failure)  I50.43  428.43     428.0   2. SOB (shortness of breath)  R06.02 786.05   3. Chronic kidney disease, unspecified CKD stage  N18.9 585.9         Disposition:   Disposition: Placed in Observation  Condition: Stable     ED Disposition Condition    Observation                             Ryley Gray PA-C  09/11/20 0310

## 2020-09-11 NOTE — HPI
Palmira Malave 85 y.o. female PMHX: CAD,hypercholesteremia,CKD,hypertension and Combined systolic/diastolic HF.  Presents complaining of shortness of breath.  She reports acute onset of shortness of breath around 5:00 p.m. on 09/10/2020. She endorses chronic orthopnea, with nightly 2 pillows usage,similar symptoms with previous CHF exacerbation and compliance with medication treatment regimen. Additionally endorses followed by Dr. Richmond(Cardiology).She denies recent illness,URI symptoms,fever/chills,N/V/D, CP/palpitations,abd pain,dysuria or any other associated symptoms. Also denies  ETOH/Smoking/Illicit drug use. Placed in observation to hospital medicine for further evaluation and treatment.

## 2020-09-11 NOTE — CONSULTS
Appointment scheduled as follows:  Follow-up Information     Kelechi Richmond MD On 10/2/2020.    Specialty: Cardiology  Why: @1:20pm, , for Cardiology follow up  Contact information:  [address]  [address]  [place] [address] [phone]

## 2020-09-12 VITALS
HEIGHT: 63 IN | DIASTOLIC BLOOD PRESSURE: 66 MMHG | OXYGEN SATURATION: 95 % | WEIGHT: 168 LBS | BODY MASS INDEX: 29.77 KG/M2 | TEMPERATURE: 98 F | HEART RATE: 66 BPM | RESPIRATION RATE: 18 BRPM | SYSTOLIC BLOOD PRESSURE: 129 MMHG

## 2020-09-12 LAB
ALBUMIN SERPL BCP-MCNC: 4 G/DL (ref 3.5–5.2)
ALP SERPL-CCNC: 73 U/L (ref 55–135)
ALT SERPL W/O P-5'-P-CCNC: 16 U/L (ref 10–44)
ANION GAP SERPL CALC-SCNC: 13 MMOL/L (ref 8–16)
AST SERPL-CCNC: 22 U/L (ref 10–40)
BILIRUB SERPL-MCNC: 0.7 MG/DL (ref 0.1–1)
BUN SERPL-MCNC: 55 MG/DL (ref 8–23)
CALCIUM SERPL-MCNC: 10 MG/DL (ref 8.7–10.5)
CHLORIDE SERPL-SCNC: 100 MMOL/L (ref 95–110)
CO2 SERPL-SCNC: 29 MMOL/L (ref 23–29)
CREAT SERPL-MCNC: 2 MG/DL (ref 0.5–1.4)
EST. GFR  (AFRICAN AMERICAN): 26 ML/MIN/1.73 M^2
EST. GFR  (NON AFRICAN AMERICAN): 22 ML/MIN/1.73 M^2
GLUCOSE SERPL-MCNC: 169 MG/DL (ref 70–110)
POTASSIUM SERPL-SCNC: 3.5 MMOL/L (ref 3.5–5.1)
PROT SERPL-MCNC: 7.2 G/DL (ref 6–8.4)
SODIUM SERPL-SCNC: 142 MMOL/L (ref 136–145)

## 2020-09-12 PROCEDURE — S0028 INJECTION, FAMOTIDINE, 20 MG: HCPCS | Performed by: HOSPITALIST

## 2020-09-12 PROCEDURE — 25000003 PHARM REV CODE 250: Performed by: HOSPITALIST

## 2020-09-12 PROCEDURE — 36415 COLL VENOUS BLD VENIPUNCTURE: CPT

## 2020-09-12 PROCEDURE — 63600175 PHARM REV CODE 636 W HCPCS: Performed by: INTERNAL MEDICINE

## 2020-09-12 PROCEDURE — 99226 PR SUBSEQUENT OBSERVATION CARE,LEVEL III: ICD-10-PCS | Mod: ,,, | Performed by: INTERNAL MEDICINE

## 2020-09-12 PROCEDURE — 25000003 PHARM REV CODE 250: Performed by: INTERNAL MEDICINE

## 2020-09-12 PROCEDURE — 96376 TX/PRO/DX INJ SAME DRUG ADON: CPT | Mod: 59

## 2020-09-12 PROCEDURE — 80053 COMPREHEN METABOLIC PANEL: CPT

## 2020-09-12 PROCEDURE — G0378 HOSPITAL OBSERVATION PER HR: HCPCS

## 2020-09-12 PROCEDURE — 99226 PR SUBSEQUENT OBSERVATION CARE,LEVEL III: CPT | Mod: ,,, | Performed by: INTERNAL MEDICINE

## 2020-09-12 RX ORDER — CARVEDILOL 12.5 MG/1
12.5 TABLET ORAL 2 TIMES DAILY
Qty: 60 TABLET | Refills: 6 | Status: ON HOLD | OUTPATIENT
Start: 2020-09-12 | End: 2022-04-02 | Stop reason: SDUPTHER

## 2020-09-12 RX ORDER — ISOSORBIDE DINITRATE AND HYDRALAZINE HYDROCHLORIDE 37.5; 2 MG/1; MG/1
1 TABLET ORAL 2 TIMES DAILY
Qty: 60 TABLET | Refills: 6 | Status: ON HOLD | OUTPATIENT
Start: 2020-09-12 | End: 2022-04-02

## 2020-09-12 RX ADMIN — CARVEDILOL 12.5 MG: 12.5 TABLET, FILM COATED ORAL at 09:09

## 2020-09-12 RX ADMIN — FUROSEMIDE 40 MG: 10 INJECTION, SOLUTION INTRAVENOUS at 05:09

## 2020-09-12 RX ADMIN — CLOPIDOGREL 75 MG: 75 TABLET, FILM COATED ORAL at 09:09

## 2020-09-12 RX ADMIN — HYDRALAZINE HYDROCHLORIDE AND ISOSORBIDE DINITRATE 1 TABLET: 37.5; 2 TABLET, FILM COATED ORAL at 09:09

## 2020-09-12 RX ADMIN — FAMOTIDINE 20 MG: 10 INJECTION INTRAVENOUS at 09:09

## 2020-09-12 NOTE — DISCHARGE SUMMARY
[facility] Hospital Medicine  Discharge Summary      Patient Name: Palmira Malave  MRN: [id number]  Admission Date: 9/10/2020  Hospital Length of Stay: 0 days  Discharge Date and Time:  09/12/2020 11:36 AM  Attending Physician: Lashawn Rubio MD   Discharging Provider: DEBBY Estrada  Primary Care Provider: Qi Ramon MD      HPI:   Palmira Malave 85 y.o. female PMHX: CAD,hypercholesteremia,CKD,hypertension and Combined systolic/diastolic HF.  Presents complaining of shortness of breath.  She reports acute onset of shortness of breath around 5:00 p.m. on 09/10/2020. She endorses chronic orthopnea, with nightly 2 pillows usage,similar symptoms with previous CHF exacerbation and compliance with medication treatment regimen. Additionally endorses followed by Dr. Richmond(Cardiology).She denies recent illness,URI symptoms,fever/chills,N/V/D, CP/palpitations,abd pain,dysuria or any other associated symptoms. Also denies  ETOH/Smoking/Illicit drug use. Placed in observation to hospital medicine for further evaluation and treatment.    * No surgery found *      Hospital Course:   85 y.o. AAF, very pleasant with known CHF presents with progressively worsening SOB and found to be in FVO. Repeat 2D echo showed findings consistent with severely depressed LVEF 20% + GiDD and LV hypertrophy. BNP >2400; Has chronic troponin emia, however, presenting trops = 0.088 peaked at 0.115 --Cards consulted. She was started on IV diuretics, responded well to treatment reports relief of activity intolerance, Room air sat 96%, and vitals stable with the addition of new med isosb-hydrala. BP stable, coreg adjusted down for bp accomodatation new med.     Consults:   Consults (From admission, onward)        Status Ordering Provider     Inpatient consult to Cardiology  Once     Provider:  Parviz Myers MD    Completed SAMIR JACOBSEN     Inpatient consult to Social Work  Once     Provider:  (Not yet  assigned)    Completed MIMA CARDONA          No new Assessment & Plan notes have been filed under this hospital service since the last note was generated.  Service: Hospital Medicine    Final Active Diagnoses:    Diagnosis Date Noted POA    PRINCIPAL PROBLEM:  Acute on chronic systolic congestive heart failure [I50.23] 10/02/2018 Yes    SOB (shortness of breath) [R06.02] 09/11/2020 Yes    Biventricular AICD in place [Z95.810] 12/08/2019 Yes     Chronic    Coronary artery disease involving native coronary artery of native heart without angina pectoris [I25.10] 11/15/2014 Yes    Essential hypertension [I10] 11/15/2014 Yes     Chronic    Hyperlipidemia [E78.5] 11/15/2014 Yes     Chronic    CKD (chronic kidney disease) stage 4, GFR 15-29 ml/min [N18.4] 11/15/2014 Yes      Problems Resolved During this Admission:       Discharged Condition: stable    Disposition: Home or Self Care    Follow Up:  Follow-up Information     Kelechi Richmond MD On 10/2/2020.    Specialty: Cardiology  Why: @1:20pm, , for Cardiology follow up  Contact information:  [address]  [address] [place], [place] [address]  [phone]                 Patient Instructions:      Diet Cardiac     Activity as tolerated       Significant Diagnostic Studies: Labs:   CMP   Recent Labs   Lab 09/10/20  2330 09/11/20  0558 09/12/20  0957    142 142   K 4.2 3.9 3.5    101 100   CO2 28 30* 29    99  --    BUN 54* 49*  --    CREATININE 1.9* 1.7* 2.0*   CALCIUM 10.1 10.2 10.0   PROT 7.5  --  7.2   ALBUMIN 4.2  --  4.0   BILITOT 0.5  --  0.7   ALKPHOS 74  --   --    AST 21  --  22   ALT 16  --   --    ANIONGAP 11 11 13   ESTGFRAFRICA 27* 31* 26*   EGFRNONAA 24* 27* 22*   , CBC   Recent Labs   Lab 09/10/20  2330 09/11/20  0558   WBC 6.12 5.85   HGB 11.3* 11.2*   HCT 35.7* 35.9*   * 111*   , INR   Lab Results   Component Value Date    INR 1.0 09/10/2020    INR 1.0 08/08/2020    INR 1.0 12/07/2019   , Lipid Panel   Lab Results    Component Value Date    CHOL 165 06/09/2020    HDL 66 06/09/2020    LDLCALC 52.4 (L) 06/09/2020    TRIG 233 (H) 06/09/2020    CHOLHDL 40.0 06/09/2020   , Troponin   Recent Labs   Lab 09/10/20  2330 09/11/20  0558 09/11/20  1201   TROPONINI 0.088* 0.103* 0.115*    and A1C: No results for input(s): HGBA1C in the last 4320 hours.    Pending Diagnostic Studies:     None         Medications:  Reconciled Home Medications:      Medication List      START taking these medications    isosorbide-hydrALAZINE 20-37.5 mg 20-37.5 mg Tab  Commonly known as: BIDIL  Take 1 tablet by mouth 2 (two) times daily.        CHANGE how you take these medications    carvediloL 12.5 MG tablet  Commonly known as: COREG  Take 1 tablet (12.5 mg total) by mouth 2 (two) times daily.  What changed: medication strength        CONTINUE taking these medications    acetaminophen 500 MG tablet  Commonly known as: TYLENOL  Take 1 tablet (500 mg total) by mouth every 6 (six) hours as needed for Pain.     allopurinoL 100 MG tablet  Commonly known as: ZYLOPRIM  Take 100 mg by mouth every evening.     calcitRIOL 0.25 MCG Cap  Commonly known as: ROCALTROL  0.25 mcg once daily.     CENTRUM SILVER ORAL  Take by mouth once daily.     clopidogreL 75 mg tablet  Commonly known as: PLAVIX  Take 75 mg by mouth once daily. On hold since 11-28-14, for procedure.     cranberry 400 mg Cap  Take 1 capsule by mouth 2 (two) times daily.     fish oil-omega-3 fatty acids 300-1,000 mg capsule  Take 2 g by mouth once daily. 2 caps every AM & 1 cap every PM.     furosemide 40 MG tablet  Commonly known as: LASIX  Take 1 tablet (40 mg total) by mouth 2 (two) times a day.     lovastatin 40 MG tablet  Commonly known as: MEVACOR  Take 40 mg by mouth nightly. Takes 2 caps with supper every evening.     MYRBETRIQ 50 mg Tb24  Generic drug: mirabegron  Take 50 mg by mouth once daily.            Indwelling Lines/Drains at time of discharge:   Lines/Drains/Airways     None                  Time spent on the discharge of patient: 35 minutes  Patient was seen and examined on the date of discharge and determined to be suitable for discharge.       Yuliya Banks, ROSA MARIA, APRN, FNP-C  Department of [facility] - [facility]  [address] [place] [address]  Office [phone]; Pager [phone]

## 2020-09-12 NOTE — SUBJECTIVE & OBJECTIVE
Past Medical History:   Diagnosis Date    Anticoagulant long-term use     Cataract     CHF (congestive heart failure)     Coronary artery disease     Gout attack     Hypercholesteremia     Hypertension     Renal disorder     Vaginal delivery     x4       Past Surgical History:   Procedure Laterality Date    APPENDECTOMY      CARDIAC DEFIBRILLATOR PLACEMENT      2015    CATARACT EXTRACTION W/  INTRAOCULAR LENS IMPLANT Left 02/07/2019    Dr. Velazco    CATARACT EXTRACTION W/  INTRAOCULAR LENS IMPLANT Right 02/21/2019    Dr. Velazco    CHOLECYSTECTOMY      COLONOSCOPY W/ POLYPECTOMY  10 or 11/ 2014    per Dr. Myrick    defribillator Left 8/2008    EYE SURGERY      HYSTERECTOMY      INTRAOCULAR PROSTHESES INSERTION Left 2/7/2019    Procedure: INSERTION, IOL PROSTHESIS;  Surgeon: Demetrius Velazco MD;  Location: [facility] OR;  Service: Ophthalmology;  Laterality: Left;  RN Phone Pre OP 1-30-19.  Arrival 05:30 AM    INTRAOCULAR PROSTHESES INSERTION Right 2/21/2019    Procedure: INSERTION, IOL PROSTHESIS;  Surgeon: Demetrius Velazco MD;  Location: [facility] OR;  Service: Ophthalmology;  Laterality: Right;    JOINT REPLACEMENT Bilateral 2005 & 2006    2 Knee Replacements=Lt. 1= 2005. Rt. 1= 2006    OOPHORECTOMY      PHACOEMULSIFICATION OF CATARACT Left 2/7/2019    Procedure: PHACOEMULSIFICATION, CATARACT;  Surgeon: Demetrius Velazco MD;  Location: [facility] OR;  Service: Ophthalmology;  Laterality: Left;    PHACOEMULSIFICATION OF CATARACT Right 2/21/2019    Procedure: PHACOEMULSIFICATION, CATARACT;  Surgeon: Demetrius Velazco MD;  Location: [facility] OR;  Service: Ophthalmology;  Laterality: Right;  RN Phone Pre op 2-15-19.  Arrival 05:30 AM    TOTAL KNEE ARTHROPLASTY Bilateral Lt.= 2005; Rt.=2006    Bilateral Knee scope       Review of patient's allergies indicates:   Allergen Reactions    Aspirin Swelling     Swelling and rash    Colace [docusate sodium] Rash     itching    Sulfa  (sulfonamide antibiotics) Swelling     Swelling and rash    Iodine and iodide containing products Rash    Penicillins Rash       Current Facility-Administered Medications on File Prior to Encounter   Medication    0.9%  NaCl infusion    phenylephrine HCL 2.5% ophthalmic solution 1 drop    proparacaine 0.5 % ophthalmic solution 1 drop    tropicamide 1% ophthalmic solution 1 drop     Current Outpatient Medications on File Prior to Encounter   Medication Sig    acetaminophen (TYLENOL) 500 MG tablet Take 1 tablet (500 mg total) by mouth every 6 (six) hours as needed for Pain.    allopurinol (ZYLOPRIM) 100 MG tablet Take 100 mg by mouth every evening.     calcitRIOL (ROCALTROL) 0.25 MCG Cap 0.25 mcg once daily.     clopidogrel (PLAVIX) 75 mg tablet Take 75 mg by mouth once daily. On hold since 11-28-14, for procedure.    cranberry 400 mg Cap Take 1 capsule by mouth 2 (two) times daily.    fish oil-omega-3 fatty acids 300-1,000 mg capsule Take 2 g by mouth once daily. 2 caps every AM & 1 cap every PM.    folic acid/multivit-min/lutein (CENTRUM SILVER ORAL) Take by mouth once daily.    furosemide (LASIX) 40 MG tablet Take 1 tablet (40 mg total) by mouth 2 (two) times a day.    lovastatin (MEVACOR) 40 MG tablet Take 40 mg by mouth nightly. Takes 2 caps with supper every evening.    mirabegron (MYRBETRIQ) 50 mg Tb24 Take 50 mg by mouth once daily.     [DISCONTINUED] carvedilol (COREG) 25 MG tablet Take 0.5 tablets (12.5 mg total) by mouth 2 (two) times daily.     Family History     Problem Relation (Age of Onset)    Amblyopia Son    Diabetes Other    Heart attack Mother (88)    Hyperlipidemia Mother    Hypertension Mother, Other        Tobacco Use    Smoking status: Never Smoker    Smokeless tobacco: Never Used   Substance and Sexual Activity    Alcohol use: Yes     Comment: rarely    Drug use: No    Sexual activity: Not Currently     Partners: Male     Review of Systems   Gastrointestinal: Negative for  melena.   Genitourinary: Negative for hematuria.     Objective:     Vital Signs (Most Recent):  Temp: 97.8 °F (36.6 °C) (09/12/20 0713)  Pulse: 66 (09/12/20 0713)  Resp: 18 (09/12/20 0713)  BP: 128/73 (09/12/20 0713)  SpO2: 96 % (09/12/20 0713) Vital Signs (24h Range):  Temp:  [97.4 °F (36.3 °C)-98.1 °F (36.7 °C)] 97.8 °F (36.6 °C)  Pulse:  [62-70] 66  Resp:  [17-18] 18  SpO2:  [90 %-100 %] 96 %  BP: (106-134)/(53-77) 128/73     Weight: 76.2 kg (167 lb 15.9 oz)  Body mass index is 29.76 kg/m².    SpO2: 96 %  O2 Device (Oxygen Therapy): room air      Intake/Output Summary (Last 24 hours) at 9/12/2020 1133  Last data filed at 9/12/2020 0900  Gross per 24 hour   Intake 520 ml   Output 750 ml   Net -230 ml       Lines/Drains/Airways     Peripheral Intravenous Line                 Peripheral IV - Single Lumen 20 G Right Forearm -- days                Physical Exam   Constitutional: She is oriented to person, place, and time. She appears well-developed and well-nourished. No distress.   HENT:   Head: Normocephalic and atraumatic.   Mouth/Throat: No oropharyngeal exudate.   Eyes: Pupils are equal, round, and reactive to light. Conjunctivae and EOM are normal. No scleral icterus.   Neck: Normal range of motion. Neck supple. No JVD present. No tracheal deviation present. No thyromegaly present.   Cardiovascular: Normal rate, regular rhythm, S1 normal and S2 normal. Exam reveals no gallop and no friction rub.   Murmur heard.   Systolic murmur is present with a grade of 2/6.  Pulmonary/Chest: Effort normal and breath sounds normal. No respiratory distress. She has no wheezes. She has no rales. She exhibits no tenderness.   Abdominal: Soft. She exhibits no distension.   Musculoskeletal: Normal range of motion.         General: No edema.   Neurological: She is alert and oriented to person, place, and time. No cranial nerve deficit.   Skin: Skin is warm and dry. She is not diaphoretic.   Psychiatric: She has a normal mood and  affect. Her behavior is normal. Judgment normal.       Current Medications:   carvediloL  12.5 mg Oral BID    clopidogreL  75 mg Oral Daily    famotidine (PF)  20 mg Intravenous Daily    furosemide (LASIX) IV  40 mg Intravenous Q12H    isosorbide-hydrALAZINE 20-37.5 mg  1 tablet Oral BID    lovastatin  40 mg Oral Nightly       acetaminophen, ondansetron, sodium chloride 0.9%    Laboratory (all labs reviewed):  CBC:  Recent Labs   Lab 06/09/20  1040 06/10/20  0313 08/08/20  0310 09/10/20  2330 09/11/20  0558   WBC 6.25 6.35 5.97 6.12 5.85   Hemoglobin 11.2 L 11.5 L 10.5 L 11.3 L 11.2 L   Hematocrit 36.6 L 36.8 L 34.7 L 35.7 L 35.9 L   Platelets 129 L 125 L 109 L 109 L 111 L       CHEMISTRIES:  Recent Labs   Lab 01/14/19  0957  12/08/19  0351 06/09/20  1040 06/10/20  0313 08/08/20  0310 09/10/20  2330 09/11/20  0558 09/12/20  0957   Glucose 94   < > 99 96 106 98 102 99  --    Sodium 142   < > 143 141 139 141 139 142 142   Potassium 3.5   < > 3.5 4.1 4.3 4.0 4.2 3.9 3.5   BUN, Bld 26 H   < > 35 H 48 H 55 H 48 H 54 H 49 H  --    Creatinine 1.3   < > 1.7 H 1.6 H 1.6 H 1.8 H 1.9 H 1.7 H 2.0 H   eGFR if  44 A   < > 31 A 34 A 34 A 29 A 27 A 31 A 26 A   eGFR if non  38 A   < > 27 A 29 A 29 A 25 A 24 A 27 A 22 A   Calcium 10.4   < > 9.9 10.0 10.3 9.2 10.1 10.2 10.0   Magnesium 1.9  --  2.1  --   --   --   --  2.3  --     < > = values in this interval not displayed.       CARDIAC BIOMARKERS:  Recent Labs   Lab 08/08/20  0310 08/08/20  0630 09/10/20  2330 09/11/20  0558 09/11/20  1201   Troponin I 0.111 H 0.092 H 0.088 H 0.103 H 0.115 H       COAGS:  Recent Labs   Lab 10/02/18  1809 12/07/19  2202 08/08/20  0310 09/10/20  2330   INR 1.1 1.0 1.0 1.0       LIPIDS/LFTS:  Recent Labs   Lab 12/08/19  0351 06/09/20  1040 06/09/20  2040 06/10/20  0313 08/08/20  0310 09/10/20  2330 09/12/20  0957   Cholesterol  --   --  165  --   --   --   --    Triglycerides  --   --  233 H  --   --   --   --     HDL  --   --  66  --   --   --   --    LDL Cholesterol  --   --  52.4 L  --   --   --   --    Non-HDL Cholesterol  --   --  99  --   --   --   --    AST 26 35  --  29 30 21 22   ALT 18 29  --  28 18 16  --        BNP:  Recent Labs   Lab 02/07/19  2136 12/07/19  2202 06/09/20  1040 08/08/20  0310 09/10/20  2330   BNP 2,475 H 1,355 H 2,706 H 1,615 H 2,450 H       TSH:  Recent Labs   Lab 06/09/20  2040   TSH 1.588       Free T4:        Diagnostic Results:  ECG (personally reviewed and interpreted tracing(s)):  9/10/20 AS 72, BiV paced, similar to 8/8/20    Chest X-Ray (personally reviewed and interpreted image(s)): 9/10/20 CMeg, mild CHF, L BiV ICD in place    Echo: (images personally reviewed and interpreted, EF unchanged vs 3/2020)  · Severely decreased left ventricular systolic function. The estimated ejection fraction is 20%.  · Severe global hypokinetic wall motion.  · Mild eccentric left ventricular hypertrophy.  · Mild left ventricular enlargement.  · Grade I (mild) left ventricular diastolic dysfunction consistent with impaired relaxation.  · Normal right ventricular systolic function.  · Indeterminate central venous pressure. Estimated PA systolic pressure is at least 13 mmHg.    Carotid US 7/10/18  There is 40 - 49% right Internal Carotid stenosis.   There is 20 - 39% left Internal Carotid stenosis.     Cath: per Dr. Richmond note 6/2020, neg cath 2011

## 2020-09-12 NOTE — HOSPITAL COURSE
Interval Hx: feeling better. No cp/sob/palps.      Tele: AS- 70, PVCs (personally reviewed and interpreted)

## 2020-09-12 NOTE — PROGRESS NOTES
[facility] [place]  Cardiology  Progress Note    Patient Name: Palmira Malave  MRN: [id number]  Admission Date: 9/10/2020  Hospital Length of Stay: 0 days  Code Status: Full Code   Attending Physician: Lashawn Rubio MD   Primary Care Physician: Qi Ramon MD  Expected Discharge Date: 9/12/2020  Principal Problem:Acute on chronic systolic congestive heart failure    Subjective:     Interval Hx: feeling better. No cp/sob/palps.      Tele: AS- 70, PVCs (personally reviewed and interpreted)      Past Medical History:   Diagnosis Date    Anticoagulant long-term use     Cataract     CHF (congestive heart failure)     Coronary artery disease     Gout attack     Hypercholesteremia     Hypertension     Renal disorder     Vaginal delivery     x4       Past Surgical History:   Procedure Laterality Date    APPENDECTOMY      CARDIAC DEFIBRILLATOR PLACEMENT      2015    CATARACT EXTRACTION W/  INTRAOCULAR LENS IMPLANT Left 02/07/2019    Dr. Velazco    CATARACT EXTRACTION W/  INTRAOCULAR LENS IMPLANT Right 02/21/2019    Dr. Velazco    CHOLECYSTECTOMY      COLONOSCOPY W/ POLYPECTOMY  10 or 11/ 2014    per Dr. Myrick    defribillator Left 8/2008    EYE SURGERY      HYSTERECTOMY      INTRAOCULAR PROSTHESES INSERTION Left 2/7/2019    Procedure: INSERTION, IOL PROSTHESIS;  Surgeon: Demetrius Velazco MD;  Location: [facility] OR;  Service: Ophthalmology;  Laterality: Left;  RN Phone Pre OP 1-30-19.  Arrival 05:30 AM    INTRAOCULAR PROSTHESES INSERTION Right 2/21/2019    Procedure: INSERTION, IOL PROSTHESIS;  Surgeon: Demetrius Velazco MD;  Location: [facility] OR;  Service: Ophthalmology;  Laterality: Right;    JOINT REPLACEMENT Bilateral 2005 & 2006    2 Knee Replacements=Lt. 1= 2005. Rt. 1= 2006    OOPHORECTOMY      PHACOEMULSIFICATION OF CATARACT Left 2/7/2019    Procedure: PHACOEMULSIFICATION, CATARACT;  Surgeon: Demetrius Velazco MD;  Location: [facility] OR;  Service: Ophthalmology;   Laterality: Left;    PHACOEMULSIFICATION OF CATARACT Right 2/21/2019    Procedure: PHACOEMULSIFICATION, CATARACT;  Surgeon: Demetrius Velazco MD;  Location[facility];  Service: Ophthalmology;  Laterality: Right;  RN Phone Pre op 2-15-19.  Arrival 05:30 AM    TOTAL KNEE ARTHROPLASTY Bilateral Lt.= 2005; Rt.=2006    Bilateral Knee scope       Review of patient's allergies indicates:   Allergen Reactions    Aspirin Swelling     Swelling and rash    Colace [docusate sodium] Rash     itching    Sulfa (sulfonamide antibiotics) Swelling     Swelling and rash    Iodine and iodide containing products Rash    Penicillins Rash       Current Facility-Administered Medications on File Prior to Encounter   Medication    0.9%  NaCl infusion    phenylephrine HCL 2.5% ophthalmic solution 1 drop    proparacaine 0.5 % ophthalmic solution 1 drop    tropicamide 1% ophthalmic solution 1 drop     Current Outpatient Medications on File Prior to Encounter   Medication Sig    acetaminophen (TYLENOL) 500 MG tablet Take 1 tablet (500 mg total) by mouth every 6 (six) hours as needed for Pain.    allopurinol (ZYLOPRIM) 100 MG tablet Take 100 mg by mouth every evening.     calcitRIOL (ROCALTROL) 0.25 MCG Cap 0.25 mcg once daily.     clopidogrel (PLAVIX) 75 mg tablet Take 75 mg by mouth once daily. On hold since 11-28-14, for procedure.    cranberry 400 mg Cap Take 1 capsule by mouth 2 (two) times daily.    fish oil-omega-3 fatty acids 300-1,000 mg capsule Take 2 g by mouth once daily. 2 caps every AM & 1 cap every PM.    folic acid/multivit-min/lutein (CENTRUM SILVER ORAL) Take by mouth once daily.    furosemide (LASIX) 40 MG tablet Take 1 tablet (40 mg total) by mouth 2 (two) times a day.    lovastatin (MEVACOR) 40 MG tablet Take 40 mg by mouth nightly. Takes 2 caps with supper every evening.    mirabegron (MYRBETRIQ) 50 mg Tb24 Take 50 mg by mouth once daily.     [DISCONTINUED] carvedilol (COREG) 25 MG tablet Take 0.5  tablets (12.5 mg total) by mouth 2 (two) times daily.     Family History     Problem Relation (Age of Onset)    Amblyopia Son    Diabetes Other    Heart attack Mother (88)    Hyperlipidemia Mother    Hypertension Mother, Other        Tobacco Use    Smoking status: Never Smoker    Smokeless tobacco: Never Used   Substance and Sexual Activity    Alcohol use: Yes     Comment: rarely    Drug use: No    Sexual activity: Not Currently     Partners: Male     Review of Systems   Gastrointestinal: Negative for melena.   Genitourinary: Negative for hematuria.     Objective:     Vital Signs (Most Recent):  Temp: 97.8 °F (36.6 °C) (09/12/20 0713)  Pulse: 66 (09/12/20 0713)  Resp: 18 (09/12/20 0713)  BP: 128/73 (09/12/20 0713)  SpO2: 96 % (09/12/20 0713) Vital Signs (24h Range):  Temp:  [97.4 °F (36.3 °C)-98.1 °F (36.7 °C)] 97.8 °F (36.6 °C)  Pulse:  [62-70] 66  Resp:  [17-18] 18  SpO2:  [90 %-100 %] 96 %  BP: (106-134)/(53-77) 128/73     Weight: 76.2 kg (167 lb 15.9 oz)  Body mass index is 29.76 kg/m².    SpO2: 96 %  O2 Device (Oxygen Therapy): room air      Intake/Output Summary (Last 24 hours) at 9/12/2020 1133  Last data filed at 9/12/2020 0900  Gross per 24 hour   Intake 520 ml   Output 750 ml   Net -230 ml       Lines/Drains/Airways     Peripheral Intravenous Line                 Peripheral IV - Single Lumen 20 G Right Forearm -- days                Physical Exam   Constitutional: She is oriented to person, place, and time. She appears well-developed and well-nourished. No distress.   HENT:   Head: Normocephalic and atraumatic.   Mouth/Throat: No oropharyngeal exudate.   Eyes: Pupils are equal, round, and reactive to light. Conjunctivae and EOM are normal. No scleral icterus.   Neck: Normal range of motion. Neck supple. No JVD present. No tracheal deviation present. No thyromegaly present.   Cardiovascular: Normal rate, regular rhythm, S1 normal and S2 normal. Exam reveals no gallop and no friction rub.   Murmur  heard.   Systolic murmur is present with a grade of 2/6.  Pulmonary/Chest: Effort normal and breath sounds normal. No respiratory distress. She has no wheezes. She has no rales. She exhibits no tenderness.   Abdominal: Soft. She exhibits no distension.   Musculoskeletal: Normal range of motion.         General: No edema.   Neurological: She is alert and oriented to person, place, and time. No cranial nerve deficit.   Skin: Skin is warm and dry. She is not diaphoretic.   Psychiatric: She has a normal mood and affect. Her behavior is normal. Judgment normal.       Current Medications:   carvediloL  12.5 mg Oral BID    clopidogreL  75 mg Oral Daily    famotidine (PF)  20 mg Intravenous Daily    furosemide (LASIX) IV  40 mg Intravenous Q12H    isosorbide-hydrALAZINE 20-37.5 mg  1 tablet Oral BID    lovastatin  40 mg Oral Nightly       acetaminophen, ondansetron, sodium chloride 0.9%    Laboratory (all labs reviewed):  CBC:  Recent Labs   Lab 06/09/20  1040 06/10/20  0313 08/08/20  0310 09/10/20  2330 09/11/20  0558   WBC 6.25 6.35 5.97 6.12 5.85   Hemoglobin 11.2 L 11.5 L 10.5 L 11.3 L 11.2 L   Hematocrit 36.6 L 36.8 L 34.7 L 35.7 L 35.9 L   Platelets 129 L 125 L 109 L 109 L 111 L       CHEMISTRIES:  Recent Labs   Lab 01/14/19  0957  12/08/19  0351 06/09/20  1040 06/10/20  0313 08/08/20  0310 09/10/20  2330 09/11/20  0558 09/12/20  0957   Glucose 94   < > 99 96 106 98 102 99  --    Sodium 142   < > 143 141 139 141 139 142 142   Potassium 3.5   < > 3.5 4.1 4.3 4.0 4.2 3.9 3.5   BUN, Bld 26 H   < > 35 H 48 H 55 H 48 H 54 H 49 H  --    Creatinine 1.3   < > 1.7 H 1.6 H 1.6 H 1.8 H 1.9 H 1.7 H 2.0 H   eGFR if  44 A   < > 31 A 34 A 34 A 29 A 27 A 31 A 26 A   eGFR if non  38 A   < > 27 A 29 A 29 A 25 A 24 A 27 A 22 A   Calcium 10.4   < > 9.9 10.0 10.3 9.2 10.1 10.2 10.0   Magnesium 1.9  --  2.1  --   --   --   --  2.3  --     < > = values in this interval not displayed.       CARDIAC  BIOMARKERS:  Recent Labs   Lab 08/08/20  0310 08/08/20  0630 09/10/20  2330 09/11/20  0558 09/11/20  1201   Troponin I 0.111 H 0.092 H 0.088 H 0.103 H 0.115 H       COAGS:  Recent Labs   Lab 10/02/18  1809 12/07/19  2202 08/08/20  0310 09/10/20  2330   INR 1.1 1.0 1.0 1.0       LIPIDS/LFTS:  Recent Labs   Lab 12/08/19  0351 06/09/20  1040 06/09/20  2040 06/10/20  0313 08/08/20  0310 09/10/20  2330 09/12/20  0957   Cholesterol  --   --  165  --   --   --   --    Triglycerides  --   --  233 H  --   --   --   --    HDL  --   --  66  --   --   --   --    LDL Cholesterol  --   --  52.4 L  --   --   --   --    Non-HDL Cholesterol  --   --  99  --   --   --   --    AST 26 35  --  29 30 21 22   ALT 18 29  --  28 18 16  --        BNP:  Recent Labs   Lab 02/07/19  2136 12/07/19 2202 06/09/20  1040 08/08/20  0310 09/10/20  2330   BNP 2,475 H 1,355 H 2,706 H 1,615 H 2,450 H       TSH:  Recent Labs   Lab 06/09/20  2040   TSH 1.588       Free T4:        Diagnostic Results:  ECG (personally reviewed and interpreted tracing(s)):  9/10/20 AS 72, BiV paced, similar to 8/8/20    Chest X-Ray (personally reviewed and interpreted image(s)): 9/10/20 CMeg, mild CHF, L BiV ICD in place    Echo: (images personally reviewed and interpreted, EF unchanged vs 3/2020)  · Severely decreased left ventricular systolic function. The estimated ejection fraction is 20%.  · Severe global hypokinetic wall motion.  · Mild eccentric left ventricular hypertrophy.  · Mild left ventricular enlargement.  · Grade I (mild) left ventricular diastolic dysfunction consistent with impaired relaxation.  · Normal right ventricular systolic function.  · Indeterminate central venous pressure. Estimated PA systolic pressure is at least 13 mmHg.    Carotid US 7/10/18  There is 40 - 49% right Internal Carotid stenosis.   There is 20 - 39% left Internal Carotid stenosis.     Cath: per Dr. Richmond note 6/2020, neg cath 2011      Assessment and Plan:     * Acute on chronic  systolic congestive heart failure  Known NICM (apparent neg cath 2011)  S/p MDT BiV ICD, appears to be normally functioning, no recent shocks  Pt states sxs have improved with diuresis, but not completely resolved  Change lasix to PO  Cont coreg 12.5mg bid  Cont BiDil 1 tab bid  No plan for isch eval/cath at this juncture (esvin with CKD4)    Elevated troponin  Nondiagnostic in setting of CKD4  Hx NICM  No plan for cath/MPI at this juncture    Coronary artery disease involving native coronary artery of native heart without angina pectoris  By hx, however with neg cath 2011 per Dr. Richmond notes  Cont plavix/statin/BBL  No anginal sxs noted      CKD (chronic kidney disease) stage 4, GFR 15-29 ml/min  Creat stable  Monitor with diuresis    Hyperlipidemia  Cont statin rx    Essential hypertension  Cont med rx  Resume coreg and start bidil    Biventricular AICD in place  Medtronic, appears to be normally functioning        VTE Risk Mitigation (From admission, onward)         Ordered     IP VTE HIGH RISK PATIENT  Once      09/11/20 0321     Place sequential compression device  Until discontinued      09/11/20 0321              Dispo planning appropriate  Pt to follow up with Dr. Richmond after discharge.    Parviz Myers MD  Cardiology  [facility] [place]

## 2020-09-12 NOTE — PLAN OF CARE
09/12/20 1139   Post-Acute Status   Post-Acute Authorization Other  (Home with cardiology follow-up)   Other Status No Post-Acute Service Needs   Discharge Delays None known at this time   Discharge Plan   Discharge Plan A Home with family   Discharge Plan B Home with family

## 2020-10-02 ENCOUNTER — OFFICE VISIT (OUTPATIENT)
Dept: OPHTHALMOLOGY | Facility: CLINIC | Age: 85
End: 2020-10-02
Payer: MEDICARE

## 2020-10-02 ENCOUNTER — OFFICE VISIT (OUTPATIENT)
Dept: CARDIOLOGY | Facility: CLINIC | Age: 85
End: 2020-10-02
Payer: MEDICARE

## 2020-10-02 VITALS
DIASTOLIC BLOOD PRESSURE: 86 MMHG | HEART RATE: 79 BPM | SYSTOLIC BLOOD PRESSURE: 120 MMHG | WEIGHT: 171.06 LBS | RESPIRATION RATE: 15 BRPM | OXYGEN SATURATION: 96 % | HEIGHT: 63 IN | BODY MASS INDEX: 30.31 KG/M2

## 2020-10-02 DIAGNOSIS — Z95.810 ICD (IMPLANTABLE CARDIOVERTER-DEFIBRILLATOR), BIVENTRICULAR, IN SITU: Chronic | ICD-10-CM

## 2020-10-02 DIAGNOSIS — Z95.810 ICD (IMPLANTABLE CARDIOVERTER-DEFIBRILLATOR) IN PLACE: Primary | ICD-10-CM

## 2020-10-02 DIAGNOSIS — I50.23 ACUTE ON CHRONIC SYSTOLIC CONGESTIVE HEART FAILURE: ICD-10-CM

## 2020-10-02 DIAGNOSIS — I20.89 OTHER FORMS OF ANGINA PECTORIS: ICD-10-CM

## 2020-10-02 DIAGNOSIS — H26.491 PCO (POSTERIOR CAPSULAR OPACIFICATION), RIGHT: Primary | ICD-10-CM

## 2020-10-02 DIAGNOSIS — R06.02 SOB (SHORTNESS OF BREATH): ICD-10-CM

## 2020-10-02 DIAGNOSIS — E78.2 MIXED HYPERLIPIDEMIA: Chronic | ICD-10-CM

## 2020-10-02 DIAGNOSIS — I25.10 CORONARY ARTERY DISEASE INVOLVING NATIVE CORONARY ARTERY OF NATIVE HEART WITHOUT ANGINA PECTORIS: ICD-10-CM

## 2020-10-02 DIAGNOSIS — I10 ESSENTIAL HYPERTENSION: ICD-10-CM

## 2020-10-02 DIAGNOSIS — I10 ESSENTIAL HYPERTENSION: Chronic | ICD-10-CM

## 2020-10-02 DIAGNOSIS — I50.42 CHRONIC COMBINED SYSTOLIC AND DIASTOLIC HEART FAILURE: Chronic | ICD-10-CM

## 2020-10-02 DIAGNOSIS — Z96.1 PSEUDOPHAKIA: ICD-10-CM

## 2020-10-02 PROCEDURE — 99999 PR PBB SHADOW E&M-EST. PATIENT-LVL IV: CPT | Mod: PBBFAC,,, | Performed by: INTERNAL MEDICINE

## 2020-10-02 PROCEDURE — 92014 PR EYE EXAM, EST PATIENT,COMPREHESV: ICD-10-PCS | Mod: S$GLB,,, | Performed by: OPHTHALMOLOGY

## 2020-10-02 PROCEDURE — 99999 PR PBB SHADOW E&M-EST. PATIENT-LVL III: ICD-10-PCS | Mod: PBBFAC,,, | Performed by: OPHTHALMOLOGY

## 2020-10-02 PROCEDURE — 1159F MED LIST DOCD IN RCRD: CPT | Mod: S$GLB,,, | Performed by: INTERNAL MEDICINE

## 2020-10-02 PROCEDURE — 3074F SYST BP LT 130 MM HG: CPT | Mod: CPTII,S$GLB,, | Performed by: INTERNAL MEDICINE

## 2020-10-02 PROCEDURE — 3074F PR MOST RECENT SYSTOLIC BLOOD PRESSURE < 130 MM HG: ICD-10-PCS | Mod: CPTII,S$GLB,, | Performed by: INTERNAL MEDICINE

## 2020-10-02 PROCEDURE — 99999 PR PBB SHADOW E&M-EST. PATIENT-LVL III: CPT | Mod: PBBFAC,,, | Performed by: OPHTHALMOLOGY

## 2020-10-02 PROCEDURE — 1126F PR PAIN SEVERITY QUANTIFIED, NO PAIN PRESENT: ICD-10-PCS | Mod: S$GLB,,, | Performed by: INTERNAL MEDICINE

## 2020-10-02 PROCEDURE — 1126F AMNT PAIN NOTED NONE PRSNT: CPT | Mod: S$GLB,,, | Performed by: INTERNAL MEDICINE

## 2020-10-02 PROCEDURE — 99214 OFFICE O/P EST MOD 30 MIN: CPT | Mod: S$GLB,,, | Performed by: INTERNAL MEDICINE

## 2020-10-02 PROCEDURE — 92014 COMPRE OPH EXAM EST PT 1/>: CPT | Mod: S$GLB,,, | Performed by: OPHTHALMOLOGY

## 2020-10-02 PROCEDURE — 3079F DIAST BP 80-89 MM HG: CPT | Mod: CPTII,S$GLB,, | Performed by: INTERNAL MEDICINE

## 2020-10-02 PROCEDURE — 1159F PR MEDICATION LIST DOCUMENTED IN MEDICAL RECORD: ICD-10-PCS | Mod: S$GLB,,, | Performed by: INTERNAL MEDICINE

## 2020-10-02 PROCEDURE — 99999 PR PBB SHADOW E&M-EST. PATIENT-LVL IV: ICD-10-PCS | Mod: PBBFAC,,, | Performed by: INTERNAL MEDICINE

## 2020-10-02 PROCEDURE — 1101F PT FALLS ASSESS-DOCD LE1/YR: CPT | Mod: CPTII,S$GLB,, | Performed by: INTERNAL MEDICINE

## 2020-10-02 PROCEDURE — 3079F PR MOST RECENT DIASTOLIC BLOOD PRESSURE 80-89 MM HG: ICD-10-PCS | Mod: CPTII,S$GLB,, | Performed by: INTERNAL MEDICINE

## 2020-10-02 PROCEDURE — 99214 PR OFFICE/OUTPT VISIT, EST, LEVL IV, 30-39 MIN: ICD-10-PCS | Mod: S$GLB,,, | Performed by: INTERNAL MEDICINE

## 2020-10-02 PROCEDURE — 1101F PR PT FALLS ASSESS DOC 0-1 FALLS W/OUT INJ PAST YR: ICD-10-PCS | Mod: CPTII,S$GLB,, | Performed by: INTERNAL MEDICINE

## 2020-10-02 NOTE — PROGRESS NOTES
Subjective:    Patient ID:  Palmira Malave is a 85 y.o. female who presents for follow-up of Follow-up (hospital f/u )      HPI     NICM, Medtronic BiV AICD, HTN, HLD        Previously followed by Dr Batres  Previous history:  Here for follow-up of systolic heart failure and biventricular ICD interrogation.  She denies any worsening cardiopulmonary complaints since we last saw her.  She's had no issues with her device.  She is expressed no worsening cardiopulmonary complaints.  She denies any chest pain, shortness breath or palpitations.  She's not expressing PND, orthopnea or lower edema.  She denies any dizziness, presyncope or syncope.  Otherwise she's better usual state of health.  He says she has a daughter the moved into scan early onset dementia she's having to take care of.  She tries to stay active by going to interactions with a female group.  She is seen by [facility] clinic and does get injections.  He wants clearance for rehabilitation.     Here for follow-up of systolic heart failure and previous biventricular ICD placement.  She had her device interrogated which shows no significant issues.  She had recent admission at the beginning of the month for mild volume overload.  She was diuresed overnight and discharged home the next day.  Her EF is stable.  She denies any PND, orthopnea or lower Veronica.  She has not experiencing dizziness, presyncope or syncope.  She on questioning says she is compliant with medicines and diet.          catheter 2011 reported nonobstructive CAD     Echo 9/11/20  · Severely decreased left ventricular systolic function. The estimated ejection fraction is 20%.  · Severe global hypokinetic wall motion.  · Mild eccentric left ventricular hypertrophy.  · Mild left ventricular enlargement.  · Grade I (mild) left ventricular diastolic dysfunction consistent with impaired relaxation.  · Normal right ventricular systolic function.  · Indeterminate central venous pressure.  Estimated PA systolic pressure is at least 13 mmHg.     Carotid ultrasound:  7-18  CONCLUSIONS   There is 40 - 49% right Internal Carotid stenosis.  There is 20 - 39% left Internal Carotid stenosis.     Went to the ER 9/11/19  Palmira Malave is a 84 y.o. female with CAD, HTN, hypercholesteremia, and renal disorder who presents to the ED complaining of urinary frequency and burning sensation when urinating onset yesterday. Pt is allergic to aspirin, colace, sulfa, iodine, and penicillin with rash and itching as a reaction. Pt's brother is in the hospital because he had a heart attack. Denies DM. Denies fever, chill, nausea, vomiting, abdominal pain, CP, SOB, or headache.     9/18/19 Denies recent CP or SOB  EKG A-sensed V-paced     Admitted 6/9/20  Palmira Malave 84 y.o. female with CHF last EF 20% in 3/2020, HTN, CKD, GERD, AICD, and gout in place presents to the hospital with a chief complaint of SOB. She reports last night she became SOB that made it difficult to ambulate and sleep. She normally has to sleep in her recliner, but was unable to sleep despite sitting upright. She recently started a steroid pack for a Gout flare. She has completed all steroids but the last day, and her gout symptoms have resolved. Her breathing has improved with lasix in the ED, but not returned to baseline. She is compliant with home lasix, fluid restriction, and salt restriction. She denies fever, chest pain, N/V, abdominal pain, syncope, dysuria, melena, hematuria. She has noticed minimal lower extremity edema.      In the ED, troponin 0.141, BNP 2700, COVID negative, chest x-ray with pulmonary congestion, Cr of 1.6.      Hospital Course:   Palmira Malave 84 y.o. female placed in observation for acute on chronic systolic diastolic heart failure. Per chart review, patient with history of non obstructive CAD per [facility], NICM with biAICD 2014.  6/10, patient reported breathing back to baseline after IV lasix in ED. Good UOP  ""a lot" but not documented in epic. Denies chest pain and ready for discharge. Elevated troponin trend flat pattern due to demand ischemia.  Discussed with patient obtaining a new scale (scale broke x 1 week), low salt diet, and fluid intake 1 L a day. CKD stable. Continue home lasix and coreg (not on ACEI/ARB.  Patient stable for discharge home with close follow up Dr. Richmond.      6/22/20 Recently Dx with colon CA. Less SOB since discharge  Denies CP  Cleared for colon surgery at moderate cardiac risk  OK to hold plavix 7 days before  OV 3 months with Medtronic AICD check    Admitted 9/10/20  Palmira GENEVIEVE CollazoAnanda 85 y.o. female PMHX: CAD,hypercholesteremia,CKD,hypertension and Combined systolic/diastolic HF.  Presents complaining of shortness of breath.  She reports acute onset of shortness of breath around 5:00 p.m. on 09/10/2020. She endorses chronic orthopnea, with nightly 2 pillows usage,similar symptoms with previous CHF exacerbation and compliance with medication treatment regimen. Additionally endorses followed by Dr. Richmond(Cardiology).She denies recent illness,URI symptoms,fever/chills,N/V/D, CP/palpitations,abd pain,dysuria or any other associated symptoms. Also denies  ETOH/Smoking/Illicit drug use. Placed in observation to hospital medicine for further evaluation and treatment.    85 y.o. AAF, very pleasant with known CHF presents with progressively worsening SOB and found to be in FVO. Repeat 2D echo showed findings consistent with severely depressed LVEF 20% + GiDD and LV hypertrophy. BNP >2400; Has chronic troponin emia, however, presenting trops = 0.088 peaked at 0.115 --Cards consulted. She was started on IV diuretics, responded well to treatment reports relief of activity intolerance, Room air sat 96%, and vitals stable with the addition of new med isosb-hydrala. BP stable, coreg adjusted down for bp accomodatation new med.     10/2/20 Feels better since discharge. Stopped bidil - have her HA and " made her feel bad - went back to coreg 25 bid    Review of Systems   Constitution: Negative for decreased appetite.   HENT: Negative for ear discharge.    Eyes: Negative for blurred vision.   Respiratory: Negative for hemoptysis.    Endocrine: Negative for polyphagia.   Hematologic/Lymphatic: Negative for adenopathy.   Skin: Negative for color change.   Musculoskeletal: Negative for joint swelling.   Genitourinary: Negative for bladder incontinence.   Neurological: Negative for brief paralysis.   Psychiatric/Behavioral: Negative for hallucinations.   Allergic/Immunologic: Negative for hives.        Objective:    Physical Exam   Constitutional: She is oriented to person, place, and time. She appears well-developed and well-nourished.   HENT:   Head: Normocephalic and atraumatic.   Eyes: Pupils are equal, round, and reactive to light. Conjunctivae and EOM are normal.   Neck: Normal range of motion. Neck supple. No thyromegaly present.   Cardiovascular: Normal rate and regular rhythm.   No murmur heard.  Pulmonary/Chest: Effort normal and breath sounds normal. No respiratory distress.   Abdominal: Soft. Bowel sounds are normal.   Musculoskeletal:         General: No edema.   Neurological: She is alert and oriented to person, place, and time.   Skin: Skin is warm and dry.   Psychiatric: She has a normal mood and affect. Her behavior is normal.         Assessment:       1. ICD (implantable cardioverter-defibrillator) in place    2. Acute on chronic systolic congestive heart failure    3. Coronary artery disease involving native coronary artery of native heart without angina pectoris    4. Biventricular AICD in place    5. Essential hypertension    6. Mixed hyperlipidemia    7. Chronic combined systolic and diastolic heart failure    8. Other forms of angina pectoris    9. SOB (shortness of breath)         Plan:        Continue Rx for CAD, HTN, CHF, HLD  OV 2 weeks with Medtronic AICD check

## 2020-10-02 NOTE — PROGRESS NOTES
Subjective:       Patient ID: Palmira Malave is a 85 y.o. female.    Chief Complaint: Eye Exam    HPI     DSL_ 9/26/19     86 y/o female is here for routine eye exam. H/o of Hypertension. Pt   reports doing well with vision. Pt wear OTC +3.00. Pt denies eye   allergies, floaters, and flashes.     Eyemeds  No gtts    Last edited by Sun Maier on 10/2/2020  8:20 AM. (History)             Assessment:       1. PCO (posterior capsular opacification), right    2. Essential hypertension    3. Pseudophakia        Plan:       Early PCO OD- Not Visually Significant.  HTN-No retinopathy OU.      Control HTN.  RTC 1 yr.

## 2020-10-06 DIAGNOSIS — Z12.31 ENCOUNTER FOR SCREENING MAMMOGRAM FOR BREAST CANCER: Primary | ICD-10-CM

## 2020-10-10 ENCOUNTER — HOSPITAL ENCOUNTER (EMERGENCY)
Facility: HOSPITAL | Age: 85
Discharge: HOME | End: 2020-10-10
Attending: EMERGENCY MEDICINE
Payer: MEDICARE

## 2020-10-10 VITALS
BODY MASS INDEX: 30.3 KG/M2 | DIASTOLIC BLOOD PRESSURE: 65 MMHG | WEIGHT: 171 LBS | OXYGEN SATURATION: 97 % | SYSTOLIC BLOOD PRESSURE: 142 MMHG | HEIGHT: 63 IN | TEMPERATURE: 98 F | RESPIRATION RATE: 18 BRPM | HEART RATE: 67 BPM

## 2020-10-10 DIAGNOSIS — R06.02 SHORTNESS OF BREATH: ICD-10-CM

## 2020-10-10 DIAGNOSIS — I50.9 CONGESTIVE HEART FAILURE, UNSPECIFIED HF CHRONICITY, UNSPECIFIED HEART FAILURE TYPE: Primary | ICD-10-CM

## 2020-10-10 LAB
ALBUMIN SERPL BCP-MCNC: 4 G/DL (ref 3.5–5.2)
ALP SERPL-CCNC: 74 U/L (ref 55–135)
ALT SERPL W/O P-5'-P-CCNC: 16 U/L (ref 10–44)
ANION GAP SERPL CALC-SCNC: 14 MMOL/L (ref 8–16)
AST SERPL-CCNC: 25 U/L (ref 10–40)
BASOPHILS # BLD AUTO: 0.03 K/UL (ref 0–0.2)
BASOPHILS NFR BLD: 0.7 % (ref 0–1.9)
BILIRUB SERPL-MCNC: 0.6 MG/DL (ref 0.1–1)
BNP SERPL-MCNC: 2155 PG/ML (ref 0–99)
BUN SERPL-MCNC: 46 MG/DL (ref 8–23)
CALCIUM SERPL-MCNC: 9.7 MG/DL (ref 8.7–10.5)
CHLORIDE SERPL-SCNC: 101 MMOL/L (ref 95–110)
CO2 SERPL-SCNC: 25 MMOL/L (ref 23–29)
CREAT SERPL-MCNC: 1.8 MG/DL (ref 0.5–1.4)
DIFFERENTIAL METHOD: ABNORMAL
EOSINOPHIL # BLD AUTO: 0.2 K/UL (ref 0–0.5)
EOSINOPHIL NFR BLD: 4.2 % (ref 0–8)
ERYTHROCYTE [DISTWIDTH] IN BLOOD BY AUTOMATED COUNT: 14.2 % (ref 11.5–14.5)
EST. GFR  (AFRICAN AMERICAN): 29 ML/MIN/1.73 M^2
EST. GFR  (NON AFRICAN AMERICAN): 25 ML/MIN/1.73 M^2
GLUCOSE SERPL-MCNC: 102 MG/DL (ref 70–110)
HCT VFR BLD AUTO: 35 % (ref 37–48.5)
HGB BLD-MCNC: 10.9 G/DL (ref 12–16)
IMM GRANULOCYTES # BLD AUTO: 0.01 K/UL (ref 0–0.04)
IMM GRANULOCYTES NFR BLD AUTO: 0.2 % (ref 0–0.5)
INR PPP: 1 (ref 0.8–1.2)
LYMPHOCYTES # BLD AUTO: 1 K/UL (ref 1–4.8)
LYMPHOCYTES NFR BLD: 23.1 % (ref 18–48)
MCH RBC QN AUTO: 31 PG (ref 27–31)
MCHC RBC AUTO-ENTMCNC: 31.1 G/DL (ref 32–36)
MCV RBC AUTO: 99 FL (ref 82–98)
MONOCYTES # BLD AUTO: 0.6 K/UL (ref 0.3–1)
MONOCYTES NFR BLD: 13.4 % (ref 4–15)
NEUTROPHILS # BLD AUTO: 2.5 K/UL (ref 1.8–7.7)
NEUTROPHILS NFR BLD: 58.4 % (ref 38–73)
NRBC BLD-RTO: 0 /100 WBC
PLATELET # BLD AUTO: 111 K/UL (ref 150–350)
PMV BLD AUTO: 11.1 FL (ref 9.2–12.9)
POTASSIUM SERPL-SCNC: 4.2 MMOL/L (ref 3.5–5.1)
PROT SERPL-MCNC: 7.2 G/DL (ref 6–8.4)
PROTHROMBIN TIME: 11.2 SEC (ref 9–12.5)
RBC # BLD AUTO: 3.52 M/UL (ref 4–5.4)
SODIUM SERPL-SCNC: 140 MMOL/L (ref 136–145)
TROPONIN I SERPL DL<=0.01 NG/ML-MCNC: 0.1 NG/ML (ref 0–0.03)
WBC # BLD AUTO: 4.32 K/UL (ref 3.9–12.7)

## 2020-10-10 PROCEDURE — 93010 EKG 12-LEAD: ICD-10-PCS | Mod: ,,, | Performed by: INTERNAL MEDICINE

## 2020-10-10 PROCEDURE — 80053 COMPREHEN METABOLIC PANEL: CPT

## 2020-10-10 PROCEDURE — 93010 ELECTROCARDIOGRAM REPORT: CPT | Mod: ,,, | Performed by: INTERNAL MEDICINE

## 2020-10-10 PROCEDURE — 96374 THER/PROPH/DIAG INJ IV PUSH: CPT

## 2020-10-10 PROCEDURE — 93005 ELECTROCARDIOGRAM TRACING: CPT

## 2020-10-10 PROCEDURE — 85025 COMPLETE CBC W/AUTO DIFF WBC: CPT

## 2020-10-10 PROCEDURE — 85610 PROTHROMBIN TIME: CPT

## 2020-10-10 PROCEDURE — 99285 EMERGENCY DEPT VISIT HI MDM: CPT | Mod: 25

## 2020-10-10 PROCEDURE — 63600175 PHARM REV CODE 636 W HCPCS: Performed by: EMERGENCY MEDICINE

## 2020-10-10 PROCEDURE — 84484 ASSAY OF TROPONIN QUANT: CPT

## 2020-10-10 PROCEDURE — 83880 ASSAY OF NATRIURETIC PEPTIDE: CPT

## 2020-10-10 RX ORDER — TERAZOSIN 2 MG/1
2 CAPSULE ORAL NIGHTLY
COMMUNITY
End: 2022-10-09

## 2020-10-10 RX ORDER — PANTOPRAZOLE SODIUM 40 MG/1
40 TABLET, DELAYED RELEASE ORAL DAILY
COMMUNITY

## 2020-10-10 RX ORDER — FUROSEMIDE 10 MG/ML
40 INJECTION INTRAMUSCULAR; INTRAVENOUS
Status: COMPLETED | OUTPATIENT
Start: 2020-10-10 | End: 2020-10-10

## 2020-10-10 RX ADMIN — FUROSEMIDE 40 MG: 10 INJECTION, SOLUTION INTRAVENOUS at 01:10

## 2020-10-10 NOTE — DISCHARGE INSTRUCTIONS
Your symptoms are related to congestive heart failure.  It is important that you take your medications as you have been prescribed.  You should contact your cardiologist and primary physician to confirm your medications.  Return to the emergency department for fever, severe breathing difficulty or any new, worsening or significantly concerning symptoms.    Thank you for coming to our Emergency Department today. It is important to remember that some problems are difficult to diagnose and may not be found during your first visit. Be sure to follow up with your primary care doctor and review any labs/imaging that was performed with them. If you do not have a primary care doctor, you may contact the one listed on your discharge paperwork or you may also call the [facility] Appointment Desk at [phone] to schedule an appointment with one.     All medications may potentially have side effects and it is impossible to predict which medications may give you side effects. If you feel that you are having a negative effect of any medication you should immediately stop taking them and seek medical attention.    Return to the ER with any questions/concerns, new/concerning symptoms, worsening or failure to improve. Do not drive or make any important decisions for 24 hours if you have received any pain medications, sedatives or mood altering drugs during your ER visit.

## 2020-10-10 NOTE — ED PROVIDER NOTES
Encounter Date: 10/10/2020    SCRIBE #1 NOTE: I, Opal Oro, am scribing for, and in the presence of,  Toña Estrada MD. I have scribed the following portions of the note - Other sections scribed: HPI, ROS, PE.       History     Chief Complaint   Patient presents with    Shortness of Breath     Pt c/o SOB since last night. Denies chest pain, weakness, dizziness. Pt hx of CHF and ICD device     CC: SOB    This is an 84 y/o female with a PMHx of CHF and HTN who presents to the Emergency Department complaining of SOB with associated orthopnea since last night. Patient reports recent change to her medication due to complaints of light-headedness. She states she stopped taking it 5 days ago. Denies leg swelling more than usual. Denies diaphoresis, chest pain, abdominal pain, nausea, emesis, and difficulty urinating. No other associated symptoms.     The history is provided by the patient.     Review of patient's allergies indicates:   Allergen Reactions    Aspirin Swelling     Swelling and rash    Colace [docusate sodium] Rash     itching    Sulfa (sulfonamide antibiotics) Swelling     Swelling and rash    Iodine and iodide containing products Rash    Penicillins Rash     Past Medical History:   Diagnosis Date    Anticoagulant long-term use     Cataract     CHF (congestive heart failure)     Coronary artery disease     Gout attack     Hypercholesteremia     Hypertension     Renal disorder     Vaginal delivery     x4     Past Surgical History:   Procedure Laterality Date    APPENDECTOMY      CARDIAC DEFIBRILLATOR PLACEMENT      2015    CATARACT EXTRACTION W/  INTRAOCULAR LENS IMPLANT Left 02/07/2019    Dr. Velazco    CATARACT EXTRACTION W/  INTRAOCULAR LENS IMPLANT Right 02/21/2019    Dr. Velazco    CHOLECYSTECTOMY      COLONOSCOPY W/ POLYPECTOMY  10 or 11/ 2014    per Dr. Myrick    defglennlator Left 8/2008    EYE SURGERY      HYSTERECTOMY      INTRAOCULAR PROSTHESES INSERTION Left  2/7/2019    Procedure: INSERTION, IOL PROSTHESIS;  Surgeon: Demetrius Velazco MD;  Location: [facility] OR;  Service: Ophthalmology;  Laterality: Left;  RN Phone Pre OP 1-30-19.  Arrival 05:30 AM    INTRAOCULAR PROSTHESES INSERTION Right 2/21/2019    Procedure: INSERTION, IOL PROSTHESIS;  Surgeon: Demetrius Velazco MD;  Location: [facility] OR;  Service: Ophthalmology;  Laterality: Right;    JOINT REPLACEMENT Bilateral 2005 & 2006    2 Knee Replacements=Lt. 1= 2005. Rt. 1= 2006    OOPHORECTOMY      PHACOEMULSIFICATION OF CATARACT Left 2/7/2019    Procedure: PHACOEMULSIFICATION, CATARACT;  Surgeon: Demetrius Velazco MD;  Location: [facility] OR;  Service: Ophthalmology;  Laterality: Left;    PHACOEMULSIFICATION OF CATARACT Right 2/21/2019    Procedure: PHACOEMULSIFICATION, CATARACT;  Surgeon: Demetrius Velazco MD;  Location: [facility] OR;  Service: Ophthalmology;  Laterality: Right;  RN Phone Pre op 2-15-19.  Arrival 05:30 AM    TOTAL KNEE ARTHROPLASTY Bilateral Lt.= 2005; Rt.=2006    Bilateral Knee scope     Family History   Problem Relation Age of Onset    Heart attack Mother 88    Hypertension Mother     Hyperlipidemia Mother     Diabetes Other     Hypertension Other     Amblyopia Son     Blindness Neg Hx     Cataracts Neg Hx     Glaucoma Neg Hx     Macular degeneration Neg Hx     Retinal detachment Neg Hx     Strabismus Neg Hx      Social History     Tobacco Use    Smoking status: Never Smoker    Smokeless tobacco: Never Used   Substance Use Topics    Alcohol use: Yes     Comment: rarely    Drug use: No     Review of Systems   Constitutional: Negative for fever.   HENT: Negative for sore throat.    Respiratory: Positive for shortness of breath.    Cardiovascular: Negative for chest pain.   Gastrointestinal: Negative for abdominal pain, nausea and vomiting.   Genitourinary: Negative for difficulty urinating and dysuria.   Musculoskeletal: Negative for back pain.   Skin: Negative for rash.    Neurological: Negative for headaches.   Psychiatric/Behavioral: Negative for confusion.       Physical Exam     Initial Vitals [10/10/20 1016]   BP Pulse Resp Temp SpO2   (!) 140/65 63 18 98.3 °F (36.8 °C) 97 %      MAP       --         Physical Exam    Nursing note and vitals reviewed.  Constitutional: She is not diaphoretic. No distress.   HENT:   Head: Normocephalic and atraumatic.   Mouth/Throat: Oropharynx is clear and moist.   Eyes: EOM are normal. Pupils are equal, round, and reactive to light. No scleral icterus.   Neck: Normal range of motion. Neck supple. No JVD present.   Cardiovascular: Normal rate, regular rhythm and intact distal pulses.   Pulmonary/Chest: No stridor. No respiratory distress. She has rales.   Rales to bilateral lung bases.    Abdominal: Soft. Bowel sounds are normal. She exhibits no distension. There is no abdominal tenderness.   Musculoskeletal: Normal range of motion. No tenderness.      Comments: Symmetrical lower extremity edema. No calf tenderness.    Neurological: She is alert. She has normal strength. No cranial nerve deficit or sensory deficit. GCS score is 15. GCS eye subscore is 4. GCS verbal subscore is 5. GCS motor subscore is 6.   Skin: Skin is warm and dry.   Psychiatric: She has a normal mood and affect.         ED Course   Procedures  Labs Reviewed   CBC W/ AUTO DIFFERENTIAL - Abnormal; Notable for the following components:       Result Value    RBC 3.52 (*)     Hemoglobin 10.9 (*)     Hematocrit 35.0 (*)     Mean Corpuscular Volume 99 (*)     Mean Corpuscular Hemoglobin Conc 31.1 (*)     Platelets 111 (*)     All other components within normal limits   COMPREHENSIVE METABOLIC PANEL - Abnormal; Notable for the following components:    BUN, Bld 46 (*)     Creatinine 1.8 (*)     eGFR if  29 (*)     eGFR if non  25 (*)     All other components within normal limits   TROPONIN I - Abnormal; Notable for the following components:    Troponin I  0.096 (*)     All other components within normal limits   B-TYPE NATRIURETIC PEPTIDE - Abnormal; Notable for the following components:    BNP 2,155 (*)     All other components within normal limits   PROTIME-INR     EKG Readings: (Independently Interpreted)   Initial Reading: No STEMI. Rhythm: Paced Rhythm. Heart Rate: 69.       Imaging Results          X-Ray Chest AP Portable (Final result)  Result time 10/10/20 11:38:45    Final result by Kaila Hernandes MD (10/10/20 11:38:45)                 Impression:      Findings suggestive of pulmonary interstitial edema.      Electronically signed by: Kaila Hrenandes MD  Date:    10/10/2020  Time:    11:38             Narrative:    EXAMINATION:  XR CHEST AP PORTABLE    CLINICAL HISTORY:  CHF;    TECHNIQUE:  Single frontal view of the chest was performed.    COMPARISON:  09/10/2020 and 08/08/2020    FINDINGS:  Support device: Left cardiac pacing device stable in appearance.    The lungs are symmetrically mildly hypoinflated with prominence of the pulmonary vasculature.  No mass, nodule, pneumothorax, airspace consolidation or pleural effusion.  Stable cardiomegaly.  The mediastinum, osseous and soft tissue structures are within normal limits.                                 Medical Decision Making:   History:   Old Medical Records: I decided to obtain old medical records.  Differential Diagnosis:   Includes but not limited to: CHF exacerbation. Medical noncompliance, electrolyte abnormality, renal failure, ACS    Independently Interpreted Test(s):   I have ordered and independently interpreted EKG Reading(s) - see prior notes  Clinical Tests:   Lab Tests: Ordered and Reviewed  Radiological Study: Ordered and Reviewed  Medical Tests: Ordered and Reviewed                   ED Course as of Oct 10 1756   Sat Oct 10, 2020   1259 Patient is afebrile and in no acute distress time history and physical.  EKG does not suggest STEMI.  She is not hypoxic, tachypneic or in  respiratory distress.  She does complain of orthopnea.  She has some rales on auscultation.  Labs with renal function consistent patient's baseline.  Patient does not have significant electrolyte abnormality.  Troponin is elevated but improved compared to prior.  BNP is significantly elevated.  On reassessment patient continues to saturate well on room air.  She reports feeling well.  She expresses some confusion about the medication she is supposed to be taking.  Patient's son reports he is unclear if she is taking her medications as prescribed.  Patient given dose of Lasix in the emergency department.  She does not appear to have impending respiratory failure.  She does not require supplemental oxygen.  She appears clinically stable for discharge to follow up with her cardiologist and primary physician.  Patient and son at the bedside counseled on supportive care, appropriate medication usage, concerning symptoms for which to return to ER and the importance of follow up. Understanding and agreement with treatment plan was expressed.     [SG]      ED Course User Index  [SG] Toña Estrada MD     This chart was completed using dictation software, as a result there may be some transcription errors.        Clinical Impression:     ICD-10-CM ICD-9-CM   1. Congestive heart failure, unspecified HF chronicity, unspecified heart failure type  I50.9 428.0   2. Shortness of breath  R06.02 786.05                      Disposition:   Disposition: Discharged  Condition: Stable     ED Disposition Condition    Discharge Stable        ED Prescriptions     None        Follow-up Information     Follow up With Specialties Details Why Contact Info    Qi Ramon MD Internal Medicine Schedule an appointment as soon as possible for a visit   [address] [place] [address]  [id number]  [place] [id number]  [phone]      Kelechi Richmond MD Cardiology Call  to verify what medications you should be taking [address]  [address]  [facility] [address]  [phone]                                         Toña Estrada MD  10/10/20 1858

## 2020-10-10 NOTE — ED NOTES
Pt rep SOB x several days, seen on 10/2 by cards, dc lasix, today pt arrives to ED SOB grad worsening.  PT AAOX4,breathing is slightly labored, denies NVD or problems w urination, SOB worsens when lying flat.

## 2020-10-14 ENCOUNTER — OFFICE VISIT (OUTPATIENT)
Dept: CARDIOLOGY | Facility: CLINIC | Age: 85
End: 2020-10-14
Payer: MEDICARE

## 2020-10-14 VITALS
BODY MASS INDEX: 29.84 KG/M2 | HEIGHT: 63 IN | SYSTOLIC BLOOD PRESSURE: 122 MMHG | OXYGEN SATURATION: 95 % | HEART RATE: 72 BPM | WEIGHT: 168.44 LBS | DIASTOLIC BLOOD PRESSURE: 70 MMHG

## 2020-10-14 DIAGNOSIS — R06.02 SOB (SHORTNESS OF BREATH): ICD-10-CM

## 2020-10-14 DIAGNOSIS — E78.2 MIXED HYPERLIPIDEMIA: Chronic | ICD-10-CM

## 2020-10-14 DIAGNOSIS — I10 ESSENTIAL HYPERTENSION: Chronic | ICD-10-CM

## 2020-10-14 DIAGNOSIS — I20.89 OTHER FORMS OF ANGINA PECTORIS: Primary | ICD-10-CM

## 2020-10-14 DIAGNOSIS — Z95.810 ICD (IMPLANTABLE CARDIOVERTER-DEFIBRILLATOR), BIVENTRICULAR, IN SITU: Chronic | ICD-10-CM

## 2020-10-14 DIAGNOSIS — I25.10 CORONARY ARTERY DISEASE INVOLVING NATIVE CORONARY ARTERY OF NATIVE HEART WITHOUT ANGINA PECTORIS: ICD-10-CM

## 2020-10-14 DIAGNOSIS — I50.23 ACUTE ON CHRONIC SYSTOLIC CONGESTIVE HEART FAILURE: ICD-10-CM

## 2020-10-14 DIAGNOSIS — I50.42 CHRONIC COMBINED SYSTOLIC AND DIASTOLIC HEART FAILURE: Chronic | ICD-10-CM

## 2020-10-14 PROCEDURE — 1159F PR MEDICATION LIST DOCUMENTED IN MEDICAL RECORD: ICD-10-PCS | Mod: S$GLB,,, | Performed by: INTERNAL MEDICINE

## 2020-10-14 PROCEDURE — 3078F DIAST BP <80 MM HG: CPT | Mod: CPTII,S$GLB,, | Performed by: INTERNAL MEDICINE

## 2020-10-14 PROCEDURE — 1101F PT FALLS ASSESS-DOCD LE1/YR: CPT | Mod: CPTII,S$GLB,, | Performed by: INTERNAL MEDICINE

## 2020-10-14 PROCEDURE — 1101F PR PT FALLS ASSESS DOC 0-1 FALLS W/OUT INJ PAST YR: ICD-10-PCS | Mod: CPTII,S$GLB,, | Performed by: INTERNAL MEDICINE

## 2020-10-14 PROCEDURE — 1126F AMNT PAIN NOTED NONE PRSNT: CPT | Mod: S$GLB,,, | Performed by: INTERNAL MEDICINE

## 2020-10-14 PROCEDURE — 1159F MED LIST DOCD IN RCRD: CPT | Mod: S$GLB,,, | Performed by: INTERNAL MEDICINE

## 2020-10-14 PROCEDURE — 99999 PR PBB SHADOW E&M-EST. PATIENT-LVL IV: ICD-10-PCS | Mod: PBBFAC,,, | Performed by: INTERNAL MEDICINE

## 2020-10-14 PROCEDURE — 3078F PR MOST RECENT DIASTOLIC BLOOD PRESSURE < 80 MM HG: ICD-10-PCS | Mod: CPTII,S$GLB,, | Performed by: INTERNAL MEDICINE

## 2020-10-14 PROCEDURE — 3074F PR MOST RECENT SYSTOLIC BLOOD PRESSURE < 130 MM HG: ICD-10-PCS | Mod: CPTII,S$GLB,, | Performed by: INTERNAL MEDICINE

## 2020-10-14 PROCEDURE — 3074F SYST BP LT 130 MM HG: CPT | Mod: CPTII,S$GLB,, | Performed by: INTERNAL MEDICINE

## 2020-10-14 PROCEDURE — 99999 PR PBB SHADOW E&M-EST. PATIENT-LVL IV: CPT | Mod: PBBFAC,,, | Performed by: INTERNAL MEDICINE

## 2020-10-14 PROCEDURE — 93284 PRGRMG EVAL IMPLANTABLE DFB: CPT | Mod: S$GLB,,, | Performed by: INTERNAL MEDICINE

## 2020-10-14 PROCEDURE — 99214 PR OFFICE/OUTPT VISIT, EST, LEVL IV, 30-39 MIN: ICD-10-PCS | Mod: S$GLB,,, | Performed by: INTERNAL MEDICINE

## 2020-10-14 PROCEDURE — 1126F PR PAIN SEVERITY QUANTIFIED, NO PAIN PRESENT: ICD-10-PCS | Mod: S$GLB,,, | Performed by: INTERNAL MEDICINE

## 2020-10-14 PROCEDURE — 93284 PR PROGRAM EVAL (IN PERSON) IMPLANT DEVICE,CARDVERT/DEFIB,MULT LEAD: ICD-10-PCS | Mod: S$GLB,,, | Performed by: INTERNAL MEDICINE

## 2020-10-14 PROCEDURE — 99214 OFFICE O/P EST MOD 30 MIN: CPT | Mod: S$GLB,,, | Performed by: INTERNAL MEDICINE

## 2020-10-14 NOTE — PROGRESS NOTES
Subjective:    Patient ID:  Palmira Malave is a 85 y.o. female who presents for follow-up of Hypertension      HPI     NICM, Medtronic BiV AICD, HTN, HLD    [employer] AICD check 10/14/20 - ok      Previously followed by Dr Batres  Previous history:  Here for follow-up of systolic heart failure and biventricular ICD interrogation.  She denies any worsening cardiopulmonary complaints since we last saw her.  She's had no issues with her device.  She is expressed no worsening cardiopulmonary complaints.  She denies any chest pain, shortness breath or palpitations.  She's not expressing PND, orthopnea or lower edema.  She denies any dizziness, presyncope or syncope.  Otherwise she's better usual state of health.  He says she has a daughter the moved into scan early onset dementia she's having to take care of.  She tries to stay active by going to interactions with a female group.  She is seen by [facility] and does get injections.  He wants clearance for rehabilitation.     Here for follow-up of systolic heart failure and previous biventricular ICD placement.  She had her device interrogated which shows no significant issues.  She had recent admission at the beginning of the month for mild volume overload.  She was diuresed overnight and discharged home the next day.  Her EF is stable.  She denies any PND, orthopnea or lower Veronica.  She has not experiencing dizziness, presyncope or syncope.  She on questioning says she is compliant with medicines and diet.          catheter 2011 reported nonobstructive CAD     Echo 9/11/20  · Severely decreased left ventricular systolic function. The estimated ejection fraction is 20%.  · Severe global hypokinetic wall motion.  · Mild eccentric left ventricular hypertrophy.  · Mild left ventricular enlargement.  · Grade I (mild) left ventricular diastolic dysfunction consistent with impaired relaxation.  · Normal right ventricular systolic function.  · Indeterminate central  venous pressure. Estimated PA systolic pressure is at least 13 mmHg.     Carotid ultrasound:  7-18  CONCLUSIONS   There is 40 - 49% right Internal Carotid stenosis.  There is 20 - 39% left Internal Carotid stenosis.     Went to the ER 9/11/19  Palmira Malave is a 84 y.o. female with CAD, HTN, hypercholesteremia, and renal disorder who presents to the ED complaining of urinary frequency and burning sensation when urinating onset yesterday. Pt is allergic to aspirin, colace, sulfa, iodine, and penicillin with rash and itching as a reaction. Pt's brother is in the hospital because he had a heart attack. Denies DM. Denies fever, chill, nausea, vomiting, abdominal pain, CP, SOB, or headache.     9/18/19 Denies recent CP or SOB  EKG A-sensed V-paced     Admitted 6/9/20  Palmira Malave 84 y.o. female with CHF last EF 20% in 3/2020, HTN, CKD, GERD, AICD, and gout in place presents to the hospital with a chief complaint of SOB. She reports last night she became SOB that made it difficult to ambulate and sleep. She normally has to sleep in her recliner, but was unable to sleep despite sitting upright. She recently started a steroid pack for a Gout flare. She has completed all steroids but the last day, and her gout symptoms have resolved. Her breathing has improved with lasix in the ED, but not returned to baseline. She is compliant with home lasix, fluid restriction, and salt restriction. She denies fever, chest pain, N/V, abdominal pain, syncope, dysuria, melena, hematuria. She has noticed minimal lower extremity edema.      In the ED, troponin 0.141, BNP 2700, COVID negative, chest x-ray with pulmonary congestion, Cr of 1.6.      Hospital Course:   Palmira Malave 84 y.o. female placed in observation for acute on chronic systolic diastolic heart failure. Per chart review, patient with history of non obstructive CAD per [facility] 2011, NICM with biAICD 2014.  6/10, patient reported breathing back to baseline after IV  "lasix in ED. Good UOP "a lot" but not documented in epic. Denies chest pain and ready for discharge. Elevated troponin trend flat pattern due to demand ischemia.  Discussed with patient obtaining a new scale (scale broke x 1 week), low salt diet, and fluid intake 1 L a day. CKD stable. Continue home lasix and coreg (not on ACEI/ARB.  Patient stable for discharge home with close follow up Dr. Richmond.      6/22/20 Recently Dx with colon CA. Less SOB since discharge  Denies CP  Cleared for colon surgery at moderate cardiac risk  OK to hold plavix 7 days before  OV 3 months with [employer] AICD check     Admitted 9/10/20  Palmira Malave 85 y.o. female PMHX: CAD,hypercholesteremia,CKD,hypertension and Combined systolic/diastolic HF.  Presents complaining of shortness of breath.  She reports acute onset of shortness of breath around 5:00 p.m. on 09/10/2020. She endorses chronic orthopnea, with nightly 2 pillows usage,similar symptoms with previous CHF exacerbation and compliance with medication treatment regimen. Additionally endorses followed by Dr. Richmond(Cardiology).She denies recent illness,URI symptoms,fever/chills,N/V/D, CP/palpitations,abd pain,dysuria or any other associated symptoms. Also denies  ETOH/Smoking/Illicit drug use. Placed in observation to hospital medicine for further evaluation and treatment.     85 y.o. AAF, very pleasant with known CHF presents with progressively worsening SOB and found to be in FVO. Repeat 2D echo showed findings consistent with severely depressed LVEF 20% + GiDD and LV hypertrophy. BNP >2400; Has chronic troponin emia, however, presenting trops = 0.088 peaked at 0.115 --Cards consulted. She was started on IV diuretics, responded well to treatment reports relief of activity intolerance, Room air sat 96%, and vitals stable with the addition of new med isosb-hydrala. BP stable, coreg adjusted down for bp accomodatation new med.      10/2/20 Feels better since discharge. Stopped " bidil - have her HA and made her feel bad - went back to coreg 25 bid   Continue Rx for CAD, HTN, CHF, HLD  OV 2 weeks with [employer] AICD check    10/14/20 Breathing stable. Denies CP    Review of Systems   Constitution: Negative for decreased appetite.   HENT: Negative for ear discharge.    Eyes: Negative for blurred vision.   Respiratory: Negative for hemoptysis.    Endocrine: Negative for polyphagia.   Hematologic/Lymphatic: Negative for adenopathy.   Skin: Negative for color change.   Musculoskeletal: Negative for joint swelling.   Genitourinary: Negative for bladder incontinence.   Neurological: Negative for brief paralysis.   Psychiatric/Behavioral: Negative for hallucinations.   Allergic/Immunologic: Negative for hives.        Objective:    Physical Exam   Constitutional: She is oriented to person, place, and time. She appears well-developed and well-nourished.   HENT:   Head: Normocephalic and atraumatic.   Eyes: Pupils are equal, round, and reactive to light. Conjunctivae and EOM are normal.   Neck: Normal range of motion. Neck supple. No thyromegaly present.   Cardiovascular: Normal rate and regular rhythm.   No murmur heard.  Pulmonary/Chest: Effort normal and breath sounds normal. No respiratory distress.   Abdominal: Soft. Bowel sounds are normal.   Musculoskeletal:         General: No edema.   Neurological: She is alert and oriented to person, place, and time.   Skin: Skin is warm and dry.   Psychiatric: She has a normal mood and affect. Her behavior is normal.         Assessment:       1. Other forms of angina pectoris    2. SOB (shortness of breath)    3. Essential hypertension    4. Mixed hyperlipidemia    5. Biventricular AICD in place    6. Chronic combined systolic and diastolic heart failure    7. Coronary artery disease involving native coronary artery of native heart without angina pectoris    8. Acute on chronic systolic congestive heart failure         Plan:       Continue Rx for CHF, HTN,  HLD, CAD  OV 3 months with BNP, BMP

## 2020-10-20 ENCOUNTER — HOSPITAL ENCOUNTER (OUTPATIENT)
Dept: RADIOLOGY | Facility: HOSPITAL | Age: 85
Discharge: HOME | End: 2020-10-20
Attending: INTERNAL MEDICINE
Payer: MEDICARE

## 2020-10-20 VITALS — BODY MASS INDEX: 29.77 KG/M2 | WEIGHT: 168 LBS | HEIGHT: 63 IN

## 2020-10-20 DIAGNOSIS — Z12.31 ENCOUNTER FOR SCREENING MAMMOGRAM FOR BREAST CANCER: ICD-10-CM

## 2020-10-20 PROCEDURE — 77067 MAMMO DIGITAL SCREENING BILAT: ICD-10-PCS | Mod: 26,,, | Performed by: RADIOLOGY

## 2020-10-20 PROCEDURE — 77067 SCR MAMMO BI INCL CAD: CPT | Mod: TC

## 2020-10-20 PROCEDURE — 77067 SCR MAMMO BI INCL CAD: CPT | Mod: 26,,, | Performed by: RADIOLOGY

## 2020-11-03 ENCOUNTER — OFFICE VISIT (OUTPATIENT)
Dept: CARDIOLOGY | Facility: CLINIC | Age: 85
End: 2020-11-03
Payer: MEDICARE

## 2020-11-03 ENCOUNTER — NURSE TRIAGE (OUTPATIENT)
Dept: ADMINISTRATIVE | Facility: CLINIC | Age: 85
End: 2020-11-03

## 2020-11-03 VITALS
WEIGHT: 168 LBS | HEART RATE: 72 BPM | HEIGHT: 63 IN | SYSTOLIC BLOOD PRESSURE: 142 MMHG | OXYGEN SATURATION: 97 % | BODY MASS INDEX: 29.77 KG/M2 | DIASTOLIC BLOOD PRESSURE: 70 MMHG

## 2020-11-03 DIAGNOSIS — E78.2 MIXED HYPERLIPIDEMIA: Chronic | ICD-10-CM

## 2020-11-03 DIAGNOSIS — R06.02 SOB (SHORTNESS OF BREATH): ICD-10-CM

## 2020-11-03 DIAGNOSIS — I20.89 OTHER FORMS OF ANGINA PECTORIS: ICD-10-CM

## 2020-11-03 DIAGNOSIS — Z95.810 ICD (IMPLANTABLE CARDIOVERTER-DEFIBRILLATOR), BIVENTRICULAR, IN SITU: Primary | Chronic | ICD-10-CM

## 2020-11-03 DIAGNOSIS — I50.42 CHRONIC COMBINED SYSTOLIC AND DIASTOLIC HEART FAILURE: Chronic | ICD-10-CM

## 2020-11-03 DIAGNOSIS — I10 ESSENTIAL HYPERTENSION: Chronic | ICD-10-CM

## 2020-11-03 DIAGNOSIS — I25.10 CORONARY ARTERY DISEASE INVOLVING NATIVE CORONARY ARTERY OF NATIVE HEART WITHOUT ANGINA PECTORIS: ICD-10-CM

## 2020-11-03 PROCEDURE — 1159F MED LIST DOCD IN RCRD: CPT | Mod: S$GLB,,, | Performed by: INTERNAL MEDICINE

## 2020-11-03 PROCEDURE — 1159F PR MEDICATION LIST DOCUMENTED IN MEDICAL RECORD: ICD-10-PCS | Mod: S$GLB,,, | Performed by: INTERNAL MEDICINE

## 2020-11-03 PROCEDURE — 1126F PR PAIN SEVERITY QUANTIFIED, NO PAIN PRESENT: ICD-10-PCS | Mod: S$GLB,,, | Performed by: INTERNAL MEDICINE

## 2020-11-03 PROCEDURE — 99214 OFFICE O/P EST MOD 30 MIN: CPT | Mod: S$GLB,,, | Performed by: INTERNAL MEDICINE

## 2020-11-03 PROCEDURE — 99214 PR OFFICE/OUTPT VISIT, EST, LEVL IV, 30-39 MIN: ICD-10-PCS | Mod: S$GLB,,, | Performed by: INTERNAL MEDICINE

## 2020-11-03 PROCEDURE — 1126F AMNT PAIN NOTED NONE PRSNT: CPT | Mod: S$GLB,,, | Performed by: INTERNAL MEDICINE

## 2020-11-03 PROCEDURE — 3078F DIAST BP <80 MM HG: CPT | Mod: CPTII,S$GLB,, | Performed by: INTERNAL MEDICINE

## 2020-11-03 PROCEDURE — 3078F PR MOST RECENT DIASTOLIC BLOOD PRESSURE < 80 MM HG: ICD-10-PCS | Mod: CPTII,S$GLB,, | Performed by: INTERNAL MEDICINE

## 2020-11-03 PROCEDURE — 99999 PR PBB SHADOW E&M-EST. PATIENT-LVL IV: ICD-10-PCS | Mod: PBBFAC,,, | Performed by: INTERNAL MEDICINE

## 2020-11-03 PROCEDURE — 1101F PT FALLS ASSESS-DOCD LE1/YR: CPT | Mod: CPTII,S$GLB,, | Performed by: INTERNAL MEDICINE

## 2020-11-03 PROCEDURE — 3077F PR MOST RECENT SYSTOLIC BLOOD PRESSURE >= 140 MM HG: ICD-10-PCS | Mod: CPTII,S$GLB,, | Performed by: INTERNAL MEDICINE

## 2020-11-03 PROCEDURE — 99999 PR PBB SHADOW E&M-EST. PATIENT-LVL IV: CPT | Mod: PBBFAC,,, | Performed by: INTERNAL MEDICINE

## 2020-11-03 PROCEDURE — 1101F PR PT FALLS ASSESS DOC 0-1 FALLS W/OUT INJ PAST YR: ICD-10-PCS | Mod: CPTII,S$GLB,, | Performed by: INTERNAL MEDICINE

## 2020-11-03 PROCEDURE — 3077F SYST BP >= 140 MM HG: CPT | Mod: CPTII,S$GLB,, | Performed by: INTERNAL MEDICINE

## 2020-11-03 RX ORDER — FUROSEMIDE 40 MG/1
40 TABLET ORAL 3 TIMES DAILY
Qty: 90 TABLET | Refills: 11 | Status: SHIPPED | OUTPATIENT
Start: 2020-11-03 | End: 2021-03-08 | Stop reason: SDUPTHER

## 2020-11-03 RX ORDER — FUROSEMIDE 40 MG/1
40 TABLET ORAL 3 TIMES DAILY
Qty: 90 TABLET | Refills: 11 | Status: SHIPPED | OUTPATIENT
Start: 2020-11-03 | End: 2020-11-03 | Stop reason: SDUPTHER

## 2020-11-03 NOTE — TELEPHONE ENCOUNTER
"Patient does not have an [facility] PCP.  She sees cardiology with [facility].  She is having some mild sob, even at rest, that began a few days ago.  She has taken an extra lasix and it has improved today.  She weighs herself regularly, and she has not had any weight gain.  She is unable to sleep lying down, is having to sleep in her recliner.  Would like to see Dr. Richmond.  appt given for today.  Reason for Disposition   MILD difficulty breathing (e.g., minimal/no SOB at rest, SOB with walking, pulse < 100) of new onset or worse than normal    Additional Information   Negative: Breathing stopped and hasn't returned   Negative: Choking on something   Negative: SEVERE difficulty breathing (e.g., struggling for each breath, speaks in single words, pulse > 120)   Negative: Bluish (or gray) lips or face   Negative: Difficult to awaken or acting confused (e.g., disoriented, slurred speech)   Negative: Passed out (i.e., fainted, collapsed and was not responding)   Negative: Wheezing started suddenly after medicine, an allergic food, or bee sting   Negative: Stridor   Negative: Slow, shallow and weak breathing   Negative: Sounds like a life-threatening emergency to the triager   Negative: Chest pain   Negative: Wheezing (high pitched whistling sound) and previous asthma attacks or use of asthma medicines   Negative: Difficulty breathing and only present when coughing   Negative: Difficulty breathing and only from stuffy or runny nose   Negative: Difficulty breathing and within 14 days of COVID-19 Exposure   Negative: MODERATE difficulty breathing (e.g., speaks in phrases, SOB even at rest, pulse 100-120) of new onset or worse than normal   Negative: Wheezing can be heard across the room   Negative: Drooling or spitting out saliva (because can't swallow)   Negative: Any history of prior "blood clot" in leg or lungs   Negative: Illness requiring prolonged bedrest in past month (e.g., immobilization, long " "hospital stay)   Negative: Hip or leg fracture (broken bone) in past month (or had cast on leg or ankle in past month)   Negative: Major surgery in the past month   Negative: Long-distance travel in past month (e.g., car, bus, train, plane; with trip lasting 6 or more hours)   Negative: Extra heart beats OR irregular heart beating   (i.e., "palpitations")   Negative: Fever > 103 F (39.4 C)   Negative: Fever > 101 F (38.3 C) and over 60 years of age   Negative: Fever > 100.0 F (37.8 C) and bedridden (e.g., nursing home patient, stroke, chronic illness, recovering from surgery)   Negative: Fever > 100.0 F (37.8 C) and diabetes mellitus or weak immune system (e.g., HIV positive, cancer chemo, splenectomy, organ transplant, chronic steroids)   Negative: Periods where breathing stops and then resumes normally and bedridden (e.g., nursing home patient, CVA)   Negative: Pregnant or postpartum (from 0 to 6 weeks after delivery)   Negative: Patient sounds very sick or weak to the triager    Protocols used: BREATHING DIFFICULTY-A-OH    "

## 2020-11-09 ENCOUNTER — LAB VISIT (OUTPATIENT)
Dept: LAB | Facility: HOSPITAL | Age: 85
End: 2020-11-09
Attending: INTERNAL MEDICINE
Payer: MEDICARE

## 2020-11-09 DIAGNOSIS — R06.00 DYSPNEA: ICD-10-CM

## 2020-11-09 DIAGNOSIS — E79.0 URICACIDEMIA: ICD-10-CM

## 2020-11-09 DIAGNOSIS — D64.9 ANEMIA, UNSPECIFIED: ICD-10-CM

## 2020-11-09 DIAGNOSIS — N18.30 CHRONIC KIDNEY DISEASE, STAGE III (MODERATE): Primary | ICD-10-CM

## 2020-11-09 DIAGNOSIS — I10 ESSENTIAL HYPERTENSION, MALIGNANT: ICD-10-CM

## 2020-11-09 DIAGNOSIS — I20.89 OTHER FORMS OF ANGINA PECTORIS: ICD-10-CM

## 2020-11-09 DIAGNOSIS — I10 ESSENTIAL HYPERTENSION: Chronic | ICD-10-CM

## 2020-11-09 DIAGNOSIS — R06.02 SOB (SHORTNESS OF BREATH): ICD-10-CM

## 2020-11-09 DIAGNOSIS — E55.9 AVITAMINOSIS D: ICD-10-CM

## 2020-11-09 LAB
ANION GAP SERPL CALC-SCNC: 11 MMOL/L (ref 8–16)
BNP SERPL-MCNC: 1558 PG/ML (ref 0–99)
BUN SERPL-MCNC: 37 MG/DL (ref 8–23)
CALCIUM SERPL-MCNC: 10 MG/DL (ref 8.7–10.5)
CHLORIDE SERPL-SCNC: 102 MMOL/L (ref 95–110)
CO2 SERPL-SCNC: 29 MMOL/L (ref 23–29)
CREAT SERPL-MCNC: 1.8 MG/DL (ref 0.5–1.4)
EST. GFR  (AFRICAN AMERICAN): 29 ML/MIN/1.73 M^2
EST. GFR  (NON AFRICAN AMERICAN): 25 ML/MIN/1.73 M^2
GLUCOSE SERPL-MCNC: 105 MG/DL (ref 70–110)
POTASSIUM SERPL-SCNC: 3.9 MMOL/L (ref 3.5–5.1)
SODIUM SERPL-SCNC: 142 MMOL/L (ref 136–145)

## 2020-11-09 PROCEDURE — 85025 COMPLETE CBC W/AUTO DIFF WBC: CPT

## 2020-11-09 PROCEDURE — 36415 COLL VENOUS BLD VENIPUNCTURE: CPT

## 2020-11-09 PROCEDURE — 80048 BASIC METABOLIC PNL TOTAL CA: CPT

## 2020-11-09 PROCEDURE — 84443 ASSAY THYROID STIM HORMONE: CPT

## 2020-11-09 PROCEDURE — 83880 ASSAY OF NATRIURETIC PEPTIDE: CPT

## 2020-11-09 PROCEDURE — 80053 COMPREHEN METABOLIC PANEL: CPT

## 2020-11-09 PROCEDURE — 82306 VITAMIN D 25 HYDROXY: CPT

## 2020-11-09 PROCEDURE — 81003 URINALYSIS AUTO W/O SCOPE: CPT

## 2020-11-09 PROCEDURE — 84550 ASSAY OF BLOOD/URIC ACID: CPT

## 2020-11-09 PROCEDURE — 80061 LIPID PANEL: CPT

## 2020-11-10 LAB
ALBUMIN SERPL BCP-MCNC: 4.2 G/DL (ref 3.5–5.2)
ALP SERPL-CCNC: 73 U/L (ref 55–135)
ALT SERPL W/O P-5'-P-CCNC: 12 U/L (ref 10–44)
ANION GAP SERPL CALC-SCNC: 11 MMOL/L (ref 8–16)
AST SERPL-CCNC: 25 U/L (ref 10–40)
BASOPHILS # BLD AUTO: 0.03 K/UL (ref 0–0.2)
BASOPHILS NFR BLD: 0.6 % (ref 0–1.9)
BILIRUB SERPL-MCNC: 0.6 MG/DL (ref 0.1–1)
BILIRUB UR QL STRIP: NEGATIVE
BUN SERPL-MCNC: 35 MG/DL (ref 8–23)
CALCIUM SERPL-MCNC: 10.5 MG/DL (ref 8.7–10.5)
CHLORIDE SERPL-SCNC: 103 MMOL/L (ref 95–110)
CHOLEST SERPL-MCNC: 165 MG/DL (ref 120–199)
CHOLEST/HDLC SERPL: 2.3 {RATIO} (ref 2–5)
CLARITY UR: CLEAR
CO2 SERPL-SCNC: 29 MMOL/L (ref 23–29)
COLOR UR: YELLOW
CREAT SERPL-MCNC: 1.8 MG/DL (ref 0.5–1.4)
DIFFERENTIAL METHOD: ABNORMAL
EOSINOPHIL # BLD AUTO: 0.1 K/UL (ref 0–0.5)
EOSINOPHIL NFR BLD: 2.3 % (ref 0–8)
ERYTHROCYTE [DISTWIDTH] IN BLOOD BY AUTOMATED COUNT: 14.4 % (ref 11.5–14.5)
EST. GFR  (AFRICAN AMERICAN): 29 ML/MIN/1.73 M^2
EST. GFR  (NON AFRICAN AMERICAN): 25 ML/MIN/1.73 M^2
GLUCOSE SERPL-MCNC: 93 MG/DL (ref 70–110)
GLUCOSE UR QL STRIP: NEGATIVE
HCT VFR BLD AUTO: 36.8 % (ref 37–48.5)
HDLC SERPL-MCNC: 71 MG/DL (ref 40–75)
HDLC SERPL: 43 % (ref 20–50)
HGB BLD-MCNC: 11.5 G/DL (ref 12–16)
HGB UR QL STRIP: NEGATIVE
IMM GRANULOCYTES # BLD AUTO: 0.03 K/UL (ref 0–0.04)
IMM GRANULOCYTES NFR BLD AUTO: 0.6 % (ref 0–0.5)
KETONES UR QL STRIP: NEGATIVE
LDLC SERPL CALC-MCNC: 67.6 MG/DL (ref 63–159)
LEUKOCYTE ESTERASE UR QL STRIP: NEGATIVE
LYMPHOCYTES # BLD AUTO: 0.9 K/UL (ref 1–4.8)
LYMPHOCYTES NFR BLD: 17.3 % (ref 18–48)
MCH RBC QN AUTO: 30.4 PG (ref 27–31)
MCHC RBC AUTO-ENTMCNC: 31.3 G/DL (ref 32–36)
MCV RBC AUTO: 97 FL (ref 82–98)
MONOCYTES # BLD AUTO: 0.5 K/UL (ref 0.3–1)
MONOCYTES NFR BLD: 9.5 % (ref 4–15)
NEUTROPHILS # BLD AUTO: 3.7 K/UL (ref 1.8–7.7)
NEUTROPHILS NFR BLD: 69.7 % (ref 38–73)
NITRITE UR QL STRIP: NEGATIVE
NONHDLC SERPL-MCNC: 94 MG/DL
NRBC BLD-RTO: 0 /100 WBC
PH UR STRIP: 5 [PH] (ref 5–8)
PLATELET # BLD AUTO: 108 K/UL (ref 150–350)
PMV BLD AUTO: 10.1 FL (ref 9.2–12.9)
POTASSIUM SERPL-SCNC: 4 MMOL/L (ref 3.5–5.1)
PROT SERPL-MCNC: 7.6 G/DL (ref 6–8.4)
PROT UR QL STRIP: NEGATIVE
RBC # BLD AUTO: 3.78 M/UL (ref 4–5.4)
SODIUM SERPL-SCNC: 143 MMOL/L (ref 136–145)
SP GR UR STRIP: 1.01 (ref 1–1.03)
TRIGL SERPL-MCNC: 132 MG/DL (ref 30–150)
TSH SERPL DL<=0.005 MIU/L-ACNC: 1.89 UIU/ML (ref 0.4–4)
URATE SERPL-MCNC: 7.8 MG/DL (ref 2.4–5.7)
URN SPEC COLLECT METH UR: NORMAL
UROBILINOGEN UR STRIP-ACNC: NEGATIVE EU/DL
WBC # BLD AUTO: 5.26 K/UL (ref 3.9–12.7)

## 2020-11-11 LAB — 25(OH)D3+25(OH)D2 SERPL-MCNC: 35 NG/ML (ref 30–96)

## 2020-11-19 ENCOUNTER — OFFICE VISIT (OUTPATIENT)
Dept: CARDIOLOGY | Facility: CLINIC | Age: 85
End: 2020-11-19
Payer: MEDICARE

## 2020-11-19 VITALS
HEIGHT: 63 IN | DIASTOLIC BLOOD PRESSURE: 80 MMHG | HEART RATE: 74 BPM | BODY MASS INDEX: 30.16 KG/M2 | SYSTOLIC BLOOD PRESSURE: 130 MMHG | OXYGEN SATURATION: 95 % | WEIGHT: 170.19 LBS

## 2020-11-19 DIAGNOSIS — I50.23 ACUTE ON CHRONIC SYSTOLIC CONGESTIVE HEART FAILURE: ICD-10-CM

## 2020-11-19 DIAGNOSIS — Z95.810 ICD (IMPLANTABLE CARDIOVERTER-DEFIBRILLATOR), BIVENTRICULAR, IN SITU: Primary | Chronic | ICD-10-CM

## 2020-11-19 DIAGNOSIS — I25.10 CORONARY ARTERY DISEASE INVOLVING NATIVE CORONARY ARTERY OF NATIVE HEART WITHOUT ANGINA PECTORIS: ICD-10-CM

## 2020-11-19 DIAGNOSIS — R06.02 SOB (SHORTNESS OF BREATH): ICD-10-CM

## 2020-11-19 PROCEDURE — 3079F PR MOST RECENT DIASTOLIC BLOOD PRESSURE 80-89 MM HG: ICD-10-PCS | Mod: CPTII,S$GLB,, | Performed by: INTERNAL MEDICINE

## 2020-11-19 PROCEDURE — 99214 PR OFFICE/OUTPT VISIT, EST, LEVL IV, 30-39 MIN: ICD-10-PCS | Mod: S$GLB,,, | Performed by: INTERNAL MEDICINE

## 2020-11-19 PROCEDURE — 3075F SYST BP GE 130 - 139MM HG: CPT | Mod: CPTII,S$GLB,, | Performed by: INTERNAL MEDICINE

## 2020-11-19 PROCEDURE — 3075F PR MOST RECENT SYSTOLIC BLOOD PRESS GE 130-139MM HG: ICD-10-PCS | Mod: CPTII,S$GLB,, | Performed by: INTERNAL MEDICINE

## 2020-11-19 PROCEDURE — 99999 PR PBB SHADOW E&M-EST. PATIENT-LVL IV: CPT | Mod: PBBFAC,,, | Performed by: INTERNAL MEDICINE

## 2020-11-19 PROCEDURE — 99214 OFFICE O/P EST MOD 30 MIN: CPT | Mod: S$GLB,,, | Performed by: INTERNAL MEDICINE

## 2020-11-19 PROCEDURE — 3288F FALL RISK ASSESSMENT DOCD: CPT | Mod: CPTII,S$GLB,, | Performed by: INTERNAL MEDICINE

## 2020-11-19 PROCEDURE — 1126F PR PAIN SEVERITY QUANTIFIED, NO PAIN PRESENT: ICD-10-PCS | Mod: S$GLB,,, | Performed by: INTERNAL MEDICINE

## 2020-11-19 PROCEDURE — 1101F PT FALLS ASSESS-DOCD LE1/YR: CPT | Mod: CPTII,S$GLB,, | Performed by: INTERNAL MEDICINE

## 2020-11-19 PROCEDURE — 3079F DIAST BP 80-89 MM HG: CPT | Mod: CPTII,S$GLB,, | Performed by: INTERNAL MEDICINE

## 2020-11-19 PROCEDURE — 3288F PR FALLS RISK ASSESSMENT DOCUMENTED: ICD-10-PCS | Mod: CPTII,S$GLB,, | Performed by: INTERNAL MEDICINE

## 2020-11-19 PROCEDURE — 1159F MED LIST DOCD IN RCRD: CPT | Mod: S$GLB,,, | Performed by: INTERNAL MEDICINE

## 2020-11-19 PROCEDURE — 1159F PR MEDICATION LIST DOCUMENTED IN MEDICAL RECORD: ICD-10-PCS | Mod: S$GLB,,, | Performed by: INTERNAL MEDICINE

## 2020-11-19 PROCEDURE — 1101F PR PT FALLS ASSESS DOC 0-1 FALLS W/OUT INJ PAST YR: ICD-10-PCS | Mod: CPTII,S$GLB,, | Performed by: INTERNAL MEDICINE

## 2020-11-19 PROCEDURE — 99999 PR PBB SHADOW E&M-EST. PATIENT-LVL IV: ICD-10-PCS | Mod: PBBFAC,,, | Performed by: INTERNAL MEDICINE

## 2020-11-19 PROCEDURE — 1126F AMNT PAIN NOTED NONE PRSNT: CPT | Mod: S$GLB,,, | Performed by: INTERNAL MEDICINE

## 2020-11-19 NOTE — PROGRESS NOTES
Subjective:    Patient ID:  Palmira Malave is a 85 y.o. female who presents for follow-up of Hypertension      HPI     NICM, [employer] BiV AICD, HTN, HLD     [employer] AICD check 10/14/20 - ok      Previously followed by Dr Batres  Previous history:  Here for follow-up of systolic heart failure and biventricular ICD interrogation.  She denies any worsening cardiopulmonary complaints since we last saw her.  She's had no issues with her device.  She is expressed no worsening cardiopulmonary complaints.  She denies any chest pain, shortness breath or palpitations.  She's not expressing PND, orthopnea or lower edema.  She denies any dizziness, presyncope or syncope.  Otherwise she's better usual state of health.  He says she has a daughter the moved into scan early onset dementia she's having to take care of.  She tries to stay active by going to interactions with a female group.  She is seen by [facility] and does get injections.  He wants clearance for rehabilitation.     Here for follow-up of systolic heart failure and previous biventricular ICD placement.  She had her device interrogated which shows no significant issues.  She had recent admission at the beginning of the month for mild volume overload.  She was diuresed overnight and discharged home the next day.  Her EF is stable.  She denies any PND, orthopnea or lower Veronica.  She has not experiencing dizziness, presyncope or syncope.  She on questioning says she is compliant with medicines and diet.          catheter 2011 reported nonobstructive CAD     Echo 9/11/20  · Severely decreased left ventricular systolic function. The estimated ejection fraction is 20%.  · Severe global hypokinetic wall motion.  · Mild eccentric left ventricular hypertrophy.  · Mild left ventricular enlargement.  · Grade I (mild) left ventricular diastolic dysfunction consistent with impaired relaxation.  · Normal right ventricular systolic function.  · Indeterminate central  venous pressure. Estimated PA systolic pressure is at least 13 mmHg.     Carotid ultrasound:  7-18  CONCLUSIONS   There is 40 - 49% right Internal Carotid stenosis.  There is 20 - 39% left Internal Carotid stenosis.     Went to the ER 9/11/19  Palmira Malave is a 84 y.o. female with CAD, HTN, hypercholesteremia, and renal disorder who presents to the ED complaining of urinary frequency and burning sensation when urinating onset yesterday. Pt is allergic to aspirin, colace, sulfa, iodine, and penicillin with rash and itching as a reaction. Pt's brother is in the hospital because he had a heart attack. Denies DM. Denies fever, chill, nausea, vomiting, abdominal pain, CP, SOB, or headache.     9/18/19 Denies recent CP or SOB  EKG A-sensed V-paced     Admitted 6/9/20  Palmira Malave 84 y.o. female with CHF last EF 20% in 3/2020, HTN, CKD, GERD, AICD, and gout in place presents to the hospital with a chief complaint of SOB. She reports last night she became SOB that made it difficult to ambulate and sleep. She normally has to sleep in her recliner, but was unable to sleep despite sitting upright. She recently started a steroid pack for a Gout flare. She has completed all steroids but the last day, and her gout symptoms have resolved. Her breathing has improved with lasix in the ED, but not returned to baseline. She is compliant with home lasix, fluid restriction, and salt restriction. She denies fever, chest pain, N/V, abdominal pain, syncope, dysuria, melena, hematuria. She has noticed minimal lower extremity edema.      In the ED, troponin 0.141, BNP 2700, COVID negative, chest x-ray with pulmonary congestion, Cr of 1.6.      Hospital Course:   Palmira Malave 84 y.o. female placed in observation for acute on chronic systolic diastolic heart failure. Per chart review, patient with history of non obstructive CAD per [facility] 2011, NICM with biAICD 2014.  6/10, patient reported breathing back to baseline after IV  "lasix in ED. Good UOP "a lot" but not documented in epic. Denies chest pain and ready for discharge. Elevated troponin trend flat pattern due to demand ischemia.  Discussed with patient obtaining a new scale (scale broke x 1 week), low salt diet, and fluid intake 1 L a day. CKD stable. Continue home lasix and coreg (not on ACEI/ARB.  Patient stable for discharge home with close follow up Dr. Richmond.      6/22/20 Recently Dx with colon CA. Less SOB since discharge  Denies CP  Cleared for colon surgery at moderate cardiac risk  OK to hold plavix 7 days before  OV 3 months with [employer] AICD check     Admitted 9/10/20  Palmira Malave 85 y.o. female PMHX: CAD,hypercholesteremia,CKD,hypertension and Combined systolic/diastolic HF.  Presents complaining of shortness of breath.  She reports acute onset of shortness of breath around 5:00 p.m. on 09/10/2020. She endorses chronic orthopnea, with nightly 2 pillows usage,similar symptoms with previous CHF exacerbation and compliance with medication treatment regimen. Additionally endorses followed by Dr. Richmond(Cardiology).She denies recent illness,URI symptoms,fever/chills,N/V/D, CP/palpitations,abd pain,dysuria or any other associated symptoms. Also denies  ETOH/Smoking/Illicit drug use. Placed in observation to hospital medicine for further evaluation and treatment.     85 y.o. AAF, very pleasant with known CHF presents with progressively worsening SOB and found to be in FVO. Repeat 2D echo showed findings consistent with severely depressed LVEF 20% + GiDD and LV hypertrophy. BNP >2400; Has chronic troponin emia, however, presenting trops = 0.088 peaked at 0.115 --Cards consulted. She was started on IV diuretics, responded well to treatment reports relief of activity intolerance, Room air sat 96%, and vitals stable with the addition of new med isosb-hydrala. BP stable, coreg adjusted down for bp accomodatation new med.      10/2/20 Feels better since discharge. Stopped " bidil - have her HA and made her feel bad - went back to coreg 25 bid   Continue Rx for CAD, HTN, CHF, HLD  OV 2 weeks with [employer] AICD check     10/14/20 Breathing stable. Denies CP   Continue Rx for CHF, HTN, HLD, CAD  OV 3 months with BNP, BMP      11/3/20 Back early for worsening SOB. Denies CP  Unclear if she takes lasix as prescribed  Increase lasix 80 AM and 40 PM  OV 2 weeks with BNP, BMP  Needs to go to the ER for worsening symptoms    Labs 11/9/20  K 4.0  Cr 1.8  BNP 1558 down from 2155    11/19/20 Less SOB. Denies CP    Review of Systems   Constitution: Negative for decreased appetite.   HENT: Negative for ear discharge.    Eyes: Negative for blurred vision.   Respiratory: Negative for hemoptysis.    Endocrine: Negative for polyphagia.   Hematologic/Lymphatic: Negative for adenopathy.   Skin: Negative for color change.   Musculoskeletal: Negative for joint swelling.   Genitourinary: Negative for bladder incontinence.   Neurological: Negative for brief paralysis.   Psychiatric/Behavioral: Negative for hallucinations.   Allergic/Immunologic: Negative for hives.        Objective:    Physical Exam   Constitutional: She is oriented to person, place, and time. She appears well-developed and well-nourished.   HENT:   Head: Normocephalic and atraumatic.   Eyes: Pupils are equal, round, and reactive to light. Conjunctivae and EOM are normal.   Neck: Normal range of motion. Neck supple. No thyromegaly present.   Cardiovascular: Normal rate and regular rhythm.   No murmur heard.  Pulmonary/Chest: Effort normal and breath sounds normal. No respiratory distress.   Abdominal: Soft. Bowel sounds are normal.   Musculoskeletal:         General: No edema.   Neurological: She is alert and oriented to person, place, and time.   Skin: Skin is warm and dry.   Psychiatric: She has a normal mood and affect. Her behavior is normal.         Assessment:       1. Biventricular AICD in place    2. Coronary artery disease involving  native coronary artery of native heart without angina pectoris    3. Acute on chronic systolic congestive heart failure    4. SOB (shortness of breath)         Plan:            Continue Rx for CHF, HTN, HLD, CAD  OV 3 months with BNP, BMP

## 2020-12-27 ENCOUNTER — HOSPITAL ENCOUNTER (EMERGENCY)
Facility: HOSPITAL | Age: 85
Discharge: HOME | End: 2020-12-27
Attending: EMERGENCY MEDICINE
Payer: MEDICARE

## 2020-12-27 VITALS
BODY MASS INDEX: 29.77 KG/M2 | DIASTOLIC BLOOD PRESSURE: 69 MMHG | SYSTOLIC BLOOD PRESSURE: 147 MMHG | HEIGHT: 63 IN | OXYGEN SATURATION: 96 % | RESPIRATION RATE: 18 BRPM | TEMPERATURE: 98 F | HEART RATE: 65 BPM | WEIGHT: 168 LBS

## 2020-12-27 DIAGNOSIS — R06.02 SHORTNESS OF BREATH: ICD-10-CM

## 2020-12-27 DIAGNOSIS — I50.9 ACUTE ON CHRONIC CONGESTIVE HEART FAILURE, UNSPECIFIED HEART FAILURE TYPE: Primary | ICD-10-CM

## 2020-12-27 LAB
ALBUMIN SERPL BCP-MCNC: 4 G/DL (ref 3.5–5.2)
ALP SERPL-CCNC: 87 U/L (ref 55–135)
ALT SERPL W/O P-5'-P-CCNC: 14 U/L (ref 10–44)
ANION GAP SERPL CALC-SCNC: 12 MMOL/L (ref 8–16)
AST SERPL-CCNC: 26 U/L (ref 10–40)
BASOPHILS # BLD AUTO: 0.04 K/UL (ref 0–0.2)
BASOPHILS NFR BLD: 0.7 % (ref 0–1.9)
BILIRUB SERPL-MCNC: 0.7 MG/DL (ref 0.1–1)
BILIRUB UR QL STRIP: NEGATIVE
BNP SERPL-MCNC: 2328 PG/ML (ref 0–99)
BUN SERPL-MCNC: 43 MG/DL (ref 8–23)
CALCIUM SERPL-MCNC: 9.8 MG/DL (ref 8.7–10.5)
CHLORIDE SERPL-SCNC: 101 MMOL/L (ref 95–110)
CLARITY UR: CLEAR
CO2 SERPL-SCNC: 28 MMOL/L (ref 23–29)
COLOR UR: NORMAL
CREAT SERPL-MCNC: 1.6 MG/DL (ref 0.5–1.4)
DIFFERENTIAL METHOD: ABNORMAL
EOSINOPHIL # BLD AUTO: 0.2 K/UL (ref 0–0.5)
EOSINOPHIL NFR BLD: 3.2 % (ref 0–8)
ERYTHROCYTE [DISTWIDTH] IN BLOOD BY AUTOMATED COUNT: 15 % (ref 11.5–14.5)
EST. GFR  (AFRICAN AMERICAN): 34 ML/MIN/1.73 M^2
EST. GFR  (NON AFRICAN AMERICAN): 29 ML/MIN/1.73 M^2
GLUCOSE SERPL-MCNC: 102 MG/DL (ref 70–110)
GLUCOSE UR QL STRIP: NEGATIVE
HCT VFR BLD AUTO: 34.2 % (ref 37–48.5)
HGB BLD-MCNC: 10.7 G/DL (ref 12–16)
HGB UR QL STRIP: NEGATIVE
IMM GRANULOCYTES # BLD AUTO: 0.02 K/UL (ref 0–0.04)
IMM GRANULOCYTES NFR BLD AUTO: 0.4 % (ref 0–0.5)
KETONES UR QL STRIP: NEGATIVE
LEUKOCYTE ESTERASE UR QL STRIP: NEGATIVE
LYMPHOCYTES # BLD AUTO: 1.1 K/UL (ref 1–4.8)
LYMPHOCYTES NFR BLD: 19.1 % (ref 18–48)
MCH RBC QN AUTO: 30.7 PG (ref 27–31)
MCHC RBC AUTO-ENTMCNC: 31.3 G/DL (ref 32–36)
MCV RBC AUTO: 98 FL (ref 82–98)
MONOCYTES # BLD AUTO: 0.6 K/UL (ref 0.3–1)
MONOCYTES NFR BLD: 10.4 % (ref 4–15)
NEUTROPHILS # BLD AUTO: 3.7 K/UL (ref 1.8–7.7)
NEUTROPHILS NFR BLD: 66.2 % (ref 38–73)
NITRITE UR QL STRIP: NEGATIVE
NRBC BLD-RTO: 0 /100 WBC
PH UR STRIP: 7 [PH] (ref 5–8)
PLATELET # BLD AUTO: 119 K/UL (ref 150–350)
PMV BLD AUTO: 10.8 FL (ref 9.2–12.9)
POTASSIUM SERPL-SCNC: 4.1 MMOL/L (ref 3.5–5.1)
PROT SERPL-MCNC: 7.3 G/DL (ref 6–8.4)
PROT UR QL STRIP: NEGATIVE
RBC # BLD AUTO: 3.49 M/UL (ref 4–5.4)
SODIUM SERPL-SCNC: 141 MMOL/L (ref 136–145)
SP GR UR STRIP: 1 (ref 1–1.03)
TROPONIN I SERPL DL<=0.01 NG/ML-MCNC: 0.1 NG/ML (ref 0–0.03)
TROPONIN I SERPL DL<=0.01 NG/ML-MCNC: 0.11 NG/ML (ref 0–0.03)
URN SPEC COLLECT METH UR: NORMAL
UROBILINOGEN UR STRIP-ACNC: NEGATIVE EU/DL
WBC # BLD AUTO: 5.59 K/UL (ref 3.9–12.7)

## 2020-12-27 PROCEDURE — 85025 COMPLETE CBC W/AUTO DIFF WBC: CPT

## 2020-12-27 PROCEDURE — 99285 EMERGENCY DEPT VISIT HI MDM: CPT | Mod: 25

## 2020-12-27 PROCEDURE — 83880 ASSAY OF NATRIURETIC PEPTIDE: CPT

## 2020-12-27 PROCEDURE — 81003 URINALYSIS AUTO W/O SCOPE: CPT

## 2020-12-27 PROCEDURE — 93010 EKG 12-LEAD: ICD-10-PCS | Mod: ,,, | Performed by: INTERNAL MEDICINE

## 2020-12-27 PROCEDURE — 84484 ASSAY OF TROPONIN QUANT: CPT | Mod: 91

## 2020-12-27 PROCEDURE — 96374 THER/PROPH/DIAG INJ IV PUSH: CPT

## 2020-12-27 PROCEDURE — 93010 ELECTROCARDIOGRAM REPORT: CPT | Mod: ,,, | Performed by: INTERNAL MEDICINE

## 2020-12-27 PROCEDURE — 80053 COMPREHEN METABOLIC PANEL: CPT

## 2020-12-27 PROCEDURE — 93005 ELECTROCARDIOGRAM TRACING: CPT

## 2020-12-27 PROCEDURE — 63600175 PHARM REV CODE 636 W HCPCS: Performed by: EMERGENCY MEDICINE

## 2020-12-27 RX ORDER — FUROSEMIDE 10 MG/ML
80 INJECTION INTRAMUSCULAR; INTRAVENOUS
Status: COMPLETED | OUTPATIENT
Start: 2020-12-27 | End: 2020-12-27

## 2020-12-27 RX ADMIN — FUROSEMIDE 80 MG: 10 INJECTION, SOLUTION INTRAVENOUS at 12:12

## 2020-12-27 NOTE — ED PROVIDER NOTES
Encounter Date: 12/27/2020    SCRIBE #1 NOTE: I, Denise Abbasi, am scribing for, and in the presence of,  Erich Hickman Jr, MD. I have scribed the following portions of the note - Other sections scribed: HPI/ROS.       History     Chief Complaint   Patient presents with    Shortness of Breath     started last night hx of heart issues,Chronic     Urinary Frequency     up all night urinating     Pt seen by provider at 10:32    This 85 y.o. F with a medical hx of CHF, CAD, HTN, and renal disorder presents to the ED for an emergent evaluation of SOB. Onset of symptom was this AM at midnight while laying supine in bed. Pt reports a hx of SOB that was consistent with CHF. Denies a hx of DVTs/PEs. No recent long distance traveling or surgeries. Pt notes urinary frequency every hour since midnight as well. Pt states she has been compliant with all daily, prescribed medications. Otherwise, pt denies fever, chills, headache, cough, chest pain, abdominal pain, and any other associated symptoms.     The history is provided by the patient. No  was used.     Review of patient's allergies indicates:   Allergen Reactions    Aspirin Swelling     Swelling and rash    Colace [docusate sodium] Rash     itching    Sulfa (sulfonamide antibiotics) Swelling     Swelling and rash    Iodine and iodide containing products Rash    Penicillins Rash     Past Medical History:   Diagnosis Date    Anticoagulant long-term use     Cataract     CHF (congestive heart failure)     Coronary artery disease     Gout attack     Hypercholesteremia     Hypertension     Renal disorder     Vaginal delivery     x4     Past Surgical History:   Procedure Laterality Date    APPENDECTOMY      CARDIAC DEFIBRILLATOR PLACEMENT      2015    CATARACT EXTRACTION W/  INTRAOCULAR LENS IMPLANT Left 02/07/2019    Dr. Velazco    CATARACT EXTRACTION W/  INTRAOCULAR LENS IMPLANT Right 02/21/2019    Dr. Velazco     CHOLECYSTECTOMY      COLONOSCOPY W/ POLYPECTOMY  10 or 11/ 2014    per Dr. Elen ceja Left 8/2008    EYE SURGERY      HYSTERECTOMY      INTRAOCULAR PROSTHESES INSERTION Left 2/7/2019    Procedure: INSERTION, IOL PROSTHESIS;  Surgeon: Demetrius Velazco MD;  Location: [facility] OR;  Service: Ophthalmology;  Laterality: Left;  RN Phone Pre OP 1-30-19.  Arrival 05:30 AM    INTRAOCULAR PROSTHESES INSERTION Right 2/21/2019    Procedure: INSERTION, IOL PROSTHESIS;  Surgeon: Demetrius Velazco MD;  Location: [facility] OR;  Service: Ophthalmology;  Laterality: Right;    JOINT REPLACEMENT Bilateral 2005 & 2006    2 Knee Replacements=Lt. 1= 2005. Rt. 1= 2006    OOPHORECTOMY      PHACOEMULSIFICATION OF CATARACT Left 2/7/2019    Procedure: PHACOEMULSIFICATION, CATARACT;  Surgeon: Demetrius Velazco MD;  Location: [facility] OR;  Service: Ophthalmology;  Laterality: Left;    PHACOEMULSIFICATION OF CATARACT Right 2/21/2019    Procedure: PHACOEMULSIFICATION, CATARACT;  Surgeon: Demetrius Velazco MD;  Location: [facility] OR;  Service: Ophthalmology;  Laterality: Right;  RN Phone Pre op 2-15-19.  Arrival 05:30 AM    TOTAL KNEE ARTHROPLASTY Bilateral Lt.= 2005; Rt.=2006    Bilateral Knee scope     Family History   Problem Relation Age of Onset    Heart attack Mother 88    Hypertension Mother     Hyperlipidemia Mother     Diabetes Other     Hypertension Other     Amblyopia Son     Blindness Neg Hx     Cataracts Neg Hx     Glaucoma Neg Hx     Macular degeneration Neg Hx     Retinal detachment Neg Hx     Strabismus Neg Hx      Social History     Tobacco Use    Smoking status: Never Smoker    Smokeless tobacco: Never Used   Substance Use Topics    Alcohol use: Yes     Comment: rarely    Drug use: No     Review of Systems   Constitutional: Negative for chills and fever.   HENT: Negative for congestion, ear pain, rhinorrhea and sore throat.    Eyes: Negative for pain and visual disturbance.   Respiratory:  Positive for shortness of breath. Negative for cough.    Cardiovascular: Negative for chest pain.   Gastrointestinal: Negative for abdominal pain, diarrhea, nausea and vomiting.   Genitourinary: Positive for frequency. Negative for dysuria.   Musculoskeletal: Negative for back pain and neck pain.   Skin: Negative for rash.   Neurological: Negative for headaches.       Physical Exam     Initial Vitals [12/27/20 1003]   BP Pulse Resp Temp SpO2   (!) 126/59 67 18 98.4 °F (36.9 °C) 95 %      MAP       --         Physical Exam    Nursing note and vitals reviewed.  Constitutional: She appears well-developed and well-nourished. She is not diaphoretic. No distress.   HENT:   Head: Normocephalic and atraumatic.   Right Ear: External ear normal.   Left Ear: External ear normal.   Nose: Nose normal.   Mouth/Throat: Oropharynx is clear and moist.   Eyes: Conjunctivae and EOM are normal. Pupils are equal, round, and reactive to light. Right eye exhibits no discharge. Left eye exhibits no discharge. No scleral icterus.   Neck: Normal range of motion. Neck supple.   Cardiovascular: Normal rate, regular rhythm, normal heart sounds and intact distal pulses.   No murmur heard.  Pulmonary/Chest: Breath sounds normal. No respiratory distress. She has no wheezes. She has no rhonchi. She has no rales.   The patient's breath sounds are clear in all lung fields bilaterally.  She does have slightly increased work of breathing after ambulating from the bathroom.   Abdominal: Soft. Bowel sounds are normal. She exhibits no distension and no mass. There is no abdominal tenderness. There is no rebound and no guarding.   Musculoskeletal: Normal range of motion. No tenderness or edema.      Comments: Patient's lower extremities are non erythematous, nonedematous, and symmetric.  There is no tenderness to palpation of the lower extremities.   Neurological: She is alert and oriented to person, place, and time. She has normal strength. No cranial  nerve deficit or sensory deficit.   Skin: Skin is warm and dry. No rash noted. No erythema. No pallor.   Psychiatric: She has a normal mood and affect. Her behavior is normal. Judgment and thought content normal.         ED Course   Procedures  Labs Reviewed   CBC W/ AUTO DIFFERENTIAL - Abnormal; Notable for the following components:       Result Value    RBC 3.49 (*)     Hemoglobin 10.7 (*)     Hematocrit 34.2 (*)     MCHC 31.3 (*)     RDW 15.0 (*)     Platelets 119 (*)     All other components within normal limits   COMPREHENSIVE METABOLIC PANEL - Abnormal; Notable for the following components:    BUN 43 (*)     Creatinine 1.6 (*)     eGFR if  34 (*)     eGFR if non  29 (*)     All other components within normal limits   TROPONIN I - Abnormal; Notable for the following components:    Troponin I 0.108 (*)     All other components within normal limits   B-TYPE NATRIURETIC PEPTIDE - Abnormal; Notable for the following components:    BNP 2,328 (*)     All other components within normal limits   TROPONIN I - Abnormal; Notable for the following components:    Troponin I 0.096 (*)     All other components within normal limits   URINALYSIS, REFLEX TO URINE CULTURE    Narrative:     Specimen Source->Urine     EKG Readings: (Independently Interpreted)   Initial Reading: No STEMI. Ectopy: No Ectopy.   EKG reviewed and interpreted by me shows sinus rhythm with pacer spikes noted.  There is a prolonged QRS interval.  QT is also prolonged.  There are no acute appearing ST segment or T-wave ischemic findings.         Imaging Results          X-Ray Chest AP Portable (Final result)  Result time 12/27/20 11:30:58    Final result by Zenon Bui MD (12/27/20 11:30:58)                 Impression:      1. Stable enlargement of the cardiac silhouette.  Left chest wall biventricular AICD.      Electronically signed by: Zenon Bui MD  Date:    12/27/2020  Time:    11:30             Narrative:     EXAMINATION:  XR CHEST AP PORTABLE    CLINICAL HISTORY:  Shortness of breath    TECHNIQUE:  Single frontal view of the chest was performed.    COMPARISON:  Radiograph 10/10/2020.    FINDINGS:  Left chest wall biventricular AICD with leads in similar position.  Mediastinal structures are midline.  There is atherosclerotic calcification of the aorta.  Cardiac silhouette remains enlarged.  Lung volumes are symmetric.  There is prominence of the central pulmonary vasculature, a finding that can be seen in the setting of pulmonary hypertension.  Lung volumes are symmetric.  No consolidation.  No pneumothorax or large pleural effusion.  There is S-shaped scoliosis of the visualized thoracolumbar spine.  No acute osseous abnormalities.  Visualized soft tissues are unremarkable.                              X-Rays:   Independently Interpreted Readings:   Other Readings:  Stable cardiomegaly.  Vascular congestion.    Medical Decision Making:   ED Management:  This is the emergent evaluation of a 85-year-old female presents emergency department with shortness of breath that started last night.  Patient states that it started approximately midnight last night while she was lying down.  Differential diagnosis at the time of initial evaluation included, but was not limited to:  CHF exacerbation, pneumonia, viral illness, acute myocardial infarction.  I considered, but doubt pulmonary embolus.    I believe that this patient is mildly volume overloaded.  I have given the patient extra dose of IV furosemide.  I discussed the case with Dr. Richmond who is the patient's cardiologist.  He advised having this patient take several days of 80 mg twice daily of Lasix until she follows up in the office.  He will follow-up in the office.  I explained all the above to the patient.  The patient has no chest pain but she did have an elevated troponin.  I suspected this was chronic and repeated.  It was lower than 1st value.  I do not suspect  acute coronary syndrome.  Patient advised to return for new or worsening symptoms such as chest pain or shortness of breath.            Scribe Attestation:   Scribe #1: I performed the above scribed service and the documentation accurately describes the services I performed. I attest to the accuracy of the note.                      Clinical Impression:     ICD-10-CM ICD-9-CM   1. Acute on chronic congestive heart failure, unspecified heart failure type  I50.9 428.0   2. Shortness of breath  R06.02 786.05                        Scribe attestation: I, Erich Hickman MD, personally performed the services described in this documentation. All medical record entries made by the scribe were at my direction and in my presence.  I have reviewed the chart and agree that the record reflects my personal performance and is accurate and complete.   ED Disposition Condition    Discharge Stable        ED Prescriptions     None        Follow-up Information     Follow up With Specialties Details Why Contact Info    Kelechi Richmond MD Cardiology Schedule an appointment as soon as possible for a visit in 3 days  [address]  [address]  [facility] [address]  [phone]                                         Erich Hickman Jr., MD  12/27/20 [id number]

## 2020-12-27 NOTE — ED TRIAGE NOTES
Pt arrived due to SOB since yesterday at 12 pm ; pt reports a hx of CHF; worsening SOB with exertion

## 2020-12-27 NOTE — DISCHARGE INSTRUCTIONS
Please follow-up with  this week.  Per his instruction and as per discussion in the emergency department, please increase your dose of furosemide (Lasix) to 80 mg twice daily into your seen by Dr. Richmond.  Return if you get worse or if new symptoms develop such as if you develop shortness of breath or chest pain.

## 2020-12-31 ENCOUNTER — HOSPITAL ENCOUNTER (INPATIENT)
Facility: HOSPITAL | Age: 85
LOS: 2 days | Discharge: HOME | DRG: 177 | End: 2021-01-02
Attending: EMERGENCY MEDICINE | Admitting: EMERGENCY MEDICINE
Payer: MEDICARE

## 2020-12-31 DIAGNOSIS — R09.02 HYPOXIA: ICD-10-CM

## 2020-12-31 DIAGNOSIS — R05.9 COUGH: ICD-10-CM

## 2020-12-31 DIAGNOSIS — U07.1 COVID-19: Primary | ICD-10-CM

## 2020-12-31 DIAGNOSIS — R06.02 SOB (SHORTNESS OF BREATH): ICD-10-CM

## 2020-12-31 LAB
ALBUMIN SERPL BCP-MCNC: 4.3 G/DL (ref 3.5–5.2)
ALP SERPL-CCNC: 86 U/L (ref 55–135)
ALT SERPL W/O P-5'-P-CCNC: 15 U/L (ref 10–44)
ANION GAP SERPL CALC-SCNC: 14 MMOL/L (ref 8–16)
AST SERPL-CCNC: 30 U/L (ref 10–40)
BASOPHILS # BLD AUTO: 0.05 K/UL (ref 0–0.2)
BASOPHILS NFR BLD: 1 % (ref 0–1.9)
BILIRUB SERPL-MCNC: 0.7 MG/DL (ref 0.1–1)
BILIRUB UR QL STRIP: NEGATIVE
BNP SERPL-MCNC: 2157 PG/ML (ref 0–99)
BUN SERPL-MCNC: 47 MG/DL (ref 8–23)
CALCIUM SERPL-MCNC: 10 MG/DL (ref 8.7–10.5)
CHLORIDE SERPL-SCNC: 100 MMOL/L (ref 95–110)
CLARITY UR: CLEAR
CO2 SERPL-SCNC: 28 MMOL/L (ref 23–29)
COLOR UR: YELLOW
CREAT SERPL-MCNC: 1.8 MG/DL (ref 0.5–1.4)
CRP SERPL-MCNC: 4.7 MG/L (ref 0–8.2)
CTP QC/QA: YES
DIFFERENTIAL METHOD: ABNORMAL
EOSINOPHIL # BLD AUTO: 0.1 K/UL (ref 0–0.5)
EOSINOPHIL NFR BLD: 1.2 % (ref 0–8)
ERYTHROCYTE [DISTWIDTH] IN BLOOD BY AUTOMATED COUNT: 15.3 % (ref 11.5–14.5)
EST. GFR  (AFRICAN AMERICAN): 29 ML/MIN/1.73 M^2
EST. GFR  (NON AFRICAN AMERICAN): 25 ML/MIN/1.73 M^2
GLUCOSE SERPL-MCNC: 100 MG/DL (ref 70–110)
GLUCOSE UR QL STRIP: NEGATIVE
HCT VFR BLD AUTO: 37 % (ref 37–48.5)
HGB BLD-MCNC: 11.7 G/DL (ref 12–16)
HGB UR QL STRIP: NEGATIVE
IMM GRANULOCYTES # BLD AUTO: 0.04 K/UL (ref 0–0.04)
IMM GRANULOCYTES NFR BLD AUTO: 0.8 % (ref 0–0.5)
KETONES UR QL STRIP: NEGATIVE
LDH SERPL L TO P-CCNC: 314 U/L (ref 110–260)
LEUKOCYTE ESTERASE UR QL STRIP: NEGATIVE
LYMPHOCYTES # BLD AUTO: 0.7 K/UL (ref 1–4.8)
LYMPHOCYTES NFR BLD: 12.5 % (ref 18–48)
MCH RBC QN AUTO: 30.8 PG (ref 27–31)
MCHC RBC AUTO-ENTMCNC: 31.6 G/DL (ref 32–36)
MCV RBC AUTO: 97 FL (ref 82–98)
MONOCYTES # BLD AUTO: 1.2 K/UL (ref 0.3–1)
MONOCYTES NFR BLD: 22.4 % (ref 4–15)
NEUTROPHILS # BLD AUTO: 3.2 K/UL (ref 1.8–7.7)
NEUTROPHILS NFR BLD: 62.1 % (ref 38–73)
NITRITE UR QL STRIP: NEGATIVE
NRBC BLD-RTO: 0 /100 WBC
PH UR STRIP: 6 [PH] (ref 5–8)
PLATELET # BLD AUTO: 113 K/UL (ref 150–350)
PMV BLD AUTO: 11.2 FL (ref 9.2–12.9)
POTASSIUM SERPL-SCNC: 4.3 MMOL/L (ref 3.5–5.1)
PROT SERPL-MCNC: 8 G/DL (ref 6–8.4)
PROT UR QL STRIP: ABNORMAL
RBC # BLD AUTO: 3.8 M/UL (ref 4–5.4)
SARS-COV-2 RDRP RESP QL NAA+PROBE: POSITIVE
SODIUM SERPL-SCNC: 142 MMOL/L (ref 136–145)
SP GR UR STRIP: 1.01 (ref 1–1.03)
TROPONIN I SERPL DL<=0.01 NG/ML-MCNC: 0.12 NG/ML (ref 0–0.03)
URN SPEC COLLECT METH UR: ABNORMAL
UROBILINOGEN UR STRIP-ACNC: NEGATIVE EU/DL
WBC # BLD AUTO: 5.18 K/UL (ref 3.9–12.7)

## 2020-12-31 PROCEDURE — 84484 ASSAY OF TROPONIN QUANT: CPT

## 2020-12-31 PROCEDURE — 81003 URINALYSIS AUTO W/O SCOPE: CPT

## 2020-12-31 PROCEDURE — 99291 CRITICAL CARE FIRST HOUR: CPT | Mod: 25

## 2020-12-31 PROCEDURE — 83880 ASSAY OF NATRIURETIC PEPTIDE: CPT

## 2020-12-31 PROCEDURE — 12000002 HC ACUTE/MED SURGE SEMI-PRIVATE ROOM

## 2020-12-31 PROCEDURE — 80053 COMPREHEN METABOLIC PANEL: CPT

## 2020-12-31 PROCEDURE — 83615 LACTATE (LD) (LDH) ENZYME: CPT

## 2020-12-31 PROCEDURE — 85025 COMPLETE CBC W/AUTO DIFF WBC: CPT

## 2020-12-31 PROCEDURE — 93010 EKG 12-LEAD: ICD-10-PCS | Mod: ,,, | Performed by: INTERNAL MEDICINE

## 2020-12-31 PROCEDURE — 84145 PROCALCITONIN (PCT): CPT

## 2020-12-31 PROCEDURE — 86140 C-REACTIVE PROTEIN: CPT

## 2020-12-31 PROCEDURE — 93005 ELECTROCARDIOGRAM TRACING: CPT

## 2020-12-31 PROCEDURE — 96374 THER/PROPH/DIAG INJ IV PUSH: CPT

## 2020-12-31 PROCEDURE — 82728 ASSAY OF FERRITIN: CPT

## 2020-12-31 PROCEDURE — 93010 ELECTROCARDIOGRAM REPORT: CPT | Mod: ,,, | Performed by: INTERNAL MEDICINE

## 2020-12-31 PROCEDURE — U0002 COVID-19 LAB TEST NON-CDC: HCPCS | Performed by: EMERGENCY MEDICINE

## 2021-01-01 PROBLEM — U07.1 COVID-19: Status: ACTIVE | Noted: 2021-01-01

## 2021-01-01 PROBLEM — J96.01 ACUTE HYPOXEMIC RESPIRATORY FAILURE: Status: ACTIVE | Noted: 2021-01-01

## 2021-01-01 LAB
25(OH)D3+25(OH)D2 SERPL-MCNC: 34 NG/ML (ref 30–96)
ALBUMIN SERPL BCP-MCNC: 4.1 G/DL (ref 3.5–5.2)
ALP SERPL-CCNC: 82 U/L (ref 55–135)
ALT SERPL W/O P-5'-P-CCNC: 13 U/L (ref 10–44)
ANION GAP SERPL CALC-SCNC: 12 MMOL/L (ref 8–16)
AST SERPL-CCNC: 25 U/L (ref 10–40)
BASOPHILS # BLD AUTO: 0.01 K/UL (ref 0–0.2)
BASOPHILS NFR BLD: 0.2 % (ref 0–1.9)
BILIRUB SERPL-MCNC: 0.6 MG/DL (ref 0.1–1)
BUN SERPL-MCNC: 47 MG/DL (ref 8–23)
CALCIUM SERPL-MCNC: 9.5 MG/DL (ref 8.7–10.5)
CHLORIDE SERPL-SCNC: 98 MMOL/L (ref 95–110)
CK SERPL-CCNC: 92 U/L (ref 20–180)
CO2 SERPL-SCNC: 31 MMOL/L (ref 23–29)
CREAT SERPL-MCNC: 1.6 MG/DL (ref 0.5–1.4)
D DIMER PPP IA.FEU-MCNC: 1.18 MG/L FEU
DIFFERENTIAL METHOD: ABNORMAL
EOSINOPHIL # BLD AUTO: 0 K/UL (ref 0–0.5)
EOSINOPHIL NFR BLD: 0 % (ref 0–8)
ERYTHROCYTE [DISTWIDTH] IN BLOOD BY AUTOMATED COUNT: 15.3 % (ref 11.5–14.5)
ERYTHROCYTE [SEDIMENTATION RATE] IN BLOOD BY WESTERGREN METHOD: 35 MM/HR (ref 0–20)
EST. GFR  (AFRICAN AMERICAN): 34 ML/MIN/1.73 M^2
EST. GFR  (NON AFRICAN AMERICAN): 29 ML/MIN/1.73 M^2
FERRITIN SERPL-MCNC: 350 NG/ML (ref 20–300)
GLUCOSE SERPL-MCNC: 136 MG/DL (ref 70–110)
HCT VFR BLD AUTO: 35.6 % (ref 37–48.5)
HGB BLD-MCNC: 11.5 G/DL (ref 12–16)
IMM GRANULOCYTES # BLD AUTO: 0.01 K/UL (ref 0–0.04)
IMM GRANULOCYTES NFR BLD AUTO: 0.2 % (ref 0–0.5)
LACTATE SERPL-SCNC: 0.7 MMOL/L (ref 0.5–2.2)
LYMPHOCYTES # BLD AUTO: 0.3 K/UL (ref 1–4.8)
LYMPHOCYTES NFR BLD: 7.3 % (ref 18–48)
MAGNESIUM SERPL-MCNC: 2.4 MG/DL (ref 1.6–2.6)
MCH RBC QN AUTO: 30.9 PG (ref 27–31)
MCHC RBC AUTO-ENTMCNC: 32.3 G/DL (ref 32–36)
MCV RBC AUTO: 96 FL (ref 82–98)
MONOCYTES # BLD AUTO: 0.2 K/UL (ref 0.3–1)
MONOCYTES NFR BLD: 5.4 % (ref 4–15)
NEUTROPHILS # BLD AUTO: 3.7 K/UL (ref 1.8–7.7)
NEUTROPHILS NFR BLD: 86.9 % (ref 38–73)
NRBC BLD-RTO: 0 /100 WBC
PHOSPHATE SERPL-MCNC: 3.7 MG/DL (ref 2.7–4.5)
PLATELET # BLD AUTO: 105 K/UL (ref 150–350)
PMV BLD AUTO: 10.9 FL (ref 9.2–12.9)
POTASSIUM SERPL-SCNC: 3.7 MMOL/L (ref 3.5–5.1)
PROCALCITONIN SERPL IA-MCNC: 0.06 NG/ML
PROT SERPL-MCNC: 7.6 G/DL (ref 6–8.4)
RBC # BLD AUTO: 3.72 M/UL (ref 4–5.4)
SODIUM SERPL-SCNC: 141 MMOL/L (ref 136–145)
TROPONIN I SERPL DL<=0.01 NG/ML-MCNC: 0.17 NG/ML (ref 0–0.03)
TROPONIN I SERPL DL<=0.01 NG/ML-MCNC: 0.19 NG/ML (ref 0–0.03)
WBC # BLD AUTO: 4.27 K/UL (ref 3.9–12.7)

## 2021-01-01 PROCEDURE — 85379 FIBRIN DEGRADATION QUANT: CPT

## 2021-01-01 PROCEDURE — 83605 ASSAY OF LACTIC ACID: CPT

## 2021-01-01 PROCEDURE — 25000242 PHARM REV CODE 250 ALT 637 W/ HCPCS: Performed by: EMERGENCY MEDICINE

## 2021-01-01 PROCEDURE — 36415 COLL VENOUS BLD VENIPUNCTURE: CPT

## 2021-01-01 PROCEDURE — 82550 ASSAY OF CK (CPK): CPT

## 2021-01-01 PROCEDURE — 25000242 PHARM REV CODE 250 ALT 637 W/ HCPCS: Performed by: INTERNAL MEDICINE

## 2021-01-01 PROCEDURE — 63600175 PHARM REV CODE 636 W HCPCS: Performed by: EMERGENCY MEDICINE

## 2021-01-01 PROCEDURE — 11000001 HC ACUTE MED/SURG PRIVATE ROOM

## 2021-01-01 PROCEDURE — 80053 COMPREHEN METABOLIC PANEL: CPT

## 2021-01-01 PROCEDURE — 84100 ASSAY OF PHOSPHORUS: CPT

## 2021-01-01 PROCEDURE — 85652 RBC SED RATE AUTOMATED: CPT

## 2021-01-01 PROCEDURE — 63600175 PHARM REV CODE 636 W HCPCS: Performed by: INTERNAL MEDICINE

## 2021-01-01 PROCEDURE — 82306 VITAMIN D 25 HYDROXY: CPT

## 2021-01-01 PROCEDURE — 25000003 PHARM REV CODE 250: Performed by: INTERNAL MEDICINE

## 2021-01-01 PROCEDURE — 87040 BLOOD CULTURE FOR BACTERIA: CPT

## 2021-01-01 PROCEDURE — 83735 ASSAY OF MAGNESIUM: CPT

## 2021-01-01 PROCEDURE — 84484 ASSAY OF TROPONIN QUANT: CPT

## 2021-01-01 PROCEDURE — 25000003 PHARM REV CODE 250: Performed by: HOSPITALIST

## 2021-01-01 PROCEDURE — 85025 COMPLETE CBC W/AUTO DIFF WBC: CPT

## 2021-01-01 RX ORDER — LANOLIN ALCOHOL/MO/W.PET/CERES
800 CREAM (GRAM) TOPICAL
Status: DISCONTINUED | OUTPATIENT
Start: 2021-01-01 | End: 2021-01-02 | Stop reason: HOSPADM

## 2021-01-01 RX ORDER — LOVASTATIN 20 MG/1
40 TABLET ORAL NIGHTLY
Status: DISCONTINUED | OUTPATIENT
Start: 2021-01-01 | End: 2021-01-02 | Stop reason: HOSPADM

## 2021-01-01 RX ORDER — CLOPIDOGREL BISULFATE 75 MG/1
75 TABLET ORAL DAILY
Status: DISCONTINUED | OUTPATIENT
Start: 2021-01-01 | End: 2021-01-02 | Stop reason: HOSPADM

## 2021-01-01 RX ORDER — FUROSEMIDE 40 MG/1
40 TABLET ORAL 3 TIMES DAILY
Status: DISCONTINUED | OUTPATIENT
Start: 2021-01-01 | End: 2021-01-01

## 2021-01-01 RX ORDER — SODIUM CHLORIDE 0.9 % (FLUSH) 0.9 %
10 SYRINGE (ML) INJECTION
Status: DISCONTINUED | OUTPATIENT
Start: 2021-01-01 | End: 2021-01-02 | Stop reason: HOSPADM

## 2021-01-01 RX ORDER — BENZONATATE 100 MG/1
100 CAPSULE ORAL 3 TIMES DAILY PRN
Status: DISCONTINUED | OUTPATIENT
Start: 2021-01-01 | End: 2021-01-02 | Stop reason: HOSPADM

## 2021-01-01 RX ORDER — IBUPROFEN 200 MG
16 TABLET ORAL
Status: DISCONTINUED | OUTPATIENT
Start: 2021-01-01 | End: 2021-01-02 | Stop reason: HOSPADM

## 2021-01-01 RX ORDER — IBUPROFEN 200 MG
24 TABLET ORAL
Status: DISCONTINUED | OUTPATIENT
Start: 2021-01-01 | End: 2021-01-02 | Stop reason: HOSPADM

## 2021-01-01 RX ORDER — PANTOPRAZOLE SODIUM 40 MG/1
40 TABLET, DELAYED RELEASE ORAL DAILY
Status: DISCONTINUED | OUTPATIENT
Start: 2021-01-01 | End: 2021-01-02 | Stop reason: HOSPADM

## 2021-01-01 RX ORDER — SODIUM,POTASSIUM PHOSPHATES 280-250MG
2 POWDER IN PACKET (EA) ORAL
Status: DISCONTINUED | OUTPATIENT
Start: 2021-01-01 | End: 2021-01-02 | Stop reason: HOSPADM

## 2021-01-01 RX ORDER — ASCORBIC ACID 500 MG
500 TABLET ORAL 2 TIMES DAILY
Status: DISCONTINUED | OUTPATIENT
Start: 2021-01-01 | End: 2021-01-02 | Stop reason: HOSPADM

## 2021-01-01 RX ORDER — GLUCAGON 1 MG
1 KIT INJECTION
Status: DISCONTINUED | OUTPATIENT
Start: 2021-01-01 | End: 2021-01-02 | Stop reason: HOSPADM

## 2021-01-01 RX ORDER — TERAZOSIN 1 MG/1
2 CAPSULE ORAL NIGHTLY
Status: DISCONTINUED | OUTPATIENT
Start: 2021-01-01 | End: 2021-01-02 | Stop reason: HOSPADM

## 2021-01-01 RX ORDER — FUROSEMIDE 10 MG/ML
20 INJECTION INTRAMUSCULAR; INTRAVENOUS
Status: COMPLETED | OUTPATIENT
Start: 2021-01-01 | End: 2021-01-01

## 2021-01-01 RX ORDER — ACETAMINOPHEN 325 MG/1
650 TABLET ORAL EVERY 8 HOURS PRN
Status: DISCONTINUED | OUTPATIENT
Start: 2021-01-01 | End: 2021-01-02 | Stop reason: HOSPADM

## 2021-01-01 RX ORDER — ALLOPURINOL 100 MG/1
100 TABLET ORAL NIGHTLY
Status: DISCONTINUED | OUTPATIENT
Start: 2021-01-01 | End: 2021-01-02 | Stop reason: HOSPADM

## 2021-01-01 RX ORDER — ACETAMINOPHEN 325 MG/1
650 TABLET ORAL EVERY 4 HOURS PRN
Status: DISCONTINUED | OUTPATIENT
Start: 2021-01-01 | End: 2021-01-02 | Stop reason: HOSPADM

## 2021-01-01 RX ORDER — FAMOTIDINE 20 MG/1
20 TABLET, FILM COATED ORAL 2 TIMES DAILY
Status: DISCONTINUED | OUTPATIENT
Start: 2021-01-01 | End: 2021-01-01 | Stop reason: ALTCHOICE

## 2021-01-01 RX ORDER — OXYBUTYNIN CHLORIDE 5 MG/1
10 TABLET, EXTENDED RELEASE ORAL DAILY
Status: DISCONTINUED | OUTPATIENT
Start: 2021-01-01 | End: 2021-01-02 | Stop reason: HOSPADM

## 2021-01-01 RX ORDER — LOPERAMIDE HYDROCHLORIDE 2 MG/1
2 CAPSULE ORAL EVERY 6 HOURS PRN
Status: DISCONTINUED | OUTPATIENT
Start: 2021-01-01 | End: 2021-01-02 | Stop reason: HOSPADM

## 2021-01-01 RX ORDER — ENOXAPARIN SODIUM 100 MG/ML
30 INJECTION SUBCUTANEOUS EVERY 24 HOURS
Status: DISCONTINUED | OUTPATIENT
Start: 2021-01-01 | End: 2021-01-02 | Stop reason: HOSPADM

## 2021-01-01 RX ORDER — ONDANSETRON 2 MG/ML
4 INJECTION INTRAMUSCULAR; INTRAVENOUS EVERY 8 HOURS PRN
Status: DISCONTINUED | OUTPATIENT
Start: 2021-01-01 | End: 2021-01-02 | Stop reason: HOSPADM

## 2021-01-01 RX ORDER — FUROSEMIDE 40 MG/1
40 TABLET ORAL DAILY
Status: DISCONTINUED | OUTPATIENT
Start: 2021-01-01 | End: 2021-01-02

## 2021-01-01 RX ORDER — ISOSORBIDE DINITRATE AND HYDRALAZINE HYDROCHLORIDE 37.5; 2 MG/1; MG/1
1 TABLET ORAL 2 TIMES DAILY
Status: DISCONTINUED | OUTPATIENT
Start: 2021-01-01 | End: 2021-01-02 | Stop reason: HOSPADM

## 2021-01-01 RX ORDER — CARVEDILOL 12.5 MG/1
12.5 TABLET ORAL 2 TIMES DAILY
Status: DISCONTINUED | OUTPATIENT
Start: 2021-01-01 | End: 2021-01-02 | Stop reason: HOSPADM

## 2021-01-01 RX ADMIN — TRAZODONE HYDROCHLORIDE 25 MG: 50 TABLET ORAL at 04:01

## 2021-01-01 RX ADMIN — CLOPIDOGREL 75 MG: 75 TABLET, FILM COATED ORAL at 08:01

## 2021-01-01 RX ADMIN — FUROSEMIDE 20 MG: 10 INJECTION, SOLUTION INTRAMUSCULAR; INTRAVENOUS at 01:01

## 2021-01-01 RX ADMIN — POTASSIUM BICARBONATE 50 MEQ: 977.5 TABLET, EFFERVESCENT ORAL at 09:01

## 2021-01-01 RX ADMIN — ALLOPURINOL 100 MG: 100 TABLET ORAL at 03:01

## 2021-01-01 RX ADMIN — HYDRALAZINE HYDROCHLORIDE AND ISOSORBIDE DINITRATE 1 TABLET: 37.5; 2 TABLET, FILM COATED ORAL at 08:01

## 2021-01-01 RX ADMIN — HYDRALAZINE HYDROCHLORIDE AND ISOSORBIDE DINITRATE 1 TABLET: 37.5; 2 TABLET, FILM COATED ORAL at 10:01

## 2021-01-01 RX ADMIN — OXYBUTYNIN CHLORIDE 10 MG: 5 TABLET, EXTENDED RELEASE ORAL at 08:01

## 2021-01-01 RX ADMIN — BENZONATATE 100 MG: 100 CAPSULE ORAL at 09:01

## 2021-01-01 RX ADMIN — CARVEDILOL 12.5 MG: 12.5 TABLET, FILM COATED ORAL at 08:01

## 2021-01-01 RX ADMIN — OXYCODONE HYDROCHLORIDE AND ACETAMINOPHEN 500 MG: 500 TABLET ORAL at 09:01

## 2021-01-01 RX ADMIN — OXYCODONE HYDROCHLORIDE AND ACETAMINOPHEN 500 MG: 500 TABLET ORAL at 08:01

## 2021-01-01 RX ADMIN — CARVEDILOL 12.5 MG: 12.5 TABLET, FILM COATED ORAL at 09:01

## 2021-01-01 RX ADMIN — ALLOPURINOL 100 MG: 100 TABLET ORAL at 09:01

## 2021-01-01 RX ADMIN — FUROSEMIDE 40 MG: 40 TABLET ORAL at 08:01

## 2021-01-01 RX ADMIN — DEXAMETHASONE 6 MG: 2 TABLET ORAL at 08:01

## 2021-01-01 RX ADMIN — TERAZOSIN HYDROCHLORIDE 2 MG: 1 CAPSULE ORAL at 04:01

## 2021-01-01 RX ADMIN — ENOXAPARIN SODIUM 30 MG: 30 INJECTION SUBCUTANEOUS at 05:01

## 2021-01-01 RX ADMIN — LOVASTATIN 40 MG: 20 TABLET ORAL at 09:01

## 2021-01-01 RX ADMIN — TERAZOSIN HYDROCHLORIDE 2 MG: 1 CAPSULE ORAL at 09:01

## 2021-01-01 RX ADMIN — PANTOPRAZOLE SODIUM 40 MG: 40 TABLET, DELAYED RELEASE ORAL at 08:01

## 2021-01-01 RX ADMIN — DEXAMETHASONE 6 MG: 2 TABLET ORAL at 01:01

## 2021-01-02 VITALS
BODY MASS INDEX: 31.29 KG/M2 | WEIGHT: 176.56 LBS | SYSTOLIC BLOOD PRESSURE: 132 MMHG | TEMPERATURE: 98 F | RESPIRATION RATE: 20 BRPM | HEIGHT: 63 IN | DIASTOLIC BLOOD PRESSURE: 62 MMHG | OXYGEN SATURATION: 95 % | HEART RATE: 69 BPM

## 2021-01-02 LAB
ALBUMIN SERPL BCP-MCNC: 3.6 G/DL (ref 3.5–5.2)
ALP SERPL-CCNC: 74 U/L (ref 55–135)
ALT SERPL W/O P-5'-P-CCNC: 11 U/L (ref 10–44)
ANION GAP SERPL CALC-SCNC: 11 MMOL/L (ref 8–16)
AST SERPL-CCNC: 22 U/L (ref 10–40)
BASOPHILS # BLD AUTO: 0 K/UL (ref 0–0.2)
BASOPHILS NFR BLD: 0 % (ref 0–1.9)
BILIRUB SERPL-MCNC: 0.4 MG/DL (ref 0.1–1)
BUN SERPL-MCNC: 66 MG/DL (ref 8–23)
CALCIUM SERPL-MCNC: 9.5 MG/DL (ref 8.7–10.5)
CHLORIDE SERPL-SCNC: 98 MMOL/L (ref 95–110)
CO2 SERPL-SCNC: 30 MMOL/L (ref 23–29)
CREAT SERPL-MCNC: 2.1 MG/DL (ref 0.5–1.4)
DIFFERENTIAL METHOD: ABNORMAL
EOSINOPHIL # BLD AUTO: 0 K/UL (ref 0–0.5)
EOSINOPHIL NFR BLD: 0 % (ref 0–8)
ERYTHROCYTE [DISTWIDTH] IN BLOOD BY AUTOMATED COUNT: 14.9 % (ref 11.5–14.5)
EST. GFR  (AFRICAN AMERICAN): 24 ML/MIN/1.73 M^2
EST. GFR  (NON AFRICAN AMERICAN): 21 ML/MIN/1.73 M^2
GLUCOSE SERPL-MCNC: 112 MG/DL (ref 70–110)
HCT VFR BLD AUTO: 33.8 % (ref 37–48.5)
HGB BLD-MCNC: 10.7 G/DL (ref 12–16)
IMM GRANULOCYTES # BLD AUTO: 0.01 K/UL (ref 0–0.04)
IMM GRANULOCYTES NFR BLD AUTO: 0.2 % (ref 0–0.5)
LYMPHOCYTES # BLD AUTO: 0.3 K/UL (ref 1–4.8)
LYMPHOCYTES NFR BLD: 7.9 % (ref 18–48)
MAGNESIUM SERPL-MCNC: 2.4 MG/DL (ref 1.6–2.6)
MCH RBC QN AUTO: 30.5 PG (ref 27–31)
MCHC RBC AUTO-ENTMCNC: 31.7 G/DL (ref 32–36)
MCV RBC AUTO: 96 FL (ref 82–98)
MONOCYTES # BLD AUTO: 0.5 K/UL (ref 0.3–1)
MONOCYTES NFR BLD: 12.1 % (ref 4–15)
NEUTROPHILS # BLD AUTO: 3.4 K/UL (ref 1.8–7.7)
NEUTROPHILS NFR BLD: 79.8 % (ref 38–73)
NRBC BLD-RTO: 0 /100 WBC
PHOSPHATE SERPL-MCNC: 4 MG/DL (ref 2.7–4.5)
PLATELET # BLD AUTO: 111 K/UL (ref 150–350)
PMV BLD AUTO: 10.8 FL (ref 9.2–12.9)
POTASSIUM SERPL-SCNC: 4.5 MMOL/L (ref 3.5–5.1)
PROT SERPL-MCNC: 6.7 G/DL (ref 6–8.4)
RBC # BLD AUTO: 3.51 M/UL (ref 4–5.4)
SODIUM SERPL-SCNC: 139 MMOL/L (ref 136–145)
WBC # BLD AUTO: 4.31 K/UL (ref 3.9–12.7)

## 2021-01-02 PROCEDURE — 84100 ASSAY OF PHOSPHORUS: CPT

## 2021-01-02 PROCEDURE — 25000242 PHARM REV CODE 250 ALT 637 W/ HCPCS: Performed by: INTERNAL MEDICINE

## 2021-01-02 PROCEDURE — 80053 COMPREHEN METABOLIC PANEL: CPT

## 2021-01-02 PROCEDURE — 36415 COLL VENOUS BLD VENIPUNCTURE: CPT

## 2021-01-02 PROCEDURE — 63600175 PHARM REV CODE 636 W HCPCS: Performed by: INTERNAL MEDICINE

## 2021-01-02 PROCEDURE — 85025 COMPLETE CBC W/AUTO DIFF WBC: CPT

## 2021-01-02 PROCEDURE — 25000003 PHARM REV CODE 250: Performed by: INTERNAL MEDICINE

## 2021-01-02 PROCEDURE — 83735 ASSAY OF MAGNESIUM: CPT

## 2021-01-02 RX ADMIN — DEXAMETHASONE 6 MG: 2 TABLET ORAL at 09:01

## 2021-01-02 RX ADMIN — CARVEDILOL 12.5 MG: 12.5 TABLET, FILM COATED ORAL at 09:01

## 2021-01-02 RX ADMIN — CLOPIDOGREL 75 MG: 75 TABLET, FILM COATED ORAL at 09:01

## 2021-01-02 RX ADMIN — PANTOPRAZOLE SODIUM 40 MG: 40 TABLET, DELAYED RELEASE ORAL at 09:01

## 2021-01-02 RX ADMIN — OXYBUTYNIN CHLORIDE 10 MG: 5 TABLET, EXTENDED RELEASE ORAL at 09:01

## 2021-01-02 RX ADMIN — OXYCODONE HYDROCHLORIDE AND ACETAMINOPHEN 500 MG: 500 TABLET ORAL at 09:01

## 2021-01-05 ENCOUNTER — PATIENT OUTREACH (OUTPATIENT)
Dept: ADMINISTRATIVE | Facility: CLINIC | Age: 86
End: 2021-01-05

## 2021-01-05 LAB
BACTERIA BLD CULT: ABNORMAL

## 2021-01-06 LAB — BACTERIA BLD CULT: NORMAL

## 2021-02-13 ENCOUNTER — HOSPITAL ENCOUNTER (EMERGENCY)
Facility: HOSPITAL | Age: 86
Discharge: HOME | End: 2021-02-14
Attending: EMERGENCY MEDICINE
Payer: MEDICARE

## 2021-02-13 DIAGNOSIS — I50.43 ACUTE ON CHRONIC COMBINED SYSTOLIC AND DIASTOLIC CONGESTIVE HEART FAILURE: Primary | ICD-10-CM

## 2021-02-13 LAB
ALBUMIN SERPL BCP-MCNC: 4 G/DL (ref 3.5–5.2)
ALP SERPL-CCNC: 101 U/L (ref 55–135)
ALT SERPL W/O P-5'-P-CCNC: 11 U/L (ref 10–44)
ANION GAP SERPL CALC-SCNC: 15 MMOL/L (ref 8–16)
AST SERPL-CCNC: 22 U/L (ref 10–40)
BASOPHILS # BLD AUTO: 0.05 K/UL (ref 0–0.2)
BASOPHILS NFR BLD: 0.6 % (ref 0–1.9)
BILIRUB SERPL-MCNC: 0.6 MG/DL (ref 0.1–1)
BNP SERPL-MCNC: 2593 PG/ML (ref 0–99)
BUN SERPL-MCNC: 35 MG/DL (ref 8–23)
CALCIUM SERPL-MCNC: 9.9 MG/DL (ref 8.7–10.5)
CHLORIDE SERPL-SCNC: 103 MMOL/L (ref 95–110)
CO2 SERPL-SCNC: 23 MMOL/L (ref 23–29)
CREAT SERPL-MCNC: 1.8 MG/DL (ref 0.5–1.4)
DIFFERENTIAL METHOD: ABNORMAL
EOSINOPHIL # BLD AUTO: 0.2 K/UL (ref 0–0.5)
EOSINOPHIL NFR BLD: 1.9 % (ref 0–8)
ERYTHROCYTE [DISTWIDTH] IN BLOOD BY AUTOMATED COUNT: 15.8 % (ref 11.5–14.5)
EST. GFR  (AFRICAN AMERICAN): 29 ML/MIN/1.73 M^2
EST. GFR  (NON AFRICAN AMERICAN): 25 ML/MIN/1.73 M^2
GLUCOSE SERPL-MCNC: 150 MG/DL (ref 70–110)
HCT VFR BLD AUTO: 35.7 % (ref 37–48.5)
HGB BLD-MCNC: 11.3 G/DL (ref 12–16)
IMM GRANULOCYTES # BLD AUTO: 0.02 K/UL (ref 0–0.04)
IMM GRANULOCYTES NFR BLD AUTO: 0.2 % (ref 0–0.5)
LYMPHOCYTES # BLD AUTO: 1.2 K/UL (ref 1–4.8)
LYMPHOCYTES NFR BLD: 13.8 % (ref 18–48)
MCH RBC QN AUTO: 31.1 PG (ref 27–31)
MCHC RBC AUTO-ENTMCNC: 31.7 G/DL (ref 32–36)
MCV RBC AUTO: 98 FL (ref 82–98)
MONOCYTES # BLD AUTO: 0.8 K/UL (ref 0.3–1)
MONOCYTES NFR BLD: 9.5 % (ref 4–15)
NEUTROPHILS # BLD AUTO: 6.2 K/UL (ref 1.8–7.7)
NEUTROPHILS NFR BLD: 74 % (ref 38–73)
NRBC BLD-RTO: 0 /100 WBC
PLATELET # BLD AUTO: 133 K/UL (ref 150–350)
PMV BLD AUTO: 10.3 FL (ref 9.2–12.9)
POTASSIUM SERPL-SCNC: 3.7 MMOL/L (ref 3.5–5.1)
PROT SERPL-MCNC: 8 G/DL (ref 6–8.4)
RBC # BLD AUTO: 3.63 M/UL (ref 4–5.4)
SODIUM SERPL-SCNC: 141 MMOL/L (ref 136–145)
TROPONIN I SERPL DL<=0.01 NG/ML-MCNC: 0.08 NG/ML (ref 0–0.03)
WBC # BLD AUTO: 8.35 K/UL (ref 3.9–12.7)

## 2021-02-13 PROCEDURE — 93005 ELECTROCARDIOGRAM TRACING: CPT

## 2021-02-13 PROCEDURE — 93010 EKG 12-LEAD: ICD-10-PCS | Mod: ,,, | Performed by: INTERNAL MEDICINE

## 2021-02-13 PROCEDURE — 96374 THER/PROPH/DIAG INJ IV PUSH: CPT

## 2021-02-13 PROCEDURE — 93010 ELECTROCARDIOGRAM REPORT: CPT | Mod: ,,, | Performed by: INTERNAL MEDICINE

## 2021-02-13 PROCEDURE — 84484 ASSAY OF TROPONIN QUANT: CPT

## 2021-02-13 PROCEDURE — 80053 COMPREHEN METABOLIC PANEL: CPT

## 2021-02-13 PROCEDURE — 85025 COMPLETE CBC W/AUTO DIFF WBC: CPT

## 2021-02-13 PROCEDURE — 99285 EMERGENCY DEPT VISIT HI MDM: CPT | Mod: 25

## 2021-02-13 PROCEDURE — 63600175 PHARM REV CODE 636 W HCPCS: Performed by: EMERGENCY MEDICINE

## 2021-02-13 PROCEDURE — 83880 ASSAY OF NATRIURETIC PEPTIDE: CPT

## 2021-02-13 RX ORDER — FUROSEMIDE 10 MG/ML
80 INJECTION INTRAMUSCULAR; INTRAVENOUS
Status: COMPLETED | OUTPATIENT
Start: 2021-02-13 | End: 2021-02-13

## 2021-02-13 RX ADMIN — FUROSEMIDE 80 MG: 10 INJECTION, SOLUTION INTRAVENOUS at 11:02

## 2021-02-14 VITALS
TEMPERATURE: 99 F | HEART RATE: 85 BPM | HEIGHT: 63 IN | RESPIRATION RATE: 20 BRPM | SYSTOLIC BLOOD PRESSURE: 154 MMHG | BODY MASS INDEX: 32.96 KG/M2 | OXYGEN SATURATION: 95 % | DIASTOLIC BLOOD PRESSURE: 74 MMHG | WEIGHT: 186 LBS

## 2021-02-23 ENCOUNTER — TELEPHONE (OUTPATIENT)
Dept: OPTOMETRY | Facility: CLINIC | Age: 86
End: 2021-02-23

## 2021-03-03 ENCOUNTER — TELEPHONE (OUTPATIENT)
Dept: OPHTHALMOLOGY | Facility: CLINIC | Age: 86
End: 2021-03-03

## 2021-03-06 ENCOUNTER — HOSPITAL ENCOUNTER (EMERGENCY)
Facility: HOSPITAL | Age: 86
Discharge: HOME | End: 2021-03-06
Attending: EMERGENCY MEDICINE
Payer: MEDICARE

## 2021-03-06 VITALS
TEMPERATURE: 98 F | OXYGEN SATURATION: 98 % | RESPIRATION RATE: 18 BRPM | WEIGHT: 187 LBS | DIASTOLIC BLOOD PRESSURE: 60 MMHG | HEIGHT: 63 IN | HEART RATE: 60 BPM | BODY MASS INDEX: 33.13 KG/M2 | SYSTOLIC BLOOD PRESSURE: 128 MMHG

## 2021-03-06 DIAGNOSIS — R07.9 CHEST PAIN: ICD-10-CM

## 2021-03-06 DIAGNOSIS — Z45.02 DEFIBRILLATOR DISCHARGE: ICD-10-CM

## 2021-03-06 LAB
ALBUMIN SERPL BCP-MCNC: 4 G/DL (ref 3.5–5.2)
ALP SERPL-CCNC: 87 U/L (ref 55–135)
ALT SERPL W/O P-5'-P-CCNC: 15 U/L (ref 10–44)
ANION GAP SERPL CALC-SCNC: 11 MMOL/L (ref 8–16)
AST SERPL-CCNC: 22 U/L (ref 10–40)
BASOPHILS # BLD AUTO: 0.04 K/UL (ref 0–0.2)
BASOPHILS NFR BLD: 0.8 % (ref 0–1.9)
BILIRUB SERPL-MCNC: 0.8 MG/DL (ref 0.1–1)
BNP SERPL-MCNC: 644 PG/ML (ref 0–99)
BUN SERPL-MCNC: 69 MG/DL (ref 8–23)
CALCIUM SERPL-MCNC: 9.9 MG/DL (ref 8.7–10.5)
CHLORIDE SERPL-SCNC: 100 MMOL/L (ref 95–110)
CO2 SERPL-SCNC: 28 MMOL/L (ref 23–29)
CREAT SERPL-MCNC: 2.6 MG/DL (ref 0.5–1.4)
DIFFERENTIAL METHOD: ABNORMAL
EOSINOPHIL # BLD AUTO: 0.1 K/UL (ref 0–0.5)
EOSINOPHIL NFR BLD: 2.6 % (ref 0–8)
ERYTHROCYTE [DISTWIDTH] IN BLOOD BY AUTOMATED COUNT: 15.4 % (ref 11.5–14.5)
EST. GFR  (AFRICAN AMERICAN): 19 ML/MIN/1.73 M^2
EST. GFR  (NON AFRICAN AMERICAN): 16 ML/MIN/1.73 M^2
GLUCOSE SERPL-MCNC: 98 MG/DL (ref 70–110)
HCT VFR BLD AUTO: 34.4 % (ref 37–48.5)
HGB BLD-MCNC: 11.1 G/DL (ref 12–16)
IMM GRANULOCYTES # BLD AUTO: 0.02 K/UL (ref 0–0.04)
IMM GRANULOCYTES NFR BLD AUTO: 0.4 % (ref 0–0.5)
LYMPHOCYTES # BLD AUTO: 0.9 K/UL (ref 1–4.8)
LYMPHOCYTES NFR BLD: 18 % (ref 18–48)
MCH RBC QN AUTO: 31.3 PG (ref 27–31)
MCHC RBC AUTO-ENTMCNC: 32.3 G/DL (ref 32–36)
MCV RBC AUTO: 97 FL (ref 82–98)
MONOCYTES # BLD AUTO: 0.5 K/UL (ref 0.3–1)
MONOCYTES NFR BLD: 10.8 % (ref 4–15)
NEUTROPHILS # BLD AUTO: 3.4 K/UL (ref 1.8–7.7)
NEUTROPHILS NFR BLD: 67.4 % (ref 38–73)
NRBC BLD-RTO: 0 /100 WBC
PLATELET # BLD AUTO: 119 K/UL (ref 150–350)
PMV BLD AUTO: 10.6 FL (ref 9.2–12.9)
POTASSIUM SERPL-SCNC: 3.8 MMOL/L (ref 3.5–5.1)
PROT SERPL-MCNC: 7.7 G/DL (ref 6–8.4)
RBC # BLD AUTO: 3.55 M/UL (ref 4–5.4)
SODIUM SERPL-SCNC: 139 MMOL/L (ref 136–145)
TROPONIN I SERPL DL<=0.01 NG/ML-MCNC: 0.1 NG/ML (ref 0–0.03)
WBC # BLD AUTO: 5.01 K/UL (ref 3.9–12.7)

## 2021-03-06 PROCEDURE — 93010 ELECTROCARDIOGRAM REPORT: CPT | Mod: ,,, | Performed by: INTERNAL MEDICINE

## 2021-03-06 PROCEDURE — 93005 ELECTROCARDIOGRAM TRACING: CPT

## 2021-03-06 PROCEDURE — 99285 EMERGENCY DEPT VISIT HI MDM: CPT | Mod: 25

## 2021-03-06 PROCEDURE — 85025 COMPLETE CBC W/AUTO DIFF WBC: CPT | Performed by: EMERGENCY MEDICINE

## 2021-03-06 PROCEDURE — 83880 ASSAY OF NATRIURETIC PEPTIDE: CPT | Performed by: EMERGENCY MEDICINE

## 2021-03-06 PROCEDURE — 84484 ASSAY OF TROPONIN QUANT: CPT | Performed by: EMERGENCY MEDICINE

## 2021-03-06 PROCEDURE — 93010 EKG 12-LEAD: ICD-10-PCS | Mod: ,,, | Performed by: INTERNAL MEDICINE

## 2021-03-06 PROCEDURE — 80053 COMPREHEN METABOLIC PANEL: CPT | Performed by: EMERGENCY MEDICINE

## 2021-03-08 ENCOUNTER — OFFICE VISIT (OUTPATIENT)
Dept: CARDIOLOGY | Facility: CLINIC | Age: 86
End: 2021-03-08
Payer: MEDICARE

## 2021-03-08 VITALS
HEART RATE: 72 BPM | DIASTOLIC BLOOD PRESSURE: 80 MMHG | SYSTOLIC BLOOD PRESSURE: 128 MMHG | HEIGHT: 63 IN | OXYGEN SATURATION: 95 % | BODY MASS INDEX: 31.25 KG/M2 | WEIGHT: 176.38 LBS

## 2021-03-08 DIAGNOSIS — E78.2 MIXED HYPERLIPIDEMIA: Chronic | ICD-10-CM

## 2021-03-08 DIAGNOSIS — Z95.810 ICD (IMPLANTABLE CARDIOVERTER-DEFIBRILLATOR), BIVENTRICULAR, IN SITU: Primary | Chronic | ICD-10-CM

## 2021-03-08 DIAGNOSIS — I50.42 CHRONIC COMBINED SYSTOLIC AND DIASTOLIC HEART FAILURE: Chronic | ICD-10-CM

## 2021-03-08 DIAGNOSIS — I25.10 CORONARY ARTERY DISEASE INVOLVING NATIVE CORONARY ARTERY OF NATIVE HEART WITHOUT ANGINA PECTORIS: ICD-10-CM

## 2021-03-08 DIAGNOSIS — I10 ESSENTIAL HYPERTENSION: Chronic | ICD-10-CM

## 2021-03-08 PROCEDURE — 99999 PR PBB SHADOW E&M-EST. PATIENT-LVL IV: ICD-10-PCS | Mod: PBBFAC,,, | Performed by: INTERNAL MEDICINE

## 2021-03-08 PROCEDURE — 3074F SYST BP LT 130 MM HG: CPT | Mod: CPTII,S$GLB,, | Performed by: INTERNAL MEDICINE

## 2021-03-08 PROCEDURE — 99999 PR PBB SHADOW E&M-EST. PATIENT-LVL IV: CPT | Mod: PBBFAC,,, | Performed by: INTERNAL MEDICINE

## 2021-03-08 PROCEDURE — 99214 OFFICE O/P EST MOD 30 MIN: CPT | Mod: S$GLB,,, | Performed by: INTERNAL MEDICINE

## 2021-03-08 PROCEDURE — 3288F FALL RISK ASSESSMENT DOCD: CPT | Mod: CPTII,S$GLB,, | Performed by: INTERNAL MEDICINE

## 2021-03-08 PROCEDURE — 1159F PR MEDICATION LIST DOCUMENTED IN MEDICAL RECORD: ICD-10-PCS | Mod: S$GLB,,, | Performed by: INTERNAL MEDICINE

## 2021-03-08 PROCEDURE — 1159F MED LIST DOCD IN RCRD: CPT | Mod: S$GLB,,, | Performed by: INTERNAL MEDICINE

## 2021-03-08 PROCEDURE — 1101F PR PT FALLS ASSESS DOC 0-1 FALLS W/OUT INJ PAST YR: ICD-10-PCS | Mod: CPTII,S$GLB,, | Performed by: INTERNAL MEDICINE

## 2021-03-08 PROCEDURE — 3079F PR MOST RECENT DIASTOLIC BLOOD PRESSURE 80-89 MM HG: ICD-10-PCS | Mod: CPTII,S$GLB,, | Performed by: INTERNAL MEDICINE

## 2021-03-08 PROCEDURE — 1126F AMNT PAIN NOTED NONE PRSNT: CPT | Mod: S$GLB,,, | Performed by: INTERNAL MEDICINE

## 2021-03-08 PROCEDURE — 3074F PR MOST RECENT SYSTOLIC BLOOD PRESSURE < 130 MM HG: ICD-10-PCS | Mod: CPTII,S$GLB,, | Performed by: INTERNAL MEDICINE

## 2021-03-08 PROCEDURE — 3288F PR FALLS RISK ASSESSMENT DOCUMENTED: ICD-10-PCS | Mod: CPTII,S$GLB,, | Performed by: INTERNAL MEDICINE

## 2021-03-08 PROCEDURE — 1126F PR PAIN SEVERITY QUANTIFIED, NO PAIN PRESENT: ICD-10-PCS | Mod: S$GLB,,, | Performed by: INTERNAL MEDICINE

## 2021-03-08 PROCEDURE — 99214 PR OFFICE/OUTPT VISIT, EST, LEVL IV, 30-39 MIN: ICD-10-PCS | Mod: S$GLB,,, | Performed by: INTERNAL MEDICINE

## 2021-03-08 PROCEDURE — 3079F DIAST BP 80-89 MM HG: CPT | Mod: CPTII,S$GLB,, | Performed by: INTERNAL MEDICINE

## 2021-03-08 PROCEDURE — 1101F PT FALLS ASSESS-DOCD LE1/YR: CPT | Mod: CPTII,S$GLB,, | Performed by: INTERNAL MEDICINE

## 2021-03-08 RX ORDER — FUROSEMIDE 40 MG/1
80 TABLET ORAL DAILY
Qty: 180 TABLET | Refills: 3 | Status: SHIPPED | OUTPATIENT
Start: 2021-03-08 | End: 2021-08-12 | Stop reason: SDUPTHER

## 2021-03-09 ENCOUNTER — OFFICE VISIT (OUTPATIENT)
Dept: CARDIOLOGY | Facility: CLINIC | Age: 86
End: 2021-03-09
Payer: MEDICARE

## 2021-03-09 VITALS
OXYGEN SATURATION: 96 % | HEIGHT: 63 IN | WEIGHT: 176.38 LBS | SYSTOLIC BLOOD PRESSURE: 116 MMHG | DIASTOLIC BLOOD PRESSURE: 64 MMHG | HEART RATE: 67 BPM | BODY MASS INDEX: 31.25 KG/M2

## 2021-03-09 DIAGNOSIS — I20.89 OTHER FORMS OF ANGINA PECTORIS: ICD-10-CM

## 2021-03-09 DIAGNOSIS — I50.42 CHRONIC COMBINED SYSTOLIC AND DIASTOLIC HEART FAILURE: Chronic | ICD-10-CM

## 2021-03-09 DIAGNOSIS — I50.23 ACUTE ON CHRONIC SYSTOLIC CONGESTIVE HEART FAILURE: ICD-10-CM

## 2021-03-09 DIAGNOSIS — E78.2 MIXED HYPERLIPIDEMIA: Chronic | ICD-10-CM

## 2021-03-09 DIAGNOSIS — I10 ESSENTIAL HYPERTENSION: Primary | Chronic | ICD-10-CM

## 2021-03-09 DIAGNOSIS — Z95.810 ICD (IMPLANTABLE CARDIOVERTER-DEFIBRILLATOR), BIVENTRICULAR, IN SITU: Chronic | ICD-10-CM

## 2021-03-09 DIAGNOSIS — I25.10 CORONARY ARTERY DISEASE INVOLVING NATIVE CORONARY ARTERY OF NATIVE HEART WITHOUT ANGINA PECTORIS: ICD-10-CM

## 2021-03-09 PROCEDURE — 1101F PT FALLS ASSESS-DOCD LE1/YR: CPT | Mod: CPTII,S$GLB,, | Performed by: INTERNAL MEDICINE

## 2021-03-09 PROCEDURE — 99999 PR PBB SHADOW E&M-EST. PATIENT-LVL IV: CPT | Mod: PBBFAC,,, | Performed by: INTERNAL MEDICINE

## 2021-03-09 PROCEDURE — 1126F AMNT PAIN NOTED NONE PRSNT: CPT | Mod: S$GLB,,, | Performed by: INTERNAL MEDICINE

## 2021-03-09 PROCEDURE — 3078F DIAST BP <80 MM HG: CPT | Mod: CPTII,S$GLB,, | Performed by: INTERNAL MEDICINE

## 2021-03-09 PROCEDURE — 3074F PR MOST RECENT SYSTOLIC BLOOD PRESSURE < 130 MM HG: ICD-10-PCS | Mod: CPTII,S$GLB,, | Performed by: INTERNAL MEDICINE

## 2021-03-09 PROCEDURE — 1159F PR MEDICATION LIST DOCUMENTED IN MEDICAL RECORD: ICD-10-PCS | Mod: S$GLB,,, | Performed by: INTERNAL MEDICINE

## 2021-03-09 PROCEDURE — 93283 PR PROGRAM EVAL (IN PERSON) IMPLANT DEVICE,CARDVERT/DEFIB,2 LEAD: ICD-10-PCS | Mod: 26,,, | Performed by: INTERNAL MEDICINE

## 2021-03-09 PROCEDURE — 1126F PR PAIN SEVERITY QUANTIFIED, NO PAIN PRESENT: ICD-10-PCS | Mod: S$GLB,,, | Performed by: INTERNAL MEDICINE

## 2021-03-09 PROCEDURE — 1101F PR PT FALLS ASSESS DOC 0-1 FALLS W/OUT INJ PAST YR: ICD-10-PCS | Mod: CPTII,S$GLB,, | Performed by: INTERNAL MEDICINE

## 2021-03-09 PROCEDURE — 99214 OFFICE O/P EST MOD 30 MIN: CPT | Mod: S$GLB,,, | Performed by: INTERNAL MEDICINE

## 2021-03-09 PROCEDURE — 93283 PRGRMG EVAL IMPLANTABLE DFB: CPT | Mod: 26,,, | Performed by: INTERNAL MEDICINE

## 2021-03-09 PROCEDURE — 3078F PR MOST RECENT DIASTOLIC BLOOD PRESSURE < 80 MM HG: ICD-10-PCS | Mod: CPTII,S$GLB,, | Performed by: INTERNAL MEDICINE

## 2021-03-09 PROCEDURE — 1159F MED LIST DOCD IN RCRD: CPT | Mod: S$GLB,,, | Performed by: INTERNAL MEDICINE

## 2021-03-09 PROCEDURE — 3288F FALL RISK ASSESSMENT DOCD: CPT | Mod: CPTII,S$GLB,, | Performed by: INTERNAL MEDICINE

## 2021-03-09 PROCEDURE — 99999 PR PBB SHADOW E&M-EST. PATIENT-LVL IV: ICD-10-PCS | Mod: PBBFAC,,, | Performed by: INTERNAL MEDICINE

## 2021-03-09 PROCEDURE — 3074F SYST BP LT 130 MM HG: CPT | Mod: CPTII,S$GLB,, | Performed by: INTERNAL MEDICINE

## 2021-03-09 PROCEDURE — 99214 PR OFFICE/OUTPT VISIT, EST, LEVL IV, 30-39 MIN: ICD-10-PCS | Mod: S$GLB,,, | Performed by: INTERNAL MEDICINE

## 2021-03-09 PROCEDURE — 3288F PR FALLS RISK ASSESSMENT DOCUMENTED: ICD-10-PCS | Mod: CPTII,S$GLB,, | Performed by: INTERNAL MEDICINE

## 2021-03-11 ENCOUNTER — OFFICE VISIT (OUTPATIENT)
Dept: OPHTHALMOLOGY | Facility: CLINIC | Age: 86
End: 2021-03-11
Payer: MEDICARE

## 2021-03-11 DIAGNOSIS — Z96.1 PSEUDOPHAKIA: ICD-10-CM

## 2021-03-11 DIAGNOSIS — H02.826 CYST OF EYELID, LEFT: Primary | ICD-10-CM

## 2021-03-11 DIAGNOSIS — H26.491 PCO (POSTERIOR CAPSULAR OPACIFICATION), RIGHT: ICD-10-CM

## 2021-03-11 PROCEDURE — 92012 PR EYE EXAM, EST PATIENT,INTERMED: ICD-10-PCS | Mod: S$GLB,,, | Performed by: OPHTHALMOLOGY

## 2021-03-11 PROCEDURE — 3288F PR FALLS RISK ASSESSMENT DOCUMENTED: ICD-10-PCS | Mod: CPTII,S$GLB,, | Performed by: OPHTHALMOLOGY

## 2021-03-11 PROCEDURE — 3288F FALL RISK ASSESSMENT DOCD: CPT | Mod: CPTII,S$GLB,, | Performed by: OPHTHALMOLOGY

## 2021-03-11 PROCEDURE — 1101F PR PT FALLS ASSESS DOC 0-1 FALLS W/OUT INJ PAST YR: ICD-10-PCS | Mod: CPTII,S$GLB,, | Performed by: OPHTHALMOLOGY

## 2021-03-11 PROCEDURE — 1101F PT FALLS ASSESS-DOCD LE1/YR: CPT | Mod: CPTII,S$GLB,, | Performed by: OPHTHALMOLOGY

## 2021-03-11 PROCEDURE — 99999 PR PBB SHADOW E&M-EST. PATIENT-LVL III: ICD-10-PCS | Mod: PBBFAC,,, | Performed by: OPHTHALMOLOGY

## 2021-03-11 PROCEDURE — 92012 INTRM OPH EXAM EST PATIENT: CPT | Mod: S$GLB,,, | Performed by: OPHTHALMOLOGY

## 2021-03-11 PROCEDURE — 99999 PR PBB SHADOW E&M-EST. PATIENT-LVL III: CPT | Mod: PBBFAC,,, | Performed by: OPHTHALMOLOGY

## 2021-06-06 ENCOUNTER — HOSPITAL ENCOUNTER (OUTPATIENT)
Facility: HOSPITAL | Age: 86
Discharge: HOME | End: 2021-06-07
Attending: EMERGENCY MEDICINE | Admitting: EMERGENCY MEDICINE
Payer: MEDICARE

## 2021-06-06 DIAGNOSIS — Z95.810 ICD (IMPLANTABLE CARDIOVERTER-DEFIBRILLATOR) IN PLACE: ICD-10-CM

## 2021-06-06 DIAGNOSIS — R06.02 SHORTNESS OF BREATH: ICD-10-CM

## 2021-06-06 DIAGNOSIS — E78.2 MIXED HYPERLIPIDEMIA: ICD-10-CM

## 2021-06-06 DIAGNOSIS — I50.9 HEART FAILURE: ICD-10-CM

## 2021-06-06 DIAGNOSIS — J96.01 ACUTE HYPOXEMIC RESPIRATORY FAILURE: ICD-10-CM

## 2021-06-06 DIAGNOSIS — N18.4 CKD (CHRONIC KIDNEY DISEASE) STAGE 4, GFR 15-29 ML/MIN: ICD-10-CM

## 2021-06-06 DIAGNOSIS — I10 ESSENTIAL HYPERTENSION: ICD-10-CM

## 2021-06-06 DIAGNOSIS — D63.8 ANEMIA OF CHRONIC DISEASE: ICD-10-CM

## 2021-06-06 DIAGNOSIS — R79.89 TROPONIN LEVEL ELEVATED: ICD-10-CM

## 2021-06-06 DIAGNOSIS — K21.9 GASTROESOPHAGEAL REFLUX DISEASE WITHOUT ESOPHAGITIS: ICD-10-CM

## 2021-06-06 DIAGNOSIS — I50.9 ACUTE ON CHRONIC CONGESTIVE HEART FAILURE, UNSPECIFIED HEART FAILURE TYPE: Primary | ICD-10-CM

## 2021-06-06 LAB
ALBUMIN SERPL BCP-MCNC: 3.6 G/DL (ref 3.5–5.2)
ALBUMIN SERPL BCP-MCNC: 3.8 G/DL (ref 3.5–5.2)
ALP SERPL-CCNC: 82 U/L (ref 55–135)
ALP SERPL-CCNC: 88 U/L (ref 55–135)
ALT SERPL W/O P-5'-P-CCNC: 11 U/L (ref 10–44)
ALT SERPL W/O P-5'-P-CCNC: 11 U/L (ref 10–44)
ANION GAP SERPL CALC-SCNC: 11 MMOL/L (ref 8–16)
ANION GAP SERPL CALC-SCNC: 12 MMOL/L (ref 8–16)
AST SERPL-CCNC: 19 U/L (ref 10–40)
AST SERPL-CCNC: 19 U/L (ref 10–40)
BASOPHILS # BLD AUTO: 0.04 K/UL (ref 0–0.2)
BASOPHILS # BLD AUTO: 0.04 K/UL (ref 0–0.2)
BASOPHILS NFR BLD: 0.7 % (ref 0–1.9)
BASOPHILS NFR BLD: 0.7 % (ref 0–1.9)
BILIRUB SERPL-MCNC: 0.6 MG/DL (ref 0.1–1)
BILIRUB SERPL-MCNC: 0.8 MG/DL (ref 0.1–1)
BNP SERPL-MCNC: 1809 PG/ML (ref 0–99)
BNP SERPL-MCNC: 2836 PG/ML (ref 0–99)
BUN SERPL-MCNC: 30 MG/DL (ref 8–23)
BUN SERPL-MCNC: 35 MG/DL (ref 8–23)
CALCIUM SERPL-MCNC: 10 MG/DL (ref 8.7–10.5)
CALCIUM SERPL-MCNC: 10.4 MG/DL (ref 8.7–10.5)
CHLORIDE SERPL-SCNC: 102 MMOL/L (ref 95–110)
CHLORIDE SERPL-SCNC: 104 MMOL/L (ref 95–110)
CHOLEST SERPL-MCNC: 158 MG/DL (ref 120–199)
CHOLEST/HDLC SERPL: 2.5 {RATIO} (ref 2–5)
CO2 SERPL-SCNC: 25 MMOL/L (ref 23–29)
CO2 SERPL-SCNC: 30 MMOL/L (ref 23–29)
CREAT SERPL-MCNC: 1.4 MG/DL (ref 0.5–1.4)
CREAT SERPL-MCNC: 1.6 MG/DL (ref 0.5–1.4)
DIFFERENTIAL METHOD: ABNORMAL
DIFFERENTIAL METHOD: ABNORMAL
EOSINOPHIL # BLD AUTO: 0.1 K/UL (ref 0–0.5)
EOSINOPHIL # BLD AUTO: 0.1 K/UL (ref 0–0.5)
EOSINOPHIL NFR BLD: 2.2 % (ref 0–8)
EOSINOPHIL NFR BLD: 2.4 % (ref 0–8)
ERYTHROCYTE [DISTWIDTH] IN BLOOD BY AUTOMATED COUNT: 13.7 % (ref 11.5–14.5)
ERYTHROCYTE [DISTWIDTH] IN BLOOD BY AUTOMATED COUNT: 13.8 % (ref 11.5–14.5)
EST. GFR  (AFRICAN AMERICAN): 34 ML/MIN/1.73 M^2
EST. GFR  (AFRICAN AMERICAN): 40 ML/MIN/1.73 M^2
EST. GFR  (NON AFRICAN AMERICAN): 29 ML/MIN/1.73 M^2
EST. GFR  (NON AFRICAN AMERICAN): 34 ML/MIN/1.73 M^2
GLUCOSE SERPL-MCNC: 93 MG/DL (ref 70–110)
GLUCOSE SERPL-MCNC: 94 MG/DL (ref 70–110)
HCT VFR BLD AUTO: 32 % (ref 37–48.5)
HCT VFR BLD AUTO: 35.4 % (ref 37–48.5)
HDLC SERPL-MCNC: 62 MG/DL (ref 40–75)
HDLC SERPL: 39.2 % (ref 20–50)
HGB BLD-MCNC: 10.3 G/DL (ref 12–16)
HGB BLD-MCNC: 11.4 G/DL (ref 12–16)
IMM GRANULOCYTES # BLD AUTO: 0.01 K/UL (ref 0–0.04)
IMM GRANULOCYTES # BLD AUTO: 0.02 K/UL (ref 0–0.04)
IMM GRANULOCYTES NFR BLD AUTO: 0.2 % (ref 0–0.5)
IMM GRANULOCYTES NFR BLD AUTO: 0.3 % (ref 0–0.5)
LDLC SERPL CALC-MCNC: 77 MG/DL (ref 63–159)
LYMPHOCYTES # BLD AUTO: 1 K/UL (ref 1–4.8)
LYMPHOCYTES # BLD AUTO: 1.2 K/UL (ref 1–4.8)
LYMPHOCYTES NFR BLD: 16.7 % (ref 18–48)
LYMPHOCYTES NFR BLD: 20.5 % (ref 18–48)
MAGNESIUM SERPL-MCNC: 2 MG/DL (ref 1.6–2.6)
MCH RBC QN AUTO: 31.5 PG (ref 27–31)
MCH RBC QN AUTO: 31.6 PG (ref 27–31)
MCHC RBC AUTO-ENTMCNC: 32.2 G/DL (ref 32–36)
MCHC RBC AUTO-ENTMCNC: 32.2 G/DL (ref 32–36)
MCV RBC AUTO: 98 FL (ref 82–98)
MCV RBC AUTO: 98 FL (ref 82–98)
MONOCYTES # BLD AUTO: 0.7 K/UL (ref 0.3–1)
MONOCYTES # BLD AUTO: 0.7 K/UL (ref 0.3–1)
MONOCYTES NFR BLD: 10.9 % (ref 4–15)
MONOCYTES NFR BLD: 11.6 % (ref 4–15)
NEUTROPHILS # BLD AUTO: 3.7 K/UL (ref 1.8–7.7)
NEUTROPHILS # BLD AUTO: 4.2 K/UL (ref 1.8–7.7)
NEUTROPHILS NFR BLD: 64.6 % (ref 38–73)
NEUTROPHILS NFR BLD: 69.2 % (ref 38–73)
NONHDLC SERPL-MCNC: 96 MG/DL
NRBC BLD-RTO: 0 /100 WBC
NRBC BLD-RTO: 0 /100 WBC
PLATELET # BLD AUTO: 124 K/UL (ref 150–450)
PLATELET # BLD AUTO: 146 K/UL (ref 150–450)
PMV BLD AUTO: 10.5 FL (ref 9.2–12.9)
PMV BLD AUTO: 10.9 FL (ref 9.2–12.9)
POTASSIUM SERPL-SCNC: 3.8 MMOL/L (ref 3.5–5.1)
POTASSIUM SERPL-SCNC: 3.9 MMOL/L (ref 3.5–5.1)
PROT SERPL-MCNC: 7 G/DL (ref 6–8.4)
PROT SERPL-MCNC: 7.6 G/DL (ref 6–8.4)
RBC # BLD AUTO: 3.26 M/UL (ref 4–5.4)
RBC # BLD AUTO: 3.62 M/UL (ref 4–5.4)
SODIUM SERPL-SCNC: 141 MMOL/L (ref 136–145)
SODIUM SERPL-SCNC: 143 MMOL/L (ref 136–145)
TRIGL SERPL-MCNC: 95 MG/DL (ref 30–150)
TROPONIN I SERPL DL<=0.01 NG/ML-MCNC: 0.09 NG/ML (ref 0–0.03)
TSH SERPL DL<=0.005 MIU/L-ACNC: 1.18 UIU/ML (ref 0.4–4)
WBC # BLD AUTO: 5.77 K/UL (ref 3.9–12.7)
WBC # BLD AUTO: 6.03 K/UL (ref 3.9–12.7)

## 2021-06-06 PROCEDURE — 36415 COLL VENOUS BLD VENIPUNCTURE: CPT | Performed by: NURSE PRACTITIONER

## 2021-06-06 PROCEDURE — 99214 PR OFFICE/OUTPT VISIT, EST, LEVL IV, 30-39 MIN: ICD-10-PCS | Mod: ,,, | Performed by: INTERNAL MEDICINE

## 2021-06-06 PROCEDURE — 63600175 PHARM REV CODE 636 W HCPCS: Performed by: NURSE PRACTITIONER

## 2021-06-06 PROCEDURE — 99285 EMERGENCY DEPT VISIT HI MDM: CPT | Mod: 25

## 2021-06-06 PROCEDURE — 84484 ASSAY OF TROPONIN QUANT: CPT | Performed by: EMERGENCY MEDICINE

## 2021-06-06 PROCEDURE — 83036 HEMOGLOBIN GLYCOSYLATED A1C: CPT | Performed by: NURSE PRACTITIONER

## 2021-06-06 PROCEDURE — 25000003 PHARM REV CODE 250: Performed by: NURSE PRACTITIONER

## 2021-06-06 PROCEDURE — 80053 COMPREHEN METABOLIC PANEL: CPT | Performed by: EMERGENCY MEDICINE

## 2021-06-06 PROCEDURE — G0378 HOSPITAL OBSERVATION PER HR: HCPCS

## 2021-06-06 PROCEDURE — 80053 COMPREHEN METABOLIC PANEL: CPT | Mod: 91 | Performed by: NURSE PRACTITIONER

## 2021-06-06 PROCEDURE — 83735 ASSAY OF MAGNESIUM: CPT | Performed by: NURSE PRACTITIONER

## 2021-06-06 PROCEDURE — 83880 ASSAY OF NATRIURETIC PEPTIDE: CPT | Mod: 91 | Performed by: NURSE PRACTITIONER

## 2021-06-06 PROCEDURE — 63600175 PHARM REV CODE 636 W HCPCS: Performed by: EMERGENCY MEDICINE

## 2021-06-06 PROCEDURE — 80061 LIPID PANEL: CPT | Performed by: NURSE PRACTITIONER

## 2021-06-06 PROCEDURE — 99214 OFFICE O/P EST MOD 30 MIN: CPT | Mod: ,,, | Performed by: INTERNAL MEDICINE

## 2021-06-06 PROCEDURE — 85025 COMPLETE CBC W/AUTO DIFF WBC: CPT | Performed by: EMERGENCY MEDICINE

## 2021-06-06 PROCEDURE — 96372 THER/PROPH/DIAG INJ SC/IM: CPT | Mod: 59 | Performed by: EMERGENCY MEDICINE

## 2021-06-06 PROCEDURE — 84443 ASSAY THYROID STIM HORMONE: CPT | Performed by: NURSE PRACTITIONER

## 2021-06-06 PROCEDURE — 85025 COMPLETE CBC W/AUTO DIFF WBC: CPT | Mod: 91 | Performed by: NURSE PRACTITIONER

## 2021-06-06 PROCEDURE — 83880 ASSAY OF NATRIURETIC PEPTIDE: CPT | Performed by: EMERGENCY MEDICINE

## 2021-06-06 PROCEDURE — 96374 THER/PROPH/DIAG INJ IV PUSH: CPT

## 2021-06-06 PROCEDURE — 96376 TX/PRO/DX INJ SAME DRUG ADON: CPT | Performed by: EMERGENCY MEDICINE

## 2021-06-06 RX ORDER — POLYETHYLENE GLYCOL 3350 17 G/17G
17 POWDER, FOR SOLUTION ORAL DAILY
Status: DISCONTINUED | OUTPATIENT
Start: 2021-06-06 | End: 2021-06-07 | Stop reason: HOSPADM

## 2021-06-06 RX ORDER — ONDANSETRON 4 MG/1
4 TABLET, ORALLY DISINTEGRATING ORAL EVERY 8 HOURS PRN
Status: DISCONTINUED | OUTPATIENT
Start: 2021-06-06 | End: 2021-06-07 | Stop reason: HOSPADM

## 2021-06-06 RX ORDER — FUROSEMIDE 10 MG/ML
40 INJECTION INTRAMUSCULAR; INTRAVENOUS
Status: COMPLETED | OUTPATIENT
Start: 2021-06-06 | End: 2021-06-06

## 2021-06-06 RX ORDER — SODIUM CHLORIDE 0.9 % (FLUSH) 0.9 %
10 SYRINGE (ML) INJECTION
Status: DISCONTINUED | OUTPATIENT
Start: 2021-06-06 | End: 2021-06-07 | Stop reason: HOSPADM

## 2021-06-06 RX ORDER — LOVASTATIN 20 MG/1
40 TABLET ORAL NIGHTLY
Status: DISCONTINUED | OUTPATIENT
Start: 2021-06-07 | End: 2021-06-07 | Stop reason: HOSPADM

## 2021-06-06 RX ORDER — ENOXAPARIN SODIUM 100 MG/ML
30 INJECTION SUBCUTANEOUS EVERY 24 HOURS
Status: DISCONTINUED | OUTPATIENT
Start: 2021-06-06 | End: 2021-06-07 | Stop reason: HOSPADM

## 2021-06-06 RX ADMIN — ENOXAPARIN SODIUM 30 MG: 30 INJECTION SUBCUTANEOUS at 05:06

## 2021-06-06 RX ADMIN — FUROSEMIDE 40 MG: 10 INJECTION, SOLUTION INTRAVENOUS at 06:06

## 2021-06-06 RX ADMIN — ISOSORBIDE DINITRATE: 20 TABLET ORAL at 09:06

## 2021-06-06 RX ADMIN — FUROSEMIDE 40 MG: 10 INJECTION, SOLUTION INTRAVENOUS at 05:06

## 2021-06-06 RX ADMIN — POLYETHYLENE GLYCOL 3350 17 G: 17 POWDER, FOR SOLUTION ORAL at 05:06

## 2021-06-07 ENCOUNTER — HOSPITAL ENCOUNTER (EMERGENCY)
Facility: HOSPITAL | Age: 86
Discharge: HOME | End: 2021-06-08
Attending: EMERGENCY MEDICINE
Payer: MEDICARE

## 2021-06-07 VITALS
OXYGEN SATURATION: 97 % | DIASTOLIC BLOOD PRESSURE: 67 MMHG | HEART RATE: 77 BPM | HEIGHT: 63 IN | WEIGHT: 162.38 LBS | TEMPERATURE: 98 F | RESPIRATION RATE: 18 BRPM | BODY MASS INDEX: 28.77 KG/M2 | SYSTOLIC BLOOD PRESSURE: 116 MMHG

## 2021-06-07 DIAGNOSIS — F41.9 ANXIETY: ICD-10-CM

## 2021-06-07 DIAGNOSIS — R06.02 SHORTNESS OF BREATH: ICD-10-CM

## 2021-06-07 DIAGNOSIS — I50.9 CONGESTIVE HEART FAILURE, UNSPECIFIED HF CHRONICITY, UNSPECIFIED HEART FAILURE TYPE: Primary | ICD-10-CM

## 2021-06-07 LAB
ALBUMIN SERPL BCP-MCNC: 3.5 G/DL (ref 3.5–5.2)
ALBUMIN SERPL BCP-MCNC: 3.8 G/DL (ref 3.5–5.2)
ALP SERPL-CCNC: 87 U/L (ref 55–135)
ALP SERPL-CCNC: 87 U/L (ref 55–135)
ALT SERPL W/O P-5'-P-CCNC: 10 U/L (ref 10–44)
ALT SERPL W/O P-5'-P-CCNC: 11 U/L (ref 10–44)
ANION GAP SERPL CALC-SCNC: 11 MMOL/L (ref 8–16)
ANION GAP SERPL CALC-SCNC: 12 MMOL/L (ref 8–16)
AST SERPL-CCNC: 18 U/L (ref 10–40)
AST SERPL-CCNC: 19 U/L (ref 10–40)
BASOPHILS # BLD AUTO: 0.03 K/UL (ref 0–0.2)
BASOPHILS # BLD AUTO: 0.03 K/UL (ref 0–0.2)
BASOPHILS NFR BLD: 0.5 % (ref 0–1.9)
BASOPHILS NFR BLD: 0.5 % (ref 0–1.9)
BILIRUB SERPL-MCNC: 0.6 MG/DL (ref 0.1–1)
BILIRUB SERPL-MCNC: 0.7 MG/DL (ref 0.1–1)
BNP SERPL-MCNC: 1694 PG/ML (ref 0–99)
BUN SERPL-MCNC: 35 MG/DL (ref 8–23)
BUN SERPL-MCNC: 39 MG/DL (ref 8–23)
CALCIUM SERPL-MCNC: 10.4 MG/DL (ref 8.7–10.5)
CALCIUM SERPL-MCNC: 9.8 MG/DL (ref 8.7–10.5)
CHLORIDE SERPL-SCNC: 100 MMOL/L (ref 95–110)
CHLORIDE SERPL-SCNC: 102 MMOL/L (ref 95–110)
CO2 SERPL-SCNC: 28 MMOL/L (ref 23–29)
CO2 SERPL-SCNC: 29 MMOL/L (ref 23–29)
CREAT SERPL-MCNC: 1.5 MG/DL (ref 0.5–1.4)
CREAT SERPL-MCNC: 1.5 MG/DL (ref 0.5–1.4)
DIFFERENTIAL METHOD: ABNORMAL
DIFFERENTIAL METHOD: ABNORMAL
EOSINOPHIL # BLD AUTO: 0.1 K/UL (ref 0–0.5)
EOSINOPHIL # BLD AUTO: 0.2 K/UL (ref 0–0.5)
EOSINOPHIL NFR BLD: 2.1 % (ref 0–8)
EOSINOPHIL NFR BLD: 2.4 % (ref 0–8)
ERYTHROCYTE [DISTWIDTH] IN BLOOD BY AUTOMATED COUNT: 13.9 % (ref 11.5–14.5)
ERYTHROCYTE [DISTWIDTH] IN BLOOD BY AUTOMATED COUNT: 14 % (ref 11.5–14.5)
EST. GFR  (AFRICAN AMERICAN): 36 ML/MIN/1.73 M^2
EST. GFR  (AFRICAN AMERICAN): 36 ML/MIN/1.73 M^2
EST. GFR  (NON AFRICAN AMERICAN): 32 ML/MIN/1.73 M^2
EST. GFR  (NON AFRICAN AMERICAN): 32 ML/MIN/1.73 M^2
ESTIMATED AVG GLUCOSE: 108 MG/DL (ref 68–131)
GLUCOSE SERPL-MCNC: 97 MG/DL (ref 70–110)
GLUCOSE SERPL-MCNC: 98 MG/DL (ref 70–110)
HBA1C MFR BLD: 5.4 % (ref 4–5.6)
HCT VFR BLD AUTO: 34.2 % (ref 37–48.5)
HCT VFR BLD AUTO: 36.2 % (ref 37–48.5)
HGB BLD-MCNC: 11 G/DL (ref 12–16)
HGB BLD-MCNC: 11.8 G/DL (ref 12–16)
IMM GRANULOCYTES # BLD AUTO: 0.02 K/UL (ref 0–0.04)
IMM GRANULOCYTES # BLD AUTO: 0.02 K/UL (ref 0–0.04)
IMM GRANULOCYTES NFR BLD AUTO: 0.3 % (ref 0–0.5)
IMM GRANULOCYTES NFR BLD AUTO: 0.3 % (ref 0–0.5)
LYMPHOCYTES # BLD AUTO: 1.1 K/UL (ref 1–4.8)
LYMPHOCYTES # BLD AUTO: 1.3 K/UL (ref 1–4.8)
LYMPHOCYTES NFR BLD: 18 % (ref 18–48)
LYMPHOCYTES NFR BLD: 20 % (ref 18–48)
MCH RBC QN AUTO: 31.4 PG (ref 27–31)
MCH RBC QN AUTO: 31.5 PG (ref 27–31)
MCHC RBC AUTO-ENTMCNC: 32.2 G/DL (ref 32–36)
MCHC RBC AUTO-ENTMCNC: 32.6 G/DL (ref 32–36)
MCV RBC AUTO: 97 FL (ref 82–98)
MCV RBC AUTO: 98 FL (ref 82–98)
MONOCYTES # BLD AUTO: 0.8 K/UL (ref 0.3–1)
MONOCYTES # BLD AUTO: 0.8 K/UL (ref 0.3–1)
MONOCYTES NFR BLD: 12.3 % (ref 4–15)
MONOCYTES NFR BLD: 13 % (ref 4–15)
NEUTROPHILS # BLD AUTO: 4.1 K/UL (ref 1.8–7.7)
NEUTROPHILS # BLD AUTO: 4.3 K/UL (ref 1.8–7.7)
NEUTROPHILS NFR BLD: 64.5 % (ref 38–73)
NEUTROPHILS NFR BLD: 66.1 % (ref 38–73)
NRBC BLD-RTO: 0 /100 WBC
NRBC BLD-RTO: 0 /100 WBC
PLATELET # BLD AUTO: 135 K/UL (ref 150–450)
PLATELET # BLD AUTO: 149 K/UL (ref 150–450)
PMV BLD AUTO: 10.3 FL (ref 9.2–12.9)
PMV BLD AUTO: 10.6 FL (ref 9.2–12.9)
POTASSIUM SERPL-SCNC: 3.6 MMOL/L (ref 3.5–5.1)
POTASSIUM SERPL-SCNC: 3.8 MMOL/L (ref 3.5–5.1)
PROT SERPL-MCNC: 7.1 G/DL (ref 6–8.4)
PROT SERPL-MCNC: 7.7 G/DL (ref 6–8.4)
RBC # BLD AUTO: 3.5 M/UL (ref 4–5.4)
RBC # BLD AUTO: 3.75 M/UL (ref 4–5.4)
SODIUM SERPL-SCNC: 140 MMOL/L (ref 136–145)
SODIUM SERPL-SCNC: 142 MMOL/L (ref 136–145)
TROPONIN I SERPL DL<=0.01 NG/ML-MCNC: 0.1 NG/ML (ref 0–0.03)
WBC # BLD AUTO: 6.17 K/UL (ref 3.9–12.7)
WBC # BLD AUTO: 6.6 K/UL (ref 3.9–12.7)

## 2021-06-07 PROCEDURE — 96374 THER/PROPH/DIAG INJ IV PUSH: CPT

## 2021-06-07 PROCEDURE — 93010 EKG 12-LEAD: ICD-10-PCS | Mod: ,,, | Performed by: INTERNAL MEDICINE

## 2021-06-07 PROCEDURE — 25000003 PHARM REV CODE 250: Performed by: HOSPITALIST

## 2021-06-07 PROCEDURE — 83880 ASSAY OF NATRIURETIC PEPTIDE: CPT | Performed by: NURSE PRACTITIONER

## 2021-06-07 PROCEDURE — 94761 N-INVAS EAR/PLS OXIMETRY MLT: CPT

## 2021-06-07 PROCEDURE — 85025 COMPLETE CBC W/AUTO DIFF WBC: CPT | Performed by: NURSE PRACTITIONER

## 2021-06-07 PROCEDURE — 93005 ELECTROCARDIOGRAM TRACING: CPT

## 2021-06-07 PROCEDURE — 36415 COLL VENOUS BLD VENIPUNCTURE: CPT | Performed by: NURSE PRACTITIONER

## 2021-06-07 PROCEDURE — G0378 HOSPITAL OBSERVATION PER HR: HCPCS

## 2021-06-07 PROCEDURE — 99285 EMERGENCY DEPT VISIT HI MDM: CPT | Mod: 25

## 2021-06-07 PROCEDURE — 93010 ELECTROCARDIOGRAM REPORT: CPT | Mod: ,,, | Performed by: INTERNAL MEDICINE

## 2021-06-07 PROCEDURE — 84484 ASSAY OF TROPONIN QUANT: CPT | Performed by: NURSE PRACTITIONER

## 2021-06-07 PROCEDURE — 25000003 PHARM REV CODE 250: Performed by: NURSE PRACTITIONER

## 2021-06-07 PROCEDURE — 85025 COMPLETE CBC W/AUTO DIFF WBC: CPT | Mod: 91 | Performed by: NURSE PRACTITIONER

## 2021-06-07 PROCEDURE — 80053 COMPREHEN METABOLIC PANEL: CPT | Performed by: NURSE PRACTITIONER

## 2021-06-07 PROCEDURE — 80053 COMPREHEN METABOLIC PANEL: CPT | Mod: 91 | Performed by: NURSE PRACTITIONER

## 2021-06-07 RX ADMIN — POLYETHYLENE GLYCOL 3350 17 G: 17 POWDER, FOR SOLUTION ORAL at 08:06

## 2021-06-07 RX ADMIN — LOVASTATIN 40 MG: 20 TABLET ORAL at 12:06

## 2021-06-07 RX ADMIN — ISOSORBIDE DINITRATE: 20 TABLET ORAL at 08:06

## 2021-06-08 VITALS
TEMPERATURE: 98 F | WEIGHT: 162 LBS | HEART RATE: 79 BPM | OXYGEN SATURATION: 97 % | SYSTOLIC BLOOD PRESSURE: 137 MMHG | DIASTOLIC BLOOD PRESSURE: 82 MMHG | RESPIRATION RATE: 18 BRPM | HEIGHT: 63 IN | BODY MASS INDEX: 28.7 KG/M2

## 2021-06-08 LAB — TROPONIN I SERPL DL<=0.01 NG/ML-MCNC: 0.12 NG/ML (ref 0–0.03)

## 2021-06-08 PROCEDURE — 63600175 PHARM REV CODE 636 W HCPCS: Performed by: EMERGENCY MEDICINE

## 2021-06-08 PROCEDURE — 84484 ASSAY OF TROPONIN QUANT: CPT | Performed by: EMERGENCY MEDICINE

## 2021-06-08 RX ORDER — FUROSEMIDE 10 MG/ML
40 INJECTION INTRAMUSCULAR; INTRAVENOUS
Status: COMPLETED | OUTPATIENT
Start: 2021-06-08 | End: 2021-06-08

## 2021-06-08 RX ADMIN — FUROSEMIDE 40 MG: 10 INJECTION, SOLUTION INTRAVENOUS at 01:06

## 2021-06-09 ENCOUNTER — PES CALL (OUTPATIENT)
Dept: ADMINISTRATIVE | Facility: CLINIC | Age: 86
End: 2021-06-09

## 2021-06-10 ENCOUNTER — OFFICE VISIT (OUTPATIENT)
Dept: CARDIOLOGY | Facility: CLINIC | Age: 86
End: 2021-06-10
Payer: MEDICARE

## 2021-06-10 VITALS
BODY MASS INDEX: 28.13 KG/M2 | HEART RATE: 61 BPM | OXYGEN SATURATION: 97 % | DIASTOLIC BLOOD PRESSURE: 53 MMHG | WEIGHT: 158.75 LBS | SYSTOLIC BLOOD PRESSURE: 106 MMHG | HEIGHT: 63 IN

## 2021-06-10 DIAGNOSIS — E78.2 MIXED HYPERLIPIDEMIA: Chronic | ICD-10-CM

## 2021-06-10 DIAGNOSIS — I50.42 CHRONIC COMBINED SYSTOLIC AND DIASTOLIC HEART FAILURE: Chronic | ICD-10-CM

## 2021-06-10 DIAGNOSIS — Z95.810 ICD (IMPLANTABLE CARDIOVERTER-DEFIBRILLATOR), BIVENTRICULAR, IN SITU: Chronic | ICD-10-CM

## 2021-06-10 DIAGNOSIS — R06.02 SHORTNESS OF BREATH: ICD-10-CM

## 2021-06-10 DIAGNOSIS — I20.89 OTHER FORMS OF ANGINA PECTORIS: ICD-10-CM

## 2021-06-10 DIAGNOSIS — I10 ESSENTIAL HYPERTENSION: Primary | Chronic | ICD-10-CM

## 2021-06-10 PROCEDURE — 3078F DIAST BP <80 MM HG: CPT | Mod: CPTII,S$GLB,, | Performed by: INTERNAL MEDICINE

## 2021-06-10 PROCEDURE — 99214 PR OFFICE/OUTPT VISIT, EST, LEVL IV, 30-39 MIN: ICD-10-PCS | Mod: S$GLB,,, | Performed by: INTERNAL MEDICINE

## 2021-06-10 PROCEDURE — 99999 PR PBB SHADOW E&M-EST. PATIENT-LVL IV: ICD-10-PCS | Mod: PBBFAC,,, | Performed by: INTERNAL MEDICINE

## 2021-06-10 PROCEDURE — 3078F PR MOST RECENT DIASTOLIC BLOOD PRESSURE < 80 MM HG: ICD-10-PCS | Mod: CPTII,S$GLB,, | Performed by: INTERNAL MEDICINE

## 2021-06-10 PROCEDURE — 1101F PT FALLS ASSESS-DOCD LE1/YR: CPT | Mod: CPTII,S$GLB,, | Performed by: INTERNAL MEDICINE

## 2021-06-10 PROCEDURE — 99214 OFFICE O/P EST MOD 30 MIN: CPT | Mod: S$GLB,,, | Performed by: INTERNAL MEDICINE

## 2021-06-10 PROCEDURE — 1159F MED LIST DOCD IN RCRD: CPT | Mod: S$GLB,,, | Performed by: INTERNAL MEDICINE

## 2021-06-10 PROCEDURE — 1101F PR PT FALLS ASSESS DOC 0-1 FALLS W/OUT INJ PAST YR: ICD-10-PCS | Mod: CPTII,S$GLB,, | Performed by: INTERNAL MEDICINE

## 2021-06-10 PROCEDURE — 3288F PR FALLS RISK ASSESSMENT DOCUMENTED: ICD-10-PCS | Mod: CPTII,S$GLB,, | Performed by: INTERNAL MEDICINE

## 2021-06-10 PROCEDURE — 1159F PR MEDICATION LIST DOCUMENTED IN MEDICAL RECORD: ICD-10-PCS | Mod: S$GLB,,, | Performed by: INTERNAL MEDICINE

## 2021-06-10 PROCEDURE — 3074F PR MOST RECENT SYSTOLIC BLOOD PRESSURE < 130 MM HG: ICD-10-PCS | Mod: CPTII,S$GLB,, | Performed by: INTERNAL MEDICINE

## 2021-06-10 PROCEDURE — 3074F SYST BP LT 130 MM HG: CPT | Mod: CPTII,S$GLB,, | Performed by: INTERNAL MEDICINE

## 2021-06-10 PROCEDURE — 1126F AMNT PAIN NOTED NONE PRSNT: CPT | Mod: S$GLB,,, | Performed by: INTERNAL MEDICINE

## 2021-06-10 PROCEDURE — 1126F PR PAIN SEVERITY QUANTIFIED, NO PAIN PRESENT: ICD-10-PCS | Mod: S$GLB,,, | Performed by: INTERNAL MEDICINE

## 2021-06-10 PROCEDURE — 99999 PR PBB SHADOW E&M-EST. PATIENT-LVL IV: CPT | Mod: PBBFAC,,, | Performed by: INTERNAL MEDICINE

## 2021-06-10 PROCEDURE — 3288F FALL RISK ASSESSMENT DOCD: CPT | Mod: CPTII,S$GLB,, | Performed by: INTERNAL MEDICINE

## 2021-07-08 ENCOUNTER — LAB VISIT (OUTPATIENT)
Dept: LAB | Facility: HOSPITAL | Age: 86
End: 2021-07-08
Attending: INTERNAL MEDICINE
Payer: MEDICARE

## 2021-07-08 DIAGNOSIS — I10 ESSENTIAL HYPERTENSION: Chronic | ICD-10-CM

## 2021-07-08 DIAGNOSIS — I20.89 OTHER FORMS OF ANGINA PECTORIS: ICD-10-CM

## 2021-07-08 DIAGNOSIS — E78.2 MIXED HYPERLIPIDEMIA: Chronic | ICD-10-CM

## 2021-07-08 DIAGNOSIS — R06.02 SHORTNESS OF BREATH: ICD-10-CM

## 2021-07-08 DIAGNOSIS — Z95.810 ICD (IMPLANTABLE CARDIOVERTER-DEFIBRILLATOR), BIVENTRICULAR, IN SITU: Chronic | ICD-10-CM

## 2021-07-08 DIAGNOSIS — I50.42 CHRONIC COMBINED SYSTOLIC AND DIASTOLIC HEART FAILURE: Chronic | ICD-10-CM

## 2021-07-08 LAB
ANION GAP SERPL CALC-SCNC: 5 MMOL/L (ref 8–16)
BNP SERPL-MCNC: 2563 PG/ML (ref 0–99)
BUN SERPL-MCNC: 30 MG/DL (ref 8–23)
CALCIUM SERPL-MCNC: 9.7 MG/DL (ref 8.7–10.5)
CHLORIDE SERPL-SCNC: 105 MMOL/L (ref 95–110)
CO2 SERPL-SCNC: 30 MMOL/L (ref 23–29)
CREAT SERPL-MCNC: 1.5 MG/DL (ref 0.5–1.4)
EST. GFR  (AFRICAN AMERICAN): 36 ML/MIN/1.73 M^2
EST. GFR  (NON AFRICAN AMERICAN): 32 ML/MIN/1.73 M^2
GLUCOSE SERPL-MCNC: 93 MG/DL (ref 70–110)
POTASSIUM SERPL-SCNC: 4.3 MMOL/L (ref 3.5–5.1)
SODIUM SERPL-SCNC: 140 MMOL/L (ref 136–145)

## 2021-07-08 PROCEDURE — 80048 BASIC METABOLIC PNL TOTAL CA: CPT | Performed by: INTERNAL MEDICINE

## 2021-07-08 PROCEDURE — 36415 COLL VENOUS BLD VENIPUNCTURE: CPT | Performed by: INTERNAL MEDICINE

## 2021-07-08 PROCEDURE — 83880 ASSAY OF NATRIURETIC PEPTIDE: CPT | Performed by: INTERNAL MEDICINE

## 2021-07-14 ENCOUNTER — OFFICE VISIT (OUTPATIENT)
Dept: CARDIOLOGY | Facility: CLINIC | Age: 86
End: 2021-07-14
Payer: MEDICARE

## 2021-07-14 VITALS
OXYGEN SATURATION: 97 % | DIASTOLIC BLOOD PRESSURE: 90 MMHG | BODY MASS INDEX: 27.93 KG/M2 | SYSTOLIC BLOOD PRESSURE: 128 MMHG | HEIGHT: 63 IN | WEIGHT: 157.63 LBS | HEART RATE: 69 BPM

## 2021-07-14 DIAGNOSIS — I10 ESSENTIAL HYPERTENSION: Primary | Chronic | ICD-10-CM

## 2021-07-14 DIAGNOSIS — I20.89 OTHER FORMS OF ANGINA PECTORIS: ICD-10-CM

## 2021-07-14 DIAGNOSIS — Z95.810 ICD (IMPLANTABLE CARDIOVERTER-DEFIBRILLATOR), BIVENTRICULAR, IN SITU: Chronic | ICD-10-CM

## 2021-07-14 DIAGNOSIS — E78.2 MIXED HYPERLIPIDEMIA: Chronic | ICD-10-CM

## 2021-07-14 DIAGNOSIS — R06.02 SOB (SHORTNESS OF BREATH): ICD-10-CM

## 2021-07-14 DIAGNOSIS — I25.10 CORONARY ARTERY DISEASE INVOLVING NATIVE CORONARY ARTERY OF NATIVE HEART WITHOUT ANGINA PECTORIS: ICD-10-CM

## 2021-07-14 DIAGNOSIS — I50.42 CHRONIC COMBINED SYSTOLIC AND DIASTOLIC HEART FAILURE: Chronic | ICD-10-CM

## 2021-07-14 PROCEDURE — 1159F MED LIST DOCD IN RCRD: CPT | Mod: S$GLB,,, | Performed by: INTERNAL MEDICINE

## 2021-07-14 PROCEDURE — 99999 PR PBB SHADOW E&M-EST. PATIENT-LVL IV: CPT | Mod: PBBFAC,,, | Performed by: INTERNAL MEDICINE

## 2021-07-14 PROCEDURE — 99999 PR PBB SHADOW E&M-EST. PATIENT-LVL IV: ICD-10-PCS | Mod: PBBFAC,,, | Performed by: INTERNAL MEDICINE

## 2021-07-14 PROCEDURE — 1126F AMNT PAIN NOTED NONE PRSNT: CPT | Mod: S$GLB,,, | Performed by: INTERNAL MEDICINE

## 2021-07-14 PROCEDURE — 1126F PR PAIN SEVERITY QUANTIFIED, NO PAIN PRESENT: ICD-10-PCS | Mod: S$GLB,,, | Performed by: INTERNAL MEDICINE

## 2021-07-14 PROCEDURE — 1101F PR PT FALLS ASSESS DOC 0-1 FALLS W/OUT INJ PAST YR: ICD-10-PCS | Mod: CPTII,S$GLB,, | Performed by: INTERNAL MEDICINE

## 2021-07-14 PROCEDURE — 1159F PR MEDICATION LIST DOCUMENTED IN MEDICAL RECORD: ICD-10-PCS | Mod: S$GLB,,, | Performed by: INTERNAL MEDICINE

## 2021-07-14 PROCEDURE — 99214 OFFICE O/P EST MOD 30 MIN: CPT | Mod: S$GLB,,, | Performed by: INTERNAL MEDICINE

## 2021-07-14 PROCEDURE — 3288F FALL RISK ASSESSMENT DOCD: CPT | Mod: CPTII,S$GLB,, | Performed by: INTERNAL MEDICINE

## 2021-07-14 PROCEDURE — 1101F PT FALLS ASSESS-DOCD LE1/YR: CPT | Mod: CPTII,S$GLB,, | Performed by: INTERNAL MEDICINE

## 2021-07-14 PROCEDURE — 3288F PR FALLS RISK ASSESSMENT DOCUMENTED: ICD-10-PCS | Mod: CPTII,S$GLB,, | Performed by: INTERNAL MEDICINE

## 2021-07-14 PROCEDURE — 99214 PR OFFICE/OUTPT VISIT, EST, LEVL IV, 30-39 MIN: ICD-10-PCS | Mod: S$GLB,,, | Performed by: INTERNAL MEDICINE

## 2021-08-06 ENCOUNTER — TELEPHONE (OUTPATIENT)
Dept: CARDIOLOGY | Facility: HOSPITAL | Age: 86
End: 2021-08-06

## 2021-08-12 ENCOUNTER — OFFICE VISIT (OUTPATIENT)
Dept: CARDIOLOGY | Facility: CLINIC | Age: 86
End: 2021-08-12
Payer: MEDICARE

## 2021-08-12 VITALS
HEART RATE: 78 BPM | OXYGEN SATURATION: 99 % | SYSTOLIC BLOOD PRESSURE: 114 MMHG | BODY MASS INDEX: 27.93 KG/M2 | HEIGHT: 63 IN | WEIGHT: 157.63 LBS | DIASTOLIC BLOOD PRESSURE: 66 MMHG

## 2021-08-12 DIAGNOSIS — I50.42 CHRONIC COMBINED SYSTOLIC AND DIASTOLIC HEART FAILURE: Chronic | ICD-10-CM

## 2021-08-12 DIAGNOSIS — I10 ESSENTIAL HYPERTENSION: Chronic | ICD-10-CM

## 2021-08-12 DIAGNOSIS — I20.89 OTHER FORMS OF ANGINA PECTORIS: ICD-10-CM

## 2021-08-12 DIAGNOSIS — Z95.810 ICD (IMPLANTABLE CARDIOVERTER-DEFIBRILLATOR), BIVENTRICULAR, IN SITU: Primary | Chronic | ICD-10-CM

## 2021-08-12 DIAGNOSIS — R06.02 SHORTNESS OF BREATH: ICD-10-CM

## 2021-08-12 DIAGNOSIS — E78.2 MIXED HYPERLIPIDEMIA: Chronic | ICD-10-CM

## 2021-08-12 PROCEDURE — 99999 PR PBB SHADOW E&M-EST. PATIENT-LVL IV: CPT | Mod: PBBFAC,,, | Performed by: INTERNAL MEDICINE

## 2021-08-12 PROCEDURE — 1159F PR MEDICATION LIST DOCUMENTED IN MEDICAL RECORD: ICD-10-PCS | Mod: CPTII,S$GLB,, | Performed by: INTERNAL MEDICINE

## 2021-08-12 PROCEDURE — 99214 OFFICE O/P EST MOD 30 MIN: CPT | Mod: S$GLB,,, | Performed by: INTERNAL MEDICINE

## 2021-08-12 PROCEDURE — 3288F FALL RISK ASSESSMENT DOCD: CPT | Mod: CPTII,S$GLB,, | Performed by: INTERNAL MEDICINE

## 2021-08-12 PROCEDURE — 1126F PR PAIN SEVERITY QUANTIFIED, NO PAIN PRESENT: ICD-10-PCS | Mod: CPTII,S$GLB,, | Performed by: INTERNAL MEDICINE

## 2021-08-12 PROCEDURE — 1159F MED LIST DOCD IN RCRD: CPT | Mod: CPTII,S$GLB,, | Performed by: INTERNAL MEDICINE

## 2021-08-12 PROCEDURE — 1101F PT FALLS ASSESS-DOCD LE1/YR: CPT | Mod: CPTII,S$GLB,, | Performed by: INTERNAL MEDICINE

## 2021-08-12 PROCEDURE — 1101F PR PT FALLS ASSESS DOC 0-1 FALLS W/OUT INJ PAST YR: ICD-10-PCS | Mod: CPTII,S$GLB,, | Performed by: INTERNAL MEDICINE

## 2021-08-12 PROCEDURE — 1160F RVW MEDS BY RX/DR IN RCRD: CPT | Mod: CPTII,S$GLB,, | Performed by: INTERNAL MEDICINE

## 2021-08-12 PROCEDURE — 99999 PR PBB SHADOW E&M-EST. PATIENT-LVL IV: ICD-10-PCS | Mod: PBBFAC,,, | Performed by: INTERNAL MEDICINE

## 2021-08-12 PROCEDURE — 1126F AMNT PAIN NOTED NONE PRSNT: CPT | Mod: CPTII,S$GLB,, | Performed by: INTERNAL MEDICINE

## 2021-08-12 PROCEDURE — 99214 PR OFFICE/OUTPT VISIT, EST, LEVL IV, 30-39 MIN: ICD-10-PCS | Mod: S$GLB,,, | Performed by: INTERNAL MEDICINE

## 2021-08-12 PROCEDURE — 1160F PR REVIEW ALL MEDS BY PRESCRIBER/CLIN PHARMACIST DOCUMENTED: ICD-10-PCS | Mod: CPTII,S$GLB,, | Performed by: INTERNAL MEDICINE

## 2021-08-12 PROCEDURE — 3288F PR FALLS RISK ASSESSMENT DOCUMENTED: ICD-10-PCS | Mod: CPTII,S$GLB,, | Performed by: INTERNAL MEDICINE

## 2021-08-12 RX ORDER — FUROSEMIDE 80 MG/1
80 TABLET ORAL 2 TIMES DAILY PRN
Qty: 180 TABLET | Refills: 3 | Status: SHIPPED | OUTPATIENT
Start: 2021-08-12 | End: 2022-10-07 | Stop reason: SDUPTHER

## 2021-11-05 DIAGNOSIS — Z12.31 ENCOUNTER FOR SCREENING MAMMOGRAM FOR BREAST CANCER: Primary | ICD-10-CM

## 2021-11-23 ENCOUNTER — HOSPITAL ENCOUNTER (OUTPATIENT)
Dept: RADIOLOGY | Facility: HOSPITAL | Age: 86
Discharge: HOME | End: 2021-11-23
Payer: MEDICARE

## 2021-11-23 VITALS — WEIGHT: 157.63 LBS | BODY MASS INDEX: 27.93 KG/M2 | HEIGHT: 63 IN

## 2021-11-23 DIAGNOSIS — Z12.31 ENCOUNTER FOR SCREENING MAMMOGRAM FOR BREAST CANCER: ICD-10-CM

## 2021-11-23 PROCEDURE — 77063 MAMMO DIGITAL SCREENING BILAT WITH TOMO: ICD-10-PCS | Mod: 26,,, | Performed by: RADIOLOGY

## 2021-11-23 PROCEDURE — 77067 MAMMO DIGITAL SCREENING BILAT WITH TOMO: ICD-10-PCS | Mod: 26,,, | Performed by: RADIOLOGY

## 2021-11-23 PROCEDURE — 77067 SCR MAMMO BI INCL CAD: CPT | Mod: TC

## 2021-11-23 PROCEDURE — 77067 SCR MAMMO BI INCL CAD: CPT | Mod: 26,,, | Performed by: RADIOLOGY

## 2021-11-23 PROCEDURE — 77063 BREAST TOMOSYNTHESIS BI: CPT | Mod: 26,,, | Performed by: RADIOLOGY

## 2021-11-26 ENCOUNTER — OFFICE VISIT (OUTPATIENT)
Dept: OPHTHALMOLOGY | Facility: CLINIC | Age: 86
End: 2021-11-26
Payer: MEDICARE

## 2021-11-26 DIAGNOSIS — H02.826 CYST OF EYELID, LEFT: ICD-10-CM

## 2021-11-26 DIAGNOSIS — I10 ESSENTIAL HYPERTENSION: ICD-10-CM

## 2021-11-26 DIAGNOSIS — H26.491 PCO (POSTERIOR CAPSULAR OPACIFICATION), RIGHT: Primary | ICD-10-CM

## 2021-11-26 DIAGNOSIS — Z96.1 PSEUDOPHAKIA: ICD-10-CM

## 2021-11-26 PROCEDURE — 92014 COMPRE OPH EXAM EST PT 1/>: CPT | Mod: S$GLB,,, | Performed by: OPHTHALMOLOGY

## 2021-11-26 PROCEDURE — 92014 PR EYE EXAM, EST PATIENT,COMPREHESV: ICD-10-PCS | Mod: S$GLB,,, | Performed by: OPHTHALMOLOGY

## 2021-11-26 PROCEDURE — 99999 PR PBB SHADOW E&M-EST. PATIENT-LVL III: CPT | Mod: PBBFAC,,, | Performed by: OPHTHALMOLOGY

## 2021-11-26 PROCEDURE — 99999 PR PBB SHADOW E&M-EST. PATIENT-LVL III: ICD-10-PCS | Mod: PBBFAC,,, | Performed by: OPHTHALMOLOGY

## 2021-11-26 RX ORDER — SUCRALFATE 1 G/1
TABLET ORAL
COMMUNITY
Start: 2021-11-10 | End: 2022-04-01

## 2021-11-26 RX ORDER — GABAPENTIN 300 MG/1
100 CAPSULE ORAL 2 TIMES DAILY
COMMUNITY
Start: 2021-06-25 | End: 2022-12-02 | Stop reason: DRUGHIGH

## 2021-11-26 RX ORDER — CLINDAMYCIN HYDROCHLORIDE 300 MG/1
CAPSULE ORAL
COMMUNITY
End: 2022-04-01

## 2021-11-26 RX ORDER — CRANBERRY CONC/C/BACILL COAG 250-30-15
TABLET ORAL
COMMUNITY
End: 2022-04-01

## 2021-12-01 ENCOUNTER — OFFICE VISIT (OUTPATIENT)
Dept: CARDIOLOGY | Facility: CLINIC | Age: 86
End: 2021-12-01
Payer: MEDICARE

## 2021-12-01 ENCOUNTER — NURSE TRIAGE (OUTPATIENT)
Dept: ADMINISTRATIVE | Facility: CLINIC | Age: 86
End: 2021-12-01
Payer: MEDICARE

## 2021-12-01 VITALS — SYSTOLIC BLOOD PRESSURE: 119 MMHG | DIASTOLIC BLOOD PRESSURE: 71 MMHG | HEART RATE: 65 BPM

## 2021-12-01 DIAGNOSIS — I10 ESSENTIAL HYPERTENSION: Chronic | ICD-10-CM

## 2021-12-01 DIAGNOSIS — I50.42 CHRONIC COMBINED SYSTOLIC AND DIASTOLIC HEART FAILURE: Chronic | ICD-10-CM

## 2021-12-01 DIAGNOSIS — Z95.810 ICD (IMPLANTABLE CARDIOVERTER-DEFIBRILLATOR), BIVENTRICULAR, IN SITU: Primary | Chronic | ICD-10-CM

## 2021-12-01 DIAGNOSIS — I50.23 ACUTE ON CHRONIC SYSTOLIC CONGESTIVE HEART FAILURE: ICD-10-CM

## 2021-12-01 DIAGNOSIS — R06.02 SOB (SHORTNESS OF BREATH): ICD-10-CM

## 2021-12-01 DIAGNOSIS — E78.2 MIXED HYPERLIPIDEMIA: Chronic | ICD-10-CM

## 2021-12-01 PROCEDURE — 99214 OFFICE O/P EST MOD 30 MIN: CPT | Mod: S$GLB,,, | Performed by: INTERNAL MEDICINE

## 2021-12-01 PROCEDURE — 93000 EKG 12-LEAD: ICD-10-PCS | Mod: S$GLB,,, | Performed by: INTERNAL MEDICINE

## 2021-12-01 PROCEDURE — 99214 PR OFFICE/OUTPT VISIT, EST, LEVL IV, 30-39 MIN: ICD-10-PCS | Mod: S$GLB,,, | Performed by: INTERNAL MEDICINE

## 2021-12-01 PROCEDURE — 99999 PR PBB SHADOW E&M-EST. PATIENT-LVL III: CPT | Mod: PBBFAC,,, | Performed by: INTERNAL MEDICINE

## 2021-12-01 PROCEDURE — 99999 PR PBB SHADOW E&M-EST. PATIENT-LVL III: ICD-10-PCS | Mod: PBBFAC,,, | Performed by: INTERNAL MEDICINE

## 2021-12-01 PROCEDURE — 93000 ELECTROCARDIOGRAM COMPLETE: CPT | Mod: S$GLB,,, | Performed by: INTERNAL MEDICINE

## 2021-12-03 ENCOUNTER — LAB VISIT (OUTPATIENT)
Dept: LAB | Facility: HOSPITAL | Age: 86
End: 2021-12-03
Attending: INTERNAL MEDICINE
Payer: MEDICARE

## 2021-12-03 DIAGNOSIS — I50.23 ACUTE ON CHRONIC SYSTOLIC CONGESTIVE HEART FAILURE: ICD-10-CM

## 2021-12-03 DIAGNOSIS — I10 ESSENTIAL HYPERTENSION: Chronic | ICD-10-CM

## 2021-12-03 DIAGNOSIS — I50.42 CHRONIC COMBINED SYSTOLIC AND DIASTOLIC HEART FAILURE: Chronic | ICD-10-CM

## 2021-12-03 DIAGNOSIS — Z95.810 ICD (IMPLANTABLE CARDIOVERTER-DEFIBRILLATOR), BIVENTRICULAR, IN SITU: Chronic | ICD-10-CM

## 2021-12-03 DIAGNOSIS — R06.02 SOB (SHORTNESS OF BREATH): ICD-10-CM

## 2021-12-03 DIAGNOSIS — E78.2 MIXED HYPERLIPIDEMIA: Chronic | ICD-10-CM

## 2021-12-03 LAB
ANION GAP SERPL CALC-SCNC: 9 MMOL/L (ref 8–16)
BNP SERPL-MCNC: 2319 PG/ML (ref 0–99)
BUN SERPL-MCNC: 41 MG/DL (ref 8–23)
CALCIUM SERPL-MCNC: 9.8 MG/DL (ref 8.7–10.5)
CHLORIDE SERPL-SCNC: 101 MMOL/L (ref 95–110)
CO2 SERPL-SCNC: 31 MMOL/L (ref 23–29)
CREAT SERPL-MCNC: 1.7 MG/DL (ref 0.5–1.4)
EST. GFR  (AFRICAN AMERICAN): 31 ML/MIN/1.73 M^2
EST. GFR  (NON AFRICAN AMERICAN): 27 ML/MIN/1.73 M^2
GLUCOSE SERPL-MCNC: 98 MG/DL (ref 70–110)
POTASSIUM SERPL-SCNC: 4.2 MMOL/L (ref 3.5–5.1)
SODIUM SERPL-SCNC: 141 MMOL/L (ref 136–145)

## 2021-12-03 PROCEDURE — 83880 ASSAY OF NATRIURETIC PEPTIDE: CPT | Performed by: INTERNAL MEDICINE

## 2021-12-03 PROCEDURE — 80048 BASIC METABOLIC PNL TOTAL CA: CPT | Performed by: INTERNAL MEDICINE

## 2021-12-03 PROCEDURE — 36415 COLL VENOUS BLD VENIPUNCTURE: CPT | Performed by: INTERNAL MEDICINE

## 2021-12-08 ENCOUNTER — OFFICE VISIT (OUTPATIENT)
Dept: CARDIOLOGY | Facility: CLINIC | Age: 86
End: 2021-12-08
Payer: MEDICARE

## 2021-12-08 VITALS
HEIGHT: 63 IN | DIASTOLIC BLOOD PRESSURE: 66 MMHG | OXYGEN SATURATION: 95 % | BODY MASS INDEX: 27.82 KG/M2 | SYSTOLIC BLOOD PRESSURE: 130 MMHG | HEART RATE: 90 BPM | WEIGHT: 157 LBS

## 2021-12-08 DIAGNOSIS — I25.10 CORONARY ARTERY DISEASE INVOLVING NATIVE CORONARY ARTERY OF NATIVE HEART WITHOUT ANGINA PECTORIS: ICD-10-CM

## 2021-12-08 DIAGNOSIS — R06.02 SOB (SHORTNESS OF BREATH): ICD-10-CM

## 2021-12-08 DIAGNOSIS — Z95.810 ICD (IMPLANTABLE CARDIOVERTER-DEFIBRILLATOR), BIVENTRICULAR, IN SITU: Chronic | ICD-10-CM

## 2021-12-08 DIAGNOSIS — I10 ESSENTIAL HYPERTENSION: Chronic | ICD-10-CM

## 2021-12-08 DIAGNOSIS — E78.2 MIXED HYPERLIPIDEMIA: Chronic | ICD-10-CM

## 2021-12-08 DIAGNOSIS — I50.42 CHRONIC COMBINED SYSTOLIC AND DIASTOLIC HEART FAILURE: Primary | Chronic | ICD-10-CM

## 2021-12-08 PROCEDURE — 99999 PR PBB SHADOW E&M-EST. PATIENT-LVL IV: ICD-10-PCS | Mod: PBBFAC,,, | Performed by: INTERNAL MEDICINE

## 2021-12-08 PROCEDURE — 99214 OFFICE O/P EST MOD 30 MIN: CPT | Mod: S$GLB,,, | Performed by: INTERNAL MEDICINE

## 2021-12-08 PROCEDURE — 99999 PR PBB SHADOW E&M-EST. PATIENT-LVL IV: CPT | Mod: PBBFAC,,, | Performed by: INTERNAL MEDICINE

## 2021-12-08 PROCEDURE — 99214 PR OFFICE/OUTPT VISIT, EST, LEVL IV, 30-39 MIN: ICD-10-PCS | Mod: S$GLB,,, | Performed by: INTERNAL MEDICINE

## 2022-04-01 ENCOUNTER — HOSPITAL ENCOUNTER (OUTPATIENT)
Facility: HOSPITAL | Age: 87
Discharge: HOME | End: 2022-04-02
Attending: EMERGENCY MEDICINE | Admitting: HOSPITALIST
Payer: MEDICARE

## 2022-04-01 ENCOUNTER — NURSE TRIAGE (OUTPATIENT)
Dept: ADMINISTRATIVE | Facility: CLINIC | Age: 87
End: 2022-04-01
Payer: MEDICARE

## 2022-04-01 DIAGNOSIS — R06.02 SOB (SHORTNESS OF BREATH): ICD-10-CM

## 2022-04-01 DIAGNOSIS — Z86.79 HISTORY OF CHF (CONGESTIVE HEART FAILURE): ICD-10-CM

## 2022-04-01 DIAGNOSIS — I50.9 CONGESTIVE HEART FAILURE, UNSPECIFIED HF CHRONICITY, UNSPECIFIED HEART FAILURE TYPE: ICD-10-CM

## 2022-04-01 DIAGNOSIS — I50.43 ACUTE ON CHRONIC COMBINED SYSTOLIC AND DIASTOLIC CONGESTIVE HEART FAILURE: Primary | ICD-10-CM

## 2022-04-01 PROBLEM — Z71.89 GOALS OF CARE, COUNSELING/DISCUSSION: Status: ACTIVE | Noted: 2022-04-01

## 2022-04-01 LAB
ALBUMIN SERPL BCP-MCNC: 4.1 G/DL (ref 3.5–5.2)
ALP SERPL-CCNC: 109 U/L (ref 55–135)
ALT SERPL W/O P-5'-P-CCNC: 23 U/L (ref 10–44)
ANION GAP SERPL CALC-SCNC: 11 MMOL/L (ref 8–16)
AST SERPL-CCNC: 38 U/L (ref 10–40)
BASOPHILS # BLD AUTO: 0.03 K/UL (ref 0–0.2)
BASOPHILS NFR BLD: 0.5 % (ref 0–1.9)
BILIRUB SERPL-MCNC: 0.8 MG/DL (ref 0.1–1)
BNP SERPL-MCNC: 2511 PG/ML (ref 0–99)
BUN SERPL-MCNC: 44 MG/DL (ref 8–23)
CALCIUM SERPL-MCNC: 9.8 MG/DL (ref 8.7–10.5)
CHLORIDE SERPL-SCNC: 101 MMOL/L (ref 95–110)
CO2 SERPL-SCNC: 24 MMOL/L (ref 23–29)
CREAT SERPL-MCNC: 1.7 MG/DL (ref 0.5–1.4)
CTP QC/QA: YES
DIFFERENTIAL METHOD: ABNORMAL
EOSINOPHIL # BLD AUTO: 0.1 K/UL (ref 0–0.5)
EOSINOPHIL NFR BLD: 1.9 % (ref 0–8)
ERYTHROCYTE [DISTWIDTH] IN BLOOD BY AUTOMATED COUNT: 14.8 % (ref 11.5–14.5)
EST. GFR  (AFRICAN AMERICAN): 31 ML/MIN/1.73 M^2
EST. GFR  (NON AFRICAN AMERICAN): 27 ML/MIN/1.73 M^2
GLUCOSE SERPL-MCNC: 95 MG/DL (ref 70–110)
HCT VFR BLD AUTO: 34.3 % (ref 37–48.5)
HGB BLD-MCNC: 10.9 G/DL (ref 12–16)
IMM GRANULOCYTES # BLD AUTO: 0.01 K/UL (ref 0–0.04)
IMM GRANULOCYTES NFR BLD AUTO: 0.2 % (ref 0–0.5)
LYMPHOCYTES # BLD AUTO: 0.9 K/UL (ref 1–4.8)
LYMPHOCYTES NFR BLD: 13.6 % (ref 18–48)
MAGNESIUM SERPL-MCNC: 2.2 MG/DL (ref 1.6–2.6)
MCH RBC QN AUTO: 31.7 PG (ref 27–31)
MCHC RBC AUTO-ENTMCNC: 31.8 G/DL (ref 32–36)
MCV RBC AUTO: 100 FL (ref 82–98)
MONOCYTES # BLD AUTO: 0.6 K/UL (ref 0.3–1)
MONOCYTES NFR BLD: 10.3 % (ref 4–15)
NEUTROPHILS # BLD AUTO: 4.6 K/UL (ref 1.8–7.7)
NEUTROPHILS NFR BLD: 73.5 % (ref 38–73)
NRBC BLD-RTO: 0 /100 WBC
PLATELET # BLD AUTO: 166 K/UL (ref 150–450)
PMV BLD AUTO: 10.9 FL (ref 9.2–12.9)
POTASSIUM SERPL-SCNC: 4.1 MMOL/L (ref 3.5–5.1)
PROT SERPL-MCNC: 7.6 G/DL (ref 6–8.4)
RBC # BLD AUTO: 3.44 M/UL (ref 4–5.4)
SARS-COV-2 RDRP RESP QL NAA+PROBE: NEGATIVE
SODIUM SERPL-SCNC: 136 MMOL/L (ref 136–145)
TROPONIN I SERPL DL<=0.01 NG/ML-MCNC: 0.23 NG/ML (ref 0–0.03)
TROPONIN I SERPL DL<=0.01 NG/ML-MCNC: 0.25 NG/ML (ref 0–0.03)
WBC # BLD AUTO: 6.24 K/UL (ref 3.9–12.7)

## 2022-04-01 PROCEDURE — 80053 COMPREHEN METABOLIC PANEL: CPT | Performed by: EMERGENCY MEDICINE

## 2022-04-01 PROCEDURE — 63600175 PHARM REV CODE 636 W HCPCS: Performed by: EMERGENCY MEDICINE

## 2022-04-01 PROCEDURE — 93010 ELECTROCARDIOGRAM REPORT: CPT | Mod: ,,, | Performed by: INTERNAL MEDICINE

## 2022-04-01 PROCEDURE — 63600175 PHARM REV CODE 636 W HCPCS: Performed by: HOSPITALIST

## 2022-04-01 PROCEDURE — 83735 ASSAY OF MAGNESIUM: CPT | Performed by: HOSPITALIST

## 2022-04-01 PROCEDURE — 96372 THER/PROPH/DIAG INJ SC/IM: CPT | Mod: 59 | Performed by: HOSPITALIST

## 2022-04-01 PROCEDURE — 85025 COMPLETE CBC W/AUTO DIFF WBC: CPT | Performed by: EMERGENCY MEDICINE

## 2022-04-01 PROCEDURE — 99285 EMERGENCY DEPT VISIT HI MDM: CPT | Mod: 25

## 2022-04-01 PROCEDURE — 93005 ELECTROCARDIOGRAM TRACING: CPT

## 2022-04-01 PROCEDURE — U0002 COVID-19 LAB TEST NON-CDC: HCPCS | Performed by: EMERGENCY MEDICINE

## 2022-04-01 PROCEDURE — 96374 THER/PROPH/DIAG INJ IV PUSH: CPT

## 2022-04-01 PROCEDURE — 25000003 PHARM REV CODE 250: Performed by: NURSE PRACTITIONER

## 2022-04-01 PROCEDURE — 84484 ASSAY OF TROPONIN QUANT: CPT | Mod: 91 | Performed by: EMERGENCY MEDICINE

## 2022-04-01 PROCEDURE — 83880 ASSAY OF NATRIURETIC PEPTIDE: CPT | Performed by: EMERGENCY MEDICINE

## 2022-04-01 PROCEDURE — 96376 TX/PRO/DX INJ SAME DRUG ADON: CPT

## 2022-04-01 PROCEDURE — 25000003 PHARM REV CODE 250: Performed by: HOSPITALIST

## 2022-04-01 PROCEDURE — G0378 HOSPITAL OBSERVATION PER HR: HCPCS

## 2022-04-01 PROCEDURE — 84484 ASSAY OF TROPONIN QUANT: CPT | Performed by: HOSPITALIST

## 2022-04-01 PROCEDURE — 93010 EKG 12-LEAD: ICD-10-PCS | Mod: ,,, | Performed by: INTERNAL MEDICINE

## 2022-04-01 RX ORDER — GABAPENTIN 300 MG/1
300 CAPSULE ORAL 2 TIMES DAILY
Status: DISCONTINUED | OUTPATIENT
Start: 2022-04-01 | End: 2022-04-02 | Stop reason: HOSPADM

## 2022-04-01 RX ORDER — ALLOPURINOL 100 MG/1
100 TABLET ORAL NIGHTLY
Status: DISCONTINUED | OUTPATIENT
Start: 2022-04-01 | End: 2022-04-02 | Stop reason: HOSPADM

## 2022-04-01 RX ORDER — SODIUM CHLORIDE 0.9 % (FLUSH) 0.9 %
10 SYRINGE (ML) INJECTION
Status: DISCONTINUED | OUTPATIENT
Start: 2022-04-01 | End: 2022-04-02 | Stop reason: HOSPADM

## 2022-04-01 RX ORDER — FUROSEMIDE 10 MG/ML
40 INJECTION INTRAMUSCULAR; INTRAVENOUS
Status: COMPLETED | OUTPATIENT
Start: 2022-04-01 | End: 2022-04-01

## 2022-04-01 RX ORDER — HEPARIN SODIUM 5000 [USP'U]/ML
5000 INJECTION, SOLUTION INTRAVENOUS; SUBCUTANEOUS EVERY 8 HOURS
Status: DISCONTINUED | OUTPATIENT
Start: 2022-04-01 | End: 2022-04-02 | Stop reason: HOSPADM

## 2022-04-01 RX ORDER — PANTOPRAZOLE SODIUM 40 MG/1
40 TABLET, DELAYED RELEASE ORAL DAILY
Status: DISCONTINUED | OUTPATIENT
Start: 2022-04-01 | End: 2022-04-02 | Stop reason: HOSPADM

## 2022-04-01 RX ORDER — FUROSEMIDE 10 MG/ML
80 INJECTION INTRAMUSCULAR; INTRAVENOUS
Status: DISCONTINUED | OUTPATIENT
Start: 2022-04-01 | End: 2022-04-01

## 2022-04-01 RX ORDER — ATORVASTATIN CALCIUM 10 MG/1
10 TABLET, FILM COATED ORAL DAILY
Status: DISCONTINUED | OUTPATIENT
Start: 2022-04-01 | End: 2022-04-02 | Stop reason: HOSPADM

## 2022-04-01 RX ORDER — CLOPIDOGREL BISULFATE 75 MG/1
75 TABLET ORAL DAILY
Status: DISCONTINUED | OUTPATIENT
Start: 2022-04-01 | End: 2022-04-02 | Stop reason: HOSPADM

## 2022-04-01 RX ORDER — ACETAMINOPHEN 500 MG
500 TABLET ORAL 2 TIMES DAILY PRN
Status: DISCONTINUED | OUTPATIENT
Start: 2022-04-01 | End: 2022-04-02 | Stop reason: HOSPADM

## 2022-04-01 RX ORDER — CARVEDILOL 12.5 MG/1
12.5 TABLET ORAL 2 TIMES DAILY
Status: DISCONTINUED | OUTPATIENT
Start: 2022-04-01 | End: 2022-04-02 | Stop reason: HOSPADM

## 2022-04-01 RX ORDER — FUROSEMIDE 10 MG/ML
40 INJECTION INTRAMUSCULAR; INTRAVENOUS
Status: DISCONTINUED | OUTPATIENT
Start: 2022-04-01 | End: 2022-04-02 | Stop reason: HOSPADM

## 2022-04-01 RX ADMIN — ATORVASTATIN CALCIUM 10 MG: 10 TABLET, FILM COATED ORAL at 02:04

## 2022-04-01 RX ADMIN — ACETAMINOPHEN 500 MG: 500 TABLET ORAL at 09:04

## 2022-04-01 RX ADMIN — HEPARIN SODIUM 5000 UNITS: 5000 INJECTION INTRAVENOUS; SUBCUTANEOUS at 02:04

## 2022-04-01 RX ADMIN — ISOSORBIDE DINITRATE: 20 TABLET ORAL at 09:04

## 2022-04-01 RX ADMIN — CLOPIDOGREL BISULFATE 75 MG: 75 TABLET, FILM COATED ORAL at 02:04

## 2022-04-01 RX ADMIN — GABAPENTIN 300 MG: 300 CAPSULE ORAL at 09:04

## 2022-04-01 RX ADMIN — FUROSEMIDE 40 MG: 10 INJECTION, SOLUTION INTRAMUSCULAR; INTRAVENOUS at 09:04

## 2022-04-01 RX ADMIN — FUROSEMIDE 40 MG: 10 INJECTION, SOLUTION INTRAMUSCULAR; INTRAVENOUS at 11:04

## 2022-04-01 RX ADMIN — PANTOPRAZOLE SODIUM 40 MG: 40 TABLET, DELAYED RELEASE ORAL at 02:04

## 2022-04-01 RX ADMIN — CARVEDILOL 12.5 MG: 12.5 TABLET, FILM COATED ORAL at 09:04

## 2022-04-01 RX ADMIN — ALLOPURINOL 100 MG: 100 TABLET ORAL at 09:04

## 2022-04-01 RX ADMIN — HEPARIN SODIUM 5000 UNITS: 5000 INJECTION INTRAVENOUS; SUBCUTANEOUS at 09:04

## 2022-04-01 NOTE — PHARMACY MED REC
"Admission Medication History     The home medication history was taken by Noa Espitia CPhT.    You may go to "Admission" then "Reconcile Home Medications" tabs to review and/or act upon these items.      The home medication list has been updated by the Pharmacy department.    Please read ALL comments highlighted in yellow.    Please address this information as you see fit.     Feel free to contact us if you have any questions or require assistance.      The medications listed below were removed from the home medication list. Please reorder if appropriate:  Patient reports no longer taking the following medication(s):   Rocaltrol 0.25 mg   Cleocin 300 mg   Cranberry 400 mg   BIDIL 20 37.5 mg tab   Carafate 1 gram   multivit-min-iron-FA-lutein (MV)    Medications listed below were obtained from: Patient/family, [employer]- [employer] and Patient's pharmacy  (Not in a hospital admission)          Noa Espitia CPhT.  [phone]                    .        "

## 2022-04-01 NOTE — PROGRESS NOTES
Call placed to [employer] spoke with Lianet about AICD interrogation.  Also informed her to have rep call Dr. Bloom to discuss findings.

## 2022-04-01 NOTE — NURSING
Pt arrives to 3rd floor tele. Pt placed on tele cardiac monitor and pulse ox. Pt educated on plan of care. Bed alarm on, call light within reach. NADN at this time.

## 2022-04-01 NOTE — ED NOTES
Report called to [facility]. Monitor tech notified pt is on tele box [id number]. Awaiting transportation.

## 2022-04-01 NOTE — ASSESSMENT & PLAN NOTE
-Troponin 0.23 on admit - similar to prior  -No chest pain or obvious ischemic changes on EKG  -Will trend troponin  -Most likely due to severe CHF and CKDIII - doubt acute coronary syndrome.

## 2022-04-01 NOTE — H&P
"[facility] Medicine  History & Physical    Patient Name: Palmira Malave  MRN: [id number]  Patient Class: OP- Observation  Admission Date: 4/1/2022  Attending Physician: Larry Bloom MD  Primary Care Provider: Qi Ramon MD         Patient information was obtained from patient, past medical records and ER records.     Subjective:     Principal Problem:Acute on chronic combined systolic and diastolic heart failure    Chief Complaint:   Chief Complaint   Patient presents with    Shortness of Breath     Patient reports sob that started this morning around midnight when she woke up to go to restroom. Patient denies CP.  She states that she took a "fluid pill" this morning at 0600 and symptoms have improved. Reports hx of CHF. Denies asthma, copd        HPI: Ms. Malave is an 86 year old woman with history of chronic systolic/diastolic chf, AICD, coronary artery disease, hypertension and CKDIII who presented for evaluation of shortness of breath.  She states she felt fine when she went to bed last night, but around midnight she woke to use the bathroom and was more short of breath than normal at that time.  She notes eating white beans and rice with sausage at dinner, prior to going to sleep.  She denies chest pain, cough, fever, leg swelling, nausea, vomiting, diarrhea, headache, palpitations, AICD discharge, light headedness, dizziness, dysuria and hematuria.  She has been taking all her medications.  She reports taking lasix this morning with some improvement.  Vitals appear stable and she is in no distress.  She was given additional IV lasix in ER and continues to improve, but states she still doesn't quite feel her breathing is back to normal.      Past Medical History:   Diagnosis Date    Cataract     CHF (congestive heart failure)     CKD (chronic kidney disease), stage III     Coronary artery disease     Gout attack     Hypercholesteremia     Hypertension     Renal disorder  "    Vaginal delivery     x4       Past Surgical History:   Procedure Laterality Date    APPENDECTOMY      CARDIAC DEFIBRILLATOR PLACEMENT      2015    CATARACT EXTRACTION W/  INTRAOCULAR LENS IMPLANT Left 02/07/2019    Dr. Velazco    CATARACT EXTRACTION W/  INTRAOCULAR LENS IMPLANT Right 02/21/2019    Dr. Velazco    CHOLECYSTECTOMY      COLONOSCOPY W/ POLYPECTOMY  10 or 11/ 2014    per Dr. Myrick    defribillator Left 8/2008    EYE SURGERY      HYSTERECTOMY      INTRAOCULAR PROSTHESES INSERTION Left 2/7/2019    Procedure: INSERTION, IOL PROSTHESIS;  Surgeon: Demetrius Velazco MD;  Location: [facility] OR;  Service: Ophthalmology;  Laterality: Left;  RN Phone Pre OP 1-30-19.  Arrival 05:30 AM    INTRAOCULAR PROSTHESES INSERTION Right 2/21/2019    Procedure: INSERTION, IOL PROSTHESIS;  Surgeon: Demetrius Velazco MD;  Location: [facility] OR;  Service: Ophthalmology;  Laterality: Right;    JOINT REPLACEMENT Bilateral 2005 & 2006    2 Knee Replacements=Lt. 1= 2005. Rt. 1= 2006    OOPHORECTOMY      PHACOEMULSIFICATION OF CATARACT Left 2/7/2019    Procedure: PHACOEMULSIFICATION, CATARACT;  Surgeon: Demetrius Velazco MD;  Location: [facility] OR;  Service: Ophthalmology;  Laterality: Left;    PHACOEMULSIFICATION OF CATARACT Right 2/21/2019    Procedure: PHACOEMULSIFICATION, CATARACT;  Surgeon: Demetrius Velazco MD;  Location: [facility] OR;  Service: Ophthalmology;  Laterality: Right;  RN Phone Pre op 2-15-19.  Arrival 05:30 AM    TOTAL KNEE ARTHROPLASTY Bilateral Lt.= 2005; Rt.=2006    Bilateral Knee scope       Review of patient's allergies indicates:   Allergen Reactions    Aspirin Swelling     Swelling and rash    Colace [docusate sodium] Rash     itching    Docusate Other (See Comments) and Rash     itching    Sulfa (sulfonamide antibiotics) Swelling     Swelling and rash  Swelling and rash    Iodine and iodide containing products Rash    Penicillins Rash       Current Facility-Administered  Medications on File Prior to Encounter   Medication    0.9%  NaCl infusion    phenylephrine HCL 2.5% ophthalmic solution 1 drop    proparacaine 0.5 % ophthalmic solution 1 drop    tropicamide 1% ophthalmic solution 1 drop     Current Outpatient Medications on File Prior to Encounter   Medication Sig    acetaminophen (TYLENOL) 500 MG tablet Take 1 tablet (500 mg total) by mouth every 6 (six) hours as needed for Pain.    allopurinol (ZYLOPRIM) 100 MG tablet Take 100 mg by mouth every evening.     clopidogrel (PLAVIX) 75 mg tablet Take 75 mg by mouth once daily. On hold since 11-28-14, for procedure.    furosemide (LASIX) 80 MG tablet Take 1 tablet (80 mg total) by mouth 2 (two) times daily as needed (SOB or LE edema).    lovastatin (MEVACOR) 40 MG tablet Take 40 mg by mouth nightly. Takes 2 caps with supper every evening.    calcitRIOL (ROCALTROL) 0.25 MCG Cap 0.25 mcg once daily.     carvediloL (COREG) 12.5 MG tablet Take 1 tablet (12.5 mg total) by mouth 2 (two) times daily.    clindamycin (CLEOCIN) 300 MG capsule 1 capsules    cranberry 400 mg Cap Take 1 capsule by mouth 2 (two) times daily.    cranberry conc-C-bacillus coag (AZO CRANBERRY PLUS PROBIOTIC) 250-30-50 mg-mg-million Tab 1 tablet    fish oil-omega-3 fatty acids 300-1,000 mg capsule Take 2 g by mouth once daily. 2 caps every AM & 1 cap every PM.    folic acid/multivit-min/lutein (CENTRUM SILVER ORAL) Take by mouth once daily.    gabapentin (NEURONTIN) 300 MG capsule Take 300 mg by mouth 2 (two) times daily.    isosorbide-hydrALAZINE 20-37.5 mg (BIDIL) 20-37.5 mg Tab Take 1 tablet by mouth 2 (two) times daily.    mirabegron (MYRBETRIQ) 50 mg Tb24 Take 50 mg by mouth once daily.     multivit-min-iron-FA-lutein 8 mg iron-400 mcg-300 mcg Tab 1 tablet    pantoprazole (PROTONIX) 40 MG tablet Take 40 mg by mouth once daily.    sucralfate (CARAFATE) 1 gram tablet     terazosin (HYTRIN) 2 MG capsule Take 2 mg by mouth every evening.      Family History       Problem Relation (Age of Onset)    Amblyopia Son    Diabetes Other    Heart attack Mother (88)    Hyperlipidemia Mother    Hypertension Mother, Other          Tobacco Use    Smoking status: Never Smoker    Smokeless tobacco: Never Used   Substance and Sexual Activity    Alcohol use: Yes     Comment: rarely    Drug use: No    Sexual activity: Not Currently     Partners: Male     Review of Systems   Constitutional:  Negative for activity change, chills, diaphoresis and fatigue.   HENT:  Negative for congestion, drooling and hearing loss.    Eyes:  Negative for discharge.   Respiratory:  Positive for shortness of breath. Negative for apnea, cough, chest tightness and wheezing.    Cardiovascular:  Negative for chest pain, palpitations and leg swelling.   Gastrointestinal:  Negative for abdominal distention, abdominal pain, constipation and diarrhea.   Musculoskeletal:  Negative for arthralgias and gait problem.   Skin:  Negative for rash.   Neurological:  Negative for seizures, light-headedness and numbness.   Hematological:  Negative for adenopathy.   Psychiatric/Behavioral:  Negative for agitation and behavioral problems.    Objective:     Vital Signs (Most Recent):  Temp: 98.2 °F (36.8 °C) (04/01/22 0922)  Pulse: 83 (04/01/22 1232)  Resp: (!) 26 (04/01/22 1232)  BP: 127/73 (04/01/22 1232)  SpO2: 96 % (04/01/22 1232)   Vital Signs (24h Range):  Temp:  [98.2 °F (36.8 °C)] 98.2 °F (36.8 °C)  Pulse:  [82-90] 83  Resp:  [18-26] 26  SpO2:  [95 %-99 %] 96 %  BP: (116-131)/(68-73) 127/73     Weight: 75.8 kg (167 lb)  Body mass index is 29.58 kg/m².    Physical Exam  Vitals and nursing note reviewed.   Constitutional:       General: She is not in acute distress.     Appearance: Normal appearance. She is well-developed. She is not ill-appearing, toxic-appearing or diaphoretic.   HENT:      Head: Normocephalic and atraumatic.      Right Ear: External ear normal.      Left Ear: External ear normal.       Nose: Nose normal.      Mouth/Throat:      Mouth: Mucous membranes are moist.   Eyes:      Extraocular Movements: Extraocular movements intact.      Conjunctiva/sclera: Conjunctivae normal.   Neck:      Comments: JVD  Cardiovascular:      Rate and Rhythm: Normal rate and regular rhythm.      Heart sounds: Normal heart sounds.      Comments: Aicd pocket clean and intact  Pulmonary:      Effort: Pulmonary effort is normal. No respiratory distress.      Breath sounds: Normal breath sounds.      Comments: No crackles or wheezes.  Speaking full sentences on room air.  Abdominal:      General: Bowel sounds are normal. There is no distension.      Palpations: Abdomen is soft.      Tenderness: There is no abdominal tenderness.   Musculoskeletal:         General: Normal range of motion.      Cervical back: Normal range of motion. No rigidity.      Right lower leg: No edema.      Left lower leg: No edema.   Skin:     General: Skin is warm and dry.   Neurological:      Mental Status: She is alert and oriented to person, place, and time.      Cranial Nerves: No cranial nerve deficit.      Coordination: Coordination normal.   Psychiatric:         Behavior: Behavior normal.           Significant Labs: All pertinent labs within the past 24 hours have been reviewed.    Significant Imaging: I have reviewed all pertinent imaging results/findings within the past 24 hours.    Assessment/Plan:     * Acute on chronic combined systolic and diastolic heart failure  -Placed in observation  -Mild CHF exacerbation most likely due to dietary indiscretions - at beans and rice with sausage last night.  -BNP rather elevated, but very similar to prior values  -CXR with mild pulmonary edema.  -Continue home coreg, hydralzine and isosorbide dinitrate  -Will diurese with IV lasix and have AICD interrogated.  -Anticipate discharge home tomorrow.    ICD (implantable cardioverter-defibrillator) in place  -Noted and no discharges  -Will have  interrogated    Troponin level elevated  -Troponin 0.23 on admit - similar to prior  -No chest pain or obvious ischemic changes on EKG  -Will trend troponin  -Most likely due to severe CHF and CKDIII - doubt acute coronary syndrome.    Stage 3 chronic kidney disease  -Baseline Cr 1.5-1.7  -At baseline on admission  -Avoid nephrotoxic agents and renally dose meds  -Diurese as above.    Coronary artery disease involving native coronary artery of native heart without angina pectoris  -History noted  -Continue home medications.    Goals of care, counseling/discussion  Advance Care Planning   Code Status  I discussed patient's values surrounding end of life care.  She was quick to state that when her heart stops or if she stops breathing she wishes for a peaceful death without aggressive or invasive measures.  Up to that point however, she wishes for full aggressive medical care.  I relayed that DNR status was most consistent with her values and she agreed.  DNR order placed.  I spent 15 minutes in goals of care discussion.      GERD (gastroesophageal reflux disease)  -No issues at this time.    Hyperlipidemia  -Continue home medications    Essential hypertension  -BP well controlled  -Continue home medications      VTE Risk Mitigation (From admission, onward)         Ordered     heparin (porcine) injection 5,000 Units  Every 8 hours         04/01/22 1333     IP VTE HIGH RISK PATIENT  Once         04/01/22 1333     Place sequential compression device  Until discontinued         04/01/22 1333                   Larry Bloom MD  Department of Hospital Medicine   [facility] - Telemetry

## 2022-04-01 NOTE — PROGRESS NOTES
[facility] [place] Emergency Dept  [facility] Medicine  Progress Note    Patient Name: Palmira Malave  MRN: [id number]  Patient Class: Emergency   Admission Date: 4/1/2022  Length of Stay: 0 days  Attending Physician: Feliz Lyles, *  Primary Care Provider: Qi Ramon MD        Subjective:     Principal Problem:Acute on chronic combined systolic and diastolic heart failure        HPI:  Ms. Malave is an 86 year old woman with history of chronic systolic/diastolic chf, AICD, coronary artery disease, hypertension and CKDIII who presented for evaluation of shortness of breath.  She states she felt fine when she went to bed last night, but around midnight she woke to use the bathroom and was more short of breath than normal at that time.  She notes eating white beans and rice with sausage at dinner, prior to going to sleep.  She denies chest pain, cough, fever, leg swelling, nausea, vomiting, diarrhea, headache, palpitations, AICD discharge, light headedness, dizziness, dysuria and hematuria.  She has been taking all her medications.  She reports taking lasix this morning with some improvement.  Vitals appear stable and she is in no distress.  She was given additional IV lasix in ER and continues to improve, but states she still doesn't quite feel her breathing is back to normal.      Overview/Hospital Course:  No notes on file    Past Medical History:   Diagnosis Date    Cataract     CHF (congestive heart failure)     CKD (chronic kidney disease), stage III     Coronary artery disease     Gout attack     Hypercholesteremia     Hypertension     Renal disorder     Vaginal delivery     x4       Past Surgical History:   Procedure Laterality Date    APPENDECTOMY      CARDIAC DEFIBRILLATOR PLACEMENT      2015    CATARACT EXTRACTION W/  INTRAOCULAR LENS IMPLANT Left 02/07/2019    Dr. Velazco    CATARACT EXTRACTION W/  INTRAOCULAR LENS IMPLANT Right 02/21/2019    Dr. Velazco    CHOLECYSTECTOMY       COLONOSCOPY W/ POLYPECTOMY  10 or 11/ 2014    per Dr. Elen ceja Left 8/2008    EYE SURGERY      HYSTERECTOMY      INTRAOCULAR PROSTHESES INSERTION Left 2/7/2019    Procedure: INSERTION, IOL PROSTHESIS;  Surgeon: Demetrius Velazco MD;  Location: [facility] OR;  Service: Ophthalmology;  Laterality: Left;  RN Phone Pre OP 1-30-19.  Arrival 05:30 AM    INTRAOCULAR PROSTHESES INSERTION Right 2/21/2019    Procedure: INSERTION, IOL PROSTHESIS;  Surgeon: Demetrius Velazco MD;  Location: [facility] OR;  Service: Ophthalmology;  Laterality: Right;    JOINT REPLACEMENT Bilateral 2005 & 2006    2 Knee Replacements=Lt. 1= 2005. Rt. 1= 2006    OOPHORECTOMY      PHACOEMULSIFICATION OF CATARACT Left 2/7/2019    Procedure: PHACOEMULSIFICATION, CATARACT;  Surgeon: Demetrius Velazco MD;  Location: [facility] OR;  Service: Ophthalmology;  Laterality: Left;    PHACOEMULSIFICATION OF CATARACT Right 2/21/2019    Procedure: PHACOEMULSIFICATION, CATARACT;  Surgeon: Demetrius Velazco MD;  Location: [facility] OR;  Service: Ophthalmology;  Laterality: Right;  RN Phone Pre op 2-15-19.  Arrival 05:30 AM    TOTAL KNEE ARTHROPLASTY Bilateral Lt.= 2005; Rt.=2006    Bilateral Knee scope       Review of patient's allergies indicates:   Allergen Reactions    Aspirin Swelling     Swelling and rash    Colace [docusate sodium] Rash     itching    Docusate Other (See Comments) and Rash     itching    Sulfa (sulfonamide antibiotics) Swelling     Swelling and rash  Swelling and rash    Iodine and iodide containing products Rash    Penicillins Rash       Current Facility-Administered Medications on File Prior to Encounter   Medication    0.9%  NaCl infusion    phenylephrine HCL 2.5% ophthalmic solution 1 drop    proparacaine 0.5 % ophthalmic solution 1 drop    tropicamide 1% ophthalmic solution 1 drop     Current Outpatient Medications on File Prior to Encounter   Medication Sig    acetaminophen (TYLENOL) 500 MG tablet Take 1  tablet (500 mg total) by mouth every 6 (six) hours as needed for Pain.    allopurinol (ZYLOPRIM) 100 MG tablet Take 100 mg by mouth every evening.     clopidogrel (PLAVIX) 75 mg tablet Take 75 mg by mouth once daily. On hold since 11-28-14, for procedure.    furosemide (LASIX) 80 MG tablet Take 1 tablet (80 mg total) by mouth 2 (two) times daily as needed (SOB or LE edema).    lovastatin (MEVACOR) 40 MG tablet Take 40 mg by mouth nightly. Takes 2 caps with supper every evening.    calcitRIOL (ROCALTROL) 0.25 MCG Cap 0.25 mcg once daily.     carvediloL (COREG) 12.5 MG tablet Take 1 tablet (12.5 mg total) by mouth 2 (two) times daily.    clindamycin (CLEOCIN) 300 MG capsule 1 capsules    cranberry 400 mg Cap Take 1 capsule by mouth 2 (two) times daily.    cranberry conc-C-bacillus coag (AZO CRANBERRY PLUS PROBIOTIC) 250-30-50 mg-mg-million Tab 1 tablet    fish oil-omega-3 fatty acids 300-1,000 mg capsule Take 2 g by mouth once daily. 2 caps every AM & 1 cap every PM.    folic acid/multivit-min/lutein (CENTRUM SILVER ORAL) Take by mouth once daily.    gabapentin (NEURONTIN) 300 MG capsule Take 300 mg by mouth 2 (two) times daily.    isosorbide-hydrALAZINE 20-37.5 mg (BIDIL) 20-37.5 mg Tab Take 1 tablet by mouth 2 (two) times daily.    mirabegron (MYRBETRIQ) 50 mg Tb24 Take 50 mg by mouth once daily.     multivit-min-iron-FA-lutein 8 mg iron-400 mcg-300 mcg Tab 1 tablet    pantoprazole (PROTONIX) 40 MG tablet Take 40 mg by mouth once daily.    sucralfate (CARAFATE) 1 gram tablet     terazosin (HYTRIN) 2 MG capsule Take 2 mg by mouth every evening.     Family History       Problem Relation (Age of Onset)    Amblyopia Son    Diabetes Other    Heart attack Mother (88)    Hyperlipidemia Mother    Hypertension Mother, Other          Tobacco Use    Smoking status: Never Smoker    Smokeless tobacco: Never Used   Substance and Sexual Activity    Alcohol use: Yes     Comment: rarely    Drug use: No     Sexual activity: Not Currently     Partners: Male     Review of Systems   Constitutional:  Negative for activity change, chills, diaphoresis and fatigue.   HENT:  Negative for congestion, drooling and hearing loss.    Eyes:  Negative for discharge.   Respiratory:  Positive for shortness of breath. Negative for apnea, cough, chest tightness and wheezing.    Cardiovascular:  Negative for chest pain, palpitations and leg swelling.   Gastrointestinal:  Negative for abdominal distention, abdominal pain, constipation and diarrhea.   Musculoskeletal:  Negative for arthralgias and gait problem.   Skin:  Negative for rash.   Neurological:  Negative for seizures, light-headedness and numbness.   Hematological:  Negative for adenopathy.   Psychiatric/Behavioral:  Negative for agitation and behavioral problems.    Objective:     Vital Signs (Most Recent):  Temp: 98.2 °F (36.8 °C) (04/01/22 0922)  Pulse: 83 (04/01/22 1232)  Resp: (!) 26 (04/01/22 1232)  BP: 127/73 (04/01/22 1232)  SpO2: 96 % (04/01/22 1232)   Vital Signs (24h Range):  Temp:  [98.2 °F (36.8 °C)] 98.2 °F (36.8 °C)  Pulse:  [82-90] 83  Resp:  [18-26] 26  SpO2:  [95 %-99 %] 96 %  BP: (116-131)/(68-73) 127/73     Weight: 75.8 kg (167 lb)  Body mass index is 29.58 kg/m².    Physical Exam  Vitals and nursing note reviewed.   Constitutional:       General: She is not in acute distress.     Appearance: Normal appearance. She is well-developed. She is not ill-appearing, toxic-appearing or diaphoretic.   HENT:      Head: Normocephalic and atraumatic.      Right Ear: External ear normal.      Left Ear: External ear normal.      Nose: Nose normal.      Mouth/Throat:      Mouth: Mucous membranes are moist.   Eyes:      Extraocular Movements: Extraocular movements intact.      Conjunctiva/sclera: Conjunctivae normal.   Neck:      Comments: JVD  Cardiovascular:      Rate and Rhythm: Normal rate and regular rhythm.      Heart sounds: Normal heart sounds.      Comments: Aicd  pocket clean and intact  Pulmonary:      Effort: Pulmonary effort is normal. No respiratory distress.      Breath sounds: Normal breath sounds.      Comments: No crackles or wheezes.  Speaking full sentences on room air.  Abdominal:      General: Bowel sounds are normal. There is no distension.      Palpations: Abdomen is soft.      Tenderness: There is no abdominal tenderness.   Musculoskeletal:         General: Normal range of motion.      Cervical back: Normal range of motion. No rigidity.      Right lower leg: No edema.      Left lower leg: No edema.   Skin:     General: Skin is warm and dry.   Neurological:      Mental Status: She is alert and oriented to person, place, and time.      Cranial Nerves: No cranial nerve deficit.      Coordination: Coordination normal.   Psychiatric:         Behavior: Behavior normal.           Significant Labs: All pertinent labs within the past 24 hours have been reviewed.    Significant Imaging: I have reviewed all pertinent imaging results/findings within the past 24 hours.      Assessment/Plan:      * Acute on chronic combined systolic and diastolic heart failure  -Placed in observation  -Mild CHF exacerbation most likely due to dietary indiscretions - at beans and rice with sausage last night.  -BNP rather elevated, but very similar to prior values  -CXR with mild pulmonary edema.  -Continue home coreg, hydralzine and isosorbide dinitrate  -Will diurese with IV lasix and have AICD interrogated.  -Anticipate discharge home tomorrow.    ICD (implantable cardioverter-defibrillator) in place  -Noted and no discharges  -Will have interrogated    Troponin level elevated  -Troponin 0.23 on admit - similar to prior  -No chest pain or obvious ischemic changes on EKG  -Will trend troponin  -Most likely due to severe CHF and CKDIII - doubt acute coronary syndrome.    Stage 3 chronic kidney disease  -Baseline Cr 1.5-1.7  -At baseline on admission  -Avoid nephrotoxic agents and renally  dose meds  -Diurese as above.    Coronary artery disease involving native coronary artery of native heart without angina pectoris  -History noted  -Continue home medications.    Goals of care, counseling/discussion  Advance Care Planning   Code Status  I discussed patient's values surrounding end of life care.  She was quick to state that when her heart stops or if she stops breathing she wishes for a peaceful death without aggressive or invasive measures.  Up to that point however, she wishes for full aggressive medical care.  I relayed that DNR status was most consistent with her values and she agreed.  DNR order placed.  I spent 15 minutes in goals of care discussion.      GERD (gastroesophageal reflux disease)  -No issues at this time.    Hyperlipidemia  -Continue home medications    Essential hypertension  -BP well controlled  -Continue home medications      VTE Risk Mitigation (From admission, onward)         Ordered     heparin (porcine) injection 5,000 Units  Every 8 hours         04/01/22 1333     IP VTE HIGH RISK PATIENT  Once         04/01/22 1333     Place sequential compression device  Until discontinued         04/01/22 1333                Discharge Planning   MARTELL:      Code Status: DNR   Is the patient medically ready for discharge?:     Reason for patient still in hospital (select all that apply): Treatment                     Larry Bloom MD  Department of Hospital Medicine   [facility] - Emergency Dept

## 2022-04-01 NOTE — ED NOTES
Patient stated that she felt wet. Purewik suction stopped working. Cleaned up, purewik replaced, suction back on and working.

## 2022-04-01 NOTE — HPI
Ms. Malave is an 86 year old woman with history of chronic systolic/diastolic chf, AICD, coronary artery disease, hypertension and CKDIII who presented for evaluation of shortness of breath.  She states she felt fine when she went to bed last night, but around midnight she woke to use the bathroom and was more short of breath than normal at that time.  She notes eating white beans and rice with sausage at dinner, prior to going to sleep.  She denies chest pain, cough, fever, leg swelling, nausea, vomiting, diarrhea, headache, palpitations, AICD discharge, light headedness, dizziness, dysuria and hematuria.  She has been taking all her medications.  She reports taking lasix this morning with some improvement.  Vitals appear stable and she is in no distress.  She was given additional IV lasix in ER and continues to improve, but states she still doesn't quite feel her breathing is back to normal.

## 2022-04-01 NOTE — ASSESSMENT & PLAN NOTE
Advance Care Planning   Code Status  I discussed patient's values surrounding end of life care.  She was quick to state that when her heart stops or if she stops breathing she wishes for a peaceful death without aggressive or invasive measures.  Up to that point however, she wishes for full aggressive medical care.  I relayed that DNR status was most consistent with her values and she agreed.  DNR order placed.  I spent 15 minutes in goals of care discussion.

## 2022-04-01 NOTE — TELEPHONE ENCOUNTER
Pt called for c/o SOB it started at 12 this am and she said that she took a fluid pill and has been urinating and feels a little better at this time. Pt triaged and care advice is to go to the ED for eval. Pt not really wanting to go but told that this is the most that I can do. Unable to make appt at this time.    Reason for Disposition   MODERATE difficulty breathing (e.g., speaks in phrases, SOB even at rest, pulse 100-120) of new-onset or worse than normal    Additional Information   Negative: SEVERE difficulty breathing (e.g., struggling for each breath, speaks in single words, pulse > 120)   Negative: Breathing stopped and hasn't returned   Negative: Choking on something   Negative: Bluish (or gray) lips or face   Negative: Difficult to awaken or acting confused (e.g., disoriented, slurred speech)   Negative: Passed out (i.e., fainted, collapsed and was not responding)   Negative: Wheezing started suddenly after medicine, an allergic food, or bee sting   Negative: Stridor   Negative: Slow, shallow and weak breathing   Negative: Sounds like a life-threatening emergency to the triager    Protocols used: BREATHING DIFFICULTY-A-OH

## 2022-04-01 NOTE — ED PROVIDER NOTES
"Encounter Date: 4/1/2022       History     Chief Complaint   Patient presents with    Shortness of Breath     Patient reports sob that started this morning around midnight when she woke up to go to restroom. Patient denies CP.  She states that she took a "fluid pill" this morning at 0600 and symptoms have improved. Reports hx of CHF. Denies asthma, copd     86 y.o. female Past Medical History:  No date: Anticoagulant long-term use  No date: Cataract  No date: CHF (congestive heart failure)  No date: Coronary artery disease  No date: Gout attack  No date: Hypercholesteremia  No date: Hypertension  No date: Renal disorder  No date: Vaginal delivery      Comment:  x4     On plavix, here for evaluation of sob. Pt informs me that she has a history of congestive heart failure notes that last night she got up around midnight to go to the bathroom and noted that she was short of breath she states that she took an extra water pill     9/11/2020 echo  · Severely decreased left ventricular systolic function. The estimated ejection fraction is 20%.  · Severe global hypokinetic wall motion.  · Mild eccentric left ventricular hypertrophy.  · Mild left ventricular enlargement.  · Grade I (mild) left ventricular diastolic dysfunction consistent with impaired relaxation.  · Normal right ventricular systolic function.  · Indeterminate central venous pressure. Estimated PA systolic pressure is at least 13 mmHg.             Review of patient's allergies indicates:   Allergen Reactions    Aspirin Swelling     Swelling and rash    Colace [docusate sodium] Rash     itching    Docusate Other (See Comments) and Rash     itching    Sulfa (sulfonamide antibiotics) Swelling     Swelling and rash  Swelling and rash    Iodine and iodide containing products Rash    Penicillins Rash     Past Medical History:   Diagnosis Date    Cataract     CHF (congestive heart failure)     CKD (chronic kidney disease), stage III     Coronary artery " disease     Gout attack     Hypercholesteremia     Hypertension     Renal disorder     Vaginal delivery     x4     Past Surgical History:   Procedure Laterality Date    APPENDECTOMY      CARDIAC DEFIBRILLATOR PLACEMENT      2015    CATARACT EXTRACTION W/  INTRAOCULAR LENS IMPLANT Left 02/07/2019    Dr. Velazco    CATARACT EXTRACTION W/  INTRAOCULAR LENS IMPLANT Right 02/21/2019    Dr. Velazco    CHOLECYSTECTOMY      COLONOSCOPY W/ POLYPECTOMY  10 or 11/ 2014    per Dr. Myrick    defribillator Left 8/2008    EYE SURGERY      HYSTERECTOMY      INTRAOCULAR PROSTHESES INSERTION Left 2/7/2019    Procedure: INSERTION, IOL PROSTHESIS;  Surgeon: Demetrius Velazco MD;  Location: [facility] OR;  Service: Ophthalmology;  Laterality: Left;  RN Phone Pre OP 1-30-19.  Arrival 05:30 AM    INTRAOCULAR PROSTHESES INSERTION Right 2/21/2019    Procedure: INSERTION, IOL PROSTHESIS;  Surgeon: Demetrius Velazco MD;  Location: [facility] OR;  Service: Ophthalmology;  Laterality: Right;    JOINT REPLACEMENT Bilateral 2005 & 2006    2 Knee Replacements=Lt. 1= 2005. Rt. 1= 2006    OOPHORECTOMY      PHACOEMULSIFICATION OF CATARACT Left 2/7/2019    Procedure: PHACOEMULSIFICATION, CATARACT;  Surgeon: Demetrius Velazco MD;  Location: [facility] OR;  Service: Ophthalmology;  Laterality: Left;    PHACOEMULSIFICATION OF CATARACT Right 2/21/2019    Procedure: PHACOEMULSIFICATION, CATARACT;  Surgeon: Demetrius Velazco MD;  Location: [facility] OR;  Service: Ophthalmology;  Laterality: Right;  RN Phone Pre op 2-15-19.  Arrival 05:30 AM    TOTAL KNEE ARTHROPLASTY Bilateral Lt.= 2005; Rt.=2006    Bilateral Knee scope     Family History   Problem Relation Age of Onset    Heart attack Mother 88    Hypertension Mother     Hyperlipidemia Mother     Diabetes Other     Hypertension Other     Amblyopia Son     Blindness Neg Hx     Cataracts Neg Hx     Glaucoma Neg Hx     Macular degeneration Neg Hx     Retinal detachment Neg Hx      Strabismus Neg Hx      Social History     Tobacco Use    Smoking status: Never Smoker    Smokeless tobacco: Never Used   Substance Use Topics    Alcohol use: Yes     Comment: rarely    Drug use: No     Review of Systems   Constitutional: Negative for fever.   HENT: Negative for sore throat.    Respiratory: Negative for shortness of breath.    Cardiovascular: Negative for chest pain.   Gastrointestinal: Negative for nausea.   Genitourinary: Negative for dysuria.   Musculoskeletal: Negative for back pain.   Skin: Negative for rash.   Neurological: Negative for weakness.   Hematological: Does not bruise/bleed easily.   All other systems reviewed and are negative.      Physical Exam     Initial Vitals [04/01/22 0922]   BP Pulse Resp Temp SpO2   125/68 90 18 98.2 °F (36.8 °C) 98 %      MAP       --         Physical Exam    Nursing note and vitals reviewed.  Constitutional: She appears well-developed and well-nourished.   HENT:   Head: Normocephalic and atraumatic.   Eyes: Conjunctivae and EOM are normal. Pupils are equal, round, and reactive to light.   Neck:   Normal range of motion.  Cardiovascular: Normal rate and regular rhythm.   Pulmonary/Chest: Breath sounds normal. No respiratory distress. She has no wheezes. She has no rhonchi.   Abdominal: She exhibits no distension.   Musculoskeletal:         General: Normal range of motion.      Cervical back: Normal range of motion.     Neurological: She is alert. No cranial nerve deficit. GCS score is 15. GCS eye subscore is 4. GCS verbal subscore is 5. GCS motor subscore is 6.   Skin: Skin is warm and dry.   Psychiatric: She has a normal mood and affect. Thought content normal.     +JVD    ED Course   Procedures  Labs Reviewed   CBC W/ AUTO DIFFERENTIAL - Abnormal; Notable for the following components:       Result Value    RBC 3.44 (*)     Hemoglobin 10.9 (*)     Hematocrit 34.3 (*)      (*)     MCH 31.7 (*)     MCHC 31.8 (*)     RDW 14.8 (*)     Lymph #  0.9 (*)     Gran % 73.5 (*)     Lymph % 13.6 (*)     All other components within normal limits   COMPREHENSIVE METABOLIC PANEL - Abnormal; Notable for the following components:    BUN 44 (*)     Creatinine 1.7 (*)     eGFR if  31 (*)     eGFR if non  27 (*)     All other components within normal limits   B-TYPE NATRIURETIC PEPTIDE - Abnormal; Notable for the following components:    BNP 2,511 (*)     All other components within normal limits   TROPONIN I - Abnormal; Notable for the following components:    Troponin I 0.235 (*)     All other components within normal limits   MAGNESIUM   SARS-COV-2 RDRP GENE          Imaging Results          X-Ray Chest AP Portable (Final result)  Result time 04/01/22 10:36:50    Final result by David Schaeffer MD (04/01/22 10:36:50)                 Impression:      Cardiac device with findings suggesting mild pulmonary edema/CHF pattern.  Interstitial type pneumonia not excluded..      Electronically signed by: David Schaeffer MD  Date:    04/01/2022  Time:    10:36             Narrative:    EXAMINATION:  XR CHEST AP PORTABLE    CLINICAL HISTORY:  Shortness of breath    TECHNIQUE:  Single frontal view of the chest was performed.    COMPARISON:  Chest radiograph 06/07/2021    FINDINGS:  Monitoring leads overlie the chest.  Patient is slightly rotated.    Left chest multi lead cardiac device is stable.  Cardiomediastinal silhouette is midline and prominent with calcification and tortuosity of the aorta and enlarged cardiac silhouette grossly similar to prior.  Hilar contours appear unchanged.  Bilateral mild diffuse nonspecific interstitial coarsening with a perihilar distribution and central peribronchial cuffing.  The lungs are otherwise well expanded without large consolidation, pleural effusion or pneumothorax.  No acute osseous process seen.  PA and lateral views can be obtained.                                 Medications   allopurinoL tablet 100 mg  (100 mg Oral Given 4/1/22 2105)   carvediloL tablet 12.5 mg (12.5 mg Oral Given 4/1/22 2105)   clopidogreL tablet 75 mg (75 mg Oral Given 4/1/22 1420)   gabapentin capsule 300 mg (300 mg Oral Given 4/1/22 2106)   hydrALAZINE (APRESOLINE) 35 mg, isosorbide dinitrate (ISORDIL) 20 mg for BIDIL 20/37.5 ( Oral Given 4/1/22 2105)   atorvastatin tablet 10 mg (10 mg Oral Given 4/1/22 1420)   pantoprazole EC tablet 40 mg (40 mg Oral Given 4/1/22 1420)   sodium chloride 0.9% flush 10 mL (has no administration in time range)   heparin (porcine) injection 5,000 Units (5,000 Units Subcutaneous Given 4/2/22 0613)   furosemide injection 40 mg (40 mg Intravenous Given 4/1/22 2139)   acetaminophen tablet 500 mg (500 mg Oral Given 4/1/22 2129)   melatonin tablet 6 mg (has no administration in time range)   loperamide capsule 2 mg (has no administration in time range)   ondansetron injection 4 mg (has no administration in time range)   furosemide injection 40 mg (40 mg Intravenous Given 4/1/22 1157)       review of records demonstrates the patient has had elevated BUN and creatinine in the past and has a history of CKD 3. This is a complicated picture with patient might be volume overload yet renal function and BUN unchanged from prior but with BUN elevation    Dr. Bloom has evaluated the patient. Will place in obs.                         Clinical Impression:   Final diagnoses:  [R06.02] SOB (shortness of breath)  [I50.9] Congestive heart failure, unspecified HF chronicity, unspecified heart failure type (Primary)          ED Disposition Condition    Observation               Feliz Lyles MD  04/02/22 [id number]

## 2022-04-01 NOTE — ASSESSMENT & PLAN NOTE
-Placed in observation  -Mild CHF exacerbation most likely due to dietary indiscretions - at beans and rice with sausage last night.  -BNP rather elevated, but very similar to prior values  -CXR with mild pulmonary edema.  -Continue home coreg, hydralzine and isosorbide dinitrate  -Will diurese with IV lasix and have AICD interrogated.  -Anticipate discharge home tomorrow.

## 2022-04-01 NOTE — ASSESSMENT & PLAN NOTE
-Baseline Cr 1.5-1.7  -At baseline on admission  -Avoid nephrotoxic agents and renally dose meds  -Diurese as above.

## 2022-04-01 NOTE — SUBJECTIVE & OBJECTIVE
Past Medical History:   Diagnosis Date    Cataract     CHF (congestive heart failure)     CKD (chronic kidney disease), stage III     Coronary artery disease     Gout attack     Hypercholesteremia     Hypertension     Renal disorder     Vaginal delivery     x4       Past Surgical History:   Procedure Laterality Date    APPENDECTOMY      CARDIAC DEFIBRILLATOR PLACEMENT      2015    CATARACT EXTRACTION W/  INTRAOCULAR LENS IMPLANT Left 02/07/2019    Dr. Velazco    CATARACT EXTRACTION W/  INTRAOCULAR LENS IMPLANT Right 02/21/2019    Dr. Velazco    CHOLECYSTECTOMY      COLONOSCOPY W/ POLYPECTOMY  10 or 11/ 2014    per Dr. Myrick    defribillator Left 8/2008    EYE SURGERY      HYSTERECTOMY      INTRAOCULAR PROSTHESES INSERTION Left 2/7/2019    Procedure: INSERTION, IOL PROSTHESIS;  Surgeon: Demetrius Velazco MD;  Location: [facility] OR;  Service: Ophthalmology;  Laterality: Left;  RN Phone Pre OP 1-30-19.  Arrival 05:30 AM    INTRAOCULAR PROSTHESES INSERTION Right 2/21/2019    Procedure: INSERTION, IOL PROSTHESIS;  Surgeon: Demetrius Velazco MD;  Location: [facility] OR;  Service: Ophthalmology;  Laterality: Right;    JOINT REPLACEMENT Bilateral 2005 & 2006    2 Knee Replacements=Lt. 1= 2005. Rt. 1= 2006    OOPHORECTOMY      PHACOEMULSIFICATION OF CATARACT Left 2/7/2019    Procedure: PHACOEMULSIFICATION, CATARACT;  Surgeon: Demetrius Velazco MD;  Location: [facility] OR;  Service: Ophthalmology;  Laterality: Left;    PHACOEMULSIFICATION OF CATARACT Right 2/21/2019    Procedure: PHACOEMULSIFICATION, CATARACT;  Surgeon: Demetrius Velazco MD;  Location: [facility] OR;  Service: Ophthalmology;  Laterality: Right;  RN Phone Pre op 2-15-19.  Arrival 05:30 AM    TOTAL KNEE ARTHROPLASTY Bilateral Lt.= 2005; Rt.=2006    Bilateral Knee scope       Review of patient's allergies indicates:   Allergen Reactions    Aspirin Swelling     Swelling and rash    Colace [docusate sodium] Rash     itching    Docusate Other (See Comments)  and Rash     itching    Sulfa (sulfonamide antibiotics) Swelling     Swelling and rash  Swelling and rash    Iodine and iodide containing products Rash    Penicillins Rash       Current Facility-Administered Medications on File Prior to Encounter   Medication    0.9%  NaCl infusion    phenylephrine HCL 2.5% ophthalmic solution 1 drop    proparacaine 0.5 % ophthalmic solution 1 drop    tropicamide 1% ophthalmic solution 1 drop     Current Outpatient Medications on File Prior to Encounter   Medication Sig    acetaminophen (TYLENOL) 500 MG tablet Take 1 tablet (500 mg total) by mouth every 6 (six) hours as needed for Pain.    allopurinol (ZYLOPRIM) 100 MG tablet Take 100 mg by mouth every evening.     clopidogrel (PLAVIX) 75 mg tablet Take 75 mg by mouth once daily. On hold since 11-28-14, for procedure.    furosemide (LASIX) 80 MG tablet Take 1 tablet (80 mg total) by mouth 2 (two) times daily as needed (SOB or LE edema).    lovastatin (MEVACOR) 40 MG tablet Take 40 mg by mouth nightly. Takes 2 caps with supper every evening.    calcitRIOL (ROCALTROL) 0.25 MCG Cap 0.25 mcg once daily.     carvediloL (COREG) 12.5 MG tablet Take 1 tablet (12.5 mg total) by mouth 2 (two) times daily.    clindamycin (CLEOCIN) 300 MG capsule 1 capsules    cranberry 400 mg Cap Take 1 capsule by mouth 2 (two) times daily.    cranberry conc-C-bacillus coag (AZO CRANBERRY PLUS PROBIOTIC) 250-30-50 mg-mg-million Tab 1 tablet    fish oil-omega-3 fatty acids 300-1,000 mg capsule Take 2 g by mouth once daily. 2 caps every AM & 1 cap every PM.    folic acid/multivit-min/lutein (CENTRUM SILVER ORAL) Take by mouth once daily.    gabapentin (NEURONTIN) 300 MG capsule Take 300 mg by mouth 2 (two) times daily.    isosorbide-hydrALAZINE 20-37.5 mg (BIDIL) 20-37.5 mg Tab Take 1 tablet by mouth 2 (two) times daily.    mirabegron (MYRBETRIQ) 50 mg Tb24 Take 50 mg by mouth once daily.     multivit-min-iron-FA-lutein 8 mg iron-400 mcg-300 mcg Tab 1 tablet     pantoprazole (PROTONIX) 40 MG tablet Take 40 mg by mouth once daily.    sucralfate (CARAFATE) 1 gram tablet     terazosin (HYTRIN) 2 MG capsule Take 2 mg by mouth every evening.     Family History       Problem Relation (Age of Onset)    Amblyopia Son    Diabetes Other    Heart attack Mother (88)    Hyperlipidemia Mother    Hypertension Mother, Other          Tobacco Use    Smoking status: Never Smoker    Smokeless tobacco: Never Used   Substance and Sexual Activity    Alcohol use: Yes     Comment: rarely    Drug use: No    Sexual activity: Not Currently     Partners: Male     Review of Systems   Constitutional:  Negative for activity change, chills, diaphoresis and fatigue.   HENT:  Negative for congestion, drooling and hearing loss.    Eyes:  Negative for discharge.   Respiratory:  Positive for shortness of breath. Negative for apnea, cough, chest tightness and wheezing.    Cardiovascular:  Negative for chest pain, palpitations and leg swelling.   Gastrointestinal:  Negative for abdominal distention, abdominal pain, constipation and diarrhea.   Musculoskeletal:  Negative for arthralgias and gait problem.   Skin:  Negative for rash.   Neurological:  Negative for seizures, light-headedness and numbness.   Hematological:  Negative for adenopathy.   Psychiatric/Behavioral:  Negative for agitation and behavioral problems.    Objective:     Vital Signs (Most Recent):  Temp: 98.2 °F (36.8 °C) (04/01/22 0922)  Pulse: 83 (04/01/22 1232)  Resp: (!) 26 (04/01/22 1232)  BP: 127/73 (04/01/22 1232)  SpO2: 96 % (04/01/22 1232)   Vital Signs (24h Range):  Temp:  [98.2 °F (36.8 °C)] 98.2 °F (36.8 °C)  Pulse:  [82-90] 83  Resp:  [18-26] 26  SpO2:  [95 %-99 %] 96 %  BP: (116-131)/(68-73) 127/73     Weight: 75.8 kg (167 lb)  Body mass index is 29.58 kg/m².    Physical Exam  Vitals and nursing note reviewed.   Constitutional:       General: She is not in acute distress.     Appearance: Normal appearance. She is well-developed. She is  not ill-appearing, toxic-appearing or diaphoretic.   HENT:      Head: Normocephalic and atraumatic.      Right Ear: External ear normal.      Left Ear: External ear normal.      Nose: Nose normal.      Mouth/Throat:      Mouth: Mucous membranes are moist.   Eyes:      Extraocular Movements: Extraocular movements intact.      Conjunctiva/sclera: Conjunctivae normal.   Neck:      Comments: JVD  Cardiovascular:      Rate and Rhythm: Normal rate and regular rhythm.      Heart sounds: Normal heart sounds.      Comments: Aicd pocket clean and intact  Pulmonary:      Effort: Pulmonary effort is normal. No respiratory distress.      Breath sounds: Normal breath sounds.      Comments: No crackles or wheezes.  Speaking full sentences on room air.  Abdominal:      General: Bowel sounds are normal. There is no distension.      Palpations: Abdomen is soft.      Tenderness: There is no abdominal tenderness.   Musculoskeletal:         General: Normal range of motion.      Cervical back: Normal range of motion. No rigidity.      Right lower leg: No edema.      Left lower leg: No edema.   Skin:     General: Skin is warm and dry.   Neurological:      Mental Status: She is alert and oriented to person, place, and time.      Cranial Nerves: No cranial nerve deficit.      Coordination: Coordination normal.   Psychiatric:         Behavior: Behavior normal.           Significant Labs: All pertinent labs within the past 24 hours have been reviewed.    Significant Imaging: I have reviewed all pertinent imaging results/findings within the past 24 hours.

## 2022-04-01 NOTE — ED TRIAGE NOTES
Patient reports getting up at 12 AM and noticed that she was SOB. States that she felt fine when she went to bed. Reports taking a fluid pill this morning.

## 2022-04-02 VITALS
HEIGHT: 63 IN | BODY MASS INDEX: 25.58 KG/M2 | SYSTOLIC BLOOD PRESSURE: 94 MMHG | WEIGHT: 144.38 LBS | HEART RATE: 74 BPM | RESPIRATION RATE: 18 BRPM | TEMPERATURE: 98 F | DIASTOLIC BLOOD PRESSURE: 50 MMHG | OXYGEN SATURATION: 93 %

## 2022-04-02 LAB
ANION GAP SERPL CALC-SCNC: 8 MMOL/L (ref 8–16)
BUN SERPL-MCNC: 45 MG/DL (ref 8–23)
CALCIUM SERPL-MCNC: 9.3 MG/DL (ref 8.7–10.5)
CHLORIDE SERPL-SCNC: 103 MMOL/L (ref 95–110)
CO2 SERPL-SCNC: 28 MMOL/L (ref 23–29)
CREAT SERPL-MCNC: 1.6 MG/DL (ref 0.5–1.4)
EST. GFR  (AFRICAN AMERICAN): 33 ML/MIN/1.73 M^2
EST. GFR  (NON AFRICAN AMERICAN): 29 ML/MIN/1.73 M^2
GLUCOSE SERPL-MCNC: 118 MG/DL (ref 70–110)
POTASSIUM SERPL-SCNC: 4 MMOL/L (ref 3.5–5.1)
SODIUM SERPL-SCNC: 139 MMOL/L (ref 136–145)
TROPONIN I SERPL DL<=0.01 NG/ML-MCNC: 0.2 NG/ML (ref 0–0.03)

## 2022-04-02 PROCEDURE — 84484 ASSAY OF TROPONIN QUANT: CPT | Performed by: HOSPITALIST

## 2022-04-02 PROCEDURE — G0378 HOSPITAL OBSERVATION PER HR: HCPCS

## 2022-04-02 PROCEDURE — 36415 COLL VENOUS BLD VENIPUNCTURE: CPT | Performed by: HOSPITALIST

## 2022-04-02 PROCEDURE — 25000003 PHARM REV CODE 250: Performed by: HOSPITALIST

## 2022-04-02 PROCEDURE — 96372 THER/PROPH/DIAG INJ SC/IM: CPT | Performed by: HOSPITALIST

## 2022-04-02 PROCEDURE — 97161 PT EVAL LOW COMPLEX 20 MIN: CPT

## 2022-04-02 PROCEDURE — 80048 BASIC METABOLIC PNL TOTAL CA: CPT | Performed by: HOSPITALIST

## 2022-04-02 PROCEDURE — 97165 OT EVAL LOW COMPLEX 30 MIN: CPT

## 2022-04-02 PROCEDURE — 97535 SELF CARE MNGMENT TRAINING: CPT

## 2022-04-02 PROCEDURE — 96376 TX/PRO/DX INJ SAME DRUG ADON: CPT

## 2022-04-02 PROCEDURE — 63600175 PHARM REV CODE 636 W HCPCS: Performed by: HOSPITALIST

## 2022-04-02 RX ORDER — CARVEDILOL 6.25 MG/1
6.25 TABLET ORAL 2 TIMES DAILY
Qty: 60 TABLET | Refills: 1 | Status: ON HOLD | OUTPATIENT
Start: 2022-04-02 | End: 2022-07-03 | Stop reason: SDUPTHER

## 2022-04-02 RX ORDER — LOPERAMIDE HYDROCHLORIDE 2 MG/1
2 CAPSULE ORAL 4 TIMES DAILY PRN
Status: DISCONTINUED | OUTPATIENT
Start: 2022-04-02 | End: 2022-04-02 | Stop reason: HOSPADM

## 2022-04-02 RX ORDER — TALC
6 POWDER (GRAM) TOPICAL NIGHTLY PRN
Status: DISCONTINUED | OUTPATIENT
Start: 2022-04-02 | End: 2022-04-02 | Stop reason: HOSPADM

## 2022-04-02 RX ORDER — ONDANSETRON 2 MG/ML
4 INJECTION INTRAMUSCULAR; INTRAVENOUS EVERY 4 HOURS PRN
Status: DISCONTINUED | OUTPATIENT
Start: 2022-04-02 | End: 2022-04-02 | Stop reason: HOSPADM

## 2022-04-02 RX ORDER — ISOSORBIDE DINITRATE AND HYDRALAZINE HYDROCHLORIDE 37.5; 2 MG/1; MG/1
1 TABLET ORAL DAILY
Qty: 60 TABLET | Refills: 6 | Status: SHIPPED | OUTPATIENT
Start: 2022-04-02 | End: 2022-10-09 | Stop reason: SDUPTHER

## 2022-04-02 RX ADMIN — GABAPENTIN 300 MG: 300 CAPSULE ORAL at 10:04

## 2022-04-02 RX ADMIN — FUROSEMIDE 40 MG: 10 INJECTION, SOLUTION INTRAMUSCULAR; INTRAVENOUS at 10:04

## 2022-04-02 RX ADMIN — HEPARIN SODIUM 5000 UNITS: 5000 INJECTION INTRAVENOUS; SUBCUTANEOUS at 06:04

## 2022-04-02 RX ADMIN — PANTOPRAZOLE SODIUM 40 MG: 40 TABLET, DELAYED RELEASE ORAL at 10:04

## 2022-04-02 RX ADMIN — HEPARIN SODIUM 5000 UNITS: 5000 INJECTION INTRAVENOUS; SUBCUTANEOUS at 01:04

## 2022-04-02 RX ADMIN — CLOPIDOGREL BISULFATE 75 MG: 75 TABLET, FILM COATED ORAL at 10:04

## 2022-04-02 NOTE — ASSESSMENT & PLAN NOTE
-Troponin 0.23 on admit.  There was a flat or slight downward trend  -No chest pain or obvious ischemic changes on EKG  -Most likely due to severe CHF and CKDIII - doubt acute coronary syndrome.

## 2022-04-02 NOTE — PLAN OF CARE
Problem: Physical Therapy  Goal: Physical Therapy Goal  Description: All acute goals met  DC patient    Outcome: Met

## 2022-04-02 NOTE — DISCHARGE INSTRUCTIONS
Take all medications as prescribed  Eat a strict low salt diet!!  This is the most important thing you can do to keep your CHF controlled.  Followup with your primary care provider within 1 week and your cardiologist within 2 weeks.  Thank you for trusting [facility] and Dr. Bloom with your care.  We are honored that you entrusted us with your healthcare needs. Your satisfaction is very important to us and we hope you have been very pleased with your experience at [facility]. After your discharge you may receive a survey asking you to rate your hospital experience. We encourage you to take the time to complete the survey as your feedback allows us to identify areas for improvement as well as recognize our staff.   Your satisfaction is our top priority.

## 2022-04-02 NOTE — PT/OT/SLP EVAL
Physical Therapy Evaluation and Discharge Note    Patient Name:  Palmira Malave   MRN:  [id number]    Recommendations:     Discharge Recommendations:  home (with family)   Discharge Equipment Recommendations: none   Barriers to discharge: None    Assessment:     Palimra Malave is a 86 y.o. female admitted with a medical diagnosis of Acute on chronic combined systolic and diastolic heart failure. .  At this time, patient is functioning at their prior level of function and does not require further acute PT services.     Recent Surgery: * No surgery found *      Plan:     During this hospitalization, patient does not require further acute PT services.  Please re-consult if situation changes.      Subjective     Chief Complaint: tired  Patient/Family Comments/goals: none present  Pain/Comfort:  · Pain Rating 1: 0/10  · Pain Addressed 1: Nurse notified    Patients cultural, spiritual, Congregation conflicts given the current situation: no    Living Environment:  Lives with 2 daughters where 1 has dementia  Prior to admission, patients level of function was indep.  Equipment used at home: walker, rolling, cane, quad.  DME owned (not currently used): none.  Upon discharge, patient will have assistance from family.    Objective:     Communicated with nsg prior to session.  Patient found supine with telemetry, pulse ox (continuous), peripheral IV, PureWick upon PT entry to room.    General Precautions: Standard, fall   Orthopedic Precautions:N/A   Braces: N/A   Respiratory Status: Room air    Exams:  · Sensation: -       Intact  · Skin Integrity/Edema:  -       intact  · RLE ROM: WFL  · RLE Strength: WFL  · LLE ROM: WFL  · LLE Strength: WFL    Patient donned yellow socks and gait belt for OOB mobility. Patient donned mask for hallway ambulation.      Functional Mobility:  · Bed Mobility:  Supine to Sit: modified independence  · Sit to Supine: modified independence  · Transfers:  Sit to Stand:  modified independence with  quad cane  · Gait: amb 25 feet around the room with quad cane. patient moved slowly with proper intention and had good control with turns and balance    AM-PAC 6 CLICK MOBILITY  Total Score:22       Therapeutic Activities and Exercises:   none at this time      AM-PAC 6 CLICK MOBILITY  Total Score:22     Patient left up in chair with all lines intact, call button in reach and nsg notified.    GOALS:   Multidisciplinary Problems     Physical Therapy Goals     Not on file          Multidisciplinary Problems (Resolved)        Problem: Physical Therapy    Goal Priority Disciplines Outcome Goal Variances Interventions   Physical Therapy Goal   (Resolved)     PT, PT/OT Met     Description: All acute goals met  DC patient                     History:     Past Medical History:   Diagnosis Date    Cataract     CHF (congestive heart failure)     CKD (chronic kidney disease), stage III     Coronary artery disease     Gout attack     Hypercholesteremia     Hypertension     Renal disorder     Vaginal delivery     x4       Past Surgical History:   Procedure Laterality Date    APPENDECTOMY      CARDIAC DEFIBRILLATOR PLACEMENT      2015    CATARACT EXTRACTION W/  INTRAOCULAR LENS IMPLANT Left 02/07/2019    Dr. Velazco    CATARACT EXTRACTION W/  INTRAOCULAR LENS IMPLANT Right 02/21/2019    Dr. Velazco    CHOLECYSTECTOMY      COLONOSCOPY W/ POLYPECTOMY  10 or 11/ 2014    per Dr. Myrick    defribrandonlator Left 8/2008    EYE SURGERY      HYSTERECTOMY      INTRAOCULAR PROSTHESES INSERTION Left 2/7/2019    Procedure: INSERTION, IOL PROSTHESIS;  Surgeon: Demetrius Velazco MD;  Location: [facility] OR;  Service: Ophthalmology;  Laterality: Left;  RN Phone Pre OP 1-30-19.  Arrival 05:30 AM    INTRAOCULAR PROSTHESES INSERTION Right 2/21/2019    Procedure: INSERTION, IOL PROSTHESIS;  Surgeon: Demetrius Velazco MD;  Location: [facility] OR;  Service: Ophthalmology;  Laterality: Right;    JOINT REPLACEMENT Bilateral 2005 &  2006    2 Knee Replacements=Lt. 1= 2005. Rt. 1= 2006    OOPHORECTOMY      PHACOEMULSIFICATION OF CATARACT Left 2/7/2019    Procedure: PHACOEMULSIFICATION, CATARACT;  Surgeon: Demetrius Velazco MD;  Location: [facility] OR;  Service: Ophthalmology;  Laterality: Left;    PHACOEMULSIFICATION OF CATARACT Right 2/21/2019    Procedure: PHACOEMULSIFICATION, CATARACT;  Surgeon: Demetrius Velazco MD;  Location: [facility] OR;  Service: Ophthalmology;  Laterality: Right;  RN Phone Pre op 2-15-19.  Arrival 05:30 AM    TOTAL KNEE ARTHROPLASTY Bilateral Lt.= 2005; Rt.=2006    Bilateral Knee scope       Time Tracking:     PT Received On: 04/02/22  PT Start Time: 0920     PT Stop Time: 0940  PT Total Time (min): 20 min     Billable Minutes: Evaluation 10      04/02/2022

## 2022-04-02 NOTE — ASSESSMENT & PLAN NOTE
-Baseline Cr 1.5-1.7  -At baseline on admission  -Avoid nephrotoxic agents and renally dose meds  -Diuresed as above.  -Cr stable at 1.6 on discharge.

## 2022-04-02 NOTE — ASSESSMENT & PLAN NOTE
-BP soft overnight but stable this morning without any symptoms.  -Continue home medications but decreased dose of home meds as above.

## 2022-04-02 NOTE — PLAN OF CARE
Problem: Occupational Therapy  Goal: Occupational Therapy Goal  Outcome: Met   OT eval is complete. The patient is at her functional baseline and no OT is recommended. No DME needs.     The patient denies need for HH OT. The patient is OK for D/C to home from OT standpoint.

## 2022-04-02 NOTE — PLAN OF CARE
[facility] - Telemetry  Discharge Final Note    Primary Care Provider: Qi Ramon MD    Expected Discharge Date: 4/2/2022    Final Discharge Note (most recent)       Final Note - 04/02/22 1255          Final Note    Assessment Type Final Discharge Note     Anticipated Discharge Disposition Home-Health Care [facility]     What phone number can be called within the next 1-3 days to see how you are doing after discharge? [phone]     Hospital Resources/Appts/Education Provided Provided patient/caregiver with written discharge plan information;Post-Acute resouces added to AVS        Post-Acute Status    Post-Acute Authorization Home Health     Home Health Status Set-up Complete/Auth obtained   Home Health faxed to [facility] for arrangement.    Discharge Delays None known at this time                     Important Message from Medicare             Contact Info       Qi Ramon MD   Specialty: Internal Medicine   Relationship: PCP - [address]  [address]  [place] [address]   Phone: [phone]       Next Steps: Follow up in 1 week(s)    Instructions: Call to schedule follow up appointment within one week.    [facility]   Specialty: DME Provider, Home Health Services    [address]  [address]  [place] [address]   Phone: [phone]       Next Steps: Follow up    Instructions: Home Health          SW secure chat patient's nurse Ernesto that patient is cleared from case management standpoint.

## 2022-04-02 NOTE — PLAN OF CARE
[facility] Telemetry      HOME HEALTH ORDERS  FACE TO FACE ENCOUNTER    Patient Name: Palmira Malave  YOB: 1935    PCP: Qi Ramon MD   PCP Address: [address] [place] [address] [address] / [place] [address]  PCP Phone Number: [phone]  PCP Fax: [phone]    Encounter Date: 4/1/22    Admit to Home Health    Diagnoses:  Active Hospital Problems    Diagnosis  POA    *Acute on chronic combined systolic and diastolic heart failure [I50.43]  Yes     Priority: 1 - High    ICD (implantable cardioverter-defibrillator) in place [Z95.810]  Yes     Priority: 2     Troponin level elevated [R77.8]  Yes     Priority: 3     Stage 3 chronic kidney disease [N18.30]  Yes     Priority: 4     Coronary artery disease involving native coronary artery of native heart without angina pectoris [I25.10]  Yes     Priority: 5     Goals of care, counseling/discussion [Z71.89]  Not Applicable    GERD (gastroesophageal reflux disease) [K21.9]  Yes     Chronic    Essential hypertension [I10]  Yes     Chronic    Hyperlipidemia [E78.5]  Yes     Chronic      Resolved Hospital Problems   No resolved problems to display.       Follow Up Appointments:  No future appointments.    Allergies:  Review of patient's allergies indicates:   Allergen Reactions    Aspirin Swelling     Swelling and rash    Colace [docusate sodium] Rash     itching    Docusate Other (See Comments) and Rash     itching    Sulfa (sulfonamide antibiotics) Swelling     Swelling and rash  Swelling and rash    Iodine and iodide containing products Rash    Penicillins Rash       Medications: Review discharge medications with patient and family and provide education.    Current Discharge Medication List      CONTINUE these medications which have CHANGED    Details   carvediloL (COREG) 6.25 MG tablet Take 1 tablet (6.25 mg total) by mouth 2 (two) times daily. Do not take if the top blood pressure number is less than 100 or your heart rate is less  than 60  Qty: 60 tablet, Refills: 1    Comments: .      isosorbide-hydrALAZINE 20-37.5 mg (BIDIL) 20-37.5 mg Tab Take 1 tablet by mouth once daily. Do not take if the top blood pressure number is less than 100  Qty: 60 tablet, Refills: 6         CONTINUE these medications which have NOT CHANGED    Details   acetaminophen (TYLENOL) 500 MG tablet Take 1 tablet (500 mg total) by mouth every 6 (six) hours as needed for Pain.  Qty: 30 tablet, Refills: 0      allopurinol (ZYLOPRIM) 100 MG tablet Take 100 mg by mouth every evening.       clopidogrel (PLAVIX) 75 mg tablet Take 75 mg by mouth once daily. On hold since 11-28-14, for procedure.      cranberry 400 mg Cap Take 1 capsule by mouth 2 (two) times daily.      fish oil-omega-3 fatty acids 300-1,000 mg capsule Take 2 g by mouth once daily. 2 caps every AM & 1 cap every PM.      folic acid/multivit-min/lutein (CENTRUM SILVER ORAL) Take by mouth once daily.      furosemide (LASIX) 80 MG tablet Take 1 tablet (80 mg total) by mouth 2 (two) times daily as needed (SOB or LE edema).  Qty: 180 tablet, Refills: 3      lovastatin (MEVACOR) 40 MG tablet Take 40 mg by mouth nightly. Takes 2 caps with supper every evening.      mirabegron (MYRBETRIQ) 50 mg Tb24 Take 50 mg by mouth once daily.       pantoprazole (PROTONIX) 40 MG tablet Take 40 mg by mouth once daily.      gabapentin (NEURONTIN) 300 MG capsule Take 300 mg by mouth 2 (two) times daily.      terazosin (HYTRIN) 2 MG capsule Take 2 mg by mouth every evening.         STOP taking these medications       calcitRIOL (ROCALTROL) 0.25 MCG Cap Comments:   Reason for Stopping:         clindamycin (CLEOCIN) 300 MG capsule Comments:   Reason for Stopping:                 I have seen and examined this patient within the last 30 days. My clinical findings that support the need for the home health skilled services and home bound status are the following:no   Weakness/numbness causing balance and gait disturbance due to  Weakness/Debility making it taxing to leave home.  Patient with medication mismanagement issues requiring home bound status as evidenced by  Unstable vital signs (blood pressure, heart rate), Poor understanding of medication regimen/dosage and Poor adherence to medication regimen/dosage.     Diet:   cardiac diet    Referrals/ Consults  Physical Therapy to evaluate and treat. Evaluate for home safety and equipment needs; Establish/upgrade home exercise program. Perform / instruct on therapeutic exercises, gait training, transfer training, and Range of Motion.  Occupational Therapy to evaluate and treat. Evaluate home environment for safety and equipment needs. Perform/Instruct on transfers, ADL training, ROM, and therapeutic exercises.  Aide to provide assistance with personal care, ADLs, and vital signs.    Activities:   ambulate in house with assistance    Nursing:   Agency to admit patient within 24 hours of hospital discharge unless specified on physician order or at patient request    SN to complete comprehensive assessment including routine vital signs. Instruct on disease process and s/s of complications to report to MD. Review/verify medication list sent home with the patient at time of discharge  and instruct patient/caregiver as needed. Frequency may be adjusted depending on start of care date.     Skilled nurse to perform up to 3 visits PRN for symptoms related to diagnosis    Notify MD if SBP > 160 or < 90; DBP > 90 or < 50; HR > 120 or < 50; Temp > 101; O2 < 88%; Other:       Ok to schedule additional visits based on staff availability and patient request on consecutive days within the home health episode.    When multiple disciplines ordered:    Start of Care occurs on Sunday - Wednesday schedule remaining discipline evaluations as ordered on separate consecutive days following the start of care.    Thursday SOC -schedule subsequent evaluations Friday and Monday the following week.     Friday - Saturday  SOC - schedule subsequent discipline evaluations on consecutive days starting Monday of the following week.    For all post-discharge communication and subsequent orders please contact patient's primary care physician.     Miscellaneous   Heart Failure:      SN to instruct on the following:    Instruct on the definition of CHF.   Instruct on the signs/sympoms of CHF to be reported.   Instruct on and monitor daily weights.   Instruct on factors that cause exacerbation.   Instruct on action, dose, schedule, and side effects of medications.   Instruct on diet as prescribed.   Instruct on activity allowed.   Instruct on life-style modifications for life long management of CHF   SN to assess compliance with daily weights, diet, medications, fluid retention,    safety precautions, activities permitted and life-style modifications.   Additional 1-2 SN visits per week as needed for signs and symptoms     of CHF exacerbation.      For Weight Gain > 2-3 lbs in 1 day or 4-6 lbs over 1 week notify PCP:  Increase Lasix (oral diuretic) dose to 80mg by mouth q8 hours for 5 days temporarily  If weight does not decrease by 2 lbs after 5 days of increased diuretic usage: Notify PCP.    Home Health Aide:  Nursing Twice weekly, Physical Therapy Twice weekly, Occupational Therapy Twice weekly and Home Health Aide Twice weekly    I certify that this patient is confined to her home and needs intermittent skilled nursing care, physical therapy and occupational therapy.

## 2022-04-02 NOTE — NURSING
Patient escorted to vehicle via ambulatory for discharge home. Patient escorted by the nurse and grandson . No apparent distress noted.

## 2022-04-02 NOTE — ASSESSMENT & PLAN NOTE
-Noted and no discharges  -Device interrogated and per [employer] rep no arrhythmias or discharges noted.  Battery life of about 1 year remaining.

## 2022-04-02 NOTE — ASSESSMENT & PLAN NOTE
-No issues at this time.   I have reviewed discharge instructions with the patient. The patient verbalized understanding. Patient left ED via Discharge Method: ambulatory to Home with self. Opportunity for questions and clarification provided. Patient given 1 scripts. To continue your aftercare when you leave the hospital, you may receive an automated call from our care team to check in on how you are doing. This is a free service and part of our promise to provide the best care and service to meet your aftercare needs.  If you have questions, or wish to unsubscribe from this service please call [phone]. Thank you for Choosing our [address] [place] [address] Emergency Department.

## 2022-04-02 NOTE — CONSULTS
"RD consulted for, "Education on fluid and salt restriction," pt receiving Low Na, 2 gm diet with 1500 mL fluid restriction.    Providing remote weekend coverage, attached handouts to discharge instructions. On-site RD to follow up and provide in person education as appropriate.    Please re-consult as needed.    Thank you.  "

## 2022-04-02 NOTE — PLAN OF CARE
[facility] - Telemetry  Discharge Assessment    Primary Care Provider: Qi Ramon MD     Discharge Assessment (most recent)       BRIEF DISCHARGE ASSESSMENT - 04/02/22 1251          Discharge Planning    Assessment Type Discharge Planning Reassessment     Resource/Environmental Concerns none     Support Systems Family members     Equipment Currently Used at Home cane, quad;walker, rolling     Current Living Arrangements home/apartment/condo     Patient/Family Anticipates Transition to home with family     Patient/Family Anticipated Services at Transition home health care   Home Health order faxed to [facility] for arrangement.    DME Needed Upon Discharge  none     Discharge Plan A Home Health;Home with family     Discharge Plan B Home Health;Home with family

## 2022-04-02 NOTE — ASSESSMENT & PLAN NOTE
"-Placed in observation  -Mild CHF exacerbation most likely due to dietary indiscretions - at beans and rice with sausage last night.  -BNP rather elevated, but very similar to prior values  -CXR with mild pulmonary edema.  -Diuresed with IV lasix and patient states she urinated "a lot" overnight.  Today feels much better and breathing at baseline.  She is asking for discharge home.  -Will continue lasix 80 bid.  Ordered HH nurse with instructions to increase lasix to q8h prn weight gain.  -BP on low side overnight so have decreased dose of coreg to 6.25mg bid and hydralazine/imdur to once daily.  Instructed to hold these meds if SBP less than 100.  -Stable for discharge with home health and OPCM.  Follow up with Dr. iRchmond within 2 weeks.  "

## 2022-04-02 NOTE — DISCHARGE SUMMARY
[facility] Medicine  Discharge Summary      Patient Name: Palmira Malave  MRN: [id number]  Patient Class: OP- Observation  Admission Date: 4/1/2022  Hospital Length of Stay: 0 days  Discharge Date and Time: No discharge date for patient encounter.  Attending Physician: Mekhi Bloom MD   Discharging Provider: Mekhi Bloom MD  Primary Care Provider: Qi Ramon MD      HPI:   Ms. Malave is an 86 year old woman with history of chronic systolic/diastolic chf, AICD, coronary artery disease, hypertension and CKDIII who presented for evaluation of shortness of breath.  She states she felt fine when she went to bed last night, but around midnight she woke to use the bathroom and was more short of breath than normal at that time.  She notes eating white beans and rice with sausage at dinner, prior to going to sleep.  She denies chest pain, cough, fever, leg swelling, nausea, vomiting, diarrhea, headache, palpitations, AICD discharge, light headedness, dizziness, dysuria and hematuria.  She has been taking all her medications.  She reports taking lasix this morning with some improvement.  Vitals appear stable and she is in no distress.  She was given additional IV lasix in ER and continues to improve, but states she still doesn't quite feel her breathing is back to normal.    Goals of Care Treatment Preferences:  Code Status: DNR    Consults:   Consults (From admission, onward)        Status Ordering Provider     Inpatient consult to Social Work  Once        Provider:  (Not yet assigned)    Ordered MEKHI BLOOM     Inpatient consult to Social Work/Case Management  Once        Provider:  (Not yet assigned)    Acknowledged MEKHI BLOOM     Inpatient consult to Registered Dietitian/Nutritionist  Once        Provider:  (Not yet assigned)    Acknowledged MEKHI BLOOM        Hospital Course By Problem:   * Acute on chronic combined systolic and diastolic heart failure  -Placed in  "observation  -Mild CHF exacerbation most likely due to dietary indiscretions - at beans and rice with sausage last night.  -BNP rather elevated, but very similar to prior values  -CXR with mild pulmonary edema.  -Diuresed with IV lasix and patient states she urinated "a lot" overnight.  Today feels much better and breathing at baseline.  She is asking for discharge home.  -Will continue lasix 80 bid.  Ordered HH nurse with instructions to increase lasix to q8h prn weight gain.  -BP on low side overnight so have decreased dose of coreg to 6.25mg bid and hydralazine/imdur to once daily.  Instructed to hold these meds if SBP less than 100.  -Stable for discharge with home health and OPCM.  Follow up with Dr. Richmond within 2 weeks.    ICD (implantable cardioverter-defibrillator) in place  -Noted and no discharges  -Device interrogated and per [employer] rep no arrhythmias or discharges noted.  Battery life of about 1 year remaining.    Troponin level elevated  -Troponin 0.23 on admit.  There was a flat or slight downward trend  -No chest pain or obvious ischemic changes on EKG  -Most likely due to severe CHF and CKDIII - doubt acute coronary syndrome.    Stage 3 chronic kidney disease  -Baseline Cr 1.5-1.7  -At baseline on admission  -Avoid nephrotoxic agents and renally dose meds  -Diuresed as above.  -Cr stable at 1.6 on discharge.    Coronary artery disease involving native coronary artery of native heart without angina pectoris  -History noted  -Continue home medications.    Goals of care, counseling/discussion  Advance Care Planning   Code Status  I discussed patient's values surrounding end of life care.  She was quick to state that when her heart stops or if she stops breathing she wishes for a peaceful death without aggressive or invasive measures.  Up to that point however, she wishes for full aggressive medical care.  I relayed that DNR status was most consistent with her values and she agreed.  DNR order " placed.  I spent 15 minutes in goals of care discussion.    GERD (gastroesophageal reflux disease)  -No issues at this time.    Hyperlipidemia  -Continue home medications    Essential hypertension  -BP soft overnight but stable this morning without any symptoms.  -Continue home medications but decreased dose of home meds as above.      Final Active Diagnoses:    Diagnosis Date Noted POA    PRINCIPAL PROBLEM:  Acute on chronic combined systolic and diastolic heart failure [I50.43] 12/08/2019 Yes    ICD (implantable cardioverter-defibrillator) in place [Z95.810] 02/08/2019 Yes    Troponin level elevated [R77.8] 11/15/2014 Yes    Stage 3 chronic kidney disease [N18.30] 11/15/2014 Yes    Coronary artery disease involving native coronary artery of native heart without angina pectoris [I25.10] 11/15/2014 Yes    Goals of care, counseling/discussion [Z71.89] 04/01/2022 Not Applicable    GERD (gastroesophageal reflux disease) [K21.9] 12/08/2019 Yes     Chronic    Essential hypertension [I10] 11/15/2014 Yes     Chronic    Hyperlipidemia [E78.5] 11/15/2014 Yes     Chronic      Problems Resolved During this Admission:       Discharged Condition: stable    Disposition: Home or Self Care    Follow Up:    Patient Instructions:      Ambulatory referral/consult to Outpatient Case Management   Referral Priority: Routine Referral Type: Consultation   Referral Reason: Specialty Services Required   Number of Visits Requested: 1     Diet Cardiac     Notify your health care provider if you experience any of the following:  increased confusion or weakness     Notify your health care provider if you experience any of the following:  persistent dizziness, light-headedness, or visual disturbances     Notify your health care provider if you experience any of the following:  worsening rash     Notify your health care provider if you experience any of the following:  severe persistent headache     Notify your health care provider if you  experience any of the following:  difficulty breathing or increased cough     Notify your health care provider if you experience any of the following:  severe uncontrolled pain     Notify your health care provider if you experience any of the following:  persistent nausea and vomiting or diarrhea     Notify your health care provider if you experience any of the following:  temperature >100.4     Activity as tolerated       Significant Diagnostic Studies: Labs:   BMP:   Recent Labs   Lab 04/01/22  0949 04/02/22 0438   GLU 95 118*    139   K 4.1 4.0    103   CO2 24 28   BUN 44* 45*   CREATININE 1.7* 1.6*   CALCIUM 9.8 9.3   MG 2.2  --    , CMP   Recent Labs   Lab 04/01/22  0949 04/02/22  0438    139   K 4.1 4.0    103   CO2 24 28   GLU 95 118*   BUN 44* 45*   CREATININE 1.7* 1.6*   CALCIUM 9.8 9.3   PROT 7.6  --    ALBUMIN 4.1  --    BILITOT 0.8  --    ALKPHOS 109  --    AST 38  --    ALT 23  --    ANIONGAP 11 8   ESTGFRAFRICA 31* 33*   EGFRNONAA 27* 29*   , CBC   Recent Labs   Lab 04/01/22 0949   WBC 6.24   HGB 10.9*   HCT 34.3*      , INR   Lab Results   Component Value Date    INR 1.0 10/10/2020    INR 1.0 09/10/2020    INR 1.0 08/08/2020   , Lipid Panel   Lab Results   Component Value Date    CHOL 158 06/06/2021    HDL 62 06/06/2021    LDLCALC 77.0 06/06/2021    TRIG 95 06/06/2021    CHOLHDL 39.2 06/06/2021   , Troponin   Recent Labs   Lab 04/01/22  0949 04/01/22  1557 04/02/22 0438   TROPONINI 0.235* 0.248* 0.203*    and A1C: No results for input(s): HGBA1C in the last 4320 hours.    Pending Diagnostic Studies:     None         Medications:  Reconciled Home Medications:      Medication List      CHANGE how you take these medications    carvediloL 6.25 MG tablet  Commonly known as: COREG  Take 1 tablet (6.25 mg total) by mouth 2 (two) times daily. Do not take if the top blood pressure number is less than 100 or your heart rate is less than 60  What changed:   · medication  strength  · how much to take  · additional instructions     isosorbide-hydrALAZINE 20-37.5 mg 20-37.5 mg Tab  Commonly known as: BIDIL  Take 1 tablet by mouth once daily. Do not take if the top blood pressure number is less than 100  What changed:   · when to take this  · additional instructions        CONTINUE taking these medications    acetaminophen 500 MG tablet  Commonly known as: TYLENOL  Take 1 tablet (500 mg total) by mouth every 6 (six) hours as needed for Pain.     allopurinoL 100 MG tablet  Commonly known as: ZYLOPRIM  Take 100 mg by mouth every evening.     CENTRUM SILVER ORAL  Take by mouth once daily.     clopidogreL 75 mg tablet  Commonly known as: PLAVIX  Take 75 mg by mouth once daily. On hold since 11-28-14, for procedure.     cranberry 400 mg Cap  Take 1 capsule by mouth 2 (two) times daily.     fish oil-omega-3 fatty acids 300-1,000 mg capsule  Take 2 g by mouth once daily. 2 caps every AM & 1 cap every PM.     furosemide 80 MG tablet  Commonly known as: LASIX  Take 1 tablet (80 mg total) by mouth 2 (two) times daily as needed (SOB or LE edema).     gabapentin 300 MG capsule  Commonly known as: NEURONTIN  Take 300 mg by mouth 2 (two) times daily.     lovastatin 40 MG tablet  Commonly known as: MEVACOR  Take 40 mg by mouth nightly. Takes 2 caps with supper every evening.     mirabegron 50 mg Tb24  Commonly known as: MYRBETRIQ  Take 50 mg by mouth once daily.     pantoprazole 40 MG tablet  Commonly known as: PROTONIX  Take 40 mg by mouth once daily.     terazosin 2 MG capsule  Commonly known as: HYTRIN  Take 2 mg by mouth every evening.        STOP taking these medications    calcitRIOL 0.25 MCG Cap  Commonly known as: ROCALTROL     clindamycin 300 MG capsule  Commonly known as: CLEOCIN            Indwelling Lines/Drains at time of discharge:   Lines/Drains/Airways     None                 Time spent on the discharge of patient: 35 minutes         Larry Bloom MD  Department of Hospital  Medicine  [facility]

## 2022-04-02 NOTE — PLAN OF CARE
Problem: Adult Inpatient Plan of Care  Goal: Plan of Care Review  Outcome: Ongoing, Progressing  Goal: Patient-Specific Goal (Individualized)  Outcome: Ongoing, Progressing  Goal: Absence of Hospital-Acquired Illness or Injury  Outcome: Ongoing, Progressing  Goal: Optimal Comfort and Wellbeing  Outcome: Ongoing, Progressing  Goal: Readiness for Transition of Care  Outcome: Ongoing, Progressing     Problem: Fall Injury Risk  Goal: Absence of Fall and Fall-Related Injury  Outcome: Ongoing, Progressing     Problem: Fluid and Electrolyte Imbalance (Acute Kidney Injury/Impairment)  Goal: Fluid and Electrolyte Balance  Outcome: Ongoing, Progressing   Remain NSR with BBB on telemetry monitor.  PRN medication effective for pain Explained plan of care, verbalized understanding. Educated pt .on new medicine . No injury during shift, Side rails up x 2, call light by bedside.

## 2022-04-04 ENCOUNTER — HOSPITAL ENCOUNTER (EMERGENCY)
Facility: HOSPITAL | Age: 87
Discharge: HOME | End: 2022-04-05
Attending: EMERGENCY MEDICINE
Payer: MEDICARE

## 2022-04-04 VITALS
BODY MASS INDEX: 25.58 KG/M2 | OXYGEN SATURATION: 98 % | RESPIRATION RATE: 18 BRPM | HEART RATE: 71 BPM | SYSTOLIC BLOOD PRESSURE: 113 MMHG | HEIGHT: 63 IN | DIASTOLIC BLOOD PRESSURE: 66 MMHG | TEMPERATURE: 99 F

## 2022-04-04 DIAGNOSIS — R06.02 SHORTNESS OF BREATH: Primary | ICD-10-CM

## 2022-04-04 DIAGNOSIS — I50.22 CHRONIC SYSTOLIC CONGESTIVE HEART FAILURE: ICD-10-CM

## 2022-04-04 PROCEDURE — 99281 EMR DPT VST MAYX REQ PHY/QHP: CPT

## 2022-04-04 NOTE — ED TRIAGE NOTES
Pt presents to ED c/o SOB, dizziness. Reports ambulating to restroom at approximately midnight when she became SOB and dizzy. Denies SOB at the moment.  Reports hx of CHF. Denies N/V/D, fever, chills, chest pain, numbness, weakness, HA. Pt is alert, calm, and oriented x4, 100% on RA, no acute distress noted at this time.

## 2022-04-04 NOTE — DISCHARGE INSTRUCTIONS
Return immediately if you get worse or if new problems develop.  Please follow-up with your cardiologist this week.  Usual medicines.  Low-sodium diet.

## 2022-04-04 NOTE — ED PROVIDER NOTES
"Encounter Date: 4/4/2022    SCRIBE #1 NOTE: I, Wen Dickens, am scribing for, and in the presence of,  Larry Owen MD. I have scribed the following portions of the note - Other sections scribed: HPI, ROS.       History     Chief Complaint   Patient presents with    Shortness of Breath     85 yo female to triage w/ complaints of SOB. Pt says she was recently seen for the same thing and discharged on Saturday. Pt took her "fluid pill" this morning and says It helped a little but wanted to come and get checked again. VSS, NAD, AAOx4     Palmira Malave is a 86 y.o. female, with a past medical history of CHF, CAD, and Hypertension, who presents to the ED today with a complaint of shortness of breath with associated slight nausea since 12:00AM today. The patient states that she went to the bathroom during the night and felt short of breath. She also states that she was in this ED 3 days ago for the same complaint. She notes that she had a cough this morning that has since resolved and chest pain under the right breast that has since resolved. She reports taking Lasix 80 mg this morning with relief. She also reports that she is currently taking Plavix. She denies headache, vomiting, constipation (last bowel movement was yesterday), abdominal pain, diarrhea, dysuria, leg swelling, fever, rhinorrhea, sore throat, or other associated symptoms. No other complaints at this time.    The history is provided by the patient.     Review of patient's allergies indicates:   Allergen Reactions    Aspirin Swelling     Swelling and rash    Colace [docusate sodium] Rash     itching    Docusate Other (See Comments) and Rash     itching    Sulfa (sulfonamide antibiotics) Swelling     Swelling and rash  Swelling and rash    Iodine and iodide containing products Rash    Penicillins Rash     Past Medical History:   Diagnosis Date    Cataract     CHF (congestive heart failure)     CKD (chronic kidney disease), stage III     " Coronary artery disease     Gout attack     Hypercholesteremia     Hypertension     Renal disorder     Vaginal delivery     x4     Past Surgical History:   Procedure Laterality Date    APPENDECTOMY      CARDIAC DEFIBRILLATOR PLACEMENT      2015    CATARACT EXTRACTION W/  INTRAOCULAR LENS IMPLANT Left 02/07/2019    Dr. Velazco    CATARACT EXTRACTION W/  INTRAOCULAR LENS IMPLANT Right 02/21/2019    Dr. Velazco    CHOLECYSTECTOMY      COLONOSCOPY W/ POLYPECTOMY  10 or 11/ 2014    per Dr. Myrick    defribillator Left 8/2008    EYE SURGERY      HYSTERECTOMY      INTRAOCULAR PROSTHESES INSERTION Left 2/7/2019    Procedure: INSERTION, IOL PROSTHESIS;  Surgeon: Demetrius Velazco MD;  Location: [facility] OR;  Service: Ophthalmology;  Laterality: Left;  RN Phone Pre OP 1-30-19.  Arrival 05:30 AM    INTRAOCULAR PROSTHESES INSERTION Right 2/21/2019    Procedure: INSERTION, IOL PROSTHESIS;  Surgeon: Demetrius Velazco MD;  Location: [facility] OR;  Service: Ophthalmology;  Laterality: Right;    JOINT REPLACEMENT Bilateral 2005 & 2006    2 Knee Replacements=Lt. 1= 2005. Rt. 1= 2006    OOPHORECTOMY      PHACOEMULSIFICATION OF CATARACT Left 2/7/2019    Procedure: PHACOEMULSIFICATION, CATARACT;  Surgeon: Demetrius Velazco MD;  Location: [facility] OR;  Service: Ophthalmology;  Laterality: Left;    PHACOEMULSIFICATION OF CATARACT Right 2/21/2019    Procedure: PHACOEMULSIFICATION, CATARACT;  Surgeon: Demetrius Velazco MD;  Location: [facility] OR;  Service: Ophthalmology;  Laterality: Right;  RN Phone Pre op 2-15-19.  Arrival 05:30 AM    TOTAL KNEE ARTHROPLASTY Bilateral Lt.= 2005; Rt.=2006    Bilateral Knee scope     Family History   Problem Relation Age of Onset    Heart attack Mother 88    Hypertension Mother     Hyperlipidemia Mother     Diabetes Other     Hypertension Other     Amblyopia Son     Blindness Neg Hx     Cataracts Neg Hx     Glaucoma Neg Hx     Macular degeneration Neg Hx     Retinal  detachment Neg Hx     Strabismus Neg Hx      Social History     Tobacco Use    Smoking status: Never Smoker    Smokeless tobacco: Never Used   Substance Use Topics    Alcohol use: Yes     Comment: rarely    Drug use: No     Review of Systems   Constitutional: Negative for chills, diaphoresis and fever.   HENT: Negative for ear pain and sore throat.    Eyes: Negative for pain.   Respiratory: Positive for shortness of breath. Negative for cough.    Cardiovascular: Negative for chest pain.   Gastrointestinal: Positive for nausea (slight). Negative for abdominal pain, diarrhea and vomiting.   Genitourinary: Negative for dysuria.   Musculoskeletal: Negative for back pain.        (-) Arm or leg trouble.    Skin: Negative for rash.   Neurological: Negative for headaches.   Psychiatric/Behavioral: Negative for confusion.       Physical Exam     Initial Vitals [04/04/22 0900]   BP Pulse Resp Temp SpO2   124/63 76 20 97.9 °F (36.6 °C) 98 %      MAP       --         Physical Exam    ED Course   Procedures  Labs Reviewed - No data to display       Imaging Results    None          Medications - No data to display           Scribe Attestation:   Scribe #1: I performed the above scribed service and the documentation accurately describes the services I performed. I attest to the accuracy of the note.                 Clinical Impression:   Final diagnoses:  [R06.02] Shortness of breath (Primary)  [I50.22] Chronic systolic congestive heart failure          ED Disposition Condition    Discharge Stable        ED Prescriptions     None        Follow-up Information     Follow up With Specialties Details Why Contact Info    Kelechi Richmond MD Cardiology In 1 week  [address]  [address]  [facility] [address]  [phone]           I personally performed the services described in this documentation.  All medical record  entries made by the scribe are at my direction and in my presence.  Signed, Dr. Lexie Owen,  MD  04/13/22 0959

## 2022-04-04 NOTE — ED PROVIDER NOTES
"Encounter Date: 4/4/2022       History     Chief Complaint   Patient presents with    Shortness of Breath     87 yo female to triage w/ complaints of SOB. Pt says she was recently seen for the same thing and discharged on Saturday. Pt took her "fluid pill" this morning and says It helped a little but wanted to come and get checked again. VSS, NAD, AAOx4     HPI     Palmira Malave is a 86 y.o. female, with a past medical history of CHF, CAD, and Hypertension, who presents to the ED today with a complaint of shortness of breath with associated slight nausea since 12:00AM today. The patient states that she went to the bathroom during the night and felt short of breath. She also states that she was in this ED 3 days ago for the same complaint. She notes that she had a cough this morning that has since resolved and chest pain under the right breast that has since resolved. She reports taking Lasix 80 mg this morning with relief. She also reports that she is currently taking Plavix. She denies headache, vomiting, constipation (last bowel movement was yesterday), abdominal pain, diarrhea, dysuria, leg swelling, fever, rhinorrhea, sore throat, or other associated symptoms. No other complaints at this time.     The history is provided by the patient.            Review of patient's allergies indicates:   Allergen Reactions    Aspirin Swelling       Swelling and rash    Colace [docusate sodium] Rash       itching    Docusate Other (See Comments) and Rash       itching    Sulfa (sulfonamide antibiotics) Swelling       Swelling and rash  Swelling and rash    Iodine and iodide containing products Rash    Penicillins Rash           Past Medical History:   Diagnosis Date    Cataract      CHF (congestive heart failure)      CKD (chronic kidney disease), stage III      Coronary artery disease      Gout attack      Hypercholesteremia      Hypertension      Renal disorder      Vaginal delivery       x4      Past " Surgical History:   Procedure Laterality Date    APPENDECTOMY        CARDIAC DEFIBRILLATOR PLACEMENT         2015    CATARACT EXTRACTION W/  INTRAOCULAR LENS IMPLANT Left 02/07/2019     Dr. Velazco    CATARACT EXTRACTION W/  INTRAOCULAR LENS IMPLANT Right 02/21/2019     Dr. Velazco    CHOLECYSTECTOMY        COLONOSCOPY W/ POLYPECTOMY   10 or 11/ 2014     per Dr. Elen ceja Left 8/2008    EYE SURGERY        HYSTERECTOMY        INTRAOCULAR PROSTHESES INSERTION Left 2/7/2019     Procedure: INSERTION, IOL PROSTHESIS;  Surgeon: Demetrius Velazco MD;  Location: [facility] OR;  Service: Ophthalmology;  Laterality: Left;  RN Phone Pre OP 1-30-19.  Arrival 05:30 AM    INTRAOCULAR PROSTHESES INSERTION Right 2/21/2019     Procedure: INSERTION, IOL PROSTHESIS;  Surgeon: Demetrius Velazco MD;  Location: [facility] OR;  Service: Ophthalmology;  Laterality: Right;    JOINT REPLACEMENT Bilateral 2005 & 2006     2 Knee Replacements=Lt. 1= 2005. Rt. 1= 2006    OOPHORECTOMY        PHACOEMULSIFICATION OF CATARACT Left 2/7/2019     Procedure: PHACOEMULSIFICATION, CATARACT;  Surgeon: Demetrius Velazco MD;  Location: [facility] OR;  Service: Ophthalmology;  Laterality: Left;    PHACOEMULSIFICATION OF CATARACT Right 2/21/2019     Procedure: PHACOEMULSIFICATION, CATARACT;  Surgeon: Demetrius Velazco MD;  Location: [facility] OR;  Service: Ophthalmology;  Laterality: Right;  RN Phone Pre op 2-15-19.  Arrival 05:30 AM    TOTAL KNEE ARTHROPLASTY Bilateral Lt.= 2005; Rt.=2006     Bilateral Knee scope            Family History   Problem Relation Age of Onset    Heart attack Mother 88    Hypertension Mother      Hyperlipidemia Mother      Diabetes Other      Hypertension Other      Amblyopia Son      Blindness Neg Hx      Cataracts Neg Hx      Glaucoma Neg Hx      Macular degeneration Neg Hx      Retinal detachment Neg Hx      Strabismus Neg Hx        Social History            Tobacco Use    Smoking  status: Never Smoker    Smokeless tobacco: Never Used   Substance Use Topics    Alcohol use: Yes       Comment: rarely    Drug use: No      Review of Systems   Constitutional: Negative for chills, diaphoresis and fever.   HENT: Negative for ear pain and sore throat.    Eyes: Negative for pain.   Respiratory: Positive for shortness of breath. Negative for cough.    Cardiovascular: Negative for chest pain.   Gastrointestinal: Positive for nausea (slight). Negative for abdominal pain, diarrhea and vomiting.   Genitourinary: Negative for dysuria.   Musculoskeletal: Negative for back pain.        (-) Arm or leg trouble.    Skin: Negative for rash.   Neurological: Negative for headaches.   Psychiatric/Behavioral: Negative for confusion.     Review of patient's allergies indicates:   Allergen Reactions    Aspirin Swelling     Swelling and rash    Colace [docusate sodium] Rash     itching    Docusate Other (See Comments) and Rash     itching    Sulfa (sulfonamide antibiotics) Swelling     Swelling and rash  Swelling and rash    Iodine and iodide containing products Rash    Penicillins Rash     Past Medical History:   Diagnosis Date    Cataract     CHF (congestive heart failure)     CKD (chronic kidney disease), stage III     Coronary artery disease     Gout attack     Hypercholesteremia     Hypertension     Renal disorder     Vaginal delivery     x4     Past Surgical History:   Procedure Laterality Date    APPENDECTOMY      CARDIAC DEFIBRILLATOR PLACEMENT      2015    CATARACT EXTRACTION W/  INTRAOCULAR LENS IMPLANT Left 02/07/2019    Dr. Velazco    CATARACT EXTRACTION W/  INTRAOCULAR LENS IMPLANT Right 02/21/2019    Dr. Velazco    CHOLECYSTECTOMY      COLONOSCOPY W/ POLYPECTOMY  10 or 11/ 2014    per Dr. yMrick    defribillator Left 8/2008    EYE SURGERY      HYSTERECTOMY      INTRAOCULAR PROSTHESES INSERTION Left 2/7/2019    Procedure: INSERTION, IOL PROSTHESIS;  Surgeon: Demetrius FULLER  MD Mora;  Location: [facility] OR;  Service: Ophthalmology;  Laterality: Left;  RN Phone Pre OP 1-30-19.  Arrival 05:30 AM    INTRAOCULAR PROSTHESES INSERTION Right 2/21/2019    Procedure: INSERTION, IOL PROSTHESIS;  Surgeon: Demetrius Velazco MD;  Location: [facility] OR;  Service: Ophthalmology;  Laterality: Right;    JOINT REPLACEMENT Bilateral 2005 & 2006    2 Knee Replacements=Lt. 1= 2005. Rt. 1= 2006    OOPHORECTOMY      PHACOEMULSIFICATION OF CATARACT Left 2/7/2019    Procedure: PHACOEMULSIFICATION, CATARACT;  Surgeon: Demetrius Velazco MD;  Location: [facility] OR;  Service: Ophthalmology;  Laterality: Left;    PHACOEMULSIFICATION OF CATARACT Right 2/21/2019    Procedure: PHACOEMULSIFICATION, CATARACT;  Surgeon: Demetrius Velazco MD;  Location: [facility] OR;  Service: Ophthalmology;  Laterality: Right;  RN Phone Pre op 2-15-19.  Arrival 05:30 AM    TOTAL KNEE ARTHROPLASTY Bilateral Lt.= 2005; Rt.=2006    Bilateral Knee scope     Family History   Problem Relation Age of Onset    Heart attack Mother 88    Hypertension Mother     Hyperlipidemia Mother     Diabetes Other     Hypertension Other     Amblyopia Son     Blindness Neg Hx     Cataracts Neg Hx     Glaucoma Neg Hx     Macular degeneration Neg Hx     Retinal detachment Neg Hx     Strabismus Neg Hx      Social History     Tobacco Use    Smoking status: Never Smoker    Smokeless tobacco: Never Used   Substance Use Topics    Alcohol use: Yes     Comment: rarely    Drug use: No     Review of Systems    Physical Exam     Initial Vitals [04/04/22 0900]   BP Pulse Resp Temp SpO2   124/63 76 20 97.9 °F (36.6 °C) 98 %      MAP       --         Physical Exam  The patient was examined specifically for the following:   General:No significant distress, Good color, Warm and dry. Head and neck:Scalp atraumatic, Neck supple. Neurological:Appropriate conversation, Gross motor deficits. Eyes:Conjugate gaze, Clear corneas. ENT: No epistaxis. Cardiac:  Regular rate and rhythm, Grossly normal heart tones. Pulmonary: Wheezing, Rales. Gastrointestinal: Abdominal tenderness, Abdominal distention. Musculoskeletal: Extremity deformity, Apparent pain with range of motion of the joints. Skin: Rash.   The findings on examination were normal.  Lungs are clear and free of wheezing rales rubs or rhonchi.  Heart tones are normal.  The patient has regular rate and rhythm.  Oxygen saturations are 98%.  The patient's respiratory rate is 20. Patient's blood pressure is 124/63.  There is no clinical evidence of respiratory distress.  There is minimal pedal edema.    ED Course   Procedures  Labs Reviewed - No data to display  EKG Readings: (Independently Interpreted)   This patient is in a paced rhythm with a heart rate of 76.  There is an intraventricular conduction delay.  There are nonspecific ST segment and T-wave changes.       Imaging Results    None       Medical decision making:  Given the above this patient presents to the emergency room with a history of congestive heart failure, defibrillator in place, complaining of some mild shortness of breath this morning.  This sensation is common for her.  It resolved prior to her arrival in the emergency room.  The patient is not short of breath now.  The patient has perfectly normal vital signs and oxygen saturations of 98% and clear lungs on her physical examination.  She had no chest pain pressure tightness.  Her EKG reveals a paced rhythm with nonspecific ST and T-wave changes.  The patient was in the hospital 3 days ago and had a complete laboratory and radiographic evaluation.  The evaluation was remarkable for stable chronic anemia renal insufficiency and elevated BNP and troponin.  The patient was offered repeat laboratory and radiographic evaluation.  She declined, wishing instead to go home and return if she gets worse or if new problems develop.  The patient appears to be capable making intelligent decisions on her own  best interest she is asymptomatic at discharge.     Medications - No data to display                       Clinical Impression:   Final diagnoses:  [R06.02] Shortness of breath (Primary)  [I50.22] Chronic systolic congestive heart failure          ED Disposition Condition    Discharge Stable        ED Prescriptions     None        Follow-up Information     Follow up With Specialties Details Why Contact Info    Kelechi Richmond MD Cardiology In 1 week  [address]  [address]  [facility] [address]  [phone]             Larry Owen MD  04/04/22 1023       Larry Owen MD  04/13/22 0959

## 2022-04-06 PROCEDURE — G0180 MD CERTIFICATION HHA PATIENT: HCPCS | Mod: ,,, | Performed by: INTERNAL MEDICINE

## 2022-04-06 PROCEDURE — G0180 PR HOME HEALTH MD CERTIFICATION: ICD-10-PCS | Mod: ,,, | Performed by: INTERNAL MEDICINE

## 2022-04-11 ENCOUNTER — OFFICE VISIT (OUTPATIENT)
Dept: CARDIOLOGY | Facility: CLINIC | Age: 87
End: 2022-04-11
Payer: MEDICARE

## 2022-04-11 VITALS
HEIGHT: 63 IN | DIASTOLIC BLOOD PRESSURE: 77 MMHG | OXYGEN SATURATION: 98 % | RESPIRATION RATE: 18 BRPM | HEART RATE: 92 BPM | SYSTOLIC BLOOD PRESSURE: 138 MMHG | BODY MASS INDEX: 26.37 KG/M2 | WEIGHT: 148.81 LBS

## 2022-04-11 DIAGNOSIS — E78.2 MIXED HYPERLIPIDEMIA: Chronic | ICD-10-CM

## 2022-04-11 DIAGNOSIS — I10 ESSENTIAL HYPERTENSION: Chronic | ICD-10-CM

## 2022-04-11 DIAGNOSIS — R06.02 SOB (SHORTNESS OF BREATH): ICD-10-CM

## 2022-04-11 DIAGNOSIS — I50.43 ACUTE ON CHRONIC COMBINED SYSTOLIC AND DIASTOLIC CONGESTIVE HEART FAILURE: Primary | ICD-10-CM

## 2022-04-11 DIAGNOSIS — I20.89 OTHER FORMS OF ANGINA PECTORIS: ICD-10-CM

## 2022-04-11 DIAGNOSIS — I25.10 CORONARY ARTERY DISEASE INVOLVING NATIVE CORONARY ARTERY OF NATIVE HEART WITHOUT ANGINA PECTORIS: ICD-10-CM

## 2022-04-11 DIAGNOSIS — Z95.810 ICD (IMPLANTABLE CARDIOVERTER-DEFIBRILLATOR), BIVENTRICULAR, IN SITU: Chronic | ICD-10-CM

## 2022-04-11 PROCEDURE — 1101F PT FALLS ASSESS-DOCD LE1/YR: CPT | Mod: CPTII,S$GLB,, | Performed by: INTERNAL MEDICINE

## 2022-04-11 PROCEDURE — 1159F MED LIST DOCD IN RCRD: CPT | Mod: CPTII,S$GLB,, | Performed by: INTERNAL MEDICINE

## 2022-04-11 PROCEDURE — 3288F FALL RISK ASSESSMENT DOCD: CPT | Mod: CPTII,S$GLB,, | Performed by: INTERNAL MEDICINE

## 2022-04-11 PROCEDURE — 1126F PR PAIN SEVERITY QUANTIFIED, NO PAIN PRESENT: ICD-10-PCS | Mod: CPTII,S$GLB,, | Performed by: INTERNAL MEDICINE

## 2022-04-11 PROCEDURE — 99999 PR PBB SHADOW E&M-EST. PATIENT-LVL IV: CPT | Mod: PBBFAC,,, | Performed by: INTERNAL MEDICINE

## 2022-04-11 PROCEDURE — 99999 PR PBB SHADOW E&M-EST. PATIENT-LVL IV: ICD-10-PCS | Mod: PBBFAC,,, | Performed by: INTERNAL MEDICINE

## 2022-04-11 PROCEDURE — 1126F AMNT PAIN NOTED NONE PRSNT: CPT | Mod: CPTII,S$GLB,, | Performed by: INTERNAL MEDICINE

## 2022-04-11 PROCEDURE — 3288F PR FALLS RISK ASSESSMENT DOCUMENTED: ICD-10-PCS | Mod: CPTII,S$GLB,, | Performed by: INTERNAL MEDICINE

## 2022-04-11 PROCEDURE — 1101F PR PT FALLS ASSESS DOC 0-1 FALLS W/OUT INJ PAST YR: ICD-10-PCS | Mod: CPTII,S$GLB,, | Performed by: INTERNAL MEDICINE

## 2022-04-11 PROCEDURE — 99214 PR OFFICE/OUTPT VISIT, EST, LEVL IV, 30-39 MIN: ICD-10-PCS | Mod: S$GLB,,, | Performed by: INTERNAL MEDICINE

## 2022-04-11 PROCEDURE — 99214 OFFICE O/P EST MOD 30 MIN: CPT | Mod: S$GLB,,, | Performed by: INTERNAL MEDICINE

## 2022-04-11 PROCEDURE — 1159F PR MEDICATION LIST DOCUMENTED IN MEDICAL RECORD: ICD-10-PCS | Mod: CPTII,S$GLB,, | Performed by: INTERNAL MEDICINE

## 2022-04-11 NOTE — PROGRESS NOTES
Subjective:    Patient ID:  Palmira Malave is a 86 y.o. female who presents for follow-up of Follow-up      HPI     Systolic CHF, NICM, [employer]tronic BiV AICD, HTN, HLD     [employer] AICD check 3/9/21 - High SVC coil impedance - SVC coil turned off - otherwise good device function     Previously followed by Dr Batres  Previous history:  Here for follow-up of systolic heart failure and biventricular ICD interrogation.  She denies any worsening cardiopulmonary complaints since we last saw her.  She's had no issues with her device.  She is expressed no worsening cardiopulmonary complaints.  She denies any chest pain, shortness breath or palpitations.  She's not expressing PND, orthopnea or lower edema.  She denies any dizziness, presyncope or syncope.  Otherwise she's better usual state of health.  He says she has a daughter the moved into scan early onset dementia she's having to take care of.  She tries to stay active by going to interactions with a female group.  She is seen by [facility] and does get injections.  He wants clearance for rehabilitation.     Here for follow-up of systolic heart failure and previous biventricular ICD placement.  She had her device interrogated which shows no significant issues.  She had recent admission at the beginning of the month for mild volume overload.  She was diuresed overnight and discharged home the next day.  Her EF is stable.  She denies any PND, orthopnea or lower Veronica.  She has not experiencing dizziness, presyncope or syncope.  She on questioning says she is compliant with medicines and diet.          catheter 2011 reported nonobstructive CAD     Echo 9/11/20  · Severely decreased left ventricular systolic function. The estimated ejection fraction is 20%.  · Severe global hypokinetic wall motion.  · Mild eccentric left ventricular hypertrophy.  · Mild left ventricular enlargement.  · Grade I (mild) left ventricular diastolic dysfunction consistent with impaired  relaxation.  · Normal right ventricular systolic function.  · Indeterminate central venous pressure. Estimated PA systolic pressure is at least 13 mmHg.     Carotid ultrasound:  7-18  CONCLUSIONS   There is 40 - 49% right Internal Carotid stenosis.  There is 20 - 39% left Internal Carotid stenosis.        9/18/19 Denies recent CP or SOB  EKG A-sensed V-paced        6/22/20 Recently Dx with colon CA. Less SOB since discharge  Denies CP  Cleared for colon surgery at moderate cardiac risk  OK to hold plavix 7 days before  OV 3 months with [employer] AICD check        10/2/20 Feels better since discharge. Stopped bidil - have her HA and made her feel bad - went back to coreg 25 bid   Continue Rx for CAD, HTN, CHF, HLD  OV 2 weeks with [employer] AICD check     10/14/20 Breathing stable. Denies CP   Continue Rx for CHF, HTN, HLD, CAD  OV 3 months with BNP, BMP      11/3/20 Back early for worsening SOB. Denies CP  Unclear if she takes lasix as prescribed  Increase lasix 80 AM and 40 PM  OV 2 weeks with BNP, BMP  Needs to go to the ER for worsening symptoms        11/19/20 Less SOB. Denies CP     Continue Rx for CHF, HTN, HLD, CAD  OV 3 months with BNP, BMP      EKG 3/6/21 A-sensed V-paced  K 3.8, , Cr 2.6     3/8/21 patient reports she heard an alarm with the AICD and did not receive a shock  Denies CP. SOB improved  BP stable   Decrease lasix to 80 AM only with rising Cr  Medtronic AICD check tomorrow  Continue Rx for CHF, CAD, HLD, HTN     3/9/21 Denies CP or SOB   Continue Rx for CHF, CAD, HLD, HTN  OV 3 months      6/10/21 Less SOB since discharge. Trying to follow low sodium diet  Denies CP  BP in good range   Continue Rx for CHF, CAD, HLD, HTN  OV 1 month with BNP, BMP      7/14/21 Denies CP, mild EVANS about the same, denies LE edema - takes lasix 80 qd     BNP up but clinic al volume status stable   Continue Rx for CHF, CAD, HLD, HTN  OV 3 month with BNP, BMP     8/12/21 had PND and increased SOB since last  night - improved some after taking lasix 80 this AM. Denies CP or LE edema   Increase lasix 80 bid for 3 days due to CHF and volume overload then 80 qd  OV 2 weeks with BNP, BMP  Needs to go to the ER for worsening SOB     Continue Rx for CHF, CAD, HLD, HTN     12/1/21 Reports increased EVANS for the past few days - increased lasix 80 bid which helped  EKG A-sensed BiV paced   BNP, BMP today  Continue lasix 80 bid for now  OV with [employer]onic AICD check       Continue Rx for CHF, CAD, HLD, HTN  Needs to go to the ER for worsening EVANS     Labs 12/3/21  K 4.2  Cr 1.7 up from 1.5  BNP 2319 down from 2563     12/8/21 EVANS improved. Denies CP. Still taking lasix 80 bid   BNP still elevated but symptomatically improved  OV 2 weeks with Med[employer]onic AICD check     Continue Rx for CHF, CAD, HLD, HTN     Admitted 4/1/22  Ms. Malave is an 86 year old woman with history of chronic systolic/diastolic chf, AICD, coronary artery disease, hypertension and CKDIII who presented for evaluation of shortness of breath.  She states she felt fine when she went to bed last night, but around midnight she woke to use the bathroom and was more short of breath than normal at that time.  She notes eating white beans and rice with sausage at dinner, prior to going to sleep.  She denies chest pain, cough, fever, leg swelling, nausea, vomiting, diarrhea, headache, palpitations, AICD discharge, light headedness, dizziness, dysuria and hematuria.  She has been taking all her medications.  She reports taking lasix this morning with some improvement.  Vitals appear stable and she is in no distress.  She was given additional IV lasix in ER and continues to improve, but states she still doesn't quite feel her breathing is back to normal.  Cr 1.6, BNP 20344    4/11/22 EVANS has improved since discharge. Takes lasix 80 qd with a 2nd dose prn  AICD was checked during last admission  Denies CP    Review of Systems   Constitutional: Negative for decreased appetite.    HENT: Negative for ear discharge.    Eyes: Negative for blurred vision.   Respiratory: Negative for hemoptysis.    Endocrine: Negative for polyphagia.   Hematologic/Lymphatic: Negative for adenopathy.   Skin: Negative for color change.   Musculoskeletal: Negative for joint swelling.   Genitourinary: Negative for bladder incontinence.   Neurological: Negative for brief paralysis.   Psychiatric/Behavioral: Negative for hallucinations.   Allergic/Immunologic: Negative for hives.        Objective:    Physical Exam  Constitutional:       Appearance: She is well-developed.   HENT:      Head: Normocephalic and atraumatic.   Eyes:      Conjunctiva/sclera: Conjunctivae normal.      Pupils: Pupils are equal, round, and reactive to light.   Neck:      Thyroid: No thyromegaly.   Cardiovascular:      Rate and Rhythm: Normal rate and regular rhythm.      Heart sounds: No murmur heard.  Pulmonary:      Effort: Pulmonary effort is normal. No respiratory distress.      Breath sounds: Normal breath sounds.   Abdominal:      General: Bowel sounds are normal.      Palpations: Abdomen is soft.   Musculoskeletal:      Cervical back: Normal range of motion and neck supple.   Skin:     General: Skin is warm and dry.   Neurological:      Mental Status: She is alert and oriented to person, place, and time.   Psychiatric:         Behavior: Behavior normal.           Assessment:       1. Acute on chronic combined systolic and diastolic congestive heart failure    2. Biventricular AICD in place    3. Coronary artery disease involving native coronary artery of native heart without angina pectoris    4. Essential hypertension    5. Mixed hyperlipidemia    6. SOB (shortness of breath)    7. Other forms of angina pectoris         Plan:           Continue Rx for CHF, CAD, HLD, HTN  OV 3 months with BNP, BMP  Next [employer] AICD check 10/2022

## 2022-04-24 ENCOUNTER — HOSPITAL ENCOUNTER (EMERGENCY)
Facility: HOSPITAL | Age: 87
Discharge: HOME | End: 2022-04-24
Attending: STUDENT IN AN ORGANIZED HEALTH CARE EDUCATION/TRAINING PROGRAM
Payer: MEDICARE

## 2022-04-24 VITALS
DIASTOLIC BLOOD PRESSURE: 75 MMHG | RESPIRATION RATE: 24 BRPM | HEART RATE: 91 BPM | TEMPERATURE: 98 F | HEIGHT: 63 IN | OXYGEN SATURATION: 97 % | WEIGHT: 168 LBS | SYSTOLIC BLOOD PRESSURE: 150 MMHG | BODY MASS INDEX: 29.77 KG/M2

## 2022-04-24 DIAGNOSIS — R06.02 SOB (SHORTNESS OF BREATH): ICD-10-CM

## 2022-04-24 LAB
ALBUMIN SERPL BCP-MCNC: 3.8 G/DL (ref 3.5–5.2)
ALP SERPL-CCNC: 101 U/L (ref 55–135)
ALT SERPL W/O P-5'-P-CCNC: 21 U/L (ref 10–44)
ANION GAP SERPL CALC-SCNC: 10 MMOL/L (ref 8–16)
AST SERPL-CCNC: 32 U/L (ref 10–40)
BASOPHILS # BLD AUTO: 0.03 K/UL (ref 0–0.2)
BASOPHILS NFR BLD: 0.6 % (ref 0–1.9)
BILIRUB SERPL-MCNC: 0.9 MG/DL (ref 0.1–1)
BNP SERPL-MCNC: 2468 PG/ML (ref 0–99)
BUN SERPL-MCNC: 44 MG/DL (ref 8–23)
CALCIUM SERPL-MCNC: 9.8 MG/DL (ref 8.7–10.5)
CHLORIDE SERPL-SCNC: 105 MMOL/L (ref 95–110)
CO2 SERPL-SCNC: 24 MMOL/L (ref 23–29)
CREAT SERPL-MCNC: 1.5 MG/DL (ref 0.5–1.4)
CTP QC/QA: YES
DIFFERENTIAL METHOD: ABNORMAL
EOSINOPHIL # BLD AUTO: 0.1 K/UL (ref 0–0.5)
EOSINOPHIL NFR BLD: 1.5 % (ref 0–8)
ERYTHROCYTE [DISTWIDTH] IN BLOOD BY AUTOMATED COUNT: 15.1 % (ref 11.5–14.5)
EST. GFR  (AFRICAN AMERICAN): 36 ML/MIN/1.73 M^2
EST. GFR  (NON AFRICAN AMERICAN): 31 ML/MIN/1.73 M^2
GLUCOSE SERPL-MCNC: 98 MG/DL (ref 70–110)
HCT VFR BLD AUTO: 34.8 % (ref 37–48.5)
HGB BLD-MCNC: 11 G/DL (ref 12–16)
IMM GRANULOCYTES # BLD AUTO: 0.02 K/UL (ref 0–0.04)
IMM GRANULOCYTES NFR BLD AUTO: 0.4 % (ref 0–0.5)
LYMPHOCYTES # BLD AUTO: 0.7 K/UL (ref 1–4.8)
LYMPHOCYTES NFR BLD: 13.7 % (ref 18–48)
MCH RBC QN AUTO: 31.5 PG (ref 27–31)
MCHC RBC AUTO-ENTMCNC: 31.6 G/DL (ref 32–36)
MCV RBC AUTO: 100 FL (ref 82–98)
MONOCYTES # BLD AUTO: 0.5 K/UL (ref 0.3–1)
MONOCYTES NFR BLD: 10.1 % (ref 4–15)
NEUTROPHILS # BLD AUTO: 3.9 K/UL (ref 1.8–7.7)
NEUTROPHILS NFR BLD: 73.7 % (ref 38–73)
NRBC BLD-RTO: 0 /100 WBC
PLATELET # BLD AUTO: 139 K/UL (ref 150–450)
PMV BLD AUTO: 10.9 FL (ref 9.2–12.9)
POTASSIUM SERPL-SCNC: 4.7 MMOL/L (ref 3.5–5.1)
PROT SERPL-MCNC: 7.4 G/DL (ref 6–8.4)
RBC # BLD AUTO: 3.49 M/UL (ref 4–5.4)
SARS-COV-2 RDRP RESP QL NAA+PROBE: NEGATIVE
SODIUM SERPL-SCNC: 139 MMOL/L (ref 136–145)
TROPONIN I SERPL DL<=0.01 NG/ML-MCNC: 0.14 NG/ML (ref 0–0.03)
WBC # BLD AUTO: 5.34 K/UL (ref 3.9–12.7)

## 2022-04-24 PROCEDURE — 63600175 PHARM REV CODE 636 W HCPCS: Performed by: STUDENT IN AN ORGANIZED HEALTH CARE EDUCATION/TRAINING PROGRAM

## 2022-04-24 PROCEDURE — 96374 THER/PROPH/DIAG INJ IV PUSH: CPT

## 2022-04-24 PROCEDURE — U0002 COVID-19 LAB TEST NON-CDC: HCPCS | Performed by: STUDENT IN AN ORGANIZED HEALTH CARE EDUCATION/TRAINING PROGRAM

## 2022-04-24 PROCEDURE — 93005 ELECTROCARDIOGRAM TRACING: CPT

## 2022-04-24 PROCEDURE — 84484 ASSAY OF TROPONIN QUANT: CPT | Performed by: STUDENT IN AN ORGANIZED HEALTH CARE EDUCATION/TRAINING PROGRAM

## 2022-04-24 PROCEDURE — 85025 COMPLETE CBC W/AUTO DIFF WBC: CPT | Performed by: STUDENT IN AN ORGANIZED HEALTH CARE EDUCATION/TRAINING PROGRAM

## 2022-04-24 PROCEDURE — 80053 COMPREHEN METABOLIC PANEL: CPT | Performed by: STUDENT IN AN ORGANIZED HEALTH CARE EDUCATION/TRAINING PROGRAM

## 2022-04-24 PROCEDURE — 99285 EMERGENCY DEPT VISIT HI MDM: CPT | Mod: 25

## 2022-04-24 PROCEDURE — 83880 ASSAY OF NATRIURETIC PEPTIDE: CPT | Performed by: STUDENT IN AN ORGANIZED HEALTH CARE EDUCATION/TRAINING PROGRAM

## 2022-04-24 PROCEDURE — 93010 EKG 12-LEAD: ICD-10-PCS | Mod: ,,, | Performed by: INTERNAL MEDICINE

## 2022-04-24 PROCEDURE — 93010 ELECTROCARDIOGRAM REPORT: CPT | Mod: ,,, | Performed by: INTERNAL MEDICINE

## 2022-04-24 RX ORDER — FUROSEMIDE 10 MG/ML
80 INJECTION INTRAMUSCULAR; INTRAVENOUS
Status: COMPLETED | OUTPATIENT
Start: 2022-04-24 | End: 2022-04-24

## 2022-04-24 RX ADMIN — FUROSEMIDE 80 MG: 10 INJECTION, SOLUTION INTRAMUSCULAR; INTRAVENOUS at 02:04

## 2022-04-24 NOTE — ED NOTES
Adult Physical Assessment  LOC: Palmira Malave, 86 y.o. female verified via two identifiers.  The patient is awake, alert, oriented and speaking appropriately at this time.  APPEARANCE: Patient resting comfortably and appears to be in no acute distress at this time. Patient is clean and well groomed, patient's clothing is properly fastened. Says her SOB resolved as soon as the sun came up.   SKIN:The skin is warm and dry, color consistent with ethnicity, patient has normal skin turgor and moist mucus membranes, skin intact, no breakdown or brusing noted.  MUSCULOSKELETAL: Patient moving all extremities well, no obvious swelling or deformities noted.  RESPIRATORY: Airway is open and patent, respirations are spontaneous, patient has a normal effort and rate, no accessory muscle use noted.  CARDIAC: Patient has a normal rate and rhythm, no periphreal edema noted in any extremity, capillary refill < 3 seconds in all extremities  ABDOMEN: Soft and non tender to palpation, no abdominal distention noted. Bowel sounds present in all four quadrants.  NEUROLOGIC: Eyes open spontaneously, behavior appropriate to situation, follows commands, facial expression symmetrical, bilateral hand grasp equal and even, purposeful motor response noted, normal sensation in all extremities when touched with a finger.

## 2022-04-24 NOTE — ED PROVIDER NOTES
Encounter Date: 4/24/2022    SCRIBE #1 NOTE: I, Shady Samayoa, am scribing for, and in the presence of, aMya Live DO.       History     Chief Complaint   Patient presents with    Shortness of Breath     Patient reports SOB that started last night and continued this morning, states swelling to legs and feet as well. SOB while talking in triage denies wearing oxygen at home.      Palmira Malave is a 86 y.o. female with a PMHx of CHF, CAD, HTN, HLD, CKD, Biventricular AICD in place, who presents to the Emergency Department for evaluation of shortness of breath at rest that began last night and has since resolved. Patient notes her feet feel swollen. She denies any current shortness of breath, chest pain, lightheadedness, abdominal pain, or other associated symptoms. She states she is not on home oxygen. Patient explains she is on Lasix BID, but will sometimes only take 1 dose. She states she last took her Lasix yesterday morning.    The history is provided by the patient.     Review of patient's allergies indicates:   Allergen Reactions    Aspirin Swelling     Swelling and rash    Colace [docusate sodium] Rash     itching    Docusate Other (See Comments) and Rash     itching    Sulfa (sulfonamide antibiotics) Swelling     Swelling and rash  Swelling and rash    Iodine and iodide containing products Rash    Penicillins Rash     Past Medical History:   Diagnosis Date    Cataract     CHF (congestive heart failure)     CKD (chronic kidney disease), stage III     Coronary artery disease     Gout attack     Hypercholesteremia     Hypertension     Renal disorder     Vaginal delivery     x4     Past Surgical History:   Procedure Laterality Date    APPENDECTOMY      CARDIAC DEFIBRILLATOR PLACEMENT      2015    CATARACT EXTRACTION W/  INTRAOCULAR LENS IMPLANT Left 02/07/2019    Dr. Velazco    CATARACT EXTRACTION W/  INTRAOCULAR LENS IMPLANT Right 02/21/2019    Dr. Velazco     CHOLECYSTECTOMY      COLONOSCOPY W/ POLYPECTOMY  10 or 11/ 2014    per Dr. Myrick    defribrandonlator Left 8/2008    EYE SURGERY      HYSTERECTOMY      INTRAOCULAR PROSTHESES INSERTION Left 2/7/2019    Procedure: INSERTION, IOL PROSTHESIS;  Surgeon: Demetrius Velazco MD;  Location: Health system OR;  Service: Ophthalmology;  Laterality: Left;  RN Phone Pre OP 1-30-19.  Arrival 05:30 AM    INTRAOCULAR PROSTHESES INSERTION Right 2/21/2019    Procedure: INSERTION, IOL PROSTHESIS;  Surgeon: Demetrius Velazco MD;  Location: Health system OR;  Service: Ophthalmology;  Laterality: Right;    JOINT REPLACEMENT Bilateral 2005 & 2006    2 Knee Replacements=Lt. 1= 2005. Rt. 1= 2006    OOPHORECTOMY      PHACOEMULSIFICATION OF CATARACT Left 2/7/2019    Procedure: PHACOEMULSIFICATION, CATARACT;  Surgeon: Demetrius Velazco MD;  Location: Health system OR;  Service: Ophthalmology;  Laterality: Left;    PHACOEMULSIFICATION OF CATARACT Right 2/21/2019    Procedure: PHACOEMULSIFICATION, CATARACT;  Surgeon: Demetrius Velazco MD;  Location: Health system OR;  Service: Ophthalmology;  Laterality: Right;  RN Phone Pre op 2-15-19.  Arrival 05:30 AM    TOTAL KNEE ARTHROPLASTY Bilateral Lt.= 2005; Rt.=2006    Bilateral Knee scope     Family History   Problem Relation Age of Onset    Heart attack Mother 88    Hypertension Mother     Hyperlipidemia Mother     Diabetes Other     Hypertension Other     Amblyopia Son     Blindness Neg Hx     Cataracts Neg Hx     Glaucoma Neg Hx     Macular degeneration Neg Hx     Retinal detachment Neg Hx     Strabismus Neg Hx      Social History     Tobacco Use    Smoking status: Never Smoker    Smokeless tobacco: Never Used   Substance Use Topics    Alcohol use: Yes     Comment: rarely    Drug use: No     Review of Systems   Constitutional: Negative for chills and fever.   HENT: Negative for drooling, facial swelling and trouble swallowing.    Eyes: Negative for redness and visual disturbance.    Respiratory: Positive for shortness of breath (resolved). Negative for cough and stridor.    Cardiovascular: Negative for chest pain.   Gastrointestinal: Negative for abdominal pain, nausea and vomiting.   Genitourinary: Negative for dysuria and hematuria.   Musculoskeletal: Positive for joint swelling. Negative for gait problem and neck stiffness.   Skin: Negative for pallor and wound.   Neurological: Negative for syncope, facial asymmetry, speech difficulty and weakness.   Psychiatric/Behavioral: Negative for confusion.   All other systems reviewed and are negative.      Physical Exam     Initial Vitals [04/24/22 1012]   BP Pulse Resp Temp SpO2   (!) 143/67 96 (!) 24 98.3 °F (36.8 °C) (!) 94 %      MAP       --         Physical Exam    Nursing note and vitals reviewed.  Constitutional: Vital signs are normal. She appears well-developed and well-nourished. She is not diaphoretic.  Non-toxic appearance. She does not have a sickly appearance. She does not appear ill.   HENT:   Head: Normocephalic and atraumatic.   Mouth/Throat: Uvula is midline, oropharynx is clear and moist and mucous membranes are normal.   Eyes: Conjunctivae are normal. No scleral icterus.   Neck: Neck supple. No JVD present.   Normal range of motion.  Cardiovascular: Normal rate, regular rhythm, normal heart sounds and intact distal pulses.   Pulmonary/Chest: No respiratory distress. She has decreased breath sounds (posterior lung fields.).   Abdominal: Abdomen is soft. She exhibits no distension. There is no abdominal tenderness. There is no rebound and no guarding.   Musculoskeletal:         General: Edema present. No tenderness.      Cervical back: Normal range of motion and neck supple. No rigidity. No spinous process tenderness.      Comments: 1+ non pitting edema to the bilateral lower extremities.      Neurological: She is alert and oriented to person, place, and time. She has normal strength.   Moves all extremities, follows all  commands, no focal neurologic deficits.    Skin: Skin is warm and dry. No rash noted. No erythema.   Psychiatric: She has a normal mood and affect. Thought content normal.         ED Course   Procedures  Labs Reviewed   CBC W/ AUTO DIFFERENTIAL - Abnormal; Notable for the following components:       Result Value    RBC 3.49 (*)     Hemoglobin 11.0 (*)     Hematocrit 34.8 (*)      (*)     MCH 31.5 (*)     MCHC 31.6 (*)     RDW 15.1 (*)     Platelets 139 (*)     Lymph # 0.7 (*)     Gran % 73.7 (*)     Lymph % 13.7 (*)     All other components within normal limits   COMPREHENSIVE METABOLIC PANEL - Abnormal; Notable for the following components:    BUN 44 (*)     Creatinine 1.5 (*)     eGFR if  36 (*)     eGFR if non  31 (*)     All other components within normal limits   TROPONIN I - Abnormal; Notable for the following components:    Troponin I 0.136 (*)     All other components within normal limits   B-TYPE NATRIURETIC PEPTIDE - Abnormal; Notable for the following components:    BNP 2,468 (*)     All other components within normal limits   SARS-COV-2 RDRP GENE     EKG Readings: (Independently Interpreted)   Paced rhythm with a rate of 91 beats per minute, frequent PVCs, no obvious acute ST segment changes.  EKG grossly unchanged when compared to previous EKG from April 1, 2022     ECG Results          EKG 12-lead (Final result)  Result time 04/24/22 12:20:02    Final result by Interface, Lab In [facility] (04/24/22 12:20:02)                 Narrative:    Test Reason : R06.02,    Vent. Rate : 091 BPM     Atrial Rate : 091 BPM     P-R Int : 152 ms          QRS Dur : 174 ms      QT Int : 458 ms       P-R-T Axes : 073 227 034 degrees     QTc Int : 563 ms    Atrial-sensed ventricular-paced rhythm with frequent Premature ventricular  complexes  Abnormal ECG  When compared with ECG of 01-APR-2022 09:14,  Significant changes have occurred  Confirmed by Judson Hall MD ([id number]) on 4/24/2022  12:19:53 PM    Referred By: [email or web address]   SELF           Confirmed By:Judson Hall MD                            Imaging Results          X-Ray Chest AP Portable (Final result)  Result time 04/24/22 11:01:42    Final result by Ismael Aguilar MD (04/24/22 11:01:42)                 Impression:      Increased interstitial markings relative to 04/01/2022 examination may relate to pulmonary edema in the setting of reported CHF.      Electronically signed by: Ismael Aguilar  Date:    04/24/2022  Time:    11:01             Narrative:    EXAMINATION:  XR CHEST AP PORTABLE    CLINICAL HISTORY:  CHF;    TECHNIQUE:  Single frontal view of the chest was performed.    COMPARISON:  Chest radiograph 04/01/2022, 10:03 hours    FINDINGS:  Monitoring leads overlie the chest.  Left subclavian approach AICD.  Enlarged cardiac silhouette as before.  Atherosclerotic calcification of the aorta.  Prominence and indistinctness of central pulmonary vasculature.  Prominent interstitial markings are noted appear progressed relative to 04/01/2022 examination.  No definite pneumothorax or large volume pleural effusion.  No acute findings identified in the visualized abdomen.  Osseous and soft tissue structures appear without definite acute change.                                 Medications   furosemide injection 80 mg (80 mg Intravenous Given 4/24/22 1405)     Medical Decision Making:   History:   Old Medical Records: I decided to obtain old medical records.  Clinical Tests:   Lab Tests: Reviewed and Ordered  Radiological Study: Ordered and Reviewed  Medical Tests: Reviewed and Ordered  ED Management:   [facility]  This is an emergent evaluation of a 86 y.o. female with a PMHx of CHF, CAD, HTN, HLD, CKD, Biventricular AICD in place, who presents with shortness of breath at rest that began last night and has since resolved. Initial vitals in the ED with RR 24, blood pressure 143/67 and remainder of vitals unremarkable. Physical exam noted above. DDx  includes but is not limited to CHF exacerbation, COPD, ACS, Pneumonia, pleural effusion, pneumothorax, COVID vs other viral illness. Also considered but clinically less likely to be PE, dissection. Labs and imaging including a cardiac work-up was obtained. Labs appear to be at the patient's baseline. Chest xray shows increased interstitial markings when compared to the previous imaging from earlier this month. EKG showed no acute ischemic changes. She was treated with a dose of IV lasix. On reassessment, she remained asymptomatic. Ambulatory pulse ox 96-99% on room air. Given benign exam and work-up, will discharge with cardiology follow-up and ED return precautions. Patient is aware of plan and is amenable.     Maya Live D.O  EMERGENCY MEDICINE  2:21 PM 04/24/2022          Scribe Attestation:   Scribe #1: I performed the above scribed service and the documentation accurately describes the services I performed. I attest to the accuracy of the note.                 Clinical Impression:   Final diagnoses:  [R06.02] SOB (shortness of breath)          ED Disposition Condition    Discharge Stable        ED Prescriptions     None        Follow-up Information     Follow up With Specialties Details Why Contact Info    Kelechi Richmond MD Cardiology Schedule an appointment as soon as possible for a visit in 1 day Emergency Room Follow-up [address]  [address]  [facility] [address]  [phone]      [facility] Emergency Dept Emergency Medicine Go to  If symptoms worsen [address]  [facility] [address] [phone]         I, Maya Live DO, personally performed the services described in this documentation. All medical record entries made by the scribe were at my direction and in my presence. I have reviewed the chart and agree that the record reflects my personal performance and is accurate and complete.     Maya Live DO  04/29/22 1100

## 2022-04-24 NOTE — ED NOTES
Upon exercise oximetry, pt's O2 saturation at rest was 97% on RA and ranged between 96%-99% upon ambulation on RA.

## 2022-05-04 ENCOUNTER — EXTERNAL HOME HEALTH (OUTPATIENT)
Dept: HOME HEALTH SERVICES | Facility: HOSPITAL | Age: 87
End: 2022-05-04
Payer: MEDICARE

## 2022-05-12 ENCOUNTER — OFFICE VISIT (OUTPATIENT)
Dept: CARDIOLOGY | Facility: CLINIC | Age: 87
End: 2022-05-12
Payer: MEDICARE

## 2022-05-12 VITALS
HEART RATE: 69 BPM | OXYGEN SATURATION: 95 % | SYSTOLIC BLOOD PRESSURE: 123 MMHG | RESPIRATION RATE: 18 BRPM | BODY MASS INDEX: 27.6 KG/M2 | WEIGHT: 155.75 LBS | DIASTOLIC BLOOD PRESSURE: 78 MMHG | HEIGHT: 63 IN

## 2022-05-12 DIAGNOSIS — Z95.810 ICD (IMPLANTABLE CARDIOVERTER-DEFIBRILLATOR), BIVENTRICULAR, IN SITU: Primary | Chronic | ICD-10-CM

## 2022-05-12 DIAGNOSIS — I50.23 ACUTE ON CHRONIC SYSTOLIC CONGESTIVE HEART FAILURE: ICD-10-CM

## 2022-05-12 DIAGNOSIS — I25.10 CORONARY ARTERY DISEASE INVOLVING NATIVE CORONARY ARTERY OF NATIVE HEART WITHOUT ANGINA PECTORIS: ICD-10-CM

## 2022-05-12 PROCEDURE — 1159F MED LIST DOCD IN RCRD: CPT | Mod: CPTII,S$GLB,, | Performed by: INTERNAL MEDICINE

## 2022-05-12 PROCEDURE — 1101F PR PT FALLS ASSESS DOC 0-1 FALLS W/OUT INJ PAST YR: ICD-10-PCS | Mod: CPTII,S$GLB,, | Performed by: INTERNAL MEDICINE

## 2022-05-12 PROCEDURE — 1101F PT FALLS ASSESS-DOCD LE1/YR: CPT | Mod: CPTII,S$GLB,, | Performed by: INTERNAL MEDICINE

## 2022-05-12 PROCEDURE — 99214 PR OFFICE/OUTPT VISIT, EST, LEVL IV, 30-39 MIN: ICD-10-PCS | Mod: S$GLB,,, | Performed by: INTERNAL MEDICINE

## 2022-05-12 PROCEDURE — 1126F AMNT PAIN NOTED NONE PRSNT: CPT | Mod: CPTII,S$GLB,, | Performed by: INTERNAL MEDICINE

## 2022-05-12 PROCEDURE — 1126F PR PAIN SEVERITY QUANTIFIED, NO PAIN PRESENT: ICD-10-PCS | Mod: CPTII,S$GLB,, | Performed by: INTERNAL MEDICINE

## 2022-05-12 PROCEDURE — 3288F FALL RISK ASSESSMENT DOCD: CPT | Mod: CPTII,S$GLB,, | Performed by: INTERNAL MEDICINE

## 2022-05-12 PROCEDURE — 99999 PR PBB SHADOW E&M-EST. PATIENT-LVL IV: ICD-10-PCS | Mod: PBBFAC,,, | Performed by: INTERNAL MEDICINE

## 2022-05-12 PROCEDURE — 99999 PR PBB SHADOW E&M-EST. PATIENT-LVL IV: CPT | Mod: PBBFAC,,, | Performed by: INTERNAL MEDICINE

## 2022-05-12 PROCEDURE — 1159F PR MEDICATION LIST DOCUMENTED IN MEDICAL RECORD: ICD-10-PCS | Mod: CPTII,S$GLB,, | Performed by: INTERNAL MEDICINE

## 2022-05-12 PROCEDURE — 99214 OFFICE O/P EST MOD 30 MIN: CPT | Mod: S$GLB,,, | Performed by: INTERNAL MEDICINE

## 2022-05-12 PROCEDURE — 3288F PR FALLS RISK ASSESSMENT DOCUMENTED: ICD-10-PCS | Mod: CPTII,S$GLB,, | Performed by: INTERNAL MEDICINE

## 2022-05-12 RX ORDER — METOLAZONE 2.5 MG/1
2.5 TABLET ORAL
Qty: 12 TABLET | Refills: 11 | Status: SHIPPED | OUTPATIENT
Start: 2022-05-13 | End: 2022-10-09

## 2022-05-12 NOTE — PROGRESS NOTES
Subjective:    Patient ID:  Palmira Malave is a 86 y.o. female who presents for follow-up of No chief complaint on file.      HPI     Systolic CHF, NICM, [employer]tronic BiV AICD, HTN, HLD     [employer] AICD check 3/9/21 - High SVC coil impedance - SVC coil turned off - otherwise good device function     Previously followed by Dr Batres  Previous history:  Here for follow-up of systolic heart failure and biventricular ICD interrogation.  She denies any worsening cardiopulmonary complaints since we last saw her.  She's had no issues with her device.  She is expressed no worsening cardiopulmonary complaints.  She denies any chest pain, shortness breath or palpitations.  She's not expressing PND, orthopnea or lower edema.  She denies any dizziness, presyncope or syncope.  Otherwise she's better usual state of health.  He says she has a daughter the moved into scan early onset dementia she's having to take care of.  She tries to stay active by going to interactions with a female group.  She is seen by [facility] clinic and does get injections.  He wants clearance for rehabilitation.     Here for follow-up of systolic heart failure and previous biventricular ICD placement.  She had her device interrogated which shows no significant issues.  She had recent admission at the beginning of the month for mild volume overload.  She was diuresed overnight and discharged home the next day.  Her EF is stable.  She denies any PND, orthopnea or lower Veronica.  She has not experiencing dizziness, presyncope or syncope.  She on questioning says she is compliant with medicines and diet.          catheter 2011 reported nonobstructive CAD     Echo 9/11/20  · Severely decreased left ventricular systolic function. The estimated ejection fraction is 20%.  · Severe global hypokinetic wall motion.  · Mild eccentric left ventricular hypertrophy.  · Mild left ventricular enlargement.  · Grade I (mild) left ventricular diastolic dysfunction  consistent with impaired relaxation.  · Normal right ventricular systolic function.  · Indeterminate central venous pressure. Estimated PA systolic pressure is at least 13 mmHg.     Carotid ultrasound:  7-18  CONCLUSIONS   There is 40 - 49% right Internal Carotid stenosis.  There is 20 - 39% left Internal Carotid stenosis.        9/18/19 Denies recent CP or SOB  EKG A-sensed V-paced        6/22/20 Recently Dx with colon CA. Less SOB since discharge  Denies CP  Cleared for colon surgery at moderate cardiac risk  OK to hold plavix 7 days before  OV 3 months with Med[employer]onic AICD check        10/2/20 Feels better since discharge. Stopped bidil - have her HA and made her feel bad - went back to coreg 25 bid   Continue Rx for CAD, HTN, CHF, HLD  OV 2 weeks with Med[employer]onic AICD check     10/14/20 Breathing stable. Denies CP   Continue Rx for CHF, HTN, HLD, CAD  OV 3 months with BNP, BMP      11/3/20 Back early for worsening SOB. Denies CP  Unclear if she takes lasix as prescribed  Increase lasix 80 AM and 40 PM  OV 2 weeks with BNP, BMP  Needs to go to the ER for worsening symptoms        11/19/20 Less SOB. Denies CP     Continue Rx for CHF, HTN, HLD, CAD  OV 3 months with BNP, BMP      EKG 3/6/21 A-sensed V-paced  K 3.8, , Cr 2.6     3/8/21 patient reports she heard an alarm with the AICD and did not receive a shock  Denies CP. SOB improved  BP stable   Decrease lasix to 80 AM only with rising Cr  Medtronic AICD check tomorrow  Continue Rx for CHF, CAD, HLD, HTN     3/9/21 Denies CP or SOB   Continue Rx for CHF, CAD, HLD, HTN  OV 3 months      6/10/21 Less SOB since discharge. Trying to follow low sodium diet  Denies CP  BP in good range   Continue Rx for CHF, CAD, HLD, HTN  OV 1 month with BNP, BMP      7/14/21 Denies CP, mild EVANS about the same, denies LE edema - takes lasix 80 qd     BNP up but clinic al volume status stable   Continue Rx for CHF, CAD, HLD, HTN  OV 3 month with BNP, BMP     8/12/21 had PND and  increased SOB since last night - improved some after taking lasix 80 this AM. Denies CP or LE edema   Increase lasix 80 bid for 3 days due to CHF and volume overload then 80 qd  OV 2 weeks with BNP, BMP  Needs to go to the ER for worsening SOB     Continue Rx for CHF, CAD, HLD, HTN     12/1/21 Reports increased EVANS for the past few days - increased lasix 80 bid which helped  EKG A-sensed BiV paced   BNP, BMP today  Continue lasix 80 bid for now  OV with Medtronic AICD check       Continue Rx for CHF, CAD, HLD, HTN  Needs to go to the ER for worsening EVANS     Labs 12/3/21  K 4.2  Cr 1.7 up from 1.5  BNP 2319 down from 2563     12/8/21 EVANS improved. Denies CP. Still taking lasix 80 bid   BNP still elevated but symptomatically improved  OV 2 weeks with Medtronic AICD check     Continue Rx for CHF, CAD, HLD, HTN     Admitted 4/1/22  Ms. Malave is an 86 year old woman with history of chronic systolic/diastolic chf, AICD, coronary artery disease, hypertension and CKDIII who presented for evaluation of shortness of breath.  She states she felt fine when she went to bed last night, but around midnight she woke to use the bathroom and was more short of breath than normal at that time.  She notes eating white beans and rice with sausage at dinner, prior to going to sleep.  She denies chest pain, cough, fever, leg swelling, nausea, vomiting, diarrhea, headache, palpitations, AICD discharge, light headedness, dizziness, dysuria and hematuria.  She has been taking all her medications.  She reports taking lasix this morning with some improvement.  Vitals appear stable and she is in no distress.  She was given additional IV lasix in ER and continues to improve, but states she still doesn't quite feel her breathing is back to normal.  Cr 1.6, BNP 75215     4/11/22 EVANS has improved since discharge. Takes lasix 80 qd with a 2nd dose prn  AICD was checked during last admission  Denies CP      Continue Rx for CHF, CAD, HLD, HTN  OV 3  months with BNP, BMP  Next Medtronic AICD check 10/2022    Went to the ER 4/24/22  Palmira Malave is a 86 y.o. female with a PMHx of CHF, CAD, HTN, HLD, CKD, Biventricular AICD in place, who presents to the Emergency Department for evaluation of shortness of breath at rest that began last night and has since resolved. Patient notes her feet feel swollen. She denies any current shortness of breath, chest pain, lightheadedness, abdominal pain, or other associated symptoms. She states she is not on home oxygen. Patient explains she is on Lasix BID, but will sometimes only take 1 dose. She states she last took her Lasix yesterday morning.     Labs 4/24/22  K 4.7  Cr 1.5  BNP 2468    5/12/22 Only taking lasix 80 qd - doesn't like 2nd dose because it keeps her up  Denies CP, mild EVANS  BP controlled    Review of Systems   Constitutional: Negative for decreased appetite.   HENT: Negative for ear discharge.    Eyes: Negative for blurred vision.   Respiratory: Negative for hemoptysis.    Endocrine: Negative for polyphagia.   Hematologic/Lymphatic: Negative for adenopathy.   Skin: Negative for color change.   Musculoskeletal: Negative for joint swelling.   Genitourinary: Negative for bladder incontinence.   Neurological: Negative for brief paralysis.   Psychiatric/Behavioral: Negative for hallucinations.   Allergic/Immunologic: Negative for hives.        Objective:    Physical Exam  Constitutional:       Appearance: She is well-developed.   HENT:      Head: Normocephalic and atraumatic.   Eyes:      Conjunctiva/sclera: Conjunctivae normal.      Pupils: Pupils are equal, round, and reactive to light.   Neck:      Thyroid: No thyromegaly.   Cardiovascular:      Rate and Rhythm: Normal rate and regular rhythm.      Heart sounds: No murmur heard.  Pulmonary:      Effort: Pulmonary effort is normal. No respiratory distress.      Breath sounds: Normal breath sounds.   Abdominal:      General: Bowel sounds are normal.       Palpations: Abdomen is soft.   Musculoskeletal:      Cervical back: Normal range of motion and neck supple.      Right lower leg: Edema present.      Left lower leg: Edema present.   Skin:     General: Skin is warm and dry.   Neurological:      Mental Status: She is alert and oriented to person, place, and time.   Psychiatric:         Behavior: Behavior normal.           Assessment:       1. Biventricular AICD in place    2. Acute on chronic systolic congestive heart failure    3. Coronary artery disease involving native coronary artery of native heart without angina pectoris         Plan:       Add metolazone 2.5 MWF for volume overload  OV 2 weeks with BNP, BMP        Continue Rx for CHF, CAD, HLD, HTN

## 2022-06-02 ENCOUNTER — LAB VISIT (OUTPATIENT)
Dept: LAB | Facility: HOSPITAL | Age: 87
End: 2022-06-02
Attending: INTERNAL MEDICINE
Payer: MEDICARE

## 2022-06-02 DIAGNOSIS — Z95.810 ICD (IMPLANTABLE CARDIOVERTER-DEFIBRILLATOR), BIVENTRICULAR, IN SITU: Chronic | ICD-10-CM

## 2022-06-02 DIAGNOSIS — I25.10 CORONARY ARTERY DISEASE INVOLVING NATIVE CORONARY ARTERY OF NATIVE HEART WITHOUT ANGINA PECTORIS: ICD-10-CM

## 2022-06-02 DIAGNOSIS — I50.23 ACUTE ON CHRONIC SYSTOLIC CONGESTIVE HEART FAILURE: ICD-10-CM

## 2022-06-02 LAB
ANION GAP SERPL CALC-SCNC: 16 MMOL/L (ref 8–16)
BNP SERPL-MCNC: 1083 PG/ML (ref 0–99)
BUN SERPL-MCNC: 79 MG/DL (ref 8–23)
CALCIUM SERPL-MCNC: 10.1 MG/DL (ref 8.7–10.5)
CHLORIDE SERPL-SCNC: 92 MMOL/L (ref 95–110)
CO2 SERPL-SCNC: 28 MMOL/L (ref 23–29)
CREAT SERPL-MCNC: 2.5 MG/DL (ref 0.5–1.4)
EST. GFR  (AFRICAN AMERICAN): 19.5 ML/MIN/1.73 M^2
EST. GFR  (NON AFRICAN AMERICAN): 16.9 ML/MIN/1.73 M^2
GLUCOSE SERPL-MCNC: 97 MG/DL (ref 70–110)
POTASSIUM SERPL-SCNC: 3.8 MMOL/L (ref 3.5–5.1)
SODIUM SERPL-SCNC: 136 MMOL/L (ref 136–145)

## 2022-06-02 PROCEDURE — 83880 ASSAY OF NATRIURETIC PEPTIDE: CPT | Performed by: INTERNAL MEDICINE

## 2022-06-02 PROCEDURE — 80048 BASIC METABOLIC PNL TOTAL CA: CPT | Performed by: INTERNAL MEDICINE

## 2022-06-02 PROCEDURE — 36415 COLL VENOUS BLD VENIPUNCTURE: CPT | Mod: PO | Performed by: INTERNAL MEDICINE

## 2022-07-02 ENCOUNTER — HOSPITAL ENCOUNTER (OUTPATIENT)
Facility: HOSPITAL | Age: 87
Discharge: HOME | End: 2022-07-03
Attending: EMERGENCY MEDICINE | Admitting: EMERGENCY MEDICINE
Payer: MEDICARE

## 2022-07-02 DIAGNOSIS — I50.9 HEART FAILURE: ICD-10-CM

## 2022-07-02 DIAGNOSIS — I50.9 ACUTE ON CHRONIC CONGESTIVE HEART FAILURE, UNSPECIFIED HEART FAILURE TYPE: Primary | ICD-10-CM

## 2022-07-02 DIAGNOSIS — R06.02 SHORTNESS OF BREATH: ICD-10-CM

## 2022-07-02 LAB
ALBUMIN SERPL BCP-MCNC: 3.8 G/DL (ref 3.5–5.2)
ALP SERPL-CCNC: 103 U/L (ref 55–135)
ALT SERPL W/O P-5'-P-CCNC: 15 U/L (ref 10–44)
ANION GAP SERPL CALC-SCNC: 14 MMOL/L (ref 8–16)
AST SERPL-CCNC: 21 U/L (ref 10–40)
BASOPHILS # BLD AUTO: 0.03 K/UL (ref 0–0.2)
BASOPHILS NFR BLD: 0.4 % (ref 0–1.9)
BILIRUB SERPL-MCNC: 0.8 MG/DL (ref 0.1–1)
BILIRUB UR QL STRIP: NEGATIVE
BNP SERPL-MCNC: 2029 PG/ML (ref 0–99)
BUN SERPL-MCNC: 52 MG/DL (ref 8–23)
CALCIUM SERPL-MCNC: 9.3 MG/DL (ref 8.7–10.5)
CHLORIDE SERPL-SCNC: 99 MMOL/L (ref 95–110)
CLARITY UR: CLEAR
CO2 SERPL-SCNC: 25 MMOL/L (ref 23–29)
COLOR UR: YELLOW
CREAT SERPL-MCNC: 2.1 MG/DL (ref 0.5–1.4)
CTP QC/QA: YES
DIFFERENTIAL METHOD: ABNORMAL
EOSINOPHIL # BLD AUTO: 0.1 K/UL (ref 0–0.5)
EOSINOPHIL NFR BLD: 1.9 % (ref 0–8)
ERYTHROCYTE [DISTWIDTH] IN BLOOD BY AUTOMATED COUNT: 14.7 % (ref 11.5–14.5)
EST. GFR  (AFRICAN AMERICAN): 24 ML/MIN/1.73 M^2
EST. GFR  (NON AFRICAN AMERICAN): 21 ML/MIN/1.73 M^2
GLUCOSE SERPL-MCNC: 97 MG/DL (ref 70–110)
GLUCOSE UR QL STRIP: NEGATIVE
HCT VFR BLD AUTO: 33.5 % (ref 37–48.5)
HGB BLD-MCNC: 11.1 G/DL (ref 12–16)
HGB UR QL STRIP: NEGATIVE
IMM GRANULOCYTES # BLD AUTO: 0.03 K/UL (ref 0–0.04)
IMM GRANULOCYTES NFR BLD AUTO: 0.4 % (ref 0–0.5)
KETONES UR QL STRIP: NEGATIVE
LEUKOCYTE ESTERASE UR QL STRIP: NEGATIVE
LYMPHOCYTES # BLD AUTO: 1.2 K/UL (ref 1–4.8)
LYMPHOCYTES NFR BLD: 16.7 % (ref 18–48)
MCH RBC QN AUTO: 31.4 PG (ref 27–31)
MCHC RBC AUTO-ENTMCNC: 33.1 G/DL (ref 32–36)
MCV RBC AUTO: 95 FL (ref 82–98)
MONOCYTES # BLD AUTO: 0.8 K/UL (ref 0.3–1)
MONOCYTES NFR BLD: 12 % (ref 4–15)
NEUTROPHILS # BLD AUTO: 4.8 K/UL (ref 1.8–7.7)
NEUTROPHILS NFR BLD: 68.6 % (ref 38–73)
NITRITE UR QL STRIP: NEGATIVE
NRBC BLD-RTO: 0 /100 WBC
PH UR STRIP: 6 [PH] (ref 5–8)
PLATELET # BLD AUTO: 129 K/UL (ref 150–450)
PMV BLD AUTO: 11 FL (ref 9.2–12.9)
POTASSIUM SERPL-SCNC: 4.1 MMOL/L (ref 3.5–5.1)
PROT SERPL-MCNC: 7 G/DL (ref 6–8.4)
PROT UR QL STRIP: NEGATIVE
RBC # BLD AUTO: 3.54 M/UL (ref 4–5.4)
SARS-COV-2 RDRP RESP QL NAA+PROBE: NEGATIVE
SODIUM SERPL-SCNC: 138 MMOL/L (ref 136–145)
SP GR UR STRIP: 1.01 (ref 1–1.03)
TROPONIN I SERPL DL<=0.01 NG/ML-MCNC: 0.14 NG/ML (ref 0–0.03)
URN SPEC COLLECT METH UR: NORMAL
UROBILINOGEN UR STRIP-ACNC: NEGATIVE EU/DL
WBC # BLD AUTO: 7 K/UL (ref 3.9–12.7)

## 2022-07-02 PROCEDURE — 93010 ELECTROCARDIOGRAM REPORT: CPT | Mod: ,,, | Performed by: INTERNAL MEDICINE

## 2022-07-02 PROCEDURE — 83880 ASSAY OF NATRIURETIC PEPTIDE: CPT | Performed by: EMERGENCY MEDICINE

## 2022-07-02 PROCEDURE — 93005 ELECTROCARDIOGRAM TRACING: CPT

## 2022-07-02 PROCEDURE — 84484 ASSAY OF TROPONIN QUANT: CPT | Performed by: EMERGENCY MEDICINE

## 2022-07-02 PROCEDURE — 80053 COMPREHEN METABOLIC PANEL: CPT | Performed by: EMERGENCY MEDICINE

## 2022-07-02 PROCEDURE — 99285 EMERGENCY DEPT VISIT HI MDM: CPT | Mod: 25

## 2022-07-02 PROCEDURE — G0378 HOSPITAL OBSERVATION PER HR: HCPCS

## 2022-07-02 PROCEDURE — 63600175 PHARM REV CODE 636 W HCPCS: Performed by: EMERGENCY MEDICINE

## 2022-07-02 PROCEDURE — 25000003 PHARM REV CODE 250: Performed by: NURSE PRACTITIONER

## 2022-07-02 PROCEDURE — 96374 THER/PROPH/DIAG INJ IV PUSH: CPT

## 2022-07-02 PROCEDURE — 93010 EKG 12-LEAD: ICD-10-PCS | Mod: ,,, | Performed by: INTERNAL MEDICINE

## 2022-07-02 PROCEDURE — U0002 COVID-19 LAB TEST NON-CDC: HCPCS | Performed by: EMERGENCY MEDICINE

## 2022-07-02 PROCEDURE — 81003 URINALYSIS AUTO W/O SCOPE: CPT | Performed by: EMERGENCY MEDICINE

## 2022-07-02 PROCEDURE — 85025 COMPLETE CBC W/AUTO DIFF WBC: CPT | Performed by: EMERGENCY MEDICINE

## 2022-07-02 RX ORDER — GABAPENTIN 300 MG/1
300 CAPSULE ORAL 2 TIMES DAILY
Status: DISCONTINUED | OUTPATIENT
Start: 2022-07-02 | End: 2022-07-02

## 2022-07-02 RX ORDER — PANTOPRAZOLE SODIUM 40 MG/1
40 TABLET, DELAYED RELEASE ORAL DAILY
Status: DISCONTINUED | OUTPATIENT
Start: 2022-07-03 | End: 2022-07-03 | Stop reason: HOSPADM

## 2022-07-02 RX ORDER — FUROSEMIDE 10 MG/ML
40 INJECTION INTRAMUSCULAR; INTRAVENOUS
Status: COMPLETED | OUTPATIENT
Start: 2022-07-02 | End: 2022-07-02

## 2022-07-02 RX ORDER — PANTOPRAZOLE SODIUM 40 MG/1
40 TABLET, DELAYED RELEASE ORAL DAILY
Status: DISCONTINUED | OUTPATIENT
Start: 2022-07-02 | End: 2022-07-02

## 2022-07-02 RX ORDER — CLOPIDOGREL BISULFATE 75 MG/1
75 TABLET ORAL DAILY
Status: DISCONTINUED | OUTPATIENT
Start: 2022-07-02 | End: 2022-07-03 | Stop reason: HOSPADM

## 2022-07-02 RX ORDER — SODIUM CHLORIDE 0.9 % (FLUSH) 0.9 %
10 SYRINGE (ML) INJECTION
Status: DISCONTINUED | OUTPATIENT
Start: 2022-07-02 | End: 2022-07-03 | Stop reason: HOSPADM

## 2022-07-02 RX ORDER — CARVEDILOL 6.25 MG/1
6.25 TABLET ORAL 2 TIMES DAILY
Status: DISCONTINUED | OUTPATIENT
Start: 2022-07-02 | End: 2022-07-02

## 2022-07-02 RX ORDER — ATORVASTATIN CALCIUM 10 MG/1
10 TABLET, FILM COATED ORAL DAILY
Status: DISCONTINUED | OUTPATIENT
Start: 2022-07-02 | End: 2022-07-03 | Stop reason: HOSPADM

## 2022-07-02 RX ADMIN — CLOPIDOGREL 75 MG: 75 TABLET, FILM COATED ORAL at 01:07

## 2022-07-02 RX ADMIN — FUROSEMIDE 40 MG: 10 INJECTION, SOLUTION INTRAMUSCULAR; INTRAVENOUS at 07:07

## 2022-07-02 RX ADMIN — GABAPENTIN 300 MG: 300 CAPSULE ORAL at 09:07

## 2022-07-02 RX ADMIN — ATORVASTATIN CALCIUM 10 MG: 10 TABLET, FILM COATED ORAL at 09:07

## 2022-07-02 RX ADMIN — CARVEDILOL 6.25 MG: 6.25 TABLET, FILM COATED ORAL at 09:07

## 2022-07-02 NOTE — ED PROVIDER NOTES
"Encounter Date: 7/2/2022    SCRIBE #1 NOTE: I, Suni Garibay, am scribing for, and in the presence of,  Toña Estrada MD. I have scribed the following portions of the note - Other sections scribed: HPI, ROS, PE, EKG.       History     Chief Complaint   Patient presents with    Shortness of Breath     Presents with complaint of shortness of breath Hx CHF, AICD     This is a 86 y.o. female, with a PMHx of CHF, COPD and CKD, who presents to the ED with complaints of acute on chronic shortness of breath that began early this morning. Patient states symptoms feel similar to chronic dyspnea and visit to the ED was prompted because patient was "tired of" labored breathing. Endorses compliancy medication regimen. Patient's daughter states she gives patient Furosamide every night, which usually helps relieve dyspnea. Reports medicine was prescribed 2X a day, but patient prefers to only take it once because it "makes her go to the bathroom". Reports last dosage taken at 8 PM last night. Patient does not use at home O2. No other exacerbating or alleviating factors. Patient denies fever, chest pain, leg swelling, abdominal pain, or other associated symptoms.       The history is provided by the patient and a relative. No  was used.     Review of patient's allergies indicates:   Allergen Reactions    Aspirin Swelling     Swelling and rash    Colace [docusate sodium] Rash     itching    Docusate Other (See Comments) and Rash     itching    Sulfa (sulfonamide antibiotics) Swelling     Swelling and rash  Swelling and rash    Iodine and iodide containing products Rash    Penicillins Rash     Past Medical History:   Diagnosis Date    Cataract     CHF (congestive heart failure)     CKD (chronic kidney disease), stage III     Coronary artery disease     Gout attack     Hypercholesteremia     Hypertension     Renal disorder     Vaginal delivery     x4     Past Surgical History:   Procedure " Laterality Date    APPENDECTOMY      CARDIAC DEFIBRILLATOR PLACEMENT      2015    CATARACT EXTRACTION W/  INTRAOCULAR LENS IMPLANT Left 02/07/2019    Dr. Velazco    CATARACT EXTRACTION W/  INTRAOCULAR LENS IMPLANT Right 02/21/2019    Dr. Velazco    CHOLECYSTECTOMY      COLONOSCOPY W/ POLYPECTOMY  10 or 11/ 2014    per Dr. Myrick    defribillator Left 8/2008    EYE SURGERY      HYSTERECTOMY      INTRAOCULAR PROSTHESES INSERTION Left 2/7/2019    Procedure: INSERTION, IOL PROSTHESIS;  Surgeon: Demetrius Velazco MD;  Location: [facility] OR;  Service: Ophthalmology;  Laterality: Left;  RN Phone Pre OP 1-30-19.  Arrival 05:30 AM    INTRAOCULAR PROSTHESES INSERTION Right 2/21/2019    Procedure: INSERTION, IOL PROSTHESIS;  Surgeon: Demetrius Velazco MD;  Location: [facility] OR;  Service: Ophthalmology;  Laterality: Right;    JOINT REPLACEMENT Bilateral 2005 & 2006    2 Knee Replacements=Lt. 1= 2005. Rt. 1= 2006    OOPHORECTOMY      PHACOEMULSIFICATION OF CATARACT Left 2/7/2019    Procedure: PHACOEMULSIFICATION, CATARACT;  Surgeon: Demetrius Velazco MD;  Location: [facility] OR;  Service: Ophthalmology;  Laterality: Left;    PHACOEMULSIFICATION OF CATARACT Right 2/21/2019    Procedure: PHACOEMULSIFICATION, CATARACT;  Surgeon: Demetrius Velazco MD;  Location: [facility] OR;  Service: Ophthalmology;  Laterality: Right;  RN Phone Pre op 2-15-19.  Arrival 05:30 AM    TOTAL KNEE ARTHROPLASTY Bilateral Lt.= 2005; Rt.=2006    Bilateral Knee scope     Family History   Problem Relation Age of Onset    Heart attack Mother 88    Hypertension Mother     Hyperlipidemia Mother     Diabetes Other     Hypertension Other     Amblyopia Son     Blindness Neg Hx     Cataracts Neg Hx     Glaucoma Neg Hx     Macular degeneration Neg Hx     Retinal detachment Neg Hx     Strabismus Neg Hx      Social History     Tobacco Use    Smoking status: Never Smoker    Smokeless tobacco: Never Used   Substance Use Topics     Alcohol use: Yes     Comment: rarely    Drug use: No     Review of Systems   Constitutional: Negative for fever.   HENT: Negative for sore throat.    Eyes: Negative for pain.   Respiratory: Positive for shortness of breath.    Cardiovascular: Negative for chest pain and leg swelling.   Gastrointestinal: Negative for abdominal pain and nausea.   Genitourinary: Negative for dysuria.   Musculoskeletal: Negative for back pain.   Skin: Negative for rash.   Neurological: Negative for weakness.       Physical Exam     Initial Vitals [07/02/22 0503]   BP Pulse Resp Temp SpO2   129/74 85 17 97.9 °F (36.6 °C) 96 %      MAP       --         Physical Exam    Nursing note and vitals reviewed.  Constitutional: She is not diaphoretic. No distress.   HENT:   Head: Normocephalic and atraumatic.   Mouth/Throat: Oropharynx is clear and moist.   Eyes: Conjunctivae and EOM are normal. No scleral icterus.   Neck: Neck supple. No tracheal deviation present. JVD present.   Normal range of motion.  Cardiovascular: Normal rate, regular rhythm and intact distal pulses.   Pulmonary/Chest: No stridor. Tachypnea noted. No respiratory distress. She has rales (at bilateral bases).   Abdominal: Abdomen is soft. She exhibits no distension. There is no abdominal tenderness.   Musculoskeletal:         General: Edema present. No tenderness. Normal range of motion.      Cervical back: Normal range of motion and neck supple.      Comments: Symmetrical lower extremity edema.     Neurological: She is alert. She has normal strength. No cranial nerve deficit or sensory deficit. GCS score is 15. GCS eye subscore is 4. GCS verbal subscore is 5. GCS motor subscore is 6.   Skin: Skin is warm and dry.   Psychiatric: She has a normal mood and affect.         ED Course   Procedures  Labs Reviewed   CBC W/ AUTO DIFFERENTIAL - Abnormal; Notable for the following components:       Result Value    RBC 3.54 (*)     Hemoglobin 11.1 (*)     Hematocrit 33.5 (*)     MCH  31.4 (*)     RDW 14.7 (*)     Platelets 129 (*)     Lymph % 16.7 (*)     All other components within normal limits   COMPREHENSIVE METABOLIC PANEL - Abnormal; Notable for the following components:    BUN 52 (*)     Creatinine 2.1 (*)     eGFR if  24 (*)     eGFR if non  21 (*)     All other components within normal limits   TROPONIN I - Abnormal; Notable for the following components:    Troponin I 0.142 (*)     All other components within normal limits   B-TYPE NATRIURETIC PEPTIDE - Abnormal; Notable for the following components:    BNP 2,029 (*)     All other components within normal limits     EKG Readings: (Independently Interpreted)   Rhythm: Paced Rhythm. Heart Rate: 83. ST Segments: Normal ST Segments. T Waves Flipped: AVL. Axis: Right Axis Deviation. Clinical Impression: Paced Rhythm and QRS Widening       Imaging Results          X-Ray Chest AP Portable (Final result)  Result time 07/02/22 06:09:55    Final result by Ismael Aguilar MD (07/02/22 06:09:55)                 Impression:      Prominent interstitial markings suggest vascular congestion/edema in a patient with reported history of CHF.  Infectious or inflammatory etiology could appear similar.      Electronically signed by: Ismael Aguilar  Date:    07/02/2022  Time:    06:09             Narrative:    EXAMINATION:  XR CHEST AP PORTABLE    CLINICAL HISTORY:  CHF;    TECHNIQUE:  Single frontal view of the chest was performed.    COMPARISON:  Chest radiograph 04/24/2022.    FINDINGS:  Cardiac monitoring leads.  Left subclavian approach AICD.  Large cardiac silhouette, as before.  Prominent interstitial markings.    No definite pneumothorax or large volume pleural effusion.    No acute findings in the visualized abdomen.  Osseous and soft tissue structures appear without definite acute abnormality.                                 Medications   furosemide injection 40 mg (has no administration in time range)     Medical  Decision Making:   History:   Old Medical Records: I decided to obtain old medical records.  Old Records Summarized: other records.       <> Summary of Records: Pertinent PMHx includes CHF, CAD, COPD, Stage III CKD, HDLM, HTN.  Differential Diagnosis:   Differential diagnosis include but are not limited to: CHF exacerbation, COPD exacerbation, Upper Respiratory Infection, Pneumonia.  Independently Interpreted Test(s):   I have ordered and independently interpreted EKG Reading(s) - see prior notes  Clinical Tests:   Lab Tests: Ordered and Reviewed  Radiological Study: Ordered and Reviewed  Medical Tests: Ordered and Reviewed  ED Management:  Patient presents with complaint of shortness of breath.  She is mildly tachypneic but is not in respiratory distress.  Chest x-ray with findings to suggest pulmonary edema.  Labs without leukocytosis or granulocytosis to suggest significant infectious process.  Renal function comparable to prior.  Troponin is elevated at 0.142.  BNP is significantly elevated at 2029. Patient given Lasix in the emergency department.  Given her tachypnea and significantly elevated BNP greater than baseline she is to be placed in observation for further diuresis, cardiopulmonary monitoring   This chart was completed using dictation software, as a result there may be some transcription errors.           Scribe Attestation:   Scribe #1: I performed the above scribed service and the documentation accurately describes the services I performed. I attest to the accuracy of the note.                 Clinical Impression:   Final diagnoses:  [R06.02] Shortness of breath  [I50.9] Acute on chronic congestive heart failure, unspecified heart failure type (Primary)          ED Disposition Condition    Observation        I, Toña Estrada , personally performed the services described in this documentation. All medical record entries made by the scribe were at my direction and in my presence. I have reviewed the  chart and agree that the record reflects my personal performance and is accurate and complete.          Toña Estrada MD  07/02/22 0676

## 2022-07-02 NOTE — CONSULTS
Consult received for education on fluid and salt restricted diet. This RD covering remotely for weekend coverage. Diet handouts attached to d/c paperwork. Inpatient RD to follow up with education.    Abigail Olsen, OLI,LDN

## 2022-07-02 NOTE — ASSESSMENT & PLAN NOTE
Scr 2.1 better then last month however sCr 1.5 2 months ago. Cardiorenal   Close monitor while diuresing  Avoid all NSAID and nephrotoxins

## 2022-07-02 NOTE — NURSING
[facility], [facility]  Nurses Note -- 4 Eyes      7/2/2022       Skin assessed on: Admission      [] No Pressure Injuries Present    []Prevention Measures Documented    [] Yes LDA  for Pressure Injury Previously documented     [x] Yes New Pressure Injury Discovered   [] LDA for New Pressure Injury Added      Attending RN:  Lana Collins RN     Second RN:  CORNELIA Turner

## 2022-07-02 NOTE — PLAN OF CARE
Problem: Adult Inpatient Plan of Care  Goal: Plan of Care Review  Outcome: Ongoing, Progressing  Flowsheets (Taken 7/2/2022 1400)  Plan of Care Reviewed With: patient  Goal: Patient-Specific Goal (Individualized)  Outcome: Ongoing, Progressing  Goal: Optimal Comfort and Wellbeing  Outcome: Ongoing, Progressing  Intervention: Monitor Pain and Promote Comfort  Flowsheets (Taken 7/2/2022 1400)  Pain Management Interventions:   care clustered   medication offered   pillow support provided   quiet environment facilitated  Intervention: Provide Person-Centered Care  Flowsheets (Taken 7/2/2022 1400)  Trust Relationship/Rapport:   care explained   questions answered   thoughts/feelings acknowledged     Problem: Adult Inpatient Plan of Care  Goal: Plan of Care Review  Outcome: Ongoing, Progressing  Flowsheets (Taken 7/2/2022 1400)  Plan of Care Reviewed With: patient     Problem: Adult Inpatient Plan of Care  Goal: Plan of Care Review  Outcome: Ongoing, Progressing  Flowsheets (Taken 7/2/2022 1400)  Plan of Care Reviewed With: patient     Problem: Adult Inpatient Plan of Care  Goal: Patient-Specific Goal (Individualized)  Outcome: Ongoing, Progressing     Problem: Adult Inpatient Plan of Care  Goal: Patient-Specific Goal (Individualized)  Outcome: Ongoing, Progressing     Problem: Adult Inpatient Plan of Care  Goal: Optimal Comfort and Wellbeing  Outcome: Ongoing, Progressing  Intervention: Monitor Pain and Promote Comfort  Flowsheets (Taken 7/2/2022 1400)  Pain Management Interventions:   care clustered   medication offered   pillow support provided   quiet environment facilitated  Intervention: Provide Person-Centered Care  Flowsheets (Taken 7/2/2022 1400)  Trust Relationship/Rapport:   care explained   questions answered   thoughts/feelings acknowledged     Problem: Adult Inpatient Plan of Care  Goal: Optimal Comfort and Wellbeing  Outcome: Ongoing, Progressing     Problem: Adult Inpatient Plan of Care  Goal: Optimal  Comfort and Wellbeing  Intervention: Monitor Pain and Promote Comfort  Flowsheets (Taken 7/2/2022 1400)  Pain Management Interventions:   care clustered   medication offered   pillow support provided   quiet environment facilitated     Problem: Adult Inpatient Plan of Care  Goal: Optimal Comfort and Wellbeing  Intervention: Monitor Pain and Promote Comfort  Flowsheets (Taken 7/2/2022 1400)  Pain Management Interventions:   care clustered   medication offered   pillow support provided   quiet environment facilitated     Problem: Adult Inpatient Plan of Care  Goal: Optimal Comfort and Wellbeing  Intervention: Provide Person-Centered Care  Flowsheets (Taken 7/2/2022 1400)  Trust Relationship/Rapport:   care explained   questions answered   thoughts/feelings acknowledged     Problem: Adult Inpatient Plan of Care  Goal: Optimal Comfort and Wellbeing  Intervention: Provide Person-Centered Care  Flowsheets (Taken 7/2/2022 1400)  Trust Relationship/Rapport:   care explained   questions answered   thoughts/feelings acknowledged

## 2022-07-02 NOTE — HPI
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  Patient denies any both lower extremity edema. Does not weigh self at home patient has been compliant with all her home medication including diuretics, low salt diet and 1 0.2 L fluid intake.  Denies chest pain headaches dizziness.  States been eating less. Denies fever chills.  No treatments at home prior to admission.  CXR interstitial markings R<L. BNP   EKG NSR v pace. Tropoin 0.124 (similar to previous labs)       9/11/2020   Severely decreased left ventricular systolic function. The estimated ejection fraction is 20%.  Severe global hypokinetic wall motion.  Mild eccentric left ventricular hypertrophy.  Mild left ventricular enlargement.  Grade I (mild) left ventricular diastolic dysfunction consistent with impaired relaxation.  Normal right ventricular systolic function.  Indeterminate central venous pressure. Estimated PA systolic pressure is at least 13 mmHg.

## 2022-07-02 NOTE — SUBJECTIVE & OBJECTIVE
Past Medical History:   Diagnosis Date    Cataract     CHF (congestive heart failure)     CKD (chronic kidney disease), stage III     Coronary artery disease     Gout attack     Hypercholesteremia     Hypertension     Renal disorder     Vaginal delivery     x4       Past Surgical History:   Procedure Laterality Date    APPENDECTOMY      CARDIAC DEFIBRILLATOR PLACEMENT      2015    CATARACT EXTRACTION W/  INTRAOCULAR LENS IMPLANT Left 02/07/2019    Dr. Velazco    CATARACT EXTRACTION W/  INTRAOCULAR LENS IMPLANT Right 02/21/2019    Dr. Velazco    CHOLECYSTECTOMY      COLONOSCOPY W/ POLYPECTOMY  10 or 11/ 2014    per Dr. Myrick    defribillator Left 8/2008    EYE SURGERY      HYSTERECTOMY      INTRAOCULAR PROSTHESES INSERTION Left 2/7/2019    Procedure: INSERTION, IOL PROSTHESIS;  Surgeon: Demetrius Velazco MD;  Location: [facility] OR;  Service: Ophthalmology;  Laterality: Left;  RN Phone Pre OP 1-30-19.  Arrival 05:30 AM    INTRAOCULAR PROSTHESES INSERTION Right 2/21/2019    Procedure: INSERTION, IOL PROSTHESIS;  Surgeon: Demetrius Velazco MD;  Location: [facility] OR;  Service: Ophthalmology;  Laterality: Right;    JOINT REPLACEMENT Bilateral 2005 & 2006    2 Knee Replacements=Lt. 1= 2005. Rt. 1= 2006    OOPHORECTOMY      PHACOEMULSIFICATION OF CATARACT Left 2/7/2019    Procedure: PHACOEMULSIFICATION, CATARACT;  Surgeon: Demetrius Velazco MD;  Location: [facility] OR;  Service: Ophthalmology;  Laterality: Left;    PHACOEMULSIFICATION OF CATARACT Right 2/21/2019    Procedure: PHACOEMULSIFICATION, CATARACT;  Surgeon: Demetrius Velazco MD;  Location: [facility] OR;  Service: Ophthalmology;  Laterality: Right;  RN Phone Pre op 2-15-19.  Arrival 05:30 AM    TOTAL KNEE ARTHROPLASTY Bilateral Lt.= 2005; Rt.=2006    Bilateral Knee scope       Review of patient's allergies indicates:   Allergen Reactions    Aspirin Swelling     Swelling and rash    Colace [docusate sodium] Rash     itching    Docusate Other (See Comments)  and Rash     itching    Sulfa (sulfonamide antibiotics) Swelling     Swelling and rash  Swelling and rash    Iodine and iodide containing products Rash    Penicillins Rash       Current Facility-Administered Medications on File Prior to Encounter   Medication    0.9%  NaCl infusion    phenylephrine HCL 2.5% ophthalmic solution 1 drop    proparacaine 0.5 % ophthalmic solution 1 drop    tropicamide 1% ophthalmic solution 1 drop     Current Outpatient Medications on File Prior to Encounter   Medication Sig    acetaminophen (TYLENOL) 500 MG tablet Take 1 tablet (500 mg total) by mouth every 6 (six) hours as needed for Pain.    allopurinol (ZYLOPRIM) 100 MG tablet Take 100 mg by mouth every evening.     carvediloL (COREG) 6.25 MG tablet Take 1 tablet (6.25 mg total) by mouth 2 (two) times daily. Do not take if the top blood pressure number is less than 100 or your heart rate is less than 60    clopidogrel (PLAVIX) 75 mg tablet Take 75 mg by mouth once daily. On hold since 11-28-14, for procedure.    cranberry 400 mg Cap Take 1 capsule by mouth 2 (two) times daily.    fish oil-omega-3 fatty acids 300-1,000 mg capsule Take 2 g by mouth once daily. 2 caps every AM & 1 cap every PM.    folic acid/multivit-min/lutein (CENTRUM SILVER ORAL) Take by mouth once daily.    furosemide (LASIX) 80 MG tablet Take 1 tablet (80 mg total) by mouth 2 (two) times daily as needed (SOB or LE edema).    gabapentin (NEURONTIN) 300 MG capsule Take 300 mg by mouth 2 (two) times daily.    isosorbide-hydrALAZINE 20-37.5 mg (BIDIL) 20-37.5 mg Tab Take 1 tablet by mouth once daily. Do not take if the top blood pressure number is less than 100    lovastatin (MEVACOR) 40 MG tablet Take 40 mg by mouth nightly. Takes 2 caps with supper every evening.    metOLazone (ZAROXOLYN) 2.5 MG tablet Take 1 tablet (2.5 mg total) by mouth 3 (three) times a week.    mirabegron (MYRBETRIQ) 50 mg Tb24 Take 50 mg by mouth once daily.     pantoprazole (PROTONIX) 40 MG  tablet Take 40 mg by mouth once daily.    terazosin (HYTRIN) 2 MG capsule Take 2 mg by mouth every evening.     Family History       Problem Relation (Age of Onset)    Amblyopia Son    Diabetes Other    Heart attack Mother (88)    Hyperlipidemia Mother    Hypertension Mother, Other          Tobacco Use    Smoking status: Never Smoker    Smokeless tobacco: Never Used   Substance and Sexual Activity    Alcohol use: Yes     Comment: rarely    Drug use: No    Sexual activity: Not Currently     Partners: Male     Review of Systems   Constitutional:  Positive for activity change. Negative for diaphoresis, fatigue and fever.   HENT: Negative.     Eyes: Negative.    Respiratory:  Positive for shortness of breath. Negative for cough, chest tightness and wheezing.    Cardiovascular:  Negative for chest pain, palpitations and leg swelling.   Gastrointestinal: Negative.    Genitourinary: Negative.    Musculoskeletal: Negative.    Skin: Negative.    Neurological: Negative.    Hematological: Negative.    Psychiatric/Behavioral: Negative.     Objective:     Vital Signs (Most Recent):  Temp: 97.5 °F (36.4 °C) (07/02/22 1124)  Pulse: 70 (07/02/22 1124)  Resp: (!) 26 (07/02/22 1124)  BP: (!) 92/55 (07/02/22 1124)  SpO2: 97 % (07/02/22 1124)   Vital Signs (24h Range):  Temp:  [97.5 °F (36.4 °C)-97.9 °F (36.6 °C)] 97.5 °F (36.4 °C)  Pulse:  [70-85] 70  Resp:  [13-26] 26  SpO2:  [92 %-98 %] 97 %  BP: ()/(55-74) 92/55     Weight: 62.4 kg (137 lb 9.1 oz)  Body mass index is 24.37 kg/m².    Physical Exam  Constitutional:       Appearance: Normal appearance. She is not ill-appearing.   HENT:      Head: Atraumatic.      Mouth/Throat:      Mouth: Mucous membranes are moist.   Eyes:      Extraocular Movements: Extraocular movements intact.      Pupils: Pupils are equal, round, and reactive to light.   Cardiovascular:      Rate and Rhythm: Normal rate and regular rhythm.   Pulmonary:      Effort: Pulmonary effort is normal.      Breath  sounds: Normal breath sounds.   Abdominal:      Palpations: Abdomen is soft.   Musculoskeletal:         General: No swelling or tenderness. Normal range of motion.      Cervical back: Normal range of motion.      Right lower leg: No edema.      Left lower leg: No edema.   Skin:     General: Skin is warm and dry.   Neurological:      General: No focal deficit present.      Mental Status: She is alert and oriented to person, place, and time.         CRANIAL NERVES     CN III, IV, VI   Pupils are equal, round, and reactive to light.     Significant Labs: All pertinent labs within the past 24 hours have been reviewed.    Significant Imaging: I have reviewed all pertinent imaging results/findings within the past 24 hours.

## 2022-07-02 NOTE — ED NOTES
Adult Physical Assessment  LOC: Palmira Malave, 86 y.o. female verified via two identifiers.  The patient is awake, alert, oriented and speaking appropriately at this time.  APPEARANCE: Patient resting comfortably and appears to be in no acute distress at this time. Patient is clean and well groomed, patient's clothing is properly fastened.  SKIN:The skin is warm and dry, color consistent with ethnicity, patient has normal skin turgor and moist mucus membranes, skin intact, no breakdown or brusing noted.  MUSCULOSKELETAL: Patient moving all extremities well, no obvious swelling or deformities noted.  RESPIRATORY: Airway is open and patent, respirations are spontaneous, patient has a normal effort and rate, no accessory muscle use noted.SPO2 98% on   CARDIAC: Patient has a normal rate and rhythm, no periphreal edema noted in any extremity, capillary refill < 3 seconds in all extremities  ABDOMEN: Soft and non tender to palpation, no abdominal distention noted. Bowel sounds present in all four quadrants.  NEUROLOGIC: Eyes open spontaneously, behavior appropriate to situation, follows commands, facial expression symmetrical, bilateral hand grasp equal and even, purposeful motor response noted, normal sensation in all extremities when touched with a finger.

## 2022-07-02 NOTE — H&P
[facility] Medicine  History & Physical    Patient Name: Palmiar Malave  MRN: [id number]  Patient Class: OP- Observation  Admission Date: 7/2/2022  Attending Physician: Raciel Mosley MD   Primary Care Provider: Qi Ramon MD         Patient information was obtained from patient and ER records.     Subjective:     Principal Problem:Acute on chronic systolic congestive heart failure    Chief Complaint:   Chief Complaint   Patient presents with    Shortness of Breath     Presents with complaint of shortness of breath Hx CHF, AICD        HPI: Palmira Malave is a 85 yo female with significant history combined systolic diastolic heart failure EF 20%, AICD, hypertension, CKD stage 4, HLD, GERD, and gout who presents to hospital for acute shortness of breath that will patient up at midnight.  Patient denies any both lower extremity edema. Does not weigh self at home patient has been compliant with all her home medication including diuretics, low salt diet and 1 0.2 L fluid intake.  Denies chest pain headaches dizziness.  States been eating less. Denies fever chills.  No treatments at home prior to admission.  CXR interstitial markings R<L. BNP   EKG NSR v pace. Tropoin 0.124 (similar to previous labs)       9/11/2020   · Severely decreased left ventricular systolic function. The estimated ejection fraction is 20%.  · Severe global hypokinetic wall motion.  · Mild eccentric left ventricular hypertrophy.  · Mild left ventricular enlargement.  · Grade I (mild) left ventricular diastolic dysfunction consistent with impaired relaxation.  · Normal right ventricular systolic function.  · Indeterminate central venous pressure. Estimated PA systolic pressure is at least 13 mmHg.      Past Medical History:   Diagnosis Date    Cataract     CHF (congestive heart failure)     CKD (chronic kidney disease), stage III     Coronary artery disease     Gout attack     Hypercholesteremia     Hypertension      Renal disorder     Vaginal delivery     x4       Past Surgical History:   Procedure Laterality Date    APPENDECTOMY      CARDIAC DEFIBRILLATOR PLACEMENT      2015    CATARACT EXTRACTION W/  INTRAOCULAR LENS IMPLANT Left 02/07/2019    Dr. Velazco    CATARACT EXTRACTION W/  INTRAOCULAR LENS IMPLANT Right 02/21/2019    Dr. Velazco    CHOLECYSTECTOMY      COLONOSCOPY W/ POLYPECTOMY  10 or 11/ 2014    per Dr. Myrick    defribillator Left 8/2008    EYE SURGERY      HYSTERECTOMY      INTRAOCULAR PROSTHESES INSERTION Left 2/7/2019    Procedure: INSERTION, IOL PROSTHESIS;  Surgeon: Demetrius Velazco MD;  Location: [facility] OR;  Service: Ophthalmology;  Laterality: Left;  RN Phone Pre OP 1-30-19.  Arrival 05:30 AM    INTRAOCULAR PROSTHESES INSERTION Right 2/21/2019    Procedure: INSERTION, IOL PROSTHESIS;  Surgeon: Demetrius Velazco MD;  Location: [facility] OR;  Service: Ophthalmology;  Laterality: Right;    JOINT REPLACEMENT Bilateral 2005 & 2006    2 Knee Replacements=Lt. 1= 2005. Rt. 1= 2006    OOPHORECTOMY      PHACOEMULSIFICATION OF CATARACT Left 2/7/2019    Procedure: PHACOEMULSIFICATION, CATARACT;  Surgeon: Demetrius Velazco MD;  Location: [facility] OR;  Service: Ophthalmology;  Laterality: Left;    PHACOEMULSIFICATION OF CATARACT Right 2/21/2019    Procedure: PHACOEMULSIFICATION, CATARACT;  Surgeon: Demetrius Velazco MD;  Location: [facility] OR;  Service: Ophthalmology;  Laterality: Right;  RN Phone Pre op 2-15-19.  Arrival 05:30 AM    TOTAL KNEE ARTHROPLASTY Bilateral Lt.= 2005; Rt.=2006    Bilateral Knee scope       Review of patient's allergies indicates:   Allergen Reactions    Aspirin Swelling     Swelling and rash    Colace [docusate sodium] Rash     itching    Docusate Other (See Comments) and Rash     itching    Sulfa (sulfonamide antibiotics) Swelling     Swelling and rash  Swelling and rash    Iodine and iodide containing products Rash    Penicillins Rash       Current  Facility-Administered Medications on File Prior to Encounter   Medication    0.9%  NaCl infusion    phenylephrine HCL 2.5% ophthalmic solution 1 drop    proparacaine 0.5 % ophthalmic solution 1 drop    tropicamide 1% ophthalmic solution 1 drop     Current Outpatient Medications on File Prior to Encounter   Medication Sig    acetaminophen (TYLENOL) 500 MG tablet Take 1 tablet (500 mg total) by mouth every 6 (six) hours as needed for Pain.    allopurinol (ZYLOPRIM) 100 MG tablet Take 100 mg by mouth every evening.     carvediloL (COREG) 6.25 MG tablet Take 1 tablet (6.25 mg total) by mouth 2 (two) times daily. Do not take if the top blood pressure number is less than 100 or your heart rate is less than 60    clopidogrel (PLAVIX) 75 mg tablet Take 75 mg by mouth once daily. On hold since 11-28-14, for procedure.    cranberry 400 mg Cap Take 1 capsule by mouth 2 (two) times daily.    fish oil-omega-3 fatty acids 300-1,000 mg capsule Take 2 g by mouth once daily. 2 caps every AM & 1 cap every PM.    folic acid/multivit-min/lutein (CENTRUM SILVER ORAL) Take by mouth once daily.    furosemide (LASIX) 80 MG tablet Take 1 tablet (80 mg total) by mouth 2 (two) times daily as needed (SOB or LE edema).    gabapentin (NEURONTIN) 300 MG capsule Take 300 mg by mouth 2 (two) times daily.    isosorbide-hydrALAZINE 20-37.5 mg (BIDIL) 20-37.5 mg Tab Take 1 tablet by mouth once daily. Do not take if the top blood pressure number is less than 100    lovastatin (MEVACOR) 40 MG tablet Take 40 mg by mouth nightly. Takes 2 caps with supper every evening.    metOLazone (ZAROXOLYN) 2.5 MG tablet Take 1 tablet (2.5 mg total) by mouth 3 (three) times a week.    mirabegron (MYRBETRIQ) 50 mg Tb24 Take 50 mg by mouth once daily.     pantoprazole (PROTONIX) 40 MG tablet Take 40 mg by mouth once daily.    terazosin (HYTRIN) 2 MG capsule Take 2 mg by mouth every evening.     Family History       Problem Relation (Age of Onset)     Amblyopia Son    Diabetes Other    Heart attack Mother (88)    Hyperlipidemia Mother    Hypertension Mother, Other          Tobacco Use    Smoking status: Never Smoker    Smokeless tobacco: Never Used   Substance and Sexual Activity    Alcohol use: Yes     Comment: rarely    Drug use: No    Sexual activity: Not Currently     Partners: Male     Review of Systems   Constitutional:  Positive for activity change. Negative for diaphoresis, fatigue and fever.   HENT: Negative.     Eyes: Negative.    Respiratory:  Positive for shortness of breath. Negative for cough, chest tightness and wheezing.    Cardiovascular:  Negative for chest pain, palpitations and leg swelling.   Gastrointestinal: Negative.    Genitourinary: Negative.    Musculoskeletal: Negative.    Skin: Negative.    Neurological: Negative.    Hematological: Negative.    Psychiatric/Behavioral: Negative.     Objective:     Vital Signs (Most Recent):  Temp: 97.5 °F (36.4 °C) (07/02/22 1124)  Pulse: 70 (07/02/22 1124)  Resp: (!) 26 (07/02/22 1124)  BP: (!) 92/55 (07/02/22 1124)  SpO2: 97 % (07/02/22 1124)   Vital Signs (24h Range):  Temp:  [97.5 °F (36.4 °C)-97.9 °F (36.6 °C)] 97.5 °F (36.4 °C)  Pulse:  [70-85] 70  Resp:  [13-26] 26  SpO2:  [92 %-98 %] 97 %  BP: ()/(55-74) 92/55     Weight: 62.4 kg (137 lb 9.1 oz)  Body mass index is 24.37 kg/m².    Physical Exam  Constitutional:       Appearance: Normal appearance. She is not ill-appearing.   HENT:      Head: Atraumatic.      Mouth/Throat:      Mouth: Mucous membranes are moist.   Eyes:      Extraocular Movements: Extraocular movements intact.      Pupils: Pupils are equal, round, and reactive to light.   Cardiovascular:      Rate and Rhythm: Normal rate and regular rhythm.   Pulmonary:      Effort: Pulmonary effort is normal.      Breath sounds: Normal breath sounds.   Abdominal:      Palpations: Abdomen is soft.   Musculoskeletal:         General: No swelling or tenderness. Normal range of motion.       Cervical back: Normal range of motion.      Right lower leg: No edema.      Left lower leg: No edema.   Skin:     General: Skin is warm and dry.   Neurological:      General: No focal deficit present.      Mental Status: She is alert and oriented to person, place, and time.         CRANIAL NERVES     CN III, IV, VI   Pupils are equal, round, and reactive to light.     Significant Labs: All pertinent labs within the past 24 hours have been reviewed.    Significant Imaging: I have reviewed all pertinent imaging results/findings within the past 24 hours.    Assessment/Plan:     * Acute on chronic systolic congestive heart failure  Patient presents to ED for one day of acute shortness of breath that woke patient up at midnight. Reports compliance with all meds, low salt diet , and 1 L fluid intake.   Possible weight lost   CXR evidence of edema  1 L output in ED after lasix in ED  Denies cp, Elevated troponin similar to previous. Will trend.  Reports feeling close to baseline but not 100%. 92-93% on RA in ED now now improved 97%  Hold additional lasix as  BP 92/55-asymptomatic. Will reassess    ICD (implantable cardioverter-defibrillator) in place  No current issue      Stage 3 chronic kidney disease  Scr 2.1 better then last month however sCr 1.5 2 months ago. Cardiorenal   Close monitor while diuresing  Avoid all NSAID and nephrotoxins      Essential hypertension  Hypotensive currently . Hold home coreg  Will reassess and give NS bolus if remains hypotensive      Troponin level elevated  See above #1        VTE Risk Mitigation (From admission, onward)         Ordered     IP VTE HIGH RISK PATIENT  Once         07/02/22 0926     Place sequential compression device  Until discontinued         07/02/22 0926               As clarification on 7/2/2022, patient admit to observation under my care in collaboration with Dr. Raciel Mosley.     Maddie Perez NP  Department of Hospital Medicine   [facility]

## 2022-07-02 NOTE — ASSESSMENT & PLAN NOTE
Patient presents to ED for one day of acute shortness of breath that woke patient up at midnight. Reports compliance with all meds, low salt diet , and 1 L fluid intake.   Possible weight lost   CXR evidence of edema  1 L output in ED after lasix in ED  Denies cp, Elevated troponin similar to previous. Will trend.  Reports feeling close to baseline but not 100%. 92-93% on RA in ED now now improved 97%  Hold additional lasix as  BP 92/55. Will reassess

## 2022-07-03 VITALS
SYSTOLIC BLOOD PRESSURE: 114 MMHG | TEMPERATURE: 98 F | WEIGHT: 137 LBS | BODY MASS INDEX: 24.27 KG/M2 | RESPIRATION RATE: 20 BRPM | HEART RATE: 75 BPM | DIASTOLIC BLOOD PRESSURE: 58 MMHG | HEIGHT: 63 IN | OXYGEN SATURATION: 97 %

## 2022-07-03 LAB
ANION GAP SERPL CALC-SCNC: 13 MMOL/L (ref 8–16)
AORTIC ROOT ANNULUS: 2.88 CM
AORTIC VALVE CUSP SEPERATION: 1.11 CM
ASCENDING AORTA: 2.57 CM
AV INDEX (PROSTH): 0.43
AV MEAN GRADIENT: 7 MMHG
AV PEAK GRADIENT: 14 MMHG
AV VALVE AREA: 1.28 CM2
AV VELOCITY RATIO: 0.43
BASOPHILS # BLD AUTO: 0.04 K/UL (ref 0–0.2)
BASOPHILS NFR BLD: 0.6 % (ref 0–1.9)
BSA FOR ECHO PROCEDURE: 1.66 M2
BUN SERPL-MCNC: 54 MG/DL (ref 8–23)
CALCIUM SERPL-MCNC: 10.1 MG/DL (ref 8.7–10.5)
CHLORIDE SERPL-SCNC: 101 MMOL/L (ref 95–110)
CO2 SERPL-SCNC: 27 MMOL/L (ref 23–29)
CREAT SERPL-MCNC: 1.8 MG/DL (ref 0.5–1.4)
CV ECHO LV RWT: 0.22 CM
DIFFERENTIAL METHOD: ABNORMAL
DOP CALC AO PEAK VEL: 1.85 M/S
DOP CALC AO VTI: 32.9 CM
DOP CALC LVOT AREA: 3 CM2
DOP CALC LVOT DIAMETER: 1.95 CM
DOP CALC LVOT PEAK VEL: 0.79 M/S
DOP CALC LVOT STROKE VOLUME: 42.06 CM3
DOP CALCLVOT PEAK VEL VTI: 14.09 CM
E WAVE DECELERATION TIME: 156.06 MSEC
E/A RATIO: 0.91
E/E' RATIO: 19.23 M/S
ECHO LV POSTERIOR WALL: 0.83 CM (ref 0.6–1.1)
EJECTION FRACTION: 20 %
EOSINOPHIL # BLD AUTO: 0.1 K/UL (ref 0–0.5)
EOSINOPHIL NFR BLD: 2 % (ref 0–8)
ERYTHROCYTE [DISTWIDTH] IN BLOOD BY AUTOMATED COUNT: 14.6 % (ref 11.5–14.5)
EST. GFR  (AFRICAN AMERICAN): 29 ML/MIN/1.73 M^2
EST. GFR  (NON AFRICAN AMERICAN): 25 ML/MIN/1.73 M^2
FRACTIONAL SHORTENING: 10 % (ref 28–44)
GLUCOSE SERPL-MCNC: 91 MG/DL (ref 70–110)
HCT VFR BLD AUTO: 35.3 % (ref 37–48.5)
HGB BLD-MCNC: 11.2 G/DL (ref 12–16)
IMM GRANULOCYTES # BLD AUTO: 0.03 K/UL (ref 0–0.04)
IMM GRANULOCYTES NFR BLD AUTO: 0.4 % (ref 0–0.5)
INTERVENTRICULAR SEPTUM: 0.8 CM (ref 0.6–1.1)
LA MAJOR: 6.89 CM
LA MINOR: 7.02 CM
LA WIDTH: 5.92 CM
LEFT ATRIUM SIZE: 4.85 CM
LEFT ATRIUM VOLUME INDEX: 102.9 ML/M2
LEFT ATRIUM VOLUME: 169.72 CM3
LEFT INTERNAL DIMENSION IN SYSTOLE: 6.74 CM (ref 2.1–4)
LEFT VENTRICLE DIASTOLIC VOLUME INDEX: 182.08 ML/M2
LEFT VENTRICLE DIASTOLIC VOLUME: 300.44 ML
LEFT VENTRICLE MASS INDEX: 172 G/M2
LEFT VENTRICLE SYSTOLIC VOLUME INDEX: 142 ML/M2
LEFT VENTRICLE SYSTOLIC VOLUME: 234.26 ML
LEFT VENTRICULAR INTERNAL DIMENSION IN DIASTOLE: 7.52 CM (ref 3.5–6)
LEFT VENTRICULAR MASS: 284.15 G
LV LATERAL E/E' RATIO: 15.63 M/S
LV SEPTAL E/E' RATIO: 25 M/S
LYMPHOCYTES # BLD AUTO: 0.9 K/UL (ref 1–4.8)
LYMPHOCYTES NFR BLD: 13.1 % (ref 18–48)
MAGNESIUM SERPL-MCNC: 2.3 MG/DL (ref 1.6–2.6)
MCH RBC QN AUTO: 30 PG (ref 27–31)
MCHC RBC AUTO-ENTMCNC: 31.7 G/DL (ref 32–36)
MCV RBC AUTO: 95 FL (ref 82–98)
MONOCYTES # BLD AUTO: 0.9 K/UL (ref 0.3–1)
MONOCYTES NFR BLD: 12.6 % (ref 4–15)
MV PEAK A VEL: 1.38 M/S
MV PEAK E VEL: 1.25 M/S
MV STENOSIS PRESSURE HALF TIME: 45.26 MS
MV VALVE AREA P 1/2 METHOD: 4.86 CM2
NEUTROPHILS # BLD AUTO: 5 K/UL (ref 1.8–7.7)
NEUTROPHILS NFR BLD: 71.3 % (ref 38–73)
NRBC BLD-RTO: 0 /100 WBC
PISA TR MAX VEL: 2.01 M/S
PLATELET # BLD AUTO: 128 K/UL (ref 150–450)
PMV BLD AUTO: 11.1 FL (ref 9.2–12.9)
POTASSIUM SERPL-SCNC: 3.9 MMOL/L (ref 3.5–5.1)
PV PEAK VELOCITY: 0.94 CM/S
RA MAJOR: 4.94 CM
RA PRESSURE: 3 MMHG
RA WIDTH: 4.12 CM
RBC # BLD AUTO: 3.73 M/UL (ref 4–5.4)
RIGHT VENTRICULAR END-DIASTOLIC DIMENSION: 3.48 CM
SODIUM SERPL-SCNC: 141 MMOL/L (ref 136–145)
STJ: 2.5 CM
TDI LATERAL: 0.08 M/S
TDI SEPTAL: 0.05 M/S
TDI: 0.07 M/S
TR MAX PG: 16 MMHG
TRICUSPID ANNULAR PLANE SYSTOLIC EXCURSION: 2.34 CM
TV REST PULMONARY ARTERY PRESSURE: 19 MMHG
WBC # BLD AUTO: 7.04 K/UL (ref 3.9–12.7)

## 2022-07-03 PROCEDURE — G0378 HOSPITAL OBSERVATION PER HR: HCPCS

## 2022-07-03 PROCEDURE — 25000003 PHARM REV CODE 250: Performed by: NURSE PRACTITIONER

## 2022-07-03 PROCEDURE — 83735 ASSAY OF MAGNESIUM: CPT | Performed by: NURSE PRACTITIONER

## 2022-07-03 PROCEDURE — 85025 COMPLETE CBC W/AUTO DIFF WBC: CPT | Performed by: NURSE PRACTITIONER

## 2022-07-03 PROCEDURE — 80048 BASIC METABOLIC PNL TOTAL CA: CPT | Performed by: NURSE PRACTITIONER

## 2022-07-03 PROCEDURE — 36415 COLL VENOUS BLD VENIPUNCTURE: CPT | Performed by: NURSE PRACTITIONER

## 2022-07-03 RX ORDER — CARVEDILOL 6.25 MG/1
6.25 TABLET ORAL 2 TIMES DAILY
Qty: 60 TABLET | Refills: 1 | Status: ON HOLD | OUTPATIENT
Start: 2022-07-03 | End: 2022-12-06 | Stop reason: HOSPADM

## 2022-07-03 RX ADMIN — ATORVASTATIN CALCIUM 10 MG: 10 TABLET, FILM COATED ORAL at 08:07

## 2022-07-03 RX ADMIN — CLOPIDOGREL 75 MG: 75 TABLET, FILM COATED ORAL at 08:07

## 2022-07-03 RX ADMIN — PANTOPRAZOLE SODIUM 40 MG: 40 TABLET, DELAYED RELEASE ORAL at 08:07

## 2022-07-03 NOTE — NURSING
Pt daughter here assisted to w/c without injury,pt now leaving unit with pt transport and her daughter at her side.

## 2022-07-03 NOTE — NURSING
Patient discharged per MD order.  IV removed.  Catheter tip intact.  No distress noted.  Discharge instructions explained.  AVS given to patient .  Patient verbalized understanding.  VSS.  Afebrile.  No complaints of pain, N/V, diarrhea or SOB.  Informed where to  Rx .  Patient waiting on her ride home.

## 2022-07-03 NOTE — PLAN OF CARE
[facility] Surg  Discharge Assessment    Primary Care Provider: Qi Ramon MD     Discharge Assessment (most recent)     BRIEF DISCHARGE ASSESSMENT - 07/03/22 1300        Discharge Planning    Assessment Type Discharge Planning Brief Assessment     Resource/Environmental Concerns none     Support Systems Family members     Equipment Currently Used at Home none     Current Living Arrangements home/apartment/condo     Patient/Family Anticipates Transition to home with family     Patient/Family Anticipated Services at Transition none     DME Needed Upon Discharge  none     Discharge Plan A Home with family

## 2022-07-03 NOTE — PLAN OF CARE
[facility] - Med Surg  Discharge Final Note    Primary Care Provider: Qi Ramon MD    Expected Discharge Date: 7/3/2022     All Case Management (CM) needs have been addressed; Nurse Ciara  informed that patient is cleared from CM standpoint      Final Discharge Note (most recent)     Final Note - 07/03/22 1202        Final Note    Assessment Type Final Discharge Note     Anticipated Discharge Disposition Home or Self Care     What phone number can be called within the next 1-3 days to see how you are doing after discharge? [phone]     Hospital Resources/Appts/Education Provided Provided patient/caregiver with written discharge plan information;Appointments scheduled and added to AVS        Post-Acute Status    Post-Acute Authorization Other     Other Status No Post-Acute Service Needs     Discharge Delays None known at this time                 Important Message from Medicare             Contact Info     Qi Ramon MD   Specialty: Internal Medicine   Relationship: PCP - [address]  [id number]  [place] [id number]   Phone: [phone]       Next Steps: Follow up    Instructions: Please call office on Tuesday to arrange a, HOSPITAL DISCHARGE FOLLOW UP VISIT, in 1 week

## 2022-07-03 NOTE — PLAN OF CARE
WRITTEN HEALTHCARE DISCHARGE INFORMATION      Things that YOU are responsible for to Manage Your Care At Home:  1. Getting your prescriptions filled.  2. Taking you medications as directed. DO NOT MISS ANY DOSES!  3. Going to your follow-up doctor appointments. This is important because it allows the doctor to monitor your progress and to determine if any changes need to be made to your treatment plan.     If you are unable to make your follow up appointments, please call the number listed and reschedule this appointment.      After discharge, if you need assistance, you can call [facility] On Call Nurse Care Line for 24/7 assistance at [phone]     If you are experience any signs or symptoms, Call your doctor or Call 911 and come to your nearest Emergency Room.     Thank you for choosing [facility] and allowing us to care for you.        You should receive a call from [facility] Discharge Department within 48-72 hours to help manage your care after discharge. Please try to make sure that you answer your phone for this important phone call.       Follow-up Information       Qi Ramon MD Follow up.    Specialty: Internal Medicine  Why: Please call office on Tuesday to arrange a, HOSPITAL DISCHARGE FOLLOW UP VISIT, in 1 week  Contact information:  [address] [place] [address] [address]  [address]  [place] [address]  [phone]

## 2022-07-03 NOTE — NURSING
"Pt daughter Mariely states "it's raining and storming and that's why it's taking so long but I'm im the parking garage headed to 4th floor now" pt transport requested.   "

## 2022-07-03 NOTE — DISCHARGE INSTRUCTIONS
WRITTEN HEALTHCARE DISCHARGE INFORMATION      Things that YOU are responsible for to Manage Your Care At Home:  1. Getting your prescriptions filled.  2. Taking you medications as directed. DO NOT MISS ANY DOSES!  3. Going to your follow-up doctor appointments. This is important because it allows the doctor to monitor your progress and to determine if any changes need to be made to your treatment plan.     If you are unable to make your follow up appointments, please call the number listed and reschedule this appointment.      After discharge, if you need assistance, you can call [facility] On Call Nurse Care Line for 24/7 assistance at [phone]     If you are experience any signs or symptoms, Call your doctor or Call 911 and come to your nearest Emergency Room.     Thank you for choosing [facility] and allowing us to care for you.        You should receive a call from [facility] Discharge Department within 48-72 hours to help manage your care after discharge. Please try to make sure that you answer your phone for this important phone call.       Follow-up Information       Qi Ramon MD Follow up.    Specialty: Internal Medicine  Why: Please call office on Tuesday to arrange a, HOSPITAL DISCHARGE FOLLOW UP VISIT, in 1 week  Contact information:  [address] [place] [address] [address]  [address]  [place] [address]  [phone]

## 2022-07-04 ENCOUNTER — HOSPITAL ENCOUNTER (EMERGENCY)
Facility: HOSPITAL | Age: 87
Discharge: HOME | End: 2022-07-04
Attending: EMERGENCY MEDICINE
Payer: MEDICARE

## 2022-07-04 VITALS
RESPIRATION RATE: 31 BRPM | HEART RATE: 75 BPM | SYSTOLIC BLOOD PRESSURE: 117 MMHG | BODY MASS INDEX: 24.27 KG/M2 | OXYGEN SATURATION: 98 % | TEMPERATURE: 98 F | WEIGHT: 137 LBS | DIASTOLIC BLOOD PRESSURE: 65 MMHG

## 2022-07-04 DIAGNOSIS — I50.9 CONGESTIVE HEART FAILURE, UNSPECIFIED HF CHRONICITY, UNSPECIFIED HEART FAILURE TYPE: Primary | ICD-10-CM

## 2022-07-04 DIAGNOSIS — R06.02 SOB (SHORTNESS OF BREATH): ICD-10-CM

## 2022-07-04 LAB
ALBUMIN SERPL BCP-MCNC: 3.7 G/DL (ref 3.5–5.2)
ALP SERPL-CCNC: 81 U/L (ref 55–135)
ALT SERPL W/O P-5'-P-CCNC: 12 U/L (ref 10–44)
ANION GAP SERPL CALC-SCNC: 12 MMOL/L (ref 8–16)
AST SERPL-CCNC: 20 U/L (ref 10–40)
BASOPHILS # BLD AUTO: 0.03 K/UL (ref 0–0.2)
BASOPHILS NFR BLD: 0.5 % (ref 0–1.9)
BILIRUB SERPL-MCNC: 1.1 MG/DL (ref 0.1–1)
BUN SERPL-MCNC: 48 MG/DL (ref 8–23)
CALCIUM SERPL-MCNC: 9.9 MG/DL (ref 8.7–10.5)
CHLORIDE SERPL-SCNC: 100 MMOL/L (ref 95–110)
CO2 SERPL-SCNC: 26 MMOL/L (ref 23–29)
CREAT SERPL-MCNC: 1.5 MG/DL (ref 0.5–1.4)
DIFFERENTIAL METHOD: ABNORMAL
EOSINOPHIL # BLD AUTO: 0.1 K/UL (ref 0–0.5)
EOSINOPHIL NFR BLD: 1.6 % (ref 0–8)
ERYTHROCYTE [DISTWIDTH] IN BLOOD BY AUTOMATED COUNT: 14.7 % (ref 11.5–14.5)
EST. GFR  (AFRICAN AMERICAN): 36 ML/MIN/1.73 M^2
EST. GFR  (NON AFRICAN AMERICAN): 31 ML/MIN/1.73 M^2
GLUCOSE SERPL-MCNC: 93 MG/DL (ref 70–110)
HCT VFR BLD AUTO: 34.1 % (ref 37–48.5)
HGB BLD-MCNC: 11.4 G/DL (ref 12–16)
IMM GRANULOCYTES # BLD AUTO: 0.01 K/UL (ref 0–0.04)
IMM GRANULOCYTES NFR BLD AUTO: 0.2 % (ref 0–0.5)
LYMPHOCYTES # BLD AUTO: 0.8 K/UL (ref 1–4.8)
LYMPHOCYTES NFR BLD: 13.1 % (ref 18–48)
MCH RBC QN AUTO: 31.2 PG (ref 27–31)
MCHC RBC AUTO-ENTMCNC: 33.4 G/DL (ref 32–36)
MCV RBC AUTO: 93 FL (ref 82–98)
MONOCYTES # BLD AUTO: 0.7 K/UL (ref 0.3–1)
MONOCYTES NFR BLD: 11.1 % (ref 4–15)
NEUTROPHILS # BLD AUTO: 4.5 K/UL (ref 1.8–7.7)
NEUTROPHILS NFR BLD: 73.5 % (ref 38–73)
NRBC BLD-RTO: 0 /100 WBC
PLATELET # BLD AUTO: 111 K/UL (ref 150–450)
PMV BLD AUTO: 10.4 FL (ref 9.2–12.9)
POTASSIUM SERPL-SCNC: 3.9 MMOL/L (ref 3.5–5.1)
PROT SERPL-MCNC: 7 G/DL (ref 6–8.4)
RBC # BLD AUTO: 3.65 M/UL (ref 4–5.4)
SODIUM SERPL-SCNC: 138 MMOL/L (ref 136–145)
TROPONIN I SERPL DL<=0.01 NG/ML-MCNC: 0.13 NG/ML (ref 0–0.03)
WBC # BLD AUTO: 6.1 K/UL (ref 3.9–12.7)

## 2022-07-04 PROCEDURE — 84484 ASSAY OF TROPONIN QUANT: CPT | Performed by: EMERGENCY MEDICINE

## 2022-07-04 PROCEDURE — 93010 ELECTROCARDIOGRAM REPORT: CPT | Mod: ,,, | Performed by: INTERNAL MEDICINE

## 2022-07-04 PROCEDURE — 80053 COMPREHEN METABOLIC PANEL: CPT | Performed by: EMERGENCY MEDICINE

## 2022-07-04 PROCEDURE — 93005 ELECTROCARDIOGRAM TRACING: CPT

## 2022-07-04 PROCEDURE — 99285 EMERGENCY DEPT VISIT HI MDM: CPT | Mod: 25

## 2022-07-04 PROCEDURE — 93010 EKG 12-LEAD: ICD-10-PCS | Mod: ,,, | Performed by: INTERNAL MEDICINE

## 2022-07-04 PROCEDURE — 96374 THER/PROPH/DIAG INJ IV PUSH: CPT

## 2022-07-04 PROCEDURE — 85025 COMPLETE CBC W/AUTO DIFF WBC: CPT | Performed by: EMERGENCY MEDICINE

## 2022-07-04 PROCEDURE — 63600175 PHARM REV CODE 636 W HCPCS: Performed by: EMERGENCY MEDICINE

## 2022-07-04 RX ORDER — FUROSEMIDE 10 MG/ML
40 INJECTION INTRAMUSCULAR; INTRAVENOUS
Status: COMPLETED | OUTPATIENT
Start: 2022-07-04 | End: 2022-07-04

## 2022-07-04 RX ADMIN — FUROSEMIDE 40 MG: 10 INJECTION, SOLUTION INTRAMUSCULAR; INTRAVENOUS at 10:07

## 2022-07-04 NOTE — HOSPITAL COURSE
Admitted to observation for acute on chronic systolic diastolic heart failure.  Patient improved after IV Lasix in ED.  Patient never required supplemental oxygen.  Again discuss low-salt diet, 1.2 L fluid intake and daily weights.  Repeat 2D echo showed EF of 20% similar to previous and grade 2 diastolic dysfunction.  Patient reports breathing back to baseline and ready for discharge.  Patient stable for discharge home with daughter.

## 2022-07-04 NOTE — ED PROVIDER NOTES
Encounter Date: 7/4/2022       History     Chief Complaint   Patient presents with    Shortness of Breath     Pt reporting SOB x 0200 this morning. Pt denies chest pain. Pt Pt has a hx of HTN. Pt has a defibrillator.      86-year-old female with history of heart failure presents to the emergency department with increased shortness of breath since 5:00 a.m. this morning.  She was discharged from this facility yesterday for CHF exacerbation with pulmonary edema.  She states that she was feeling good yesterday.  She denies any fever/chills/nausea/vomiting.  She is noncompliant with her Lasix, her prescribed dose is 80 mg by mouth twice a day, she only takes this once a day because she does not want to go to the bathroom all night.  She states that because the pills a day as needed she does not feel like she needs it.  She denies any nausea/vomiting/diarrhea.  She did take her morning 80 mg dose prior to arrival.  She denies any increasing peripheral edema.  She denies any cough.        Review of patient's allergies indicates:   Allergen Reactions    Aspirin Swelling     Swelling and rash    Colace [docusate sodium] Rash     itching    Docusate Other (See Comments) and Rash     itching    Sulfa (sulfonamide antibiotics) Swelling     Swelling and rash  Swelling and rash    Iodine and iodide containing products Rash    Penicillins Rash     Past Medical History:   Diagnosis Date    Cataract     CHF (congestive heart failure)     CKD (chronic kidney disease), stage III     Coronary artery disease     Gout attack     Hypercholesteremia     Hypertension     Renal disorder     Vaginal delivery     x4     Past Surgical History:   Procedure Laterality Date    APPENDECTOMY      CARDIAC DEFIBRILLATOR PLACEMENT      2015    CATARACT EXTRACTION W/  INTRAOCULAR LENS IMPLANT Left 02/07/2019    Dr. Velazco    CATARACT EXTRACTION W/  INTRAOCULAR LENS IMPLANT Right 02/21/2019    Dr. Velazco    CHOLECYSTECTOMY       COLONOSCOPY W/ POLYPECTOMY  10 or 11/ 2014    per Dr. Elen ceja Left 8/2008    EYE SURGERY      HYSTERECTOMY      INTRAOCULAR PROSTHESES INSERTION Left 2/7/2019    Procedure: INSERTION, IOL PROSTHESIS;  Surgeon: Demetrius Velazco MD;  Location: [facility] OR;  Service: Ophthalmology;  Laterality: Left;  RN Phone Pre OP 1-30-19.  Arrival 05:30 AM    INTRAOCULAR PROSTHESES INSERTION Right 2/21/2019    Procedure: INSERTION, IOL PROSTHESIS;  Surgeon: Demetrius Velazco MD;  Location: [facility] OR;  Service: Ophthalmology;  Laterality: Right;    JOINT REPLACEMENT Bilateral 2005 & 2006    2 Knee Replacements=Lt. 1= 2005. Rt. 1= 2006    OOPHORECTOMY      PHACOEMULSIFICATION OF CATARACT Left 2/7/2019    Procedure: PHACOEMULSIFICATION, CATARACT;  Surgeon: Demetrius Velazco MD;  Location: [facility] OR;  Service: Ophthalmology;  Laterality: Left;    PHACOEMULSIFICATION OF CATARACT Right 2/21/2019    Procedure: PHACOEMULSIFICATION, CATARACT;  Surgeon: Demetrius Velazco MD;  Location: [facility] OR;  Service: Ophthalmology;  Laterality: Right;  RN Phone Pre op 2-15-19.  Arrival 05:30 AM    TOTAL KNEE ARTHROPLASTY Bilateral Lt.= 2005; Rt.=2006    Bilateral Knee scope     Family History   Problem Relation Age of Onset    Heart attack Mother 88    Hypertension Mother     Hyperlipidemia Mother     Diabetes Other     Hypertension Other     Amblyopia Son     Blindness Neg Hx     Cataracts Neg Hx     Glaucoma Neg Hx     Macular degeneration Neg Hx     Retinal detachment Neg Hx     Strabismus Neg Hx      Social History     Tobacco Use    Smoking status: Never Smoker    Smokeless tobacco: Never Used   Substance Use Topics    Alcohol use: Yes     Comment: rarely    Drug use: No     Review of Systems   Respiratory: Positive for shortness of breath.    All other systems reviewed and are negative.      Physical Exam     Initial Vitals [07/04/22 0913]   BP Pulse Resp Temp SpO2   107/71 84 18 98.1 °F  (36.7 °C) 97 %      MAP       --         Physical Exam    Nursing note and vitals reviewed.  Constitutional: She appears well-developed and well-nourished.   HENT:   Head: Normocephalic and atraumatic.   Cardiovascular: Normal rate.   Paced rhythm, no murmurs   Pulmonary/Chest: Breath sounds normal. She has no wheezes.   Abdominal: Abdomen is soft. There is no abdominal tenderness.   Musculoskeletal:         General: Normal range of motion.     Neurological: She is alert and oriented to person, place, and time.   Skin: Skin is warm and dry. Capillary refill takes less than 2 seconds.   Psychiatric: She has a normal mood and affect.         ED Course   Procedures  Labs Reviewed   CBC W/ AUTO DIFFERENTIAL - Abnormal; Notable for the following components:       Result Value    RBC 3.65 (*)     Hemoglobin 11.4 (*)     Hematocrit 34.1 (*)     MCH 31.2 (*)     RDW 14.7 (*)     Platelets 111 (*)     Lymph # 0.8 (*)     Gran % 73.5 (*)     Lymph % 13.1 (*)     All other components within normal limits   COMPREHENSIVE METABOLIC PANEL - Abnormal; Notable for the following components:    BUN 48 (*)     Creatinine 1.5 (*)     Total Bilirubin 1.1 (*)     eGFR if  36 (*)     eGFR if non  31 (*)     All other components within normal limits   TROPONIN I - Abnormal; Notable for the following components:    Troponin I 0.131 (*)     All other components within normal limits     EKG Readings: (Independently Interpreted)   Ventricular paced rhythm, rate 86, left axis deviation, nonspecific ST T-wave abnormalities interpreted by me       Imaging Results          X-Ray Chest 1 View (Final result)  Result time 07/04/22 11:22:10    Final result by Dru Nielson DO (07/04/22 11:22:10)                 Impression:      Cardiomegaly and mild perihilar edema, similar to prior.      Electronically signed by: Dru Nielson  Date:    07/04/2022  Time:    11:22             Narrative:    EXAMINATION:  XR CHEST 1  VIEW    CLINICAL HISTORY:  Shortness of breath    TECHNIQUE:  Single frontal view of the chest was performed.    COMPARISON:  Multiple prior chest radiographs, most recent from 07/02/2022. CT chest from 10/07/2008.    FINDINGS:  There is a cardiac pacer/AICD, unchanged in position from prior.    The lungs are well expanded.  There are continued interstitial opacities in the perihilar regions.  There is a nodular opacity in the left lower lung, partially obscured by the pacer battery pack, corresponding to a pleural based calcified granuloma adjacent to the lingula, unchanged in size dating back to CT chest from 10/07/2008.    The cardiac silhouette is enlarged.  There are atherosclerotic calcifications of the aortic arch.    The visualized osseous structures demonstrate degenerative changes.                                 Medications   furosemide injection 40 mg (40 mg Intravenous Given 7/4/22 1037)     Medical Decision Making:   ED Management:  Patient states that she is feeling better after diuresis.  I instructed patient that she needs to take her Lasix twice a day as prescribed and both patient and daughter verbalized understanding.  Instructed to return immediately for any new or worsening symptoms and she verbalized understanding.                      Clinical Impression:   Final diagnoses:  [R06.02] SOB (shortness of breath)  [I50.9] Congestive heart failure, unspecified HF chronicity, unspecified heart failure type (Primary)          ED Disposition Condition    Discharge Stable        ED Prescriptions     None        Follow-up Information     Follow up With Specialties Details Why Contact Info    Qi Ramon MD Internal Medicine Schedule an appointment as soon as possible for a visit   [address] [place] [address]  [id number]  [place] [address]  [phone]             Segundo Carranza,   07/05/22 1406

## 2022-07-04 NOTE — DISCHARGE SUMMARY
[facility] Medicine  Discharge Summary      Patient Name: Palmira Malave  MRN: [id number]  Patient Class: OP- Observation  Admission Date: 7/2/2022  Hospital Length of Stay: 0 days  Discharge Date and Time: 7/3/2022  Attending Physician: Raciel Mosley MD  Discharging Provider: Maddie Perez NP  Primary Care Provider: Qi Ramon MD      HPI:   Palmira Malave is a 87 yo female with significant history combined systolic diastolic heart failure EF 20%, AICD, hypertension, CKD stage 4, HLD, GERD, and gout who presents to hospital for acute shortness of breath that will patient up at midnight.  Patient denies any both lower extremity edema. Does not weigh self at home patient has been compliant with all her home medication including diuretics, low salt diet and 1 0.2 L fluid intake.  Denies chest pain headaches dizziness.  States been eating less. Denies fever chills.  No treatments at home prior to admission.  CXR interstitial markings R<L. BNP   EKG NSR v pace. Tropoin 0.124 (similar to previous labs)       9/11/2020   · Severely decreased left ventricular systolic function. The estimated ejection fraction is 20%.  · Severe global hypokinetic wall motion.  · Mild eccentric left ventricular hypertrophy.  · Mild left ventricular enlargement.  · Grade I (mild) left ventricular diastolic dysfunction consistent with impaired relaxation.  · Normal right ventricular systolic function.  · Indeterminate central venous pressure. Estimated PA systolic pressure is at least 13 mmHg.      * No surgery found *      Hospital Course:   Admitted to observation for acute on chronic systolic diastolic heart failure.  Patient improved after IV Lasix in ED.  Patient never required supplemental oxygen.  Again discuss low-salt diet, 1.2 L fluid intake and daily weights.  Repeat 2D echo showed EF of 20% similar to previous and grade 2 diastolic dysfunction.  Patient reports breathing back to baseline and ready  for discharge.  Patient stable for discharge home with daughter.       Goals of Care Treatment Preferences:  Code Status: Full Code      Consults:   Consults (From admission, onward)        Status Ordering Provider     Inpatient consult to Social Work/Case Management  Once        Provider:  (Not yet assigned)    Completed RADHA LIU     Inpatient consult to Registered Dietitian/Nutritionist  Once        Provider:  (Not yet assigned)    Completed RADHA LIU          No new Assessment & Plan notes have been filed under this hospital service since the last note was generated.  Service: Hospital Medicine    Final Active Diagnoses:    Diagnosis Date Noted POA    PRINCIPAL PROBLEM:  Acute on chronic systolic congestive heart failure [I50.23] 10/02/2018 Yes    ICD (implantable cardioverter-defibrillator) in place [Z95.810] 02/08/2019 Yes    Troponin level elevated [R77.8] 11/15/2014 Yes    Stage 3 chronic kidney disease [N18.30] 11/15/2014 Yes    Essential hypertension [I10] 11/15/2014 Yes     Chronic      Problems Resolved During this Admission:       Discharged Condition: stable    Disposition: Home or Self Care    Follow Up:   Follow-up Information     Qi Ramon MD Follow up.    Specialty: Internal Medicine  Why: Please call office on Tuesday to arrange a, HOSPITAL DISCHARGE FOLLOW UP VISIT, in 1 week  Contact information:  [address] [place] [address] [address]  [id number]  [place] [id number]  [phone]                       Patient Instructions:      Diet Cardiac     Notify your health care provider if you experience any of the following:  temperature >100.4     Notify your health care provider if you experience any of the following:  difficulty breathing or increased cough     Notify your health care provider if you experience any of the following:  increased confusion or weakness     Activity as tolerated       Significant Diagnostic Studies: Labs: All labs within the past 24 hours have been  reviewed    Pending Diagnostic Studies:     None         Medications:  Reconciled Home Medications:      Medication List      CONTINUE taking these medications    acetaminophen 500 MG tablet  Commonly known as: TYLENOL  Take 1 tablet (500 mg total) by mouth every 6 (six) hours as needed for Pain.     allopurinoL 100 MG tablet  Commonly known as: ZYLOPRIM  Take 100 mg by mouth every evening.     carvediloL 6.25 MG tablet  Commonly known as: COREG  Take 1 tablet (6.25 mg total) by mouth 2 (two) times daily. Do not take if the top blood pressure number is less than 100 or your heart rate is less than 60     CENTRUM SILVER ORAL  Take by mouth once daily.     clopidogreL 75 mg tablet  Commonly known as: PLAVIX  Take 75 mg by mouth once daily. On hold since 11-28-14, for procedure.     cranberry 400 mg Cap  Take 1 capsule by mouth 2 (two) times daily.     fish oil-omega-3 fatty acids 300-1,000 mg capsule  Take 2 g by mouth once daily. 2 caps every AM & 1 cap every PM.     furosemide 80 MG tablet  Commonly known as: LASIX  Take 1 tablet (80 mg total) by mouth 2 (two) times daily as needed (SOB or LE edema).     gabapentin 300 MG capsule  Commonly known as: NEURONTIN  Take 300 mg by mouth 2 (two) times daily.     isosorbide-hydrALAZINE 20-37.5 mg 20-37.5 mg Tab  Commonly known as: BIDIL  Take 1 tablet by mouth once daily. Do not take if the top blood pressure number is less than 100     lovastatin 40 MG tablet  Commonly known as: MEVACOR  Take 40 mg by mouth nightly. Takes 2 caps with supper every evening.     metOLazone 2.5 MG tablet  Commonly known as: ZAROXOLYN  Take 1 tablet (2.5 mg total) by mouth 3 (three) times a week.     mirabegron 50 mg Tb24  Commonly known as: MYRBETRIQ  Take 50 mg by mouth once daily.     pantoprazole 40 MG tablet  Commonly known as: PROTONIX  Take 40 mg by mouth once daily.     terazosin 2 MG capsule  Commonly known as: HYTRIN  Take 2 mg by mouth every evening.            Indwelling  Lines/Drains at time of discharge:   Lines/Drains/Airways     None                 Time spent on the discharge of patient: 30 minutes         Maddie Perez NP  Department of Hospital Medicine  [facility]

## 2022-07-11 ENCOUNTER — OFFICE VISIT (OUTPATIENT)
Dept: CARDIOLOGY | Facility: CLINIC | Age: 87
End: 2022-07-11
Payer: MEDICARE

## 2022-07-11 VITALS
DIASTOLIC BLOOD PRESSURE: 78 MMHG | HEIGHT: 63 IN | WEIGHT: 136.69 LBS | OXYGEN SATURATION: 99 % | HEART RATE: 72 BPM | BODY MASS INDEX: 24.22 KG/M2 | SYSTOLIC BLOOD PRESSURE: 126 MMHG | RESPIRATION RATE: 15 BRPM

## 2022-07-11 DIAGNOSIS — I10 ESSENTIAL HYPERTENSION: Chronic | ICD-10-CM

## 2022-07-11 DIAGNOSIS — I50.42 CHRONIC COMBINED SYSTOLIC AND DIASTOLIC HEART FAILURE: Chronic | ICD-10-CM

## 2022-07-11 DIAGNOSIS — Z95.810 ICD (IMPLANTABLE CARDIOVERTER-DEFIBRILLATOR), BIVENTRICULAR, IN SITU: Primary | Chronic | ICD-10-CM

## 2022-07-11 DIAGNOSIS — I50.23 ACUTE ON CHRONIC SYSTOLIC CONGESTIVE HEART FAILURE: ICD-10-CM

## 2022-07-11 DIAGNOSIS — E78.2 MIXED HYPERLIPIDEMIA: Chronic | ICD-10-CM

## 2022-07-11 DIAGNOSIS — I25.10 CORONARY ARTERY DISEASE INVOLVING NATIVE CORONARY ARTERY OF NATIVE HEART WITHOUT ANGINA PECTORIS: ICD-10-CM

## 2022-07-11 PROCEDURE — 99999 PR PBB SHADOW E&M-EST. PATIENT-LVL III: ICD-10-PCS | Mod: PBBFAC,,, | Performed by: INTERNAL MEDICINE

## 2022-07-11 PROCEDURE — 1126F PR PAIN SEVERITY QUANTIFIED, NO PAIN PRESENT: ICD-10-PCS | Mod: CPTII,S$GLB,, | Performed by: INTERNAL MEDICINE

## 2022-07-11 PROCEDURE — 1101F PR PT FALLS ASSESS DOC 0-1 FALLS W/OUT INJ PAST YR: ICD-10-PCS | Mod: CPTII,S$GLB,, | Performed by: INTERNAL MEDICINE

## 2022-07-11 PROCEDURE — 1101F PT FALLS ASSESS-DOCD LE1/YR: CPT | Mod: CPTII,S$GLB,, | Performed by: INTERNAL MEDICINE

## 2022-07-11 PROCEDURE — 99214 PR OFFICE/OUTPT VISIT, EST, LEVL IV, 30-39 MIN: ICD-10-PCS | Mod: S$GLB,,, | Performed by: INTERNAL MEDICINE

## 2022-07-11 PROCEDURE — 1126F AMNT PAIN NOTED NONE PRSNT: CPT | Mod: CPTII,S$GLB,, | Performed by: INTERNAL MEDICINE

## 2022-07-11 PROCEDURE — 1159F PR MEDICATION LIST DOCUMENTED IN MEDICAL RECORD: ICD-10-PCS | Mod: CPTII,S$GLB,, | Performed by: INTERNAL MEDICINE

## 2022-07-11 PROCEDURE — 99999 PR PBB SHADOW E&M-EST. PATIENT-LVL III: CPT | Mod: PBBFAC,,, | Performed by: INTERNAL MEDICINE

## 2022-07-11 PROCEDURE — 1159F MED LIST DOCD IN RCRD: CPT | Mod: CPTII,S$GLB,, | Performed by: INTERNAL MEDICINE

## 2022-07-11 PROCEDURE — 3288F FALL RISK ASSESSMENT DOCD: CPT | Mod: CPTII,S$GLB,, | Performed by: INTERNAL MEDICINE

## 2022-07-11 PROCEDURE — 99214 OFFICE O/P EST MOD 30 MIN: CPT | Mod: S$GLB,,, | Performed by: INTERNAL MEDICINE

## 2022-07-11 PROCEDURE — 3288F PR FALLS RISK ASSESSMENT DOCUMENTED: ICD-10-PCS | Mod: CPTII,S$GLB,, | Performed by: INTERNAL MEDICINE

## 2022-07-11 NOTE — PROGRESS NOTES
Subjective:    Patient ID:  Palmira Malave is a 87 y.o. female who presents for follow-up of No chief complaint on file.      HPI     Systolic CHF, NICM, Medtronic BiV AICD, HTN, HLD     Medtronic AICD check 3/9/21 - High SVC coil impedance - SVC coil turned off - otherwise good device function     Previously followed by Dr Batres  Previous history:  Here for follow-up of systolic heart failure and biventricular ICD interrogation.  She denies any worsening cardiopulmonary complaints since we last saw her.  She's had no issues with her device.  She is expressed no worsening cardiopulmonary complaints.  She denies any chest pain, shortness breath or palpitations.  She's not expressing PND, orthopnea or lower edema.  She denies any dizziness, presyncope or syncope.  Otherwise she's better usual state of health.  He says she has a daughter the moved into scan early onset dementia she's having to take care of.  She tries to stay active by going to interactions with a female group.  She is seen by bone and joint clinic and does get injections.  He wants clearance for rehabilitation.     Here for follow-up of systolic heart failure and previous biventricular ICD placement.  She had her device interrogated which shows no significant issues.  She had recent admission at the beginning of the month for mild volume overload.  She was diuresed overnight and discharged home the next day.  Her EF is stable.  She denies any PND, orthopnea or lower Veronica.  She has not experiencing dizziness, presyncope or syncope.  She on questioning says she is compliant with medicines and diet.          catheter 2011 reported nonobstructive CAD     Echo 7/3/22  · The left ventricle is moderately enlarged with eccentric hypertrophy and severely decreased systolic function.  · The estimated ejection fraction is 20%.  · Grade II left ventricular diastolic dysfunction.  · Normal right ventricular size with normal right ventricular systolic  function.  · Severe left atrial enlargement.  · There is mild aortic valve stenosis.  · Aortic valve area is 1.28 cm2; peak velocity is 1.85 m/s; mean gradient is 7 mmHg.  · Moderate mitral regurgitation.  · Mild tricuspid regurgitation.  · Normal central venous pressure (3 mmHg).  · The estimated PA systolic pressure is 19 mmHg.    Carotid ultrasound:  7-18  CONCLUSIONS   There is 40 - 49% right Internal Carotid stenosis.  There is 20 - 39% left Internal Carotid stenosis.        9/18/19 Denies recent CP or SOB  EKG A-sensed V-paced        6/22/20 Recently Dx with colon CA. Less SOB since discharge  Denies CP  Cleared for colon surgery at moderate cardiac risk  OK to hold plavix 7 days before  OV 3 months with Medtronic AICD check        10/2/20 Feels better since discharge. Stopped bidil - have her HA and made her feel bad - went back to coreg 25 bid   Continue Rx for CAD, HTN, CHF, HLD  OV 2 weeks with Medtronic AICD check     10/14/20 Breathing stable. Denies CP   Continue Rx for CHF, HTN, HLD, CAD  OV 3 months with BNP, BMP      11/3/20 Back early for worsening SOB. Denies CP  Unclear if she takes lasix as prescribed  Increase lasix 80 AM and 40 PM  OV 2 weeks with BNP, BMP  Needs to go to the ER for worsening symptoms        11/19/20 Less SOB. Denies CP     Continue Rx for CHF, HTN, HLD, CAD  OV 3 months with BNP, BMP      EKG 3/6/21 A-sensed V-paced  K 3.8, , Cr 2.6     3/8/21 patient reports she heard an alarm with the AICD and did not receive a shock  Denies CP. SOB improved  BP stable   Decrease lasix to 80 AM only with rising Cr  Medtronic AICD check tomorrow  Continue Rx for CHF, CAD, HLD, HTN     3/9/21 Denies CP or SOB   Continue Rx for CHF, CAD, HLD, HTN  OV 3 months      6/10/21 Less SOB since discharge. Trying to follow low sodium diet  Denies CP  BP in good range   Continue Rx for CHF, CAD, HLD, HTN  OV 1 month with BNP, BMP      7/14/21 Denies CP, mild EVANS about the same, denies LE edema -  takes lasix 80 qd     BNP up but clinic al volume status stable   Continue Rx for CHF, CAD, HLD, HTN  OV 3 month with BNP, BMP     8/12/21 had PND and increased SOB since last night - improved some after taking lasix 80 this AM. Denies CP or LE edema   Increase lasix 80 bid for 3 days due to CHF and volume overload then 80 qd  OV 2 weeks with BNP, BMP  Needs to go to the ER for worsening SOB     Continue Rx for CHF, CAD, HLD, HTN     12/1/21 Reports increased EVANS for the past few days - increased lasix 80 bid which helped  EKG A-sensed BiV paced   BNP, BMP today  Continue lasix 80 bid for now  OV with Medtronic AICD check       Continue Rx for CHF, CAD, HLD, HTN  Needs to go to the ER for worsening EVANS     Labs 12/3/21  K 4.2  Cr 1.7 up from 1.5  BNP 2319 down from 2563     12/8/21 EVANS improved. Denies CP. Still taking lasix 80 bid   BNP still elevated but symptomatically improved  OV 2 weeks with Medtronic AICD check     Continue Rx for CHF, CAD, HLD, HTN     Admitted 4/1/22  Ms. Malave is an 86 year old woman with history of chronic systolic/diastolic chf, AICD, coronary artery disease, hypertension and CKDIII who presented for evaluation of shortness of breath.  She states she felt fine when she went to bed last night, but around midnight she woke to use the bathroom and was more short of breath than normal at that time.  She notes eating white beans and rice with sausage at dinner, prior to going to sleep.  She denies chest pain, cough, fever, leg swelling, nausea, vomiting, diarrhea, headache, palpitations, AICD discharge, light headedness, dizziness, dysuria and hematuria.  She has been taking all her medications.  She reports taking lasix this morning with some improvement.  Vitals appear stable and she is in no distress.  She was given additional IV lasix in ER and continues to improve, but states she still doesn't quite feel her breathing is back to normal.  Cr 1.6, BNP 12026     4/11/22 EVANS has improved  since discharge. Takes lasix 80 qd with a 2nd dose prn  AICD was checked during last admission  Denies CP      Continue Rx for CHF, CAD, HLD, HTN  OV 3 months with BNP, BMP  Next Medtronic AICD check 10/2022        Labs 4/24/22  K 4.7  Cr 1.5  BNP 2468     5/12/22 Only taking lasix 80 qd - doesn't like 2nd dose because it keeps her up  Denies CP, mild EVANS  BP controlled   Add metolazone 2.5 MWF for volume overload  OV 2 weeks with BNP, BMP        Continue Rx for CHF, CAD, HLD, HTN    Admitted 7/2/22  Admitted to observation for acute on chronic systolic diastolic heart failure.  Patient improved after IV Lasix in ED.  Patient never required supplemental oxygen.  Again discuss low-salt diet, 1.2 L fluid intake and daily weights.  Repeat 2D echo showed EF of 20% similar to previous and grade 2 diastolic dysfunction.  Patient reports breathing back to baseline and ready for discharge.  Patient stable for discharge home with daughter.     Labs 7/4/22  K 3.9  Cr 1.5  BNP 2029 (7/2/22)    7/11/22 Feels better since discharge  LE edema resolved  Taking lasix 80 bid    Review of Systems   Constitutional: Negative for decreased appetite.   HENT: Negative for ear discharge.    Eyes: Negative for blurred vision.   Respiratory: Negative for hemoptysis.    Endocrine: Negative for polyphagia.   Hematologic/Lymphatic: Negative for adenopathy.   Skin: Negative for color change.   Musculoskeletal: Negative for joint swelling.   Genitourinary: Negative for bladder incontinence.   Neurological: Negative for brief paralysis.   Psychiatric/Behavioral: Negative for hallucinations.   Allergic/Immunologic: Negative for hives.        Objective:    Physical Exam  Constitutional:       Appearance: She is well-developed.   HENT:      Head: Normocephalic and atraumatic.   Eyes:      Conjunctiva/sclera: Conjunctivae normal.      Pupils: Pupils are equal, round, and reactive to light.   Neck:      Thyroid: No thyromegaly.   Cardiovascular:       Rate and Rhythm: Normal rate and regular rhythm.      Heart sounds: No murmur heard.  Pulmonary:      Effort: Pulmonary effort is normal. No respiratory distress.      Breath sounds: Normal breath sounds.   Abdominal:      General: Bowel sounds are normal.      Palpations: Abdomen is soft.   Musculoskeletal:      Cervical back: Normal range of motion and neck supple.      Right lower leg: Edema present.      Left lower leg: Edema present.   Skin:     General: Skin is warm and dry.   Neurological:      Mental Status: She is alert and oriented to person, place, and time.   Psychiatric:         Behavior: Behavior normal.           Assessment:       1. Biventricular AICD in place    2. Chronic combined systolic and diastolic heart failure    3. Coronary artery disease involving native coronary artery of native heart without angina pectoris    4. Acute on chronic systolic congestive heart failure    5. Essential hypertension    6. Mixed hyperlipidemia         Plan:       OV 1 month with BNP, BMP        Continue Rx for CHF, CAD, HLD, HTN

## 2022-08-16 ENCOUNTER — LAB VISIT (OUTPATIENT)
Dept: LAB | Facility: HOSPITAL | Age: 87
End: 2022-08-16
Attending: INTERNAL MEDICINE
Payer: MEDICARE

## 2022-08-16 ENCOUNTER — OFFICE VISIT (OUTPATIENT)
Dept: CARDIOLOGY | Facility: CLINIC | Age: 87
End: 2022-08-16
Payer: MEDICARE

## 2022-08-16 VITALS
OXYGEN SATURATION: 98 % | WEIGHT: 140.31 LBS | HEIGHT: 63 IN | HEART RATE: 69 BPM | SYSTOLIC BLOOD PRESSURE: 110 MMHG | BODY MASS INDEX: 24.86 KG/M2 | DIASTOLIC BLOOD PRESSURE: 40 MMHG

## 2022-08-16 DIAGNOSIS — Z95.810 ICD (IMPLANTABLE CARDIOVERTER-DEFIBRILLATOR), BIVENTRICULAR, IN SITU: Chronic | ICD-10-CM

## 2022-08-16 DIAGNOSIS — I20.89 OTHER FORMS OF ANGINA PECTORIS: ICD-10-CM

## 2022-08-16 DIAGNOSIS — I50.43 ACUTE ON CHRONIC COMBINED SYSTOLIC AND DIASTOLIC HEART FAILURE: ICD-10-CM

## 2022-08-16 DIAGNOSIS — I10 ESSENTIAL HYPERTENSION: Chronic | ICD-10-CM

## 2022-08-16 DIAGNOSIS — I50.43 ACUTE ON CHRONIC COMBINED SYSTOLIC AND DIASTOLIC HEART FAILURE: Primary | ICD-10-CM

## 2022-08-16 DIAGNOSIS — I50.23 ACUTE ON CHRONIC SYSTOLIC CONGESTIVE HEART FAILURE: ICD-10-CM

## 2022-08-16 LAB
ANION GAP SERPL CALC-SCNC: 11 MMOL/L (ref 8–16)
BNP SERPL-MCNC: 1509 PG/ML (ref 0–99)
BUN SERPL-MCNC: 62 MG/DL (ref 8–23)
CALCIUM SERPL-MCNC: 10 MG/DL (ref 8.7–10.5)
CHLORIDE SERPL-SCNC: 99 MMOL/L (ref 95–110)
CO2 SERPL-SCNC: 28 MMOL/L (ref 23–29)
CREAT SERPL-MCNC: 2.4 MG/DL (ref 0.5–1.4)
EST. GFR  (NO RACE VARIABLE): 19.1 ML/MIN/1.73 M^2
GLUCOSE SERPL-MCNC: 99 MG/DL (ref 70–110)
POTASSIUM SERPL-SCNC: 4.2 MMOL/L (ref 3.5–5.1)
SODIUM SERPL-SCNC: 138 MMOL/L (ref 136–145)

## 2022-08-16 PROCEDURE — 3288F PR FALLS RISK ASSESSMENT DOCUMENTED: ICD-10-PCS | Mod: CPTII,S$GLB,, | Performed by: INTERNAL MEDICINE

## 2022-08-16 PROCEDURE — 1159F MED LIST DOCD IN RCRD: CPT | Mod: CPTII,S$GLB,, | Performed by: INTERNAL MEDICINE

## 2022-08-16 PROCEDURE — 3288F FALL RISK ASSESSMENT DOCD: CPT | Mod: CPTII,S$GLB,, | Performed by: INTERNAL MEDICINE

## 2022-08-16 PROCEDURE — 36415 COLL VENOUS BLD VENIPUNCTURE: CPT | Mod: PO | Performed by: INTERNAL MEDICINE

## 2022-08-16 PROCEDURE — 99999 PR PBB SHADOW E&M-EST. PATIENT-LVL III: ICD-10-PCS | Mod: PBBFAC,,, | Performed by: INTERNAL MEDICINE

## 2022-08-16 PROCEDURE — 99999 PR PBB SHADOW E&M-EST. PATIENT-LVL III: CPT | Mod: PBBFAC,,, | Performed by: INTERNAL MEDICINE

## 2022-08-16 PROCEDURE — 99214 PR OFFICE/OUTPT VISIT, EST, LEVL IV, 30-39 MIN: ICD-10-PCS | Mod: S$GLB,,, | Performed by: INTERNAL MEDICINE

## 2022-08-16 PROCEDURE — 83880 ASSAY OF NATRIURETIC PEPTIDE: CPT | Performed by: INTERNAL MEDICINE

## 2022-08-16 PROCEDURE — 99214 OFFICE O/P EST MOD 30 MIN: CPT | Mod: S$GLB,,, | Performed by: INTERNAL MEDICINE

## 2022-08-16 PROCEDURE — 1126F AMNT PAIN NOTED NONE PRSNT: CPT | Mod: CPTII,S$GLB,, | Performed by: INTERNAL MEDICINE

## 2022-08-16 PROCEDURE — 80048 BASIC METABOLIC PNL TOTAL CA: CPT | Performed by: INTERNAL MEDICINE

## 2022-08-16 PROCEDURE — 1101F PT FALLS ASSESS-DOCD LE1/YR: CPT | Mod: CPTII,S$GLB,, | Performed by: INTERNAL MEDICINE

## 2022-08-16 PROCEDURE — 1101F PR PT FALLS ASSESS DOC 0-1 FALLS W/OUT INJ PAST YR: ICD-10-PCS | Mod: CPTII,S$GLB,, | Performed by: INTERNAL MEDICINE

## 2022-08-16 PROCEDURE — 1126F PR PAIN SEVERITY QUANTIFIED, NO PAIN PRESENT: ICD-10-PCS | Mod: CPTII,S$GLB,, | Performed by: INTERNAL MEDICINE

## 2022-08-16 PROCEDURE — 1159F PR MEDICATION LIST DOCUMENTED IN MEDICAL RECORD: ICD-10-PCS | Mod: CPTII,S$GLB,, | Performed by: INTERNAL MEDICINE

## 2022-08-16 NOTE — PROGRESS NOTES
Subjective:    Patient ID:  Palmira Malave is a 87 y.o. female who presents for follow-up of Follow-up      HPI        Systolic CHF, NICM, Medtronic BiV AICD, HTN, HLD     Medtronic AICD check 3/9/21 - High SVC coil impedance - SVC coil turned off - otherwise good device function     Previously followed by Dr Batres  Previous history:  Here for follow-up of systolic heart failure and biventricular ICD interrogation.  She denies any worsening cardiopulmonary complaints since we last saw her.  She's had no issues with her device.  She is expressed no worsening cardiopulmonary complaints.  She denies any chest pain, shortness breath or palpitations.  She's not expressing PND, orthopnea or lower edema.  She denies any dizziness, presyncope or syncope.  Otherwise she's better usual state of health.  He says she has a daughter the moved into scan early onset dementia she's having to take care of.  She tries to stay active by going to interactions with a female group.  She is seen by bone and joint clinic and does get injections.  He wants clearance for rehabilitation.     Here for follow-up of systolic heart failure and previous biventricular ICD placement.  She had her device interrogated which shows no significant issues.  She had recent admission at the beginning of the month for mild volume overload.  She was diuresed overnight and discharged home the next day.  Her EF is stable.  She denies any PND, orthopnea or lower Veronica.  She has not experiencing dizziness, presyncope or syncope.  She on questioning says she is compliant with medicines and diet.          catheter 2011 reported nonobstructive CAD     Echo 7/3/22  · The left ventricle is moderately enlarged with eccentric hypertrophy and severely decreased systolic function.  · The estimated ejection fraction is 20%.  · Grade II left ventricular diastolic dysfunction.  · Normal right ventricular size with normal right ventricular systolic function.  · Severe left  atrial enlargement.  · There is mild aortic valve stenosis.  · Aortic valve area is 1.28 cm2; peak velocity is 1.85 m/s; mean gradient is 7 mmHg.  · Moderate mitral regurgitation.  · Mild tricuspid regurgitation.  · Normal central venous pressure (3 mmHg).  · The estimated PA systolic pressure is 19 mmHg.     Carotid ultrasound:  7-18  CONCLUSIONS   There is 40 - 49% right Internal Carotid stenosis.  There is 20 - 39% left Internal Carotid stenosis.        9/18/19 Denies recent CP or SOB  EKG A-sensed V-paced        6/22/20 Recently Dx with colon CA. Less SOB since discharge  Denies CP  Cleared for colon surgery at moderate cardiac risk  OK to hold plavix 7 days before  OV 3 months with Medtronic AICD check        10/2/20 Feels better since discharge. Stopped bidil - have her HA and made her feel bad - went back to coreg 25 bid   Continue Rx for CAD, HTN, CHF, HLD  OV 2 weeks with Medtronic AICD check     10/14/20 Breathing stable. Denies CP   Continue Rx for CHF, HTN, HLD, CAD  OV 3 months with BNP, BMP      11/3/20 Back early for worsening SOB. Denies CP  Unclear if she takes lasix as prescribed  Increase lasix 80 AM and 40 PM  OV 2 weeks with BNP BMP  Needs to go to the ER for worsening symptoms        11/19/20 Less SOB. Denies CP     Continue Rx for CHF, HTN, HLD, CAD  OV 3 months with BNP, BMP      EKG 3/6/21 A-sensed V-paced  K 3.8, , Cr 2.6     3/8/21 patient reports she heard an alarm with the AICD and did not receive a shock  Denies CP. SOB improved  BP stable   Decrease lasix to 80 AM only with rising Cr  Medtronic AICD check tomorrow  Continue Rx for CHF, CAD, HLD, HTN     3/9/21 Denies CP or SOB   Continue Rx for CHF, CAD, HLD, HTN  OV 3 months      6/10/21 Less SOB since discharge. Trying to follow low sodium diet  Denies CP  BP in good range   Continue Rx for CHF, CAD, HLD, HTN  OV 1 month with BNP, BMP      7/14/21 Denies CP, mild EVANS about the same, denies LE edema - takes lasix 80 qd     BNP  up but clinic al volume status stable   Continue Rx for CHF, CAD, HLD, HTN  OV 3 month with BNP, BMP     8/12/21 had PND and increased SOB since last night - improved some after taking lasix 80 this AM. Denies CP or LE edema   Increase lasix 80 bid for 3 days due to CHF and volume overload then 80 qd  OV 2 weeks with BNP, BMP  Needs to go to the ER for worsening SOB     Continue Rx for CHF, CAD, HLD, HTN     12/1/21 Reports increased EVANS for the past few days - increased lasix 80 bid which helped  EKG A-sensed BiV paced   BNP, BMP today  Continue lasix 80 bid for now  OV with Medtronic AICD check       Continue Rx for CHF, CAD, HLD, HTN  Needs to go to the ER for worsening EVANS     Labs 12/3/21  K 4.2  Cr 1.7 up from 1.5  BNP 2319 down from 2563     12/8/21 EVANS improved. Denies CP. Still taking lasix 80 bid   BNP still elevated but symptomatically improved  OV 2 weeks with Medtronic AICD check     Continue Rx for CHF, CAD, HLD, HTN     Admitted 4/1/22  Ms. Malave is an 86 year old woman with history of chronic systolic/diastolic chf, AICD, coronary artery disease, hypertension and CKDIII who presented for evaluation of shortness of breath.  She states she felt fine when she went to bed last night, but around midnight she woke to use the bathroom and was more short of breath than normal at that time.  She notes eating white beans and rice with sausage at dinner, prior to going to sleep.  She denies chest pain, cough, fever, leg swelling, nausea, vomiting, diarrhea, headache, palpitations, AICD discharge, light headedness, dizziness, dysuria and hematuria.  She has been taking all her medications.  She reports taking lasix this morning with some improvement.  Vitals appear stable and she is in no distress.  She was given additional IV lasix in ER and continues to improve, but states she still doesn't quite feel her breathing is back to normal.  Cr 1.6, BNP 42764     4/11/22 EVANS has improved since discharge. Takes  lasix 80 qd with a 2nd dose prn  AICD was checked during last admission  Denies CP      Continue Rx for CHF, CAD, HLD, HTN  OV 3 months with BNP, BMP  Next Medtronic AICD check 10/2022        Labs 4/24/22  K 4.7  Cr 1.5  BNP 2468     5/12/22 Only taking lasix 80 qd - doesn't like 2nd dose because it keeps her up  Denies CP, mild EVANS  BP controlled   Add metolazone 2.5 MWF for volume overload  OV 2 weeks with BNP, BMP        Continue Rx for CHF, CAD, HLD, HTN     Admitted 7/2/22  Admitted to observation for acute on chronic systolic diastolic heart failure.  Patient improved after IV Lasix in ED.  Patient never required supplemental oxygen.  Again discuss low-salt diet, 1.2 L fluid intake and daily weights.  Repeat 2D echo showed EF of 20% similar to previous and grade 2 diastolic dysfunction.  Patient reports breathing back to baseline and ready for discharge.  Patient stable for discharge home with daughter.     Labs 7/4/22  K 3.9  Cr 1.5  BNP 2029 (7/2/22)     7/11/22 Feels better since discharge  LE edema resolved  Taking lasix 80 bid   OV 1 month with BNP, BMP        Continue Rx for CHF, CAD, HLD, HTN    8/16/22 Labs not done. SOB improved. Denies LE edema  EKG A-sensed V-paced  BP controlled    Review of Systems   Constitutional: Negative for decreased appetite.   HENT: Negative for ear discharge.    Eyes: Negative for blurred vision.   Respiratory: Negative for hemoptysis.    Endocrine: Negative for polyphagia.   Hematologic/Lymphatic: Negative for adenopathy.   Skin: Negative for color change.   Musculoskeletal: Negative for joint swelling.   Genitourinary: Negative for bladder incontinence.   Neurological: Negative for brief paralysis.   Psychiatric/Behavioral: Negative for hallucinations.   Allergic/Immunologic: Negative for hives.        Objective:    Physical Exam  Constitutional:       Appearance: She is well-developed.   HENT:      Head: Normocephalic and atraumatic.   Eyes:      Conjunctiva/sclera:  Conjunctivae normal.      Pupils: Pupils are equal, round, and reactive to light.   Neck:      Thyroid: No thyromegaly.   Cardiovascular:      Rate and Rhythm: Normal rate and regular rhythm.      Heart sounds: No murmur heard.  Pulmonary:      Effort: Pulmonary effort is normal. No respiratory distress.      Breath sounds: Normal breath sounds.   Abdominal:      General: Bowel sounds are normal.      Palpations: Abdomen is soft.   Musculoskeletal:      Cervical back: Normal range of motion and neck supple.   Skin:     General: Skin is warm and dry.   Neurological:      Mental Status: She is alert and oriented to person, place, and time.   Psychiatric:         Behavior: Behavior normal.           Assessment:       1. Acute on chronic combined systolic and diastolic heart failure    2. Biventricular AICD in place    3. Acute on chronic systolic congestive heart failure    4. Other forms of angina pectoris    5. Essential hypertension         Plan:       BNP, BMP today - if stable then OV 3 months with BNP, BMP          Continue Rx for CHF, CAD, HLD, HTN

## 2022-10-07 ENCOUNTER — HOSPITAL ENCOUNTER (EMERGENCY)
Facility: HOSPITAL | Age: 87
Discharge: HOME | End: 2022-10-07
Attending: EMERGENCY MEDICINE
Payer: MEDICARE

## 2022-10-07 ENCOUNTER — TELEPHONE (OUTPATIENT)
Dept: CARDIOLOGY | Facility: CLINIC | Age: 87
End: 2022-10-07
Payer: MEDICARE

## 2022-10-07 VITALS
DIASTOLIC BLOOD PRESSURE: 67 MMHG | TEMPERATURE: 98 F | HEIGHT: 63 IN | RESPIRATION RATE: 15 BRPM | WEIGHT: 140 LBS | BODY MASS INDEX: 24.8 KG/M2 | SYSTOLIC BLOOD PRESSURE: 125 MMHG | OXYGEN SATURATION: 97 % | HEART RATE: 77 BPM

## 2022-10-07 DIAGNOSIS — I50.43 ACUTE ON CHRONIC COMBINED SYSTOLIC AND DIASTOLIC CONGESTIVE HEART FAILURE: Primary | ICD-10-CM

## 2022-10-07 DIAGNOSIS — R06.02 SHORTNESS OF BREATH: ICD-10-CM

## 2022-10-07 LAB
ALBUMIN SERPL BCP-MCNC: 3.9 G/DL (ref 3.5–5.2)
ALP SERPL-CCNC: 95 U/L (ref 55–135)
ALT SERPL W/O P-5'-P-CCNC: 14 U/L (ref 10–44)
ANION GAP SERPL CALC-SCNC: 5 MMOL/L (ref 8–16)
ANISOCYTOSIS BLD QL SMEAR: SLIGHT
AST SERPL-CCNC: 21 U/L (ref 10–40)
BASOPHILS # BLD AUTO: 0.04 K/UL (ref 0–0.2)
BASOPHILS NFR BLD: 0.9 % (ref 0–1.9)
BILIRUB SERPL-MCNC: 0.9 MG/DL (ref 0.1–1)
BNP SERPL-MCNC: 2191 PG/ML (ref 0–99)
BUN SERPL-MCNC: 44 MG/DL (ref 8–23)
CALCIUM SERPL-MCNC: 9.7 MG/DL (ref 8.7–10.5)
CHLORIDE SERPL-SCNC: 103 MMOL/L (ref 95–110)
CO2 SERPL-SCNC: 26 MMOL/L (ref 23–29)
CREAT SERPL-MCNC: 1.7 MG/DL (ref 0.5–1.4)
DIFFERENTIAL METHOD: ABNORMAL
EOSINOPHIL # BLD AUTO: 0.1 K/UL (ref 0–0.5)
EOSINOPHIL NFR BLD: 2 % (ref 0–8)
ERYTHROCYTE [DISTWIDTH] IN BLOOD BY AUTOMATED COUNT: 15.7 % (ref 11.5–14.5)
EST. GFR  (NO RACE VARIABLE): 29 ML/MIN/1.73 M^2
GLUCOSE SERPL-MCNC: 93 MG/DL (ref 70–110)
HCT VFR BLD AUTO: 30 % (ref 37–48.5)
HGB BLD-MCNC: 10.2 G/DL (ref 12–16)
HYPOCHROMIA BLD QL SMEAR: ABNORMAL
IMM GRANULOCYTES # BLD AUTO: 0.01 K/UL (ref 0–0.04)
IMM GRANULOCYTES NFR BLD AUTO: 0.2 % (ref 0–0.5)
LYMPHOCYTES # BLD AUTO: 0.7 K/UL (ref 1–4.8)
LYMPHOCYTES NFR BLD: 15.4 % (ref 18–48)
MCH RBC QN AUTO: 32.1 PG (ref 27–31)
MCHC RBC AUTO-ENTMCNC: 34 G/DL (ref 32–36)
MCV RBC AUTO: 94 FL (ref 82–98)
MONOCYTES # BLD AUTO: 0.4 K/UL (ref 0.3–1)
MONOCYTES NFR BLD: 9.5 % (ref 4–15)
NEUTROPHILS # BLD AUTO: 3.3 K/UL (ref 1.8–7.7)
NEUTROPHILS NFR BLD: 72 % (ref 38–73)
NRBC BLD-RTO: 0 /100 WBC
OVALOCYTES BLD QL SMEAR: ABNORMAL
PLATELET # BLD AUTO: 137 K/UL (ref 150–450)
PMV BLD AUTO: 10.2 FL (ref 9.2–12.9)
POIKILOCYTOSIS BLD QL SMEAR: SLIGHT
POLYCHROMASIA BLD QL SMEAR: ABNORMAL
POTASSIUM SERPL-SCNC: 4.1 MMOL/L (ref 3.5–5.1)
PROT SERPL-MCNC: 7 G/DL (ref 6–8.4)
RBC # BLD AUTO: 3.18 M/UL (ref 4–5.4)
SODIUM SERPL-SCNC: 134 MMOL/L (ref 136–145)
TROPONIN I SERPL DL<=0.01 NG/ML-MCNC: 0.15 NG/ML (ref 0–0.03)
WBC # BLD AUTO: 4.55 K/UL (ref 3.9–12.7)

## 2022-10-07 PROCEDURE — 25000003 PHARM REV CODE 250: Performed by: EMERGENCY MEDICINE

## 2022-10-07 PROCEDURE — 83880 ASSAY OF NATRIURETIC PEPTIDE: CPT | Performed by: EMERGENCY MEDICINE

## 2022-10-07 PROCEDURE — 84484 ASSAY OF TROPONIN QUANT: CPT | Performed by: EMERGENCY MEDICINE

## 2022-10-07 PROCEDURE — 93010 ELECTROCARDIOGRAM REPORT: CPT | Mod: ,,, | Performed by: INTERNAL MEDICINE

## 2022-10-07 PROCEDURE — 93005 ELECTROCARDIOGRAM TRACING: CPT

## 2022-10-07 PROCEDURE — 63600175 PHARM REV CODE 636 W HCPCS: Performed by: EMERGENCY MEDICINE

## 2022-10-07 PROCEDURE — 96374 THER/PROPH/DIAG INJ IV PUSH: CPT

## 2022-10-07 PROCEDURE — 80053 COMPREHEN METABOLIC PANEL: CPT | Performed by: EMERGENCY MEDICINE

## 2022-10-07 PROCEDURE — 85025 COMPLETE CBC W/AUTO DIFF WBC: CPT | Performed by: EMERGENCY MEDICINE

## 2022-10-07 PROCEDURE — 93010 EKG 12-LEAD: ICD-10-PCS | Mod: ,,, | Performed by: INTERNAL MEDICINE

## 2022-10-07 PROCEDURE — 99285 EMERGENCY DEPT VISIT HI MDM: CPT | Mod: 25

## 2022-10-07 RX ORDER — ACETAMINOPHEN 500 MG
1000 TABLET ORAL
Status: COMPLETED | OUTPATIENT
Start: 2022-10-07 | End: 2022-10-07

## 2022-10-07 RX ORDER — FUROSEMIDE 80 MG/1
80 TABLET ORAL 2 TIMES DAILY
Qty: 180 TABLET | Refills: 3 | Status: SHIPPED | OUTPATIENT
Start: 2022-10-07 | End: 2022-10-09 | Stop reason: SDUPTHER

## 2022-10-07 RX ORDER — FUROSEMIDE 10 MG/ML
40 INJECTION INTRAMUSCULAR; INTRAVENOUS
Status: COMPLETED | OUTPATIENT
Start: 2022-10-07 | End: 2022-10-07

## 2022-10-07 RX ADMIN — ACETAMINOPHEN 1000 MG: 500 TABLET ORAL at 01:10

## 2022-10-07 RX ADMIN — FUROSEMIDE 40 MG: 10 INJECTION, SOLUTION INTRAMUSCULAR; INTRAVENOUS at 12:10

## 2022-10-07 NOTE — DISCHARGE INSTRUCTIONS
Please follow-up with Dr. Richmond to make an appointment for later next week.  Please resume taking 80 mg of furosemide in the morning and 80 mg of furosemide in the evening as per prior.  Return if you get worse or if new symptoms develop such as chest pain, shortness of breath, or weakness.

## 2022-10-07 NOTE — TELEPHONE ENCOUNTER
I informed pt that Dr. Richmond's first opening is next Thursday. I stated that if she feels the need to be seen today, then I recommend she go to the ER. Pt verbalized understanding and states she will go there today.

## 2022-10-07 NOTE — ED PROVIDER NOTES
Encounter Date: 10/7/2022    SCRIBE #1 NOTE: IShona, am scribing for, and in the presence of,  Erich Hickman Jr., MD. I have scribed the following portions of the note - Other sections scribed: HPI, ROS.     History     Chief Complaint   Patient presents with    Shortness of Breath     Pt c/o SOB starting last night. Pt denies any other symptoms. Able to speak complete sentences. RR 24. Sats 98% on RA      This 87 y.o female, with a medical history of Congestive heart failure, Chronic kidney disease (stage III), Coronary artery disease, Gout attack, Hypercholesteremia, Hypertension, and Renal disorder, presents to the ED accompanied by a relative c/o acute, constant shortness of breath that began after 9:00 am this morning. Pt reports that the symptoms are exacerbated when ambulating. She states that she was unable to sleep throughout the night last night, but notes that she felt fine while lying down. Pt reports that she has experienced similar symptoms in the past due to her congestive heart failure. She notes her cardiologist as Dr. Richmond. Pt's relative reports that Dr. Richmond had pt taking 2x tablets of Lasix per day, however, she visited her PCP, Dr. Ramon 2 weeks ago and was informed that the Lasix was affecting her kidneys. He notes that pt was subsequently instructed to only take 1x tablet of Lasix per day, which she has been doing. Pt notes taking her medications this morning. No recent diet changes. No history of heart attack, DVT or PE. Pt denies chest pain, leg swelling, cough, abdominal pain, emesis, fever, or chills. No other associated symptoms. No alleviating factors.    The history is provided by the patient and a relative.   Review of patient's allergies indicates:   Allergen Reactions    Aspirin Swelling     Swelling and rash    Colace [docusate sodium] Rash     itching    Docusate Other (See Comments) and Rash     itching    Sulfa (sulfonamide antibiotics) Swelling     Swelling  and rash  Swelling and rash    Iodine and iodide containing products Rash    Penicillins Rash     Past Medical History:   Diagnosis Date    Cataract     CHF (congestive heart failure)     CKD (chronic kidney disease), stage III     Coronary artery disease     Gout attack     Hypercholesteremia     Hypertension     Renal disorder     Vaginal delivery     x4     Past Surgical History:   Procedure Laterality Date    APPENDECTOMY      CARDIAC DEFIBRILLATOR PLACEMENT      2015    CATARACT EXTRACTION W/  INTRAOCULAR LENS IMPLANT Left 02/07/2019    Dr. Velazco    CATARACT EXTRACTION W/  INTRAOCULAR LENS IMPLANT Right 02/21/2019    Dr. Velazco    CHOLECYSTECTOMY      COLONOSCOPY W/ POLYPECTOMY  10 or 11/ 2014    per Dr. Myrick    defribillator Left 8/2008    EYE SURGERY      HYSTERECTOMY      INTRAOCULAR PROSTHESES INSERTION Left 2/7/2019    Procedure: INSERTION, IOL PROSTHESIS;  Surgeon: Demetrius Velazco MD;  Location: [facility] OR;  Service: Ophthalmology;  Laterality: Left;  RN Phone Pre OP 1-30-19.  Arrival 05:30 AM    INTRAOCULAR PROSTHESES INSERTION Right 2/21/2019    Procedure: INSERTION, IOL PROSTHESIS;  Surgeon: Demetrius Velazco MD;  Location: [facility] OR;  Service: Ophthalmology;  Laterality: Right;    JOINT REPLACEMENT Bilateral 2005 & 2006    2 Knee Replacements=Lt. 1= 2005. Rt. 1= 2006    OOPHORECTOMY      PHACOEMULSIFICATION OF CATARACT Left 2/7/2019    Procedure: PHACOEMULSIFICATION, CATARACT;  Surgeon: Demetrius Velazco MD;  Location: [facility] OR;  Service: Ophthalmology;  Laterality: Left;    PHACOEMULSIFICATION OF CATARACT Right 2/21/2019    Procedure: PHACOEMULSIFICATION, CATARACT;  Surgeon: Demetrius Velazco MD;  Location: [facility] OR;  Service: Ophthalmology;  Laterality: Right;  RN Phone Pre op 2-15-19.  Arrival 05:30 AM    TOTAL KNEE ARTHROPLASTY Bilateral Lt.= 2005; Rt.=2006    Bilateral Knee scope     Family History   Problem Relation Age of Onset    Heart attack Mother 88    Hypertension  Mother     Hyperlipidemia Mother     Diabetes Other     Hypertension Other     Amblyopia Son     Blindness Neg Hx     Cataracts Neg Hx     Glaucoma Neg Hx     Macular degeneration Neg Hx     Retinal detachment Neg Hx     Strabismus Neg Hx      Social History     Tobacco Use    Smoking status: Never    Smokeless tobacco: Never   Substance Use Topics    Alcohol use: Yes     Comment: rarely    Drug use: No     Review of Systems   Constitutional:  Negative for chills and fever.   HENT:  Negative for sore throat.    Eyes:  Negative for visual disturbance.   Respiratory:  Positive for shortness of breath.    Cardiovascular:  Negative for chest pain and leg swelling.   Gastrointestinal:  Negative for abdominal pain, nausea and vomiting.   Genitourinary:  Negative for dysuria.   Musculoskeletal:  Negative for back pain.   Skin:  Negative for rash.   Neurological:  Negative for weakness.     Physical Exam     Initial Vitals [10/07/22 1121]   BP Pulse Resp Temp SpO2   120/65 86 (!) 24 97.9 °F (36.6 °C) 98 %      MAP       --         Physical Exam    Nursing note and vitals reviewed.  Constitutional: She appears well-developed and well-nourished. She is not diaphoretic. No distress.   HENT:   Head: Normocephalic and atraumatic.   Right Ear: External ear normal.   Left Ear: External ear normal.   Nose: Nose normal.   Mouth/Throat: Oropharynx is clear and moist.   Eyes: Conjunctivae and EOM are normal. Pupils are equal, round, and reactive to light. Right eye exhibits no discharge. Left eye exhibits no discharge. No scleral icterus.   Neck: Neck supple.   Normal range of motion.  Cardiovascular:  Normal rate, regular rhythm, normal heart sounds and intact distal pulses.           No murmur heard.  Pulmonary/Chest: No respiratory distress. She has no wheezes. She has no rhonchi. She has rales.   The patient does not appear to be in significant respiratory distress.  There are no wheezes on pulmonary exam.  There are bibasilar  faint rales auscultated more prominently on expiration.  Lung sounds are symmetric bilaterally.   Abdominal: Abdomen is soft. Bowel sounds are normal. She exhibits no distension and no mass. There is no abdominal tenderness. There is no rebound and no guarding.   Musculoskeletal:         General: No tenderness or edema. Normal range of motion.      Cervical back: Normal range of motion and neck supple.      Comments: Lower extremities appear symmetric and nonedematous.     Neurological: She is alert and oriented to person, place, and time. She has normal strength. No cranial nerve deficit or sensory deficit.   Skin: Skin is warm and dry. No rash noted. No erythema. No pallor.   Psychiatric: She has a normal mood and affect. Her behavior is normal. Judgment and thought content normal.       ED Course   Procedures  Labs Reviewed   CBC W/ AUTO DIFFERENTIAL - Abnormal; Notable for the following components:       Result Value    RBC 3.18 (*)     Hemoglobin 10.2 (*)     Hematocrit 30.0 (*)     MCH 32.1 (*)     RDW 15.7 (*)     Platelets 137 (*)     Lymph # 0.7 (*)     Lymph % 15.4 (*)     All other components within normal limits   COMPREHENSIVE METABOLIC PANEL - Abnormal; Notable for the following components:    Sodium 134 (*)     BUN 44 (*)     Creatinine 1.7 (*)     Anion Gap 5 (*)     eGFR 29 (*)     All other components within normal limits   TROPONIN I - Abnormal; Notable for the following components:    Troponin I 0.155 (*)     All other components within normal limits   B-TYPE NATRIURETIC PEPTIDE - Abnormal; Notable for the following components:    BNP 2,191 (*)     All other components within normal limits     EKG Readings: (Independently Interpreted)   Initial Reading: No STEMI. Ectopy: No Ectopy.   EKG reviewed and interpreted by me shows the paced rhythm at a rate of 80.  Right axis deviation.  QRS and QTC prolongation due to pacing, no convincing evidence of acute ischemia.  There are PVCs.     ECG Results               EKG 12-lead (Final result)  Result time 10/07/22 14:08:14      Final result by Interface, Lab In [facility] (10/07/22 14:08:14)                   Narrative:    Test Reason : R06.02,    Vent. Rate : 080 BPM     Atrial Rate : 080 BPM     P-R Int : 160 ms          QRS Dur : 184 ms      QT Int : 502 ms       P-R-T Axes : 071 237 061 degrees     QTc Int : 578 ms    AS-BiV paced, occ PVCs  Abnormal ECG  When compared with ECG of 16-AUG-2022 10:59,  Significant changes have occurred  Confirmed by Parviz Myers MD ([id number]) on 10/7/2022 2:08:07 PM    Referred By: [email or web address]   SELF           Confirmed By:Parviz Myers MD                                  Imaging Results              X-Ray Chest AP Portable (Final result)  Result time 10/07/22 12:12:11      Final result by Tomas Godinez III, MD (10/07/22 12:12:11)                   Impression:      Cardiomegaly.      Electronically signed by: Tomas Godinez MD  Date:    10/07/2022  Time:    12:12               Narrative:    EXAMINATION:  XR CHEST AP PORTABLE    CLINICAL HISTORY:  Shortness of breath    FINDINGS:  Chest one view AP portable.    There is a pacer.  There is cardiomegaly.  There is aortic plaque and DJD.  Lungs are clear.                                    X-Rays:   Independently Interpreted Readings:   Other Readings:  Chest x-ray reveals cardiomegaly.  Pacemaker wires in place.  No definite pulmonary edema.  No new infiltrate or consolidation.  No pleural effusion or pneumothorax.  Medications   furosemide injection 40 mg (40 mg Intravenous Given 10/7/22 1212)   acetaminophen tablet 1,000 mg (1,000 mg Oral Given 10/7/22 1314)     Medical Decision Making:   ED Management:  This is the emergent evaluation of an 87-year-old female presents emergency department for evaluation of shortness of breath started this morning.  Differential diagnosis at the time of initial evaluation included, but was not limited to:  CHF exacerbation, pneumonia, viral  illness.  I considered but doubt pulmonary embolus.  The patient also has no chest pain.  Do not suspect anginal equivalent.  I reviewed the patient's chart.  Patient has history of heart failure and is followed by Dr. Richmond.  Her PCP recently reduced her   furosemide from 80 mg twice daily to 80 mg once daily due to concerns about kidney injury.  Patient states this was approximately 2 weeks ago.  She denies fever.  No cough.  Patient denies orthopnea.  Symptoms worse with exertion.    I reviewed the patient's chart.  Patient has been admitted volume overload in the past.  She has a history of combined systolic and diastolic heart failure with ejection fraction of 20% and grade II diastolic dysfunction on most recent echocardiogram from July of 2022.  She is followed by Dr. Richmond.  Patient has a biventricular pacemaker and AICD according to chart review.  Today, chest x-ray reveals cardiomegaly without definite pulmonary edema.    Patient is feeling much better after 40 mg of IV Lasix.  She also take 80 mg of Lasix by mouth this morning.  She is putting out urine.  She appears to be stable.  She is comfortable.  She is ambulating without significant dyspnea or any weakness.  I discussed the case with Dr. Myers who was on-call for Cardiology.  Recommends restarting 80 mg of Lasix by mouth twice daily and following up with Dr. Richmond next week for re-evaluation.  Patient is amenable to this plan.  We discussed the difficulty of your volume optimization in the setting of significant systolic and diastolic heart failure and chronic kidney disease.  Advised return for any new or worsening symptoms.        Scribe Attestation:   Scribe #1: I performed the above scribed service and the documentation accurately describes the services I performed. I attest to the accuracy of the note.                   Clinical Impression:   Final diagnoses:  [R06.02] Shortness of breath  [I50.43] Acute on chronic combined systolic and  diastolic congestive heart failure (Primary)      ED Disposition Condition    Discharge Stable          ED Prescriptions       Medication Sig Dispense Start Date End Date Auth. Provider    furosemide (LASIX) 80 MG tablet Take 1 tablet (80 mg total) by mouth 2 (two) times a day. 180 tablet 10/7/2022 -- Erich Hickman Jr., MD          Follow-up Information       Follow up With Specialties Details Why Contact Info    Kelechi Richmond MD Cardiology Schedule an appointment as soon as possible for a visit in 1 week  [address]  [address]  [place] [address]  [phone]              I, Erich Hickman MD, personally performed the services described in this documentation. All medical record entries made by the scribe were at my direction and in my presence. I have reviewed the chart and agree that the record reflects my personal performance and is accurate and complete.      Erich Hickman Jr., MD  10/07/22 4968

## 2022-10-07 NOTE — TELEPHONE ENCOUNTER
----- Message from Adilia Posada sent at 10/7/2022 10:02 AM CDT -----  .Type: Patient Call Back    Who called:self    What is the request in detail: pt is experiencing shortness of breath and would like to get a same day appt. Please call    Can the clinic reply by [facility]?    Would the patient rather a call back or a response via [facility]? call    Best call back number:.[phone] (home) [phone] (work)

## 2022-10-07 NOTE — ED TRIAGE NOTES
Pt presents to the ED with complaints of SOB that started last night (with dizziness) and has worsen this morning    Pt states her dizziness has resolved  Pt denies any Chest Pain, HA, N/V/D, LOC   Pt AAOX4

## 2022-10-08 PROCEDURE — 87804 INFLUENZA ASSAY W/OPTIC: CPT

## 2022-10-08 PROCEDURE — 99285 EMERGENCY DEPT VISIT HI MDM: CPT | Mod: 25

## 2022-10-09 ENCOUNTER — HOSPITAL ENCOUNTER (OUTPATIENT)
Facility: HOSPITAL | Age: 87
Discharge: HOME | End: 2022-10-09
Attending: EMERGENCY MEDICINE | Admitting: EMERGENCY MEDICINE
Payer: MEDICARE

## 2022-10-09 VITALS
BODY MASS INDEX: 26.58 KG/M2 | HEART RATE: 75 BPM | WEIGHT: 150 LBS | TEMPERATURE: 98 F | RESPIRATION RATE: 29 BRPM | DIASTOLIC BLOOD PRESSURE: 51 MMHG | OXYGEN SATURATION: 96 % | SYSTOLIC BLOOD PRESSURE: 101 MMHG | HEIGHT: 63 IN

## 2022-10-09 DIAGNOSIS — R06.02 SOB (SHORTNESS OF BREATH): ICD-10-CM

## 2022-10-09 DIAGNOSIS — G89.29 CHRONIC PAIN: ICD-10-CM

## 2022-10-09 DIAGNOSIS — R06.02 SHORTNESS OF BREATH: Primary | ICD-10-CM

## 2022-10-09 DIAGNOSIS — I50.9 ACUTE EXACERBATION OF CHF (CONGESTIVE HEART FAILURE): ICD-10-CM

## 2022-10-09 DIAGNOSIS — Z71.89 GOALS OF CARE, COUNSELING/DISCUSSION: ICD-10-CM

## 2022-10-09 DIAGNOSIS — I50.9 ACUTE ON CHRONIC CONGESTIVE HEART FAILURE, UNSPECIFIED HEART FAILURE TYPE: ICD-10-CM

## 2022-10-09 LAB
ALBUMIN SERPL BCP-MCNC: 4 G/DL (ref 3.5–5.2)
ALP SERPL-CCNC: 107 U/L (ref 55–135)
ALT SERPL W/O P-5'-P-CCNC: 14 U/L (ref 10–44)
ANION GAP SERPL CALC-SCNC: 11 MMOL/L (ref 8–16)
AST SERPL-CCNC: 25 U/L (ref 10–40)
BASOPHILS # BLD AUTO: 0.04 K/UL (ref 0–0.2)
BASOPHILS NFR BLD: 0.6 % (ref 0–1.9)
BILIRUB SERPL-MCNC: 0.6 MG/DL (ref 0.1–1)
BNP SERPL-MCNC: 2347 PG/ML (ref 0–99)
BUN SERPL-MCNC: 52 MG/DL (ref 8–23)
CALCIUM SERPL-MCNC: 9.9 MG/DL (ref 8.7–10.5)
CHLORIDE SERPL-SCNC: 101 MMOL/L (ref 95–110)
CO2 SERPL-SCNC: 22 MMOL/L (ref 23–29)
CREAT SERPL-MCNC: 2 MG/DL (ref 0.5–1.4)
CTP QC/QA: YES
CTP QC/QA: YES
DIFFERENTIAL METHOD: ABNORMAL
EOSINOPHIL # BLD AUTO: 0.1 K/UL (ref 0–0.5)
EOSINOPHIL NFR BLD: 1.9 % (ref 0–8)
ERYTHROCYTE [DISTWIDTH] IN BLOOD BY AUTOMATED COUNT: 15.5 % (ref 11.5–14.5)
EST. GFR  (NO RACE VARIABLE): 24 ML/MIN/1.73 M^2
ESTIMATED AVG GLUCOSE: 97 MG/DL (ref 68–131)
GLUCOSE SERPL-MCNC: 101 MG/DL (ref 70–110)
HBA1C MFR BLD: 5 % (ref 4–5.6)
HCT VFR BLD AUTO: 31.2 % (ref 37–48.5)
HGB BLD-MCNC: 10.4 G/DL (ref 12–16)
IMM GRANULOCYTES # BLD AUTO: 0.01 K/UL (ref 0–0.04)
IMM GRANULOCYTES NFR BLD AUTO: 0.2 % (ref 0–0.5)
LYMPHOCYTES # BLD AUTO: 1.2 K/UL (ref 1–4.8)
LYMPHOCYTES NFR BLD: 18.7 % (ref 18–48)
MAGNESIUM SERPL-MCNC: 2.5 MG/DL (ref 1.6–2.6)
MCH RBC QN AUTO: 31.4 PG (ref 27–31)
MCHC RBC AUTO-ENTMCNC: 33.3 G/DL (ref 32–36)
MCV RBC AUTO: 94 FL (ref 82–98)
MONOCYTES # BLD AUTO: 0.7 K/UL (ref 0.3–1)
MONOCYTES NFR BLD: 10.6 % (ref 4–15)
NEUTROPHILS # BLD AUTO: 4.2 K/UL (ref 1.8–7.7)
NEUTROPHILS NFR BLD: 68 % (ref 38–73)
NRBC BLD-RTO: 0 /100 WBC
PLATELET # BLD AUTO: 133 K/UL (ref 150–450)
PMV BLD AUTO: 10.5 FL (ref 9.2–12.9)
POC MOLECULAR INFLUENZA A AGN: NEGATIVE
POC MOLECULAR INFLUENZA B AGN: NEGATIVE
POCT GLUCOSE: 90 MG/DL (ref 70–110)
POTASSIUM SERPL-SCNC: 4.3 MMOL/L (ref 3.5–5.1)
PROT SERPL-MCNC: 7.1 G/DL (ref 6–8.4)
RBC # BLD AUTO: 3.31 M/UL (ref 4–5.4)
SARS-COV-2 RDRP RESP QL NAA+PROBE: NEGATIVE
SODIUM SERPL-SCNC: 134 MMOL/L (ref 136–145)
TROPONIN I SERPL DL<=0.01 NG/ML-MCNC: 0.18 NG/ML (ref 0–0.03)
WBC # BLD AUTO: 6.21 K/UL (ref 3.9–12.7)

## 2022-10-09 PROCEDURE — 87635 SARS-COV-2 COVID-19 AMP PRB: CPT | Performed by: EMERGENCY MEDICINE

## 2022-10-09 PROCEDURE — 63600175 PHARM REV CODE 636 W HCPCS: Performed by: NURSE PRACTITIONER

## 2022-10-09 PROCEDURE — G0378 HOSPITAL OBSERVATION PER HR: HCPCS

## 2022-10-09 PROCEDURE — 84484 ASSAY OF TROPONIN QUANT: CPT | Performed by: EMERGENCY MEDICINE

## 2022-10-09 PROCEDURE — 80053 COMPREHEN METABOLIC PANEL: CPT | Performed by: EMERGENCY MEDICINE

## 2022-10-09 PROCEDURE — 85025 COMPLETE CBC W/AUTO DIFF WBC: CPT | Performed by: EMERGENCY MEDICINE

## 2022-10-09 PROCEDURE — 83735 ASSAY OF MAGNESIUM: CPT | Performed by: PHYSICIAN ASSISTANT

## 2022-10-09 PROCEDURE — 96376 TX/PRO/DX INJ SAME DRUG ADON: CPT

## 2022-10-09 PROCEDURE — 83036 HEMOGLOBIN GLYCOSYLATED A1C: CPT | Performed by: PHYSICIAN ASSISTANT

## 2022-10-09 PROCEDURE — 63600175 PHARM REV CODE 636 W HCPCS: Performed by: PHYSICIAN ASSISTANT

## 2022-10-09 PROCEDURE — 63600175 PHARM REV CODE 636 W HCPCS: Performed by: EMERGENCY MEDICINE

## 2022-10-09 PROCEDURE — 93005 ELECTROCARDIOGRAM TRACING: CPT

## 2022-10-09 PROCEDURE — 83880 ASSAY OF NATRIURETIC PEPTIDE: CPT | Performed by: EMERGENCY MEDICINE

## 2022-10-09 PROCEDURE — 25000003 PHARM REV CODE 250: Performed by: EMERGENCY MEDICINE

## 2022-10-09 PROCEDURE — 25000003 PHARM REV CODE 250: Performed by: NURSE PRACTITIONER

## 2022-10-09 PROCEDURE — 82962 GLUCOSE BLOOD TEST: CPT

## 2022-10-09 PROCEDURE — 93010 ELECTROCARDIOGRAM REPORT: CPT | Mod: ,,, | Performed by: INTERNAL MEDICINE

## 2022-10-09 PROCEDURE — 96374 THER/PROPH/DIAG INJ IV PUSH: CPT

## 2022-10-09 PROCEDURE — 93010 EKG 12-LEAD: ICD-10-PCS | Mod: ,,, | Performed by: INTERNAL MEDICINE

## 2022-10-09 PROCEDURE — 96372 THER/PROPH/DIAG INJ SC/IM: CPT | Performed by: PHYSICIAN ASSISTANT

## 2022-10-09 PROCEDURE — 87502 INFLUENZA DNA AMP PROBE: CPT

## 2022-10-09 RX ORDER — SODIUM CHLORIDE 0.9 % (FLUSH) 0.9 %
10 SYRINGE (ML) INJECTION
Status: DISCONTINUED | OUTPATIENT
Start: 2022-10-09 | End: 2022-10-09 | Stop reason: HOSPADM

## 2022-10-09 RX ORDER — ATORVASTATIN CALCIUM 10 MG/1
10 TABLET, FILM COATED ORAL DAILY
Status: DISCONTINUED | OUTPATIENT
Start: 2022-10-09 | End: 2022-10-09 | Stop reason: HOSPADM

## 2022-10-09 RX ORDER — CLOPIDOGREL BISULFATE 75 MG/1
75 TABLET ORAL DAILY
Status: DISCONTINUED | OUTPATIENT
Start: 2022-10-09 | End: 2022-10-09 | Stop reason: HOSPADM

## 2022-10-09 RX ORDER — ACETAMINOPHEN 500 MG
1000 TABLET ORAL
Status: COMPLETED | OUTPATIENT
Start: 2022-10-09 | End: 2022-10-09

## 2022-10-09 RX ORDER — PANTOPRAZOLE SODIUM 40 MG/1
40 TABLET, DELAYED RELEASE ORAL DAILY
Status: DISCONTINUED | OUTPATIENT
Start: 2022-10-09 | End: 2022-10-09 | Stop reason: HOSPADM

## 2022-10-09 RX ORDER — FUROSEMIDE 10 MG/ML
80 INJECTION INTRAMUSCULAR; INTRAVENOUS
Status: COMPLETED | OUTPATIENT
Start: 2022-10-09 | End: 2022-10-09

## 2022-10-09 RX ORDER — FUROSEMIDE 80 MG/1
80 TABLET ORAL DAILY
Qty: 180 TABLET | Refills: 3
Start: 2022-10-09 | End: 2022-10-28

## 2022-10-09 RX ORDER — ISOSORBIDE DINITRATE AND HYDRALAZINE HYDROCHLORIDE 37.5; 2 MG/1; MG/1
1 TABLET ORAL DAILY
Qty: 60 TABLET | Refills: 6 | Status: ON HOLD | OUTPATIENT
Start: 2022-10-09 | End: 2022-12-03 | Stop reason: SDUPTHER

## 2022-10-09 RX ORDER — GABAPENTIN 300 MG/1
300 CAPSULE ORAL 2 TIMES DAILY
Status: DISCONTINUED | OUTPATIENT
Start: 2022-10-09 | End: 2022-10-09 | Stop reason: HOSPADM

## 2022-10-09 RX ORDER — FUROSEMIDE 10 MG/ML
60 INJECTION INTRAMUSCULAR; INTRAVENOUS 2 TIMES DAILY
Status: DISCONTINUED | OUTPATIENT
Start: 2022-10-09 | End: 2022-10-09 | Stop reason: HOSPADM

## 2022-10-09 RX ORDER — HEPARIN SODIUM 5000 [USP'U]/ML
5000 INJECTION, SOLUTION INTRAVENOUS; SUBCUTANEOUS EVERY 8 HOURS
Status: DISCONTINUED | OUTPATIENT
Start: 2022-10-09 | End: 2022-10-09 | Stop reason: HOSPADM

## 2022-10-09 RX ADMIN — HEPARIN SODIUM 5000 UNITS: 5000 INJECTION, SOLUTION INTRAVENOUS; SUBCUTANEOUS at 06:10

## 2022-10-09 RX ADMIN — ACETAMINOPHEN 1000 MG: 500 TABLET ORAL at 04:10

## 2022-10-09 RX ADMIN — PANTOPRAZOLE SODIUM 40 MG: 40 TABLET, DELAYED RELEASE ORAL at 11:10

## 2022-10-09 RX ADMIN — HEPARIN SODIUM 5000 UNITS: 5000 INJECTION, SOLUTION INTRAVENOUS; SUBCUTANEOUS at 01:10

## 2022-10-09 RX ADMIN — CLOPIDOGREL 75 MG: 75 TABLET, FILM COATED ORAL at 11:10

## 2022-10-09 RX ADMIN — ATORVASTATIN CALCIUM 10 MG: 10 TABLET, FILM COATED ORAL at 11:10

## 2022-10-09 RX ADMIN — FUROSEMIDE 60 MG: 10 INJECTION, SOLUTION INTRAMUSCULAR; INTRAVENOUS at 09:10

## 2022-10-09 RX ADMIN — GABAPENTIN 300 MG: 300 CAPSULE ORAL at 11:10

## 2022-10-09 RX ADMIN — FUROSEMIDE 80 MG: 10 INJECTION, SOLUTION INTRAMUSCULAR; INTRAVENOUS at 02:10

## 2022-10-09 NOTE — H&P
[facility]  [facility] Medicine  History & Physical    Patient Name: Palmira Malave  MRN: [id number]  Patient Class: OP- Observation  Admission Date: 10/9/2022  Attending Physician: Raciel Mosley MD   Primary Care Provider: Qi Ramon MD         Patient information was obtained from patient and ER records.     Subjective:     Principal Problem:Acute on chronic systolic congestive heart failure    Chief Complaint:   Chief Complaint   Patient presents with    Shortness of Breath     Pt chief complaint is shortness of breath. Pt states has a history of heart failure. Pt states was having sob symptoms when attempting to go to bed. Pt took lasix about an hour PTA.          HPI: Palmira Malave is a 85 yo female with significant history combined systolic diastolic heart failure EF 20%, AICD, hypertension, CKD stage 4, HLD, GERD, and gout who presents to hospital for acute shortness at breath while reading newspaper last night. Took additional 80 mg lasix without relief. Patient was in ED on 10/7 for similar symptoms. Denies chest pain, abdominal pain, nausea, and vomiting. Recent seen by PCP and reduced lasix 80 bid to daily then went to ED on 10/7 told to go back on lasix 80 mg bid.     Elevated BNP and troponin similar to previous labs. EKG v pacing RBBB    7/2/2022 2 D echo EF 20%, grade II DD, severe LAE, mild AS.       Past Medical History:   Diagnosis Date    Cataract     CHF (congestive heart failure)     CKD (chronic kidney disease), stage III     Coronary artery disease     Gout attack     Hypercholesteremia     Hypertension     Renal disorder     Vaginal delivery     x4       Past Surgical History:   Procedure Laterality Date    APPENDECTOMY      CARDIAC DEFIBRILLATOR PLACEMENT      2015    CATARACT EXTRACTION W/  INTRAOCULAR LENS IMPLANT Left 02/07/2019    Dr. Velazco    CATARACT EXTRACTION W/  INTRAOCULAR LENS IMPLANT Right 02/21/2019    Dr. Velazco     CHOLECYSTECTOMY      COLONOSCOPY W/ POLYPECTOMY  10 or 11/ 2014    per Dr. Elen ceja Left 8/2008    EYE SURGERY      HYSTERECTOMY      INTRAOCULAR PROSTHESES INSERTION Left 2/7/2019    Procedure: INSERTION, IOL PROSTHESIS;  Surgeon: Demetrius Velazco MD;  Location: [facility] OR;  Service: Ophthalmology;  Laterality: Left;  RN Phone Pre OP 1-30-19.  Arrival 05:30 AM    INTRAOCULAR PROSTHESES INSERTION Right 2/21/2019    Procedure: INSERTION, IOL PROSTHESIS;  Surgeon: Demetrius Velazco MD;  Location: [facility] OR;  Service: Ophthalmology;  Laterality: Right;    JOINT REPLACEMENT Bilateral 2005 & 2006    2 Knee Replacements=Lt. 1= 2005. Rt. 1= 2006    OOPHORECTOMY      PHACOEMULSIFICATION OF CATARACT Left 2/7/2019    Procedure: PHACOEMULSIFICATION, CATARACT;  Surgeon: Demetrius Velazco MD;  Location: [facility] OR;  Service: Ophthalmology;  Laterality: Left;    PHACOEMULSIFICATION OF CATARACT Right 2/21/2019    Procedure: PHACOEMULSIFICATION, CATARACT;  Surgeon: Demetrius Velazco MD;  Location: [facility] OR;  Service: Ophthalmology;  Laterality: Right;  RN Phone Pre op 2-15-19.  Arrival 05:30 AM    TOTAL KNEE ARTHROPLASTY Bilateral Lt.= 2005; Rt.=2006    Bilateral Knee scope       Review of patient's allergies indicates:   Allergen Reactions    Aspirin Swelling     Swelling and rash    Colace [docusate sodium] Rash     itching    Docusate Other (See Comments) and Rash     itching    Sulfa (sulfonamide antibiotics) Swelling     Swelling and rash  Swelling and rash    Iodine and iodide containing products Rash    Penicillins Rash       Current Facility-Administered Medications on File Prior to Encounter   Medication    0.9%  NaCl infusion    phenylephrine HCL 2.5% ophthalmic solution 1 drop    proparacaine 0.5 % ophthalmic solution 1 drop    tropicamide 1% ophthalmic solution 1 drop     Current Outpatient Medications on File Prior to Encounter   Medication Sig    acetaminophen (TYLENOL)  500 MG tablet Take 1 tablet (500 mg total) by mouth every 6 (six) hours as needed for Pain.    allopurinol (ZYLOPRIM) 100 MG tablet Take 100 mg by mouth every evening.     carvediloL (COREG) 6.25 MG tablet Take 1 tablet (6.25 mg total) by mouth 2 (two) times daily. Do not take if the top blood pressure number is less than 100 or your heart rate is less than 60    clopidogrel (PLAVIX) 75 mg tablet Take 75 mg by mouth once daily. On hold since 11-28-14, for procedure.    gabapentin (NEURONTIN) 300 MG capsule Take 300 mg by mouth 2 (two) times daily.    lovastatin (MEVACOR) 40 MG tablet Take 40 mg by mouth nightly. Takes 2 caps with supper every evening.    mirabegron (MYRBETRIQ) 50 mg Tb24 Take 50 mg by mouth once daily.     pantoprazole (PROTONIX) 40 MG tablet Take 40 mg by mouth once daily.    [DISCONTINUED] furosemide (LASIX) 80 MG tablet Take 1 tablet (80 mg total) by mouth 2 (two) times a day.    [DISCONTINUED] isosorbide-hydrALAZINE 20-37.5 mg (BIDIL) 20-37.5 mg Tab Take 1 tablet by mouth once daily. Do not take if the top blood pressure number is less than 100    [DISCONTINUED] metOLazone (ZAROXOLYN) 2.5 MG tablet Take 1 tablet (2.5 mg total) by mouth 3 (three) times a week.    cranberry 400 mg Cap Take 1 capsule by mouth 2 (two) times daily.    fish oil-omega-3 fatty acids 300-1,000 mg capsule Take 2 g by mouth once daily. 2 caps every AM & 1 cap every PM.    folic acid/multivit-min/lutein (CENTRUM SILVER ORAL) Take by mouth once daily.    [DISCONTINUED] terazosin (HYTRIN) 2 MG capsule Take 2 mg by mouth every evening.     Family History       Problem Relation (Age of Onset)    Amblyopia Son    Diabetes Other    Heart attack Mother (88)    Hyperlipidemia Mother    Hypertension Mother, Other          Tobacco Use    Smoking status: Never    Smokeless tobacco: Never   Substance and Sexual Activity    Alcohol use: Yes     Comment: rarely    Drug use: No    Sexual activity: Not Currently      Partners: Male     Review of Systems   Constitutional:  Positive for activity change.   HENT: Negative.     Respiratory:  Positive for shortness of breath. Negative for chest tightness.    Cardiovascular:  Negative for chest pain.   Gastrointestinal: Negative.    Genitourinary: Negative.    Musculoskeletal: Negative.    Skin: Negative.    Neurological: Negative.    Hematological: Negative.    Psychiatric/Behavioral: Negative.     Objective:     Vital Signs (Most Recent):  Temp: 97.9 °F (36.6 °C) (10/08/22 2358)  Pulse: 75 (10/09/22 1330)  Resp: (!) 29 (10/09/22 1330)  BP: (!) 101/51 (10/09/22 1330)  SpO2: 96 % (10/09/22 1330)   Vital Signs (24h Range):  Temp:  [97.9 °F (36.6 °C)] 97.9 °F (36.6 °C)  Pulse:  [74-88] 75  Resp:  [11-34] 29  SpO2:  [94 %-100 %] 96 %  BP: ()/(51-72) 101/51     Weight: 68 kg (150 lb)  Body mass index is 26.57 kg/m².    Physical Exam  Constitutional:       Appearance: Normal appearance. She is not ill-appearing.   HENT:      Head: Atraumatic.      Nose: Nose normal.      Mouth/Throat:      Mouth: Mucous membranes are dry.   Eyes:      Extraocular Movements: Extraocular movements intact.   Cardiovascular:      Rate and Rhythm: Normal rate and regular rhythm.   Pulmonary:      Effort: Pulmonary effort is normal.      Breath sounds: Normal breath sounds.      Comments: Diminished   Abdominal:      Palpations: Abdomen is soft.   Musculoskeletal:         General: No swelling. Normal range of motion.      Cervical back: Normal range of motion and neck supple.      Right lower leg: Edema (trace) present.      Left lower leg: Edema (trace) present.   Skin:     General: Skin is warm and dry.   Neurological:      General: No focal deficit present.      Mental Status: She is alert and oriented to person, place, and time.   Psychiatric:         Mood and Affect: Mood normal.         Behavior: Behavior normal.           Significant Labs: All pertinent labs within the past 24 hours have been  reviewed.    Significant Imaging: I have reviewed all pertinent imaging results/findings within the past 24 hours.    Assessment/Plan:     * Acute on chronic systolic congestive heart failure  Admission to observation for acute on chronic systolic heart failure.  EKG V paced.  Elevated BNP and troponin similar to previous labs most recent 2 days ago. One dose of IV lasix 60 mg given in ED with good UOP. SOB improved. Lung diminished at bases and BLE no edema. Breathing comfortable on RA 96%.  On discharge patient denies chest pain or shortness of breath. Review med list with Daughter Usama. Continue home lasix 80 mg daily and okay to take additional dose if gain 2-3 lbs in 2-3 days. 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 sCr 2.0 on discharge. Discussed importance of daily weight. Patient HDS for discharge home. Patient stable for discharge home with Daughter with close follow up Nephrologist  10/10 and Cardiologist 11/11.      VTE Risk Mitigation (From admission, onward)         Ordered     heparin (porcine) injection 5,000 Units  Every 8 hours         10/09/22 0516     IP VTE HIGH RISK PATIENT  Once         10/09/22 0516     Place sequential compression device  Until discontinued         10/09/22 0516              As clarification on 10/9/2022, patient admit to observation under my care in collaboration with Dr. Raciel Mosley.      Maddie Perez NP  Department of Hospital Medicine   [facility] - [place] - Emergency Dept

## 2022-10-09 NOTE — PHARMACY MED REC
"Admission Medication History     The home medication history was taken by Joellen Kern.    You may go to "Admission" then "Reconcile Home Medications" tabs to review and/or act upon these items.     The home medication list has been updated by the Pharmacy department.   Please read ALL comments highlighted in yellow.   Please address this information as you see fit.    Feel free to contact us if you have any questions or require assistance.    Medications listed below were obtained from: Patient/family and Analytic software- [employer]      The medication reconciliation was completed by the patient's bedside.      Joellen Kern  [phone]                .        "

## 2022-10-09 NOTE — ED NOTES
Bed: 01main  Expected date: 10/9/22  Expected time: 1:00 AM  Means of arrival: Personal Transportation  Comments:

## 2022-10-09 NOTE — ED PROVIDER NOTES
Encounter Date: 10/8/2022       History     Chief Complaint   Patient presents with    Shortness of Breath     Pt chief complaint is shortness of breath. Pt states has a history of heart failure. Pt states was having sob symptoms when attempting to go to bed. Pt took lasix about an hour PTA.       86 yo female with CHF, EF 20%, presents by daughter with acute moderate shortness of breath, onset around 9:30-10:30pm while she was trying to go to bed.  Patient denies chest pain, diaphoresis, or nausea.  She woke her daughter around 11:00 p.m. and they made their way to this emergency department.  Daughter did give the patient additional oral Lasix 80mg around 11:00 p.m., but patient did not have relief.    Patient also reports chronic pain of bilateral knees and ankles.  She is followed by Dr. Oscar Paris, orthopedist, and has been told she has arthritis, but has not been seen recently.      Similar presentation for CHF exacerbation 10/7/22, also 7/4/22, 7/2/22, etc.      TTE 7/3/22  ? The left ventricle is moderately enlarged with eccentric hypertrophy and severely decreased systolic function.  ? The estimated ejection fraction is 20%.  ? Grade II left ventricular diastolic dysfunction.  ? Normal right ventricular size with normal right ventricular systolic function.  ? Severe left atrial enlargement.  ? There is mild aortic valve stenosis.  ? Aortic valve area is 1.28 cm2; peak velocity is 1.85 m/s; mean gradient is 7 mmHg.  ? Moderate mitral regurgitation.  ? Mild tricuspid regurgitation.  ? Normal central venous pressure (3 mmHg).  ? The estimated PA systolic pressure is 19 mmHg.      Review of patient's allergies indicates:   Allergen Reactions    Aspirin Swelling     Swelling and rash    Colace [docusate sodium] Rash     itching    Docusate Other (See Comments) and Rash     itching    Sulfa (sulfonamide antibiotics) Swelling     Swelling and rash  Swelling and rash    Iodine and iodide containing products Rash     Penicillins Rash     Past Medical History:   Diagnosis Date    Cataract     CHF (congestive heart failure)     CKD (chronic kidney disease), stage III     Coronary artery disease     Gout attack     Hypercholesteremia     Hypertension     Renal disorder     Vaginal delivery     x4     Past Surgical History:   Procedure Laterality Date    APPENDECTOMY      CARDIAC DEFIBRILLATOR PLACEMENT      2015    CATARACT EXTRACTION W/  INTRAOCULAR LENS IMPLANT Left 02/07/2019    Dr. Velazco    CATARACT EXTRACTION W/  INTRAOCULAR LENS IMPLANT Right 02/21/2019    Dr. Velazco    CHOLECYSTECTOMY      COLONOSCOPY W/ POLYPECTOMY  10 or 11/ 2014    per Dr. Myrick    defribillator Left 8/2008    EYE SURGERY      HYSTERECTOMY      INTRAOCULAR PROSTHESES INSERTION Left 2/7/2019    Procedure: INSERTION, IOL PROSTHESIS;  Surgeon: Demetrius Velazco MD;  Location: [facility] OR;  Service: Ophthalmology;  Laterality: Left;  RN Phone Pre OP 1-30-19.  Arrival 05:30 AM    INTRAOCULAR PROSTHESES INSERTION Right 2/21/2019    Procedure: INSERTION, IOL PROSTHESIS;  Surgeon: Demetrius Velazco MD;  Location: [facility] OR;  Service: Ophthalmology;  Laterality: Right;    JOINT REPLACEMENT Bilateral 2005 & 2006    2 Knee Replacements=Lt. 1= 2005. Rt. 1= 2006    OOPHORECTOMY      PHACOEMULSIFICATION OF CATARACT Left 2/7/2019    Procedure: PHACOEMULSIFICATION, CATARACT;  Surgeon: Demetrius Velazco MD;  Location: [facility] OR;  Service: Ophthalmology;  Laterality: Left;    PHACOEMULSIFICATION OF CATARACT Right 2/21/2019    Procedure: PHACOEMULSIFICATION, CATARACT;  Surgeon: Demetrius Velazco MD;  Location: [facility] OR;  Service: Ophthalmology;  Laterality: Right;  RN Phone Pre op 2-15-19.  Arrival 05:30 AM    TOTAL KNEE ARTHROPLASTY Bilateral Lt.= 2005; Rt.=2006    Bilateral Knee scope     Family History   Problem Relation Age of Onset    Heart attack Mother 88    Hypertension Mother     Hyperlipidemia Mother     Diabetes Other     Hypertension Other      Amblyopia Son     Blindness Neg Hx     Cataracts Neg Hx     Glaucoma Neg Hx     Macular degeneration Neg Hx     Retinal detachment Neg Hx     Strabismus Neg Hx      Social History     Tobacco Use    Smoking status: Never    Smokeless tobacco: Never   Substance Use Topics    Alcohol use: Yes     Comment: rarely    Drug use: No     Review of Systems   Constitutional:  Negative for diaphoresis and fever.   HENT:  Negative for sore throat.    Eyes:  Negative for photophobia and visual disturbance.   Respiratory:  Positive for shortness of breath.    Cardiovascular:  Negative for chest pain and leg swelling.   Gastrointestinal:  Negative for abdominal pain, nausea and vomiting.   Genitourinary:  Negative for dysuria.   Musculoskeletal:  Negative for myalgias.   Skin:  Negative for rash.   Neurological:  Negative for syncope.     Physical Exam     Initial Vitals [10/08/22 2358]   BP Pulse Resp Temp SpO2   (!) 123/58 88 16 97.9 °F (36.6 °C) 98 %      MAP       --         Physical Exam    Nursing note and vitals reviewed.  Constitutional: She appears well-developed and well-nourished. She is not diaphoretic. She appears distressed (minimal respiratory).   Awake, alert elderly female. Mild increased work of breathing.   HENT:   Head: Normocephalic and atraumatic.   Mouth/Throat: Oropharynx is clear and moist.   Eyes: Conjunctivae and EOM are normal. Pupils are equal, round, and reactive to light.   Neck: Neck supple.   Normal range of motion.  Cardiovascular:  Normal rate, regular rhythm and intact distal pulses.           Murmur heard.  Frequent accessory beats.   Pulmonary/Chest: She is in respiratory distress (mild). She has no wheezes. She has no rhonchi. She has rales (bases).   Diminished breath sounds at bases   Abdominal: Abdomen is soft. There is no abdominal tenderness.   Musculoskeletal:         General: No tenderness or edema. Normal range of motion.      Cervical back: Normal range of motion and neck supple.      Neurological: She is alert and oriented to person, place, and time. She has normal strength.   Moving all extremities.   Skin: Skin is warm and dry. No erythema. There is pallor.   Psychiatric: She has a normal mood and affect.       ED Course   Procedures  Labs Reviewed   CBC W/ AUTO DIFFERENTIAL - Abnormal; Notable for the following components:       Result Value    RBC 3.31 (*)     Hemoglobin 10.4 (*)     Hematocrit 31.2 (*)     MCH 31.4 (*)     RDW 15.5 (*)     Platelets 133 (*)     All other components within normal limits   COMPREHENSIVE METABOLIC PANEL - Abnormal; Notable for the following components:    Sodium 134 (*)     CO2 22 (*)     BUN 52 (*)     Creatinine 2.0 (*)     eGFR 24 (*)     All other components within normal limits   B-TYPE NATRIURETIC PEPTIDE - Abnormal; Notable for the following components:    BNP 2,347 (*)     All other components within normal limits   TROPONIN I - Abnormal; Notable for the following components:    Troponin I 0.185 (*)     All other components within normal limits   MAGNESIUM   HEMOGLOBIN A1C   SARS-COV-2 RDRP GENE   POCT INFLUENZA A/B MOLECULAR     EKG Readings: (Independently Interpreted)   01:37: Paced, HR 81. No STEMI. Morphology c/w 10/7/22.     Imaging Results              X-Ray Ankle Complete Bilateral (Final result)  Result time 10/09/22 06:30:52      Final result by Ismael Aguilar MD (10/09/22 06:30:52)                   Impression:      No convincing evidence of acute fracture or dislocation.      Electronically signed by: Ismael Aguilar  Date:    10/09/2022  Time:    06:30               Narrative:    EXAMINATION:  XR ANKLE COMPLETE 3 VIEW BILATERAL    CLINICAL HISTORY:  Other chronic pain    TECHNIQUE:  AP, lateral and oblique views of both ankles were performed.    COMPARISON:  None    FINDINGS:  Left: No definite evidence of acute fracture or dislocation.  Talar dome intact.  Ankle mortise symmetric.  No findings of syndesmotic widening.  No definite  large ankle joint effusion.  Vascular calcifications.  Enthesopathic change at the calcaneus.  Degenerative spurring dorsal midfoot.  No definite radiopaque foreign body.    Right: No definite evidence of acute fracture or dislocation.  Talar dome intact.  Ankle mortise symmetric.  No findings of syndesmotic widening.  No definite large ankle joint effusion.  Vascular calcifications.  Chronic degenerative change at the hindfoot and midfoot.  Enthesopathic change at the calcaneus.  No definite radiopaque foreign body.                                       X-Ray Knee 1 or 2 View Bilateral (Final result)  Result time 10/09/22 06:28:27      Final result by Ismael Aguilar MD (10/09/22 06:28:27)                   Impression:      No evidence of acute fracture or dislocation.    No definite hardware complication.      Electronically signed by: Ismael Aguilar  Date:    10/09/2022  Time:    06:28               Narrative:    EXAMINATION:  XR KNEE 1 OR 2 VIEW BILATERAL    CLINICAL HISTORY:  chronic pain;    TECHNIQUE:  Four views bilateral knees.    COMPARISON:  None    FINDINGS:  Right: Status post total knee arthroplasty and patellar resurfacing.  No evidence of hardware complication.  No evidence of acute fracture or dislocation appreciated.  No definite radiopaque foreign body.    Left: Status post total knee arthroplasty and patellar resurfacing.  No evidence of hardware complication.  No evidence of acute fracture or dislocation appreciated.  No definite radiopaque foreign body.                                       X-Ray Chest AP Portable (Final result)  Result time 10/09/22 01:38:09      Final result by Lili Lisa MD (10/09/22 01:38:09)                   Impression:      Cardiomegaly and findings suggestive of pulmonary edema/CHF although correlation for infectious process advised.      Electronically signed by: Lili Lisa MD  Date:    10/09/2022  Time:    01:38               Narrative:    EXAMINATION:  XR  CHEST AP PORTABLE    CLINICAL HISTORY:  sob;    TECHNIQUE:  Single frontal view of the chest was performed.    COMPARISON:  10/07/2022    FINDINGS:  There is a stably positioned left-sided cardiac pacing device in place.  There is unchanged enlargement of the cardiomediastinal silhouette noting tortuosity and atherosclerosis of the thoracic aorta.  The lungs are symmetrically expanded with increased interstitial and parenchymal attenuation and perihilar opacities.  Findings suggestive of pulmonary edema/CHF although correlation for infectious process advised.  No confluent airspace consolidation, large volume of pleural fluid or pneumothorax identified.  Osseous structures are intact with degenerative change.                                    X-Rays:   Independently Interpreted Readings:   Other Readings:  CXR +pulmonary edema, cardiomegaly  Medications   sodium chloride 0.9% flush 10 mL (has no administration in time range)   heparin (porcine) injection 5,000 Units (5,000 Units Subcutaneous Given 10/9/22 0615)   furosemide injection 60 mg (has no administration in time range)   furosemide injection 80 mg (80 mg Intravenous Given 10/9/22 0238)   acetaminophen tablet 1,000 mg (1,000 mg Oral Given 10/9/22 0434)     Medical Decision Making:   History:   Old Medical Records: I decided to obtain old medical records.  Old Records Summarized: records from previous admission(s).  Initial Assessment:   87 y.o. female with CHF, here with shortness of breath.  Differential Diagnosis:   Ddx includes CHF exacerbation, ACS, symptomatic anemia, PNA, COVID-19, other.   Independently Interpreted Test(s):   I have ordered and independently interpreted X-rays - see prior notes.  I have ordered and independently interpreted EKG Reading(s) - see prior notes  Clinical Tests:   Lab Tests: Reviewed and Ordered  Radiological Study: Ordered and Reviewed  Medical Tests: Ordered and Reviewed  ED Management:  EKG no STEMI.    CXR  +cardiomegaly, pulmonary edema.      Labs: Hgb 10.4, c/w prior. BUN 52 / creatinine 2.0, c/w prior. BNP 2347, elevated from prior. Troponin 0.185, c/w prior.     Patient tx'ed for CHF with IV lasix 80mg.    Due to chronic bilateral lower extremity pain with no recent imaging, I also checked XRs knees and ankles. Patient has significant degenerative disease. No acute process noted    Patient OBS'ed to hospital medicine for CHF exacerbation. I discussed this case with NP Anabel Webb.   Other:   I have discussed this case with another health care provider.                        Clinical Impression:   Final diagnoses:  [R06.02] SOB (shortness of breath)  [R06.02] Shortness of breath (Primary)  [I50.9] Acute on chronic congestive heart failure, unspecified heart failure type  [I50.9] Acute exacerbation of CHF (congestive heart failure)  [G89.29] Chronic pain        ED Disposition Condition    Observation                 Cheyenne Kay MD  10/09/22 0852

## 2022-10-09 NOTE — HPI
Palmira Malave is a 85 yo female with significant history combined systolic diastolic heart failure EF 20%, AICD, hypertension, CKD stage 4, HLD, GERD, and gout who presents to hospital for acute shortness at breath while reading newspaper last night. Took additional 80 mg lasix without relief. Patient was in ED on 10/7 for similar symptoms. Denies chest pain, abdominal pain, nausea, and vomiting. Recent seen by PCP and reduced lasix 80 bid to daily then went to ED on 10/7 told to go back on lasix 80 mg bid.     Elevated BNP and troponin similar to previous labs. EKG v pacing RBBB    7/2/2022 2 D echo EF 20%, grade II DD, severe LAE, mild AS.

## 2022-10-09 NOTE — ED TRIAGE NOTES
Pt chief complaint is shortness of breath. Pt states has a history of heart failure. Pt states was having sob symptoms when attempting to go to bed. Pt took lasix about an hour PTA

## 2022-10-09 NOTE — ED NOTES
Received pt report from CHAGO Horn. Pt appears to be lying in bed, eyes closed, and breathing. Bed locked in low position and side rails raised x 2. Will continue to monitor.

## 2022-10-09 NOTE — DISCHARGE SUMMARY
[facility] [place] Emergency Dept  Hospital Medicine  Discharge Summary      Patient Name: Palmira Malave  MRN: [id number]  Patient Class: OP- Observation  Admission Date: 10/9/2022  Hospital Length of Stay: 0 days  Discharge Date and Time:  10/09/2022 3:10 PM  Attending Physician: Raciel Mosley MD  Discharging Provider: Maddie Perez NP  Primary Care Provider: Qi Ramon MD      HPI:   Palmira Malave is a 87 yo female with significant history combined systolic diastolic heart failure EF 20%, AICD, hypertension, CKD stage 4, HLD, GERD, and gout who presents to hospital for acute shortness at breath while reading newspaper last night. Took additional 80 mg lasix without relief. Patient was in ED on 10/7 for similar symptoms. Denies chest pain, abdominal pain, nausea, and vomiting. Recent seen by PCP and reduced lasix 80 bid to daily then went to ED on 10/7 told to go back on lasix 80 mg bid.     Elevated BNP and troponin similar to previous labs. EKG v pacing RBBB    7/2/2022 2 D echo EF 20%, grade II DD, severe LAE, mild AS.       * No surgery found *      Hospital Course:   Admission to observation for acute on chronic systolic heart failure.  EKG V paced.  Elevated BNP and troponin similar to previous labs most recent 2 days ago. One dose of IV lasix 60 mg given in ED with good UOP. SOB improved. Lung diminished at bases and BLE no edema. Breathing comfortable on RA 96%.  On discharge patient denies chest pain or shortness of breath. Review med list with Daughter Usama. Continue home lasix 80 mg daily and okay to take additional dose if gain 2-3 lbs in 2-3 days. 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 sCr 2.0 on discharge. Discussed importance of daily weight. Patient HDS for discharge home. Patient stable for discharge home with Daughter with close follow up Nephrologist  10/10 and Cardiologist 11/11. Ambulatory referral [facility].        Goals of Care Treatment  Preferences:  Code Status: Full Code      Consults:   Consults (From admission, onward)        Status Ordering Provider     Inpatient consult to Social Work/Case Management  Once        Provider:  (Not yet assigned)    Completed EVELINA KYLE     Inpatient consult to Registered Dietitian/Nutritionist  Once        Provider:  (Not yet assigned)    Acknowledged EVELINA KYLE          No new Assessment & Plan notes have been filed under this hospital service since the last note was generated.  Service: Hospital Medicine    Final Active Diagnoses:    Diagnosis Date Noted POA    PRINCIPAL PROBLEM:  Acute on chronic systolic congestive heart failure [I50.23] 10/02/2018 Yes    Troponin level elevated [R77.8] 11/15/2014 Yes      Problems Resolved During this Admission:       Discharged Condition: stable    Disposition: Home or Self Care    Follow Up:   Follow-up Information     Qi Ramon MD Follow up in 1 week(s).    Specialty: Internal Medicine  Contact information:  [address] [place] [address]  [id number]  [place] [address]  [phone]             Kelechi Richmond MD Follow up.    Specialty: Cardiology  Contact information:  [address]  [address]  [facility] [address]  [phone]                       Patient Instructions:      Ambulatory referral/consult to [facility]   Standing Status: Future   Referral Priority: Routine Referral Type: Consultation   Referral Reason: Specialty Services Required   Number of Visits Requested: 1     Diet Cardiac     Notify your health care provider if you experience any of the following:  temperature >100.4     Notify your health care provider if you experience any of the following:  difficulty breathing or increased cough     Notify your health care provider if you experience any of the following:  increased confusion or weakness     Notify your health care provider if you experience any of the following:  temperature >100.4      Notify your health care provider if you experience any of the following:  difficulty breathing or increased cough     Notify your health care provider if you experience any of the following:  increased confusion or weakness     Activity as tolerated     Activity as tolerated       Significant Diagnostic Studies: Labs: All labs within the past 24 hours have been reviewed    Pending Diagnostic Studies:     None         Medications:  Reconciled Home Medications:      Medication List      CHANGE how you take these medications    furosemide 80 MG tablet  Commonly known as: LASIX  Take 1 tablet (80 mg total) by mouth Daily. Please log daily weight. If gain 2-3 lbs in 2-3 days or a week, take additional dose and call PCP or Cardiologist  What changed:   · when to take this  · additional instructions     isosorbide-hydrALAZINE 20-37.5 mg 20-37.5 mg Tab  Commonly known as: BIDIL  Take 1 tablet by mouth once daily. Do not take if the top blood pressure number is less than 100.  What changed: additional instructions        CONTINUE taking these medications    acetaminophen 500 MG tablet  Commonly known as: TYLENOL  Take 1 tablet (500 mg total) by mouth every 6 (six) hours as needed for Pain.     allopurinoL 100 MG tablet  Commonly known as: ZYLOPRIM  Take 100 mg by mouth every evening.     carvediloL 6.25 MG tablet  Commonly known as: COREG  Take 1 tablet (6.25 mg total) by mouth 2 (two) times daily. Do not take if the top blood pressure number is less than 100 or your heart rate is less than 60     CENTRUM SILVER ORAL  Take by mouth once daily.     clopidogreL 75 mg tablet  Commonly known as: PLAVIX  Take 75 mg by mouth once daily. On hold since 11-28-14, for procedure.     cranberry 400 mg Cap  Take 1 capsule by mouth 2 (two) times daily.     fish oil-omega-3 fatty acids 300-1,000 mg capsule  Take 2 g by mouth once daily. 2 caps every AM & 1 cap every PM.     gabapentin 300 MG capsule  Commonly known as: NEURONTIN  Take  300 mg by mouth 2 (two) times daily.     lovastatin 40 MG tablet  Commonly known as: MEVACOR  Take 40 mg by mouth nightly. Takes 2 caps with supper every evening.     mirabegron 50 mg Tb24  Commonly known as: MYRBETRIQ  Take 50 mg by mouth once daily.     pantoprazole 40 MG tablet  Commonly known as: PROTONIX  Take 40 mg by mouth once daily.        STOP taking these medications    metOLazone 2.5 MG tablet  Commonly known as: ZAROXOLYN     terazosin 2 MG capsule  Commonly known as: HYTRIN            Indwelling Lines/Drains at time of discharge:   Lines/Drains/Airways     None                 Time spent on the discharge of patient: 30 minutes         Maddie Perez NP  Department of Hospital Medicine  [facility] - Emergency Dept

## 2022-10-09 NOTE — SUBJECTIVE & OBJECTIVE
Past Medical History:   Diagnosis Date    Cataract     CHF (congestive heart failure)     CKD (chronic kidney disease), stage III     Coronary artery disease     Gout attack     Hypercholesteremia     Hypertension     Renal disorder     Vaginal delivery     x4       Past Surgical History:   Procedure Laterality Date    APPENDECTOMY      CARDIAC DEFIBRILLATOR PLACEMENT      2015    CATARACT EXTRACTION W/  INTRAOCULAR LENS IMPLANT Left 02/07/2019    Dr. Velazco    CATARACT EXTRACTION W/  INTRAOCULAR LENS IMPLANT Right 02/21/2019    Dr. Velazco    CHOLECYSTECTOMY      COLONOSCOPY W/ POLYPECTOMY  10 or 11/ 2014    per Dr. Myrick    defribillator Left 8/2008    EYE SURGERY      HYSTERECTOMY      INTRAOCULAR PROSTHESES INSERTION Left 2/7/2019    Procedure: INSERTION, IOL PROSTHESIS;  Surgeon: Demetrius Velazco MD;  Location: [facility] OR;  Service: Ophthalmology;  Laterality: Left;  RN Phone Pre OP 1-30-19.  Arrival 05:30 AM    INTRAOCULAR PROSTHESES INSERTION Right 2/21/2019    Procedure: INSERTION, IOL PROSTHESIS;  Surgeon: Demetrius Velazco MD;  Location: [facility] OR;  Service: Ophthalmology;  Laterality: Right;    JOINT REPLACEMENT Bilateral 2005 & 2006    2 Knee Replacements=Lt. 1= 2005. Rt. 1= 2006    OOPHORECTOMY      PHACOEMULSIFICATION OF CATARACT Left 2/7/2019    Procedure: PHACOEMULSIFICATION, CATARACT;  Surgeon: Demetrius Velazco MD;  Location: [facility] OR;  Service: Ophthalmology;  Laterality: Left;    PHACOEMULSIFICATION OF CATARACT Right 2/21/2019    Procedure: PHACOEMULSIFICATION, CATARACT;  Surgeon: Demetrius Velazco MD;  Location: [facility] OR;  Service: Ophthalmology;  Laterality: Right;  RN Phone Pre op 2-15-19.  Arrival 05:30 AM    TOTAL KNEE ARTHROPLASTY Bilateral Lt.= 2005; Rt.=2006    Bilateral Knee scope       Review of patient's allergies indicates:   Allergen Reactions    Aspirin Swelling     Swelling and rash    Colace [docusate sodium] Rash     itching    Docusate Other (See Comments)  and Rash     itching    Sulfa (sulfonamide antibiotics) Swelling     Swelling and rash  Swelling and rash    Iodine and iodide containing products Rash    Penicillins Rash       Current Facility-Administered Medications on File Prior to Encounter   Medication    0.9%  NaCl infusion    phenylephrine HCL 2.5% ophthalmic solution 1 drop    proparacaine 0.5 % ophthalmic solution 1 drop    tropicamide 1% ophthalmic solution 1 drop     Current Outpatient Medications on File Prior to Encounter   Medication Sig    acetaminophen (TYLENOL) 500 MG tablet Take 1 tablet (500 mg total) by mouth every 6 (six) hours as needed for Pain.    allopurinol (ZYLOPRIM) 100 MG tablet Take 100 mg by mouth every evening.     carvediloL (COREG) 6.25 MG tablet Take 1 tablet (6.25 mg total) by mouth 2 (two) times daily. Do not take if the top blood pressure number is less than 100 or your heart rate is less than 60    clopidogrel (PLAVIX) 75 mg tablet Take 75 mg by mouth once daily. On hold since 11-28-14, for procedure.    gabapentin (NEURONTIN) 300 MG capsule Take 300 mg by mouth 2 (two) times daily.    lovastatin (MEVACOR) 40 MG tablet Take 40 mg by mouth nightly. Takes 2 caps with supper every evening.    mirabegron (MYRBETRIQ) 50 mg Tb24 Take 50 mg by mouth once daily.     pantoprazole (PROTONIX) 40 MG tablet Take 40 mg by mouth once daily.    [DISCONTINUED] furosemide (LASIX) 80 MG tablet Take 1 tablet (80 mg total) by mouth 2 (two) times a day.    [DISCONTINUED] isosorbide-hydrALAZINE 20-37.5 mg (BIDIL) 20-37.5 mg Tab Take 1 tablet by mouth once daily. Do not take if the top blood pressure number is less than 100    [DISCONTINUED] metOLazone (ZAROXOLYN) 2.5 MG tablet Take 1 tablet (2.5 mg total) by mouth 3 (three) times a week.    cranberry 400 mg Cap Take 1 capsule by mouth 2 (two) times daily.    fish oil-omega-3 fatty acids 300-1,000 mg capsule Take 2 g by mouth once daily. 2 caps every AM & 1 cap every PM.    folic  acid/multivit-min/lutein (CENTRUM SILVER ORAL) Take by mouth once daily.    [DISCONTINUED] terazosin (HYTRIN) 2 MG capsule Take 2 mg by mouth every evening.     Family History       Problem Relation (Age of Onset)    Amblyopia Son    Diabetes Other    Heart attack Mother (88)    Hyperlipidemia Mother    Hypertension Mother, Other          Tobacco Use    Smoking status: Never    Smokeless tobacco: Never   Substance and Sexual Activity    Alcohol use: Yes     Comment: rarely    Drug use: No    Sexual activity: Not Currently     Partners: Male     Review of Systems   Constitutional:  Positive for activity change.   HENT: Negative.     Respiratory:  Positive for shortness of breath. Negative for chest tightness.    Cardiovascular:  Negative for chest pain.   Gastrointestinal: Negative.    Genitourinary: Negative.    Musculoskeletal: Negative.    Skin: Negative.    Neurological: Negative.    Hematological: Negative.    Psychiatric/Behavioral: Negative.     Objective:     Vital Signs (Most Recent):  Temp: 97.9 °F (36.6 °C) (10/08/22 2358)  Pulse: 75 (10/09/22 1330)  Resp: (!) 29 (10/09/22 1330)  BP: (!) 101/51 (10/09/22 1330)  SpO2: 96 % (10/09/22 1330)   Vital Signs (24h Range):  Temp:  [97.9 °F (36.6 °C)] 97.9 °F (36.6 °C)  Pulse:  [74-88] 75  Resp:  [11-34] 29  SpO2:  [94 %-100 %] 96 %  BP: ()/(51-72) 101/51     Weight: 68 kg (150 lb)  Body mass index is 26.57 kg/m².    Physical Exam  Constitutional:       Appearance: Normal appearance. She is not ill-appearing.   HENT:      Head: Atraumatic.      Nose: Nose normal.      Mouth/Throat:      Mouth: Mucous membranes are dry.   Eyes:      Extraocular Movements: Extraocular movements intact.   Cardiovascular:      Rate and Rhythm: Normal rate and regular rhythm.   Pulmonary:      Effort: Pulmonary effort is normal.      Breath sounds: Normal breath sounds.      Comments: Diminished   Abdominal:      Palpations: Abdomen is soft.   Musculoskeletal:         General: No  swelling. Normal range of motion.      Cervical back: Normal range of motion and neck supple.      Right lower leg: Edema (trace) present.      Left lower leg: Edema (trace) present.   Skin:     General: Skin is warm and dry.   Neurological:      General: No focal deficit present.      Mental Status: She is alert and oriented to person, place, and time.   Psychiatric:         Mood and Affect: Mood normal.         Behavior: Behavior normal.           Significant Labs: All pertinent labs within the past 24 hours have been reviewed.    Significant Imaging: I have reviewed all pertinent imaging results/findings within the past 24 hours.

## 2022-10-09 NOTE — ASSESSMENT & PLAN NOTE
Admission to observation for acute on chronic systolic heart failure.  EKG V paced.  Elevated BNP and troponin similar to previous labs most recent 2 days ago. One dose of IV lasix 60 mg given in ED with good UOP. SOB improved. Lung diminished at bases and BLE no edema. Breathing comfortable on RA 96%.  On discharge patient denies chest pain or shortness of breath. Review med list with Daughter Usama. Continue home lasix 80 mg daily and okay to take additional dose if gain 2-3 lbs in 2-3 days.  Discussed importance of daily weight. Patient HDS for discharge home.

## 2022-10-09 NOTE — CONSULTS
Patient to discharge home with family; unable to schedule follow-up needed with Primary Care Physician; patient will need to schedule. Message sent to Dr. Richmond' staff for Cardiology hospital follow-up

## 2022-10-09 NOTE — PROGRESS NOTES
Unable to schedule with patient's Primary Care Physician; patient will need to call to schedule a hospital follow-up in 1 week. Unable to schedule a Cardiology Hospital Follow-up with Dr. Richmond in a reasonable amount of time; message was sent to Dr. Richmond' staff to schedule with the patient within one week.

## 2022-10-09 NOTE — PLAN OF CARE
[facility] - Emergency Dept  Discharge Final Note    Primary Care Provider: Qi Ramon MD    Expected Discharge Date: 10/9/2022    Final Discharge Note (most recent)       Final Note - 10/09/22 1416          Final Note    Assessment Type Final Discharge Note     Anticipated Discharge Disposition Home or Self Care     What phone number can be called within the next 1-3 days to see how you are doing after discharge? --   [phone]    Hospital Resources/Appts/Education Provided Provided patient/caregiver with written discharge plan information;Appointments scheduled and added to AVS;Appointments scheduled by Navigator/Coordinator;Provided education on problems/symptoms using teachback        Post-Acute Status    Post-Acute Authorization Other   Home; follow-ups    Other Status No Post-Acute Service Needs     Discharge Delays None known at this time                     Important Message from Medicare             Contact Info       Qi Ramon MD   Specialty: Internal Medicine   Relationship: PCP - [address]  [id number]  [place] [id number]   Phone: [phone]       Next Steps: Follow up in 1 week(s)    Kelechi Richmond MD   Specialty: Cardiology    [address]  [address]  [facility] [address]   Phone: [phone]       Next Steps: Follow up

## 2022-10-09 NOTE — HOSPITAL COURSE
Admission to observation for acute on chronic systolic heart failure.  EKG V paced.  Elevated BNP and troponin similar to previous labs most recent 2 days ago. One dose of IV lasix 60 mg given in ED with good UOP. SOB improved. Lung diminished at bases and BLE no edema. Breathing comfortable on RA 96%.  On discharge patient denies chest pain or shortness of breath. Review med list with Daughter Usaam. Continue home lasix 80 mg daily and okay to take additional dose if gain 2-3 lbs in 2-3 days. 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 sCr 2.0 on discharge. Discussed importance of daily weight. Patient HDS for discharge home. Patient stable for discharge home with Daughter with close follow up Nephrologist  10/10 and Cardiologist 11/11. Ambulatory referral [facility].

## 2022-10-09 NOTE — PLAN OF CARE
10/09/22 1415   Post-Acute Status   Post-Acute Authorization Other  (Home; follow-ups)   Other Status No Post-Acute Service Needs   Hospital Resources/Appts/Education Provided Provided patient/caregiver with written discharge plan information;Appointments scheduled and added to AVS;Appointments scheduled by Navigator/Coordinator;Provided education on problems/symptoms using teachback   Discharge Delays None known at this time   Discharge Plan   Discharge Plan A Home with family  (Follow-ups)   Discharge Plan B Home with family  (Follow-ups)

## 2022-10-10 ENCOUNTER — TELEPHONE (OUTPATIENT)
Dept: CARDIOLOGY | Facility: CLINIC | Age: 87
End: 2022-10-10
Payer: MEDICARE

## 2022-10-10 NOTE — TELEPHONE ENCOUNTER
----- Message from Larry Smith III, LCSW sent at 10/9/2022  2:11 PM CDT -----  Regarding: Out-Patient Cardiology Hospital Follow-up in 1 week  Please contact patient to schedule an Out-Patient Cardiology Hospital Follow-up with Dr. Richmond in one week. Patient was discharged from the [facility] on 10/09/22. Thanks. Larry

## 2022-10-13 ENCOUNTER — PES CALL (OUTPATIENT)
Dept: ADMINISTRATIVE | Facility: CLINIC | Age: 87
End: 2022-10-13
Payer: MEDICARE

## 2022-10-14 ENCOUNTER — PES CALL (OUTPATIENT)
Dept: ADMINISTRATIVE | Facility: CLINIC | Age: 87
End: 2022-10-14
Payer: MEDICARE

## 2022-10-14 ENCOUNTER — OFFICE VISIT (OUTPATIENT)
Dept: CARDIOLOGY | Facility: CLINIC | Age: 87
End: 2022-10-14
Payer: MEDICARE

## 2022-10-14 VITALS
HEART RATE: 86 BPM | OXYGEN SATURATION: 98 % | RESPIRATION RATE: 18 BRPM | SYSTOLIC BLOOD PRESSURE: 126 MMHG | HEIGHT: 63 IN | DIASTOLIC BLOOD PRESSURE: 74 MMHG | WEIGHT: 149.94 LBS | BODY MASS INDEX: 26.57 KG/M2

## 2022-10-14 DIAGNOSIS — I10 ESSENTIAL HYPERTENSION: Chronic | ICD-10-CM

## 2022-10-14 DIAGNOSIS — I50.43 ACUTE ON CHRONIC COMBINED SYSTOLIC AND DIASTOLIC CONGESTIVE HEART FAILURE: Primary | ICD-10-CM

## 2022-10-14 DIAGNOSIS — E78.2 MIXED HYPERLIPIDEMIA: Chronic | ICD-10-CM

## 2022-10-14 DIAGNOSIS — Z95.810 ICD (IMPLANTABLE CARDIOVERTER-DEFIBRILLATOR), BIVENTRICULAR, IN SITU: Chronic | ICD-10-CM

## 2022-10-14 DIAGNOSIS — I25.10 CORONARY ARTERY DISEASE INVOLVING NATIVE CORONARY ARTERY OF NATIVE HEART WITHOUT ANGINA PECTORIS: ICD-10-CM

## 2022-10-14 DIAGNOSIS — I20.89 OTHER FORMS OF ANGINA PECTORIS: ICD-10-CM

## 2022-10-14 PROCEDURE — 3288F PR FALLS RISK ASSESSMENT DOCUMENTED: ICD-10-PCS | Mod: CPTII,S$GLB,, | Performed by: INTERNAL MEDICINE

## 2022-10-14 PROCEDURE — 1126F AMNT PAIN NOTED NONE PRSNT: CPT | Mod: CPTII,S$GLB,, | Performed by: INTERNAL MEDICINE

## 2022-10-14 PROCEDURE — 99999 PR PBB SHADOW E&M-EST. PATIENT-LVL IV: CPT | Mod: PBBFAC,,, | Performed by: INTERNAL MEDICINE

## 2022-10-14 PROCEDURE — 99214 OFFICE O/P EST MOD 30 MIN: CPT | Mod: S$GLB,,, | Performed by: INTERNAL MEDICINE

## 2022-10-14 PROCEDURE — 1159F PR MEDICATION LIST DOCUMENTED IN MEDICAL RECORD: ICD-10-PCS | Mod: CPTII,S$GLB,, | Performed by: INTERNAL MEDICINE

## 2022-10-14 PROCEDURE — 3288F FALL RISK ASSESSMENT DOCD: CPT | Mod: CPTII,S$GLB,, | Performed by: INTERNAL MEDICINE

## 2022-10-14 PROCEDURE — 1101F PT FALLS ASSESS-DOCD LE1/YR: CPT | Mod: CPTII,S$GLB,, | Performed by: INTERNAL MEDICINE

## 2022-10-14 PROCEDURE — 1126F PR PAIN SEVERITY QUANTIFIED, NO PAIN PRESENT: ICD-10-PCS | Mod: CPTII,S$GLB,, | Performed by: INTERNAL MEDICINE

## 2022-10-14 PROCEDURE — 99214 PR OFFICE/OUTPT VISIT, EST, LEVL IV, 30-39 MIN: ICD-10-PCS | Mod: S$GLB,,, | Performed by: INTERNAL MEDICINE

## 2022-10-14 PROCEDURE — 99999 PR PBB SHADOW E&M-EST. PATIENT-LVL IV: ICD-10-PCS | Mod: PBBFAC,,, | Performed by: INTERNAL MEDICINE

## 2022-10-14 PROCEDURE — 1159F MED LIST DOCD IN RCRD: CPT | Mod: CPTII,S$GLB,, | Performed by: INTERNAL MEDICINE

## 2022-10-14 PROCEDURE — 1101F PR PT FALLS ASSESS DOC 0-1 FALLS W/OUT INJ PAST YR: ICD-10-PCS | Mod: CPTII,S$GLB,, | Performed by: INTERNAL MEDICINE

## 2022-10-14 NOTE — PROGRESS NOTES
Subjective:    Patient ID:  Palmira Malave is a 87 y.o. female who presents for follow-up of Hospital Follow Up      HPI       Systolic CHF, NICM, Medtronic BiV AICD, HTN, HLD     Med[employer]onic AICD check 3/9/21 - High SVC coil impedance - SVC coil turned off - otherwise good device function     Previously followed by Dr Batres  Previous history:  Here for follow-up of systolic heart failure and biventricular ICD interrogation.  She denies any worsening cardiopulmonary complaints since we last saw her.  She's had no issues with her device.  She is expressed no worsening cardiopulmonary complaints.  She denies any chest pain, shortness breath or palpitations.  She's not expressing PND, orthopnea or lower edema.  She denies any dizziness, presyncope or syncope.  Otherwise she's better usual state of health.  He says she has a daughter the moved into scan early onset dementia she's having to take care of.  She tries to stay active by going to interactions with a female group.  She is seen by [facility] clinic and does get injections.  He wants clearance for rehabilitation.     Here for follow-up of systolic heart failure and previous biventricular ICD placement.  She had her device interrogated which shows no significant issues.  She had recent admission at the beginning of the month for mild volume overload.  She was diuresed overnight and discharged home the next day.  Her EF is stable.  She denies any PND, orthopnea or lower Veronica.  She has not experiencing dizziness, presyncope or syncope.  She on questioning says she is compliant with medicines and diet.          catheter 2011 reported nonobstructive CAD     Echo 7/3/22  The left ventricle is moderately enlarged with eccentric hypertrophy and severely decreased systolic function.  The estimated ejection fraction is 20%.  Grade II left ventricular diastolic dysfunction.  Normal right ventricular size with normal right ventricular systolic function.  Severe left  atrial enlargement.  There is mild aortic valve stenosis.  Aortic valve area is 1.28 cm2; peak velocity is 1.85 m/s; mean gradient is 7 mmHg.  Moderate mitral regurgitation.  Mild tricuspid regurgitation.  Normal central venous pressure (3 mmHg).  The estimated PA systolic pressure is 19 mmHg.     Carotid ultrasound:  7-18  CONCLUSIONS   There is 40 - 49% right Internal Carotid stenosis.  There is 20 - 39% left Internal Carotid stenosis.        9/18/19 Denies recent CP or SOB  EKG A-sensed V-paced        6/22/20 Recently Dx with colon CA. Less SOB since discharge  Denies CP  Cleared for colon surgery at moderate cardiac risk  OK to hold plavix 7 days before  OV 3 months with Medtronic AICD check        10/2/20 Feels better since discharge. Stopped bidil - have her HA and made her feel bad - went back to coreg 25 bid   Continue Rx for CAD, HTN, CHF, HLD  OV 2 weeks with Medtronic AICD check     10/14/20 Breathing stable. Denies CP   Continue Rx for CHF, HTN, HLD, CAD  OV 3 months with BNP, BMP      11/3/20 Back early for worsening SOB. Denies CP  Unclear if she takes lasix as prescribed  Increase lasix 80 AM and 40 PM  OV 2 weeks with BNP, BMP  Needs to go to the ER for worsening symptoms        11/19/20 Less SOB. Denies CP     Continue Rx for CHF, HTN, HLD, CAD  OV 3 months with BNP, BMP      EKG 3/6/21 A-sensed V-paced  K 3.8, , Cr 2.6     3/8/21 patient reports she heard an alarm with the AICD and did not receive a shock  Denies CP. SOB improved  BP stable   Decrease lasix to 80 AM only with rising Cr  Medtronic AICD check tomorrow  Continue Rx for CHF, CAD, HLD, HTN     3/9/21 Denies CP or SOB   Continue Rx for CHF, CAD, HLD, HTN  OV 3 months      6/10/21 Less SOB since discharge. Trying to follow low sodium diet  Denies CP  BP in good range   Continue Rx for CHF, CAD, HLD, HTN  OV 1 month with BNP, BMP      7/14/21 Denies CP, mild EVANS about the same, denies LE edema - takes lasix 80 qd     BNP up but  clinic al volume status stable   Continue Rx for CHF, CAD, HLD, HTN  OV 3 month with BNP, BMP     8/12/21 had PND and increased SOB since last night - improved some after taking lasix 80 this AM. Denies CP or LE edema   Increase lasix 80 bid for 3 days due to CHF and volume overload then 80 qd  OV 2 weeks with BNP, BMP  Needs to go to the ER for worsening SOB     Continue Rx for CHF, CAD, HLD, HTN     12/1/21 Reports increased EVANS for the past few days - increased lasix 80 bid which helped  EKG A-sensed BiV paced   BNP, BMP today  Continue lasix 80 bid for now  OV with Medtronic AICD check       Continue Rx for CHF, CAD, HLD, HTN  Needs to go to the ER for worsening EVANS        12/8/21 EVANS improved. Denies CP. Still taking lasix 80 bid   BNP still elevated but symptomatically improved  OV 2 weeks with Medtronic AICD check     Continue Rx for CHF, CAD, HLD, HTN     Admitted 4/1/22  Ms. Malave is an 86 year old woman with history of chronic systolic/diastolic chf, AICD, coronary artery disease, hypertension and CKDIII who presented for evaluation of shortness of breath.  She states she felt fine when she went to bed last night, but around midnight she woke to use the bathroom and was more short of breath than normal at that time.  She notes eating white beans and rice with sausage at dinner, prior to going to sleep.  She denies chest pain, cough, fever, leg swelling, nausea, vomiting, diarrhea, headache, palpitations, AICD discharge, light headedness, dizziness, dysuria and hematuria.  She has been taking all her medications.  She reports taking lasix this morning with some improvement.  Vitals appear stable and she is in no distress.  She was given additional IV lasix in ER and continues to improve, but states she still doesn't quite feel her breathing is back to normal.  Cr 1.6, BNP 27239     4/11/22 EVANS has improved since discharge. Takes lasix 80 qd with a 2nd dose prn  AICD was checked during last  admission  Denies CP      Continue Rx for CHF, CAD, HLD, HTN  OV 3 months with BNP, BMP  Next Medtronic AICD check 10/2022       5/12/22 Only taking lasix 80 qd - doesn't like 2nd dose because it keeps her up  Denies CP, mild EVANS  BP controlled   Add metolazone 2.5 MWF for volume overload  OV 2 weeks with BNP, BMP        Continue Rx for CHF, CAD, HLD, HTN     Admitted 7/2/22  Admitted to observation for acute on chronic systolic diastolic heart failure.  Patient improved after IV Lasix in ED.  Patient never required supplemental oxygen.  Again discuss low-salt diet, 1.2 L fluid intake and daily weights.  Repeat 2D echo showed EF of 20% similar to previous and grade 2 diastolic dysfunction.  Patient reports breathing back to baseline and ready for discharge.  Patient stable for discharge home with daughter.        7/11/22 Feels better since discharge  LE edema resolved  Taking lasix 80 bid   OV 1 month with BNP, BMP        Continue Rx for CHF, CAD, HLD, HTN     8/16/22 Labs not done. SOB improved. Denies LE edema  EKG A-sensed V-paced  BP controlled   BNP, BMP today - if stable then OV 3 months with BNP, BMP          Continue Rx for CHF, CAD, HLD, HTN    Admitted 10/9/22  Admission to observation for acute on chronic systolic heart failure.  EKG V paced.  Elevated BNP and troponin similar to previous labs most recent 2 days ago. One dose of IV lasix 60 mg given in ED with good UOP. SOB improved. Lung diminished at bases and BLE no edema. Breathing comfortable on RA 96%.  On discharge patient denies chest pain or shortness of breath. Review med list with Daughter Usama. Continue home lasix 80 mg daily and okay to take additional dose if gain 2-3 lbs in 2-3 days. 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 sCr 2.0 on discharge. Discussed importance of daily weight. Patient HDS for discharge home. Patient stable for discharge home with Daughter with close follow up Nephrologist  10/10 and  Cardiologist 11/11. Ambulatory referral [facility].    10/14/22 less SOB since discharge. LE edema resolved. Taking lasix 80 qd with a 2nd dose PRN  BP controlled    Review of Systems   Constitutional: Negative for decreased appetite.   HENT:  Negative for ear discharge.    Eyes:  Negative for blurred vision.   Respiratory:  Negative for hemoptysis.    Endocrine: Negative for polyphagia.   Hematologic/Lymphatic: Negative for adenopathy.   Skin:  Negative for color change.   Musculoskeletal:  Negative for joint swelling.   Genitourinary:  Negative for bladder incontinence.   Neurological:  Negative for brief paralysis.   Psychiatric/Behavioral:  Negative for hallucinations.    Allergic/Immunologic: Negative for hives.      Objective:    Physical Exam  Constitutional:       Appearance: She is well-developed.   HENT:      Head: Normocephalic and atraumatic.   Eyes:      Conjunctiva/sclera: Conjunctivae normal.      Pupils: Pupils are equal, round, and reactive to light.   Neck:      Thyroid: No thyromegaly.   Cardiovascular:      Rate and Rhythm: Normal rate and regular rhythm.      Heart sounds: No murmur heard.  Pulmonary:      Effort: Pulmonary effort is normal. No respiratory distress.      Breath sounds: Normal breath sounds.   Abdominal:      General: Bowel sounds are normal.      Palpations: Abdomen is soft.   Musculoskeletal:      Cervical back: Normal range of motion and neck supple.   Skin:     General: Skin is warm and dry.   Neurological:      Mental Status: She is alert and oriented to person, place, and time.   Psychiatric:         Behavior: Behavior normal.         Assessment:       1. Acute on chronic combined systolic and diastolic congestive heart failure    2. Biventricular AICD in place    3. Other forms of angina pectoris    4. Coronary artery disease involving native coronary artery of native heart without angina pectoris    5. Essential hypertension    6. Mixed hyperlipidemia         Plan:        OV 1 month with BNP, BMP          Continue Rx for CHF, CAD, HLD, HTN

## 2022-10-26 ENCOUNTER — CARE AT HOME (OUTPATIENT)
Dept: HOME HEALTH SERVICES | Facility: CLINIC | Age: 87
End: 2022-10-26
Payer: MEDICARE

## 2022-10-26 DIAGNOSIS — I50.9 ACUTE EXACERBATION OF CHF (CONGESTIVE HEART FAILURE): ICD-10-CM

## 2022-10-26 DIAGNOSIS — D63.8 ANEMIA OF CHRONIC DISEASE: Chronic | ICD-10-CM

## 2022-10-26 DIAGNOSIS — K21.9 GASTROESOPHAGEAL REFLUX DISEASE, UNSPECIFIED WHETHER ESOPHAGITIS PRESENT: Chronic | ICD-10-CM

## 2022-10-26 DIAGNOSIS — E78.2 MIXED HYPERLIPIDEMIA: Chronic | ICD-10-CM

## 2022-10-26 DIAGNOSIS — I50.42 CHRONIC COMBINED SYSTOLIC AND DIASTOLIC HEART FAILURE: Chronic | ICD-10-CM

## 2022-10-26 DIAGNOSIS — I10 ESSENTIAL HYPERTENSION: Primary | Chronic | ICD-10-CM

## 2022-10-26 PROCEDURE — 99350 PR HOME VISIT,ESTAB PATIENT,LEVEL IV: ICD-10-PCS | Mod: S$GLB,,, | Performed by: NURSE PRACTITIONER

## 2022-10-26 PROCEDURE — 99497 PR ADVNCD CARE PLAN 30 MIN: ICD-10-PCS | Mod: S$GLB,,, | Performed by: NURSE PRACTITIONER

## 2022-10-26 PROCEDURE — 99350 HOME/RES VST EST HIGH MDM 60: CPT | Mod: S$GLB,,, | Performed by: NURSE PRACTITIONER

## 2022-10-26 PROCEDURE — 99497 ADVNCD CARE PLAN 30 MIN: CPT | Mod: S$GLB,,, | Performed by: NURSE PRACTITIONER

## 2022-10-27 NOTE — PATIENT INSTRUCTIONS
Instructions:  - [facility] Nurse Practitioner to schedule home follow-up visit with patient in 8 weeks.  - Continue all medications, treatments and therapies as ordered.   - Follow all instructions, recommendations as discussed.  - Maintain Safety Precautions at all times.  - Attend all medical appointments as scheduled.  - For worsening symptoms: call Primary Care Physician or Nurse Practitioner.  - For emergencies, call 911 or immediately report to the nearest emergency room.  - Limit Risks of environmental exposure to coronavirus/COVID-19 as discussed including: social distancing, hand hygiene, and limiting departures from the home for necessities only.

## 2022-10-31 VITALS
HEART RATE: 86 BPM | DIASTOLIC BLOOD PRESSURE: 64 MMHG | SYSTOLIC BLOOD PRESSURE: 118 MMHG | TEMPERATURE: 98 F | OXYGEN SATURATION: 98 % | RESPIRATION RATE: 12 BRPM

## 2022-10-31 NOTE — PROGRESS NOTES
[facility]  Medical Home Visit    Visit Date: 10/26/2022  Encounter Provider: Roni Mike  PCP:  Qi Ramon MD    Subjective:      Patient ID: Palmira Malave is a 87 y.o. female.    Consult Requested By:  Maddie Perez  Reason for Consult:  Medical visit    Palmira is being seen at home due to being seen at home due to physical debility that presents a taxing effort to leave the home, to mitigate high risk of hospital readmission and/or due to the limited availability of reliable or safe options for transportation to the point of access to the provider. Prior to treatment on this visit the chart was reviewed and patient verbal consent was obtained.    Chief Complaint: Establish Care     Palmira Malave is an 88 y/o female with HTN, HLD, GERD, CKD III, anemia, heart failure s/p AICD. The patient is being seen in the home in conjunction with her PCP as it is difficult for her to transport physically to [facility].  The patient currently lives with her daughter on the [facility].  She denies a history of smoking or drinking.  Her son is her transportation to all her appointments.  The patient is  and has a total of 6 children, x5 living.  She previously worked as a home health aide.  No pets in the home.    With this visit today patient is found ambulating throughout her home with her walker.  She is alone for the visit, and daughter is at work. Patient is AAOx3, able to verify her name and , able to give some history along with her medical record.  The patient is generally weak and needs help with organization of medication.  Home health order placed. Patient endorses ability to partially perform ADLs. Endorses eating x 2 meals per day, regular Bms every other day, and adequate sleep pattern. Med rec done with daughter over the telephone. No additional needs at this this time. All of my information was given to the patient and family if any questions or concerns arise.     VSS.  Denies fever, chest pain, shortness of breath, nausea, vomiting, diarrhea. Risks of environmental exposure to coronavirus discussed including: social distancing, hand hygiene, and limiting departures from the home for necessities only.  Reports understanding and willingness to comply.  All hospital discharge orders reviewed and being followed, all medications reconciled and reviewed, patient and family verbalized understanding. No other needs identified at this time.      I initiated the process of advance care planning today and explained the importance of this process to the patient and family.  I introduced the concept of advance directives to the patient, as well. The patient has not previously appointed a HCPOA. Then the patient received detailed information about the importance of designating a Health Care Power of  (HCPOA). The patient was also instructed to communicate with this person about their wishes for future healthcare, should patient become sick and lose decision-making capacity. We spoke about ACP for 30 minutes.    Attestation: Screening criteria to assess the level of the patient's risk for infection with COVID-19 as recommended by the CDC at the time of the above documented home visit concluded appropriateness to proceed. Universal precautions were maintained at all times, including provider use of 60% alcohol gel hand  immediately prior to entry and upon departing the patient's home.    Review of Systems   Constitutional:  Negative for activity change and appetite change.   HENT:  Negative for congestion and dental problem.    Eyes:  Negative for discharge and itching.   Respiratory:  Negative for choking and chest tightness.    Cardiovascular:  Negative for chest pain and palpitations.   Gastrointestinal:  Negative for rectal pain and vomiting.   Endocrine: Negative for cold intolerance and heat intolerance.   Genitourinary:  Negative for enuresis and flank pain.    Musculoskeletal:  Positive for gait problem. Negative for myalgias and neck pain.   Skin:  Negative for color change and wound.   Allergic/Immunologic: Negative for environmental allergies and food allergies.   Neurological:  Positive for weakness. Negative for tremors and syncope.   Hematological:  Does not bruise/bleed easily.   Psychiatric/Behavioral:  Negative for decreased concentration and dysphoric mood.      Assessments:  Environmental: clutterd, dimly lit  Functional Status: mostly independent  Safety: mod to high fr  Nutritional: adequate  Home Health/DME/Supplies: cane, walker, bs commode, wc    Objective:   Physical Exam  Vitals reviewed.   Constitutional:       Appearance: She is well-developed.   HENT:      Head: Normocephalic and atraumatic.   Eyes:      Pupils: Pupils are equal, round, and reactive to light.   Cardiovascular:      Rate and Rhythm: Normal rate.   Pulmonary:      Effort: Pulmonary effort is normal.      Breath sounds: Normal breath sounds.   Abdominal:      General: Bowel sounds are normal.      Palpations: Abdomen is soft.   Musculoskeletal:         General: Normal range of motion.      Cervical back: Normal range of motion and neck supple.   Skin:     General: Skin is warm and dry.   Neurological:      Mental Status: She is alert. Mental status is at baseline.      GCS: GCS eye subscore is 4. GCS verbal subscore is 5. GCS motor subscore is 6.   Psychiatric:         Attention and Perception: Attention normal.         Mood and Affect: Mood normal.         Speech: Speech normal.     Vitals:    10/31/22 1032   BP: 118/64   Pulse: 86   Resp: 12   Temp: 98 °F (36.7 °C)   SpO2: 98%   PainSc: 0-No pain     There is no height or weight on file to calculate BMI.    Assessment:     1. Essential hypertension    2. Acute exacerbation of CHF (congestive heart failure)    3. Congestive heart failure, unspecified HF chronicity, unspecified heart failure type    4. Anemia of chronic disease    5.  Gastroesophageal reflux disease, unspecified whether esophagitis present    6. Mixed hyperlipidemia    7. Chronic combined systolic and diastolic heart failure        Plan:     Ethical / Legal: Advance Care Planning   Surrogate decision maker:  Name Usama Malave, Relationship: daughter  Code Status:  DNR  [place]:  none on file  Other advance directive:  none on file, Capacity to make medical decisions:  partial, Conflict none       1. Essential hypertension  Assessment & Plan:  --currently holding Coreg  --normotensive at visit      2. Acute exacerbation of CHF (congestive heart failure)  -     Ambulatory referral/consult to [facility]    3. Congestive heart failure, unspecified HF chronicity, unspecified heart failure type    4. Anemia of chronic disease    5. Gastroesophageal reflux disease, unspecified whether esophagitis present  Assessment & Plan:  --compliant with PPI      6. Mixed hyperlipidemia  Assessment & Plan:  --compliant with statin therapy        7. Chronic combined systolic and diastolic heart failure  Assessment & Plan:  --continue cardiac medicatinos  --Lasix with daily dose and prn  --AICD in place  --f/up with cardiology           Total face-to-face time was 50 minutes, >50% of this was spent on counseling and coordination of care. The following issues were discussed: primary and secondary diagnoses, co-morbidities, prescribed medications, treatment modalities, importance of compliance with medical advice, and directives for follow-up care.    Were controlled substances prescribed?  No    Follow Up Appointments:   Future Appointments   Date Time Provider Department Center   11/22/2022  9:30 AM Kelechi Richmond MD [facility] CARD[facility] [place]       Signature: Roni Mike NP

## 2022-10-31 NOTE — ASSESSMENT & PLAN NOTE
--continue cardiac medicatinos  --Lasix with daily dose and prn  --AICD in place  --f/up with cardiology

## 2022-11-16 DIAGNOSIS — Z12.31 OTHER SCREENING MAMMOGRAM: Primary | ICD-10-CM

## 2022-11-21 ENCOUNTER — LAB VISIT (OUTPATIENT)
Dept: LAB | Facility: HOSPITAL | Age: 87
End: 2022-11-21
Attending: INTERNAL MEDICINE
Payer: MEDICARE

## 2022-11-21 DIAGNOSIS — I25.10 CORONARY ARTERY DISEASE INVOLVING NATIVE CORONARY ARTERY OF NATIVE HEART WITHOUT ANGINA PECTORIS: ICD-10-CM

## 2022-11-21 DIAGNOSIS — Z95.810 ICD (IMPLANTABLE CARDIOVERTER-DEFIBRILLATOR), BIVENTRICULAR, IN SITU: Chronic | ICD-10-CM

## 2022-11-21 DIAGNOSIS — I20.89 OTHER FORMS OF ANGINA PECTORIS: ICD-10-CM

## 2022-11-21 DIAGNOSIS — I50.43 ACUTE ON CHRONIC COMBINED SYSTOLIC AND DIASTOLIC CONGESTIVE HEART FAILURE: ICD-10-CM

## 2022-11-21 DIAGNOSIS — E78.2 MIXED HYPERLIPIDEMIA: Chronic | ICD-10-CM

## 2022-11-21 DIAGNOSIS — I10 ESSENTIAL HYPERTENSION: Chronic | ICD-10-CM

## 2022-11-21 LAB
ANION GAP SERPL CALC-SCNC: 12 MMOL/L (ref 8–16)
BNP SERPL-MCNC: 3054 PG/ML (ref 0–99)
BUN SERPL-MCNC: 44 MG/DL (ref 8–23)
CALCIUM SERPL-MCNC: 10.2 MG/DL (ref 8.7–10.5)
CHLORIDE SERPL-SCNC: 95 MMOL/L (ref 95–110)
CO2 SERPL-SCNC: 26 MMOL/L (ref 23–29)
CREAT SERPL-MCNC: 1.6 MG/DL (ref 0.5–1.4)
EST. GFR  (NO RACE VARIABLE): 31 ML/MIN/1.73 M^2
GLUCOSE SERPL-MCNC: 84 MG/DL (ref 70–110)
POTASSIUM SERPL-SCNC: 4.3 MMOL/L (ref 3.5–5.1)
SODIUM SERPL-SCNC: 133 MMOL/L (ref 136–145)

## 2022-11-21 PROCEDURE — 83880 ASSAY OF NATRIURETIC PEPTIDE: CPT | Performed by: INTERNAL MEDICINE

## 2022-11-21 PROCEDURE — 80048 BASIC METABOLIC PNL TOTAL CA: CPT | Performed by: INTERNAL MEDICINE

## 2022-11-21 PROCEDURE — 36415 COLL VENOUS BLD VENIPUNCTURE: CPT | Mod: PO | Performed by: INTERNAL MEDICINE

## 2022-11-22 ENCOUNTER — TELEPHONE (OUTPATIENT)
Dept: CARDIOLOGY | Facility: CLINIC | Age: 87
End: 2022-11-22
Payer: MEDICARE

## 2022-11-22 NOTE — TELEPHONE ENCOUNTER
Returned call to patient regarding  to patient regarding cole. Patient without any cardiac symptoms of post hospital stay. She reports she is okay with the cole she has.       TYE Petty RN 11/22/22 0217          ----- Message from Doris Hinds sent at 11/22/2022 12:08 PM CST -----  Name of Who is Calling: NEAL HAINES [[id number]]            What is the request in detail: Patient is requesting call back to get same day appointment or sooner than next available 12/7               Can the clinic reply by [facility]: no              What Number to Call Back if not in [facility]: [phone]

## 2022-11-24 ENCOUNTER — HOSPITAL ENCOUNTER (EMERGENCY)
Facility: HOSPITAL | Age: 87
Discharge: HOME | End: 2022-11-25
Attending: INTERNAL MEDICINE
Payer: MEDICARE

## 2022-11-24 DIAGNOSIS — I50.9 ACUTE ON CHRONIC CONGESTIVE HEART FAILURE, UNSPECIFIED HEART FAILURE TYPE: Primary | ICD-10-CM

## 2022-11-24 DIAGNOSIS — R06.02 SHORTNESS OF BREATH: ICD-10-CM

## 2022-11-24 PROCEDURE — 99285 EMERGENCY DEPT VISIT HI MDM: CPT | Mod: 25

## 2022-11-24 PROCEDURE — 93005 ELECTROCARDIOGRAM TRACING: CPT

## 2022-11-24 PROCEDURE — 85025 COMPLETE CBC W/AUTO DIFF WBC: CPT | Performed by: INTERNAL MEDICINE

## 2022-11-24 PROCEDURE — 93010 EKG 12-LEAD: ICD-10-PCS | Mod: ,,, | Performed by: INTERNAL MEDICINE

## 2022-11-24 PROCEDURE — 93010 ELECTROCARDIOGRAM REPORT: CPT | Mod: ,,, | Performed by: INTERNAL MEDICINE

## 2022-11-24 PROCEDURE — 80053 COMPREHEN METABOLIC PANEL: CPT | Performed by: INTERNAL MEDICINE

## 2022-11-24 PROCEDURE — 83880 ASSAY OF NATRIURETIC PEPTIDE: CPT | Performed by: INTERNAL MEDICINE

## 2022-11-24 PROCEDURE — 84484 ASSAY OF TROPONIN QUANT: CPT | Performed by: INTERNAL MEDICINE

## 2022-11-25 VITALS
WEIGHT: 140 LBS | SYSTOLIC BLOOD PRESSURE: 132 MMHG | BODY MASS INDEX: 24.8 KG/M2 | TEMPERATURE: 98 F | OXYGEN SATURATION: 97 % | DIASTOLIC BLOOD PRESSURE: 64 MMHG | HEART RATE: 84 BPM | RESPIRATION RATE: 21 BRPM | HEIGHT: 63 IN

## 2022-11-25 LAB
ALBUMIN SERPL BCP-MCNC: 3.7 G/DL (ref 3.5–5.2)
ALP SERPL-CCNC: 107 U/L (ref 55–135)
ALT SERPL W/O P-5'-P-CCNC: 20 U/L (ref 10–44)
ANION GAP SERPL CALC-SCNC: 13 MMOL/L (ref 8–16)
AST SERPL-CCNC: 25 U/L (ref 10–40)
BASOPHILS # BLD AUTO: 0.03 K/UL (ref 0–0.2)
BASOPHILS NFR BLD: 0.6 % (ref 0–1.9)
BILIRUB SERPL-MCNC: 0.7 MG/DL (ref 0.1–1)
BNP SERPL-MCNC: 2772 PG/ML (ref 0–99)
BUN SERPL-MCNC: 47 MG/DL (ref 8–23)
CALCIUM SERPL-MCNC: 9.4 MG/DL (ref 8.7–10.5)
CHLORIDE SERPL-SCNC: 96 MMOL/L (ref 95–110)
CO2 SERPL-SCNC: 24 MMOL/L (ref 23–29)
CREAT SERPL-MCNC: 2.2 MG/DL (ref 0.5–1.4)
DIFFERENTIAL METHOD: ABNORMAL
EOSINOPHIL # BLD AUTO: 0.1 K/UL (ref 0–0.5)
EOSINOPHIL NFR BLD: 1.9 % (ref 0–8)
ERYTHROCYTE [DISTWIDTH] IN BLOOD BY AUTOMATED COUNT: 15.7 % (ref 11.5–14.5)
EST. GFR  (NO RACE VARIABLE): 21 ML/MIN/1.73 M^2
GLUCOSE SERPL-MCNC: 105 MG/DL (ref 70–110)
HCT VFR BLD AUTO: 31.2 % (ref 37–48.5)
HGB BLD-MCNC: 10.5 G/DL (ref 12–16)
IMM GRANULOCYTES # BLD AUTO: 0.01 K/UL (ref 0–0.04)
IMM GRANULOCYTES NFR BLD AUTO: 0.2 % (ref 0–0.5)
LYMPHOCYTES # BLD AUTO: 0.8 K/UL (ref 1–4.8)
LYMPHOCYTES NFR BLD: 17.5 % (ref 18–48)
MCH RBC QN AUTO: 31.7 PG (ref 27–31)
MCHC RBC AUTO-ENTMCNC: 33.7 G/DL (ref 32–36)
MCV RBC AUTO: 94 FL (ref 82–98)
MONOCYTES # BLD AUTO: 0.6 K/UL (ref 0.3–1)
MONOCYTES NFR BLD: 11.9 % (ref 4–15)
NEUTROPHILS # BLD AUTO: 3.3 K/UL (ref 1.8–7.7)
NEUTROPHILS NFR BLD: 67.9 % (ref 38–73)
NRBC BLD-RTO: 0 /100 WBC
PLATELET # BLD AUTO: 128 K/UL (ref 150–450)
PMV BLD AUTO: 10.4 FL (ref 9.2–12.9)
POTASSIUM SERPL-SCNC: 4.3 MMOL/L (ref 3.5–5.1)
PROT SERPL-MCNC: 6.8 G/DL (ref 6–8.4)
RBC # BLD AUTO: 3.31 M/UL (ref 4–5.4)
SODIUM SERPL-SCNC: 133 MMOL/L (ref 136–145)
TROPONIN I SERPL DL<=0.01 NG/ML-MCNC: 0.2 NG/ML (ref 0–0.03)
TROPONIN I SERPL DL<=0.01 NG/ML-MCNC: 0.22 NG/ML (ref 0–0.03)
WBC # BLD AUTO: 4.79 K/UL (ref 3.9–12.7)

## 2022-11-25 PROCEDURE — 63600175 PHARM REV CODE 636 W HCPCS: Performed by: INTERNAL MEDICINE

## 2022-11-25 PROCEDURE — 96374 THER/PROPH/DIAG INJ IV PUSH: CPT

## 2022-11-25 PROCEDURE — 84484 ASSAY OF TROPONIN QUANT: CPT | Performed by: INTERNAL MEDICINE

## 2022-11-25 RX ORDER — FUROSEMIDE 10 MG/ML
40 INJECTION INTRAMUSCULAR; INTRAVENOUS
Status: COMPLETED | OUTPATIENT
Start: 2022-11-25 | End: 2022-11-25

## 2022-11-25 RX ADMIN — FUROSEMIDE 40 MG: 10 INJECTION, SOLUTION INTRAMUSCULAR; INTRAVENOUS at 12:11

## 2022-11-25 NOTE — ED PROVIDER NOTES
"Encounter Date: 11/24/2022       History     Chief Complaint   Patient presents with    Shortness of Breath     Pt presents to ED c/o sob started around 2000.  Bilateral foot swelling.  Denies cp, ha, dizziness, weakness, n/v or any other symptoms.  Hx of cardiac. Denies home O2. Daughter reports giving lasix around 2030 tonight. Pain 0/10.      87-year-old female presents to the emergency department complaining shortness of breath which began around 8:00 p.m..  Patient states she ate "salty gumbo and dressing" during Thanksgiving meal approximately 3 hours prior to symptom onset.  She denies chest pain/fever/chills/nausea/vomiting.    The history is provided by the patient. No  was used.   Review of patient's allergies indicates:   Allergen Reactions    Aspirin Swelling     Swelling and rash    Colace [docusate sodium] Rash     itching    Docusate Other (See Comments) and Rash     itching    Sulfa (sulfonamide antibiotics) Swelling     Swelling and rash  Swelling and rash    Iodine and iodide containing products Rash    Penicillins Rash     Past Medical History:   Diagnosis Date    Cataract     CHF (congestive heart failure)     CKD (chronic kidney disease), stage III     Coronary artery disease     Gout attack     Hypercholesteremia     Hypertension     Renal disorder     Vaginal delivery     x4     Past Surgical History:   Procedure Laterality Date    APPENDECTOMY      CARDIAC DEFIBRILLATOR PLACEMENT      2015    CATARACT EXTRACTION W/  INTRAOCULAR LENS IMPLANT Left 02/07/2019    Dr. Velazco    CATARACT EXTRACTION W/  INTRAOCULAR LENS IMPLANT Right 02/21/2019    Dr. Velazco    CHOLECYSTECTOMY      COLONOSCOPY W/ POLYPECTOMY  10 or 11/ 2014    per Dr. Myrick    defribillator Left 8/2008    EYE SURGERY      HYSTERECTOMY      INTRAOCULAR PROSTHESES INSERTION Left 2/7/2019    Procedure: INSERTION, IOL PROSTHESIS;  Surgeon: Demetrius Velazco MD;  Location: [facility] OR;  Service: " Ophthalmology;  Laterality: Left;  RN Phone Pre OP 1-30-19.  Arrival 05:30 AM    INTRAOCULAR PROSTHESES INSERTION Right 2/21/2019    Procedure: INSERTION, IOL PROSTHESIS;  Surgeon: Demetrius Velazco MD;  Location: [facility] OR;  Service: Ophthalmology;  Laterality: Right;    JOINT REPLACEMENT Bilateral 2005 & 2006    2 Knee Replacements=Lt. 1= 2005. Rt. 1= 2006    OOPHORECTOMY      PHACOEMULSIFICATION OF CATARACT Left 2/7/2019    Procedure: PHACOEMULSIFICATION, CATARACT;  Surgeon: Demetrius Velazco MD;  Location: [facility] OR;  Service: Ophthalmology;  Laterality: Left;    PHACOEMULSIFICATION OF CATARACT Right 2/21/2019    Procedure: PHACOEMULSIFICATION, CATARACT;  Surgeon: Demetrius Velazco MD;  Location: [facility] OR;  Service: Ophthalmology;  Laterality: Right;  RN Phone Pre op 2-15-19.  Arrival 05:30 AM    TOTAL KNEE ARTHROPLASTY Bilateral Lt.= 2005; Rt.=2006    Bilateral Knee scope     Family History   Problem Relation Age of Onset    Heart attack Mother 88    Hypertension Mother     Hyperlipidemia Mother     Diabetes Other     Hypertension Other     Amblyopia Son     Blindness Neg Hx     Cataracts Neg Hx     Glaucoma Neg Hx     Macular degeneration Neg Hx     Retinal detachment Neg Hx     Strabismus Neg Hx      Social History     Tobacco Use    Smoking status: Never    Smokeless tobacco: Never   Substance Use Topics    Alcohol use: Yes     Comment: rarely    Drug use: No     Review of Systems   Constitutional:  Negative for chills and fever.   Respiratory:  Positive for shortness of breath. Negative for cough, chest tightness, wheezing and stridor.    Cardiovascular:  Positive for leg swelling. Negative for chest pain and palpitations.   Gastrointestinal:  Negative for abdominal pain, nausea and vomiting.   All other systems reviewed and are negative.    Physical Exam     Initial Vitals [11/24/22 2334]   BP Pulse Resp Temp SpO2   118/75 93 16 98.2 °F (36.8 °C) 97 %      MAP       --         Physical  Exam    Nursing note and vitals reviewed.  Constitutional: She is not diaphoretic. No distress.   HENT:   Head: Normocephalic and atraumatic.   Right Ear: External ear normal.   Left Ear: External ear normal.   Mouth/Throat: Oropharynx is clear and moist.   Eyes: Conjunctivae and EOM are normal. Right eye exhibits no discharge. Left eye exhibits no discharge.   Neck:   Normal range of motion.  Cardiovascular:  Normal rate, regular rhythm and normal heart sounds.           Pulmonary/Chest: No respiratory distress. She has no wheezes. She has no rhonchi. She has rales (Mild bibasilar rales).   Good air movement bilaterally without respiratory distress/accessory muscle uses   Abdominal: Abdomen is soft. Bowel sounds are normal. She exhibits no distension. There is no abdominal tenderness.   Musculoskeletal:         General: Normal range of motion.      Cervical back: Normal range of motion.     Neurological: She is alert. She has normal strength.   Skin: Skin is warm and dry. Capillary refill takes less than 2 seconds.   Psychiatric: She has a normal mood and affect. Thought content normal.       ED Course   Procedures  Labs Reviewed   CBC W/ AUTO DIFFERENTIAL - Abnormal; Notable for the following components:       Result Value    RBC 3.31 (*)     Hemoglobin 10.5 (*)     Hematocrit 31.2 (*)     MCH 31.7 (*)     RDW 15.7 (*)     Platelets 128 (*)     Lymph # 0.8 (*)     Lymph % 17.5 (*)     All other components within normal limits   COMPREHENSIVE METABOLIC PANEL - Abnormal; Notable for the following components:    Sodium 133 (*)     BUN 47 (*)     Creatinine 2.2 (*)     eGFR 21 (*)     All other components within normal limits   TROPONIN I - Abnormal; Notable for the following components:    Troponin I 0.221 (*)     All other components within normal limits   B-TYPE NATRIURETIC PEPTIDE - Abnormal; Notable for the following components:    BNP 2,772 (*)     All other components within normal limits   TROPONIN I -  Abnormal; Notable for the following components:    Troponin I 0.203 (*)     All other components within normal limits        ECG Results              EKG 12-lead (Final result)  Result time 11/25/22 06:49:10      Final result by Interface, Lab In [facility] (11/25/22 06:49:10)                   Narrative:    Test Reason : R06.02,    Vent. Rate : 089 BPM     Atrial Rate : 089 BPM     P-R Int : 156 ms          QRS Dur : 188 ms      QT Int : 466 ms       P-R-T Axes : 065 238 071 degrees     QTc Int : 566 ms    AS-BiV paced with PVC  Abnormal ECG  When compared with ECG of 09-OCT-2022 01:37,  No significant change was found    Confirmed by Parviz Myers MD ([id number]) on 11/25/2022 6:49:02 AM    Referred By: MIGUEL ÁNGEL   SELF           Confirmed By:Parviz Myers MD                                  Imaging Results              X-Ray Chest AP Portable (Final result)  Result time 11/25/22 00:18:13      Final result by Po Brennan MD (11/25/22 00:18:13)                   Impression:      Stable cardiomegaly with possible mild pulmonary interstitial edema.      Electronically signed by: Po Brennan MD  Date:    11/25/2022  Time:    00:18               Narrative:    EXAMINATION:  XR CHEST AP PORTABLE    CLINICAL HISTORY:  CHF;    TECHNIQUE:  Single frontal view of the chest was performed.    COMPARISON:  10/09/2022.    FINDINGS:  Cardiac silhouette is enlarged but stable in size.  Left-sided pacer device is seen.  Lungs are symmetrically expanded.  There is prominence of central pulmonary vasculature with mild perihilar opacity and increased interstitial attenuation.  No evidence of new focal consolidative process, pneumothorax, or large pleural effusion.  No acute osseous abnormality identified.                                       Medications   furosemide injection 40 mg (40 mg Intravenous Given 11/25/22 0049)     Medical Decision Making:   Initial Assessment:   87-year-old female presents to the emergency department  "complaining shortness of breath which began around 8:00 p.m..  Patient states she ate "salty gumbo and dressing" during Thanksgiving meal approximately 3 hours prior to symptom onset.  She denies chest pain/fever/chills/nausea/vomiting.  ED Management:  Course of ED stay:  1. Cardiovascular-patient has denied chest pain throughout ED stay.  She is been hemodynamically stable.  Troponin was slightly elevated (0.221), but patient has a history of chronically elevated troponin.  Will trend 2nd troponin in the ED. EKG shows no acute changes.  2. Pulmonary-patient has had normal O2 sats on room air throughout ED stay.  She has not exhibited any signs or symptoms of moderate to severe respiratory distress.  Chest x-ray shows possible mild pulmonary interstitial edema.  BNP was elevated (2700) Lasix was given in the emergency department and patient states she feels better after diuresis.  3. Hematology/infectious disease-CBC is reassuring and patient has been afebrile throughout ED stay.  4. GI/nutrition/renal/endocrine-CMP shows chronic, stable decreased kidney function.  Second troponin was decreased from 1st.  Patient states breathing is much improved after diuresis, continues to deny chest pain and states she would like to follow-up with her PCP next week for re-evaluation.  She was given strict instructions to return to the emergency department if condition worsens.                        Clinical Impression:   Final diagnoses:  [R06.02] Shortness of breath  [I50.9] Acute on chronic congestive heart failure, unspecified heart failure type (Primary)        ED Disposition Condition    Discharge Stable          ED Prescriptions    None       Follow-up Information       Follow up With Specialties Details Why Contact Info    Qi Ramon MD Internal Medicine Schedule an appointment as soon as possible for a visit in 3 days For reevaluation [address] [place] [address]  [id number]  [place] [address]  [phone]        "        Mario Alberto Marquis MD  11/25/22 2013

## 2022-12-01 ENCOUNTER — HOSPITAL ENCOUNTER (OUTPATIENT)
Dept: RADIOLOGY | Facility: HOSPITAL | Age: 87
Discharge: HOME | DRG: 291 | End: 2022-12-01
Attending: INTERNAL MEDICINE
Payer: MEDICARE

## 2022-12-01 DIAGNOSIS — Z12.31 OTHER SCREENING MAMMOGRAM: ICD-10-CM

## 2022-12-01 PROCEDURE — 77067 MAMMO DIGITAL SCREENING BILAT WITH TOMO: ICD-10-PCS | Mod: 26,,, | Performed by: RADIOLOGY

## 2022-12-01 PROCEDURE — 77067 SCR MAMMO BI INCL CAD: CPT | Mod: TC

## 2022-12-01 PROCEDURE — 77063 BREAST TOMOSYNTHESIS BI: CPT | Mod: TC

## 2022-12-01 PROCEDURE — 77063 BREAST TOMOSYNTHESIS BI: CPT | Mod: 26,,, | Performed by: RADIOLOGY

## 2022-12-01 PROCEDURE — 77067 SCR MAMMO BI INCL CAD: CPT | Mod: 26,,, | Performed by: RADIOLOGY

## 2022-12-01 PROCEDURE — 77063 MAMMO DIGITAL SCREENING BILAT WITH TOMO: ICD-10-PCS | Mod: 26,,, | Performed by: RADIOLOGY

## 2022-12-03 ENCOUNTER — HOSPITAL ENCOUNTER (INPATIENT)
Facility: HOSPITAL | Age: 87
LOS: 3 days | Discharge: HOME HEALTH | DRG: 291 | End: 2022-12-06
Attending: EMERGENCY MEDICINE | Admitting: STUDENT IN AN ORGANIZED HEALTH CARE EDUCATION/TRAINING PROGRAM
Payer: MEDICARE

## 2022-12-03 DIAGNOSIS — I48.91 ATRIAL FIBRILLATION, UNSPECIFIED TYPE: ICD-10-CM

## 2022-12-03 DIAGNOSIS — R06.02 SOB (SHORTNESS OF BREATH): ICD-10-CM

## 2022-12-03 DIAGNOSIS — R06.02 SHORTNESS OF BREATH: ICD-10-CM

## 2022-12-03 DIAGNOSIS — I50.9 CHF (CONGESTIVE HEART FAILURE): ICD-10-CM

## 2022-12-03 DIAGNOSIS — I50.23 ACUTE ON CHRONIC SYSTOLIC CONGESTIVE HEART FAILURE: Primary | ICD-10-CM

## 2022-12-03 LAB
ALBUMIN SERPL BCP-MCNC: 3.9 G/DL (ref 3.5–5.2)
ALP SERPL-CCNC: 98 U/L (ref 55–135)
ALT SERPL W/O P-5'-P-CCNC: 17 U/L (ref 10–44)
ANION GAP SERPL CALC-SCNC: 11 MMOL/L (ref 8–16)
AST SERPL-CCNC: 25 U/L (ref 10–40)
BASOPHILS # BLD AUTO: 0.03 K/UL (ref 0–0.2)
BASOPHILS NFR BLD: 0.5 % (ref 0–1.9)
BILIRUB SERPL-MCNC: 1 MG/DL (ref 0.1–1)
BNP SERPL-MCNC: 4000 PG/ML (ref 0–99)
BUN SERPL-MCNC: 59 MG/DL (ref 8–23)
CALCIUM SERPL-MCNC: 10.1 MG/DL (ref 8.7–10.5)
CHLORIDE SERPL-SCNC: 98 MMOL/L (ref 95–110)
CO2 SERPL-SCNC: 25 MMOL/L (ref 23–29)
CREAT SERPL-MCNC: 2 MG/DL (ref 0.5–1.4)
DIFFERENTIAL METHOD: ABNORMAL
EOSINOPHIL # BLD AUTO: 0.1 K/UL (ref 0–0.5)
EOSINOPHIL NFR BLD: 1.2 % (ref 0–8)
ERYTHROCYTE [DISTWIDTH] IN BLOOD BY AUTOMATED COUNT: 15.6 % (ref 11.5–14.5)
EST. GFR  (NO RACE VARIABLE): 24 ML/MIN/1.73 M^2
GLUCOSE SERPL-MCNC: 112 MG/DL (ref 70–110)
HCT VFR BLD AUTO: 31.7 % (ref 37–48.5)
HGB BLD-MCNC: 10.7 G/DL (ref 12–16)
IMM GRANULOCYTES # BLD AUTO: 0.02 K/UL (ref 0–0.04)
IMM GRANULOCYTES NFR BLD AUTO: 0.3 % (ref 0–0.5)
LYMPHOCYTES # BLD AUTO: 0.6 K/UL (ref 1–4.8)
LYMPHOCYTES NFR BLD: 9.4 % (ref 18–48)
MAGNESIUM SERPL-MCNC: 2.1 MG/DL (ref 1.6–2.6)
MCH RBC QN AUTO: 31.5 PG (ref 27–31)
MCHC RBC AUTO-ENTMCNC: 33.8 G/DL (ref 32–36)
MCV RBC AUTO: 93 FL (ref 82–98)
MONOCYTES # BLD AUTO: 0.6 K/UL (ref 0.3–1)
MONOCYTES NFR BLD: 10.4 % (ref 4–15)
NEUTROPHILS # BLD AUTO: 4.6 K/UL (ref 1.8–7.7)
NEUTROPHILS NFR BLD: 78.2 % (ref 38–73)
NRBC BLD-RTO: 0 /100 WBC
PLATELET # BLD AUTO: 133 K/UL (ref 150–450)
PMV BLD AUTO: 10.8 FL (ref 9.2–12.9)
POTASSIUM SERPL-SCNC: 4.4 MMOL/L (ref 3.5–5.1)
PROT SERPL-MCNC: 7 G/DL (ref 6–8.4)
RBC # BLD AUTO: 3.4 M/UL (ref 4–5.4)
SODIUM SERPL-SCNC: 134 MMOL/L (ref 136–145)
TROPONIN I SERPL DL<=0.01 NG/ML-MCNC: 0.19 NG/ML (ref 0–0.03)
WBC # BLD AUTO: 5.88 K/UL (ref 3.9–12.7)

## 2022-12-03 PROCEDURE — 93010 EKG 12-LEAD: ICD-10-PCS | Mod: ,,, | Performed by: INTERNAL MEDICINE

## 2022-12-03 PROCEDURE — 83735 ASSAY OF MAGNESIUM: CPT | Performed by: EMERGENCY MEDICINE

## 2022-12-03 PROCEDURE — 21400001 HC TELEMETRY ROOM

## 2022-12-03 PROCEDURE — 85025 COMPLETE CBC W/AUTO DIFF WBC: CPT | Mod: 91 | Performed by: EMERGENCY MEDICINE

## 2022-12-03 PROCEDURE — 96374 THER/PROPH/DIAG INJ IV PUSH: CPT

## 2022-12-03 PROCEDURE — 93010 ELECTROCARDIOGRAM REPORT: CPT | Mod: ,,, | Performed by: INTERNAL MEDICINE

## 2022-12-03 PROCEDURE — 63600175 PHARM REV CODE 636 W HCPCS: Performed by: EMERGENCY MEDICINE

## 2022-12-03 PROCEDURE — 99285 EMERGENCY DEPT VISIT HI MDM: CPT | Mod: 25

## 2022-12-03 PROCEDURE — 80053 COMPREHEN METABOLIC PANEL: CPT | Performed by: EMERGENCY MEDICINE

## 2022-12-03 PROCEDURE — 84484 ASSAY OF TROPONIN QUANT: CPT | Performed by: EMERGENCY MEDICINE

## 2022-12-03 PROCEDURE — 25000003 PHARM REV CODE 250: Performed by: PHYSICIAN ASSISTANT

## 2022-12-03 PROCEDURE — 93005 ELECTROCARDIOGRAM TRACING: CPT

## 2022-12-03 PROCEDURE — 83880 ASSAY OF NATRIURETIC PEPTIDE: CPT | Performed by: EMERGENCY MEDICINE

## 2022-12-03 RX ORDER — SODIUM CHLORIDE 0.9 % (FLUSH) 0.9 %
10 SYRINGE (ML) INJECTION
Status: DISCONTINUED | OUTPATIENT
Start: 2022-12-03 | End: 2022-12-06 | Stop reason: HOSPADM

## 2022-12-03 RX ORDER — ACETAMINOPHEN 500 MG
500 TABLET ORAL EVERY 6 HOURS PRN
Status: DISCONTINUED | OUTPATIENT
Start: 2022-12-03 | End: 2022-12-06 | Stop reason: HOSPADM

## 2022-12-03 RX ORDER — CARVEDILOL 6.25 MG/1
6.25 TABLET ORAL 2 TIMES DAILY
Status: DISCONTINUED | OUTPATIENT
Start: 2022-12-03 | End: 2022-12-04

## 2022-12-03 RX ORDER — FUROSEMIDE 10 MG/ML
80 INJECTION INTRAMUSCULAR; INTRAVENOUS
Status: COMPLETED | OUTPATIENT
Start: 2022-12-03 | End: 2022-12-03

## 2022-12-03 RX ORDER — CLOPIDOGREL BISULFATE 75 MG/1
75 TABLET ORAL DAILY
Status: DISCONTINUED | OUTPATIENT
Start: 2022-12-04 | End: 2022-12-06 | Stop reason: HOSPADM

## 2022-12-03 RX ORDER — PRAVASTATIN SODIUM 40 MG/1
40 TABLET ORAL DAILY
Status: DISCONTINUED | OUTPATIENT
Start: 2022-12-04 | End: 2022-12-06 | Stop reason: HOSPADM

## 2022-12-03 RX ORDER — DEXTROMETHORPHAN HYDROBROMIDE, GUAIFENESIN 5; 100 MG/5ML; MG/5ML
1300 LIQUID ORAL 2 TIMES DAILY
Status: ON HOLD | COMMUNITY
End: 2022-12-06 | Stop reason: HOSPADM

## 2022-12-03 RX ORDER — FUROSEMIDE 10 MG/ML
40 INJECTION INTRAMUSCULAR; INTRAVENOUS
Status: DISCONTINUED | OUTPATIENT
Start: 2022-12-04 | End: 2022-12-04

## 2022-12-03 RX ORDER — TRAMADOL HYDROCHLORIDE 50 MG/1
50 TABLET ORAL DAILY PRN
Status: DISCONTINUED | OUTPATIENT
Start: 2022-12-03 | End: 2022-12-06 | Stop reason: HOSPADM

## 2022-12-03 RX ORDER — FUROSEMIDE 10 MG/ML
60 INJECTION INTRAMUSCULAR; INTRAVENOUS
Status: DISCONTINUED | OUTPATIENT
Start: 2022-12-04 | End: 2022-12-03

## 2022-12-03 RX ORDER — TALC
6 POWDER (GRAM) TOPICAL NIGHTLY PRN
Status: DISCONTINUED | OUTPATIENT
Start: 2022-12-03 | End: 2022-12-06 | Stop reason: HOSPADM

## 2022-12-03 RX ADMIN — CARVEDILOL 6.25 MG: 6.25 TABLET, FILM COATED ORAL at 09:12

## 2022-12-03 RX ADMIN — ISOSORBIDE DINITRATE: 20 TABLET ORAL at 09:12

## 2022-12-03 RX ADMIN — FUROSEMIDE 80 MG: 10 INJECTION, SOLUTION INTRAMUSCULAR; INTRAVENOUS at 05:12

## 2022-12-03 NOTE — ED PROVIDER NOTES
"Encounter Date: 12/3/2022    SCRIBE #1 NOTE: I, Naima Lai, am scribing for, and in the presence of,  Gabbie Schreiber MD.     History     Chief Complaint   Patient presents with    Shortness of Breath     Patient reports sudden onset of SOB today, was discharged today with CHF     Palmira Malave is a 87 y.o. female, with a PMHx of CHF (ef 20%), CKD, CAD, HTN, HLD, pacemaker placement, who presents to the ED with shortness of breath. Patient was just admitted last PM and discharged this AM from observation status at this facility around 9:30 AM today. States she was doing well and had received her Lasix, prior to her discharge. After she went home, she started experiencing acute shortness of breath by just ambulating to the restroom, noting she was "panting and feels like I just ran". She doesn't usually experience dyspnea just by ambulating to the bathroom at home, but is not able to ambulate far at baseline. She further notes a mild cough. Patient further reports severe right hip and knee pain, worsened with ambulation, this has been chronic for the last few months, she takes tramadol for this at home.. She does note h/o right knee replacement and arthritis. She denies any recent falls or injuries.  No other exacerbating or alleviating factors. Denies chest pain, fever, or other associated symptoms. Patient is compliant on allopurinol, carvedilol, Plavix, lasix, bidil, lovastatin, mirabegron, gabapentin, Protonix, tramadol daily medications, states she received all her AM medications today prior to discharge except her antihypertensives, as she was slightly hypotensive before discharge today. Patient denies EtOH, tobacco, or illicit drug use. Patient reports a PSHx of eye surgery, appendectomy, cardiac defibrillator placement, cholecystectomy, colonoscopy with polypectomy, hysterectomy, bilateral joint replacement, oophorectomy, bilateral total knee arthroplasty. Patient has drug allergies Aspirin, Colace, " Docusate, Sulfa, Iodine, Penicillin.    The history is provided by the patient.   Review of patient's allergies indicates:   Allergen Reactions    Aspirin Swelling     Swelling and rash    Colace [docusate sodium] Rash     itching    Docusate Other (See Comments) and Rash     itching    Sulfa (sulfonamide antibiotics) Swelling     Swelling and rash  Swelling and rash    Iodine and iodide containing products Rash    Penicillins Rash     Past Medical History:   Diagnosis Date    Cataract     CHF (congestive heart failure)     CKD (chronic kidney disease), stage III     Coronary artery disease     Gout attack     Hypercholesteremia     Hypertension     Renal disorder     Vaginal delivery     x4     Past Surgical History:   Procedure Laterality Date    APPENDECTOMY      CARDIAC DEFIBRILLATOR PLACEMENT      2015    CATARACT EXTRACTION W/  INTRAOCULAR LENS IMPLANT Left 02/07/2019    Dr. Velazco    CATARACT EXTRACTION W/  INTRAOCULAR LENS IMPLANT Right 02/21/2019    Dr. Velazco    CHOLECYSTECTOMY      COLONOSCOPY W/ POLYPECTOMY  10 or 11/ 2014    per Dr. Myrick    defribrandonlator Left 8/2008    EYE SURGERY      HYSTERECTOMY      INTRAOCULAR PROSTHESES INSERTION Left 2/7/2019    Procedure: INSERTION, IOL PROSTHESIS;  Surgeon: Demetrius Velazco MD;  Location: [facility] OR;  Service: Ophthalmology;  Laterality: Left;  RN Phone Pre OP 1-30-19.  Arrival 05:30 AM    INTRAOCULAR PROSTHESES INSERTION Right 2/21/2019    Procedure: INSERTION, IOL PROSTHESIS;  Surgeon: Demetrius Velazco MD;  Location: [facility] OR;  Service: Ophthalmology;  Laterality: Right;    JOINT REPLACEMENT Bilateral 2005 & 2006    2 Knee Replacements=Lt. 1= 2005. Rt. 1= 2006    OOPHORECTOMY      PHACOEMULSIFICATION OF CATARACT Left 2/7/2019    Procedure: PHACOEMULSIFICATION, CATARACT;  Surgeon: Demetrius Velazco MD;  Location: [facility] OR;  Service: Ophthalmology;  Laterality: Left;    PHACOEMULSIFICATION OF CATARACT Right 2/21/2019    Procedure:  PHACOEMULSIFICATION, CATARACT;  Surgeon: Demetrius Velazco MD;  Location: [facility];  Service: Ophthalmology;  Laterality: Right;  RN Phone Pre op 2-15-19.  Arrival 05:30 AM    TOTAL KNEE ARTHROPLASTY Bilateral Lt.= 2005; Rt.=2006    Bilateral Knee scope     Family History   Problem Relation Age of Onset    Heart attack Mother 88    Hypertension Mother     Hyperlipidemia Mother     Diabetes Other     Hypertension Other     Amblyopia Son     Blindness Neg Hx     Cataracts Neg Hx     Glaucoma Neg Hx     Macular degeneration Neg Hx     Retinal detachment Neg Hx     Strabismus Neg Hx      Social History     Tobacco Use    Smoking status: Never    Smokeless tobacco: Never   Substance Use Topics    Alcohol use: Not Currently    Drug use: No     Review of Systems   Constitutional:  Negative for chills, diaphoresis and fever.   Eyes:  Negative for photophobia and visual disturbance.   Respiratory:  Positive for cough and shortness of breath.    Cardiovascular:  Negative for chest pain and leg swelling.   Gastrointestinal:  Negative for abdominal pain, blood in stool, constipation, diarrhea, nausea and vomiting.   Genitourinary:  Negative for dysuria, flank pain, frequency, hematuria and urgency.   Musculoskeletal:  Positive for arthralgias (right hip and knee). Negative for neck pain and neck stiffness.   Skin:  Negative for rash and wound.   Neurological:  Negative for weakness, light-headedness, numbness and headaches.   Psychiatric/Behavioral:  Negative for confusion and suicidal ideas.    All other systems reviewed and are negative.    Physical Exam     Initial Vitals   BP Pulse Resp Temp SpO2   12/03/22 1407 12/03/22 1407 12/03/22 1407 12/03/22 1409 12/03/22 1407   129/72 86 (!) 22 98 °F (36.7 °C) 97 %      MAP       --                Physical Exam    Nursing note and vitals reviewed.  Constitutional: She appears well-developed and well-nourished. She is not diaphoretic. No distress.   HENT:   Head: Normocephalic  and atraumatic.   Mouth/Throat: Oropharynx is clear and moist. No oropharyngeal exudate.   Eyes: Conjunctivae and EOM are normal. Pupils are equal, round, and reactive to light. Right eye exhibits no discharge. Left eye exhibits no discharge.   Neck: Neck supple. JVD present.   Mild JVD.    Normal range of motion.  Cardiovascular:  Normal rate, regular rhythm, normal heart sounds and intact distal pulses.     Exam reveals no gallop and no friction rub.       No murmur heard.  Pulmonary/Chest: No respiratory distress. She has no wheezes. She has no rhonchi. She has rales.   Rales. Left chest wall with pacemaker in place.    Abdominal: Abdomen is soft. Bowel sounds are normal. She exhibits no distension. There is no abdominal tenderness. There is no rebound and no guarding.   Musculoskeletal:         General: Edema present. No tenderness.      Cervical back: Normal range of motion and neck supple.      Comments: Trace nonpitting edema to BLE.     FROM of the right hip without pain. NVI distally.      Lymphadenopathy:     She has no cervical adenopathy.   Neurological: She is alert and oriented to person, place, and time. She has normal strength. No cranial nerve deficit or sensory deficit. GCS score is 15. GCS eye subscore is 4. GCS verbal subscore is 5. GCS motor subscore is 6.   Skin: Skin is warm and dry. Capillary refill takes less than 2 seconds.   Psychiatric: She has a normal mood and affect. Thought content normal.       ED Course   Procedures  Labs Reviewed   CBC W/ AUTO DIFFERENTIAL - Abnormal; Notable for the following components:       Result Value    RBC 3.40 (*)     Hemoglobin 10.7 (*)     Hematocrit 31.7 (*)     MCH 31.5 (*)     RDW 15.6 (*)     Platelets 133 (*)     Lymph # 0.6 (*)     Gran % 78.2 (*)     Lymph % 9.4 (*)     All other components within normal limits   COMPREHENSIVE METABOLIC PANEL - Abnormal; Notable for the following components:    Sodium 134 (*)     Glucose 112 (*)     BUN 59 (*)      Creatinine 2.0 (*)     eGFR 24 (*)     All other components within normal limits   B-TYPE NATRIURETIC PEPTIDE - Abnormal; Notable for the following components:    BNP 4,000 (*)     All other components within normal limits   TROPONIN I - Abnormal; Notable for the following components:    Troponin I 0.189 (*)     All other components within normal limits   MAGNESIUM   MAGNESIUM   TROPONIN I          Imaging Results              X-Ray Chest AP Portable (Final result)  Result time 12/03/22 15:12:40      Final result by Guzman Cueto MD (12/03/22 15:12:40)                   Impression:      Stable cardiomegaly with probable pulmonary edema.  Follow-up recommended.      Electronically signed by: Guzman Cueto  Date:    12/03/2022  Time:    15:12               Narrative:    EXAMINATION:  XR CHEST AP PORTABLE    CLINICAL HISTORY:  Shortness of breath    TECHNIQUE:  Single frontal view of the chest was performed.    COMPARISON:  12/02/2022    FINDINGS:  Cardiac silhouette is enlarged similar to the prior study.  Aortic atherosclerosis.    Mild interstitial and ground-glass changes bilaterally could represent pulmonary edema.    Possible trace effusions bilaterally.  No mass or consolidation.    No evidence of pneumothorax.  No acute osseous abnormality.    No significant change.                                       Medications   sodium chloride 0.9% flush 10 mL (has no administration in time range)   acetaminophen tablet 500 mg (has no administration in time range)   melatonin tablet 6 mg (has no administration in time range)   carvediloL tablet 6.25 mg (has no administration in time range)   clopidogreL tablet 75 mg (has no administration in time range)   hydrALAZINE (APRESOLINE) 35 mg, isosorbide dinitrate (ISORDIL) 20 mg for BIDIL 20/37.5 (has no administration in time range)   pravastatin tablet 40 mg (has no administration in time range)   traMADoL tablet 50 mg (has no administration in time range)   furosemide  injection 40 mg (has no administration in time range)   furosemide injection 80 mg (80 mg Intravenous Given 12/3/22 1700)     Medical Decision Making:   History:   Old Medical Records: I decided to obtain old medical records.  Old Records Summarized: other records and records from previous admission(s).       <> Summary of Records: Patient was just admitted to this facility for observation from the ED yesterday. Yesterday, her troponin was 0.2, BNP was 2856, Cr 1.8, chest x-ray without acute process, EKG without ST elevaiton. She received IV lasix overnight, and her shortness of breath resolved, and BLE edema improved, and discharged with cardiology follow up.   Independently Interpreted Test(s):   I have ordered and independently interpreted EKG Reading(s) - see summary below  Clinical Tests:   Lab Tests: Ordered and Reviewed  Radiological Study: Ordered and Reviewed  Medical Tests: Ordered and Reviewed     MDM  Pt presenting 2/2 rales in fluid overload and shortness of breath consistent with CHF exacerbation.  Patient is given IV Lasix.  Considered nitroglycerin.  If worsening respiratory status will consider BiPAP and/or intubation.    Also considered but less likely:  Acs: ekg doesn't show stemi. Troponin ordered and elevated significantly but unchanged from her troponin yesterday. Patient has CKD which may be contributing, she denies CP. She is allergic to ASA so not given.   Pna: symptoms bilaterally and no fevers.   Bronchitis: considered but hpi most c/w CHF  COPD:  Considered but less likely  Pneumothorax: bilateral breath sounds    BNP increased from yesterday.     Will admit patient for further management and care and further diuresis. This was discussed with Reji with [facility] medicine who will see this patient and discuss with Dr. Feliciano as he previously cared for her. Patient and daughter in agreement with plan and all questions answered.        Scribe Attestation:   Scribe #1: I performed the above  scribed service and the documentation accurately describes the services I performed. I attest to the accuracy of the note.      ED Course as of 12/03/22 2011   Sat Dec 03, 2022   1712 EKG 12-lead [JT]   1714 EKG 12-lead  AV dual paced rhythm with PVCs and PACs at 78.  Given this paced rhythm no clear ST elevation or depression.  T-wave inversions in V6.  Difficult to read given  [JT]      ED Course User Index  [JT] Gabbie Schreiber MD                   Clinical Impression:   Final diagnoses:  [R06.02] Shortness of breath  [R06.02] SOB (shortness of breath)  [I50.9] CHF (congestive heart failure)        ED Disposition Condition    Admit         I, Gabbie Schreiber, personally performed the services described in this documentation. All medical record entries made by the scribe were at my direction and in my presence. I have reviewed the chart and agree that the record reflects my personal performance and is accurate and complete.          Gabbie Schreiber MD  12/03/22 2011

## 2022-12-04 LAB
ANION GAP SERPL CALC-SCNC: 7 MMOL/L (ref 8–16)
BASOPHILS # BLD AUTO: 0.04 K/UL (ref 0–0.2)
BASOPHILS NFR BLD: 0.6 % (ref 0–1.9)
BUN SERPL-MCNC: 57 MG/DL (ref 8–23)
CALCIUM SERPL-MCNC: 9.9 MG/DL (ref 8.7–10.5)
CHLORIDE SERPL-SCNC: 98 MMOL/L (ref 95–110)
CO2 SERPL-SCNC: 28 MMOL/L (ref 23–29)
CREAT SERPL-MCNC: 1.7 MG/DL (ref 0.5–1.4)
DIFFERENTIAL METHOD: ABNORMAL
EOSINOPHIL # BLD AUTO: 0.1 K/UL (ref 0–0.5)
EOSINOPHIL NFR BLD: 1 % (ref 0–8)
ERYTHROCYTE [DISTWIDTH] IN BLOOD BY AUTOMATED COUNT: 15.8 % (ref 11.5–14.5)
EST. GFR  (NO RACE VARIABLE): 29 ML/MIN/1.73 M^2
GLUCOSE SERPL-MCNC: 101 MG/DL (ref 70–110)
HCT VFR BLD AUTO: 33 % (ref 37–48.5)
HGB BLD-MCNC: 10.6 G/DL (ref 12–16)
IMM GRANULOCYTES # BLD AUTO: 0.02 K/UL (ref 0–0.04)
IMM GRANULOCYTES NFR BLD AUTO: 0.3 % (ref 0–0.5)
LYMPHOCYTES # BLD AUTO: 0.6 K/UL (ref 1–4.8)
LYMPHOCYTES NFR BLD: 9.2 % (ref 18–48)
MCH RBC QN AUTO: 30.5 PG (ref 27–31)
MCHC RBC AUTO-ENTMCNC: 32.1 G/DL (ref 32–36)
MCV RBC AUTO: 95 FL (ref 82–98)
MONOCYTES # BLD AUTO: 0.6 K/UL (ref 0.3–1)
MONOCYTES NFR BLD: 9.6 % (ref 4–15)
NEUTROPHILS # BLD AUTO: 4.9 K/UL (ref 1.8–7.7)
NEUTROPHILS NFR BLD: 79.3 % (ref 38–73)
NRBC BLD-RTO: 0 /100 WBC
PLATELET # BLD AUTO: 134 K/UL (ref 150–450)
PMV BLD AUTO: 11.2 FL (ref 9.2–12.9)
POTASSIUM SERPL-SCNC: 3.9 MMOL/L (ref 3.5–5.1)
RBC # BLD AUTO: 3.48 M/UL (ref 4–5.4)
SODIUM SERPL-SCNC: 133 MMOL/L (ref 136–145)
TROPONIN I SERPL DL<=0.01 NG/ML-MCNC: 0.18 NG/ML (ref 0–0.03)
WBC # BLD AUTO: 6.22 K/UL (ref 3.9–12.7)

## 2022-12-04 PROCEDURE — 80048 BASIC METABOLIC PNL TOTAL CA: CPT | Performed by: PHYSICIAN ASSISTANT

## 2022-12-04 PROCEDURE — 63600175 PHARM REV CODE 636 W HCPCS: Performed by: PHYSICIAN ASSISTANT

## 2022-12-04 PROCEDURE — 36415 COLL VENOUS BLD VENIPUNCTURE: CPT | Performed by: PHYSICIAN ASSISTANT

## 2022-12-04 PROCEDURE — 85025 COMPLETE CBC W/AUTO DIFF WBC: CPT | Performed by: PHYSICIAN ASSISTANT

## 2022-12-04 PROCEDURE — 25000003 PHARM REV CODE 250: Performed by: STUDENT IN AN ORGANIZED HEALTH CARE EDUCATION/TRAINING PROGRAM

## 2022-12-04 PROCEDURE — 21400001 HC TELEMETRY ROOM

## 2022-12-04 PROCEDURE — 63600175 PHARM REV CODE 636 W HCPCS: Performed by: STUDENT IN AN ORGANIZED HEALTH CARE EDUCATION/TRAINING PROGRAM

## 2022-12-04 PROCEDURE — 25000003 PHARM REV CODE 250: Performed by: PHYSICIAN ASSISTANT

## 2022-12-04 PROCEDURE — 84484 ASSAY OF TROPONIN QUANT: CPT | Performed by: PHYSICIAN ASSISTANT

## 2022-12-04 RX ORDER — FUROSEMIDE 10 MG/ML
40 INJECTION INTRAMUSCULAR; INTRAVENOUS ONCE
Status: COMPLETED | OUTPATIENT
Start: 2022-12-04 | End: 2022-12-04

## 2022-12-04 RX ORDER — METOPROLOL SUCCINATE 25 MG/1
25 TABLET, EXTENDED RELEASE ORAL DAILY
Status: DISCONTINUED | OUTPATIENT
Start: 2022-12-04 | End: 2022-12-06 | Stop reason: HOSPADM

## 2022-12-04 RX ORDER — FUROSEMIDE 10 MG/ML
80 INJECTION INTRAMUSCULAR; INTRAVENOUS 2 TIMES DAILY WITH MEALS
Status: DISCONTINUED | OUTPATIENT
Start: 2022-12-04 | End: 2022-12-06

## 2022-12-04 RX ADMIN — FUROSEMIDE 80 MG: 10 INJECTION, SOLUTION INTRAVENOUS at 05:12

## 2022-12-04 RX ADMIN — SACUBITRIL AND VALSARTAN 1 TABLET: 24; 26 TABLET, FILM COATED ORAL at 08:12

## 2022-12-04 RX ADMIN — FUROSEMIDE 40 MG: 10 INJECTION, SOLUTION INTRAVENOUS at 09:12

## 2022-12-04 RX ADMIN — SACUBITRIL AND VALSARTAN 1 TABLET: 24; 26 TABLET, FILM COATED ORAL at 09:12

## 2022-12-04 RX ADMIN — METOPROLOL SUCCINATE 25 MG: 25 TABLET, EXTENDED RELEASE ORAL at 09:12

## 2022-12-04 RX ADMIN — FUROSEMIDE 40 MG: 10 INJECTION, SOLUTION INTRAVENOUS at 11:12

## 2022-12-04 RX ADMIN — PRAVASTATIN SODIUM 40 MG: 40 TABLET ORAL at 09:12

## 2022-12-04 RX ADMIN — CLOPIDOGREL BISULFATE 75 MG: 75 TABLET ORAL at 09:12

## 2022-12-04 NOTE — HPI
Palmira Malave 87 y.o. female with CHF, CKD, HTN, HLD presents to the hospital with a chief complaint of SOB.  She reports today after discharge from the hospital she became short of breath with ambulating to the bathroom after having a bowel.  She then ambulated back to her living room and was found by her daughter and informed she looked short of breath was directed hospital.  She is not attempted any treatment at home.  She reports her shortness breath has improved with treatment in the emergency room with Lasix.  She denies any aggravating factors.  She reports eating toast and coffee after discharge but denies any other salty foods.  She denies fever chest pain nausea vomiting abdominal syncope dizziness dysuria melena hematuria hematemesis.  She reports her lower extremity edema has greatly improved since yesterday.    In the ED, BNP 4000 troponin 0.189 creatinine 2.0 afebrile without leukocytosis chest x-ray with cardiomegaly and mild pulmonary edema.

## 2022-12-04 NOTE — ASSESSMENT & PLAN NOTE
· Echo 7/2022 with EF of 20% and grade 2 DD. Presents with SOB, increasing lower extremity edema, and probable pulmonary edema on chest x-ray.   · BNP 4000  · Started on IV lasix  · Daily weights/strict intake and output/telemetry  · Soft BP's while diuresing with severe LHF  · Will discontinue home carvedilol and Bidil, in order to make sure she has both beta-blocker and ACE inhibitor/Arb on board.    · Will start metoprolol succinate 25 mg, and low-dose Entresto.    · Creatinine improving on diuresis, sodium depressed, bicarb coming up, will continue.

## 2022-12-04 NOTE — CONSULTS
Patient will discharge home with daughter, who assists at home with care and needs. Patient also has home health with weekly nursing visits and Out-Patient PT/OT.

## 2022-12-04 NOTE — ASSESSMENT & PLAN NOTE
Referral placed for Dr. Verónica Sprague [facility]. Continue home coreg/isordil with hold parameters

## 2022-12-04 NOTE — ASSESSMENT & PLAN NOTE
Denies chest pain. Troponin unchanged from 12/2  Continue home coreg (with hold parameters), plavix, statin  Troponin trend  Telemetry

## 2022-12-04 NOTE — CONSULTS
"RD consulted for, "For education of fluid and salt restriction."  Currently providing remote weekend coverage.    Pt receiving Low Na, 2 gm diet with 1500 mL fluid restriction.    Attached diet education handouts to discharge instructions.  On-site RD to follow up with in-person education as appropriate.    Please Re-Consult as needed.    Thank you.    "

## 2022-12-04 NOTE — PROGRESS NOTES
[facility] Medicine  Progress Note    Patient Name: Palmira Malave  MRN: [id number]  Patient Class: IP- Inpatient   Admission Date: 12/3/2022  Length of Stay: 1 days  Attending Physician: Luis Llanos MD  Primary Care Provider: Qi Ramon MD        Subjective:     Principal Problem:Acute on chronic combined systolic and diastolic heart failure        HPI:  Palmira Malave 87 y.o. female with CHF, CKD, HTN, HLD presents to the hospital with a chief complaint of SOB.  She reports today after discharge from the hospital she became short of breath with ambulating to the bathroom after having a bowel.  She then ambulated back to her living room and was found by her daughter and informed she looked short of breath was directed hospital.  She is not attempted any treatment at home.  She reports her shortness breath has improved with treatment in the emergency room with Lasix.  She denies any aggravating factors.  She reports eating toast and coffee after discharge but denies any other salty foods.  She denies fever chest pain nausea vomiting abdominal syncope dizziness dysuria melena hematuria hematemesis.  She reports her lower extremity edema has greatly improved since yesterday.    In the ED, BNP 4000 troponin 0.189 creatinine 2.0 afebrile without leukocytosis chest x-ray with cardiomegaly and mild pulmonary edema.      Overview/Hospital Course:  Echo 7/2022 with EF of 20% and grade 2 DD. Presents with SOB, increasing lower extremity edema, and pulmonary edema on chest x-ray. BNP 4000. Soft BP's.  Will discontinue home carvedilol and Bidil, in order to make sure she has both beta-blocker and ACE inhibitor/Arb on board.  Will start metoprolol succinate 25 mg, and low-dose Entresto.  Creatinine improving on diuresis, sodium depressed, bicarb coming up, will continue. No UOP documented yet, will place pure wick for accurate measures.      NAEON. Denies CP.   Eating okay. UOP wnl.       Review  of Systems   Constitutional:  Negative for chills and fever.   HENT:  Negative for nosebleeds and tinnitus.    Eyes:  Negative for photophobia and visual disturbance.   Respiratory:  Positive for shortness of breath. Negative for wheezing.    Cardiovascular:  Negative for chest pain, palpitations and leg swelling.   Gastrointestinal:  Negative for abdominal distention, nausea and vomiting.   Genitourinary:  Negative for dysuria, flank pain and hematuria.   Musculoskeletal:  Negative for gait problem and joint swelling.   Skin:  Negative for rash and wound.   Neurological:  Negative for seizures and syncope.         Objective:     Vital Signs (Most Recent):  Temp: 97.8 °F (36.6 °C) (12/04/22 0743)  Pulse: 69 (12/04/22 0743)  Resp: (!) 22 (12/04/22 0743)  BP: (!) 108/57 (12/04/22 0743)  SpO2: 96 % (12/04/22 0743)   Vital Signs (24h Range):  Temp:  [97.7 °F (36.5 °C)-98 °F (36.7 °C)] 97.8 °F (36.6 °C)  Pulse:  [69-86] 69  Resp:  [18-22] 22  SpO2:  [95 %-98 %] 96 %  BP: (103-129)/(57-72) 108/57     Weight: 61.4 kg (135 lb 5.8 oz)  Body mass index is 23.98 kg/m².    Physical Exam  Vitals and nursing note reviewed.   Constitutional:       General: She is not in acute distress.     Appearance: She is well-developed. She is not diaphoretic.   HENT:      Head: Normocephalic and atraumatic.      Right Ear: External ear normal.      Left Ear: External ear normal.   Eyes:      General:         Right eye: No discharge.         Left eye: No discharge.      Conjunctiva/sclera: Conjunctivae normal.   Neck:      Thyroid: No thyromegaly.      Comments: Minimal JVD  Cardiovascular:      Rate and Rhythm: Normal rate and regular rhythm.      Heart sounds: No murmur heard.  Pulmonary:      Effort: Pulmonary effort is normal. No respiratory distress.      Breath sounds: Rales present.      Comments: On RA  Abdominal:      General: Bowel sounds are normal. There is no distension.      Palpations: Abdomen is soft. There is no mass.       Tenderness: There is no abdominal tenderness.   Musculoskeletal:         General: No deformity.      Cervical back: Normal range of motion and neck supple.      Right lower leg: No edema.      Left lower leg: No edema.   Skin:     General: Skin is warm and dry.   Neurological:      Mental Status: She is alert and oriented to person, place, and time.      Sensory: No sensory deficit.   Psychiatric:         Mood and Affect: Mood normal.         Behavior: Behavior normal.           Significant Labs: CBC:   Recent Labs   Lab 12/03/22  0339 12/03/22  1530 12/04/22  0348   WBC 6.26 5.88 6.22   HGB 10.2* 10.7* 10.6*   HCT 31.8* 31.7* 33.0*   * 133* 134*       CMP:   Recent Labs   Lab 12/02/22  1258 12/03/22  0339 12/03/22  1530 12/04/22  0348   * 133* 134* 133*   K 4.0 4.2 4.4 3.9   CL 95 98 98 98   CO2 22* 25 25 28    104 112* 101   BUN 47* 55* 59* 57*   CREATININE 1.8* 2.0* 2.0* 1.7*   CALCIUM 9.3 9.7 10.1 9.9   PROT 6.6  --  7.0  --    ALBUMIN 3.6  --  3.9  --    BILITOT 1.1*  --  1.0  --    ALKPHOS 86  --  98  --    AST 26  --  25  --    ALT 18  --  17  --    ANIONGAP 15 10 11 7*       Cardiac Markers:   Recent Labs   Lab 12/03/22  1530   BNP 4,000*       Troponin:   Recent Labs   Lab 12/02/22  1714 12/03/22  1530 12/04/22  0012   TROPONINI 0.187* 0.189* 0.178*         Significant Imaging:   Imaging Results              X-Ray Chest AP Portable (Final result)  Result time 12/03/22 15:12:40      Final result by Guzman Cueto MD (12/03/22 15:12:40)                   Impression:      Stable cardiomegaly with probable pulmonary edema.  Follow-up recommended.      Electronically signed by: Guzman Cueto  Date:    12/03/2022  Time:    15:12               Narrative:    EXAMINATION:  XR CHEST AP PORTABLE    CLINICAL HISTORY:  Shortness of breath    TECHNIQUE:  Single frontal view of the chest was performed.    COMPARISON:  12/02/2022    FINDINGS:  Cardiac silhouette is enlarged similar to the prior study.   Aortic atherosclerosis.    Mild interstitial and ground-glass changes bilaterally could represent pulmonary edema.    Possible trace effusions bilaterally.  No mass or consolidation.    No evidence of pneumothorax.  No acute osseous abnormality.    No significant change.                                          Assessment/Plan:      * Acute on chronic combined systolic and diastolic heart failure  Echo 7/2022 with EF of 20% and grade 2 DD. Presents with SOB, increasing lower extremity edema, and probable pulmonary edema on chest x-ray.   BNP 4000  Started on IV lasix  Daily weights/strict intake and output/telemetry  Soft BP's while diuresing with severe LHF  Will discontinue home carvedilol and Bidil, in order to make sure she has both beta-blocker and ACE inhibitor/Arb on board.    Will start metoprolol succinate 25 mg, and low-dose Entresto.    Creatinine improving on diuresis, sodium depressed, bicarb coming up, will continue.    Goals of care, counseling/discussion  Advance Care Planning   Previous records showing DNR, will confirm and change code status.     ICD (implantable cardioverter-defibrillator) in place  Stable no acute issues    Stage 3 chronic kidney disease  Cr today of 2.0. baseline of 1.6-2.0  Renal dose medications, avoid nephrotoxic drugs monitor intake and output    Hyperlipidemia  substitute home lovastatin for pravastatin inpt    Coronary artery disease involving native coronary artery of native heart without angina pectoris  Denies chest pain. Troponin unchanged from 12/2  Continue home coreg (with hold parameters), plavix, statin  Troponin trend  Telemetry    Essential hypertension  Well controlled. Continue home coreg/isordil with hold parameters      VTE Risk Mitigation (From admission, onward)           Ordered     Place DALE hose  Until discontinued         12/03/22 1805     IP VTE HIGH RISK PATIENT  Once         12/03/22 1805     Place sequential compression device  Until discontinued          12/03/22 1805                    Discharge Planning   MARTELL:      Code Status: DNR   Is the patient medically ready for discharge?:     Reason for patient still in hospital (select all that apply): Patient trending condition and Treatment                     Luis Llanos MD  Department of [facility]   [facility]

## 2022-12-04 NOTE — ASSESSMENT & PLAN NOTE
Cr today of 2.0. baseline of 1.6-2.0  Renal dose medications, avoid nephrotoxic drugs monitor intake and output

## 2022-12-04 NOTE — PLAN OF CARE
"  Problem: Adult Inpatient Plan of Care  Goal: Plan of Care Review  Outcome: Ongoing, Progressing  Flowsheets (Taken 12/4/2022 1744)  Plan of Care Reviewed With: patient  Goal: Patient-Specific Goal (Individualized)  Outcome: Ongoing, Progressing  Flowsheets (Taken 12/4/2022 1744)  Anxieties, Fears or Concerns: Concern about " feeling hot"  Individualized Care Needs: Thermostat in room decreased to promote pt's comfort  Patient-Specific Goals (Include Timeframe): Telesitter order placed to monitor pt for falls  Goal: Optimal Comfort and Wellbeing  Outcome: Ongoing, Progressing     Problem: Oral Intake Inadequate (Acute Kidney Injury/Impairment)  Goal: Optimal Nutrition Intake  Outcome: Ongoing, Progressing     Problem: Fall Injury Risk  Goal: Absence of Fall and Fall-Related Injury  Outcome: Ongoing, Progressing     "

## 2022-12-04 NOTE — PHARMACY MED REC
"Admission Medication History     The home medication history was taken by Joellen Kern.    You may go to "Admission" then "Reconcile Home Medications" tabs to review and/or act upon these items.     The home medication list has been updated by the Pharmacy department.   Please read ALL comments highlighted in yellow.   Please address this information as you see fit.    Feel free to contact us if you have any questions or require assistance.    Medications listed below were obtained from: Patient/family and Analytic software- [employer]    The medication reconciliation was completed by the patient's bedside.      Joellen Kern  [phone]                  .        "

## 2022-12-04 NOTE — H&P
[facility]  [facility] Medicine  History & Physical    Patient Name: Palmira Malave  MRN: [id number]  Patient Class: IP- Inpatient  Admission Date: 12/3/2022  Attending Physician: Dwayne Feliciano DO   Primary Care Provider: Qi Ramon MD         Patient information was obtained from patient, past medical records and ER records.     Subjective:     Principal Problem:Acute on chronic combined systolic and diastolic heart failure    Chief Complaint:   Chief Complaint   Patient presents with    Shortness of Breath     Patient reports sudden onset of SOB today, was discharged today with CHF        HPI: Palmira Malave 87 y.o. female with CHF, CKD, HTN, HLD presents to the hospital with a chief complaint of SOB.  She reports today after discharge from the hospital she became short of breath with ambulating to the bathroom after having a bowel.  She then ambulated back to her living room and was found by her daughter and informed she looked short of breath was directed hospital.  She is not attempted any treatment at home.  She reports her shortness breath has improved with treatment in the emergency room with Lasix.  She denies any aggravating factors.  She reports eating toast and coffee after discharge but denies any other salty foods.  She denies fever chest pain nausea vomiting abdominal syncope dizziness dysuria melena hematuria hematemesis.  She reports her lower extremity edema has greatly improved since yesterday.    In the ED, BNP 4000 troponin 0.189 creatinine 2.0 afebrile without leukocytosis chest x-ray with cardiomegaly and mild pulmonary edema.      Past Medical History:   Diagnosis Date    Cataract     CHF (congestive heart failure)     CKD (chronic kidney disease), stage III     Coronary artery disease     Gout attack     Hypercholesteremia     Hypertension     Renal disorder     Vaginal delivery     x4       Past Surgical History:   Procedure Laterality Date     APPENDECTOMY      CARDIAC DEFIBRILLATOR PLACEMENT      2015    CATARACT EXTRACTION W/  INTRAOCULAR LENS IMPLANT Left 02/07/2019    Dr. Velazco    CATARACT EXTRACTION W/  INTRAOCULAR LENS IMPLANT Right 02/21/2019    Dr. Velazco    CHOLECYSTECTOMY      COLONOSCOPY W/ POLYPECTOMY  10 or 11/ 2014    per Dr. Elen ceja Left 8/2008    EYE SURGERY      HYSTERECTOMY      INTRAOCULAR PROSTHESES INSERTION Left 2/7/2019    Procedure: INSERTION, IOL PROSTHESIS;  Surgeon: Demetrius Velazco MD;  Location: [facility] OR;  Service: Ophthalmology;  Laterality: Left;  RN Phone Pre OP 1-30-19.  Arrival 05:30 AM    INTRAOCULAR PROSTHESES INSERTION Right 2/21/2019    Procedure: INSERTION, IOL PROSTHESIS;  Surgeon: Demetrius Velazco MD;  Location: [facility] OR;  Service: Ophthalmology;  Laterality: Right;    JOINT REPLACEMENT Bilateral 2005 & 2006    2 Knee Replacements=Lt. 1= 2005. Rt. 1= 2006    OOPHORECTOMY      PHACOEMULSIFICATION OF CATARACT Left 2/7/2019    Procedure: PHACOEMULSIFICATION, CATARACT;  Surgeon: Demetrius Velazco MD;  Location: [facility] OR;  Service: Ophthalmology;  Laterality: Left;    PHACOEMULSIFICATION OF CATARACT Right 2/21/2019    Procedure: PHACOEMULSIFICATION, CATARACT;  Surgeon: Demetrius Velazco MD;  Location: [facility] OR;  Service: Ophthalmology;  Laterality: Right;  RN Phone Pre op 2-15-19.  Arrival 05:30 AM    TOTAL KNEE ARTHROPLASTY Bilateral Lt.= 2005; Rt.=2006    Bilateral Knee scope       Review of patient's allergies indicates:   Allergen Reactions    Aspirin Swelling     Swelling and rash    Colace [docusate sodium] Rash     itching    Docusate Other (See Comments) and Rash     itching    Sulfa (sulfonamide antibiotics) Swelling     Swelling and rash  Swelling and rash    Iodine and iodide containing products Rash    Penicillins Rash       Current Facility-Administered Medications on File Prior to Encounter   Medication    0.9%  NaCl infusion    phenylephrine  HCL 2.5% ophthalmic solution 1 drop    proparacaine 0.5 % ophthalmic solution 1 drop    tropicamide 1% ophthalmic solution 1 drop    [DISCONTINUED] acetaminophen tablet 500 mg    [DISCONTINUED] carvediloL tablet 6.25 mg    [DISCONTINUED] clopidogreL tablet 75 mg    [DISCONTINUED] furosemide injection 40 mg    [DISCONTINUED] hydrALAZINE (APRESOLINE) 35 mg, isosorbide dinitrate (ISORDIL) 20 mg for BIDIL 20/37.5    [DISCONTINUED] melatonin tablet 6 mg    [DISCONTINUED] pravastatin tablet 40 mg    [DISCONTINUED] sodium chloride 0.9% flush 10 mL    [DISCONTINUED] traMADoL tablet 50 mg     Current Outpatient Medications on File Prior to Encounter   Medication Sig    acetaminophen (TYLENOL) 650 MG TbSR Take 1,300 mg by mouth 2 (two) times a day. In the morning and at bedtime    allopurinol (ZYLOPRIM) 100 MG tablet Take 100 mg by mouth every evening.     carvediloL (COREG) 6.25 MG tablet Take 1 tablet (6.25 mg total) by mouth 2 (two) times daily. Do not take if the top blood pressure number is less than 100 or your heart rate is less than 60    clopidogrel (PLAVIX) 75 mg tablet Take 75 mg by mouth once daily. On hold since 11-28-14, for procedure.    cranberry 400 mg Cap Take 1 capsule by mouth 2 (two) times daily.    fish oil-omega-3 fatty acids 300-1,000 mg capsule Take 2 g by mouth once daily. 1 caps every AM & 1 cap every PM.    folic acid/multivit-min/lutein (CENTRUM SILVER ORAL) Take by mouth once daily.    isosorbide-hydrALAZINE 20-37.5 mg (BIDIL) 20-37.5 mg Tab Take 1 tablet by mouth once daily. Do not take if the top blood pressure number is less than 100.    lovastatin (MEVACOR) 40 MG tablet Take 40 mg by mouth nightly. Takes 2 caps with supper every evening.    mirabegron (MYRBETRIQ) 50 mg Tb24 Take 50 mg by mouth once daily.     NEURONTIN 100 mg capsule Take 100 mg by mouth 3 (three) times daily.    pantoprazole (PROTONIX) 40 MG tablet Take 40 mg by mouth once daily.    traMADoL (ULTRAM)  50 mg tablet Take 50 mg by mouth every 12 (twelve) hours as needed.    acetaminophen (TYLENOL) 325 MG tablet Take 650 mg by mouth once daily.    furosemide (LASIX) 80 MG tablet TAKE 1 TABLET BY MOUTH TWICE DAILY AS NEEDED FOR SHORTNESS OF BREATH OR  EDEMA    [DISCONTINUED] acetaminophen (TYLENOL) 500 MG tablet Take 1 tablet (500 mg total) by mouth every 6 (six) hours as needed for Pain. (Patient taking differently: Take 1,000 mg by mouth every evening.)    [DISCONTINUED] isosorbide-hydrALAZINE 20-37.5 mg (BIDIL) 20-37.5 mg Tab Take 1 tablet by mouth once daily. Do not take if the top blood pressure number is less than 100.     Family History       Problem Relation (Age of Onset)    Amblyopia Son    Diabetes Other    Heart attack Mother (88)    Hyperlipidemia Mother    Hypertension Mother, Other          Tobacco Use    Smoking status: Never    Smokeless tobacco: Never   Substance and Sexual Activity    Alcohol use: Not Currently    Drug use: No    Sexual activity: Not Currently     Partners: Male     Review of Systems   Constitutional:  Negative for chills and fever.   HENT:  Negative for nosebleeds and tinnitus.    Eyes:  Negative for photophobia and visual disturbance.   Respiratory:  Positive for shortness of breath. Negative for wheezing.    Cardiovascular:  Negative for chest pain, palpitations and leg swelling.   Gastrointestinal:  Negative for abdominal distention, nausea and vomiting.   Genitourinary:  Negative for dysuria, flank pain and hematuria.   Musculoskeletal:  Negative for gait problem and joint swelling.   Skin:  Negative for rash and wound.   Neurological:  Negative for seizures and syncope.   Objective:     Vital Signs (Most Recent):  Temp: 98 °F (36.7 °C) (12/03/22 1409)  Pulse: 86 (12/03/22 1805)  Resp: (!) 22 (12/03/22 1407)  BP: 121/65 (12/03/22 1805)  SpO2: 97 % (12/03/22 1805)   Vital Signs (24h Range):  Temp:  [97.7 °F (36.5 °C)-98.6 °F (37 °C)] 98 °F (36.7 °C)  Pulse:  [79-88]  86  Resp:  [18-22] 22  SpO2:  [95 %-98 %] 97 %  BP: ()/(51-72) 121/65     Weight: 63.5 kg (140 lb)  Body mass index is 24.8 kg/m².    Physical Exam  Vitals and nursing note reviewed.   Constitutional:       General: She is not in acute distress.     Appearance: She is well-developed. She is not diaphoretic.   HENT:      Head: Normocephalic and atraumatic.      Right Ear: External ear normal.      Left Ear: External ear normal.   Eyes:      General:         Right eye: No discharge.         Left eye: No discharge.      Conjunctiva/sclera: Conjunctivae normal.   Neck:      Thyroid: No thyromegaly.      Comments: Minimal JVD  Cardiovascular:      Rate and Rhythm: Normal rate and regular rhythm.      Heart sounds: No murmur heard.  Pulmonary:      Effort: Pulmonary effort is normal. No respiratory distress.      Breath sounds: Rales present.      Comments: On RA  Abdominal:      General: Bowel sounds are normal. There is no distension.      Palpations: Abdomen is soft. There is no mass.      Tenderness: There is no abdominal tenderness.   Musculoskeletal:         General: No deformity.      Cervical back: Normal range of motion and neck supple.      Right lower leg: No edema.      Left lower leg: No edema.   Skin:     General: Skin is warm and dry.   Neurological:      Mental Status: She is alert and oriented to person, place, and time.      Sensory: No sensory deficit.   Psychiatric:         Mood and Affect: Mood normal.         Behavior: Behavior normal.           Significant Labs: CBC:   Recent Labs   Lab 12/02/22  1258 12/03/22  0339 12/03/22  1530   WBC 5.79 6.26 5.88   HGB 9.7* 10.2* 10.7*   HCT 29.2* 31.8* 31.7*   * 124* 133*     CMP:   Recent Labs   Lab 12/02/22  1258 12/03/22  0339 12/03/22  1530   * 133* 134*   K 4.0 4.2 4.4   CL 95 98 98   CO2 22* 25 25    104 112*   BUN 47* 55* 59*   CREATININE 1.8* 2.0* 2.0*   CALCIUM 9.3 9.7 10.1   PROT 6.6  --  7.0   ALBUMIN 3.6  --  3.9   BILITOT  1.1*  --  1.0   ALKPHOS 86  --  98   AST 26  --  25   ALT 18  --  17   ANIONGAP 15 10 11     Cardiac Markers:   Recent Labs   Lab 12/03/22  1530   BNP 4,000*     Troponin:   Recent Labs   Lab 12/02/22  1258 12/02/22  1714 12/03/22  1530   TROPONINI 0.227* 0.187* 0.189*       Significant Imaging:   Imaging Results              X-Ray Chest AP Portable (Final result)  Result time 12/03/22 15:12:40      Final result by Guzman Cueto MD (12/03/22 15:12:40)                   Impression:      Stable cardiomegaly with probable pulmonary edema.  Follow-up recommended.      Electronically signed by: Guzman Cueto  Date:    12/03/2022  Time:    15:12               Narrative:    EXAMINATION:  XR CHEST AP PORTABLE    CLINICAL HISTORY:  Shortness of breath    TECHNIQUE:  Single frontal view of the chest was performed.    COMPARISON:  12/02/2022    FINDINGS:  Cardiac silhouette is enlarged similar to the prior study.  Aortic atherosclerosis.    Mild interstitial and ground-glass changes bilaterally could represent pulmonary edema.    Possible trace effusions bilaterally.  No mass or consolidation.    No evidence of pneumothorax.  No acute osseous abnormality.    No significant change.                                        Assessment/Plan:     * Acute on chronic combined systolic and diastolic heart failure  Echo 7/2022 with EF of 20% and grade 2 DD. Presents with SOB, increasing lower extremity edema, and probable pulmonary edema on chest x-ray.   Started on IV lasix  Continue home coreg/isordil with hold parameters  Daily weights/strict intake and output/telemetry    ICD (implantable cardioverter-defibrillator) in place  Stable no acute issues    Stage 3 chronic kidney disease  Cr today of 2.0. baseline of 1.6-2.0  Renal dose medications, avoid nephrotoxic drugs monitor intake and output    Hyperlipidemia  substitute home lovastatin for pravastatin inpt    Coronary artery disease involving native coronary artery of native  heart without angina pectoris  Denies chest pain. Troponin unchanged from 12/2  Continue home coreg (with hold parameters), plavix, statin  Troponin trend  Telemetry    Essential hypertension  Well controlled. Continue home coreg/isordil with hold parameters      VTE Risk Mitigation (From admission, onward)         Ordered     Place DALE hose  Until discontinued         12/03/22 1805     IP VTE HIGH RISK PATIENT  Once         12/03/22 1805     Place sequential compression device  Until discontinued         12/03/22 1805              VTE: DALE/SCD  Code: Full  Diet: cardiac/flud restriction  Dispo: pending diuresis  As clarification, on 12/3/2022, patient should be admitted for hospital inpatient services under my care in collaboration with Dwayne Feliciano MD. Reji Nguyễn PA-C  Department of Hospital Medicine   [facility] [place] - Emergency Dept

## 2022-12-04 NOTE — SUBJECTIVE & OBJECTIVE
Past Medical History:   Diagnosis Date    Cataract     CHF (congestive heart failure)     CKD (chronic kidney disease), stage III     Coronary artery disease     Gout attack     Hypercholesteremia     Hypertension     Renal disorder     Vaginal delivery     x4       Past Surgical History:   Procedure Laterality Date    APPENDECTOMY      CARDIAC DEFIBRILLATOR PLACEMENT      2015    CATARACT EXTRACTION W/  INTRAOCULAR LENS IMPLANT Left 02/07/2019    Dr. Velazco    CATARACT EXTRACTION W/  INTRAOCULAR LENS IMPLANT Right 02/21/2019    Dr. Velazco    CHOLECYSTECTOMY      COLONOSCOPY W/ POLYPECTOMY  10 or 11/ 2014    per Dr. Myrick    defribillator Left 8/2008    EYE SURGERY      HYSTERECTOMY      INTRAOCULAR PROSTHESES INSERTION Left 2/7/2019    Procedure: INSERTION, IOL PROSTHESIS;  Surgeon: Demetrius Velazco MD;  Location: [facility] OR;  Service: Ophthalmology;  Laterality: Left;  RN Phone Pre OP 1-30-19.  Arrival 05:30 AM    INTRAOCULAR PROSTHESES INSERTION Right 2/21/2019    Procedure: INSERTION, IOL PROSTHESIS;  Surgeon: Demetrius Velazco MD;  Location: [facility] OR;  Service: Ophthalmology;  Laterality: Right;    JOINT REPLACEMENT Bilateral 2005 & 2006    2 Knee Replacements=Lt. 1= 2005. Rt. 1= 2006    OOPHORECTOMY      PHACOEMULSIFICATION OF CATARACT Left 2/7/2019    Procedure: PHACOEMULSIFICATION, CATARACT;  Surgeon: Demetrius Velazco MD;  Location: [facility] OR;  Service: Ophthalmology;  Laterality: Left;    PHACOEMULSIFICATION OF CATARACT Right 2/21/2019    Procedure: PHACOEMULSIFICATION, CATARACT;  Surgeon: Demetrius Velazco MD;  Location: [facility] OR;  Service: Ophthalmology;  Laterality: Right;  RN Phone Pre op 2-15-19.  Arrival 05:30 AM    TOTAL KNEE ARTHROPLASTY Bilateral Lt.= 2005; Rt.=2006    Bilateral Knee scope       Review of patient's allergies indicates:   Allergen Reactions    Aspirin Swelling     Swelling and rash    Colace [docusate sodium] Rash     itching    Docusate Other (See Comments)  and Rash     itching    Sulfa (sulfonamide antibiotics) Swelling     Swelling and rash  Swelling and rash    Iodine and iodide containing products Rash    Penicillins Rash       Current Facility-Administered Medications on File Prior to Encounter   Medication    0.9%  NaCl infusion    phenylephrine HCL 2.5% ophthalmic solution 1 drop    proparacaine 0.5 % ophthalmic solution 1 drop    tropicamide 1% ophthalmic solution 1 drop    [DISCONTINUED] acetaminophen tablet 500 mg    [DISCONTINUED] carvediloL tablet 6.25 mg    [DISCONTINUED] clopidogreL tablet 75 mg    [DISCONTINUED] furosemide injection 40 mg    [DISCONTINUED] hydrALAZINE (APRESOLINE) 35 mg, isosorbide dinitrate (ISORDIL) 20 mg for BIDIL 20/37.5    [DISCONTINUED] melatonin tablet 6 mg    [DISCONTINUED] pravastatin tablet 40 mg    [DISCONTINUED] sodium chloride 0.9% flush 10 mL    [DISCONTINUED] traMADoL tablet 50 mg     Current Outpatient Medications on File Prior to Encounter   Medication Sig    acetaminophen (TYLENOL) 650 MG TbSR Take 1,300 mg by mouth 2 (two) times a day. In the morning and at bedtime    allopurinol (ZYLOPRIM) 100 MG tablet Take 100 mg by mouth every evening.     carvediloL (COREG) 6.25 MG tablet Take 1 tablet (6.25 mg total) by mouth 2 (two) times daily. Do not take if the top blood pressure number is less than 100 or your heart rate is less than 60    clopidogrel (PLAVIX) 75 mg tablet Take 75 mg by mouth once daily. On hold since 11-28-14, for procedure.    cranberry 400 mg Cap Take 1 capsule by mouth 2 (two) times daily.    fish oil-omega-3 fatty acids 300-1,000 mg capsule Take 2 g by mouth once daily. 1 caps every AM & 1 cap every PM.    folic acid/multivit-min/lutein (CENTRUM SILVER ORAL) Take by mouth once daily.    isosorbide-hydrALAZINE 20-37.5 mg (BIDIL) 20-37.5 mg Tab Take 1 tablet by mouth once daily. Do not take if the top blood pressure number is less than 100.    lovastatin (MEVACOR) 40 MG tablet Take 40 mg by mouth nightly.  Takes 2 caps with supper every evening.    mirabegron (MYRBETRIQ) 50 mg Tb24 Take 50 mg by mouth once daily.     NEURONTIN 100 mg capsule Take 100 mg by mouth 3 (three) times daily.    pantoprazole (PROTONIX) 40 MG tablet Take 40 mg by mouth once daily.    traMADoL (ULTRAM) 50 mg tablet Take 50 mg by mouth every 12 (twelve) hours as needed.    acetaminophen (TYLENOL) 325 MG tablet Take 650 mg by mouth once daily.    furosemide (LASIX) 80 MG tablet TAKE 1 TABLET BY MOUTH TWICE DAILY AS NEEDED FOR SHORTNESS OF BREATH OR  EDEMA    [DISCONTINUED] acetaminophen (TYLENOL) 500 MG tablet Take 1 tablet (500 mg total) by mouth every 6 (six) hours as needed for Pain. (Patient taking differently: Take 1,000 mg by mouth every evening.)    [DISCONTINUED] isosorbide-hydrALAZINE 20-37.5 mg (BIDIL) 20-37.5 mg Tab Take 1 tablet by mouth once daily. Do not take if the top blood pressure number is less than 100.     Family History       Problem Relation (Age of Onset)    Amblyopia Son    Diabetes Other    Heart attack Mother (88)    Hyperlipidemia Mother    Hypertension Mother, Other          Tobacco Use    Smoking status: Never    Smokeless tobacco: Never   Substance and Sexual Activity    Alcohol use: Not Currently    Drug use: No    Sexual activity: Not Currently     Partners: Male     Review of Systems   Constitutional:  Negative for chills and fever.   HENT:  Negative for nosebleeds and tinnitus.    Eyes:  Negative for photophobia and visual disturbance.   Respiratory:  Positive for shortness of breath. Negative for wheezing.    Cardiovascular:  Negative for chest pain, palpitations and leg swelling.   Gastrointestinal:  Negative for abdominal distention, nausea and vomiting.   Genitourinary:  Negative for dysuria, flank pain and hematuria.   Musculoskeletal:  Negative for gait problem and joint swelling.   Skin:  Negative for rash and wound.   Neurological:  Negative for seizures and syncope.   Objective:     Vital Signs (Most  Recent):  Temp: 98 °F (36.7 °C) (12/03/22 1409)  Pulse: 86 (12/03/22 1805)  Resp: (!) 22 (12/03/22 1407)  BP: 121/65 (12/03/22 1805)  SpO2: 97 % (12/03/22 1805)   Vital Signs (24h Range):  Temp:  [97.7 °F (36.5 °C)-98.6 °F (37 °C)] 98 °F (36.7 °C)  Pulse:  [79-88] 86  Resp:  [18-22] 22  SpO2:  [95 %-98 %] 97 %  BP: ()/(51-72) 121/65     Weight: 63.5 kg (140 lb)  Body mass index is 24.8 kg/m².    Physical Exam  Vitals and nursing note reviewed.   Constitutional:       General: She is not in acute distress.     Appearance: She is well-developed. She is not diaphoretic.   HENT:      Head: Normocephalic and atraumatic.      Right Ear: External ear normal.      Left Ear: External ear normal.   Eyes:      General:         Right eye: No discharge.         Left eye: No discharge.      Conjunctiva/sclera: Conjunctivae normal.   Neck:      Thyroid: No thyromegaly.      Comments: Minimal JVD  Cardiovascular:      Rate and Rhythm: Normal rate and regular rhythm.      Heart sounds: No murmur heard.  Pulmonary:      Effort: Pulmonary effort is normal. No respiratory distress.      Breath sounds: Rales present.      Comments: On RA  Abdominal:      General: Bowel sounds are normal. There is no distension.      Palpations: Abdomen is soft. There is no mass.      Tenderness: There is no abdominal tenderness.   Musculoskeletal:         General: No deformity.      Cervical back: Normal range of motion and neck supple.      Right lower leg: No edema.      Left lower leg: No edema.   Skin:     General: Skin is warm and dry.   Neurological:      Mental Status: She is alert and oriented to person, place, and time.      Sensory: No sensory deficit.   Psychiatric:         Mood and Affect: Mood normal.         Behavior: Behavior normal.           Significant Labs: CBC:   Recent Labs   Lab 12/02/22  1258 12/03/22  0339 12/03/22  1530   WBC 5.79 6.26 5.88   HGB 9.7* 10.2* 10.7*   HCT 29.2* 31.8* 31.7*   * 124* 133*     CMP:    Recent Labs   Lab 12/02/22  1258 12/03/22  0339 12/03/22  1530   * 133* 134*   K 4.0 4.2 4.4   CL 95 98 98   CO2 22* 25 25    104 112*   BUN 47* 55* 59*   CREATININE 1.8* 2.0* 2.0*   CALCIUM 9.3 9.7 10.1   PROT 6.6  --  7.0   ALBUMIN 3.6  --  3.9   BILITOT 1.1*  --  1.0   ALKPHOS 86  --  98   AST 26  --  25   ALT 18  --  17   ANIONGAP 15 10 11     Cardiac Markers:   Recent Labs   Lab 12/03/22  1530   BNP 4,000*     Troponin:   Recent Labs   Lab 12/02/22  1258 12/02/22  1714 12/03/22  1530   TROPONINI 0.227* 0.187* 0.189*       Significant Imaging:   Imaging Results              X-Ray Chest AP Portable (Final result)  Result time 12/03/22 15:12:40      Final result by Guzman Cueto MD (12/03/22 15:12:40)                   Impression:      Stable cardiomegaly with probable pulmonary edema.  Follow-up recommended.      Electronically signed by: Guzman Cueto  Date:    12/03/2022  Time:    15:12               Narrative:    EXAMINATION:  XR CHEST AP PORTABLE    CLINICAL HISTORY:  Shortness of breath    TECHNIQUE:  Single frontal view of the chest was performed.    COMPARISON:  12/02/2022    FINDINGS:  Cardiac silhouette is enlarged similar to the prior study.  Aortic atherosclerosis.    Mild interstitial and ground-glass changes bilaterally could represent pulmonary edema.    Possible trace effusions bilaterally.  No mass or consolidation.    No evidence of pneumothorax.  No acute osseous abnormality.    No significant change.

## 2022-12-04 NOTE — HOSPITAL COURSE
Echo 7/2022 with EF of 20% and grade 2 DD. Presents with SOB, increasing lower extremity edema, and pulmonary edema on chest x-ray. BNP 4000. Soft BP's.  Will discontinue home carvedilol and Bidil, in order to make sure she has both beta-blocker and ACE inhibitor/Arb on board.  Will start metoprolol succinate 25 mg, and low-dose Entresto.  Creatinine improving on diuresis, sodium depressed, bicarb coming up, will continue. No UOP documented yet, will place pure wick for accurate measures. Continue diuresis, BNP 4550.

## 2022-12-04 NOTE — SUBJECTIVE & OBJECTIVE
BIJUON. Denies CP.   Eating okay. UOP wnl.       Review of Systems   Constitutional:  Negative for chills and fever.   HENT:  Negative for nosebleeds and tinnitus.    Eyes:  Negative for photophobia and visual disturbance.   Respiratory:  Positive for shortness of breath. Negative for wheezing.    Cardiovascular:  Negative for chest pain, palpitations and leg swelling.   Gastrointestinal:  Negative for abdominal distention, nausea and vomiting.   Genitourinary:  Negative for dysuria, flank pain and hematuria.   Musculoskeletal:  Negative for gait problem and joint swelling.   Skin:  Negative for rash and wound.   Neurological:  Negative for seizures and syncope.         Objective:     Vital Signs (Most Recent):  Temp: 97.8 °F (36.6 °C) (12/04/22 0743)  Pulse: 69 (12/04/22 0743)  Resp: (!) 22 (12/04/22 0743)  BP: (!) 108/57 (12/04/22 0743)  SpO2: 96 % (12/04/22 0743)   Vital Signs (24h Range):  Temp:  [97.7 °F (36.5 °C)-98 °F (36.7 °C)] 97.8 °F (36.6 °C)  Pulse:  [69-86] 69  Resp:  [18-22] 22  SpO2:  [95 %-98 %] 96 %  BP: (103-129)/(57-72) 108/57     Weight: 61.4 kg (135 lb 5.8 oz)  Body mass index is 23.98 kg/m².    Physical Exam  Vitals and nursing note reviewed.   Constitutional:       General: She is not in acute distress.     Appearance: She is well-developed. She is not diaphoretic.   HENT:      Head: Normocephalic and atraumatic.      Right Ear: External ear normal.      Left Ear: External ear normal.   Eyes:      General:         Right eye: No discharge.         Left eye: No discharge.      Conjunctiva/sclera: Conjunctivae normal.   Neck:      Thyroid: No thyromegaly.      Comments: Minimal JVD  Cardiovascular:      Rate and Rhythm: Normal rate and regular rhythm.      Heart sounds: No murmur heard.  Pulmonary:      Effort: Pulmonary effort is normal. No respiratory distress.      Breath sounds: Rales present.      Comments: On RA  Abdominal:      General: Bowel sounds are normal. There is no distension.       Palpations: Abdomen is soft. There is no mass.      Tenderness: There is no abdominal tenderness.   Musculoskeletal:         General: No deformity.      Cervical back: Normal range of motion and neck supple.      Right lower leg: No edema.      Left lower leg: No edema.   Skin:     General: Skin is warm and dry.   Neurological:      Mental Status: She is alert and oriented to person, place, and time.      Sensory: No sensory deficit.   Psychiatric:         Mood and Affect: Mood normal.         Behavior: Behavior normal.           Significant Labs: CBC:   Recent Labs   Lab 12/03/22  0339 12/03/22  1530 12/04/22  0348   WBC 6.26 5.88 6.22   HGB 10.2* 10.7* 10.6*   HCT 31.8* 31.7* 33.0*   * 133* 134*       CMP:   Recent Labs   Lab 12/02/22  1258 12/03/22  0339 12/03/22  1530 12/04/22  0348   * 133* 134* 133*   K 4.0 4.2 4.4 3.9   CL 95 98 98 98   CO2 22* 25 25 28    104 112* 101   BUN 47* 55* 59* 57*   CREATININE 1.8* 2.0* 2.0* 1.7*   CALCIUM 9.3 9.7 10.1 9.9   PROT 6.6  --  7.0  --    ALBUMIN 3.6  --  3.9  --    BILITOT 1.1*  --  1.0  --    ALKPHOS 86  --  98  --    AST 26  --  25  --    ALT 18  --  17  --    ANIONGAP 15 10 11 7*       Cardiac Markers:   Recent Labs   Lab 12/03/22  1530   BNP 4,000*       Troponin:   Recent Labs   Lab 12/02/22  1714 12/03/22  1530 12/04/22  0012   TROPONINI 0.187* 0.189* 0.178*         Significant Imaging:   Imaging Results              X-Ray Chest AP Portable (Final result)  Result time 12/03/22 15:12:40      Final result by Guzman Cueto MD (12/03/22 15:12:40)                   Impression:      Stable cardiomegaly with probable pulmonary edema.  Follow-up recommended.      Electronically signed by: Guzman Cueto  Date:    12/03/2022  Time:    15:12               Narrative:    EXAMINATION:  XR CHEST AP PORTABLE    CLINICAL HISTORY:  Shortness of breath    TECHNIQUE:  Single frontal view of the chest was  performed.    COMPARISON:  12/02/2022    FINDINGS:  Cardiac silhouette is enlarged similar to the prior study.  Aortic atherosclerosis.    Mild interstitial and ground-glass changes bilaterally could represent pulmonary edema.    Possible trace effusions bilaterally.  No mass or consolidation.    No evidence of pneumothorax.  No acute osseous abnormality.    No significant change.

## 2022-12-04 NOTE — PLAN OF CARE
[facility] [place] - Telemetry  Initial Discharge Assessment    Patient reported that she is mostly independent at home; daughter help with bathing and other care when needed; uses RW and cane; has built-in tub bench; current with  and out-patient rehab     Primary Care Provider: Qi Ramon MD     Admission Diagnosis: Shortness of breath [R06.02]  CHF (congestive heart failure) [I50.9]  SOB (shortness of breath) [R06.02]    Admission Date: 12/3/2022  Expected Discharge Date:     Discharge Barriers Identified: None    Payor: [employer] MEDICARE / Plan: [employer] / Product Type: Medicare Advantage /     Extended Emergency Contact Information  Primary Emergency Contact: Keyla Morel   [facility]  Home Phone: [phone]  Mobile Phone: [phone]  Relation: Daughter  Secondary Emergency Contact: Usama Malave  Address: [address] [place] [address] [facility]  Home Phone: [phone]  Mobile Phone: [phone]  Relation: Daughter    Discharge Plan A: Home with family (Resume current Home Health; continue out-patient rehab; cardiology follow-up tomorrow)  Discharge Plan B: Other (TBD)      [facility] [address] [place] [address], [place] - [address]  [address]  [place] [address]  Phone: [phone] Fax: [phone]      Initial Assessment (most recent)       Adult Discharge Assessment - 12/04/22 0919          Discharge Assessment    Assessment Type Discharge Planning Assessment     Confirmed/corrected address, phone number and insurance Yes     Confirmed Demographics Correct on Facesheet     Source of Information patient;health record     Communicated MARTELL with patient/caregiver Yes   1-2 days    Reason For Admission SOB     Lives With child(karan), adult     Facility Arrived From: Home     Do you expect to return to your current living situation? Yes     Do you have help at home or someone to help you manage your care at home? Yes     Who are  your caregiver(s) and their phone number(s)? Darlenedaughter: [phone]; [phone]     Prior to hospitilization cognitive status: Alert/Oriented     Current cognitive status: Alert/Oriented     Walking or Climbing Stairs Difficulty ambulation difficulty, requires equipment     Mobility Management RW; cane     Dressing/Bathing Difficulty bathing difficulty, requires equipment;bathing difficulty, assistance 1 person     Dressing/Bathing Management Built-in tub bench; BSC; daughter asssist with bathing     Do you have any problems with: Errands/Grocery;Needs other help     Specify other help Sierradaughter assists with personal care as needed and transports patient     Home Accessibility wheelchair accessible     Home Layout Able to live on 1st floor     Equipment Currently Used at Home bath bench;walker, rolling;cane, straight;bedside commode     Readmission within 30 days? Yes   OWB; yesterday--was not feeling well and returned to ED    Patient currently being followed by outpatient case management? No     Do you currently have service(s) that help you manage your care at home? No     Do you take prescription medications? Yes     Do you have prescription coverage? Yes     Coverage PHN     Do you have any problems affording any of your prescribed medications? No     Is the patient taking medications as prescribed? yes     Who is going to help you get home at discharge? Darlenedaughter     How do you get to doctors appointments? family or friend will provide     Are you on dialysis? No     Do you take coumadin? No     Discharge Plan A Home with family   Resume current Home Health; continue out-patient rehab; cardiology follow-up tomorrow    Discharge Plan B Other   TBD    DME Needed Upon Discharge  other (see comments)   TBD    Discharge Plan discussed with: Patient     Discharge Barriers Identified None                   SW Role explained to patient; two patient identifiers recognized; SW contact information  placed on Communication board. Discussed patient managing health care at home and discussed discharge plans A and B; determined who would be helping patient at home with recovery: Usama, daughter, will help with recovery at home. Patient also has HH and O/P Rehab.

## 2022-12-05 LAB
ANION GAP SERPL CALC-SCNC: 15 MMOL/L (ref 8–16)
BNP SERPL-MCNC: 4552 PG/ML (ref 0–99)
BUN SERPL-MCNC: 49 MG/DL (ref 8–23)
CALCIUM SERPL-MCNC: 9.5 MG/DL (ref 8.7–10.5)
CHLORIDE SERPL-SCNC: 98 MMOL/L (ref 95–110)
CO2 SERPL-SCNC: 25 MMOL/L (ref 23–29)
CREAT SERPL-MCNC: 1.5 MG/DL (ref 0.5–1.4)
EST. GFR  (NO RACE VARIABLE): 34 ML/MIN/1.73 M^2
GLUCOSE SERPL-MCNC: 101 MG/DL (ref 70–110)
POTASSIUM SERPL-SCNC: 3.7 MMOL/L (ref 3.5–5.1)
SODIUM SERPL-SCNC: 138 MMOL/L (ref 136–145)

## 2022-12-05 PROCEDURE — 99223 1ST HOSP IP/OBS HIGH 75: CPT | Mod: ,,, | Performed by: NURSE PRACTITIONER

## 2022-12-05 PROCEDURE — 80048 BASIC METABOLIC PNL TOTAL CA: CPT | Performed by: PHYSICIAN ASSISTANT

## 2022-12-05 PROCEDURE — 21400001 HC TELEMETRY ROOM

## 2022-12-05 PROCEDURE — 36415 COLL VENOUS BLD VENIPUNCTURE: CPT | Performed by: STUDENT IN AN ORGANIZED HEALTH CARE EDUCATION/TRAINING PROGRAM

## 2022-12-05 PROCEDURE — 83880 ASSAY OF NATRIURETIC PEPTIDE: CPT | Performed by: STUDENT IN AN ORGANIZED HEALTH CARE EDUCATION/TRAINING PROGRAM

## 2022-12-05 PROCEDURE — 25000003 PHARM REV CODE 250: Performed by: STUDENT IN AN ORGANIZED HEALTH CARE EDUCATION/TRAINING PROGRAM

## 2022-12-05 PROCEDURE — 99223 PR INITIAL HOSPITAL CARE,LEVL III: ICD-10-PCS | Mod: ,,, | Performed by: NURSE PRACTITIONER

## 2022-12-05 PROCEDURE — 25000003 PHARM REV CODE 250: Performed by: PHYSICIAN ASSISTANT

## 2022-12-05 PROCEDURE — 63600175 PHARM REV CODE 636 W HCPCS: Performed by: STUDENT IN AN ORGANIZED HEALTH CARE EDUCATION/TRAINING PROGRAM

## 2022-12-05 RX ORDER — METOLAZONE 2.5 MG/1
2.5 TABLET ORAL ONCE
Status: COMPLETED | OUTPATIENT
Start: 2022-12-05 | End: 2022-12-05

## 2022-12-05 RX ORDER — ACETAMINOPHEN 500 MG
1000 TABLET ORAL EVERY 8 HOURS
Status: DISCONTINUED | OUTPATIENT
Start: 2022-12-05 | End: 2022-12-06 | Stop reason: HOSPADM

## 2022-12-05 RX ADMIN — ACETAMINOPHEN 1000 MG: 500 TABLET ORAL at 12:12

## 2022-12-05 RX ADMIN — PRAVASTATIN SODIUM 40 MG: 40 TABLET ORAL at 10:12

## 2022-12-05 RX ADMIN — FUROSEMIDE 80 MG: 10 INJECTION, SOLUTION INTRAVENOUS at 10:12

## 2022-12-05 RX ADMIN — ACETAMINOPHEN 1000 MG: 500 TABLET ORAL at 09:12

## 2022-12-05 RX ADMIN — SACUBITRIL AND VALSARTAN 1 TABLET: 24; 26 TABLET, FILM COATED ORAL at 09:12

## 2022-12-05 RX ADMIN — METOLAZONE 2.5 MG: 2.5 TABLET ORAL at 12:12

## 2022-12-05 RX ADMIN — SACUBITRIL AND VALSARTAN 1 TABLET: 24; 26 TABLET, FILM COATED ORAL at 10:12

## 2022-12-05 RX ADMIN — METOPROLOL SUCCINATE 25 MG: 25 TABLET, EXTENDED RELEASE ORAL at 10:12

## 2022-12-05 RX ADMIN — TRAMADOL HYDROCHLORIDE 50 MG: 50 TABLET, COATED ORAL at 10:12

## 2022-12-05 RX ADMIN — FUROSEMIDE 80 MG: 10 INJECTION, SOLUTION INTRAVENOUS at 06:12

## 2022-12-05 RX ADMIN — CLOPIDOGREL BISULFATE 75 MG: 75 TABLET ORAL at 10:12

## 2022-12-05 NOTE — HPI
HPI: Palmira Malave 87 y.o. female with CHF, CKD, HTN, HLD presents to the hospital with a chief complaint of SOB.  She reports today after discharge from the hospital she became short of breath with ambulating to the bathroom after having a bowel.  She then ambulated back to her living room and was found by her daughter and informed she looked short of breath was directed hospital.  She is not attempted any treatment at home.  She reports her shortness breath has improved with treatment in the emergency room with Lasix.  She denies any aggravating factors.  She reports eating toast and coffee after discharge but denies any other salty foods.  She denies fever chest pain nausea vomiting abdominal syncope dizziness dysuria melena hematuria hematemesis.  She reports her lower extremity edema has greatly improved since yesterday.     In the ED, BNP 4000 troponin 0.189 creatinine 2.0 afebrile without leukocytosis chest x-ray with cardiomegaly and mild pulmonary edema.

## 2022-12-05 NOTE — PLAN OF CARE
Problem: Adult Inpatient Plan of Care  Goal: Plan of Care Review  Outcome: Ongoing, Progressing  Goal: Patient-Specific Goal (Individualized)  Outcome: Ongoing, Progressing  Goal: Absence of Hospital-Acquired Illness or Injury  Outcome: Ongoing, Progressing  Goal: Optimal Comfort and Wellbeing  Outcome: Ongoing, Progressing  Goal: Readiness for Transition of Care  Outcome: Ongoing, Progressing     Problem: Renal Function Impairment (Acute Kidney Injury/Impairment)  Goal: Effective Renal Function  Outcome: Ongoing, Progressing  Intervention: Monitor and Support Renal Function  Flowsheets (Taken 12/5/2022 0023)  Stabilization Measures: legs elevated  Medication Review/Management: medications reviewed     Problem: Coping Ineffective  Goal: Effective Coping  Outcome: Ongoing, Progressing  Intervention: Support and Enhance Coping Strategies  Flowsheets (Taken 12/5/2022 0023)  Environmental Support:   calm environment promoted   distractions minimized     Problem: Fall Injury Risk  Goal: Absence of Fall and Fall-Related Injury  Outcome: Ongoing, Progressing  Intervention: Identify and Manage Contributors  Flowsheets (Taken 12/5/2022 0023)  Self-Care Promotion: independence encouraged  Medication Review/Management: medications reviewed  Intervention: Promote Injury-Free Environment  Flowsheets (Taken 12/5/2022 0023)  Safety Promotion/Fall Prevention:   assistive device/personal item within reach   medications reviewed   nonskid shoes/socks when out of bed   side rails raised x 3   supervised activity

## 2022-12-05 NOTE — CONSULTS
"[facility] - Telemetry  Palliative Medicine  Consult Note    Patient Name: Palmira Malave  MRN: [id number]  Admission Date: 12/3/2022  Hospital Length of Stay: 2 days  Code Status: DNR   Attending Provider: Luis Llanos MD  Consulting Provider: Lina Troncoso NP  Primary Care Physician: Qi Ramon MD  Principal Problem:Acute on chronic combined systolic and diastolic heart failure    Patient information was obtained from patient, past medical records, ER records and primary team.      Inpatient consult to Palliative Care  Consult performed by: Lina Troncoso NP  Consult ordered by: Reji Nguyễn PA-C  Reason for consult: [facility]        Assessment/Plan:     Palliative encounter:    -Palliative consult received  -Chart reviewed in detail  -At time of consult patient alert and oriented. No oxygen on and denies SOB. She states she is encouraged after talking with the doctor this am and hopes they have found the "right medicine for her"  -We discussed her current clinical condition and return to the hospital the same day she was discharged. She was concerned with dyspnea and leg swelling she states  -We discussed her heart failure  And how this plays a role with dyspnea and edema.  She states she sees Dr Richmond outpatient and is to see him this week.   -I engaged the patient in a conversation about EOL wishes and she confirms DNR and states she does not want to be put on machines to keep her alive and when she gets dependent on others for everything she prefers not to be here (meaning alive). She states she has expressed her feelings and wishes to her family regarding EOL    -addressed all questions and concerns  -emotional support provided  -Patient does meet criteria for hospice with CHF but after our discussion this is not in line with her current [facility]    Acute on chronic combined systolic and diastolic heart failure  "Echo 7/2022 with EF of 20% and grade 2 DD. Presents with SOB, increasing lower extremity " Patient is here with mom, Belle.        If any information or results need to be relayed from today's visit, the best way to contact the patient is via [phone] (mobile) - Patient gives verbal permission to leave a detailed voicemail at the number provided.         "edema, and probable pulmonary edema on chest x-ray. "  -mgmt per primary  -follows outpatient Dr Richmond CARDS; appt this week       ICD (implantable cardioverter-defibrillator) in place  noted     Stage 3 chronic kidney disease  -Creatinine 1.5 down from 2.0   -Mgmt per primary      Hyperlipidemia     Coronary artery disease involving native coronary artery of native heart without angina pectoris        Essential hypertension      Thank you for your consult.     Subjective:       HPI: Palmira Malave 87 y.o. female with CHF, CKD, HTN, HLD presents to the hospital with a chief complaint of SOB.  She reports today after discharge from the hospital she became short of breath with ambulating to the bathroom after having a bowel.  She then ambulated back to her living room and was found by her daughter and informed she looked short of breath was directed hospital.  She is not attempted any treatment at home.  She reports her shortness breath has improved with treatment in the emergency room with Lasix.  She denies any aggravating factors.  She reports eating toast and coffee after discharge but denies any other salty foods.  She denies fever chest pain nausea vomiting abdominal syncope dizziness dysuria melena hematuria hematemesis.  She reports her lower extremity edema has greatly improved since yesterday.     In the ED, BNP 4000 troponin 0.189 creatinine 2.0 afebrile without leukocytosis chest x-ray with cardiomegaly and mild pulmonary edema.      Hospital Course:  No notes on file        Past Medical History:   Diagnosis Date    Cataract     CHF (congestive heart failure)     CKD (chronic kidney disease), stage III     Coronary artery disease     Gout attack     Hypercholesteremia     Hypertension     Renal disorder     Vaginal delivery     x4       Past Surgical History:   Procedure Laterality Date    APPENDECTOMY      CARDIAC DEFIBRILLATOR PLACEMENT      2015    CATARACT EXTRACTION W/  INTRAOCULAR " LENS IMPLANT Left 02/07/2019    Dr. Velazco    CATARACT EXTRACTION W/  INTRAOCULAR LENS IMPLANT Right 02/21/2019    Dr. Velazco    CHOLECYSTECTOMY      COLONOSCOPY W/ POLYPECTOMY  10 or 11/ 2014    per Dr. Elen ceja Left 8/2008    EYE SURGERY      HYSTERECTOMY      INTRAOCULAR PROSTHESES INSERTION Left 2/7/2019    Procedure: INSERTION, IOL PROSTHESIS;  Surgeon: Demetrius Velazco MD;  Location: [facility] OR;  Service: Ophthalmology;  Laterality: Left;  RN Phone Pre OP 1-30-19.  Arrival 05:30 AM    INTRAOCULAR PROSTHESES INSERTION Right 2/21/2019    Procedure: INSERTION, IOL PROSTHESIS;  Surgeon: Demetrius Velazco MD;  Location: [facility] OR;  Service: Ophthalmology;  Laterality: Right;    JOINT REPLACEMENT Bilateral 2005 & 2006    2 Knee Replacements=Lt. 1= 2005. Rt. 1= 2006    OOPHORECTOMY      PHACOEMULSIFICATION OF CATARACT Left 2/7/2019    Procedure: PHACOEMULSIFICATION, CATARACT;  Surgeon: Demetrius Velazco MD;  Location: [facility] OR;  Service: Ophthalmology;  Laterality: Left;    PHACOEMULSIFICATION OF CATARACT Right 2/21/2019    Procedure: PHACOEMULSIFICATION, CATARACT;  Surgeon: Demetrius Velazco MD;  Location: [facility] OR;  Service: Ophthalmology;  Laterality: Right;  RN Phone Pre op 2-15-19.  Arrival 05:30 AM    TOTAL KNEE ARTHROPLASTY Bilateral Lt.= 2005; Rt.=2006    Bilateral Knee scope       Review of patient's allergies indicates:   Allergen Reactions    Aspirin Swelling     Swelling and rash    Colace [docusate sodium] Rash     itching    Docusate Other (See Comments) and Rash     itching    Sulfa (sulfonamide antibiotics) Swelling     Swelling and rash  Swelling and rash    Iodine and iodide containing products Rash    Penicillins Rash       Medications:  Continuous Infusions:  Scheduled Meds:   clopidogreL  75 mg Oral Daily    furosemide (LASIX) injection  80 mg Intravenous BID WM    metoprolol succinate  25 mg Oral Daily    pravastatin  40 mg Oral Daily     sacubitriL-valsartan  1 tablet Oral BID     PRN Meds:acetaminophen, melatonin, sodium chloride 0.9%, traMADoL    Family History       Problem Relation (Age of Onset)    Amblyopia Son    Diabetes Other    Heart attack Mother (88)    Hyperlipidemia Mother    Hypertension Mother, Other          Tobacco Use    Smoking status: Never    Smokeless tobacco: Never   Substance and Sexual Activity    Alcohol use: Not Currently    Drug use: No    Sexual activity: Not Currently     Partners: Male       Review of Systems   Constitutional:  Positive for activity change. Negative for fatigue.   HENT: Negative.     Respiratory:  Negative for shortness of breath.    Cardiovascular:  Positive for leg swelling.   Musculoskeletal:  Positive for arthralgias.   Neurological:  Negative for dizziness and weakness.   Objective:     Vital Signs (Most Recent):  Temp: 98 °F (36.7 °C) (12/05/22 0752)  Pulse: 73 (12/05/22 0752)  Resp: 18 (12/05/22 0752)  BP: (!) 112/55 (12/05/22 0752)  SpO2: (!) 94 % (12/05/22 0752)   Vital Signs (24h Range):  Temp:  [97.4 °F (36.3 °C)-98.1 °F (36.7 °C)] 98 °F (36.7 °C)  Pulse:  [66-73] 73  Resp:  [18-26] 18  SpO2:  [94 %-97 %] 94 %  BP: ()/(51-63) 112/55     Weight: 61.4 kg (135 lb 5.8 oz)  Body mass index is 23.98 kg/m².    Physical Exam  Vitals and nursing note reviewed.   Constitutional:       General: She is not in acute distress.     Appearance: She is ill-appearing. She is not toxic-appearing or diaphoretic.   HENT:      Right Ear: External ear normal.      Left Ear: External ear normal.      Nose: Nose normal.   Eyes:      General:         Right eye: No discharge.         Left eye: No discharge.   Cardiovascular:      Rate and Rhythm: Normal rate and regular rhythm.   Pulmonary:      Effort: Pulmonary effort is normal.   Abdominal:      General: Abdomen is flat.      Palpations: Abdomen is soft.   Musculoskeletal:      Right lower leg: Edema (trace) present.      Left lower leg: Edema  (trace) present.   Skin:     General: Skin is warm and dry.   Neurological:      Mental Status: She is alert and oriented to person, place, and time.   Psychiatric:         Mood and Affect: Mood normal.         Behavior: Behavior normal.       Review of Symptoms      Symptom Assessment (ESAS 0-10 Scale)  Pain:  0  Dyspnea:  0  Anxiety:  0  Nausea:  0  Depression:  0  Anorexia:  0  Fatigue:  0  Insomnia:  0  Restlessness:  0  Agitation:  0           Advance Care Planning   Advance Directives:   Do Not Resuscitate Status: Yes      Decision Making:  Patient answered questions  Goals of Care: The patient endorses that what is most important right now is to focus on spending time at home, avoiding the hospital, and remaining as independent as possible    Accordingly, we have decided that the best plan to meet the patient's goals includes continuing with treatment       Significant Labs: All pertinent labs within the past 24 hours have been reviewed.  CBC:   Recent Labs   Lab 12/04/22  0348   WBC 6.22   HGB 10.6*   HCT 33.0*   MCV 95   *     BMP:  Recent Labs   Lab 12/05/22  0408         K 3.7   CL 98   CO2 25   BUN 49*   CREATININE 1.5*   CALCIUM 9.5     LFT:  Lab Results   Component Value Date    AST 25 12/03/2022    ALKPHOS 98 12/03/2022    BILITOT 1.0 12/03/2022     Albumin:   Albumin   Date Value Ref Range Status   12/03/2022 3.9 3.5 - 5.2 g/dL Final     Protein:   Total Protein   Date Value Ref Range Status   12/03/2022 7.0 6.0 - 8.4 g/dL Final     Lactic acid:   Lab Results   Component Value Date    LACTATE 0.7 01/01/2021    LACTATE 0.4 (L) 11/17/2014       Significant Imaging:  last imaging 12/3 CXR        > 50% of 80 min visit spent in chart review, face to face discussion of goals of care (10 mins of 80 min visit), symptom assessment, coordination of care and emotional support.    Lina Troncoso NP  Palliative Medicine  [facility] - [place], [place] - [facility]

## 2022-12-05 NOTE — PLAN OF CARE
Problem: Adult Inpatient Plan of Care  Goal: Plan of Care Review  Outcome: Ongoing, Progressing  Goal: Patient-Specific Goal (Individualized)  Outcome: Ongoing, Progressing  Goal: Absence of Hospital-Acquired Illness or Injury  Outcome: Ongoing, Progressing  Goal: Optimal Comfort and Wellbeing  Outcome: Ongoing, Progressing  Goal: Readiness for Transition of Care  Outcome: Ongoing, Progressing     Problem: Fluid and Electrolyte Imbalance (Acute Kidney Injury/Impairment)  Goal: Fluid and Electrolyte Balance  Outcome: Ongoing, Progressing     Problem: Oral Intake Inadequate (Acute Kidney Injury/Impairment)  Goal: Optimal Nutrition Intake  Outcome: Ongoing, Progressing     Problem: Coping Ineffective  Goal: Effective Coping  Outcome: Ongoing, Progressing     Problem: Impaired Wound Healing  Goal: Optimal Wound Healing  Outcome: Ongoing, Progressing     Problem: Fall Injury Risk  Goal: Absence of Fall and Fall-Related Injury  Outcome: Ongoing, Progressing     Problem: Skin Injury Risk Increased  Goal: Skin Health and Integrity  Outcome: Ongoing, Progressing

## 2022-12-05 NOTE — PROGRESS NOTES
[facility] Medicine  Progress Note    Patient Name: Palmira Malave  MRN: [id number]  Patient Class: IP- Inpatient   Admission Date: 12/3/2022  Length of Stay: 2 days  Attending Physician: Luis Llanos MD  Primary Care Provider: Qi Ramon MD        Subjective:     Principal Problem:Acute on chronic combined systolic and diastolic heart failure        HPI:  Palmira Malave 87 y.o. female with CHF, CKD, HTN, HLD presents to the hospital with a chief complaint of SOB.  She reports today after discharge from the hospital she became short of breath with ambulating to the bathroom after having a bowel.  She then ambulated back to her living room and was found by her daughter and informed she looked short of breath was directed hospital.  She is not attempted any treatment at home.  She reports her shortness breath has improved with treatment in the emergency room with Lasix.  She denies any aggravating factors.  She reports eating toast and coffee after discharge but denies any other salty foods.  She denies fever chest pain nausea vomiting abdominal syncope dizziness dysuria melena hematuria hematemesis.  She reports her lower extremity edema has greatly improved since yesterday.    In the ED, BNP 4000 troponin 0.189 creatinine 2.0 afebrile without leukocytosis chest x-ray with cardiomegaly and mild pulmonary edema.      Overview/Hospital Course:  Echo 7/2022 with EF of 20% and grade 2 DD. Presents with SOB, increasing lower extremity edema, and pulmonary edema on chest x-ray. BNP 4000. Soft BP's.  Will discontinue home carvedilol and Bidil, in order to make sure she has both beta-blocker and ACE inhibitor/Arb on board.  Will start metoprolol succinate 25 mg, and low-dose Entresto.  Creatinine improving on diuresis, sodium depressed, bicarb coming up, will continue. No UOP documented yet, will place pure wick for accurate measures. Continue diuresis, BNP 4550.       NAEON. Denies CP.   Eating  okay. UOP wnl.       Review of Systems   Constitutional:  Negative for chills and fever.   HENT:  Negative for nosebleeds and tinnitus.    Eyes:  Negative for photophobia and visual disturbance.   Respiratory:  Positive for shortness of breath. Negative for wheezing.    Cardiovascular:  Negative for chest pain, palpitations and leg swelling.   Gastrointestinal:  Negative for abdominal distention, nausea and vomiting.   Genitourinary:  Negative for dysuria, flank pain and hematuria.   Musculoskeletal:  Negative for gait problem and joint swelling.   Skin:  Negative for rash and wound.   Neurological:  Negative for seizures and syncope.         Objective:     Vital Signs (Most Recent):  Temp: 98 °F (36.7 °C) (12/05/22 0752)  Pulse: 73 (12/05/22 0752)  Resp: 18 (12/05/22 0752)  BP: (!) 112/55 (12/05/22 0752)  SpO2: (!) 94 % (12/05/22 0752)   Vital Signs (24h Range):  Temp:  [97.4 °F (36.3 °C)-98.1 °F (36.7 °C)] 98 °F (36.7 °C)  Pulse:  [66-73] 73  Resp:  [18-26] 18  SpO2:  [94 %-97 %] 94 %  BP: ()/(51-63) 112/55     Weight: 61.4 kg (135 lb 5.8 oz)  Body mass index is 23.98 kg/m².    Physical Exam  Vitals and nursing note reviewed.   Constitutional:       General: She is not in acute distress.     Appearance: She is well-developed. She is not diaphoretic.   HENT:      Head: Normocephalic and atraumatic.      Right Ear: External ear normal.      Left Ear: External ear normal.   Eyes:      General:         Right eye: No discharge.         Left eye: No discharge.      Conjunctiva/sclera: Conjunctivae normal.   Neck:      Thyroid: No thyromegaly.      Comments: Minimal JVD  Cardiovascular:      Rate and Rhythm: Normal rate and regular rhythm.      Heart sounds: No murmur heard.  Pulmonary:      Effort: Pulmonary effort is normal. No respiratory distress.      Breath sounds: Rales present.      Comments: On RA  Abdominal:      General: Bowel sounds are normal. There is no distension.      Palpations: Abdomen is soft.  There is no mass.      Tenderness: There is no abdominal tenderness.   Musculoskeletal:         General: No deformity.      Cervical back: Normal range of motion and neck supple.      Right lower leg: No edema.      Left lower leg: No edema.   Skin:     General: Skin is warm and dry.   Neurological:      Mental Status: She is alert and oriented to person, place, and time.      Sensory: No sensory deficit.   Psychiatric:         Mood and Affect: Mood normal.         Behavior: Behavior normal.           Significant Labs: CBC:   Recent Labs   Lab 12/03/22  1530 12/04/22  0348   WBC 5.88 6.22   HGB 10.7* 10.6*   HCT 31.7* 33.0*   * 134*       CMP:   Recent Labs   Lab 12/03/22  1530 12/04/22  0348 12/05/22  0408   * 133* 138   K 4.4 3.9 3.7   CL 98 98 98   CO2 25 28 25   * 101 101   BUN 59* 57* 49*   CREATININE 2.0* 1.7* 1.5*   CALCIUM 10.1 9.9 9.5   PROT 7.0  --   --    ALBUMIN 3.9  --   --    BILITOT 1.0  --   --    ALKPHOS 98  --   --    AST 25  --   --    ALT 17  --   --    ANIONGAP 11 7* 15       Cardiac Markers:   Recent Labs   Lab 12/05/22  0408   BNP 4,552*       Troponin:   Recent Labs   Lab 12/03/22  1530 12/04/22  0012   TROPONINI 0.189* 0.178*         Significant Imaging:   Imaging Results              X-Ray Chest AP Portable (Final result)  Result time 12/03/22 15:12:40      Final result by Guzman Cueto MD (12/03/22 15:12:40)                   Impression:      Stable cardiomegaly with probable pulmonary edema.  Follow-up recommended.      Electronically signed by: Guzman Cueto  Date:    12/03/2022  Time:    15:12               Narrative:    EXAMINATION:  XR CHEST AP PORTABLE    CLINICAL HISTORY:  Shortness of breath    TECHNIQUE:  Single frontal view of the chest was performed.    COMPARISON:  12/02/2022    FINDINGS:  Cardiac silhouette is enlarged similar to the prior study.  Aortic atherosclerosis.    Mild interstitial and ground-glass changes bilaterally could represent pulmonary  edema.    Possible trace effusions bilaterally.  No mass or consolidation.    No evidence of pneumothorax.  No acute osseous abnormality.    No significant change.                                          Assessment/Plan:      * Acute on chronic combined systolic and diastolic heart failure  · Echo 7/2022 with EF of 20% and grade 2 DD. Presents with SOB, increasing lower extremity edema, and probable pulmonary edema on chest x-ray.   · BNP 4000  · Started on IV lasix  · Daily weights/strict intake and output/telemetry  · Soft BP's while diuresing with severe LHF  · Will discontinue home carvedilol and Bidil, in order to make sure she has both beta-blocker and ACE inhibitor/Arb on board.    · Will start metoprolol succinate 25 mg, and low-dose Entresto.    · Creatinine improving on diuresis, sodium depressed, bicarb coming up, will continue.    Goals of care, counseling/discussion  Advance Care Planning   Previous records showing DNR, will confirm and change code status.     ICD (implantable cardioverter-defibrillator) in place  Stable no acute issues    Stage 3 chronic kidney disease  Cr today of 2.0. baseline of 1.6-2.0  Renal dose medications, avoid nephrotoxic drugs monitor intake and output    Hyperlipidemia  substitute home lovastatin for pravastatin inpt    Coronary artery disease involving native coronary artery of native heart without angina pectoris  Denies chest pain. Troponin unchanged from 12/2  Continue home coreg (with hold parameters), plavix, statin  Troponin trend  Telemetry    Essential hypertension  Well controlled. Continue home coreg/isordil with hold parameters      VTE Risk Mitigation (From admission, onward)         Ordered     Place DALE hose  Until discontinued         12/03/22 1805     IP VTE HIGH RISK PATIENT  Once         12/03/22 1805     Place sequential compression device  Until discontinued         12/03/22 1805                Discharge Planning   MARTELL:      Code Status: DNR   Is the  patient medically ready for discharge?:     Reason for patient still in hospital (select all that apply): Patient trending condition and Treatment  Discharge Plan A: Home with family (Resume current Home Health; continue out-patient rehab; cardiology follow-up tomorrow)                  Luis Llanos MD  Department of [facility] Medicine   [facility]

## 2022-12-05 NOTE — SUBJECTIVE & OBJECTIVE
NAHEIDION. Denies CP.   Eating okay. UOP wnl.       Review of Systems   Constitutional:  Negative for chills and fever.   HENT:  Negative for nosebleeds and tinnitus.    Eyes:  Negative for photophobia and visual disturbance.   Respiratory:  Positive for shortness of breath. Negative for wheezing.    Cardiovascular:  Negative for chest pain, palpitations and leg swelling.   Gastrointestinal:  Negative for abdominal distention, nausea and vomiting.   Genitourinary:  Negative for dysuria, flank pain and hematuria.   Musculoskeletal:  Negative for gait problem and joint swelling.   Skin:  Negative for rash and wound.   Neurological:  Negative for seizures and syncope.         Objective:     Vital Signs (Most Recent):  Temp: 98 °F (36.7 °C) (12/05/22 0752)  Pulse: 73 (12/05/22 0752)  Resp: 18 (12/05/22 0752)  BP: (!) 112/55 (12/05/22 0752)  SpO2: (!) 94 % (12/05/22 0752)   Vital Signs (24h Range):  Temp:  [97.4 °F (36.3 °C)-98.1 °F (36.7 °C)] 98 °F (36.7 °C)  Pulse:  [66-73] 73  Resp:  [18-26] 18  SpO2:  [94 %-97 %] 94 %  BP: ()/(51-63) 112/55     Weight: 61.4 kg (135 lb 5.8 oz)  Body mass index is 23.98 kg/m².    Physical Exam  Vitals and nursing note reviewed.   Constitutional:       General: She is not in acute distress.     Appearance: She is well-developed. She is not diaphoretic.   HENT:      Head: Normocephalic and atraumatic.      Right Ear: External ear normal.      Left Ear: External ear normal.   Eyes:      General:         Right eye: No discharge.         Left eye: No discharge.      Conjunctiva/sclera: Conjunctivae normal.   Neck:      Thyroid: No thyromegaly.      Comments: Minimal JVD  Cardiovascular:      Rate and Rhythm: Normal rate and regular rhythm.      Heart sounds: No murmur heard.  Pulmonary:      Effort: Pulmonary effort is normal. No respiratory distress.      Breath sounds: Rales present.      Comments: On RA  Abdominal:      General: Bowel sounds are normal. There is no distension.       Palpations: Abdomen is soft. There is no mass.      Tenderness: There is no abdominal tenderness.   Musculoskeletal:         General: No deformity.      Cervical back: Normal range of motion and neck supple.      Right lower leg: No edema.      Left lower leg: No edema.   Skin:     General: Skin is warm and dry.   Neurological:      Mental Status: She is alert and oriented to person, place, and time.      Sensory: No sensory deficit.   Psychiatric:         Mood and Affect: Mood normal.         Behavior: Behavior normal.           Significant Labs: CBC:   Recent Labs   Lab 12/03/22 1530 12/04/22  0348   WBC 5.88 6.22   HGB 10.7* 10.6*   HCT 31.7* 33.0*   * 134*       CMP:   Recent Labs   Lab 12/03/22  1530 12/04/22  0348 12/05/22  0408   * 133* 138   K 4.4 3.9 3.7   CL 98 98 98   CO2 25 28 25   * 101 101   BUN 59* 57* 49*   CREATININE 2.0* 1.7* 1.5*   CALCIUM 10.1 9.9 9.5   PROT 7.0  --   --    ALBUMIN 3.9  --   --    BILITOT 1.0  --   --    ALKPHOS 98  --   --    AST 25  --   --    ALT 17  --   --    ANIONGAP 11 7* 15       Cardiac Markers:   Recent Labs   Lab 12/05/22  0408   BNP 4,552*       Troponin:   Recent Labs   Lab 12/03/22  1530 12/04/22  0012   TROPONINI 0.189* 0.178*         Significant Imaging:   Imaging Results              X-Ray Chest AP Portable (Final result)  Result time 12/03/22 15:12:40      Final result by Guzman Cueto MD (12/03/22 15:12:40)                   Impression:      Stable cardiomegaly with probable pulmonary edema.  Follow-up recommended.      Electronically signed by: Guzman Cueto  Date:    12/03/2022  Time:    15:12               Narrative:    EXAMINATION:  XR CHEST AP PORTABLE    CLINICAL HISTORY:  Shortness of breath    TECHNIQUE:  Single frontal view of the chest was performed.    COMPARISON:  12/02/2022    FINDINGS:  Cardiac silhouette is enlarged similar to the prior study.  Aortic atherosclerosis.    Mild interstitial and ground-glass changes  bilaterally could represent pulmonary edema.    Possible trace effusions bilaterally.  No mass or consolidation.    No evidence of pneumothorax.  No acute osseous abnormality.    No significant change.

## 2022-12-05 NOTE — SUBJECTIVE & OBJECTIVE
Past Medical History:   Diagnosis Date    Cataract     CHF (congestive heart failure)     CKD (chronic kidney disease), stage III     Coronary artery disease     Gout attack     Hypercholesteremia     Hypertension     Renal disorder     Vaginal delivery     x4       Past Surgical History:   Procedure Laterality Date    APPENDECTOMY      CARDIAC DEFIBRILLATOR PLACEMENT      2015    CATARACT EXTRACTION W/  INTRAOCULAR LENS IMPLANT Left 02/07/2019    Dr. Velazco    CATARACT EXTRACTION W/  INTRAOCULAR LENS IMPLANT Right 02/21/2019    Dr. Velazco    CHOLECYSTECTOMY      COLONOSCOPY W/ POLYPECTOMY  10 or 11/ 2014    per Dr. Myrick    defribillator Left 8/2008    EYE SURGERY      HYSTERECTOMY      INTRAOCULAR PROSTHESES INSERTION Left 2/7/2019    Procedure: INSERTION, IOL PROSTHESIS;  Surgeon: Demetrius Velazco MD;  Location: [facility] OR;  Service: Ophthalmology;  Laterality: Left;  RN Phone Pre OP 1-30-19.  Arrival 05:30 AM    INTRAOCULAR PROSTHESES INSERTION Right 2/21/2019    Procedure: INSERTION, IOL PROSTHESIS;  Surgeon: Demetrius Velazco MD;  Location: [facility] OR;  Service: Ophthalmology;  Laterality: Right;    JOINT REPLACEMENT Bilateral 2005 & 2006    2 Knee Replacements=Lt. 1= 2005. Rt. 1= 2006    OOPHORECTOMY      PHACOEMULSIFICATION OF CATARACT Left 2/7/2019    Procedure: PHACOEMULSIFICATION, CATARACT;  Surgeon: Demetrius Velazco MD;  Location: [facility] OR;  Service: Ophthalmology;  Laterality: Left;    PHACOEMULSIFICATION OF CATARACT Right 2/21/2019    Procedure: PHACOEMULSIFICATION, CATARACT;  Surgeon: Demetrius Velazco MD;  Location: [facility] OR;  Service: Ophthalmology;  Laterality: Right;  RN Phone Pre op 2-15-19.  Arrival 05:30 AM    TOTAL KNEE ARTHROPLASTY Bilateral Lt.= 2005; Rt.=2006    Bilateral Knee scope       Review of patient's allergies indicates:   Allergen Reactions    Aspirin Swelling     Swelling and rash    Colace [docusate sodium] Rash     itching    Docusate Other (See  Comments) and Rash     itching    Sulfa (sulfonamide antibiotics) Swelling     Swelling and rash  Swelling and rash    Iodine and iodide containing products Rash    Penicillins Rash       Medications:  Continuous Infusions:  Scheduled Meds:   clopidogreL  75 mg Oral Daily    furosemide (LASIX) injection  80 mg Intravenous BID WM    metoprolol succinate  25 mg Oral Daily    pravastatin  40 mg Oral Daily    sacubitriL-valsartan  1 tablet Oral BID     PRN Meds:acetaminophen, melatonin, sodium chloride 0.9%, traMADoL    Family History       Problem Relation (Age of Onset)    Amblyopia Son    Diabetes Other    Heart attack Mother (88)    Hyperlipidemia Mother    Hypertension Mother, Other          Tobacco Use    Smoking status: Never    Smokeless tobacco: Never   Substance and Sexual Activity    Alcohol use: Not Currently    Drug use: No    Sexual activity: Not Currently     Partners: Male       Review of Systems   Constitutional:  Positive for activity change. Negative for fatigue.   HENT: Negative.     Respiratory:  Negative for shortness of breath.    Cardiovascular:  Positive for leg swelling.   Musculoskeletal:  Positive for arthralgias.   Neurological:  Negative for dizziness and weakness.   Objective:     Vital Signs (Most Recent):  Temp: 98 °F (36.7 °C) (12/05/22 0752)  Pulse: 73 (12/05/22 0752)  Resp: 18 (12/05/22 0752)  BP: (!) 112/55 (12/05/22 0752)  SpO2: (!) 94 % (12/05/22 0752)   Vital Signs (24h Range):  Temp:  [97.4 °F (36.3 °C)-98.1 °F (36.7 °C)] 98 °F (36.7 °C)  Pulse:  [66-73] 73  Resp:  [18-26] 18  SpO2:  [94 %-97 %] 94 %  BP: ()/(51-63) 112/55     Weight: 61.4 kg (135 lb 5.8 oz)  Body mass index is 23.98 kg/m².    Physical Exam  Vitals and nursing note reviewed.   Constitutional:       General: She is not in acute distress.     Appearance: She is ill-appearing. She is not toxic-appearing or diaphoretic.   HENT:      Right Ear: External ear normal.      Left Ear: External ear normal.      Nose:  Nose normal.   Eyes:      General:         Right eye: No discharge.         Left eye: No discharge.   Cardiovascular:      Rate and Rhythm: Normal rate and regular rhythm.   Pulmonary:      Effort: Pulmonary effort is normal.   Abdominal:      General: Abdomen is flat.      Palpations: Abdomen is soft.   Musculoskeletal:      Right lower leg: Edema (trace) present.      Left lower leg: Edema (trace) present.   Skin:     General: Skin is warm and dry.   Neurological:      Mental Status: She is alert and oriented to person, place, and time.   Psychiatric:         Mood and Affect: Mood normal.         Behavior: Behavior normal.       Review of Symptoms      Symptom Assessment (ESAS 0-10 Scale)  Pain:  0  Dyspnea:  0  Anxiety:  0  Nausea:  0  Depression:  0  Anorexia:  0  Fatigue:  0  Insomnia:  0  Restlessness:  0  Agitation:  0           Advance Care Planning   Advance Directives:   Do Not Resuscitate Status: Yes      Decision Making:  Patient answered questions  Goals of Care: The patient endorses that what is most important right now is to focus on spending time at home, avoiding the hospital, and remaining as independent as possible    Accordingly, we have decided that the best plan to meet the patient's goals includes continuing with treatment       Significant Labs: All pertinent labs within the past 24 hours have been reviewed.  CBC:   Recent Labs   Lab 12/04/22  0348   WBC 6.22   HGB 10.6*   HCT 33.0*   MCV 95   *     BMP:  Recent Labs   Lab 12/05/22  0408         K 3.7   CL 98   CO2 25   BUN 49*   CREATININE 1.5*   CALCIUM 9.5     LFT:  Lab Results   Component Value Date    AST 25 12/03/2022    ALKPHOS 98 12/03/2022    BILITOT 1.0 12/03/2022     Albumin:   Albumin   Date Value Ref Range Status   12/03/2022 3.9 3.5 - 5.2 g/dL Final     Protein:   Total Protein   Date Value Ref Range Status   12/03/2022 7.0 6.0 - 8.4 g/dL Final     Lactic acid:   Lab Results   Component Value Date    LACTATE  0.7 01/01/2021    LACTATE 0.4 (L) 11/17/2014       Significant Imaging:  last imaging 12/3 CXR

## 2022-12-06 VITALS
TEMPERATURE: 98 F | HEIGHT: 63 IN | BODY MASS INDEX: 22.5 KG/M2 | HEART RATE: 48 BPM | OXYGEN SATURATION: 98 % | SYSTOLIC BLOOD PRESSURE: 108 MMHG | WEIGHT: 127 LBS | DIASTOLIC BLOOD PRESSURE: 54 MMHG | RESPIRATION RATE: 18 BRPM

## 2022-12-06 LAB
ANION GAP SERPL CALC-SCNC: 14 MMOL/L (ref 8–16)
BUN SERPL-MCNC: 54 MG/DL (ref 8–23)
CALCIUM SERPL-MCNC: 9.9 MG/DL (ref 8.7–10.5)
CHLORIDE SERPL-SCNC: 98 MMOL/L (ref 95–110)
CO2 SERPL-SCNC: 27 MMOL/L (ref 23–29)
CREAT SERPL-MCNC: 1.7 MG/DL (ref 0.5–1.4)
EST. GFR  (NO RACE VARIABLE): 29 ML/MIN/1.73 M^2
GLUCOSE SERPL-MCNC: 94 MG/DL (ref 70–110)
POTASSIUM SERPL-SCNC: 3.5 MMOL/L (ref 3.5–5.1)
SODIUM SERPL-SCNC: 139 MMOL/L (ref 136–145)

## 2022-12-06 PROCEDURE — 25000003 PHARM REV CODE 250: Performed by: PHYSICIAN ASSISTANT

## 2022-12-06 PROCEDURE — 63600175 PHARM REV CODE 636 W HCPCS: Performed by: STUDENT IN AN ORGANIZED HEALTH CARE EDUCATION/TRAINING PROGRAM

## 2022-12-06 PROCEDURE — 80048 BASIC METABOLIC PNL TOTAL CA: CPT | Performed by: PHYSICIAN ASSISTANT

## 2022-12-06 PROCEDURE — 36415 COLL VENOUS BLD VENIPUNCTURE: CPT | Performed by: PHYSICIAN ASSISTANT

## 2022-12-06 PROCEDURE — 25000003 PHARM REV CODE 250: Performed by: STUDENT IN AN ORGANIZED HEALTH CARE EDUCATION/TRAINING PROGRAM

## 2022-12-06 RX ORDER — TRAMADOL HYDROCHLORIDE 50 MG/1
50 TABLET ORAL EVERY 12 HOURS PRN
Qty: 14 TABLET | Refills: 0 | Status: SHIPPED | OUTPATIENT
Start: 2022-12-06 | End: 2022-12-13

## 2022-12-06 RX ORDER — METOPROLOL SUCCINATE 25 MG/1
25 TABLET, EXTENDED RELEASE ORAL DAILY
Qty: 90 TABLET | Refills: 3 | Status: ON HOLD | OUTPATIENT
Start: 2022-12-07 | End: 2023-02-02 | Stop reason: HOSPADM

## 2022-12-06 RX ADMIN — ACETAMINOPHEN 1000 MG: 500 TABLET ORAL at 06:12

## 2022-12-06 RX ADMIN — METOPROLOL SUCCINATE 25 MG: 25 TABLET, EXTENDED RELEASE ORAL at 08:12

## 2022-12-06 RX ADMIN — PRAVASTATIN SODIUM 40 MG: 40 TABLET ORAL at 08:12

## 2022-12-06 RX ADMIN — CLOPIDOGREL BISULFATE 75 MG: 75 TABLET ORAL at 08:12

## 2022-12-06 RX ADMIN — FUROSEMIDE 80 MG: 10 INJECTION, SOLUTION INTRAVENOUS at 08:12

## 2022-12-06 RX ADMIN — SACUBITRIL AND VALSARTAN 1 TABLET: 24; 26 TABLET, FILM COATED ORAL at 08:12

## 2022-12-06 RX ADMIN — ACETAMINOPHEN 1000 MG: 500 TABLET ORAL at 01:12

## 2022-12-06 NOTE — PLAN OF CARE
Follow up appointments for cardiology and Care at Home NP scheduled and listed on avs.     Home health referral and plan of care orders faxed to [facility] for in network provider to be resumed    1:30pm Per [facility], patient current with [facility] health, updated clinicals and poc sent to [facility] via care port.     Per [facility] patient eligible to resume services.

## 2022-12-06 NOTE — PLAN OF CARE
[place] - Telemetry  Discharge Final Note    Patient clear to discharge from case management stand point. Follow up for cardiology and Care at home scheduled and listed on avs. [facility] Pt's daughter to be her help home at discharge.    Primary Care Provider: Qi Ramon MD    Expected Discharge Date: 12/6/2022    Final Discharge Note (most recent)       Final Note - 12/06/22 1349          Final Note    Assessment Type Final Discharge Note     Anticipated Discharge Disposition [facility]    What phone number can be called within the next 1-3 days to see how you are doing after discharge? [phone]     Hospital Resources/Appts/Education Provided Provided patient/caregiver with written discharge plan information;Appointments scheduled and added to AVS        Post-Acute Status    Post-Acute Authorization Home Health     Home Health Status Set-up Complete/Auth obtained     Coverage PHN     Discharge Delays None known at this time                     Important Message from Medicare  Important Message from Medicare regarding Discharge Appeal Rights: Given to patient/caregiver, Explained to patient/caregiver, Signed/date by patient/caregiver     Date IMM was signed: 12/06/22  Time IMM was signed: 1100    Contact Info       [facility]   Specialty: Home Health Services    [address]  [address]  [place] [address]   Phone: [phone]       Next Steps: Follow up    Instructions: Home Health

## 2022-12-06 NOTE — DISCHARGE SUMMARY
[facility] Medicine  Discharge Summary      Patient Name: Palmira Malave  MRN: [id number]  JN: [id number]  Patient Class: IP- Inpatient  Admission Date: 12/3/2022  Hospital Length of Stay: 3 days  Discharge Date and Time:  12/06/2022 12:49 PM  Attending Physician: Luis Llanos MD   Discharging Provider: Luis Llanos MD  Primary Care Provider: Qi Ramon MD    Primary Care Team: Networked reference to record PCT     HPI:   Palmira Malave 87 y.o. female with CHF, CKD, HTN, HLD presents to the hospital with a chief complaint of SOB.  She reports today after discharge from the hospital she became short of breath with ambulating to the bathroom after having a bowel.  She then ambulated back to her living room and was found by her daughter and informed she looked short of breath was directed hospital.  She is not attempted any treatment at home.  She reports her shortness breath has improved with treatment in the emergency room with Lasix.  She denies any aggravating factors.  She reports eating toast and coffee after discharge but denies any other salty foods.  She denies fever chest pain nausea vomiting abdominal syncope dizziness dysuria melena hematuria hematemesis.  She reports her lower extremity edema has greatly improved since yesterday.    In the ED, BNP 4000 troponin 0.189 creatinine 2.0 afebrile without leukocytosis chest x-ray with cardiomegaly and mild pulmonary edema.      * No surgery found *      Hospital Course:   Echo 7/2022 with EF of 20% and grade 2 DD. Presents with SOB, increasing lower extremity edema, and pulmonary edema on chest x-ray. BNP 4000. Soft BP's.  Will discontinue home carvedilol and Bidil, in order to make sure she has both beta-blocker and ACE inhibitor/Arb on board.  Will start metoprolol succinate 25 mg, and low-dose Entresto.  Creatinine improving on diuresis, sodium depressed, bicarb coming up, will continue. No UOP documented yet, will place pure  "wick for accurate measures. Continue diuresis, BNP 4550.       Stop Correg   Stop Bidil   Start taking Metoprolol 25 mg daily   Start taking ENTRESTO twice daily    Take lasix 80 mg once daily  o Weigh yourself daily:  - if your weight increases by 1-3 lbs in a single night or 3-5 lbs over course of the week,   - double your lasix dose to twice a day until you are back to your original "dry" weight.   Go to Cardiologist and PCP apt        Luis Llanos MD  Internal Medicine Staff        Goals of Care Treatment Preferences:  Code Status: DNR          What is most important right now is to focus on spending time at home, avoiding the hospital, remaining as independent as possible.  Accordingly, we have decided that the best plan to meet the patient's goals includes continuing with treatment.      Consults:   Consults (From admission, onward)        Status Ordering Provider     Inpatient consult to Palliative Care  Once        Provider:  Lina Troncoso NP    Completed MELANIE CANAS     Inpatient consult to Social Work/Case Management  Once        Provider:  (Not yet assigned)    Completed MELANIE CANAS     Inpatient consult to Registered Dietitian/Nutritionist  Once        Provider:  (Not yet assigned)    Completed MELANIE CANAS          No new Assessment & Plan notes have been filed under this hospital service since the last note was generated.  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] 02/08/2019 Yes    Stage 3 chronic kidney disease [N18.30] 11/15/2014 Yes    Hyperlipidemia [E78.5] 11/15/2014 Yes     Chronic    Coronary artery disease involving native coronary artery of native heart without angina pectoris " [I25.10] 11/15/2014 Yes    Essential hypertension [I10] 11/15/2014 Yes     Chronic      Problems Resolved During this Admission:       Discharged Condition: good    Disposition:     Follow Up:    Patient Instructions:      Ambulatory referral/consult to Internal Medicine   Standing Status: Future   Referral Priority: Routine Referral Type: Consultation   Referral Reason: Specialty Services Required   Requested Specialty: Internal Medicine   Number of Visits Requested: 1     Ambulatory referral/consult to Cardiology   Standing Status: Future   Referral Priority: Routine Referral Type: Consultation   Referral Reason: Specialty Services Required   Requested Specialty: Cardiology   Number of Visits Requested: 1       Significant Diagnostic Studies:     Recent Results (from the past 100 hour(s))   CBC auto differential    Collection Time: 12/02/22 12:58 PM   Result Value Ref Range    WBC 5.79 3.90 - 12.70 K/uL    RBC 3.10 (L) 4.00 - 5.40 M/uL    Hemoglobin 9.7 (L) 12.0 - 16.0 g/dL    Hematocrit 29.2 (L) 37.0 - 48.5 %    MCV 94 82 - 98 fL    MCH 31.3 (H) 27.0 - 31.0 pg    MCHC 33.2 32.0 - 36.0 g/dL    RDW 15.7 (H) 11.5 - 14.5 %    Platelets 124 (L) 150 - 450 K/uL    MPV 11.1 9.2 - 12.9 fL    Immature Granulocytes 0.2 0.0 - 0.5 %    Gran # (ANC) 4.4 1.8 - 7.7 K/uL    Immature Grans (Abs) 0.01 0.00 - 0.04 K/uL    Lymph # 0.6 (L) 1.0 - 4.8 K/uL    Mono # 0.7 0.3 - 1.0 K/uL    Eos # 0.1 0.0 - 0.5 K/uL    Baso # 0.02 0.00 - 0.20 K/uL    nRBC 0 0 /100 WBC    Gran % 76.2 (H) 38.0 - 73.0 %    Lymph % 10.7 (L) 18.0 - 48.0 %    Mono % 11.6 4.0 - 15.0 %    Eosinophil % 1.0 0.0 - 8.0 %    Basophil % 0.3 0.0 - 1.9 %    Differential Method Automated    Comprehensive metabolic panel    Collection Time: 12/02/22 12:58 PM   Result Value Ref Range    Sodium 132 (L) 136 - 145 mmol/L    Potassium 4.0 3.5 - 5.1 mmol/L    Chloride 95 95 - 110 mmol/L    CO2 22 (L) 23 - 29 mmol/L    Glucose 104 70 - 110 mg/dL    BUN 47 (H) 8 - 23 mg/dL     Creatinine 1.8 (H) 0.5 - 1.4 mg/dL    Calcium 9.3 8.7 - 10.5 mg/dL    Total Protein 6.6 6.0 - 8.4 g/dL    Albumin 3.6 3.5 - 5.2 g/dL    Total Bilirubin 1.1 (H) 0.1 - 1.0 mg/dL    Alkaline Phosphatase 86 55 - 135 U/L    AST 26 10 - 40 U/L    ALT 18 10 - 44 U/L    Anion Gap 15 8 - 16 mmol/L    eGFR 27 (A) >60 mL/min/1.73 m^2   Troponin I    Collection Time: 12/02/22 12:58 PM   Result Value Ref Range    Troponin I 0.227 (H) 0.000 - 0.026 ng/mL   Brain natriuretic peptide    Collection Time: 12/02/22 12:58 PM   Result Value Ref Range    BNP 2,856 (H) 0 - 99 pg/mL   Hemoglobin A1c    Collection Time: 12/02/22  5:14 PM   Result Value Ref Range    Hemoglobin A1C 5.0 4.0 - 5.6 %    Estimated Avg Glucose 97 68 - 131 mg/dL   Magnesium    Collection Time: 12/02/22  5:14 PM   Result Value Ref Range    Magnesium 2.1 1.6 - 2.6 mg/dL   Troponin I    Collection Time: 12/02/22  5:14 PM   Result Value Ref Range    Troponin I 0.187 (H) 0.000 - 0.026 ng/mL   Basic metabolic panel    Collection Time: 12/03/22  3:39 AM   Result Value Ref Range    Sodium 133 (L) 136 - 145 mmol/L    Potassium 4.2 3.5 - 5.1 mmol/L    Chloride 98 95 - 110 mmol/L    CO2 25 23 - 29 mmol/L    Glucose 104 70 - 110 mg/dL    BUN 55 (H) 8 - 23 mg/dL    Creatinine 2.0 (H) 0.5 - 1.4 mg/dL    Calcium 9.7 8.7 - 10.5 mg/dL    Anion Gap 10 8 - 16 mmol/L    eGFR 24 (A) >60 mL/min/1.73 m^2   CBC Auto Differential    Collection Time: 12/03/22  3:39 AM   Result Value Ref Range    WBC 6.26 3.90 - 12.70 K/uL    RBC 3.36 (L) 4.00 - 5.40 M/uL    Hemoglobin 10.2 (L) 12.0 - 16.0 g/dL    Hematocrit 31.8 (L) 37.0 - 48.5 %    MCV 95 82 - 98 fL    MCH 30.4 27.0 - 31.0 pg    MCHC 32.1 32.0 - 36.0 g/dL    RDW 15.7 (H) 11.5 - 14.5 %    Platelets 124 (L) 150 - 450 K/uL    MPV 10.9 9.2 - 12.9 fL    Immature Granulocytes 0.3 0.0 - 0.5 %    Gran # (ANC) 4.9 1.8 - 7.7 K/uL    Immature Grans (Abs) 0.02 0.00 - 0.04 K/uL    Lymph # 0.6 (L) 1.0 - 4.8 K/uL    Mono # 0.7 0.3 - 1.0 K/uL    Eos # 0.1  0.0 - 0.5 K/uL    Baso # 0.03 0.00 - 0.20 K/uL    nRBC 1 (A) 0 /100 WBC    Gran % 77.7 (H) 38.0 - 73.0 %    Lymph % 9.9 (L) 18.0 - 48.0 %    Mono % 10.5 4.0 - 15.0 %    Eosinophil % 1.1 0.0 - 8.0 %    Basophil % 0.5 0.0 - 1.9 %    Differential Method Automated    CBC auto differential    Collection Time: 12/03/22  3:30 PM   Result Value Ref Range    WBC 5.88 3.90 - 12.70 K/uL    RBC 3.40 (L) 4.00 - 5.40 M/uL    Hemoglobin 10.7 (L) 12.0 - 16.0 g/dL    Hematocrit 31.7 (L) 37.0 - 48.5 %    MCV 93 82 - 98 fL    MCH 31.5 (H) 27.0 - 31.0 pg    MCHC 33.8 32.0 - 36.0 g/dL    RDW 15.6 (H) 11.5 - 14.5 %    Platelets 133 (L) 150 - 450 K/uL    MPV 10.8 9.2 - 12.9 fL    Immature Granulocytes 0.3 0.0 - 0.5 %    Gran # (ANC) 4.6 1.8 - 7.7 K/uL    Immature Grans (Abs) 0.02 0.00 - 0.04 K/uL    Lymph # 0.6 (L) 1.0 - 4.8 K/uL    Mono # 0.6 0.3 - 1.0 K/uL    Eos # 0.1 0.0 - 0.5 K/uL    Baso # 0.03 0.00 - 0.20 K/uL    nRBC 0 0 /100 WBC    Gran % 78.2 (H) 38.0 - 73.0 %    Lymph % 9.4 (L) 18.0 - 48.0 %    Mono % 10.4 4.0 - 15.0 %    Eosinophil % 1.2 0.0 - 8.0 %    Basophil % 0.5 0.0 - 1.9 %    Differential Method Automated    Comprehensive metabolic panel    Collection Time: 12/03/22  3:30 PM   Result Value Ref Range    Sodium 134 (L) 136 - 145 mmol/L    Potassium 4.4 3.5 - 5.1 mmol/L    Chloride 98 95 - 110 mmol/L    CO2 25 23 - 29 mmol/L    Glucose 112 (H) 70 - 110 mg/dL    BUN 59 (H) 8 - 23 mg/dL    Creatinine 2.0 (H) 0.5 - 1.4 mg/dL    Calcium 10.1 8.7 - 10.5 mg/dL    Total Protein 7.0 6.0 - 8.4 g/dL    Albumin 3.9 3.5 - 5.2 g/dL    Total Bilirubin 1.0 0.1 - 1.0 mg/dL    Alkaline Phosphatase 98 55 - 135 U/L    AST 25 10 - 40 U/L    ALT 17 10 - 44 U/L    Anion Gap 11 8 - 16 mmol/L    eGFR 24 (A) >60 mL/min/1.73 m^2   Brain natriuretic peptide    Collection Time: 12/03/22  3:30 PM   Result Value Ref Range    BNP 4,000 (H) 0 - 99 pg/mL   Troponin I    Collection Time: 12/03/22  3:30 PM   Result Value Ref Range    Troponin I 0.189 (H)  0.000 - 0.026 ng/mL   Magnesium    Collection Time: 12/03/22  3:30 PM   Result Value Ref Range    Magnesium 2.1 1.6 - 2.6 mg/dL   Troponin I    Collection Time: 12/04/22 12:12 AM   Result Value Ref Range    Troponin I 0.178 (H) 0.000 - 0.026 ng/mL   Basic metabolic panel    Collection Time: 12/04/22  3:48 AM   Result Value Ref Range    Sodium 133 (L) 136 - 145 mmol/L    Potassium 3.9 3.5 - 5.1 mmol/L    Chloride 98 95 - 110 mmol/L    CO2 28 23 - 29 mmol/L    Glucose 101 70 - 110 mg/dL    BUN 57 (H) 8 - 23 mg/dL    Creatinine 1.7 (H) 0.5 - 1.4 mg/dL    Calcium 9.9 8.7 - 10.5 mg/dL    Anion Gap 7 (L) 8 - 16 mmol/L    eGFR 29 (A) >60 mL/min/1.73 m^2   CBC Auto Differential    Collection Time: 12/04/22  3:48 AM   Result Value Ref Range    WBC 6.22 3.90 - 12.70 K/uL    RBC 3.48 (L) 4.00 - 5.40 M/uL    Hemoglobin 10.6 (L) 12.0 - 16.0 g/dL    Hematocrit 33.0 (L) 37.0 - 48.5 %    MCV 95 82 - 98 fL    MCH 30.5 27.0 - 31.0 pg    MCHC 32.1 32.0 - 36.0 g/dL    RDW 15.8 (H) 11.5 - 14.5 %    Platelets 134 (L) 150 - 450 K/uL    MPV 11.2 9.2 - 12.9 fL    Immature Granulocytes 0.3 0.0 - 0.5 %    Gran # (ANC) 4.9 1.8 - 7.7 K/uL    Immature Grans (Abs) 0.02 0.00 - 0.04 K/uL    Lymph # 0.6 (L) 1.0 - 4.8 K/uL    Mono # 0.6 0.3 - 1.0 K/uL    Eos # 0.1 0.0 - 0.5 K/uL    Baso # 0.04 0.00 - 0.20 K/uL    nRBC 0 0 /100 WBC    Gran % 79.3 (H) 38.0 - 73.0 %    Lymph % 9.2 (L) 18.0 - 48.0 %    Mono % 9.6 4.0 - 15.0 %    Eosinophil % 1.0 0.0 - 8.0 %    Basophil % 0.6 0.0 - 1.9 %    Differential Method Automated    Basic metabolic panel    Collection Time: 12/05/22  4:08 AM   Result Value Ref Range    Sodium 138 136 - 145 mmol/L    Potassium 3.7 3.5 - 5.1 mmol/L    Chloride 98 95 - 110 mmol/L    CO2 25 23 - 29 mmol/L    Glucose 101 70 - 110 mg/dL    BUN 49 (H) 8 - 23 mg/dL    Creatinine 1.5 (H) 0.5 - 1.4 mg/dL    Calcium 9.5 8.7 - 10.5 mg/dL    Anion Gap 15 8 - 16 mmol/L    eGFR 34 (A) >60 mL/min/1.73 m^2   Brain natriuretic peptide    Collection  Time: 12/05/22  4:08 AM   Result Value Ref Range    BNP 4,552 (H) 0 - 99 pg/mL   Basic metabolic panel    Collection Time: 12/06/22  4:47 AM   Result Value Ref Range    Sodium 139 136 - 145 mmol/L    Potassium 3.5 3.5 - 5.1 mmol/L    Chloride 98 95 - 110 mmol/L    CO2 27 23 - 29 mmol/L    Glucose 94 70 - 110 mg/dL    BUN 54 (H) 8 - 23 mg/dL    Creatinine 1.7 (H) 0.5 - 1.4 mg/dL    Calcium 9.9 8.7 - 10.5 mg/dL    Anion Gap 14 8 - 16 mmol/L    eGFR 29 (A) >60 mL/min/1.73 m^2       Microbiology Results (last 7 days)     ** No results found for the last 168 hours. **          Imaging Results          X-Ray Chest AP Portable (Final result)  Result time 12/03/22 15:12:40    Final result by Guzman Cueto MD (12/03/22 15:12:40)                 Impression:      Stable cardiomegaly with probable pulmonary edema.  Follow-up recommended.      Electronically signed by: Guzman Cueto  Date:    12/03/2022  Time:    15:12             Narrative:    EXAMINATION:  XR CHEST AP PORTABLE    CLINICAL HISTORY:  Shortness of breath    TECHNIQUE:  Single frontal view of the chest was performed.    COMPARISON:  12/02/2022    FINDINGS:  Cardiac silhouette is enlarged similar to the prior study.  Aortic atherosclerosis.    Mild interstitial and ground-glass changes bilaterally could represent pulmonary edema.    Possible trace effusions bilaterally.  No mass or consolidation.    No evidence of pneumothorax.  No acute osseous abnormality.    No significant change.                                    Pending Diagnostic Studies:     None         Medications:  Reconciled Home Medications:      Medication List      START taking these medications    metoprolol succinate 25 MG 24 hr tablet  Commonly known as: TOPROL-XL  Take 1 tablet (25 mg total) by mouth once daily.  Start taking on: December 7, 2022     sacubitriL-valsartan 24-26 mg per tablet  Commonly known as: ENTRESTO  Take 1 tablet by mouth 2 (two) times daily.        CHANGE how you take  these medications    acetaminophen 325 MG tablet  Commonly known as: TYLENOL  Take 650 mg by mouth once daily.  What changed: Another medication with the same name was removed. Continue taking this medication, and follow the directions you see here.        CONTINUE taking these medications    allopurinoL 100 MG tablet  Commonly known as: ZYLOPRIM  Take 100 mg by mouth every evening.     CENTRUM SILVER ORAL  Take by mouth once daily.     clopidogreL 75 mg tablet  Commonly known as: PLAVIX  Take 75 mg by mouth once daily. On hold since 11-28-14, for procedure.     cranberry 400 mg Cap  Take 1 capsule by mouth 2 (two) times daily.     fish oil-omega-3 fatty acids 300-1,000 mg capsule  Take 2 g by mouth once daily. 1 caps every AM & 1 cap every PM.     furosemide 80 MG tablet  Commonly known as: LASIX  TAKE 1 TABLET BY MOUTH TWICE DAILY AS NEEDED FOR SHORTNESS OF BREATH OR  EDEMA     lovastatin 40 MG tablet  Commonly known as: MEVACOR  Take 40 mg by mouth nightly. Takes 2 caps with supper every evening.     mirabegron 50 mg Tb24  Commonly known as: MYRBETRIQ  Take 50 mg by mouth once daily.     NEURONTIN 100 MG capsule  Generic drug: gabapentin  Take 100 mg by mouth 3 (three) times daily.     pantoprazole 40 MG tablet  Commonly known as: PROTONIX  Take 40 mg by mouth once daily.     traMADoL 50 mg tablet  Commonly known as: ULTRAM  Take 1 tablet (50 mg total) by mouth every 12 (twelve) hours as needed.        STOP taking these medications    carvediloL 6.25 MG tablet  Commonly known as: COREG     isosorbide-hydrALAZINE 20-37.5 mg 20-37.5 mg Tab  Commonly known as: BIDIL            Indwelling Lines/Drains at time of discharge:   Lines/Drains/Airways     None                 Time spent on the discharge of patient: 35 minutes         Luis Llanos MD  Department of Hospital Medicine  [facility]

## 2022-12-06 NOTE — PLAN OF CARE
12/06/22 1100   Medicare Message   Important Message from Medicare regarding Discharge Appeal Rights Given to patient/caregiver;Explained to patient/caregiver;Signed/date by patient/caregiver   Date IMM was signed 12/06/22   Time IMM was signed 1100

## 2022-12-06 NOTE — NURSING
Discharge instructions given to patient and family at bedside. Patient and family verbalized understanding and states willingness to comply. Saline lock removed. Tele monitoring removed.

## 2022-12-06 NOTE — NURSING
Patient escorted to family vehicle via wheelchair for discharge home. Patient escorted by transport and accompanied by family. No apparent distress noted.

## 2022-12-06 NOTE — PLAN OF CARE
Problem: Adult Inpatient Plan of Care  Goal: Plan of Care Review  Outcome: Met  Goal: Patient-Specific Goal (Individualized)  Outcome: Met  Goal: Absence of Hospital-Acquired Illness or Injury  Outcome: Met  Goal: Optimal Comfort and Wellbeing  Outcome: Met  Goal: Readiness for Transition of Care  Outcome: Met     Problem: Fluid and Electrolyte Imbalance (Acute Kidney Injury/Impairment)  Goal: Fluid and Electrolyte Balance  Outcome: Met     Problem: Oral Intake Inadequate (Acute Kidney Injury/Impairment)  Goal: Optimal Nutrition Intake  Outcome: Met     Problem: Renal Function Impairment (Acute Kidney Injury/Impairment)  Goal: Effective Renal Function  Outcome: Met     Problem: Coping Ineffective  Goal: Effective Coping  Outcome: Met     Problem: Impaired Wound Healing  Goal: Optimal Wound Healing  Outcome: Met     Problem: Fall Injury Risk  Goal: Absence of Fall and Fall-Related Injury  Outcome: Met     Problem: Skin Injury Risk Increased  Goal: Skin Health and Integrity  Outcome: Met

## 2022-12-06 NOTE — NURSING
Evaluated general patient appearance and condition. Patient resting quietly, easily aroused via verbal stimuli. No apparent distress noted.

## 2022-12-06 NOTE — PLAN OF CARE
[facility] Telemetry      HOME HEALTH ORDERS  FACE TO FACE ENCOUNTER    Patient Name: Palmira Malave  YOB: 1935    PCP: Qi Ramon MD   PCP Address: [address] [place] [address] [address] / [place] [address]  PCP Phone Number: [phone]  PCP Fax: [phone]    Encounter Date: 12/3/22    Admit to Home Health    Diagnoses:  Active Hospital Problems    Diagnosis  POA    *Acute on chronic combined systolic and diastolic heart failure [I50.43]  Yes     Priority: 1 - High    Goals of care, counseling/discussion [Z71.89]  Not Applicable    ICD (implantable cardioverter-defibrillator) in place [Z95.810]  Yes    Stage 3 chronic kidney disease [N18.30]  Yes    Hyperlipidemia [E78.5]  Yes     Chronic    Coronary artery disease involving native coronary artery of native heart without angina pectoris [I25.10]  Yes    Essential hypertension [I10]  Yes     Chronic      Resolved Hospital Problems   No resolved problems to display.       Follow Up Appointments:  Future Appointments   Date Time Provider Department Center   12/9/2022  9:15 AM Kelechi Richmond MD [facility] CARDIO [place]   12/13/2022  8:00 AM Roni Mike NP [facility] C3HV [place]   2/16/2023  1:30 PM Demetrius Velazco MD [facility] OPHTHAL [place]       Allergies:  Review of patient's allergies indicates:   Allergen Reactions    Aspirin Swelling     Swelling and rash    Colace [docusate sodium] Rash     itching    Docusate Other (See Comments) and Rash     itching    Sulfa (sulfonamide antibiotics) Swelling     Swelling and rash  Swelling and rash    Iodine and iodide containing products Rash    Penicillins Rash       Medications: Review discharge medications with patient and family and provide education.    Current Facility-Administered Medications   Medication Dose Route Frequency Provider Last Rate Last Admin    acetaminophen tablet 1,000 mg  1,000 mg Oral Q8H Luis Llanos MD   1,000 mg at 12/06/22 0600    acetaminophen tablet 500 mg  500 mg  Oral Q6H PRN Reji Nguyễn PA-C        clopidogreL tablet 75 mg  75 mg Oral Daily Reji Nguyễn PA-C   75 mg at 12/06/22 0819    melatonin tablet 6 mg  6 mg Oral Nightly PRN Reji Nguyễn PA-C        metoprolol succinate (TOPROL-XL) 24 hr tablet 25 mg  25 mg Oral Daily Luis Llanos MD   25 mg at 12/06/22 0819    pravastatin tablet 40 mg  40 mg Oral Daily Reji Nguyễn PA-C   40 mg at 12/06/22 0819    sacubitriL-valsartan 24-26 mg per tablet 1 tablet  1 tablet Oral BID Luis Llanos MD   1 tablet at 12/06/22 0819    sodium chloride 0.9% flush 10 mL  10 mL Intravenous PRN Reji Nguyễn PA-C        traMADoL tablet 50 mg  50 mg Oral Daily PRN Reji Nguyễn PA-C   50 mg at 12/05/22 1022     Facility-Administered Medications Ordered in Other Encounters   Medication Dose Route Frequency Provider Last Rate Last Admin    0.9%  NaCl infusion   Intravenous Continuous Demetrius Velazco MD        phenylephrine HCL 2.5% ophthalmic solution 1 drop  1 drop Right Eye On Call Procedure Demetrius Velazco MD   1 drop at 02/21/19 0725    proparacaine 0.5 % ophthalmic solution 1 drop  1 drop Right Eye On Call Procedure Demetrius Velazco MD   1 drop at 02/21/19 0706    tropicamide 1% ophthalmic solution 1 drop  1 drop Right Eye On Call Procedure Demetrius Velazco MD   1 drop at 02/21/19 0726     Current Discharge Medication List        START taking these medications    Details   metoprolol succinate (TOPROL-XL) 25 MG 24 hr tablet Take 1 tablet (25 mg total) by mouth once daily.  Qty: 90 tablet, Refills: 3    Comments: .      sacubitriL-valsartan (ENTRESTO) 24-26 mg per tablet Take 1 tablet by mouth 2 (two) times daily.  Qty: 180 tablet, Refills: 3           CONTINUE these medications which have CHANGED    Details   traMADoL (ULTRAM) 50 mg tablet Take 1 tablet (50 mg total) by mouth every 12 (twelve) hours as needed.  Qty: 14 tablet, Refills: 0    Comments: Quantity prescribed more than 7 day supply? No            CONTINUE these medications which have NOT CHANGED    Details   allopurinol (ZYLOPRIM) 100 MG tablet Take 100 mg by mouth every evening.       clopidogrel (PLAVIX) 75 mg tablet Take 75 mg by mouth once daily. On hold since 11-28-14, for procedure.      cranberry 400 mg Cap Take 1 capsule by mouth 2 (two) times daily.      fish oil-omega-3 fatty acids 300-1,000 mg capsule Take 2 g by mouth once daily. 1 caps every AM & 1 cap every PM.      folic acid/multivit-min/lutein (CENTRUM SILVER ORAL) Take by mouth once daily.      lovastatin (MEVACOR) 40 MG tablet Take 40 mg by mouth nightly. Takes 2 caps with supper every evening.      mirabegron (MYRBETRIQ) 50 mg Tb24 Take 50 mg by mouth once daily.       NEURONTIN 100 mg capsule Take 100 mg by mouth 3 (three) times daily.      pantoprazole (PROTONIX) 40 MG tablet Take 40 mg by mouth once daily.      acetaminophen (TYLENOL) 325 MG tablet Take 650 mg by mouth once daily.      furosemide (LASIX) 80 MG tablet TAKE 1 TABLET BY MOUTH TWICE DAILY AS NEEDED FOR SHORTNESS OF BREATH OR  EDEMA  Qty: 180 tablet, Refills: 3           STOP taking these medications       acetaminophen (TYLENOL) 650 MG TbSR Comments:   Reason for Stopping:         carvediloL (COREG) 6.25 MG tablet Comments:   Reason for Stopping:         isosorbide-hydrALAZINE 20-37.5 mg (BIDIL) 20-37.5 mg Tab Comments:   Reason for Stopping:                 I have seen and examined this patient within the last 30 days. My clinical findings that support the need for the home health skilled services and home bound status are the following:no   Weakness/numbness causing balance and gait disturbance due to Heart Failure and Weakness/Debility making it taxing to leave home.  Requiring assistive device to leave home due to unsteady gait caused by  Heart Failure and Weakness/Debility.  Medical restrictions requiring assistance of another human to leave home due to  Fluid/volume overload and Unstable ambulation.     Diet:    cardiac diet and renal diet    Labs:  N/a    Referrals/ Consults  Physical Therapy to evaluate and treat. Evaluate for home safety and equipment needs; Establish/upgrade home exercise program. Perform / instruct on therapeutic exercises, gait training, transfer training, and Range of Motion.  Occupational Therapy to evaluate and treat. Evaluate home environment for safety and equipment needs. Perform/Instruct on transfers, ADL training, ROM, and therapeutic exercises.   to evaluate for community resources/long-range planning.  Aide to provide assistance with personal care, ADLs, and vital signs.    Activities:   activity as tolerated    Nursing:   Agency to admit patient within 24 hours of hospital discharge unless specified on physician order or at patient request    SN to complete comprehensive assessment including routine vital signs. Instruct on disease process and s/s of complications to report to MD. Review/verify medication list sent home with the patient at time of discharge  and instruct patient/caregiver as needed. Frequency may be adjusted depending on start of care date.     Skilled nurse to perform up to 3 visits PRN for symptoms related to diagnosis    Notify MD if SBP > 160 or < 90; DBP > 90 or < 50; HR > 120 or < 50; Temp > 101; O2 < 88%;     Ok to schedule additional visits based on staff availability and patient request on consecutive days within the home health episode.    When multiple disciplines ordered:    Start of Care occurs on Sunday - Wednesday schedule remaining discipline evaluations as ordered on separate consecutive days following the start of care.    Thursday SOC -schedule subsequent evaluations Friday and Monday the following week.     Friday - Saturday SOC - schedule subsequent discipline evaluations on consecutive days starting Monday of the following week.    For all post-discharge communication and subsequent orders please contact patient's primary care physician.  "If unable to reach primary care physician or do not receive response within 30 minutes, please contact HM on call for clinical staff order clarification    Miscellaneous   Routine Skin for Bedridden Patients: Instruct patient/caregiver to apply moisture barrier cream to all skin folds and wet areas in perineal area daily and after baths and all bowel movements.  Heart Failure:      SN to instruct on the following:    Instruct on the definition of CHF.   Instruct on the signs/sympoms of CHF to be reported.   Instruct on and monitor daily weights.   Instruct on factors that cause exacerbation.   Instruct on action, dose, schedule, and side effects of medications.   Instruct on diet as prescribed.   Instruct on activity allowed.   Instruct on life-style modifications for life long management of CHF   SN to assess compliance with daily weights, diet, medications, fluid retention,    safety precautions, activities permitted and life-style modifications.   Additional 1-2 SN visits per week as needed for signs and symptoms     of CHF exacerbation.      For Weight Gain > 2-3 lbs in 1 day or 3-5 lbs over 1 week notify PCP:  Stop Correg  Stop Bidil  Start taking Metoprolol 25 mg daily  Start taking ENTRESTO twice daily   Take lasix 80 mg once daily  Weigh yourself daily:  if your weight increases by 1-3 lbs in a single night or 3-5 lbs over course of the week,   double your lasix dose to twice a day until you are back to your original "dry" weight.      Home Health Aide:  Nursing Twice weekly, Physical Therapy Three times weekly, Occupational Therapy Three times weekly, Medical Social Work Weekly, and Home Health Aide Twice weekly    Wound Care Orders  no    I certify that this patient is confined to her home and needs intermittent skilled nursing care, physical therapy, and occupational therapy.          "

## 2022-12-06 NOTE — DISCHARGE INSTRUCTIONS
"Stop Correg  Stop Bidil  Start taking Metoprolol 25 mg daily  Start taking ENTRESTO twice daily   Take lasix 80 mg once daily  Weigh yourself daily:  if your weight increases by 1-3 lbs in a single night or 3-5 lbs over course of the week,   double your lasix dose to twice a day until you are back to your original "dry" weight.  Go to Cardiologist and PCP apt        Luis Llanos MD  Internal Medicine Staff    "

## 2022-12-07 ENCOUNTER — EXTERNAL HOME HEALTH (OUTPATIENT)
Dept: HOME HEALTH SERVICES | Facility: HOSPITAL | Age: 87
End: 2022-12-07
Payer: MEDICARE

## 2022-12-07 ENCOUNTER — PATIENT OUTREACH (OUTPATIENT)
Dept: ADMINISTRATIVE | Facility: CLINIC | Age: 87
End: 2022-12-07
Payer: MEDICARE

## 2022-12-07 NOTE — PROGRESS NOTES
C3 nurse spoke with Palmira Malave (Daughter) for a TCC post hospital discharge follow up call. The patient has a scheduled HOSFU appointment with Roni Mike on 12/13/2022 @ 0800.

## 2022-12-08 ENCOUNTER — HOSPITAL ENCOUNTER (EMERGENCY)
Facility: HOSPITAL | Age: 87
Discharge: HOME | End: 2022-12-08
Attending: EMERGENCY MEDICINE
Payer: MEDICARE

## 2022-12-08 VITALS
HEART RATE: 64 BPM | HEIGHT: 63 IN | OXYGEN SATURATION: 98 % | WEIGHT: 130 LBS | DIASTOLIC BLOOD PRESSURE: 55 MMHG | RESPIRATION RATE: 18 BRPM | TEMPERATURE: 98 F | BODY MASS INDEX: 23.04 KG/M2 | SYSTOLIC BLOOD PRESSURE: 99 MMHG
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
## 2022-12-08 DIAGNOSIS — S09.90XA CLOSED HEAD INJURY, INITIAL ENCOUNTER: Primary | ICD-10-CM

## 2022-12-08 PROCEDURE — 99284 EMERGENCY DEPT VISIT MOD MDM: CPT | Mod: 25

## 2022-12-08 PROCEDURE — 93010 EKG 12-LEAD: ICD-10-PCS | Mod: ,,, | Performed by: INTERNAL MEDICINE

## 2022-12-08 PROCEDURE — 25000003 PHARM REV CODE 250: Performed by: EMERGENCY MEDICINE

## 2022-12-08 PROCEDURE — 93010 ELECTROCARDIOGRAM REPORT: CPT | Mod: ,,, | Performed by: INTERNAL MEDICINE

## 2022-12-08 PROCEDURE — 93005 ELECTROCARDIOGRAM TRACING: CPT

## 2022-12-08 RX ORDER — ACETAMINOPHEN AND CODEINE PHOSPHATE 300; 30 MG/1; MG/1
1 TABLET ORAL
Status: COMPLETED | OUTPATIENT
Start: 2022-12-08 | End: 2022-12-08

## 2022-12-08 RX ADMIN — ACETAMINOPHEN AND CODEINE PHOSPHATE 1 TABLET: 300; 30 TABLET ORAL at 03:12

## 2022-12-08 NOTE — DISCHARGE INSTRUCTIONS
1. Ice affected area 2x a day for 15 minutes for 1 week, then 1x day for 15 minutes as needed for pain management.  2. Activities as tolerated  3. Continue taking medication for pain management.  4. Consideration MRI of effected joint and/or limb to rule out further pathology

## 2022-12-08 NOTE — ED TRIAGE NOTES
Fall (Pt states she rolled off of her bed and hit the left side of her temple on the floor and landing on her chest. Pt was found on the floor by her home health nurse. Pt is c/o neck pain and chest tenderness. Pt denies sob, n/v/d.)

## 2022-12-08 NOTE — ED PROVIDER NOTES
"--------------------------------------------------------------------------------------------------  Hossein ZHAO, have reviewed the SORT/triage note.      History     Chief Complaint   Patient presents with    Fall     Pt states she rolled off of her bed and hit the left side of her temple on the floor and landing on her chest. Pt was found on the floor by her home health nurse. Pt is c/o neck pain and chest tenderness. Pt denies sob, n/v/d.       HPI: 87 y.o. female with a PMHx of CHF, CKD, CAD, Gout attack, Hypercholesteremia, HTN, and Renal disorder, presents to the ED with a chief complaint of fall onset PTA. The patient states that she fell out of her bed after attempting to get in and is unsure what time it happened. Pt was found on the floor by her home health nurse. Her daughter states that she was found on her bedroom floor by her other daughter six hours ago. e. Pt is c/o neck pain and chest tenderness. Pt denies sob, n/v/d. The patient reports having left-sided head pain with associated "knot" to the left frontal head secondary to fall. She further reports having pain "across her chest", and denies hitting her chest when she fell. No other exacerbating or alleviating factors. Denies any other associated symptoms.     Past Medical History:   Diagnosis Date    Cataract     CHF (congestive heart failure)     CKD (chronic kidney disease), stage III     Coronary artery disease     Gout attack     Hypercholesteremia     Hypertension     Renal disorder     Vaginal delivery     x4     Past Surgical History:   Procedure Laterality Date    APPENDECTOMY      CARDIAC DEFIBRILLATOR PLACEMENT      2015    CATARACT EXTRACTION W/  INTRAOCULAR LENS IMPLANT Left 02/07/2019    Dr. Velazco    CATARACT EXTRACTION W/  INTRAOCULAR LENS IMPLANT Right 02/21/2019    Dr. Velazco    CHOLECYSTECTOMY      COLONOSCOPY W/ POLYPECTOMY  10 or 11/ 2014    per Dr. Elen ceja Left 8/2008    EYE SURGERY      " HYSTERECTOMY      INTRAOCULAR PROSTHESES INSERTION Left 2/7/2019    Procedure: INSERTION, IOL PROSTHESIS;  Surgeon: Demetrius Velazco MD;  Location: [facility] OR;  Service: Ophthalmology;  Laterality: Left;  RN Phone Pre OP 1-30-19.  Arrival 05:30 AM    INTRAOCULAR PROSTHESES INSERTION Right 2/21/2019    Procedure: INSERTION, IOL PROSTHESIS;  Surgeon: Demetrius Velazco MD;  Location: [facility] OR;  Service: Ophthalmology;  Laterality: Right;    JOINT REPLACEMENT Bilateral 2005 & 2006    2 Knee Replacements=Lt. 1= 2005. Rt. 1= 2006    OOPHORECTOMY      PHACOEMULSIFICATION OF CATARACT Left 2/7/2019    Procedure: PHACOEMULSIFICATION, CATARACT;  Surgeon: Demetrius Velazco MD;  Location: [facility] OR;  Service: Ophthalmology;  Laterality: Left;    PHACOEMULSIFICATION OF CATARACT Right 2/21/2019    Procedure: PHACOEMULSIFICATION, CATARACT;  Surgeon: Demetrius Velazco MD;  Location: [facility] OR;  Service: Ophthalmology;  Laterality: Right;  RN Phone Pre op 2-15-19.  Arrival 05:30 AM    TOTAL KNEE ARTHROPLASTY Bilateral Lt.= 2005; Rt.=2006    Bilateral Knee scope     Social History     Tobacco Use    Smoking status: Never    Smokeless tobacco: Never   Substance Use Topics    Alcohol use: Not Currently    Drug use: No     Family History   Problem Relation Age of Onset    Heart attack Mother 88    Hypertension Mother     Hyperlipidemia Mother     Diabetes Other     Hypertension Other     Amblyopia Son     Blindness Neg Hx     Cataracts Neg Hx     Glaucoma Neg Hx     Macular degeneration Neg Hx     Retinal detachment Neg Hx     Strabismus Neg Hx      Review of patient's allergies indicates:   Allergen Reactions    Aspirin Swelling     Swelling and rash    Colace [docusate sodium] Rash     itching    Docusate Other (See Comments) and Rash     itching    Sulfa (sulfonamide antibiotics) Swelling     Swelling and rash  Swelling and rash    Iodine and iodide containing products Rash    Penicillins Rash     Current Outpatient  Medications:     acetaminophen (TYLENOL) 325 MG tablet, Take 650 mg by mouth once daily., Disp: , Rfl:     allopurinol (ZYLOPRIM) 100 MG tablet, Take 100 mg by mouth every evening. , Disp: , Rfl:     clopidogrel (PLAVIX) 75 mg tablet, Take 75 mg by mouth once daily. On hold since 11-28-14, for procedure., Disp: , Rfl:     cranberry 400 mg Cap, Take 1 capsule by mouth 2 (two) times daily., Disp: , Rfl:     fish oil-omega-3 fatty acids 300-1,000 mg capsule, Take 2 g by mouth once daily. 1 caps every AM & 1 cap every PM., Disp: , Rfl:     folic acid/multivit-min/lutein (CENTRUM SILVER ORAL), Take by mouth once daily., Disp: , Rfl:     furosemide (LASIX) 80 MG tablet, TAKE 1 TABLET BY MOUTH TWICE DAILY AS NEEDED FOR SHORTNESS OF BREATH OR  EDEMA, Disp: 180 tablet, Rfl: 3    lovastatin (MEVACOR) 40 MG tablet, Take 40 mg by mouth nightly. Takes 2 caps with supper every evening., Disp: , Rfl:     metoprolol succinate (TOPROL-XL) 25 MG 24 hr tablet, Take 1 tablet (25 mg total) by mouth once daily., Disp: 90 tablet, Rfl: 3    mirabegron (MYRBETRIQ) 50 mg Tb24, Take 50 mg by mouth once daily. , Disp: , Rfl:     NEURONTIN 100 mg capsule, Take 100 mg by mouth 3 (three) times daily., Disp: , Rfl:     pantoprazole (PROTONIX) 40 MG tablet, Take 40 mg by mouth once daily., Disp: , Rfl:     sacubitriL-valsartan (ENTRESTO) 24-26 mg per tablet, Take 1 tablet by mouth 2 (two) times daily., Disp: 180 tablet, Rfl: 3    traMADoL (ULTRAM) 50 mg tablet, Take 1 tablet (50 mg total) by mouth every 12 (twelve) hours as needed., Disp: 14 tablet, Rfl: 0       Review of Systems as per HPI  Constitutional: Negative for chills, fever, malaise/fatigue and weight loss.   Eyes: Negative for blurred vision, double vision, photophobia and pain. Cardiovascular:  Positive for chest pain.   Skin:         Positive for abrasion to the left frontal head.   Gastrointestinal: Negative for abdominal pain, diarrhea, nausea and vomiting.   Genitourinary: Negative  "for flank pain, frequency and urgency.   Musculoskeletal: Negative for back pain, falls, joint pain, myalgias and neck pain.   Neurological: Positive for headaches. Negative for dizziness, tingling, tremors, sensory change, speech change, focal weakness, seizures, loss of consciousness and weakness.   All other systems reviewed and are negative.    Physical Exam     ED Triage Vitals [12/08/22 1423]   Enc Vitals Group      BP (!) 102/53      Pulse 60      Resp 15      Temp 97.8 °F (36.6 °C)      Temp src Oral      SpO2 99 %      Weight 130 lb      Height 5' 3"      Head Circumference       Peak Flow       Pain Score       Pain Loc       Pain Edu?       Excl. in GC?         Vital signs and nursing assessment noted: low end of normal BP    GEN:   NAD, A & Ox3, atraumatic, well appearing, nontoxic appearing  HEENT:  PERRLA, EOMI, moist membranes, nl conjunctiva, no scleral icterus, no nystagmus, no nodes/nodules, soft, supple, FROM, no trachial deviation, nexus negative  CV:   RRR no m/r/g, 2+ radial pulses, <2sec cap refill, no obvious JVD  RESP:  CTA B, no w/r/r, equal and bilateral chest rise, no respiratory distress  ABD:   soft, Nontender, Nondistended, +BS, no guarding/rebound  :   Deferred  BACK:  FROM, no midline tenderness, no paraspinal tenderness  EXT:   FROM, KNAPP x 4, no swelling, no edema, no calf tenderness, no bony tenderness, no warmth or redness, no crepitus, no obvious deformity  LYMPH:  no gross adenopathy  NEURO:  GCS 15, CN II-XII grossly intact, no obvious motor/sensory deficit, no tremor, negative Romberg,  nl gait/coordination  PSYCH:   no SI/HI, no anxiety, nl mood/affect, nl judgement/thought process  SKIN:  Warm, dry, intact, no rashes/lesions or masses, nl color, no pallor. 3 cm abrasion surrounded by 6 cm of ecchymosis on left parietal area with associated tenderness and swelling.      Tests     Labs Reviewed - No data to display  X-Ray Chest 1 View   Final Result      Improving CHF.       "   Electronically signed by: Tomas Godinez MD   Date:    12/08/2022   Time:    15:09      CT Head Without Contrast   Final Result      No acute process seen.      Involutional change and small vessel ischemic change.         Electronically signed by: Tomas Godinez MD   Date:    12/08/2022   Time:    14:47      CT Maxillofacial Without Contrast   Final Result      No evidence of significant trauma.         Electronically signed by: Tomas Godinez MD   Date:    12/08/2022   Time:    14:54      CT Cervical Spine Without Contrast   Final Result      No acute process seen.         Electronically signed by: Tomas Godinez MD   Date:    12/08/2022   Time:    14:55          PROCEDURE(S):  NONE      MEDICAL DECISION MAKING:  History supplemented by old records which were checked/reviewed in EMR: Patient evaluated for acute on chronic systolic congestive heart failure.    This is an emergency evaluation of 87 y.o. female with PMHx of CHF, CKD, CAD, Gout attack, Hypercholesteremia, HTN, and Renal disorder, who presents to the ED with a chief complaint of fall onset PTA and HA.    Exam shows 3 cm abrasion surrounded by 6 cm of ecchymosis on left parietal area with associated tenderness and swelling.    Pain differential includes but not exclusive to:  Laceration, sprain/strain, fracture, contusion, hematoma, malingering.  Unlikely ICH,  internal bleeding or organ rupture.    The [place] Head CT Rule suggests a head CT is necessary for this patient to rule out an intracranial traumatic finding (sensitivity %).     Treatment plan includes history, physical exam, cardiac monitoring, imaging studies, and supportive care.      ED Course     MDM  Reviewed: previous chart, nursing note and vitals  Reviewed previous: x-ray and CT scan  Interpretation: x-ray and CT scan     ED Course as of 12/09/22 1942   u Dec 08, 2022   1451 CT Head Without Contrast  No ICH [NO]   1510 X-Ray Chest 1 View  No PTX [NO]      ED Course User  Index  [NO] Hossein Hoang MD     REASSESSMENT: Patient is tolerating p.o. and ambulating with steady gait.   Sx improved after treatment with:   Medications   acetaminophen-codeine 300-30mg per tablet 1 tablet (1 tablet Oral Given 22 1527)      The results of physical exam and imaging findings were reviewed with the patient. This discussion included but not exclusive to the risk to the patient due to the potential underlying pathology, the testing that was required to make the diagnosis, and the treatment administered or prescribed.  We discussed the followin. Ice affected area 2x a day for 15 minutes for 1 week, then 1x day for 15 minutes as needed for pain management.  2. Activities as tolerated  3. Continue taking medication for pain management.  4. Consideration MRI of effected joint and/or limb to rule out further pathology     Pt agrees with assessment, disposition and treatment plan.  Patient is amenable to discharge. Precautions for return discussed at length. Further work up and treatment options offered to patient who declined. Patient informed and understands should immediately return to emergency department with persistent or worsening symptoms or any other concerns.  Discharge and follow-up instructions discussed with the patient who expressed understanding.  A printed After Visit Summary, relating to diagnosis, concerns, and/or associated differentials, was given to the patient. All questions asked and answered to the satisfaction of the patient.     For further evaluation, patient will follow up outpatient with:    Follow-up Information       Qi Ramon MD. Call in 2 days.    Specialty: Internal Medicine  Why: As needed, If symptoms worsen  Contact information:  [address] [place] [address]  [address]  [place] [address]  [phone]                             PRESCRIPTION GIVEN:  Discharge Medication List as of 2022  3:02 PM        Discharge Medication List as of 2022   3:02 PM        Clinical Impression     1. Closed head injury, initial encounter    2. Fall, initial encounter         ED Disposition Condition    Discharge Stable            SCRIBE #1 NOTE: I, GIBRAN KHANNA, am scribing for, and in the presence of,  Hossein Hoang MD. I have scribed the following portions of the note - Other sections scribed: HPI, ROS, PE, MDM.     I, Dr. Hossein Hoang, personally performed the services described in this documentation. All medical record entries made by the scribe were at my direction and in my presence.  I have reviewed the chart and agree that the record reflects my personal performance and is accurate and complete. Hossein Hoang MD.     Hossein Hoang MD  12/09/22 1944

## 2022-12-09 ENCOUNTER — OFFICE VISIT (OUTPATIENT)
Dept: CARDIOLOGY | Facility: CLINIC | Age: 87
End: 2022-12-09
Payer: MEDICARE

## 2022-12-09 VITALS
DIASTOLIC BLOOD PRESSURE: 82 MMHG | WEIGHT: 130.06 LBS | BODY MASS INDEX: 23.04 KG/M2 | HEIGHT: 63 IN | RESPIRATION RATE: 15 BRPM | HEART RATE: 69 BPM | SYSTOLIC BLOOD PRESSURE: 128 MMHG | OXYGEN SATURATION: 96 %

## 2022-12-09 DIAGNOSIS — I10 ESSENTIAL HYPERTENSION: Chronic | ICD-10-CM

## 2022-12-09 DIAGNOSIS — I50.43 ACUTE ON CHRONIC COMBINED SYSTOLIC AND DIASTOLIC CONGESTIVE HEART FAILURE: ICD-10-CM

## 2022-12-09 DIAGNOSIS — E78.2 MIXED HYPERLIPIDEMIA: Chronic | ICD-10-CM

## 2022-12-09 DIAGNOSIS — I50.23 ACUTE ON CHRONIC SYSTOLIC CONGESTIVE HEART FAILURE: ICD-10-CM

## 2022-12-09 DIAGNOSIS — Z95.810 ICD (IMPLANTABLE CARDIOVERTER-DEFIBRILLATOR), BIVENTRICULAR, IN SITU: Primary | Chronic | ICD-10-CM

## 2022-12-09 PROCEDURE — 1111F PR DISCHARGE MEDS RECONCILED W/ CURRENT OUTPATIENT MED LIST: ICD-10-PCS | Mod: CPTII,S$GLB,, | Performed by: INTERNAL MEDICINE

## 2022-12-09 PROCEDURE — 1126F AMNT PAIN NOTED NONE PRSNT: CPT | Mod: CPTII,S$GLB,, | Performed by: INTERNAL MEDICINE

## 2022-12-09 PROCEDURE — 1159F MED LIST DOCD IN RCRD: CPT | Mod: CPTII,S$GLB,, | Performed by: INTERNAL MEDICINE

## 2022-12-09 PROCEDURE — 1101F PR PT FALLS ASSESS DOC 0-1 FALLS W/OUT INJ PAST YR: ICD-10-PCS | Mod: CPTII,S$GLB,, | Performed by: INTERNAL MEDICINE

## 2022-12-09 PROCEDURE — 1111F DSCHRG MED/CURRENT MED MERGE: CPT | Mod: CPTII,S$GLB,, | Performed by: INTERNAL MEDICINE

## 2022-12-09 PROCEDURE — 3288F FALL RISK ASSESSMENT DOCD: CPT | Mod: CPTII,S$GLB,, | Performed by: INTERNAL MEDICINE

## 2022-12-09 PROCEDURE — 1159F PR MEDICATION LIST DOCUMENTED IN MEDICAL RECORD: ICD-10-PCS | Mod: CPTII,S$GLB,, | Performed by: INTERNAL MEDICINE

## 2022-12-09 PROCEDURE — 1126F PR PAIN SEVERITY QUANTIFIED, NO PAIN PRESENT: ICD-10-PCS | Mod: CPTII,S$GLB,, | Performed by: INTERNAL MEDICINE

## 2022-12-09 PROCEDURE — 99999 PR PBB SHADOW E&M-EST. PATIENT-LVL IV: CPT | Mod: PBBFAC,,, | Performed by: INTERNAL MEDICINE

## 2022-12-09 PROCEDURE — 1101F PT FALLS ASSESS-DOCD LE1/YR: CPT | Mod: CPTII,S$GLB,, | Performed by: INTERNAL MEDICINE

## 2022-12-09 PROCEDURE — 99214 PR OFFICE/OUTPT VISIT, EST, LEVL IV, 30-39 MIN: ICD-10-PCS | Mod: S$GLB,,, | Performed by: INTERNAL MEDICINE

## 2022-12-09 PROCEDURE — 3288F PR FALLS RISK ASSESSMENT DOCUMENTED: ICD-10-PCS | Mod: CPTII,S$GLB,, | Performed by: INTERNAL MEDICINE

## 2022-12-09 PROCEDURE — 99214 OFFICE O/P EST MOD 30 MIN: CPT | Mod: S$GLB,,, | Performed by: INTERNAL MEDICINE

## 2022-12-09 PROCEDURE — 99999 PR PBB SHADOW E&M-EST. PATIENT-LVL IV: ICD-10-PCS | Mod: PBBFAC,,, | Performed by: INTERNAL MEDICINE

## 2022-12-09 NOTE — PROGRESS NOTES
Subjective:    Patient ID:  Palmira Malave is a 87 y.o. female who presents for follow-up of Follow-up      HPI    Systolic CHF, NICM, Medtronic BiV AICD, HTN, HLD     Medtronic AICD check 3/9/21 - High SVC coil impedance - SVC coil turned off - otherwise good device function     Previously followed by Dr Batres  Previous history:  Here for follow-up of systolic heart failure and biventricular ICD interrogation.  She denies any worsening cardiopulmonary complaints since we last saw her.  She's had no issues with her device.  She is expressed no worsening cardiopulmonary complaints.  She denies any chest pain, shortness breath or palpitations.  She's not expressing PND, orthopnea or lower edema.  She denies any dizziness, presyncope or syncope.  Otherwise she's better usual state of health.  He says she has a daughter the moved into scan early onset dementia she's having to take care of.  She tries to stay active by going to interactions with a female group.  She is seen by [facility] and does get injections.  He wants clearance for rehabilitation.     Here for follow-up of systolic heart failure and previous biventricular ICD placement.  She had her device interrogated which shows no significant issues.  She had recent admission at the beginning of the month for mild volume overload.  She was diuresed overnight and discharged home the next day.  Her EF is stable.  She denies any PND, orthopnea or lower Veronica.  She has not experiencing dizziness, presyncope or syncope.  She on questioning says she is compliant with medicines and diet.          catheter 2011 reported nonobstructive CAD     Echo 7/3/22  The left ventricle is moderately enlarged with eccentric hypertrophy and severely decreased systolic function.  The estimated ejection fraction is 20%.  Grade II left ventricular diastolic dysfunction.  Normal right ventricular size with normal right ventricular systolic function.  Severe left atrial  enlargement.  There is mild aortic valve stenosis.  Aortic valve area is 1.28 cm2; peak velocity is 1.85 m/s; mean gradient is 7 mmHg.  Moderate mitral regurgitation.  Mild tricuspid regurgitation.  Normal central venous pressure (3 mmHg).  The estimated PA systolic pressure is 19 mmHg.     Carotid ultrasound:  7-18  CONCLUSIONS   There is 40 - 49% right Internal Carotid stenosis.  There is 20 - 39% left Internal Carotid stenosis.        9/18/19 Denies recent CP or SOB  EKG A-sensed V-paced        6/22/20 Recently Dx with colon CA. Less SOB since discharge  Denies CP  Cleared for colon surgery at moderate cardiac risk  OK to hold plavix 7 days before  OV 3 months with Medtronic AICD check        10/2/20 Feels better since discharge. Stopped bidil - have her HA and made her feel bad - went back to coreg 25 bid   Continue Rx for CAD, HTN, CHF, HLD  OV 2 weeks with Medtronic AICD check     10/14/20 Breathing stable. Denies CP   Continue Rx for CHF, HTN, HLD, CAD  OV 3 months with BNP, BMP      11/3/20 Back early for worsening SOB. Denies CP  Unclear if she takes lasix as prescribed  Increase lasix 80 AM and 40 PM  OV 2 weeks with BNP, BMP  Needs to go to the ER for worsening symptoms        11/19/20 Less SOB. Denies CP     Continue Rx for CHF, HTN, HLD, CAD  OV 3 months with BNP, BMP      EKG 3/6/21 A-sensed V-paced  K 3.8, , Cr 2.6     3/8/21 patient reports she heard an alarm with the AICD and did not receive a shock  Denies CP. SOB improved  BP stable   Decrease lasix to 80 AM only with rising Cr  Medtronic AICD check tomorrow  Continue Rx for CHF, CAD, HLD, HTN     3/9/21 Denies CP or SOB   Continue Rx for CHF, CAD, HLD, HTN  OV 3 months      6/10/21 Less SOB since discharge. Trying to follow low sodium diet  Denies CP  BP in good range   Continue Rx for CHF, CAD, HLD, HTN  OV 1 month with BNP, BMP      7/14/21 Denies CP, mild EVANS about the same, denies LE edema - takes lasix 80 qd     BNP up but clinic al  volume status stable   Continue Rx for CHF, CAD, HLD, HTN  OV 3 month with BNP, BMP     8/12/21 had PND and increased SOB since last night - improved some after taking lasix 80 this AM. Denies CP or LE edema   Increase lasix 80 bid for 3 days due to CHF and volume overload then 80 qd  OV 2 weeks with BNP, BMP  Needs to go to the ER for worsening SOB     Continue Rx for CHF, CAD, HLD, HTN     12/1/21 Reports increased EVANS for the past few days - increased lasix 80 bid which helped  EKG A-sensed BiV paced   BNP, BMP today  Continue lasix 80 bid for now  OV with Medtronic AICD check       Continue Rx for CHF, CAD, HLD, HTN  Needs to go to the ER for worsening EVANS        12/8/21 EVANS improved. Denies CP. Still taking lasix 80 bid   BNP still elevated but symptomatically improved  OV 2 weeks with Medtronic AICD check     Continue Rx for CHF, CAD, HLD, HTN     Admitted 4/1/22  Ms. Malave is an 86 year old woman with history of chronic systolic/diastolic chf, AICD, coronary artery disease, hypertension and CKDIII who presented for evaluation of shortness of breath.  She states she felt fine when she went to bed last night, but around midnight she woke to use the bathroom and was more short of breath than normal at that time.  She notes eating white beans and rice with sausage at dinner, prior to going to sleep.  She denies chest pain, cough, fever, leg swelling, nausea, vomiting, diarrhea, headache, palpitations, AICD discharge, light headedness, dizziness, dysuria and hematuria.  She has been taking all her medications.  She reports taking lasix this morning with some improvement.  Vitals appear stable and she is in no distress.  She was given additional IV lasix in ER and continues to improve, but states she still doesn't quite feel her breathing is back to normal.  Cr 1.6, BNP 13798     4/11/22 EVANS has improved since discharge. Takes lasix 80 qd with a 2nd dose prn  AICD was checked during last admission  Denies CP       Continue Rx for CHF, CAD, HLD, HTN  OV 3 months with BNP, BMP  Next Medtronic AICD check 10/2022        5/12/22 Only taking lasix 80 qd - doesn't like 2nd dose because it keeps her up  Denies CP, mild EVANS  BP controlled   Add metolazone 2.5 MWF for volume overload  OV 2 weeks with BNP, BMP        Continue Rx for CHF, CAD, HLD, HTN     Admitted 7/2/22  Admitted to observation for acute on chronic systolic diastolic heart failure.  Patient improved after IV Lasix in ED.  Patient never required supplemental oxygen.  Again discuss low-salt diet, 1.2 L fluid intake and daily weights.  Repeat 2D echo showed EF of 20% similar to previous and grade 2 diastolic dysfunction.  Patient reports breathing back to baseline and ready for discharge.  Patient stable for discharge home with daughter.        7/11/22 Feels better since discharge  LE edema resolved  Taking lasix 80 bid   OV 1 month with BNP, BMP        Continue Rx for CHF, CAD, HLD, HTN     8/16/22 Labs not done. SOB improved. Denies LE edema  EKG A-sensed V-paced  BP controlled   BNP, BMP today - if stable then OV 3 months with BNP, BMP          Continue Rx for CHF, CAD, HLD, HTN     Admitted 10/9/22  Admission to observation for acute on chronic systolic heart failure.  EKG V paced.  Elevated BNP and troponin similar to previous labs most recent 2 days ago. One dose of IV lasix 60 mg given in ED with good UOP. SOB improved. Lung diminished at bases and BLE no edema. Breathing comfortable on RA 96%.  On discharge patient denies chest pain or shortness of breath. Review med list with Daughter Usama. Continue home lasix 80 mg daily and okay to take additional dose if gain 2-3 lbs in 2-3 days. 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 sCr 2.0 on discharge. Discussed importance of daily weight. Patient HDS for discharge home. Patient stable for discharge home with Daughter with close follow up Nephrologist  10/10 and Cardiologist 11/11. Ambulatory  referral [facility].     10/14/22 less SOB since discharge. LE edema resolved. Taking lasix 80 qd with a 2nd dose PRN  BP controlled   OV 1 month with BNP, BMP          Continue Rx for CHF, CAD, HLD, HTN    Admitted 12/3/22  Echo 7/2022 with EF of 20% and grade 2 DD. Presents with SOB, increasing lower extremity edema, and pulmonary edema on chest x-ray. BNP 4000. Soft BP's.  Will discontinue home carvedilol and Bidil, in order to make sure she has both beta-blocker and ACE inhibitor/Arb on board.  Will start metoprolol succinate 25 mg, and low-dose Entresto.  Creatinine improving on diuresis, sodium depressed, bicarb coming up, will continue. No UOP documented yet, will place pure wick for accurate measures. Continue diuresis, BNP 4550.     Labs 12/6/22  K 3.5  Cr 1.7  BNP 4552 (12/5/22)    12/9/22 less SOB since discharge. Doesn't take entresto if BP runs low  Denies CP or SOB  BP controlled    Review of Systems   Constitutional: Negative for decreased appetite.   HENT:  Negative for ear discharge.    Eyes:  Negative for blurred vision.   Respiratory:  Negative for hemoptysis.    Endocrine: Negative for polyphagia.   Hematologic/Lymphatic: Negative for adenopathy.   Skin:  Negative for color change.   Musculoskeletal:  Negative for joint swelling.   Genitourinary:  Negative for bladder incontinence.   Neurological:  Negative for brief paralysis.   Psychiatric/Behavioral:  Negative for hallucinations.    Allergic/Immunologic: Negative for hives.      Objective:    Physical Exam  Constitutional:       Appearance: She is well-developed.   HENT:      Head: Normocephalic and atraumatic.   Eyes:      Conjunctiva/sclera: Conjunctivae normal.      Pupils: Pupils are equal, round, and reactive to light.   Neck:      Thyroid: No thyromegaly.   Cardiovascular:      Rate and Rhythm: Normal rate and regular rhythm.      Heart sounds: No murmur heard.  Pulmonary:      Effort: Pulmonary effort is normal. No respiratory  distress.      Breath sounds: Normal breath sounds.   Abdominal:      General: Bowel sounds are normal.      Palpations: Abdomen is soft.   Musculoskeletal:      Cervical back: Normal range of motion and neck supple.   Skin:     General: Skin is warm and dry.   Neurological:      Mental Status: She is alert and oriented to person, place, and time.   Psychiatric:         Behavior: Behavior normal.         Assessment:       1. Biventricular AICD in place    2. Acute on chronic systolic congestive heart failure    3. Acute on chronic combined systolic and diastolic congestive heart failure    4. Essential hypertension    5. Mixed hyperlipidemia         Plan:        OV 1 month with BNP, BMP          Continue Rx for CHF, CAD, HLD, HTN

## 2022-12-13 ENCOUNTER — HOSPITAL ENCOUNTER (EMERGENCY)
Facility: HOSPITAL | Age: 87
Discharge: HOME | End: 2022-12-13
Attending: STUDENT IN AN ORGANIZED HEALTH CARE EDUCATION/TRAINING PROGRAM
Payer: MEDICARE

## 2022-12-13 VITALS
BODY MASS INDEX: 23.55 KG/M2 | HEART RATE: 75 BPM | SYSTOLIC BLOOD PRESSURE: 103 MMHG | TEMPERATURE: 99 F | DIASTOLIC BLOOD PRESSURE: 68 MMHG | WEIGHT: 128 LBS | OXYGEN SATURATION: 100 % | RESPIRATION RATE: 20 BRPM | HEIGHT: 62 IN

## 2022-12-13 DIAGNOSIS — R53.83 FATIGUE: ICD-10-CM

## 2022-12-13 LAB
ALBUMIN SERPL BCP-MCNC: 3.6 G/DL (ref 3.5–5.2)
ALP SERPL-CCNC: 73 U/L (ref 55–135)
ALT SERPL W/O P-5'-P-CCNC: 15 U/L (ref 10–44)
ANION GAP SERPL CALC-SCNC: 11 MMOL/L (ref 8–16)
AST SERPL-CCNC: 22 U/L (ref 10–40)
BACTERIA #/AREA URNS HPF: ABNORMAL /HPF
BASOPHILS # BLD AUTO: 0.02 K/UL (ref 0–0.2)
BASOPHILS NFR BLD: 0.5 % (ref 0–1.9)
BILIRUB SERPL-MCNC: 0.9 MG/DL (ref 0.1–1)
BILIRUB UR QL STRIP: NEGATIVE
BNP SERPL-MCNC: >4900 PG/ML (ref 0–99)
BUN SERPL-MCNC: 67 MG/DL (ref 8–23)
CALCIUM SERPL-MCNC: 10 MG/DL (ref 8.7–10.5)
CHLORIDE SERPL-SCNC: 93 MMOL/L (ref 95–110)
CLARITY UR: CLEAR
CO2 SERPL-SCNC: 29 MMOL/L (ref 23–29)
COLOR UR: YELLOW
CREAT SERPL-MCNC: 2.3 MG/DL (ref 0.5–1.4)
CTP QC/QA: YES
CTP QC/QA: YES
DIFFERENTIAL METHOD: ABNORMAL
EOSINOPHIL # BLD AUTO: 0.1 K/UL (ref 0–0.5)
EOSINOPHIL NFR BLD: 2.1 % (ref 0–8)
ERYTHROCYTE [DISTWIDTH] IN BLOOD BY AUTOMATED COUNT: 15.2 % (ref 11.5–14.5)
EST. GFR  (NO RACE VARIABLE): 20 ML/MIN/1.73 M^2
GLUCOSE SERPL-MCNC: 94 MG/DL (ref 70–110)
GLUCOSE UR QL STRIP: NEGATIVE
HCT VFR BLD AUTO: 33.7 % (ref 37–48.5)
HGB BLD-MCNC: 11.3 G/DL (ref 12–16)
HGB UR QL STRIP: NEGATIVE
IMM GRANULOCYTES # BLD AUTO: 0.01 K/UL (ref 0–0.04)
IMM GRANULOCYTES NFR BLD AUTO: 0.3 % (ref 0–0.5)
KETONES UR QL STRIP: NEGATIVE
LEUKOCYTE ESTERASE UR QL STRIP: ABNORMAL
LYMPHOCYTES # BLD AUTO: 0.7 K/UL (ref 1–4.8)
LYMPHOCYTES NFR BLD: 17.5 % (ref 18–48)
MCH RBC QN AUTO: 31.2 PG (ref 27–31)
MCHC RBC AUTO-ENTMCNC: 33.5 G/DL (ref 32–36)
MCV RBC AUTO: 93 FL (ref 82–98)
MICROSCOPIC COMMENT: ABNORMAL
MONOCYTES # BLD AUTO: 0.6 K/UL (ref 0.3–1)
MONOCYTES NFR BLD: 14.4 % (ref 4–15)
NEUTROPHILS # BLD AUTO: 2.5 K/UL (ref 1.8–7.7)
NEUTROPHILS NFR BLD: 65.2 % (ref 38–73)
NITRITE UR QL STRIP: NEGATIVE
NRBC BLD-RTO: 0 /100 WBC
PH UR STRIP: 5 [PH] (ref 5–8)
PLATELET # BLD AUTO: 132 K/UL (ref 150–450)
PMV BLD AUTO: 10.4 FL (ref 9.2–12.9)
POC MOLECULAR INFLUENZA A AGN: NEGATIVE
POC MOLECULAR INFLUENZA B AGN: NEGATIVE
POTASSIUM SERPL-SCNC: 4.1 MMOL/L (ref 3.5–5.1)
PROT SERPL-MCNC: 6.7 G/DL (ref 6–8.4)
PROT UR QL STRIP: NEGATIVE
RBC # BLD AUTO: 3.62 M/UL (ref 4–5.4)
RBC #/AREA URNS HPF: 3 /HPF (ref 0–4)
SARS-COV-2 RDRP RESP QL NAA+PROBE: NEGATIVE
SODIUM SERPL-SCNC: 133 MMOL/L (ref 136–145)
SP GR UR STRIP: 1.01 (ref 1–1.03)
SQUAMOUS #/AREA URNS HPF: 0 /HPF
TROPONIN I SERPL DL<=0.01 NG/ML-MCNC: 0.17 NG/ML (ref 0–0.03)
TSH SERPL DL<=0.005 MIU/L-ACNC: 1.33 UIU/ML (ref 0.4–4)
URN SPEC COLLECT METH UR: ABNORMAL
UROBILINOGEN UR STRIP-ACNC: NEGATIVE EU/DL
WBC # BLD AUTO: 3.89 K/UL (ref 3.9–12.7)
WBC #/AREA URNS HPF: 2 /HPF (ref 0–5)

## 2022-12-13 PROCEDURE — 83880 ASSAY OF NATRIURETIC PEPTIDE: CPT

## 2022-12-13 PROCEDURE — 87635 SARS-COV-2 COVID-19 AMP PRB: CPT

## 2022-12-13 PROCEDURE — 99285 EMERGENCY DEPT VISIT HI MDM: CPT | Mod: 25

## 2022-12-13 PROCEDURE — 81000 URINALYSIS NONAUTO W/SCOPE: CPT

## 2022-12-13 PROCEDURE — 80053 COMPREHEN METABOLIC PANEL: CPT

## 2022-12-13 PROCEDURE — 85025 COMPLETE CBC W/AUTO DIFF WBC: CPT

## 2022-12-13 PROCEDURE — 84443 ASSAY THYROID STIM HORMONE: CPT

## 2022-12-13 PROCEDURE — 84484 ASSAY OF TROPONIN QUANT: CPT

## 2022-12-13 PROCEDURE — 87502 INFLUENZA DNA AMP PROBE: CPT

## 2022-12-13 NOTE — FIRST PROVIDER EVALUATION
"Medical screening examination initiated.  I have conducted a focused provider triage encounter, findings are as follows:    Brief history of present illness:  87-year-old female sent in from primary care provider for headache, dizziness.  Recently seen here in the emergency department for fall.    Vitals:    12/13/22 1417   BP: (!) 108/57   BP Location: Left arm   Patient Position: Sitting   Pulse: 65   Resp: 18   Temp: 98 °F (36.7 °C)   TempSrc: Oral   SpO2: 97%   Weight: 58.1 kg (128 lb)   Height: 5' 2" (1.575 m)       Pertinent physical exam:  Well-appearing and in no acute distress.  Band-aid over left bruise to left temple.    Brief workup plan:  CT head    Preliminary workup initiated; this workup will be continued and followed by the physician or advanced practice provider that is assigned to the patient when roomed.  " Fluconazole Counseling:  Patient counseled regarding adverse effects of fluconazole including but not limited to headache, diarrhea, nausea, upset stomach, liver function test abnormalities, taste disturbance, and stomach pain.  There is a rare possibility of liver failure that can occur when taking fluconazole.  The patient understands that monitoring of LFTs and kidney function test may be required, especially at baseline. The patient verbalized understanding of the proper use and possible adverse effects of fluconazole.  All of the patient's questions and concerns were addressed.

## 2022-12-13 NOTE — ED PROVIDER NOTES
Encounter Date: 12/13/2022       History     Chief Complaint   Patient presents with    Fall     Patient's daughter reports that patient had a fall on 12/8/22. She was seen at her PCP office and referred to the ED for a BP of 90/60, headache, weakness, and recent concussion. Patient denies headache, vision changes, dizziness. Patient states that she did have some nausea earlier today. Reports use of blood thinners.      Pt is an 87 year old female with PMHx significant for CAD, HTN, CHF(EF 20% 7/2/22), CKD, AICD who presents for evaluation of fatigue, which was noted at primary care office today. Associated nausea. Pt's blood pressure was noted to be in the 90s systolic which prompted her to be sent to the ED for further evaluation. No fever, chills, chest pain, or shortness of breath. Pt reports her blood pressure has run low over the last 1-2 weeks following addition of entresto to her medication regimen.     Review of patient's allergies indicates:   Allergen Reactions    Aspirin Swelling     Swelling and rash    Colace [docusate sodium] Rash     itching    Docusate Other (See Comments) and Rash     itching    Sulfa (sulfonamide antibiotics) Swelling     Swelling and rash  Swelling and rash    Iodine and iodide containing products Rash    Penicillins Rash     Past Medical History:   Diagnosis Date    Cataract     CHF (congestive heart failure)     CKD (chronic kidney disease), stage III     Coronary artery disease     Gout attack     Hypercholesteremia     Hypertension     Renal disorder     Vaginal delivery     x4     Past Surgical History:   Procedure Laterality Date    APPENDECTOMY      CARDIAC DEFIBRILLATOR PLACEMENT      2015    CATARACT EXTRACTION W/  INTRAOCULAR LENS IMPLANT Left 02/07/2019    Dr. Velazco    CATARACT EXTRACTION W/  INTRAOCULAR LENS IMPLANT Right 02/21/2019    Dr. Velazco    CHOLECYSTECTOMY      COLONOSCOPY W/ POLYPECTOMY  10 or 11/ 2014    per Dr. Myrick    defribillator Left 8/2008     EYE SURGERY      HYSTERECTOMY      INTRAOCULAR PROSTHESES INSERTION Left 2/7/2019    Procedure: INSERTION, IOL PROSTHESIS;  Surgeon: Demetrius Velazco MD;  Location: [facility] OR;  Service: Ophthalmology;  Laterality: Left;  RN Phone Pre OP 1-30-19.  Arrival 05:30 AM    INTRAOCULAR PROSTHESES INSERTION Right 2/21/2019    Procedure: INSERTION, IOL PROSTHESIS;  Surgeon: Demetrius Velazco MD;  Location: [facility] OR;  Service: Ophthalmology;  Laterality: Right;    JOINT REPLACEMENT Bilateral 2005 & 2006    2 Knee Replacements=Lt. 1= 2005. Rt. 1= 2006    OOPHORECTOMY      PHACOEMULSIFICATION OF CATARACT Left 2/7/2019    Procedure: PHACOEMULSIFICATION, CATARACT;  Surgeon: Demetrius Velazco MD;  Location: [facility] OR;  Service: Ophthalmology;  Laterality: Left;    PHACOEMULSIFICATION OF CATARACT Right 2/21/2019    Procedure: PHACOEMULSIFICATION, CATARACT;  Surgeon: Demetrius Velazco MD;  Location: [facility] OR;  Service: Ophthalmology;  Laterality: Right;  RN Phone Pre op 2-15-19.  Arrival 05:30 AM    TOTAL KNEE ARTHROPLASTY Bilateral Lt.= 2005; Rt.=2006    Bilateral Knee scope     Family History   Problem Relation Age of Onset    Heart attack Mother 88    Hypertension Mother     Hyperlipidemia Mother     Diabetes Other     Hypertension Other     Amblyopia Son     Blindness Neg Hx     Cataracts Neg Hx     Glaucoma Neg Hx     Macular degeneration Neg Hx     Retinal detachment Neg Hx     Strabismus Neg Hx      Social History     Tobacco Use    Smoking status: Never    Smokeless tobacco: Never   Substance Use Topics    Alcohol use: Not Currently    Drug use: No     Review of Systems   Constitutional:  Positive for fatigue. Negative for chills and fever.   HENT:  Negative for ear discharge and ear pain.    Eyes:  Negative for photophobia and visual disturbance.   Respiratory:  Negative for cough and shortness of breath.    Cardiovascular:  Negative for chest pain and palpitations.   Gastrointestinal:  Positive for nausea.  Negative for abdominal pain, diarrhea and vomiting.   Genitourinary:  Negative for dysuria and frequency.   Musculoskeletal:  Negative for back pain and neck pain.   Skin:  Negative for rash and wound.   Neurological:  Negative for weakness, light-headedness and numbness.     Physical Exam     Initial Vitals [12/13/22 1417]   BP Pulse Resp Temp SpO2   (!) 108/57 65 18 98 °F (36.7 °C) 97 %      MAP       --         Physical Exam    Nursing note and vitals reviewed.  Constitutional: She appears well-developed and well-nourished. No distress.   HENT:   Head: Normocephalic and atraumatic.   Eyes: Conjunctivae and EOM are normal.   Neck: Neck supple. No tracheal deviation present.   Cardiovascular:  Normal rate, regular rhythm and intact distal pulses.           No murmur heard.  Pulmonary/Chest: Breath sounds normal. No stridor. No respiratory distress. She has no wheezes. She has no rales.   Abdominal: Abdomen is soft. She exhibits no distension. There is no abdominal tenderness.   Musculoskeletal:         General: Edema present. No tenderness. Normal range of motion.      Cervical back: Neck supple.     Neurological: She is alert and oriented to person, place, and time. She has normal strength. No cranial nerve deficit or sensory deficit.   Skin: Skin is warm and dry.       ED Course   Procedures      Labs Reviewed   CBC W/ AUTO DIFFERENTIAL - Abnormal; Notable for the following components:       Result Value    WBC 3.89 (*)     RBC 3.62 (*)     Hemoglobin 11.3 (*)     Hematocrit 33.7 (*)     MCH 31.2 (*)     RDW 15.2 (*)     Platelets 132 (*)     Lymph # 0.7 (*)     Lymph % 17.5 (*)     All other components within normal limits   COMPREHENSIVE METABOLIC PANEL - Abnormal; Notable for the following components:    Sodium 133 (*)     Chloride 93 (*)     BUN 67 (*)     Creatinine 2.3 (*)     eGFR 20 (*)     All other components within normal limits   B-TYPE NATRIURETIC PEPTIDE - Abnormal; Notable for the following  components:    BNP >4,900 (*)     All other components within normal limits   TROPONIN I - Abnormal; Notable for the following components:    Troponin I 0.174 (*)     All other components within normal limits   URINALYSIS, REFLEX TO URINE CULTURE - Abnormal; Notable for the following components:    Leukocytes, UA Trace (*)     All other components within normal limits    Narrative:     Specimen Source->Urine   URINALYSIS MICROSCOPIC - Abnormal; Notable for the following components:    Bacteria Few (*)     All other components within normal limits    Narrative:     Specimen Source->Urine   TSH   SARS-COV-2 RDRP GENE   POCT INFLUENZA A/B MOLECULAR          Imaging Results              X-Ray Chest 1 View (Final result)  Result time 12/13/22 18:04:52   Procedure changed from X-Ray Chest PA And Lateral     Final result by Po Brennan MD (12/13/22 18:04:52)                   Impression:      Stable enlargement of the cardiac silhouette, otherwise no acute cardiopulmonary process identified.      Electronically signed by: Po Brennan MD  Date:    12/13/2022  Time:    18:04               Narrative:    EXAMINATION:  XR CHEST 1 VIEW    CLINICAL HISTORY:  pain; Other fatigue    TECHNIQUE:  Single frontal view of the chest was performed.    COMPARISON:  12/08/2022.    FINDINGS:  Cardiac silhouette is enlarged but stable in size.  Left-sided pacer device is seen.  Lungs are symmetrically expanded.  No evidence of new focal consolidative process, pneumothorax, or significant pleural effusion.  No acute osseous abnormality identified.                                       CT Head Without Contrast (Final result)  Result time 12/13/22 15:06:57      Final result by Guzman Cueto MD (12/13/22 15:06:57)                   Impression:      1. No acute intracranial process.  2. Involutional changes with chronic microvascular ischemic changes.      Electronically signed by: Guzman Cueto  Date:    12/13/2022  Time:    15:06                Narrative:    EXAMINATION:  CT HEAD WITHOUT CONTRAST    CLINICAL HISTORY:  Head trauma, minor (Age >= 65y);Mental status change, unknown cause;    TECHNIQUE:  Low dose axial CT images obtained throughout the head without intravenous contrast. Sagittal and coronal reconstructions were performed.    COMPARISON:  12/08/2022    FINDINGS:  Intracranial compartment:    Ventricles and sulci are normal in size for age without evidence of hydrocephalus. No extra-axial blood or fluid collections.    Moderate involutional changes and chronic microvascular ischemic changes in the periventricular white matter.  No parenchymal mass, hemorrhage, edema or major vascular distribution infarct.    Skull/extracranial contents (limited evaluation): No fracture. Mastoid air cells and paranasal sinuses are essentially clear.    No significant change.                                       Medications - No data to display  Medical Decision Making:   History:   Old Medical Records: I decided to obtain old medical records.  Initial Assessment:   Pt is an 87 year old female with hx of CAD, CHF, and CKD who presents for evaluation of fatigue which was noted today  Differential Diagnosis:   ACS, arhythmia, CHF exacerbation, electrolyte abnormality, UTI  Clinical Tests:   Lab Tests: Ordered and Reviewed  Radiological Study: Ordered and Reviewed  Medical Tests: Ordered and Reviewed  ED Management:  Pt's ED work up without acute process. Pt's labs are at baseline and do not believe she is experiencing an acute exacerbation of chronic medical conditions. Pt has remained stable and without any acute events while in the ED. Pt's blood pressure has improved without intervention and she reports feeling well. Plan to discharge to home to follow up with primary care. Return precautions advised.                         Clinical Impression:   Final diagnoses:  [R53.83] Fatigue        ED Disposition Condition    Discharge Stable          ED  Prescriptions    None       Follow-up Information       Follow up With Specialties Details Why Contact Info    Qi Ramon MD Internal Medicine In 1 week  [address] [place] [address]  [id number]  [place] [address]  [phone]               Luc Interiano MD  Resident  12/13/22 1956       Maya Live,   12/14/22 1372

## 2022-12-14 ENCOUNTER — TELEPHONE (OUTPATIENT)
Dept: HOME HEALTH SERVICES | Facility: CLINIC | Age: 87
End: 2022-12-14
Payer: MEDICARE

## 2022-12-14 NOTE — TELEPHONE ENCOUNTER
DOS: 12/13/22    I showed up to the patient's home. Patient was not home, daughter answered phone. Patient was in the ED and would like to be rescheduled. Patient rescheduled as 2nd attempt. No additional needs at this this time. All of my information was given to the patient and family if any questions or concerns arise.      MANDI Celeste-ACNP [facility]

## 2022-12-20 ENCOUNTER — LAB VISIT (OUTPATIENT)
Dept: LAB | Facility: HOSPITAL | Age: 87
End: 2022-12-20
Attending: INTERNAL MEDICINE
Payer: MEDICARE

## 2022-12-20 DIAGNOSIS — Z95.810 ICD (IMPLANTABLE CARDIOVERTER-DEFIBRILLATOR), BIVENTRICULAR, IN SITU: Chronic | ICD-10-CM

## 2022-12-20 DIAGNOSIS — I10 ESSENTIAL HYPERTENSION: Chronic | ICD-10-CM

## 2022-12-20 DIAGNOSIS — I50.43 ACUTE ON CHRONIC COMBINED SYSTOLIC AND DIASTOLIC CONGESTIVE HEART FAILURE: ICD-10-CM

## 2022-12-20 DIAGNOSIS — I50.23 ACUTE ON CHRONIC SYSTOLIC CONGESTIVE HEART FAILURE: ICD-10-CM

## 2022-12-20 DIAGNOSIS — E78.2 MIXED HYPERLIPIDEMIA: Chronic | ICD-10-CM

## 2022-12-20 LAB
ANION GAP SERPL CALC-SCNC: 11 MMOL/L (ref 8–16)
BNP SERPL-MCNC: 4680 PG/ML (ref 0–99)
BUN SERPL-MCNC: 54 MG/DL (ref 8–23)
CALCIUM SERPL-MCNC: 10 MG/DL (ref 8.7–10.5)
CHLORIDE SERPL-SCNC: 94 MMOL/L (ref 95–110)
CO2 SERPL-SCNC: 24 MMOL/L (ref 23–29)
CREAT SERPL-MCNC: 2.1 MG/DL (ref 0.5–1.4)
EST. GFR  (NO RACE VARIABLE): 22.4 ML/MIN/1.73 M^2
GLUCOSE SERPL-MCNC: 99 MG/DL (ref 70–110)
POTASSIUM SERPL-SCNC: 4.2 MMOL/L (ref 3.5–5.1)
SODIUM SERPL-SCNC: 129 MMOL/L (ref 136–145)

## 2022-12-20 PROCEDURE — 83880 ASSAY OF NATRIURETIC PEPTIDE: CPT | Performed by: INTERNAL MEDICINE

## 2022-12-20 PROCEDURE — 80048 BASIC METABOLIC PNL TOTAL CA: CPT | Performed by: INTERNAL MEDICINE

## 2022-12-20 PROCEDURE — 36415 COLL VENOUS BLD VENIPUNCTURE: CPT | Mod: PO | Performed by: INTERNAL MEDICINE

## 2023-01-06 ENCOUNTER — CARE AT HOME (OUTPATIENT)
Dept: HOME HEALTH SERVICES | Facility: CLINIC | Age: 88
End: 2023-01-06
Payer: MEDICARE

## 2023-01-06 DIAGNOSIS — I50.42 CHRONIC COMBINED SYSTOLIC AND DIASTOLIC HEART FAILURE: Primary | ICD-10-CM

## 2023-01-06 PROCEDURE — 99349 HOME/RES VST EST MOD MDM 40: CPT | Mod: S$GLB,,, | Performed by: NURSE PRACTITIONER

## 2023-01-06 PROCEDURE — 99349 PR HOME VISIT,ESTAB PATIENT,LEVEL III: ICD-10-PCS | Mod: S$GLB,,, | Performed by: NURSE PRACTITIONER

## 2023-01-09 VITALS
HEART RATE: 80 BPM | SYSTOLIC BLOOD PRESSURE: 115 MMHG | TEMPERATURE: 98 F | RESPIRATION RATE: 16 BRPM | DIASTOLIC BLOOD PRESSURE: 65 MMHG

## 2023-01-09 NOTE — PROGRESS NOTES
[facility]  Medical Home Visit    Visit Date: 2023  Encounter Provider: Roni Mike  PCP:  Qi Ramon MD    Subjective:      Patient ID: Palmira Malave is a 87 y.o. female.    Consult Requested By:  No ref. provider found  Reason for Consult:  Medical visit    Palmira is being seen at home due to being seen at home due to physical debility that presents a taxing effort to leave the home, to mitigate high risk of hospital readmission and/or due to the limited availability of reliable or safe options for transportation to the point of access to the provider. Prior to treatment on this visit the chart was reviewed and patient verbal consent was obtained.    Chief Complaint: Follow-up     Palmira Malave is an 86 y/o female with HTN, HLD, GERD, CKD III, anemia, heart failure s/p AICD. The patient is being seen in the home in conjunction with her PCP as it is difficult for her to transport physically to [facility].  The patient currently lives with her daughter on the [facility].  She denies a history of smoking or drinking.  Her son is her transportation to all her appointments.  The patient is  and has a total of 6 children, x5 living.  She previously worked as a home health aide.  No pets in the home.    With this visit today patient is found sitting up in her recliner with her son present for the visit.  Reportedly, family takes shifts with the patient in her home. Patient is AAOx2, able to verify her name and , able to give some history along with her medical record.  The patient is generally weak and needs help with organization of medication.  Home health visits completed inpatient appears to be doing better with medications as well as ambulation with her walker.  Blood pressure log indicates that current values have been  to 120s over DBP 60 to 70s.      Patient endorses ability to partially perform ADLs. Endorses eating x 2 meals per day, regular Bms every other day,  and adequate sleep pattern. Med rec done with daughter over the telephone. No additional needs at this this time. All of my information was given to the patient and family if any questions or concerns arise.     VSS. Denies fever, chest pain, shortness of breath, nausea, vomiting, diarrhea. Risks of environmental exposure to coronavirus discussed including: social distancing, hand hygiene, and limiting departures from the home for necessities only.  Reports understanding and willingness to comply.  All hospital discharge orders reviewed and being followed, all medications reconciled and reviewed, patient and family verbalized understanding. No other needs identified at this time.     Attestation: Screening criteria to assess the level of the patient's risk for infection with COVID-19 as recommended by the CDC at the time of the above documented home visit concluded appropriateness to proceed. Universal precautions were maintained at all times, including provider use of 60% alcohol gel hand  immediately prior to entry and upon departing the patient's home.    Review of Systems   Unable to perform ROS: Dementia     Assessments:  Environmental: cluttered, dimly lit  Functional Status: mostly independent  Safety: moderate to high fr  Nutritional: adequate  Home Health/DME/Supplies: cane, walker, bs commode, wc    Objective:   Physical Exam  Vitals reviewed.   Constitutional:       Appearance: She is well-developed.   HENT:      Head: Normocephalic and atraumatic.   Eyes:      Pupils: Pupils are equal, round, and reactive to light.   Cardiovascular:      Rate and Rhythm: Normal rate.   Pulmonary:      Effort: Pulmonary effort is normal.      Breath sounds: Normal breath sounds.   Abdominal:      General: Bowel sounds are normal.      Palpations: Abdomen is soft.   Musculoskeletal:         General: Normal range of motion.      Cervical back: Normal range of motion and neck supple.   Skin:     General: Skin is  warm and dry.   Neurological:      Mental Status: She is alert. Mental status is at baseline.      GCS: GCS eye subscore is 4. GCS verbal subscore is 5. GCS motor subscore is 6.   Psychiatric:         Attention and Perception: Attention normal.         Mood and Affect: Mood normal.         Speech: Speech normal.     Vitals:    01/09/23 1336   BP: 115/65   Pulse: 80   Resp: 16   Temp: 98.3 °F (36.8 °C)   PainSc: 0-No pain     There is no height or weight on file to calculate BMI.    Assessment:     --see below      Plan:     Ethical / Legal: Advance Care Planning   Surrogate decision maker:  Kamaljit Malave, Relationship: daughter  Code Status:  DNR  [place]:  none on file  Other advance directive:  none on file, Capacity to make medical decisions:  partial, Conflict none       Assessment:      1. Essential hypertension    2. Acute exacerbation of CHF (congestive heart failure)    3. Congestive heart failure, unspecified HF chronicity, unspecified heart failure type    4. Anemia of chronic disease    5. Gastroesophageal reflux disease, unspecified whether esophagitis present    6. Mixed hyperlipidemia    7. Chronic combined systolic and diastolic heart failure          Plan:      Ethical / Legal: Advance Care Planning   Surrogate decision maker:  Kamaljit Malave, Relationship: daughter  Code Status:  DNR  [place]POST:  none on file  Other advance directive:  none on file, Capacity to make medical decisions:  partial, Conflict none     1. Essential hypertension  Assessment & Plan:  --currently holding Coreg  --normotensive at visit        2. Acute exacerbation of CHF (congestive heart failure)  -     Ambulatory referral/consult to [facility]     3. Congestive heart failure, unspecified HF chronicity, unspecified heart failure type     4. Anemia of chronic disease     5. Gastroesophageal reflux disease, unspecified whether esophagitis present  Assessment & Plan:  --compliant with  PPI        6. Mixed hyperlipidemia  Assessment & Plan:  --compliant with statin therapy           7. Chronic combined systolic and diastolic heart failure  Assessment & Plan:  --continue cardiac medicatinos  --Lasix with daily dose and prn  --AICD in place  --f/up with cardiology                Total face-to-face time was 50 minutes, >50% of this was spent on counseling and coordination of care. The following issues were discussed: primary and secondary diagnoses, co-morbidities, prescribed medications, treatment modalities, importance of compliance with medical advice, and directives for follow-up care.    Were controlled substances prescribed?  No    Follow Up Appointments:   Future Appointments   Date Time Provider Department Center   1/18/2023  9:00 AM Kelechi Richmond MD [facility] CARDIO [place]   2/16/2023  1:30 PM Demetrius Velazco MD [facility] OPHTHAL [place]       Signature: Roni Mike NP

## 2023-01-09 NOTE — PATIENT INSTRUCTIONS
Instructions:  - [facility] to schedule home follow-up visit with patient in 8 weeks.  - Continue all medications, treatments and therapies as ordered.   - Follow all instructions, recommendations as discussed.  - Maintain Safety Precautions at all times.  - Attend all medical appointments as scheduled.  - For worsening symptoms: call Primary Care Physician or Nurse Practitioner.  - For emergencies, call 911 or immediately report to the nearest emergency room.  - Limit Risks of environmental exposure to coronavirus/COVID-19 as discussed including: social distancing, hand hygiene, and limiting departures from the home for necessities only.

## 2023-01-10 ENCOUNTER — HOSPITAL ENCOUNTER (EMERGENCY)
Facility: HOSPITAL | Age: 88
Discharge: HOME | End: 2023-01-10
Attending: EMERGENCY MEDICINE
Payer: MEDICARE

## 2023-01-10 VITALS
DIASTOLIC BLOOD PRESSURE: 48 MMHG | HEART RATE: 76 BPM | RESPIRATION RATE: 16 BRPM | BODY MASS INDEX: 23.57 KG/M2 | TEMPERATURE: 98 F | SYSTOLIC BLOOD PRESSURE: 104 MMHG | HEIGHT: 62 IN | OXYGEN SATURATION: 100 % | WEIGHT: 128.06 LBS

## 2023-01-10 DIAGNOSIS — S70.02XA CONTUSION OF LEFT HIP, INITIAL ENCOUNTER: Primary | ICD-10-CM
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
## 2023-01-10 DIAGNOSIS — M25.552 LEFT HIP PAIN: ICD-10-CM

## 2023-01-10 PROCEDURE — 25000003 PHARM REV CODE 250: Performed by: EMERGENCY MEDICINE

## 2023-01-10 PROCEDURE — 99283 EMERGENCY DEPT VISIT LOW MDM: CPT

## 2023-01-10 RX ORDER — ACETAMINOPHEN 500 MG
1000 TABLET ORAL
Status: COMPLETED | OUTPATIENT
Start: 2023-01-10 | End: 2023-01-10

## 2023-01-10 RX ADMIN — ACETAMINOPHEN 1000 MG: 500 TABLET ORAL at 08:01

## 2023-01-11 NOTE — ED TRIAGE NOTES
"Pt presents to ED via EMS with c/o LEFT hip pain after fall. States her daughter was helping her get out of bed and the next thing she could recall was her lying on her bed and "seeing stars". Daughter states she did not strike her head. Denies LOC. No obvious deformity. Pt ambulatory in ED with assistance.   "

## 2023-01-11 NOTE — ED PROVIDER NOTES
Encounter Date: 1/10/2023       History     Chief Complaint   Patient presents with    Fall    Hip Pain     Patient arrives via EMS after a fall. No LOC or weakness. She felt a sharp pain to her buttocks, causing her to fall. Did not hit head. She is now having left hip pain. EMS reports no shortening or rotation. Patient able to stand and bear weight.     HPI  Review of patient's allergies indicates:   Allergen Reactions    Aspirin Swelling     Swelling and rash    Colace [docusate sodium] Rash     itching    Docusate Other (See Comments) and Rash     itching    Sulfa (sulfonamide antibiotics) Swelling     Swelling and rash  Swelling and rash    Iodine and iodide containing products Rash    Penicillins Rash     Past Medical History:   Diagnosis Date    Cataract     CHF (congestive heart failure)     CKD (chronic kidney disease), stage III     Coronary artery disease     Gout attack     Hypercholesteremia     Hypertension     Renal disorder     Vaginal delivery     x4     Past Surgical History:   Procedure Laterality Date    APPENDECTOMY      CARDIAC DEFIBRILLATOR PLACEMENT      2015    CATARACT EXTRACTION W/  INTRAOCULAR LENS IMPLANT Left 02/07/2019    Dr. Velazco    CATARACT EXTRACTION W/  INTRAOCULAR LENS IMPLANT Right 02/21/2019    Dr. Velazco    CHOLECYSTECTOMY      COLONOSCOPY W/ POLYPECTOMY  10 or 11/ 2014    per Dr. Myrick    defribillator Left 8/2008    EYE SURGERY      HYSTERECTOMY      INTRAOCULAR PROSTHESES INSERTION Left 2/7/2019    Procedure: INSERTION, IOL PROSTHESIS;  Surgeon: Demetrius Velazco MD;  Location: [facility] OR;  Service: Ophthalmology;  Laterality: Left;  RN Phone Pre OP 1-30-19.  Arrival 05:30 AM    INTRAOCULAR PROSTHESES INSERTION Right 2/21/2019    Procedure: INSERTION, IOL PROSTHESIS;  Surgeon: Demetrius Velazco MD;  Location: [facility] OR;  Service: Ophthalmology;  Laterality: Right;    JOINT REPLACEMENT Bilateral 2005 & 2006    2 Knee Replacements=Lt. 1= 2005. Rt. 1= 2006     OOPHORECTOMY      PHACOEMULSIFICATION OF CATARACT Left 2/7/2019    Procedure: PHACOEMULSIFICATION, CATARACT;  Surgeon: Demetrius Velazco MD;  Location: [facility] OR;  Service: Ophthalmology;  Laterality: Left;    PHACOEMULSIFICATION OF CATARACT Right 2/21/2019    Procedure: PHACOEMULSIFICATION, CATARACT;  Surgeon: Demetrius Velazco MD;  Location: [facility] OR;  Service: Ophthalmology;  Laterality: Right;  RN Phone Pre op 2-15-19.  Arrival 05:30 AM    TOTAL KNEE ARTHROPLASTY Bilateral Lt.= 2005; Rt.=2006    Bilateral Knee scope     Family History   Problem Relation Age of Onset    Heart attack Mother 88    Hypertension Mother     Hyperlipidemia Mother     Diabetes Other     Hypertension Other     Amblyopia Son     Blindness Neg Hx     Cataracts Neg Hx     Glaucoma Neg Hx     Macular degeneration Neg Hx     Retinal detachment Neg Hx     Strabismus Neg Hx      Social History     Tobacco Use    Smoking status: Never    Smokeless tobacco: Never   Substance Use Topics    Alcohol use: Not Currently    Drug use: No     Review of Systems    Physical Exam     Initial Vitals [01/10/23 1913]   BP Pulse Resp Temp SpO2   112/68 80 16 -- 100 %      MAP       --         Physical Exam  The patient was examined specifically for the following:   General:No significant distress, Good color, Warm and dry. Head and neck:Scalp atraumatic, Neck supple. Neurological:Appropriate conversation, Gross motor deficits. Eyes:Conjugate gaze, Clear corneas. ENT: No epistaxis. Cardiac: Regular rate and rhythm, Grossly normal heart tones. Pulmonary: Wheezing, Rales. Gastrointestinal: Abdominal tenderness, Abdominal distention. Musculoskeletal: Extremity deformity, Apparent pain with range of motion of the joints. Skin: Rash.   The findings on examination were normal except for the following:  The scalp is atraumatic.  There is no significant tenderness or pain with range of motion of any joints.  The patient has no midline spinal tenderness along its  entire course chest abdomen and pelvis are nontender.  The lungs are clear.  The patient is awake alert bright conversational vital signs are stable she is in no distress.  ED Course   Procedures  Labs Reviewed - No data to display       Imaging Results              X-Ray Hip 2 or 3 views Left (with Pelvis when performed) (In process)                      Medications - No data to display                           Clinical Impression:   Final diagnoses:  [S09.90XA] Closed head injury  [M25.552] Left hip pain               Larry Owen MD  01/10/23 1938

## 2023-01-11 NOTE — DISCHARGE INSTRUCTIONS
Tylenol, 1000 mg by mouth every 8 hours as needed for pain.  Please return immediately if you get worse or if new problems develop.  You may apply ice to your injuries for 24 hours, then you may use heat.  Rest.  Please follow-up with your primary care doctor this week.

## 2023-01-11 NOTE — ED PROVIDER NOTES
Encounter Date: 1/10/2023    SCRIBE #1 NOTE: I, Asia Soria, am scribing for, and in the presence of,  Larry Owen MD. I have scribed the following portions of the note - Other sections scribed: HPI, ROS.     History     Chief Complaint   Patient presents with    Fall    Hip Pain     Patient arrives via EMS after a fall. No LOC or weakness. She felt a sharp pain to her buttocks, causing her to fall. Did not hit head. She is now having left hip pain. EMS reports no shortening or rotation. Patient able to stand and bear weight.     87 year old female with PMHx of CAD, HTN, CHF and CKD presents to ED with chief complaint of fall PTA. Patient's daughter Raymundo Malave states she fell from bed to floor landing on her buttock. The duaghter denies any trauma to the head. Patient reports pain in her left hip. No weakness or dizzness. Denies any cough, shortness of breath, chest pain, fever, chills, abdominal pain, nausea, vomiting, diarrhea, dysuria, headaches, congestion, sore throat, arm or leg trouble, eye pain, ear pain, rash, or other associated symptoms    The history is provided by the patient. No  was used.   Review of patient's allergies indicates:   Allergen Reactions    Aspirin Swelling     Swelling and rash    Colace [docusate sodium] Rash     itching    Docusate Other (See Comments) and Rash     itching    Sulfa (sulfonamide antibiotics) Swelling     Swelling and rash  Swelling and rash    Iodine and iodide containing products Rash    Penicillins Rash     Past Medical History:   Diagnosis Date    Cataract     CHF (congestive heart failure)     CKD (chronic kidney disease), stage III     Coronary artery disease     Gout attack     Hypercholesteremia     Hypertension     Renal disorder     Vaginal delivery     x4     Past Surgical History:   Procedure Laterality Date    APPENDECTOMY      CARDIAC DEFIBRILLATOR PLACEMENT      2015    CATARACT EXTRACTION W/  INTRAOCULAR LENS IMPLANT Left  02/07/2019    Dr. Velazco    CATARACT EXTRACTION W/  INTRAOCULAR LENS IMPLANT Right 02/21/2019    Dr. Velazco    CHOLECYSTECTOMY      COLONOSCOPY W/ POLYPECTOMY  10 or 11/ 2014    per Dr. Elen ceja Left 8/2008    EYE SURGERY      HYSTERECTOMY      INTRAOCULAR PROSTHESES INSERTION Left 2/7/2019    Procedure: INSERTION, IOL PROSTHESIS;  Surgeon: Demetrius Velazco MD;  Location: [facility] OR;  Service: Ophthalmology;  Laterality: Left;  RN Phone Pre OP 1-30-19.  Arrival 05:30 AM    INTRAOCULAR PROSTHESES INSERTION Right 2/21/2019    Procedure: INSERTION, IOL PROSTHESIS;  Surgeon: Demetrius Velazco MD;  Location: [facility] OR;  Service: Ophthalmology;  Laterality: Right;    JOINT REPLACEMENT Bilateral 2005 & 2006    2 Knee Replacements=Lt. 1= 2005. Rt. 1= 2006    OOPHORECTOMY      PHACOEMULSIFICATION OF CATARACT Left 2/7/2019    Procedure: PHACOEMULSIFICATION, CATARACT;  Surgeon: Demetrius Velazco MD;  Location: [facility] OR;  Service: Ophthalmology;  Laterality: Left;    PHACOEMULSIFICATION OF CATARACT Right 2/21/2019    Procedure: PHACOEMULSIFICATION, CATARACT;  Surgeon: Demetrius Velazco MD;  Location: [facility] OR;  Service: Ophthalmology;  Laterality: Right;  RN Phone Pre op 2-15-19.  Arrival 05:30 AM    TOTAL KNEE ARTHROPLASTY Bilateral Lt.= 2005; Rt.=2006    Bilateral Knee scope     Family History   Problem Relation Age of Onset    Heart attack Mother 88    Hypertension Mother     Hyperlipidemia Mother     Diabetes Other     Hypertension Other     Amblyopia Son     Blindness Neg Hx     Cataracts Neg Hx     Glaucoma Neg Hx     Macular degeneration Neg Hx     Retinal detachment Neg Hx     Strabismus Neg Hx      Social History     Tobacco Use    Smoking status: Never    Smokeless tobacco: Never   Substance Use Topics    Alcohol use: Not Currently    Drug use: No     Review of Systems   Constitutional:  Negative for chills, diaphoresis and fever.   HENT:  Negative for ear pain and sore throat.     Eyes:  Negative for pain.   Respiratory:  Negative for cough and shortness of breath.    Cardiovascular:  Negative for chest pain.   Gastrointestinal:  Negative for abdominal pain, diarrhea, nausea and vomiting.   Genitourinary:  Negative for dysuria.   Musculoskeletal:  Positive for arthralgias (L hip). Negative for back pain.        (-) Arm or leg trouble.    Skin:  Negative for rash.   Neurological:  Negative for headaches.   Psychiatric/Behavioral:  Negative for confusion.      Physical Exam     Initial Vitals [01/10/23 1913]   BP Pulse Resp Temp SpO2   112/68 80 16 -- 100 %      MAP       --         Physical Exam  The patient was examined specifically for the following:   General:No significant distress, Good color, Warm and dry. Head and neck:Scalp atraumatic, Neck supple. Neurological:Appropriate conversation, Gross motor deficits. Eyes:Conjugate gaze, Clear corneas. ENT: No epistaxis. Cardiac: Regular rate and rhythm, Grossly normal heart tones. Pulmonary: Wheezing, Rales. Gastrointestinal: Abdominal tenderness, Abdominal distention. Musculoskeletal: Extremity deformity, Apparent pain with range of motion of the joints. Skin: Rash.   The findings on examination were normal except for the following:  The scalp is atraumatic.  There is no spinal tenderness along its entire course.  Chest abdomen pelvis are nontender.  Extremities are nontender.  There is no pain with range of motion any joints.  Able to get this patient up.  She is walking.  Mental status examination, cranial nerves, motor and sensory examination are normal.  Lungs are clear.  The heart tones are normal.  The abdomen is nontender.  ED Course   Procedures  Labs Reviewed - No data to display       Imaging Results              X-Ray Hip 2 or 3 views Left (with Pelvis when performed) (Final result)  Result time 01/10/23 20:09:59      Final result by Po Brennan MD (01/10/23 20:09:59)                   Impression:      No acute displaced  fracture seen.      Electronically signed by: Po Brennan MD  Date:    01/10/2023  Time:    20:09               Narrative:    EXAMINATION:  XR HIP WITH PELVIS WHEN PERFORMED, 2 OR 3 VIEWS LEFT    CLINICAL HISTORY:  Pain in left hip    TECHNIQUE:  AP view of the pelvis and frog leg lateral view of the left hip were performed.    COMPARISON:  None    FINDINGS:  No evidence of acute displaced fracture, dislocation, or osseous destructive process.  Moderate degenerative changes and joint space narrowing are seen involving the bilateral hips.                                       Medications - No data to display  Medical Decision Making:   History:   Old Medical Records: I decided to obtain old medical records.  Clinical Tests:   Radiological Study: Ordered and Reviewed     Given the above, this patient reports a fall.  I spoke with her daughter who reports that she has a history of dementia.  She did not hit her head she landed on her butt she complained of left hip pain.  X-rays of the left hip and pelvis failed to reveal fractures here.  Patient also reports no pain at the end of her physical exam.  I could not find significant evidence of head trauma.  The spine was nontender along its entire course chest abdomen pelvis were unremarkable.  I could find no good evidence of extremity injury.  I was able to have this patient walk for me.     Scribe Attestation:   Scribe #1: I performed the above scribed service and the documentation accurately describes the services I performed. I attest to the accuracy of the note.                   Clinical Impression:   Final diagnoses:  [M25.552] Left hip pain  [S70.02XA] Contusion of left hip, initial encounter (Primary)  [W19.XXXA] Fall, initial encounter   I personally performed the services described in this documentation.  All medical record  entries made by the scribe are at my direction and in my presence.  Signed, Dr. Owen     ED Disposition Condition    Discharge  Stable          ED Prescriptions    None       Follow-up Information       Follow up With Specialties Details Why Contact Info    Qi Ramon MD Internal Medicine In 3 days  [address] [place] [address]  [id number]  [place] [address]  [phone]               Larry Owen MD  01/10/23 2031

## 2023-01-18 ENCOUNTER — OFFICE VISIT (OUTPATIENT)
Dept: CARDIOLOGY | Facility: CLINIC | Age: 88
End: 2023-01-18
Payer: MEDICARE

## 2023-01-18 VITALS
HEART RATE: 70 BPM | WEIGHT: 127.88 LBS | SYSTOLIC BLOOD PRESSURE: 108 MMHG | HEIGHT: 62 IN | DIASTOLIC BLOOD PRESSURE: 58 MMHG | OXYGEN SATURATION: 99 % | BODY MASS INDEX: 23.53 KG/M2 | RESPIRATION RATE: 15 BRPM

## 2023-01-18 DIAGNOSIS — R06.02 SOB (SHORTNESS OF BREATH): Primary | ICD-10-CM

## 2023-01-18 DIAGNOSIS — I10 ESSENTIAL HYPERTENSION: Chronic | ICD-10-CM

## 2023-01-18 DIAGNOSIS — Z95.810 ICD (IMPLANTABLE CARDIOVERTER-DEFIBRILLATOR), BIVENTRICULAR, IN SITU: Chronic | ICD-10-CM

## 2023-01-18 DIAGNOSIS — I50.43 ACUTE ON CHRONIC COMBINED SYSTOLIC AND DIASTOLIC CONGESTIVE HEART FAILURE: ICD-10-CM

## 2023-01-18 DIAGNOSIS — I25.10 CORONARY ARTERY DISEASE INVOLVING NATIVE CORONARY ARTERY OF NATIVE HEART WITHOUT ANGINA PECTORIS: ICD-10-CM

## 2023-01-18 DIAGNOSIS — E78.2 MIXED HYPERLIPIDEMIA: Chronic | ICD-10-CM

## 2023-01-18 PROCEDURE — 3288F FALL RISK ASSESSMENT DOCD: CPT | Mod: CPTII,S$GLB,, | Performed by: INTERNAL MEDICINE

## 2023-01-18 PROCEDURE — 99999 PR PBB SHADOW E&M-EST. PATIENT-LVL IV: CPT | Mod: PBBFAC,,, | Performed by: INTERNAL MEDICINE

## 2023-01-18 PROCEDURE — 1125F AMNT PAIN NOTED PAIN PRSNT: CPT | Mod: CPTII,S$GLB,, | Performed by: INTERNAL MEDICINE

## 2023-01-18 PROCEDURE — 1100F PTFALLS ASSESS-DOCD GE2>/YR: CPT | Mod: CPTII,S$GLB,, | Performed by: INTERNAL MEDICINE

## 2023-01-18 PROCEDURE — 1159F PR MEDICATION LIST DOCUMENTED IN MEDICAL RECORD: ICD-10-PCS | Mod: CPTII,S$GLB,, | Performed by: INTERNAL MEDICINE

## 2023-01-18 PROCEDURE — 1159F MED LIST DOCD IN RCRD: CPT | Mod: CPTII,S$GLB,, | Performed by: INTERNAL MEDICINE

## 2023-01-18 PROCEDURE — 99214 OFFICE O/P EST MOD 30 MIN: CPT | Mod: S$GLB,,, | Performed by: INTERNAL MEDICINE

## 2023-01-18 PROCEDURE — 99214 PR OFFICE/OUTPT VISIT, EST, LEVL IV, 30-39 MIN: ICD-10-PCS | Mod: S$GLB,,, | Performed by: INTERNAL MEDICINE

## 2023-01-18 PROCEDURE — 3288F PR FALLS RISK ASSESSMENT DOCUMENTED: ICD-10-PCS | Mod: CPTII,S$GLB,, | Performed by: INTERNAL MEDICINE

## 2023-01-18 PROCEDURE — 1125F PR PAIN SEVERITY QUANTIFIED, PAIN PRESENT: ICD-10-PCS | Mod: CPTII,S$GLB,, | Performed by: INTERNAL MEDICINE

## 2023-01-18 PROCEDURE — 99999 PR PBB SHADOW E&M-EST. PATIENT-LVL IV: ICD-10-PCS | Mod: PBBFAC,,, | Performed by: INTERNAL MEDICINE

## 2023-01-18 PROCEDURE — 1100F PR PT FALLS ASSESS DOC 2+ FALLS/FALL W/INJURY/YR: ICD-10-PCS | Mod: CPTII,S$GLB,, | Performed by: INTERNAL MEDICINE

## 2023-01-18 NOTE — PROGRESS NOTES
Subjective:    Patient ID:  Palmira Malave is a 87 y.o. female who presents for follow-up of Results      HPI    Systolic CHF, NICM, Medtronic BiV AICD, HTN, HLD     Medtronic AICD check 3/9/21 - High SVC coil impedance - SVC coil turned off - otherwise good device function     Previously followed by Dr Batres  Previous history:  Here for follow-up of systolic heart failure and biventricular ICD interrogation.  She denies any worsening cardiopulmonary complaints since we last saw her.  She's had no issues with her device.  She is expressed no worsening cardiopulmonary complaints.  She denies any chest pain, shortness breath or palpitations.  She's not expressing PND, orthopnea or lower edema.  She denies any dizziness, presyncope or syncope.  Otherwise she's better usual state of health.  He says she has a daughter the moved into scan early onset dementia she's having to take care of.  She tries to stay active by going to interactions with a female group.  She is seen by [facility] and joint clinic and does get injections.  He wants clearance for rehabilitation.     Here for follow-up of systolic heart failure and previous biventricular ICD placement.  She had her device interrogated which shows no significant issues.  She had recent admission at the beginning of the month for mild volume overload.  She was diuresed overnight and discharged home the next day.  Her EF is stable.  She denies any PND, orthopnea or lower Veronica.  She has not experiencing dizziness, presyncope or syncope.  She on questioning says she is compliant with medicines and diet.          catheter 2011 reported nonobstructive CAD     Echo 7/3/22  The left ventricle is moderately enlarged with eccentric hypertrophy and severely decreased systolic function.  The estimated ejection fraction is 20%.  Grade II left ventricular diastolic dysfunction.  Normal right ventricular size with normal right ventricular systolic function.  Severe left atrial  enlargement.  There is mild aortic valve stenosis.  Aortic valve area is 1.28 cm2; peak velocity is 1.85 m/s; mean gradient is 7 mmHg.  Moderate mitral regurgitation.  Mild tricuspid regurgitation.  Normal central venous pressure (3 mmHg).  The estimated PA systolic pressure is 19 mmHg.     Carotid ultrasound:  7-18  CONCLUSIONS   There is 40 - 49% right Internal Carotid stenosis.  There is 20 - 39% left Internal Carotid stenosis.        9/18/19 Denies recent CP or SOB  EKG A-sensed V-paced        6/22/20 Recently Dx with colon CA. Less SOB since discharge  Denies CP  Cleared for colon surgery at moderate cardiac risk  OK to hold plavix 7 days before  OV 3 months with Medtronic AICD check        10/2/20 Feels better since discharge. Stopped bidil - have her HA and made her feel bad - went back to coreg 25 bid   Continue Rx for CAD, HTN, CHF, HLD  OV 2 weeks with Medtronic AICD check     10/14/20 Breathing stable. Denies CP   Continue Rx for CHF, HTN, HLD, CAD  OV 3 months with BNP, BMP      11/3/20 Back early for worsening SOB. Denies CP  Unclear if she takes lasix as prescribed  Increase lasix 80 AM and 40 PM  OV 2 weeks with BNP, BMP  Needs to go to the ER for worsening symptoms        11/19/20 Less SOB. Denies CP     Continue Rx for CHF, HTN, HLD, CAD  OV 3 months with BNP, BMP      EKG 3/6/21 A-sensed V-paced  K 3.8, , Cr 2.6     3/8/21 patient reports she heard an alarm with the AICD and did not receive a shock  Denies CP. SOB improved  BP stable   Decrease lasix to 80 AM only with rising Cr  Medtronic AICD check tomorrow  Continue Rx for CHF, CAD, HLD, HTN     3/9/21 Denies CP or SOB   Continue Rx for CHF, CAD, HLD, HTN  OV 3 months      6/10/21 Less SOB since discharge. Trying to follow low sodium diet  Denies CP  BP in good range   Continue Rx for CHF, CAD, HLD, HTN  OV 1 month with BNP, BMP      7/14/21 Denies CP, mild EVANS about the same, denies LE edema - takes lasix 80 qd     BNP up but clinic al  volume status stable   Continue Rx for CHF, CAD, HLD, HTN  OV 3 month with BNP, BMP     8/12/21 had PND and increased SOB since last night - improved some after taking lasix 80 this AM. Denies CP or LE edema   Increase lasix 80 bid for 3 days due to CHF and volume overload then 80 qd  OV 2 weeks with BNP, BMP  Needs to go to the ER for worsening SOB     Continue Rx for CHF, CAD, HLD, HTN     12/1/21 Reports increased EVANS for the past few days - increased lasix 80 bid which helped  EKG A-sensed BiV paced   BNP, BMP today  Continue lasix 80 bid for now  OV with Medtronic AICD check       Continue Rx for CHF, CAD, HLD, HTN  Needs to go to the ER for worsening EVANS        12/8/21 EVANS improved. Denies CP. Still taking lasix 80 bid   BNP still elevated but symptomatically improved  OV 2 weeks with Medtronic AICD check     Continue Rx for CHF, CAD, HLD, HTN     Admitted 4/1/22  Ms. Malave is an 86 year old woman with history of chronic systolic/diastolic chf, AICD, coronary artery disease, hypertension and CKDIII who presented for evaluation of shortness of breath.  She states she felt fine when she went to bed last night, but around midnight she woke to use the bathroom and was more short of breath than normal at that time.  She notes eating white beans and rice with sausage at dinner, prior to going to sleep.  She denies chest pain, cough, fever, leg swelling, nausea, vomiting, diarrhea, headache, palpitations, AICD discharge, light headedness, dizziness, dysuria and hematuria.  She has been taking all her medications.  She reports taking lasix this morning with some improvement.  Vitals appear stable and she is in no distress.  She was given additional IV lasix in ER and continues to improve, but states she still doesn't quite feel her breathing is back to normal.  Cr 1.6, BNP 36992     4/11/22 EVANS has improved since discharge. Takes lasix 80 qd with a 2nd dose prn  AICD was checked during last admission  Denies CP       Continue Rx for CHF, CAD, HLD, HTN  OV 3 months with BNP, BMP  Next Medtronic AICD check 10/2022        5/12/22 Only taking lasix 80 qd - doesn't like 2nd dose because it keeps her up  Denies CP, mild EVANS  BP controlled   Add metolazone 2.5 MWF for volume overload  OV 2 weeks with BNP, BMP        Continue Rx for CHF, CAD, HLD, HTN     Admitted 7/2/22  Admitted to observation for acute on chronic systolic diastolic heart failure.  Patient improved after IV Lasix in ED.  Patient never required supplemental oxygen.  Again discuss low-salt diet, 1.2 L fluid intake and daily weights.  Repeat 2D echo showed EF of 20% similar to previous and grade 2 diastolic dysfunction.  Patient reports breathing back to baseline and ready for discharge.  Patient stable for discharge home with daughter.        7/11/22 Feels better since discharge  LE edema resolved  Taking lasix 80 bid   OV 1 month with BNP, BMP        Continue Rx for CHF, CAD, HLD, HTN     8/16/22 Labs not done. SOB improved. Denies LE edema  EKG A-sensed V-paced  BP controlled   BNP, BMP today - if stable then OV 3 months with BNP, BMP          Continue Rx for CHF, CAD, HLD, HTN     Admitted 10/9/22  Admission to observation for acute on chronic systolic heart failure.  EKG V paced.  Elevated BNP and troponin similar to previous labs most recent 2 days ago. One dose of IV lasix 60 mg given in ED with good UOP. SOB improved. Lung diminished at bases and BLE no edema. Breathing comfortable on RA 96%.  On discharge patient denies chest pain or shortness of breath. Review med list with Daughter Usama. Continue home lasix 80 mg daily and okay to take additional dose if gain 2-3 lbs in 2-3 days. 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 sCr 2.0 on discharge. Discussed importance of daily weight. Patient HDS for discharge home. Patient stable for discharge home with Daughter with close follow up Nephrologist  10/10 and Cardiologist 11/11. Ambulatory  referral [facility].     10/14/22 less SOB since discharge. LE edema resolved. Taking lasix 80 qd with a 2nd dose PRN  BP controlled   OV 1 month with BNP, BMP          Continue Rx for CHF, CAD, HLD, HTN     Admitted 12/3/22  Echo 7/2022 with EF of 20% and grade 2 DD. Presents with SOB, increasing lower extremity edema, and pulmonary edema on chest x-ray. BNP 4000. Soft BP's.  Will discontinue home carvedilol and Bidil, in order to make sure she has both beta-blocker and ACE inhibitor/Arb on board.  Will start metoprolol succinate 25 mg, and low-dose Entresto.  Creatinine improving on diuresis, sodium depressed, bicarb coming up, will continue. No UOP documented yet, will place pure wick for accurate measures. Continue diuresis, BNP 4550.         12/9/22 less SOB since discharge. Doesn't take entresto if BP runs low  Denies CP or SOB  BP controlled   OV 1 month with BNP, BMP          Continue Rx for CHF, CAD, HLD, HTN    Labs 12/20/22  K 4.2  Cr 2.1 up from 1.7  BNP 4680    Went to the ER 1/10/23 after fall    1/18/23 Stable EVANS. Denies LE edema  BP controlled      Review of Systems   Constitutional: Negative for decreased appetite.   HENT:  Negative for ear discharge.    Eyes:  Negative for blurred vision.   Respiratory:  Negative for hemoptysis.    Endocrine: Negative for polyphagia.   Hematologic/Lymphatic: Negative for adenopathy.   Skin:  Negative for color change.   Musculoskeletal:  Negative for joint swelling.   Genitourinary:  Negative for bladder incontinence.   Neurological:  Negative for brief paralysis.   Psychiatric/Behavioral:  Negative for hallucinations.    Allergic/Immunologic: Negative for hives.      Objective:    Physical Exam  Constitutional:       Appearance: She is well-developed.   HENT:      Head: Normocephalic and atraumatic.   Eyes:      Conjunctiva/sclera: Conjunctivae normal.      Pupils: Pupils are equal, round, and reactive to light.   Neck:      Thyroid: No thyromegaly.    Cardiovascular:      Rate and Rhythm: Normal rate and regular rhythm.      Heart sounds: No murmur heard.  Pulmonary:      Effort: Pulmonary effort is normal. No respiratory distress.      Breath sounds: Normal breath sounds.   Abdominal:      General: Bowel sounds are normal.      Palpations: Abdomen is soft.   Musculoskeletal:      Cervical back: Normal range of motion and neck supple.   Skin:     General: Skin is warm and dry.   Neurological:      Mental Status: She is alert and oriented to person, place, and time.   Psychiatric:         Behavior: Behavior normal.         Assessment:       1. SOB (shortness of breath)    2. Acute on chronic combined systolic and diastolic congestive heart failure    3. Biventricular AICD in place    4. Coronary artery disease involving native coronary artery of native heart without angina pectoris    5. Essential hypertension    6. Mixed hyperlipidemia         Plan:        OV 1 month with BNP, BMP          Continue Rx for CHF, CAD, HLD, HTN

## 2023-01-24 ENCOUNTER — DOCUMENT SCAN (OUTPATIENT)
Dept: HOME HEALTH SERVICES | Facility: HOSPITAL | Age: 88
End: 2023-01-24
Payer: MEDICARE

## 2023-01-30 ENCOUNTER — HOSPITAL ENCOUNTER (INPATIENT)
Facility: HOSPITAL | Age: 88
LOS: 5 days | Discharge: HOME HEALTH | DRG: 392 | End: 2023-02-04
Attending: EMERGENCY MEDICINE | Admitting: HOSPITALIST
Payer: MEDICARE

## 2023-01-30 DIAGNOSIS — K92.1 GASTROINTESTINAL HEMORRHAGE WITH MELENA: Primary | ICD-10-CM

## 2023-01-30 DIAGNOSIS — K92.2 LOWER GASTROINTESTINAL BLEED: ICD-10-CM

## 2023-01-30 DIAGNOSIS — D62 ACUTE BLOOD LOSS ANEMIA: ICD-10-CM

## 2023-01-30 DIAGNOSIS — R06.02 SHORTNESS OF BREATH: ICD-10-CM

## 2023-01-30 DIAGNOSIS — K92.2 GASTROINTESTINAL HEMORRHAGE, UNSPECIFIED GASTROINTESTINAL HEMORRHAGE TYPE: ICD-10-CM

## 2023-01-30 DIAGNOSIS — R10.9 ABDOMINAL PAIN: ICD-10-CM

## 2023-01-30 DIAGNOSIS — R07.9 CHEST PAIN: ICD-10-CM

## 2023-01-30 DIAGNOSIS — I95.9 HYPOTENSION: ICD-10-CM

## 2023-01-30 LAB
ABO + RH BLD: ABNORMAL
ALBUMIN SERPL BCP-MCNC: 3.6 G/DL (ref 3.5–5.2)
ALP SERPL-CCNC: 68 U/L (ref 55–135)
ALT SERPL W/O P-5'-P-CCNC: 9 U/L (ref 10–44)
ANION GAP SERPL CALC-SCNC: 10 MMOL/L (ref 8–16)
AST SERPL-CCNC: 18 U/L (ref 10–40)
BASOPHILS # BLD AUTO: 0.02 K/UL (ref 0–0.2)
BASOPHILS NFR BLD: 0.3 % (ref 0–1.9)
BILIRUB SERPL-MCNC: 0.7 MG/DL (ref 0.1–1)
BILIRUB UR QL STRIP: NEGATIVE
BLD GP AB SCN CELLS X3 SERPL QL: ABNORMAL
BLD PROD TYP BPU: NORMAL
BLOOD GROUP ANTIBODIES SERPL: NORMAL
BLOOD UNIT EXPIRATION DATE: NORMAL
BLOOD UNIT TYPE CODE: 9500
BLOOD UNIT TYPE: NORMAL
BNP SERPL-MCNC: >4900 PG/ML (ref 0–99)
BUN SERPL-MCNC: 82 MG/DL (ref 8–23)
CALCIUM SERPL-MCNC: 9.2 MG/DL (ref 8.7–10.5)
CHLORIDE SERPL-SCNC: 105 MMOL/L (ref 95–110)
CLARITY UR: CLEAR
CO2 SERPL-SCNC: 23 MMOL/L (ref 23–29)
CODING SYSTEM: NORMAL
COLOR UR: YELLOW
CREAT SERPL-MCNC: 2.2 MG/DL (ref 0.5–1.4)
DIFFERENTIAL METHOD: ABNORMAL
DISPENSE STATUS: NORMAL
EOSINOPHIL # BLD AUTO: 0.1 K/UL (ref 0–0.5)
EOSINOPHIL NFR BLD: 1.6 % (ref 0–8)
ERYTHROCYTE [DISTWIDTH] IN BLOOD BY AUTOMATED COUNT: 18.1 % (ref 11.5–14.5)
EST. GFR  (NO RACE VARIABLE): 21 ML/MIN/1.73 M^2
GLUCOSE SERPL-MCNC: 117 MG/DL (ref 70–110)
GLUCOSE UR QL STRIP: NEGATIVE
HCT VFR BLD AUTO: 22.3 % (ref 37–48.5)
HGB BLD-MCNC: 7.1 G/DL (ref 12–16)
HGB UR QL STRIP: NEGATIVE
IMM GRANULOCYTES # BLD AUTO: 0.03 K/UL (ref 0–0.04)
IMM GRANULOCYTES NFR BLD AUTO: 0.5 % (ref 0–0.5)
KETONES UR QL STRIP: NEGATIVE
LACTATE SERPL-SCNC: 0.8 MMOL/L (ref 0.5–2.2)
LEUKOCYTE ESTERASE UR QL STRIP: NEGATIVE
LIPASE SERPL-CCNC: 30 U/L (ref 4–60)
LYMPHOCYTES # BLD AUTO: 0.7 K/UL (ref 1–4.8)
LYMPHOCYTES NFR BLD: 11.6 % (ref 18–48)
MCH RBC QN AUTO: 31 PG (ref 27–31)
MCHC RBC AUTO-ENTMCNC: 31.8 G/DL (ref 32–36)
MCV RBC AUTO: 97 FL (ref 82–98)
MONOCYTES # BLD AUTO: 0.7 K/UL (ref 0.3–1)
MONOCYTES NFR BLD: 11.2 % (ref 4–15)
NEUTROPHILS # BLD AUTO: 4.3 K/UL (ref 1.8–7.7)
NEUTROPHILS NFR BLD: 74.8 % (ref 38–73)
NITRITE UR QL STRIP: NEGATIVE
NRBC BLD-RTO: 1 /100 WBC
PH UR STRIP: 5 [PH] (ref 5–8)
PLATELET # BLD AUTO: 155 K/UL (ref 150–450)
PLATELET BLD QL SMEAR: ABNORMAL
PMV BLD AUTO: 11 FL (ref 9.2–12.9)
POTASSIUM SERPL-SCNC: 4.1 MMOL/L (ref 3.5–5.1)
PROT SERPL-MCNC: 6.7 G/DL (ref 6–8.4)
PROT UR QL STRIP: NEGATIVE
RBC # BLD AUTO: 2.29 M/UL (ref 4–5.4)
SODIUM SERPL-SCNC: 138 MMOL/L (ref 136–145)
SP GR UR STRIP: 1.01 (ref 1–1.03)
TRANS ERYTHROCYTES VOL PATIENT: NORMAL ML
TROPONIN I SERPL DL<=0.01 NG/ML-MCNC: 0.14 NG/ML (ref 0–0.03)
URN SPEC COLLECT METH UR: NORMAL
UROBILINOGEN UR STRIP-ACNC: NEGATIVE EU/DL
WBC # BLD AUTO: 5.8 K/UL (ref 3.9–12.7)

## 2023-01-30 PROCEDURE — 12000002 HC ACUTE/MED SURGE SEMI-PRIVATE ROOM

## 2023-01-30 PROCEDURE — 96361 HYDRATE IV INFUSION ADD-ON: CPT

## 2023-01-30 PROCEDURE — 25000003 PHARM REV CODE 250: Performed by: EMERGENCY MEDICINE

## 2023-01-30 PROCEDURE — 96375 TX/PRO/DX INJ NEW DRUG ADDON: CPT

## 2023-01-30 PROCEDURE — 85025 COMPLETE CBC W/AUTO DIFF WBC: CPT | Performed by: EMERGENCY MEDICINE

## 2023-01-30 PROCEDURE — 86922 COMPATIBILITY TEST ANTIGLOB: CPT | Performed by: EMERGENCY MEDICINE

## 2023-01-30 PROCEDURE — 86870 RBC ANTIBODY IDENTIFICATION: CPT | Performed by: EMERGENCY MEDICINE

## 2023-01-30 PROCEDURE — 83690 ASSAY OF LIPASE: CPT | Performed by: EMERGENCY MEDICINE

## 2023-01-30 PROCEDURE — C9113 INJ PANTOPRAZOLE SODIUM, VIA: HCPCS | Performed by: EMERGENCY MEDICINE

## 2023-01-30 PROCEDURE — 93010 EKG 12-LEAD: ICD-10-PCS | Mod: ,,, | Performed by: INTERNAL MEDICINE

## 2023-01-30 PROCEDURE — 84484 ASSAY OF TROPONIN QUANT: CPT | Performed by: EMERGENCY MEDICINE

## 2023-01-30 PROCEDURE — 93005 ELECTROCARDIOGRAM TRACING: CPT

## 2023-01-30 PROCEDURE — 83880 ASSAY OF NATRIURETIC PEPTIDE: CPT | Performed by: EMERGENCY MEDICINE

## 2023-01-30 PROCEDURE — 99285 EMERGENCY DEPT VISIT HI MDM: CPT | Mod: 25

## 2023-01-30 PROCEDURE — 93010 ELECTROCARDIOGRAM REPORT: CPT | Mod: ,,, | Performed by: INTERNAL MEDICINE

## 2023-01-30 PROCEDURE — 36430 TRANSFUSION BLD/BLD COMPNT: CPT

## 2023-01-30 PROCEDURE — 63600175 PHARM REV CODE 636 W HCPCS: Mod: TB,JG | Performed by: EMERGENCY MEDICINE

## 2023-01-30 PROCEDURE — P9021 RED BLOOD CELLS UNIT: HCPCS | Performed by: EMERGENCY MEDICINE

## 2023-01-30 PROCEDURE — 81003 URINALYSIS AUTO W/O SCOPE: CPT | Performed by: EMERGENCY MEDICINE

## 2023-01-30 PROCEDURE — 80053 COMPREHEN METABOLIC PANEL: CPT | Performed by: EMERGENCY MEDICINE

## 2023-01-30 PROCEDURE — 96374 THER/PROPH/DIAG INJ IV PUSH: CPT

## 2023-01-30 PROCEDURE — 86900 BLOOD TYPING SEROLOGIC ABO: CPT | Performed by: EMERGENCY MEDICINE

## 2023-01-30 PROCEDURE — 83605 ASSAY OF LACTIC ACID: CPT | Performed by: EMERGENCY MEDICINE

## 2023-01-30 RX ORDER — LANOLIN ALCOHOL/MO/W.PET/CERES
800 CREAM (GRAM) TOPICAL
Status: DISCONTINUED | OUTPATIENT
Start: 2023-01-31 | End: 2023-02-04 | Stop reason: HOSPADM

## 2023-01-30 RX ORDER — SODIUM CHLORIDE 0.9 % (FLUSH) 0.9 %
10 SYRINGE (ML) INJECTION EVERY 12 HOURS PRN
Status: DISCONTINUED | OUTPATIENT
Start: 2023-01-31 | End: 2023-02-04 | Stop reason: HOSPADM

## 2023-01-30 RX ORDER — GLUCAGON 1 MG
1 KIT INJECTION
Status: DISCONTINUED | OUTPATIENT
Start: 2023-01-31 | End: 2023-02-04 | Stop reason: HOSPADM

## 2023-01-30 RX ORDER — IBUPROFEN 200 MG
24 TABLET ORAL
Status: DISCONTINUED | OUTPATIENT
Start: 2023-01-31 | End: 2023-02-04 | Stop reason: HOSPADM

## 2023-01-30 RX ORDER — METOPROLOL SUCCINATE 25 MG/1
25 TABLET, EXTENDED RELEASE ORAL DAILY
Status: DISCONTINUED | OUTPATIENT
Start: 2023-01-31 | End: 2023-01-31

## 2023-01-30 RX ORDER — IPRATROPIUM BROMIDE AND ALBUTEROL SULFATE 2.5; .5 MG/3ML; MG/3ML
3 SOLUTION RESPIRATORY (INHALATION) EVERY 4 HOURS PRN
Status: DISCONTINUED | OUTPATIENT
Start: 2023-01-31 | End: 2023-02-04 | Stop reason: HOSPADM

## 2023-01-30 RX ORDER — MORPHINE SULFATE 4 MG/ML
4 INJECTION, SOLUTION INTRAMUSCULAR; INTRAVENOUS
Status: COMPLETED | OUTPATIENT
Start: 2023-01-30 | End: 2023-01-30

## 2023-01-30 RX ORDER — TALC
6 POWDER (GRAM) TOPICAL NIGHTLY PRN
Status: DISCONTINUED | OUTPATIENT
Start: 2023-01-31 | End: 2023-02-04 | Stop reason: HOSPADM

## 2023-01-30 RX ORDER — ALLOPURINOL 100 MG/1
100 TABLET ORAL NIGHTLY
Status: DISCONTINUED | OUTPATIENT
Start: 2023-01-31 | End: 2023-02-04 | Stop reason: HOSPADM

## 2023-01-30 RX ORDER — PANTOPRAZOLE SODIUM 40 MG/10ML
80 INJECTION, POWDER, LYOPHILIZED, FOR SOLUTION INTRAVENOUS
Status: COMPLETED | OUTPATIENT
Start: 2023-01-30 | End: 2023-01-30

## 2023-01-30 RX ORDER — ACETAMINOPHEN 325 MG/1
650 TABLET ORAL EVERY 8 HOURS PRN
Status: DISCONTINUED | OUTPATIENT
Start: 2023-01-31 | End: 2023-02-04 | Stop reason: HOSPADM

## 2023-01-30 RX ORDER — PANTOPRAZOLE SODIUM 40 MG/10ML
40 INJECTION, POWDER, LYOPHILIZED, FOR SOLUTION INTRAVENOUS 2 TIMES DAILY
Status: DISCONTINUED | OUTPATIENT
Start: 2023-01-31 | End: 2023-02-01

## 2023-01-30 RX ORDER — PROCHLORPERAZINE EDISYLATE 5 MG/ML
5 INJECTION INTRAMUSCULAR; INTRAVENOUS EVERY 6 HOURS PRN
Status: DISCONTINUED | OUTPATIENT
Start: 2023-01-31 | End: 2023-02-04 | Stop reason: HOSPADM

## 2023-01-30 RX ORDER — HYDROCODONE BITARTRATE AND ACETAMINOPHEN 500; 5 MG/1; MG/1
TABLET ORAL
Status: DISCONTINUED | OUTPATIENT
Start: 2023-01-30 | End: 2023-01-31

## 2023-01-30 RX ORDER — ACETAMINOPHEN 325 MG/1
650 TABLET ORAL EVERY 4 HOURS PRN
Status: DISCONTINUED | OUTPATIENT
Start: 2023-01-31 | End: 2023-02-04 | Stop reason: HOSPADM

## 2023-01-30 RX ORDER — SIMETHICONE 80 MG
1 TABLET,CHEWABLE ORAL 4 TIMES DAILY PRN
Status: DISCONTINUED | OUTPATIENT
Start: 2023-01-31 | End: 2023-02-04 | Stop reason: HOSPADM

## 2023-01-30 RX ORDER — OXYBUTYNIN CHLORIDE 5 MG/1
5 TABLET, EXTENDED RELEASE ORAL DAILY
Status: DISCONTINUED | OUTPATIENT
Start: 2023-01-31 | End: 2023-02-04 | Stop reason: HOSPADM

## 2023-01-30 RX ORDER — NALOXONE HCL 0.4 MG/ML
0.02 VIAL (ML) INJECTION
Status: DISCONTINUED | OUTPATIENT
Start: 2023-01-31 | End: 2023-02-04 | Stop reason: HOSPADM

## 2023-01-30 RX ORDER — MAG HYDROX/ALUMINUM HYD/SIMETH 200-200-20
30 SUSPENSION, ORAL (FINAL DOSE FORM) ORAL 4 TIMES DAILY PRN
Status: DISCONTINUED | OUTPATIENT
Start: 2023-01-31 | End: 2023-02-04 | Stop reason: HOSPADM

## 2023-01-30 RX ORDER — SODIUM,POTASSIUM PHOSPHATES 280-250MG
2 POWDER IN PACKET (EA) ORAL
Status: DISCONTINUED | OUTPATIENT
Start: 2023-01-31 | End: 2023-02-04 | Stop reason: HOSPADM

## 2023-01-30 RX ORDER — ONDANSETRON 2 MG/ML
4 INJECTION INTRAMUSCULAR; INTRAVENOUS
Status: COMPLETED | OUTPATIENT
Start: 2023-01-30 | End: 2023-01-30

## 2023-01-30 RX ORDER — IBUPROFEN 200 MG
16 TABLET ORAL
Status: DISCONTINUED | OUTPATIENT
Start: 2023-01-31 | End: 2023-02-04 | Stop reason: HOSPADM

## 2023-01-30 RX ORDER — ATORVASTATIN CALCIUM 10 MG/1
20 TABLET, FILM COATED ORAL DAILY
Status: DISCONTINUED | OUTPATIENT
Start: 2023-01-31 | End: 2023-02-04 | Stop reason: HOSPADM

## 2023-01-30 RX ORDER — ONDANSETRON 2 MG/ML
4 INJECTION INTRAMUSCULAR; INTRAVENOUS EVERY 8 HOURS PRN
Status: DISCONTINUED | OUTPATIENT
Start: 2023-01-31 | End: 2023-02-04 | Stop reason: HOSPADM

## 2023-01-30 RX ADMIN — SODIUM CHLORIDE 500 ML: 9 INJECTION, SOLUTION INTRAVENOUS at 07:01

## 2023-01-30 RX ADMIN — MORPHINE SULFATE 4 MG: 4 INJECTION, SOLUTION INTRAMUSCULAR; INTRAVENOUS at 07:01

## 2023-01-30 RX ADMIN — SODIUM CHLORIDE 500 ML: 9 INJECTION, SOLUTION INTRAVENOUS at 09:01

## 2023-01-30 RX ADMIN — ONDANSETRON 4 MG: 2 INJECTION INTRAMUSCULAR; INTRAVENOUS at 07:01

## 2023-01-30 RX ADMIN — PANTOPRAZOLE SODIUM 80 MG: 40 INJECTION, POWDER, FOR SOLUTION INTRAVENOUS at 10:01

## 2023-01-30 NOTE — FIRST PROVIDER EVALUATION
"Medical screening examination initiated.  I have conducted a focused provider triage encounter, findings are as follows:    Brief history of present illness:  Patient with weakness and black stools.  Called by her primary provider and told to come to the ER due to an acute drop in her hemoglobin.  She was told her hemoglobin was 6.  Patient reports she is having some mid abdominal and epigastric pain.  History obtained from family member.  She reports the patient is often sleepy but perhaps has been more fatigued lately.    Vitals:    01/30/23 1733   BP: (!) 100/54   Pulse: 74   Resp: 17   Temp: 98.1 °F (36.7 °C)   TempSrc: Oral   SpO2: 100%   Weight: 58.1 kg (128 lb)   Height: 5' 2" (1.575 m)       Pertinent physical exam:  Sleepy but protecting her airway.  Pale.  Unlabored respirations.    Brief workup plan:  I have ordered CBC, CMP and type and screen.    Preliminary workup initiated; this workup will be continued and followed by the physician or advanced practice provider that is assigned to the patient when roomed.  "

## 2023-01-31 ENCOUNTER — ANESTHESIA (OUTPATIENT)
Dept: ENDOSCOPY | Facility: HOSPITAL | Age: 88
DRG: 392 | End: 2023-01-31
Payer: MEDICARE

## 2023-01-31 ENCOUNTER — ANESTHESIA EVENT (OUTPATIENT)
Dept: ENDOSCOPY | Facility: HOSPITAL | Age: 88
DRG: 392 | End: 2023-01-31
Payer: MEDICARE

## 2023-01-31 PROBLEM — D62 ACUTE BLOOD LOSS ANEMIA: Status: ACTIVE | Noted: 2023-01-31

## 2023-01-31 LAB
ANION GAP SERPL CALC-SCNC: 12 MMOL/L (ref 8–16)
ANISOCYTOSIS BLD QL SMEAR: SLIGHT
BASOPHILS # BLD AUTO: 0.02 K/UL (ref 0–0.2)
BASOPHILS # BLD AUTO: 0.02 K/UL (ref 0–0.2)
BASOPHILS # BLD AUTO: 0.03 K/UL (ref 0–0.2)
BASOPHILS # BLD AUTO: 0.04 K/UL (ref 0–0.2)
BASOPHILS NFR BLD: 0.3 % (ref 0–1.9)
BASOPHILS NFR BLD: 0.4 % (ref 0–1.9)
BASOPHILS NFR BLD: 0.6 % (ref 0–1.9)
BASOPHILS NFR BLD: 0.6 % (ref 0–1.9)
BUN SERPL-MCNC: 70 MG/DL (ref 8–23)
CALCIUM SERPL-MCNC: 8.9 MG/DL (ref 8.7–10.5)
CHLORIDE SERPL-SCNC: 108 MMOL/L (ref 95–110)
CO2 SERPL-SCNC: 24 MMOL/L (ref 23–29)
CREAT SERPL-MCNC: 1.9 MG/DL (ref 0.5–1.4)
DIFFERENTIAL METHOD: ABNORMAL
EOSINOPHIL # BLD AUTO: 0.1 K/UL (ref 0–0.5)
EOSINOPHIL NFR BLD: 1.5 % (ref 0–8)
EOSINOPHIL NFR BLD: 1.8 % (ref 0–8)
EOSINOPHIL NFR BLD: 2.1 % (ref 0–8)
EOSINOPHIL NFR BLD: 2.7 % (ref 0–8)
ERYTHROCYTE [DISTWIDTH] IN BLOOD BY AUTOMATED COUNT: 18.7 % (ref 11.5–14.5)
ERYTHROCYTE [DISTWIDTH] IN BLOOD BY AUTOMATED COUNT: 18.8 % (ref 11.5–14.5)
ERYTHROCYTE [DISTWIDTH] IN BLOOD BY AUTOMATED COUNT: 19 % (ref 11.5–14.5)
ERYTHROCYTE [DISTWIDTH] IN BLOOD BY AUTOMATED COUNT: 19.1 % (ref 11.5–14.5)
EST. GFR  (NO RACE VARIABLE): 25 ML/MIN/1.73 M^2
GLUCOSE SERPL-MCNC: 87 MG/DL (ref 70–110)
HCT VFR BLD AUTO: 22.2 % (ref 37–48.5)
HCT VFR BLD AUTO: 22.4 % (ref 37–48.5)
HCT VFR BLD AUTO: 23.8 % (ref 37–48.5)
HCT VFR BLD AUTO: 23.8 % (ref 37–48.5)
HGB BLD-MCNC: 6.9 G/DL (ref 12–16)
HGB BLD-MCNC: 7.1 G/DL (ref 12–16)
HGB BLD-MCNC: 7.4 G/DL (ref 12–16)
HGB BLD-MCNC: 7.5 G/DL (ref 12–16)
HYPOCHROMIA BLD QL SMEAR: ABNORMAL
IMM GRANULOCYTES # BLD AUTO: 0.02 K/UL (ref 0–0.04)
IMM GRANULOCYTES # BLD AUTO: 0.03 K/UL (ref 0–0.04)
IMM GRANULOCYTES # BLD AUTO: 0.03 K/UL (ref 0–0.04)
IMM GRANULOCYTES # BLD AUTO: 0.05 K/UL (ref 0–0.04)
IMM GRANULOCYTES NFR BLD AUTO: 0.4 % (ref 0–0.5)
IMM GRANULOCYTES NFR BLD AUTO: 0.4 % (ref 0–0.5)
IMM GRANULOCYTES NFR BLD AUTO: 0.5 % (ref 0–0.5)
IMM GRANULOCYTES NFR BLD AUTO: 0.9 % (ref 0–0.5)
LYMPHOCYTES # BLD AUTO: 0.7 K/UL (ref 1–4.8)
LYMPHOCYTES # BLD AUTO: 0.7 K/UL (ref 1–4.8)
LYMPHOCYTES # BLD AUTO: 0.8 K/UL (ref 1–4.8)
LYMPHOCYTES # BLD AUTO: 0.9 K/UL (ref 1–4.8)
LYMPHOCYTES NFR BLD: 11.2 % (ref 18–48)
LYMPHOCYTES NFR BLD: 11.3 % (ref 18–48)
LYMPHOCYTES NFR BLD: 13.1 % (ref 18–48)
LYMPHOCYTES NFR BLD: 17.1 % (ref 18–48)
MCH RBC QN AUTO: 30.6 PG (ref 27–31)
MCH RBC QN AUTO: 30.6 PG (ref 27–31)
MCH RBC QN AUTO: 30.9 PG (ref 27–31)
MCH RBC QN AUTO: 30.9 PG (ref 27–31)
MCHC RBC AUTO-ENTMCNC: 31.1 G/DL (ref 32–36)
MCHC RBC AUTO-ENTMCNC: 31.1 G/DL (ref 32–36)
MCHC RBC AUTO-ENTMCNC: 31.5 G/DL (ref 32–36)
MCHC RBC AUTO-ENTMCNC: 31.7 G/DL (ref 32–36)
MCV RBC AUTO: 100 FL (ref 82–98)
MCV RBC AUTO: 97 FL (ref 82–98)
MCV RBC AUTO: 97 FL (ref 82–98)
MCV RBC AUTO: 98 FL (ref 82–98)
MONOCYTES # BLD AUTO: 0.5 K/UL (ref 0.3–1)
MONOCYTES # BLD AUTO: 0.6 K/UL (ref 0.3–1)
MONOCYTES # BLD AUTO: 0.7 K/UL (ref 0.3–1)
MONOCYTES # BLD AUTO: 0.7 K/UL (ref 0.3–1)
MONOCYTES NFR BLD: 10.4 % (ref 4–15)
MONOCYTES NFR BLD: 13.8 % (ref 4–15)
MONOCYTES NFR BLD: 9.3 % (ref 4–15)
MONOCYTES NFR BLD: 9.8 % (ref 4–15)
NEUTROPHILS # BLD AUTO: 3.4 K/UL (ref 1.8–7.7)
NEUTROPHILS # BLD AUTO: 3.9 K/UL (ref 1.8–7.7)
NEUTROPHILS # BLD AUTO: 4.6 K/UL (ref 1.8–7.7)
NEUTROPHILS # BLD AUTO: 5.2 K/UL (ref 1.8–7.7)
NEUTROPHILS NFR BLD: 65.4 % (ref 38–73)
NEUTROPHILS NFR BLD: 74.2 % (ref 38–73)
NEUTROPHILS NFR BLD: 76.1 % (ref 38–73)
NEUTROPHILS NFR BLD: 76.1 % (ref 38–73)
NRBC BLD-RTO: 0 /100 WBC
PLATELET # BLD AUTO: 124 K/UL (ref 150–450)
PLATELET # BLD AUTO: 126 K/UL (ref 150–450)
PLATELET # BLD AUTO: 135 K/UL (ref 150–450)
PLATELET # BLD AUTO: 137 K/UL (ref 150–450)
PLATELET BLD QL SMEAR: ABNORMAL
PMV BLD AUTO: 10.1 FL (ref 9.2–12.9)
PMV BLD AUTO: 10.3 FL (ref 9.2–12.9)
PMV BLD AUTO: 10.7 FL (ref 9.2–12.9)
PMV BLD AUTO: 9.9 FL (ref 9.2–12.9)
POTASSIUM SERPL-SCNC: 4 MMOL/L (ref 3.5–5.1)
RBC # BLD AUTO: 2.23 M/UL (ref 4–5.4)
RBC # BLD AUTO: 2.3 M/UL (ref 4–5.4)
RBC # BLD AUTO: 2.42 M/UL (ref 4–5.4)
RBC # BLD AUTO: 2.45 M/UL (ref 4–5.4)
SODIUM SERPL-SCNC: 144 MMOL/L (ref 136–145)
WBC # BLD AUTO: 5.15 K/UL (ref 3.9–12.7)
WBC # BLD AUTO: 5.27 K/UL (ref 3.9–12.7)
WBC # BLD AUTO: 6.08 K/UL (ref 3.9–12.7)
WBC # BLD AUTO: 6.82 K/UL (ref 3.9–12.7)

## 2023-01-31 PROCEDURE — 85025 COMPLETE CBC W/AUTO DIFF WBC: CPT | Mod: 91 | Performed by: NURSE PRACTITIONER

## 2023-01-31 PROCEDURE — 85025 COMPLETE CBC W/AUTO DIFF WBC: CPT | Mod: 91 | Performed by: STUDENT IN AN ORGANIZED HEALTH CARE EDUCATION/TRAINING PROGRAM

## 2023-01-31 PROCEDURE — 11000001 HC ACUTE MED/SURG PRIVATE ROOM

## 2023-01-31 PROCEDURE — C9113 INJ PANTOPRAZOLE SODIUM, VIA: HCPCS | Performed by: STUDENT IN AN ORGANIZED HEALTH CARE EDUCATION/TRAINING PROGRAM

## 2023-01-31 PROCEDURE — 99223 PR INITIAL HOSPITAL CARE,LEVL III: ICD-10-PCS | Mod: 25,,, | Performed by: NURSE PRACTITIONER

## 2023-01-31 PROCEDURE — 63600175 PHARM REV CODE 636 W HCPCS: Performed by: STUDENT IN AN ORGANIZED HEALTH CARE EDUCATION/TRAINING PROGRAM

## 2023-01-31 PROCEDURE — 25000003 PHARM REV CODE 250: Performed by: STUDENT IN AN ORGANIZED HEALTH CARE EDUCATION/TRAINING PROGRAM

## 2023-01-31 PROCEDURE — 43235 EGD DIAGNOSTIC BRUSH WASH: CPT | Performed by: INTERNAL MEDICINE

## 2023-01-31 PROCEDURE — 37000009 HC ANESTHESIA EA ADD 15 MINS: Performed by: INTERNAL MEDICINE

## 2023-01-31 PROCEDURE — 43235 PR EGD, FLEX, DIAGNOSTIC: ICD-10-PCS | Mod: ,,, | Performed by: INTERNAL MEDICINE

## 2023-01-31 PROCEDURE — 99223 1ST HOSP IP/OBS HIGH 75: CPT | Mod: 25,,, | Performed by: NURSE PRACTITIONER

## 2023-01-31 PROCEDURE — 43235 EGD DIAGNOSTIC BRUSH WASH: CPT | Mod: ,,, | Performed by: INTERNAL MEDICINE

## 2023-01-31 PROCEDURE — D9220A PRA ANESTHESIA: ICD-10-PCS | Mod: CRNA,,, | Performed by: NURSE ANESTHETIST, CERTIFIED REGISTERED

## 2023-01-31 PROCEDURE — D9220A PRA ANESTHESIA: ICD-10-PCS | Mod: ANES,,, | Performed by: ANESTHESIOLOGY

## 2023-01-31 PROCEDURE — 36415 COLL VENOUS BLD VENIPUNCTURE: CPT | Performed by: STUDENT IN AN ORGANIZED HEALTH CARE EDUCATION/TRAINING PROGRAM

## 2023-01-31 PROCEDURE — 36415 COLL VENOUS BLD VENIPUNCTURE: CPT | Performed by: NURSE PRACTITIONER

## 2023-01-31 PROCEDURE — D9220A PRA ANESTHESIA: Mod: CRNA,,, | Performed by: NURSE ANESTHETIST, CERTIFIED REGISTERED

## 2023-01-31 PROCEDURE — 85025 COMPLETE CBC W/AUTO DIFF WBC: CPT | Performed by: STUDENT IN AN ORGANIZED HEALTH CARE EDUCATION/TRAINING PROGRAM

## 2023-01-31 PROCEDURE — 80048 BASIC METABOLIC PNL TOTAL CA: CPT | Performed by: STUDENT IN AN ORGANIZED HEALTH CARE EDUCATION/TRAINING PROGRAM

## 2023-01-31 PROCEDURE — 37000008 HC ANESTHESIA 1ST 15 MINUTES: Performed by: INTERNAL MEDICINE

## 2023-01-31 PROCEDURE — D9220A PRA ANESTHESIA: Mod: ANES,,, | Performed by: ANESTHESIOLOGY

## 2023-01-31 PROCEDURE — 25000003 PHARM REV CODE 250: Performed by: NURSE ANESTHETIST, CERTIFIED REGISTERED

## 2023-01-31 RX ORDER — LIDOCAINE HYDROCHLORIDE 20 MG/ML
INJECTION, SOLUTION EPIDURAL; INFILTRATION; INTRACAUDAL; PERINEURAL
Status: DISPENSED
Start: 2023-01-31 | End: 2023-02-01

## 2023-01-31 RX ORDER — ETOMIDATE 2 MG/ML
INJECTION INTRAVENOUS
Status: DISCONTINUED | OUTPATIENT
Start: 2023-01-31 | End: 2023-01-31

## 2023-01-31 RX ORDER — LIDOCAINE HYDROCHLORIDE 20 MG/ML
INJECTION INTRAVENOUS
Status: DISCONTINUED | OUTPATIENT
Start: 2023-01-31 | End: 2023-01-31

## 2023-01-31 RX ORDER — TRAMADOL HYDROCHLORIDE 50 MG/1
1 TABLET ORAL
COMMUNITY
End: 2023-02-04

## 2023-01-31 RX ORDER — ETOMIDATE 2 MG/ML
INJECTION INTRAVENOUS
Status: DISPENSED
Start: 2023-01-31 | End: 2023-02-01

## 2023-01-31 RX ADMIN — ETOMIDATE 5 MG: 2 INJECTION, SOLUTION INTRAVENOUS at 01:01

## 2023-01-31 RX ADMIN — PANTOPRAZOLE SODIUM 40 MG: 40 INJECTION, POWDER, FOR SOLUTION INTRAVENOUS at 08:01

## 2023-01-31 RX ADMIN — ETOMIDATE 2 MG: 2 INJECTION, SOLUTION INTRAVENOUS at 01:01

## 2023-01-31 RX ADMIN — BENZOCAINE, BUTAMBEN, AND TETRACAINE HYDROCHLORIDE 1 SPRAY: .028; .004; .004 AEROSOL, SPRAY TOPICAL at 12:01

## 2023-01-31 RX ADMIN — PANTOPRAZOLE SODIUM 40 MG: 40 INJECTION, POWDER, FOR SOLUTION INTRAVENOUS at 10:01

## 2023-01-31 RX ADMIN — ACETAMINOPHEN 650 MG: 325 TABLET ORAL at 07:01

## 2023-01-31 RX ADMIN — ALLOPURINOL 100 MG: 100 TABLET ORAL at 08:01

## 2023-01-31 RX ADMIN — SODIUM CHLORIDE: 0.9 INJECTION, SOLUTION INTRAVENOUS at 12:01

## 2023-01-31 RX ADMIN — ATORVASTATIN CALCIUM 20 MG: 10 TABLET, FILM COATED ORAL at 10:01

## 2023-01-31 RX ADMIN — OXYBUTYNIN CHLORIDE 5 MG: 5 TABLET, EXTENDED RELEASE ORAL at 10:01

## 2023-01-31 RX ADMIN — LIDOCAINE HYDROCHLORIDE 50 MG: 20 INJECTION, SOLUTION INTRAVENOUS at 01:01

## 2023-01-31 NOTE — PROVATION PATIENT INSTRUCTIONS
Discharge Summary/Instructions after an Endoscopic Procedure  Patient Name: Palmira Malave  Patient MRN: [id number]  Patient YOB: 1935 Tuesday, January 31, 2023  Jordon Sotomayor MD  Dear patient,  As a result of recent federal legislation (The Federal Cures Act), you may   receive lab or pathology results from your procedure in your [facility]   account before your physician is able to contact you. Your physician or   their representative will relay the results to you with their   recommendations at their soonest availability.  Thank you,  RESTRICTIONS:  During your procedure today, you received medications for sedation.  These   medications may affect your judgment, balance and coordination.  Therefore,   for 24 hours, you have the following restrictions:   - DO NOT drive a car, operate machinery, make legal/financial decisions,   sign important papers or drink alcohol.    ACTIVITY:  Today: no heavy lifting, straining or running due to procedural   sedation/anesthesia.  The following day: return to full activity including work.  DIET:  Eat and drink normally unless instructed otherwise.     TREATMENT FOR COMMON SIDE EFFECTS:  - Mild abdominal pain, nausea, belching, bloating or excessive gas:  rest,   eat lightly and use a heating pad.  - Sore Throat: treat with throat lozenges and/or gargle with warm salt   water.  - Because air was used during the procedure, expelling large amounts of air   from your rectum or belching is normal.  - If a bowel prep was taken, you may not have a bowel movement for 1-3 days.    This is normal.  SYMPTOMS TO WATCH FOR AND REPORT TO YOUR PHYSICIAN:  1. Abdominal pain or bloating, other than gas cramps.  2. Chest pain.  3. Back pain.  4. Signs of infection such as: chills or fever occurring within 24 hours   after the procedure.  5. Rectal bleeding, which would show as bright red, maroon, or black stools.   (A tablespoon of blood from the rectum is not serious, especially  if   hemorrhoids are present.)  6. Vomiting.  7. Weakness or dizziness.  GO DIRECTLY TO THE NEAREST EMERGENCY ROOM IF YOU HAVE ANY OF THE FOLLOWING:      Difficulty breathing              Chills and/or fever over 101 F   Persistent vomiting and/or vomiting blood   Severe abdominal pain   Severe chest pain   Black, tarry stools   Bleeding- more than one tablespoon   Any other symptom or condition that you feel may need urgent attention  Your doctor recommends these additional instructions:  If any biopsies were taken, your doctors clinic will contact you in 1 to 2   weeks with any results.  - Return patient to hospital [facility] for ongoing care.   - Resume previous diet.   - Continue present medications.   - Observe patient's clinical course.  - Discuss risks/benefits of continued use of DAPT  - Consider colonoscopy and VCE if persistant anemia is found.  For questions, problems or results please call your physician - Jordon Sotomayor MD at Work:  [phone].  [facility] can be reached at [phone]     IF A COMPLICATION OR EMERGENCY SITUATION ARISES AND YOU ARE UNABLE TO REACH   YOUR PHYSICIAN - GO DIRECTLY TO THE EMERGENCY ROOM.  Jordon Sotomayor MD  1/31/2023 5:10:00 PM  This report has been verified and signed electronically.  Dear patient,  As a result of recent federal legislation (The Federal Cures Act), you may   receive lab or pathology results from your procedure in your [facility]   account before your physician is able to contact you. Your physician or   their representative will relay the results to you with their   recommendations at their soonest availability.  Thank you,  [employer]ATION

## 2023-01-31 NOTE — ASSESSMENT & PLAN NOTE
- Patient presented with melanotic stools  - Risk factors: on aspirin, plavix.   - Baseline Hgb: 10-11  - Hgb on admission: 7.1. Was 6.4 on 01/30 on outpatient labs  - Etiology: likely upper GI bleed    Plan:  - Keep NPO  - Monitor for active bleeding  - Protonix IV 40mg q12hr  - Transfuse to keep hemoglobin > 7   - Consult to GI: plan for EGD on 01/31

## 2023-01-31 NOTE — ASSESSMENT & PLAN NOTE
- Patient presented with melanotic stools  - Risk factors: on aspirin, plavix.   - Baseline Hgb: 10-11  - Hgb on admission: 7.1  - Etiology: likely upper GI bleed    Plan:  - Keep NPO  - Monitor for active bleeding  - Protonix IV 40mg q12hr  - Transfuse to keep hemoglobin > 7   - Consult to GI

## 2023-01-31 NOTE — NURSING
Returned to unit via stretcher. No sign of pain External catheter reapplied after incontinence care.

## 2023-01-31 NOTE — TRANSFER OF CARE
"Anesthesia Transfer of Care Note    Patient: Palmira Malave    Procedure(s) Performed: Procedure(s) (LRB):  EGD (ESOPHAGOGASTRODUODENOSCOPY) (N/A)    Patient location: GI    Anesthesia Type: MAC    Transport from OR: Transported from OR on 2-3 L/min O2 by NC with adequate spontaneous ventilation    Post pain: adequate analgesia    Post assessment: no apparent anesthetic complications and tolerated procedure well    Post vital signs: stable    Level of consciousness: lethargic and responds to stimulation    Nausea/Vomiting: no nausea/vomiting    Complications: none    Transfer of care protocol was followed      Last vitals:   Visit Vitals  BP (!) 119/58 (BP Location: Left arm, Patient Position: Lying)   Pulse 71   Temp 36.2 °C (97.2 °F) (Axillary)   Resp 19   Ht 5' 3" (1.6 m)   Wt 56.8 kg (125 lb 3.5 oz)   SpO2 100%   Breastfeeding No   BMI 22.18 kg/m²     "

## 2023-01-31 NOTE — ANESTHESIA PREPROCEDURE EVALUATION
"                                                                                                             01/31/2023  Palmira Malave is a 87 y.o., female.      Pre-op Assessment    I have reviewed the Patient Summary Reports.     I have reviewed the Nursing Notes.       Review of Systems  Anesthesia Hx:  No problems with previous Anesthesia  Denies Family Hx of Anesthesia complications.   Denies Personal Hx of Anesthesia complications.   Social:  Non-Smoker, No Alcohol Use    Hematology/Oncology:         -- Anemia: Hematology Comments: S/p 1u PRBC 1/31; Hgb 7.5    Plavix-last dose 1/30   Cardiovascular:   Exercise tolerance: poor Pacemaker Hypertension Valvular problems/Murmurs, AS CAD   CHF 7/3/2022 Echo: EF 20%; grade 2 diastolic dysfunction; mild AS (PAUL 1.28cm3)   Pulmonary:   Shortness of breath Pulmonary edema; BNP >4,900   Renal/:   Chronic Renal Disease, CKD    Hepatic/GI:   GERD No recent N/V   Neurological:   "memory problems" in the last 6 months per daughter Dementia    Endocrine:  Endocrine Normal        Physical Exam  General: Cooperative  Frail; only oriented to self  Airway:  Mallampati: II   Mouth Opening: Normal  TM Distance: 4 - 6 cm  Tongue: Normal    Dental:    Chest/Lungs:  Normal Respiratory Rate    Heart:  Rate: Normal      Wt Readings from Last 3 Encounters:   01/31/23 56.8 kg (125 lb 3.5 oz)   01/18/23 58 kg (127 lb 13.9 oz)   01/10/23 58.1 kg (128 lb 1.4 oz)     Temp Readings from Last 3 Encounters:   01/31/23 36.6 °C (97.9 °F) (Oral)   01/10/23 36.7 °C (98 °F)   01/09/23 36.8 °C (98.3 °F)     BP Readings from Last 3 Encounters:   01/31/23 (!) 108/58   01/18/23 (!) 108/58   01/10/23 (!) 104/48     Pulse Readings from Last 3 Encounters:   01/31/23 60   01/18/23 70   01/10/23 76     Lab Results   Component Value Date    WBC 6.08 01/31/2023    HGB 7.5 (L) 01/31/2023    HCT 23.8 (L) 01/31/2023    MCV 97 01/31/2023     (L) 01/31/2023       CMP  Sodium   Date Value Ref Range " Status   01/31/2023 144 136 - 145 mmol/L Final     Potassium   Date Value Ref Range Status   01/31/2023 4.0 3.5 - 5.1 mmol/L Final     Chloride   Date Value Ref Range Status   01/31/2023 108 95 - 110 mmol/L Final     CO2   Date Value Ref Range Status   01/31/2023 24 23 - 29 mmol/L Final     Glucose   Date Value Ref Range Status   01/31/2023 87 70 - 110 mg/dL Final     BUN   Date Value Ref Range Status   01/31/2023 70 (H) 8 - 23 mg/dL Final     Creatinine   Date Value Ref Range Status   01/31/2023 1.9 (H) 0.5 - 1.4 mg/dL Final     Calcium   Date Value Ref Range Status   01/31/2023 8.9 8.7 - 10.5 mg/dL Final     Total Protein   Date Value Ref Range Status   01/30/2023 6.7 6.0 - 8.4 g/dL Final     Albumin   Date Value Ref Range Status   01/30/2023 3.6 3.5 - 5.2 g/dL Final     Total Bilirubin   Date Value Ref Range Status   01/30/2023 0.7 0.1 - 1.0 mg/dL Final     Comment:     For infants and newborns, interpretation of results should be based  on gestational age, weight and in agreement with clinical  observations.    Premature Infant recommended reference ranges:  Up to 24 hours.............<8.0 mg/dL  Up to 48 hours............<12.0 mg/dL  3-5 days..................<15.0 mg/dL  6-29 days.................<15.0 mg/dL       Alkaline Phosphatase   Date Value Ref Range Status   01/30/2023 68 55 - 135 U/L Final     AST   Date Value Ref Range Status   01/30/2023 18 10 - 40 U/L Final     ALT   Date Value Ref Range Status   01/30/2023 9 (L) 10 - 44 U/L Final     Anion Gap   Date Value Ref Range Status   01/31/2023 12 8 - 16 mmol/L Final     eGFR   Date Value Ref Range Status   01/31/2023 25 (A) >60 mL/min/1.73 m^2 Final         Anesthesia Plan  Type of Anesthesia, risks & benefits discussed:    Anesthesia Type: Gen Natural Airway, MAC  Intra-op Monitoring Plan: Standard ASA Monitors  Post Op Pain Control Plan: multimodal analgesia  Induction:  IV  Informed Consent: Informed consent signed with the Patient and all parties  understand the risks and agree with anesthesia plan.  All questions answered.   ASA Score: 4  Day of Surgery Review of History & Physical: H&P Update referred to the surgeon/provider.  Anesthesia Plan Notes: Informed consent obtained from patient's daughter, Usama Malave, and witnessed by 2 RNs.    Ready For Surgery From Anesthesia Perspective.     .

## 2023-01-31 NOTE — ED NOTES
Patient remains free from injury or falls. Vital signs stable on room air. Positions self independently. Voiding adequately through shift via purewick. Pain 10/10 to back and mid abdomen managed with PO medications. Telemetry maintained. Bed in low locked position and call light within reach. Will continue to monitor.

## 2023-01-31 NOTE — NURSING
[facility] notified about PICC line order - voicemail message left. Awaiting call back for confirmation.

## 2023-01-31 NOTE — ASSESSMENT & PLAN NOTE
Advance Care Planning     -On chart review, previous discussion and code status was DNR.   -Will revisit discussion in the AM  -Continue full code at this time. Will discuss ACP in the AM

## 2023-01-31 NOTE — PLAN OF CARE
Procedure and recovery completed without complication. Pt n NAD, vitals recovered to baseline, denies pain. Criteria met for transfer back to floor at this time. Report called to CHAGO Javier. Pt off unit via stretcher with transport tech.

## 2023-01-31 NOTE — HPI
This is a 87 year old female with a PMHx of HFrEF (EF: 20%, GIIDD, s/p AICD), CAD (on DAPT), CKD3, HTN, HLD who presents with melena.     History is obtained via chart review given altered mental status. Patient reportedly had melanotic stools, shortness of breath, abdominal pain and weakness. She had blood work done as outpatient and was told that her hemoglobin was 6 prompting her to present to the ED. The patient is lethargic and falls asleep during the interview. She denied having any symptoms but reported that her daughter reported that the patient had black stools. Additionally, she mentions shaking of her hands that started about 2 weeks ago. In the ED, she was hypotensive (86/47), improving with fluids. Labs showed normocytic anemia (7.1 - baseline 10-11), elevated creatinine (2.2, baseline of 2.0), elevated BNP (>4900), elevated troponin (0.135), negative lactic acid (0.8). CXR showed cardiomegaly and mild pulmonary edema/CHF. She was given Protonix 80 mg IV, NS 1000 ml, zofran and morphine 4 mg IV. The patient was admitted for further management.

## 2023-01-31 NOTE — HOSPITAL COURSE
87 year old female with a PMHx of HFrEF (EF: 20%, GIIDD, s/p AICD), CAD (on DAPT), CKD3, HTN, HLD admitted on 01/30/2023 for melena with concerns for GI bleed and acute blood loss anemia requiring transfusion. Hgb noted to be 6.4 on outpatient labs on 01/30 and was instructed to come to ED for evaluation. Was given 1U of pRBC on 01/30 with appropiate response (hgb 7.4 on 01/31). GI consulted- s/p EGD on 01/31. Showed friable mucosa noted in duodenal bulb. Likely source of bleeding. GI recommends discontinuing plavix on discharge and f/u with GI outpatient for anemia workup. Hgb remained stable.  No further evidence bleeding.  Patient remained alert and oriented x3.  PT/OT consulted-recommends home health. Patient was anticipated to discharge home on 02/02, but had a large bowel movment which appeared to be very dark and concern for GI bleed. Discussed with GI, plan for colonoscopy on 02/03. Hgb dropped to 6.9 on 02/03 and given 1 unit prbc. Blood counts stable after the unit the next morning. Pt c/o back pain in the morning which pt states has been going on for several weeks and improved with pain medications. Thoracolumbar xr with degenerative changes, but no acute findings. Pt denied any red flag sxs including fever, chills, numbness/weakness of the extremities, saddle paraesthesias, neck pain, or ha. Pt asking to be d/c home. We will send her with short rx for pain medication for back pain and home health pt/ot/nursing. Pt noted to have pulmonary nodule of xr and referral for pulm sent. Pt also needs to f/u with pcp and GI after d/c.

## 2023-01-31 NOTE — ED NOTES
No change in pt's status; pt resting in bed with eyes closed and resp with ease; awaiting hospital room assignment; VS per monitor; care ongoing

## 2023-01-31 NOTE — SUBJECTIVE & OBJECTIVE
Past Medical History:   Diagnosis Date    Cataract     CHF (congestive heart failure)     CKD (chronic kidney disease), stage III     Coronary artery disease     Gout attack     Hypercholesteremia     Hypertension     Renal disorder     Vaginal delivery     x4       Past Surgical History:   Procedure Laterality Date    APPENDECTOMY      CARDIAC DEFIBRILLATOR PLACEMENT      2015    CATARACT EXTRACTION W/  INTRAOCULAR LENS IMPLANT Left 02/07/2019    Dr. Velazco    CATARACT EXTRACTION W/  INTRAOCULAR LENS IMPLANT Right 02/21/2019    Dr. Velazco    CHOLECYSTECTOMY      COLONOSCOPY W/ POLYPECTOMY  10 or 11/ 2014    per Dr. Myrick    defribillator Left 8/2008    EYE SURGERY      HYSTERECTOMY      INTRAOCULAR PROSTHESES INSERTION Left 2/7/2019    Procedure: INSERTION, IOL PROSTHESIS;  Surgeon: Demetrius Velazco MD;  Location: [facility] OR;  Service: Ophthalmology;  Laterality: Left;  RN Phone Pre OP 1-30-19.  Arrival 05:30 AM    INTRAOCULAR PROSTHESES INSERTION Right 2/21/2019    Procedure: INSERTION, IOL PROSTHESIS;  Surgeon: Demetrius Velazco MD;  Location: [facility] OR;  Service: Ophthalmology;  Laterality: Right;    JOINT REPLACEMENT Bilateral 2005 & 2006    2 Knee Replacements=Lt. 1= 2005. Rt. 1= 2006    OOPHORECTOMY      PHACOEMULSIFICATION OF CATARACT Left 2/7/2019    Procedure: PHACOEMULSIFICATION, CATARACT;  Surgeon: Demetrius Velazco MD;  Location: [facility] OR;  Service: Ophthalmology;  Laterality: Left;    PHACOEMULSIFICATION OF CATARACT Right 2/21/2019    Procedure: PHACOEMULSIFICATION, CATARACT;  Surgeon: Demetrius Velazco MD;  Location: [facility] OR;  Service: Ophthalmology;  Laterality: Right;  RN Phone Pre op 2-15-19.  Arrival 05:30 AM    TOTAL KNEE ARTHROPLASTY Bilateral Lt.= 2005; Rt.=2006    Bilateral Knee scope       Review of patient's allergies indicates:   Allergen Reactions    Aspirin Swelling     Swelling and rash    Colace [docusate sodium] Rash     itching    Docusate Other (See Comments)  and Rash     itching    Sulfa (sulfonamide antibiotics) Swelling     Swelling and rash  Swelling and rash    Iodine and iodide containing products Rash    Penicillins Rash       Current Facility-Administered Medications on File Prior to Encounter   Medication    0.9%  NaCl infusion    phenylephrine HCL 2.5% ophthalmic solution 1 drop    proparacaine 0.5 % ophthalmic solution 1 drop    tropicamide 1% ophthalmic solution 1 drop     Current Outpatient Medications on File Prior to Encounter   Medication Sig    acetaminophen (TYLENOL) 325 MG tablet Take 650 mg by mouth once daily.    allopurinol (ZYLOPRIM) 100 MG tablet Take 100 mg by mouth every evening.     clopidogrel (PLAVIX) 75 mg tablet Take 75 mg by mouth once daily. On hold since 11-28-14, for procedure.    cranberry 400 mg Cap Take 1 capsule by mouth 2 (two) times daily.    fish oil-omega-3 fatty acids 300-1,000 mg capsule Take 2 g by mouth once daily. 1 caps every AM & 1 cap every PM.    folic acid/multivit-min/lutein (CENTRUM SILVER ORAL) Take by mouth once daily.    furosemide (LASIX) 80 MG tablet TAKE 1 TABLET BY MOUTH TWICE DAILY AS NEEDED FOR SHORTNESS OF BREATH OR  EDEMA    lovastatin (MEVACOR) 40 MG tablet Take 40 mg by mouth nightly. Takes 2 caps with supper every evening.    metoprolol succinate (TOPROL-XL) 25 MG 24 hr tablet Take 1 tablet (25 mg total) by mouth once daily.    mirabegron (MYRBETRIQ) 50 mg Tb24 Take 50 mg by mouth once daily.     NEURONTIN 100 mg capsule Take 100 mg by mouth 3 (three) times daily.    pantoprazole (PROTONIX) 40 MG tablet Take 40 mg by mouth once daily.    sacubitriL-valsartan (ENTRESTO) 24-26 mg per tablet Take 1 tablet by mouth 2 (two) times daily.     Family History       Problem Relation (Age of Onset)    Amblyopia Son    Diabetes Other    Heart attack Mother (88)    Hyperlipidemia Mother    Hypertension Mother, Other          Tobacco Use    Smoking status: Never    Smokeless tobacco: Never   Substance and Sexual  Activity    Alcohol use: Not Currently    Drug use: No    Sexual activity: Not Currently     Partners: Male     Review of Systems   Unable to perform ROS: Mental status change   Objective:     Vital Signs (Most Recent):  Temp: 97.7 °F (36.5 °C) (01/30/23 2259)  Pulse: 72 (01/30/23 2259)  Resp: 20 (01/30/23 2259)  BP: 96/63 (01/30/23 2259)  SpO2: 97 % (01/30/23 2259) Vital Signs (24h Range):  Temp:  [97.7 °F (36.5 °C)-98.1 °F (36.7 °C)] 97.7 °F (36.5 °C)  Pulse:  [67-74] 72  Resp:  [15-21] 20  SpO2:  [96 %-100 %] 97 %  BP: ()/(46-63) 96/63     Weight: 58.1 kg (128 lb)  Body mass index is 23.41 kg/m².    Physical Exam  Vitals and nursing note reviewed.   Constitutional:       General: She is in acute distress.      Appearance: She is ill-appearing.   HENT:      Head: Normocephalic and atraumatic.      Nose: Nose normal.      Mouth/Throat:      Mouth: Mucous membranes are moist.   Eyes:      Extraocular Movements: Extraocular movements intact.   Cardiovascular:      Rate and Rhythm: Normal rate.      Pulses: Normal pulses.      Heart sounds: Murmur heard.   Pulmonary:      Effort: Pulmonary effort is normal. No respiratory distress.   Abdominal:      General: Abdomen is flat.      Palpations: Abdomen is soft.      Tenderness: There is no abdominal tenderness.   Musculoskeletal:      Right lower leg: Edema (trace) present.      Left lower leg: Edema (trace) present.   Skin:     General: Skin is warm.      Capillary Refill: Capillary refill takes less than 2 seconds.   Neurological:      Mental Status: She is lethargic and disoriented.           Significant Labs: All pertinent labs within the past 24 hours have been reviewed.    Significant Imaging: I have reviewed all pertinent imaging results/findings within the past 24 hours.

## 2023-01-31 NOTE — ASSESSMENT & PLAN NOTE
BNP  Recent Labs   Lab 01/30/23 2020   BNP >4,900*     - Patient is on room air. Euvolemic/slightly hypervolemic on examination.   - Doubt exacerbation. Elevated BNP is likely due to entresto.   - Resume home medications when able

## 2023-01-31 NOTE — SUBJECTIVE & OBJECTIVE
Interval History:  No acute overnight events.  Patient remained afebrile.  States her abdominal pain has improved.  Has not noticed any further episodes of melena.  Alert and oriented x3 this morning.  Still has some fatigue, but improving.  Do feel better after blood transfusion.    Review of Systems   Constitutional:  Positive for fatigue. Negative for chills and fever.   Eyes:  Negative for visual disturbance.   Respiratory:  Negative for cough and shortness of breath.    Cardiovascular:  Negative for chest pain, palpitations and leg swelling.   Gastrointestinal:  Positive for abdominal pain and blood in stool (prior to admit but none currently). Negative for diarrhea, nausea and vomiting.   Genitourinary:  Negative for difficulty urinating, dysuria and hematuria.   Musculoskeletal:  Negative for joint swelling.   Neurological:  Positive for weakness. Negative for dizziness and headaches.   Psychiatric/Behavioral:  Negative for confusion and hallucinations.      Objective:     Vital Signs (Most Recent):  Temp: 98.4 °F (36.9 °C) (01/31/23 1613)  Pulse: 72 (01/31/23 1613)  Resp: 18 (01/31/23 1613)  BP: (!) 102/51 (01/31/23 1613)  SpO2: 95 % (01/31/23 1613)   Vital Signs (24h Range):  Temp:  [97.2 °F (36.2 °C)-99.1 °F (37.3 °C)] 98.4 °F (36.9 °C)  Pulse:  [60-77] 72  Resp:  [15-29] 18  SpO2:  [95 %-100 %] 95 %  BP: ()/(46-74) 102/51     Weight: 56.8 kg (125 lb 3.5 oz)  Body mass index is 22.18 kg/m².    Intake/Output Summary (Last 24 hours) at 1/31/2023 1630  Last data filed at 1/31/2023 1320  Gross per 24 hour   Intake 915 ml   Output 1 ml   Net 914 ml      Physical Exam  Vitals and nursing note reviewed.   Constitutional:       General: She is not in acute distress.     Appearance: She is ill-appearing (chronically ill appearing).   Eyes:      Extraocular Movements: Extraocular movements intact.      Pupils: Pupils are equal, round, and reactive to light.   Cardiovascular:      Rate and Rhythm: Normal rate  and regular rhythm.      Heart sounds: Murmur heard.     No gallop.   Pulmonary:      Effort: Pulmonary effort is normal. No respiratory distress.      Breath sounds: Normal breath sounds. No wheezing or rales.   Abdominal:      General: There is no distension.      Palpations: Abdomen is soft.      Tenderness: There is no abdominal tenderness. There is no guarding.   Musculoskeletal:         General: No swelling or tenderness.      Right lower leg: Edema present.      Left lower leg: Edema present.      Comments: Trace edema in bilateral LE   Skin:     General: Skin is warm and dry.      Coloration: Skin is pale.   Neurological:      General: No focal deficit present.      Mental Status: She is alert and oriented to person, place, and time.   Psychiatric:         Mood and Affect: Mood normal.         Behavior: Behavior is slowed.         Thought Content: Thought content normal.       Significant Labs: All pertinent labs within the past 24 hours have been reviewed.    Significant Imaging: I have reviewed all pertinent imaging results/findings within the past 24 hours.

## 2023-01-31 NOTE — PROGRESS NOTES
[facility]  Progress Note    Patient Name: Palmira Malave  MRN: [id number]  Patient Class: IP- Inpatient   Admission Date: 1/30/2023  Length of Stay: 1 days  Attending Physician: Dwayne Feliciano DO  Primary Care Provider: Qi Ramon MD        Subjective:     Principal Problem:GI bleed        HPI:  This is a 87 year old female with a PMHx of HFrEF (EF: 20%, GIIDD, s/p AICD), CAD (on DAPT), CKD3, HTN, HLD who presents with melena.     History is obtained via chart review given altered mental status. Patient reportedly had melanotic stools, shortness of breath, abdominal pain and weakness. She had blood work done as outpatient and was told that her hemoglobin was 6 prompting her to present to the ED. The patient is lethargic and falls asleep during the interview. She denied having any symptoms but reported that her daughter reported that the patient had black stools. Additionally, she mentions shaking of her hands that started about 2 weeks ago. In the ED, she was hypotensive (86/47), improving with fluids. Labs showed normocytic anemia (7.1 - baseline 10-11), elevated creatinine (2.2, baseline of 2.0), elevated BNP (>4900), elevated troponin (0.135), negative lactic acid (0.8). CXR showed cardiomegaly and mild pulmonary edema/CHF. She was given Protonix 80 mg IV, NS 1000 ml, zofran and morphine 4 mg IV. The patient was admitted for further management.       Overview/Hospital Course:   87 year old female with a PMHx of HFrEF (EF: 20%, GIIDD, s/p AICD), CAD (on DAPT), CKD3, HTN, HLD admitted on 01/30/2023 for melena with concerns for GI bleed and acute blood loss anemia requiring transfusion. Hgb noted to be 6.4 on outpatient labs on 01/30 and was instructed to come to ED for evaluation. Was given 1U of pRBC on 01/30 with appropiate response (hgb 7.4 on 01/31). GI consulted- planned for EGD on 01/31. Continue on PPI BID.       Interval History:  No acute overnight events.  Patient  remained afebrile.  States her abdominal pain has improved.  Has not noticed any further episodes of melena.  Alert and oriented x3 this morning.  Still has some fatigue, but improving.  Do feel better after blood transfusion.    Review of Systems   Constitutional:  Positive for fatigue. Negative for chills and fever.   Eyes:  Negative for visual disturbance.   Respiratory:  Negative for cough and shortness of breath.    Cardiovascular:  Negative for chest pain, palpitations and leg swelling.   Gastrointestinal:  Positive for abdominal pain and blood in stool (prior to admit but none currently). Negative for diarrhea, nausea and vomiting.   Genitourinary:  Negative for difficulty urinating, dysuria and hematuria.   Musculoskeletal:  Negative for joint swelling.   Neurological:  Positive for weakness. Negative for dizziness and headaches.   Psychiatric/Behavioral:  Negative for confusion and hallucinations.      Objective:     Vital Signs (Most Recent):  Temp: 98.4 °F (36.9 °C) (01/31/23 1613)  Pulse: 72 (01/31/23 1613)  Resp: 18 (01/31/23 1613)  BP: (!) 102/51 (01/31/23 1613)  SpO2: 95 % (01/31/23 1613)   Vital Signs (24h Range):  Temp:  [97.2 °F (36.2 °C)-99.1 °F (37.3 °C)] 98.4 °F (36.9 °C)  Pulse:  [60-77] 72  Resp:  [15-29] 18  SpO2:  [95 %-100 %] 95 %  BP: ()/(46-74) 102/51     Weight: 56.8 kg (125 lb 3.5 oz)  Body mass index is 22.18 kg/m².    Intake/Output Summary (Last 24 hours) at 1/31/2023 1630  Last data filed at 1/31/2023 1320  Gross per 24 hour   Intake 915 ml   Output 1 ml   Net 914 ml      Physical Exam  Vitals and nursing note reviewed.   Constitutional:       General: She is not in acute distress.     Appearance: She is ill-appearing (chronically ill appearing).   Eyes:      Extraocular Movements: Extraocular movements intact.      Pupils: Pupils are equal, round, and reactive to light.   Cardiovascular:      Rate and Rhythm: Normal rate and regular rhythm.      Heart sounds: Murmur heard.      No gallop.   Pulmonary:      Effort: Pulmonary effort is normal. No respiratory distress.      Breath sounds: Normal breath sounds. No wheezing or rales.   Abdominal:      General: There is no distension.      Palpations: Abdomen is soft.      Tenderness: There is no abdominal tenderness. There is no guarding.   Musculoskeletal:         General: No swelling or tenderness.      Right lower leg: Edema present.      Left lower leg: Edema present.      Comments: Trace edema in bilateral LE   Skin:     General: Skin is warm and dry.      Coloration: Skin is pale.   Neurological:      General: No focal deficit present.      Mental Status: She is alert and oriented to person, place, and time.   Psychiatric:         Mood and Affect: Mood normal.         Behavior: Behavior is slowed.         Thought Content: Thought content normal.       Significant Labs: All pertinent labs within the past 24 hours have been reviewed.    Significant Imaging: I have reviewed all pertinent imaging results/findings within the past 24 hours.      Assessment/Plan:      * GI bleed  - Patient presented with melanotic stools  - Risk factors: on aspirin, plavix.   - Baseline Hgb: 10-11  - Hgb on admission: 7.1. Was 6.4 on 01/30 on outpatient labs  - Etiology: likely upper GI bleed    Plan:  - Keep NPO  - Monitor for active bleeding  - Protonix IV 40mg q12hr  - Transfuse to keep hemoglobin > 7   - Consult to GI: plan for EGD on 01/31    Acute blood loss anemia  -Hgb noted to be 6.4 on outpatient labs on 01/30   -Was given 1U of pRBC on 01/30 in ED with appropiate response (hgb 7.4 on 01/31).   -GI consulted- planned for EGD on 01/31.   -Continue on PPI BID.   -CBC in AM     Troponin level elevated  Likely in the setting of GI bleed.   No chest pain. Doubt ACS   Telemetry monitoring      Chronic systolic congestive heart failure  BNP  Recent Labs   Lab 01/30/23 2020   BNP >4,900*     - Patient is on room air. Euvolemic/slightly hypervolemic on  examination.   - Doubt exacerbation. Elevated BNP is likely due to entresto.   - BNP chronically elevated  - Resume home medications when able     Coronary artery disease involving native coronary artery of native heart without angina pectoris  Hold aspirin/plavix in the setting of GI bleed.       Essential hypertension  Hold home medications in the setting of hypotension     Hyperlipidemia  Continue statin      Stage 3 chronic kidney disease  - History of CKD stage 3  - Currently not on HD  - Baseline creatinine 2.0 mg/dL    Recent Labs   Lab 01/30/23  1856 01/31/23  0743   BUN 82* 70*   CREATININE 2.2* 1.9*       Plan:  - Monitor Cr and BUN  - Monitor for acidosis, hyperkalemia, fluid overload and signs of uremia  - Avoid nephrotoxins  - Daily weights  - Strict I/Os  - Stable     GERD (gastroesophageal reflux disease)  Continue PPI    Goals of care, counseling/discussion  Advance Care Planning     -On chart review, previous discussion and code status was DNR.   -Will revisit discussion in the AM  -Continue full code at this time. Will discuss ACP in the AM         VTE Risk Mitigation (From admission, onward)         Ordered     IP VTE HIGH RISK PATIENT  Once         01/30/23 2320     Place sequential compression device  Until discontinued         01/30/23 2319                Discharge Planning   MARTELL:      Code Status: Full Code   Is the patient medically ready for discharge?:     Reason for patient still in hospital (select all that apply): Patient trending condition, Treatment and Consult recommendations                     Dwayne Feliciano DO  Department of Hospital Medicine   [facility] - [place] - Med Surg

## 2023-01-31 NOTE — PLAN OF CARE
[facility] [place]  Nurses Note -- 4 Eyes      1/31/2023       Skin assessed on: Admit      [x] No Pressure Injuries Present    []Prevention Measures Documented    [] Yes LDA  for Pressure Injury Previously documented     [] Yes New Pressure Injury Discovered   [] LDA for New Pressure Injury Added      Attending RN:  Concepcion Waddell RN     Second RN: Shira Granados PCT       Problem: Adjustment to Illness (Gastrointestinal Bleeding)  Goal: Optimal Coping with Acute Illness  Intervention: Optimize Psychosocial Response  Flowsheets (Taken 1/31/2023 1707)  Supportive Measures:   verbalization of feelings encouraged   self-care encouraged     Problem: Skin Injury Risk Increased  Goal: Skin Health and Integrity  Intervention: Optimize Skin Protection  Flowsheets (Taken 1/31/2023 1707)  Pressure Reduction Techniques: sit time limited to 2 hours  Skin Protection: incontinence pads utilized  Head of Bed (HOB) Positioning: HOB at 30-45 degrees

## 2023-01-31 NOTE — ED PROVIDER NOTES
"Encounter Date: 1/30/2023    SCRIBE #1 NOTE: I, Yeny Jennings, am scribing for, and in the presence of,  Quinn Gonzalez MD. I have scribed the following portions of the note - Other sections scribed: HPI/ROS.     History     Chief Complaint   Patient presents with    Melena     86 yo female to triage for dark tarry stools x 3 -4 days, abdominal pain, weakness, low BP, and told to come to ED for low Hg. NAD, AAOx4, VSS    Abnormal Lab     Palmira Malave is a 87 y.o. female, with a PMHx of CHF, CKD-stage 3, HTN, HCL, CAD, who presents to the ED with melena (with foul smell), lower abdominal pain, shortness of breath, sore throat, weakness with exertion onset 3 days. Pt was doing lab work at cancer center (pt family unsure if pt has cancer diagnosis) when her Hg was 6 pt told to come to ED. Pt doesn't take iron, and hasn't had Pepto Bismol. Pt also reports chronic leg and back pain. Pt attempted treatment with Tylenol 3 times a day. No other exacerbating or alleviating factors. Pt notes sporadic dropping of bilateral arms"- pt didn't fall, unsure of source of issue. Pt denies chest pain. This is the extent of the patient's complaints today in the Emergency Department.      The history is provided by the patient and a relative.   Review of patient's allergies indicates:   Allergen Reactions    Aspirin Swelling     Swelling and rash    Colace [docusate sodium] Rash     itching    Docusate Other (See Comments) and Rash     itching    Sulfa (sulfonamide antibiotics) Swelling     Swelling and rash  Swelling and rash    Iodine and iodide containing products Rash    Penicillins Rash     Past Medical History:   Diagnosis Date    Cataract     CHF (congestive heart failure)     CKD (chronic kidney disease), stage III     Coronary artery disease     Gout attack     Hypercholesteremia     Hypertension     Renal disorder     Vaginal delivery     x4     Past Surgical History:   Procedure Laterality Date    APPENDECTOMY      " CARDIAC DEFIBRILLATOR PLACEMENT      2015    CATARACT EXTRACTION W/  INTRAOCULAR LENS IMPLANT Left 02/07/2019    Dr. Velazco    CATARACT EXTRACTION W/  INTRAOCULAR LENS IMPLANT Right 02/21/2019    Dr. Velazco    CHOLECYSTECTOMY      COLONOSCOPY W/ POLYPECTOMY  10 or 11/ 2014    per Dr. Myrick    defribillator Left 8/2008    EYE SURGERY      HYSTERECTOMY      INTRAOCULAR PROSTHESES INSERTION Left 2/7/2019    Procedure: INSERTION, IOL PROSTHESIS;  Surgeon: Demetrius Velazco MD;  Location: [facility] [place];  Service: Ophthalmology;  Laterality: Left;  RN Phone Pre OP 1-30-19.  Arrival 05:30 AM    INTRAOCULAR PROSTHESES INSERTION Right 2/21/2019    Procedure: INSERTION, IOL PROSTHESIS;  Surgeon: Demetrius Velazco MD;  Location: [facility] OR;  Service: Ophthalmology;  Laterality: Right;    JOINT REPLACEMENT Bilateral 2005 & 2006    2 Knee Replacements=Lt. 1= 2005. Rt. 1= 2006    OOPHORECTOMY      PHACOEMULSIFICATION OF CATARACT Left 2/7/2019    Procedure: PHACOEMULSIFICATION, CATARACT;  Surgeon: Demetrius Velazco MD;  Location: [facility] OR;  Service: Ophthalmology;  Laterality: Left;    PHACOEMULSIFICATION OF CATARACT Right 2/21/2019    Procedure: PHACOEMULSIFICATION, CATARACT;  Surgeon: Demetrius Velazco MD;  Location: [facility] OR;  Service: Ophthalmology;  Laterality: Right;  RN Phone Pre op 2-15-19.  Arrival 05:30 AM    TOTAL KNEE ARTHROPLASTY Bilateral Lt.= 2005; Rt.=2006    Bilateral Knee scope     Family History   Problem Relation Age of Onset    Heart attack Mother 88    Hypertension Mother     Hyperlipidemia Mother     Diabetes Other     Hypertension Other     Amblyopia Son     Blindness Neg Hx     Cataracts Neg Hx     Glaucoma Neg Hx     Macular degeneration Neg Hx     Retinal detachment Neg Hx     Strabismus Neg Hx      Social History     Tobacco Use    Smoking status: Never    Smokeless tobacco: Never   Substance Use Topics    Alcohol use: Not Currently    Drug use: No     Review of Systems    Constitutional: Negative.    HENT:  Positive for sore throat.    Eyes: Negative.    Respiratory:  Positive for shortness of breath.    Cardiovascular: Negative.  Negative for chest pain.   Gastrointestinal:  Positive for abdominal pain and blood in stool.   Genitourinary: Negative.    Musculoskeletal: Negative.    Skin: Negative.    Neurological:  Positive for weakness.     Physical Exam     Initial Vitals [01/30/23 1733]   BP Pulse Resp Temp SpO2   (!) 100/54 74 17 98.1 °F (36.7 °C) 100 %      MAP       --         Physical Exam    Nursing note and vitals reviewed.  Constitutional: She is not diaphoretic. She appears distressed (mildly).   HENT:   Head: Normocephalic and atraumatic.   Nose: Nose normal.   Eyes: EOM are normal. Pupils are equal, round, and reactive to light.   Neck: Neck supple. No JVD present.   Normal range of motion.  Cardiovascular:  Normal rate, regular rhythm, normal heart sounds and intact distal pulses.           Pulmonary/Chest: Breath sounds normal. No stridor. No respiratory distress. She has no wheezes. She has no rales.   Abdominal: Abdomen is soft. Bowel sounds are normal. She exhibits no distension. There is abdominal tenderness (mild epigastric and LLQ). There is no rebound and no guarding.   Musculoskeletal:         General: No tenderness or edema. Normal range of motion.      Cervical back: Normal range of motion and neck supple.     Neurological: She is alert and oriented to person, place, and time. She has normal strength.   Skin: Skin is warm and dry. Capillary refill takes less than 2 seconds. No rash noted. No erythema. There is pallor.       ED Course   Critical Care    Date/Time: 1/30/2023 7:30 PM  Performed by: Quinn Gonzalez MD  Authorized by: Quinn Gonzalez MD   Direct patient critical care time: 15 minutes  Additional history critical care time: 5 minutes  Ordering / reviewing critical care time: 5 minutes  Documentation critical care time: 5  minutes  Consulting other physicians critical care time: 5 minutes  Total critical care time (exclusive of procedural time) : 35 minutes  Critical care time was exclusive of separately billable procedures and treating other patients and teaching time.  Critical care was necessary to treat or prevent imminent or life-threatening deterioration of the following conditions: shock and circulatory failure.  Critical care was time spent personally by me on the following activities: development of treatment plan with patient or surrogate, discussions with consultants, evaluation of patient's response to treatment, examination of patient, obtaining history from patient or surrogate, ordering and performing treatments and interventions, ordering and review of laboratory studies, ordering and review of radiographic studies, re-evaluation of patient's condition and review of old charts.      Labs Reviewed   COMPREHENSIVE METABOLIC PANEL - Abnormal; Notable for the following components:       Result Value    Glucose 117 (*)     BUN 82 (*)     Creatinine 2.2 (*)     ALT 9 (*)     eGFR 21 (*)     All other components within normal limits   CBC W/ AUTO DIFFERENTIAL - Abnormal; Notable for the following components:    RBC 2.29 (*)     Hemoglobin 7.1 (*)     Hematocrit 22.3 (*)     MCHC 31.8 (*)     RDW 18.1 (*)     Lymph # 0.7 (*)     nRBC 1 (*)     Gran % 74.8 (*)     Lymph % 11.6 (*)     All other components within normal limits   TROPONIN I - Abnormal; Notable for the following components:    Troponin I 0.135 (*)     All other components within normal limits   B-TYPE NATRIURETIC PEPTIDE - Abnormal; Notable for the following components:    BNP >4,900 (*)     All other components within normal limits   TYPE & SCREEN - Abnormal; Notable for the following components:    Indirect Moiz POS (*)     All other components within normal limits   URINALYSIS   LIPASE   LACTIC ACID, PLASMA   CBC W/ AUTO DIFFERENTIAL   ANTIBODY IDENTIFICATION    PREPARE RBC SOFT          Imaging Results              X-Ray Chest 1 View (Final result)  Result time 01/30/23 20:10:06      Final result by Dru Nielson DO (01/30/23 20:10:06)                   Impression:      Cardiomegaly and mild pulmonary edema/CHF.      Electronically signed by: Dru Nielson  Date:    01/30/2023  Time:    20:10               Narrative:    EXAMINATION:  XR CHEST 1 VIEW    CLINICAL HISTORY:  Unspecified abdominal pain    TECHNIQUE:  Single frontal view of the chest was performed.    COMPARISON:  12/13/2022.    FINDINGS:  There is a cardiac pacer/AICD, unchanged in position from prior.  There is pulmonary vascular congestion and mild perihilar interstitial prominence compatible with interstitial pulmonary edema/CHF.  The pleural spaces are clear.  There is no large focal consolidation.  The cardiac silhouette is enlarged.  There are calcifications of the aortic arch.  The visualized osseous structures demonstrate degenerative changes.  There is mild scoliotic curvature.                                       Medications   0.9%  NaCl infusion (for blood administration) (has no administration in time range)   allopurinoL tablet 100 mg (has no administration in time range)   atorvastatin tablet 20 mg (has no administration in time range)   metoprolol succinate (TOPROL-XL) 24 hr tablet 25 mg (has no administration in time range)   oxybutynin 24 hr tablet 5 mg (has no administration in time range)   pantoprazole injection 40 mg (has no administration in time range)   sodium chloride 0.9% flush 10 mL (has no administration in time range)   albuterol-ipratropium 2.5 mg-0.5 mg/3 mL nebulizer solution 3 mL (has no administration in time range)   melatonin tablet 6 mg (has no administration in time range)   ondansetron injection 4 mg (has no administration in time range)   prochlorperazine injection Soln 5 mg (has no administration in time range)   acetaminophen tablet 650 mg (has no administration  in time range)   simethicone chewable tablet 80 mg (has no administration in time range)   aluminum-magnesium hydroxide-simethicone 200-200-20 mg/5 mL suspension 30 mL (has no administration in time range)   acetaminophen tablet 650 mg (has no administration in time range)   naloxone 0.4 mg/mL injection 0.02 mg (has no administration in time range)   potassium bicarbonate disintegrating tablet 50 mEq (has no administration in time range)   potassium bicarbonate disintegrating tablet 35 mEq (has no administration in time range)   potassium bicarbonate disintegrating tablet 60 mEq (has no administration in time range)   magnesium oxide tablet 800 mg (has no administration in time range)   magnesium oxide tablet 800 mg (has no administration in time range)   potassium, sodium phosphates 280-160-250 mg packet 2 packet (has no administration in time range)   potassium, sodium phosphates 280-160-250 mg packet 2 packet (has no administration in time range)   potassium, sodium phosphates 280-160-250 mg packet 2 packet (has no administration in time range)   glucose chewable tablet 16 g (has no administration in time range)   glucose chewable tablet 24 g (has no administration in time range)   glucagon (human recombinant) injection 1 mg (has no administration in time range)   dextrose 10% bolus 125 mL 125 mL (has no administration in time range)   dextrose 10% bolus 250 mL 250 mL (has no administration in time range)   morphine injection 4 mg (4 mg Intravenous Given 1/30/23 1907)   ondansetron injection 4 mg (4 mg Intravenous Given 1/30/23 1901)   sodium chloride 0.9% bolus 500 mL 500 mL (0 mLs Intravenous Stopped 1/30/23 2024)   sodium chloride 0.9% bolus 500 mL 500 mL (0 mLs Intravenous Stopped 1/30/23 2216)   pantoprazole injection 80 mg (80 mg Intravenous Given 1/30/23 2223)     Medical Decision Making:   History:   Old Medical Records: I decided to obtain old medical records.  Clinical Tests:   Lab Tests: Ordered and  Reviewed  Radiological Study: Ordered and Reviewed       MDM:    87-year-old female with past medical history as noted above presenting with melena, concern for anemia, fatigue.  Patient's hemoglobin today 7 1, significantly downtrending from her baseline.  Patient appears pale, generally weak with reports of melena currently on Plavix outpatient.  Patient's blood pressure initially slightly tenuous, responding to small IV fluid boluses given her significant congestive heart failure history.  Discussed further with gastroenterology who requests patient be NPO, PPI drip for EGD tomorrow morning.  Patient's blood pressure remaining stable, however given her significant hemoglobin drop, likely ongoing blood loss and history of CAD will transfuse 1 unit PRBC at this time.  Discussed further with Hospital Medicine team who will admit for further evaluation and management. Discussed diagnosis and further treatment with patient and family at bedside. All questions answered, patient transferred to floor improved and stable.         Scribe Attestation:   Scribe #1: I performed the above scribed service and the documentation accurately describes the services I performed. I attest to the accuracy of the note.                   Clinical Impression:   Final diagnoses:  [R10.9] Abdominal pain  [I95.9] Hypotension  [K92.2] Lower gastrointestinal bleed        ED Disposition Condition    Admit         I, Quinn Gonzalez M.D., personally performed the services described in this documentation. All medical record entries made by the scribe were at my direction and in my presence. I have reviewed the chart and agree that the record reflects my personal performance and is accurate and complete.         Quinn Gonzalez MD  01/31/23 0314

## 2023-01-31 NOTE — ASSESSMENT & PLAN NOTE
BNP  Recent Labs   Lab 01/30/23 2020   BNP >4,900*     - Patient is on room air. Euvolemic/slightly hypervolemic on examination.   - Doubt exacerbation. Elevated BNP is likely due to entresto.   - BNP chronically elevated  - Resume home medications when able

## 2023-01-31 NOTE — CONSULTS
[facility] Consultation Note    Patient Complaint: blood in stool    PCP:   Qi Ramon       LOS: 1        Initial History of Present Illness (HPI):  This is a 87 y.o. female consulted to GI service for GI bleed.    PMH  PMHx of HFrEF (EF: 20%, GIIDD, s/p AICD), CAD (on DAPT), CKD3, HTN, HLD.   Admitted for GI Bleed.  Patient complaint of acute onset of severe generalized abdominal pain with associated symptoms of blood in stool describes as melena that began 3 days ago. She denies anything precipitating these symptoms or improving them. She takes plavix daily for CAD, last dose yesterday.  Denies dizziness, lightheadedness, n/v, BRBPR or constipation. Denies smoking, drinking or NSAID use and has an aspirin allergy.  Reports having a colonoscopy years ago and notes from previous providers reflect colonoscopy with polypectomy in 2014. I do not have the records from this procedure.   On admit labs  : hgb 7.1, bun 82, creat 2.2, bnp 4,900, Trop 0.135.    Review of Systems   Constitutional:  Negative for activity change, chills, diaphoresis and fever.   HENT:  Negative for congestion, drooling, rhinorrhea, sore throat and trouble swallowing.    Eyes:  Negative for discharge.   Respiratory:  Negative for cough, shortness of breath and wheezing.    Cardiovascular:  Negative for chest pain, palpitations and leg swelling.   Gastrointestinal:  Positive for abdominal pain and blood in stool. Negative for abdominal distention, anal bleeding, constipation, diarrhea, nausea, rectal pain and vomiting.   Endocrine: Negative for cold intolerance and heat intolerance.   Genitourinary:  Negative for frequency, hematuria and urgency.   Musculoskeletal:  Negative for arthralgias.   Skin:  Negative for color change, pallor and rash.   Neurological:  Positive for weakness. Negative for syncope, facial asymmetry and numbness.   Psychiatric/Behavioral:  Negative for agitation and confusion. The patient is not  nervous/anxious.          Medical History:  has a past medical history of Cataract, CHF (congestive heart failure), CKD (chronic kidney disease), stage III, Coronary artery disease, Gout attack, Hypercholesteremia, Hypertension, Renal disorder, and Vaginal delivery.    Surgical History:  has a past surgical history that includes Hysterectomy; Total knee arthroplasty (Bilateral, Lt.= 2005; Rt.=2006); defribillator (Left, 8/2008); Cholecystectomy; Appendectomy; Colonoscopy w/ polypectomy (10 or 11/ 2014); Cardiac defibrillator placement; Oophorectomy; Joint replacement (Bilateral, 2005 & 2006); Eye surgery; Intraocular prosthesis insertion (Left, 2/7/2019); Phacoemulsification of cataract (Left, 2/7/2019); Phacoemulsification of cataract (Right, 2/21/2019); Intraocular prosthesis insertion (Right, 2/21/2019); Cataract extraction w/  intraocular lens implant (Left, 02/07/2019); and Cataract extraction w/  intraocular lens implant (Right, 02/21/2019).    Family History: family history includes Amblyopia in her son; Diabetes in an other family member; Heart attack (age of onset: 88) in her mother; Hyperlipidemia in her mother; Hypertension in her mother and another family member..     Social History:  reports that she has never smoked. She has never used smokeless tobacco. She reports that she does not currently use alcohol. She reports that she does not use drugs.    Review of patient's allergies indicates:   Allergen Reactions    Aspirin Swelling     Swelling and rash    Colace [docusate sodium] Rash     itching    Docusate Other (See Comments) and Rash     itching    Sulfa (sulfonamide antibiotics) Swelling     Swelling and rash  Swelling and rash    Iodine and iodide containing products Rash    Penicillins Rash       Current Facility-Administered Medications on File Prior to Encounter   Medication Dose Route Frequency Provider Last Rate Last Admin    0.9%  NaCl infusion   Intravenous Continuous Demetrius Velazco,  MD        phenylephrine HCL 2.5% ophthalmic solution 1 drop  1 drop Right Eye On Call Procedure Demetrius Velazco MD   1 drop at 02/21/19 0725    proparacaine 0.5 % ophthalmic solution 1 drop  1 drop Right Eye On Call Procedure Demetrius Velazco MD   1 drop at 02/21/19 0706    tropicamide 1% ophthalmic solution 1 drop  1 drop Right Eye On Call Procedure Demetrius Velazco MD   1 drop at 02/21/19 0726     Current Outpatient Medications on File Prior to Encounter   Medication Sig Dispense Refill    acetaminophen (TYLENOL) 325 MG tablet Take 650 mg by mouth once daily.      allopurinol (ZYLOPRIM) 100 MG tablet Take 100 mg by mouth every evening.       clopidogrel (PLAVIX) 75 mg tablet Take 75 mg by mouth once daily. On hold since 11-28-14, for procedure.      cranberry 400 mg Cap Take 1 capsule by mouth 2 (two) times daily.      fish oil-omega-3 fatty acids 300-1,000 mg capsule Take 2 g by mouth once daily. 1 caps every AM & 1 cap every PM.      folic acid/multivit-min/lutein (CENTRUM SILVER ORAL) Take by mouth once daily.      furosemide (LASIX) 80 MG tablet TAKE 1 TABLET BY MOUTH TWICE DAILY AS NEEDED FOR SHORTNESS OF BREATH OR  EDEMA 180 tablet 3    lovastatin (MEVACOR) 40 MG tablet Take 40 mg by mouth nightly. Takes 2 caps with supper every evening.      metoprolol succinate (TOPROL-XL) 25 MG 24 hr tablet Take 1 tablet (25 mg total) by mouth once daily. 90 tablet 3    mirabegron (MYRBETRIQ) 50 mg Tb24 Take 50 mg by mouth once daily.       NEURONTIN 100 mg capsule Take 100 mg by mouth 3 (three) times daily.      pantoprazole (PROTONIX) 40 MG tablet Take 40 mg by mouth once daily.      sacubitriL-valsartan (ENTRESTO) 24-26 mg per tablet Take 1 tablet by mouth 2 (two) times daily. 180 tablet 3        Objective Findings:    Vital Signs:  Temp:  [97.6 °F (36.4 °C)-99.1 °F (37.3 °C)]   Pulse:  [60-77]   Resp:  [15-29]   BP: ()/(46-74)   SpO2:  [95 %-100 %]   Body mass index is 22.18  kg/m².      Physical Exam  Vitals and nursing note reviewed.   Constitutional:       Appearance: Normal appearance.   HENT:      Head: Normocephalic.      Nose: Nose normal.      Mouth/Throat:      Mouth: Mucous membranes are moist.   Eyes:      Pupils: Pupils are equal, round, and reactive to light.   Cardiovascular:      Heart sounds: Normal heart sounds.   Pulmonary:      Effort: Pulmonary effort is normal.      Breath sounds: Normal breath sounds.   Abdominal:      General: Bowel sounds are normal. There is no distension.      Palpations: Abdomen is soft.      Tenderness: There is no abdominal tenderness.   Musculoskeletal:         General: Normal range of motion.      Cervical back: Normal range of motion.   Skin:     Capillary Refill: Capillary refill takes less than 2 seconds.   Neurological:      General: No focal deficit present.      Mental Status: She is alert and oriented to person, place, and time.   Psychiatric:         Mood and Affect: Mood normal.         Behavior: Behavior normal.         Thought Content: Thought content normal.         Judgment: Judgment normal.             Labs:  Lab Results   Component Value Date    WBC 6.08 2023    HGB 7.5 (L) 2023    HCT 23.8 (L) 2023     (L) 2023    CHOL 158 2021    TRIG 95 2021    HDL 62 2021    ALT 9 (L) 2023    AST 18 2023     2023    K 4.0 2023     2023    CREATININE 1.9 (H) 2023    BUN 70 (H) 2023    CO2 24 2023    TSH 1.335 2022    INR 1.0 10/10/2020    HGBA1C 5.0 2022             Imagin/30 Chest xray- cardiomegaly and pulmonary edema. Pleural spaces clear, no large focal consolidation.    I have independently reviewed and interpreted the imaging above    Assessment:  Patient is a .87 y.o. y/o .female with  has a past medical history of Cataract, CHF (congestive heart failure), CKD (chronic kidney disease), stage III, Coronary  artery disease, Gout attack, Hypercholesteremia, Hypertension, Renal disorder, and Vaginal delivery. Consulted to the GI service for GI bleed.               Recommendations:  Acute blood loss anemia. Abdominal pain. Melena. GI bleed. Current anticoagulant use. CHF    Denies any overt bleeding today, has not had stool since occurrence 3 days ago. Hgb improved from 7.1 to 7.5 independently. Baseline 10.2. Plan for upper endoscopy today. Remain NPO. Hold anticoagulants.        Thank you so much for allowing us to participate in the care of Palmira Malave . Please contact us if you have any additional questions.    Joaquina Verde NP  Gastroenterology  [facility] [place] [facility]

## 2023-01-31 NOTE — ASSESSMENT & PLAN NOTE
-Hgb noted to be 6.4 on outpatient labs on 01/30   -Was given 1U of pRBC on 01/30 in ED with appropiate response (hgb 7.4 on 01/31).   -GI consulted- planned for EGD on 01/31.   -Continue on PPI BID.   -CBC in AM

## 2023-01-31 NOTE — ED NOTES
Pt resting in bed with eyes closed and resp with ease; awaiting hospital room assignment; care ongoing

## 2023-01-31 NOTE — ASSESSMENT & PLAN NOTE
- History of CKD stage 3  - Currently not on HD  - Baseline creatinine 2.0 mg/dL    Recent Labs   Lab 01/30/23  1856   BUN 82*   CREATININE 2.2*       Plan:  - Monitor Cr and BUN  - Monitor for acidosis, hyperkalemia, fluid overload and signs of uremia  - Avoid nephrotoxins  - Daily weights  - Strict I/Os

## 2023-01-31 NOTE — ASSESSMENT & PLAN NOTE
- History of CKD stage 3  - Currently not on HD  - Baseline creatinine 2.0 mg/dL    Recent Labs   Lab 01/30/23  1856 01/31/23  0743   BUN 82* 70*   CREATININE 2.2* 1.9*       Plan:  - Monitor Cr and BUN  - Monitor for acidosis, hyperkalemia, fluid overload and signs of uremia  - Avoid nephrotoxins  - Daily weights  - Strict I/Os  - Stable

## 2023-01-31 NOTE — ED NOTES
No change in pt's status; pt resting in bed with eyes closed and resp with ease; VS per monitor; awaiting hospital room assignment; care ongoing

## 2023-01-31 NOTE — H&P
[facility]  [facility] Medicine  History & Physical    Patient Name: Palmira Malave  MRN: [id number]  Patient Class: IP- Inpatient  Admission Date: 1/30/2023  Attending Physician: Raciel Mosley MD   Primary Care Provider: Qi Ramon MD         Patient information was obtained from patient and ER records.     Subjective:     Principal Problem:GI bleed    Chief Complaint:   Chief Complaint   Patient presents with    Melena     88 yo female to triage for dark tarry stools x 3 -4 days, abdominal pain, weakness, low BP, and told to come to ED for low Hg. NAD, AAOx4, VSS    Abnormal Lab        HPI: This is a 87 year old female with a PMHx of HFrEF (EF: 20%, GIIDD, s/p AICD), CAD (on DAPT), CKD3, HTN, HLD who presents with melena.     History is obtained via chart review given altered mental status. Patient reportedly had melanotic stools, shortness of breath, abdominal pain and weakness. She had blood work done as outpatient and was told that her hemoglobin was 6 prompting her to present to the ED. The patient is lethargic and falls asleep during the interview. She denied having any symptoms but reported that her daughter reported that the patient had black stools. Additionally, she mentions shaking of her hands that started about 2 weeks ago. In the ED, she was hypotensive (86/47), improving with fluids. Labs showed normocytic anemia (7.1 - baseline 10-11), elevated creatinine (2.2, baseline of 2.0), elevated BNP (>4900), elevated troponin (0.135), negative lactic acid (0.8). CXR showed cardiomegaly and mild pulmonary edema/CHF. She was given Protonix 80 mg IV, NS 1000 ml, zofran and morphine 4 mg IV. The patient was admitted for further management.       Past Medical History:   Diagnosis Date    Cataract     CHF (congestive heart failure)     CKD (chronic kidney disease), stage III     Coronary artery disease     Gout attack     Hypercholesteremia     Hypertension     Renal disorder      Vaginal delivery     x4       Past Surgical History:   Procedure Laterality Date    APPENDECTOMY      CARDIAC DEFIBRILLATOR PLACEMENT      2015    CATARACT EXTRACTION W/  INTRAOCULAR LENS IMPLANT Left 02/07/2019    Dr. Velazco    CATARACT EXTRACTION W/  INTRAOCULAR LENS IMPLANT Right 02/21/2019    Dr. Velazco    CHOLECYSTECTOMY      COLONOSCOPY W/ POLYPECTOMY  10 or 11/ 2014    per Dr. Myrick    defribillator Left 8/2008    EYE SURGERY      HYSTERECTOMY      INTRAOCULAR PROSTHESES INSERTION Left 2/7/2019    Procedure: INSERTION, IOL PROSTHESIS;  Surgeon: Demetrius Velazco MD;  Location: [facility] OR;  Service: Ophthalmology;  Laterality: Left;  RN Phone Pre OP 1-30-19.  Arrival 05:30 AM    INTRAOCULAR PROSTHESES INSERTION Right 2/21/2019    Procedure: INSERTION, IOL PROSTHESIS;  Surgeon: Demetrius Velazco MD;  Location: [facility] OR;  Service: Ophthalmology;  Laterality: Right;    JOINT REPLACEMENT Bilateral 2005 & 2006    2 Knee Replacements=Lt. 1= 2005. Rt. 1= 2006    OOPHORECTOMY      PHACOEMULSIFICATION OF CATARACT Left 2/7/2019    Procedure: PHACOEMULSIFICATION, CATARACT;  Surgeon: Demetrius Velazco MD;  Location: [facility] OR;  Service: Ophthalmology;  Laterality: Left;    PHACOEMULSIFICATION OF CATARACT Right 2/21/2019    Procedure: PHACOEMULSIFICATION, CATARACT;  Surgeon: Demetrius Velazco MD;  Location: [facility] OR;  Service: Ophthalmology;  Laterality: Right;  RN Phone Pre op 2-15-19.  Arrival 05:30 AM    TOTAL KNEE ARTHROPLASTY Bilateral Lt.= 2005; Rt.=2006    Bilateral Knee scope       Review of patient's allergies indicates:   Allergen Reactions    Aspirin Swelling     Swelling and rash    Colace [docusate sodium] Rash     itching    Docusate Other (See Comments) and Rash     itching    Sulfa (sulfonamide antibiotics) Swelling     Swelling and rash  Swelling and rash    Iodine and iodide containing products Rash    Penicillins Rash       Current Facility-Administered  Medications on File Prior to Encounter   Medication    0.9%  NaCl infusion    phenylephrine HCL 2.5% ophthalmic solution 1 drop    proparacaine 0.5 % ophthalmic solution 1 drop    tropicamide 1% ophthalmic solution 1 drop     Current Outpatient Medications on File Prior to Encounter   Medication Sig    acetaminophen (TYLENOL) 325 MG tablet Take 650 mg by mouth once daily.    allopurinol (ZYLOPRIM) 100 MG tablet Take 100 mg by mouth every evening.     clopidogrel (PLAVIX) 75 mg tablet Take 75 mg by mouth once daily. On hold since 11-28-14, for procedure.    cranberry 400 mg Cap Take 1 capsule by mouth 2 (two) times daily.    fish oil-omega-3 fatty acids 300-1,000 mg capsule Take 2 g by mouth once daily. 1 caps every AM & 1 cap every PM.    folic acid/multivit-min/lutein (CENTRUM SILVER ORAL) Take by mouth once daily.    furosemide (LASIX) 80 MG tablet TAKE 1 TABLET BY MOUTH TWICE DAILY AS NEEDED FOR SHORTNESS OF BREATH OR  EDEMA    lovastatin (MEVACOR) 40 MG tablet Take 40 mg by mouth nightly. Takes 2 caps with supper every evening.    metoprolol succinate (TOPROL-XL) 25 MG 24 hr tablet Take 1 tablet (25 mg total) by mouth once daily.    mirabegron (MYRBETRIQ) 50 mg Tb24 Take 50 mg by mouth once daily.     NEURONTIN 100 mg capsule Take 100 mg by mouth 3 (three) times daily.    pantoprazole (PROTONIX) 40 MG tablet Take 40 mg by mouth once daily.    sacubitriL-valsartan (ENTRESTO) 24-26 mg per tablet Take 1 tablet by mouth 2 (two) times daily.     Family History       Problem Relation (Age of Onset)    Amblyopia Son    Diabetes Other    Heart attack Mother (88)    Hyperlipidemia Mother    Hypertension Mother, Other          Tobacco Use    Smoking status: Never    Smokeless tobacco: Never   Substance and Sexual Activity    Alcohol use: Not Currently    Drug use: No    Sexual activity: Not Currently     Partners: Male     Review of Systems   Unable to perform ROS: Mental status change   Objective:      Vital Signs (Most Recent):  Temp: 97.7 °F (36.5 °C) (01/30/23 2259)  Pulse: 72 (01/30/23 2259)  Resp: 20 (01/30/23 2259)  BP: 96/63 (01/30/23 2259)  SpO2: 97 % (01/30/23 2259) Vital Signs (24h Range):  Temp:  [97.7 °F (36.5 °C)-98.1 °F (36.7 °C)] 97.7 °F (36.5 °C)  Pulse:  [67-74] 72  Resp:  [15-21] 20  SpO2:  [96 %-100 %] 97 %  BP: ()/(46-63) 96/63     Weight: 58.1 kg (128 lb)  Body mass index is 23.41 kg/m².    Physical Exam  Vitals and nursing note reviewed.   Constitutional:       General: She is in acute distress.      Appearance: She is ill-appearing.   HENT:      Head: Normocephalic and atraumatic.      Nose: Nose normal.      Mouth/Throat:      Mouth: Mucous membranes are moist.   Eyes:      Extraocular Movements: Extraocular movements intact.   Cardiovascular:      Rate and Rhythm: Normal rate.      Pulses: Normal pulses.      Heart sounds: Murmur heard.   Pulmonary:      Effort: Pulmonary effort is normal. No respiratory distress.   Abdominal:      General: Abdomen is flat.      Palpations: Abdomen is soft.      Tenderness: There is no abdominal tenderness.   Musculoskeletal:      Right lower leg: Edema (trace) present.      Left lower leg: Edema (trace) present.   Skin:     General: Skin is warm.      Capillary Refill: Capillary refill takes less than 2 seconds.   Neurological:      Mental Status: She is lethargic and disoriented.           Significant Labs: All pertinent labs within the past 24 hours have been reviewed.    Significant Imaging: I have reviewed all pertinent imaging results/findings within the past 24 hours.    Assessment/Plan:     * GI bleed  - Patient presented with melanotic stools  - Risk factors: on aspirin, plavix.   - Baseline Hgb: 10-11  - Hgb on admission: 7.1  - Etiology: likely upper GI bleed    Plan:  - Keep NPO  - Monitor for active bleeding  - Protonix IV 40mg q12hr  - Transfuse to keep hemoglobin > 7   - Consult to GI    GERD (gastroesophageal reflux  disease)  PPI    Chronic systolic congestive heart failure  BNP  Recent Labs   Lab 01/30/23 2020   BNP >4,900*     - Patient is on room air. Euvolemic/slightly hypervolemic on examination.   - Doubt exacerbation. Elevated BNP is likely due to entresto.   - Resume home medications when able     Stage 3 chronic kidney disease  - History of CKD stage 3  - Currently not on HD  - Baseline creatinine 2.0 mg/dL    Recent Labs   Lab 01/30/23  1856   BUN 82*   CREATININE 2.2*       Plan:  - Monitor Cr and BUN  - Monitor for acidosis, hyperkalemia, fluid overload and signs of uremia  - Avoid nephrotoxins  - Daily weights  - Strict I/Os    Hyperlipidemia  Resume statin      Coronary artery disease involving native coronary artery of native heart without angina pectoris  Hold aspirin/plavix in the setting of GI bleed.       Essential hypertension  Hold home medications in the setting of hypotension     Troponin level elevated  Likely in the setting of GI bleed.   Telemetry monitoring        VTE Risk Mitigation (From admission, onward)         Ordered     IP VTE HIGH RISK PATIENT  Once         01/30/23 2320     Place sequential compression device  Until discontinued         01/30/23 0873                   Luigi Hamm MD  Department of Hospital Medicine   [facility] - [place] - Emergency Dept

## 2023-01-31 NOTE — PHARMACY MED REC
"Admission Medication History     The home medication history was taken by Noa Espitia CPhT.      You may go to "Admission" then "Reconcile Home Medications" tabs to review and/or act upon these items.     The home medication list has been updated by the Pharmacy department.   Please read ALL comments highlighted in yellow.   Please address this information as you see fit.    Feel free to contact us if you have any questions or require assistance.      Medications listed below were obtained from: Patient/family and Analytic software- [employer] Medications   Medication Sig    allopurinol (ZYLOPRIM) 100 MG tablet Take 100 mg by mouth every evening.     clopidogrel (PLAVIX) 75 mg tablet Take 75 mg by mouth once daily. On hold since 11-28-14, for procedure.    cranberry 400 mg Cap Take 1 capsule by mouth 2 (two) times daily.    fish oil-omega-3 fatty acids 300-1,000 mg capsule Take 2 g by mouth once daily. 1 caps every AM & 1 cap every PM.    folic acid/multivit-min/lutein (CENTRUM SILVER ORAL) Take by mouth once daily.    furosemide (LASIX) 80 MG tablet TAKE 1 TABLET BY MOUTH TWICE DAILY AS NEEDED FOR SHORTNESS OF BREATH OR  EDEMA (Patient taking differently: Take 80 mg by mouth once daily.)    lovastatin (MEVACOR) 40 MG tablet Take 40 mg by mouth nightly. Takes 2 caps with supper every evening.    metoprolol succinate (TOPROL-XL) 25 MG 24 hr tablet Take 1 tablet (25 mg total) by mouth once daily. (Patient taking differently: Take 25 mg by mouth daily as needed.)    mirabegron (MYRBETRIQ) 50 mg Tb24 Take 50 mg by mouth once daily.     NEURONTIN 100 mg capsule Take 100 mg by mouth 2 (two) times daily.    pantoprazole (PROTONIX) 40 MG tablet Take 40 mg by mouth once daily.    sacubitriL-valsartan (ENTRESTO) 24-26 mg per tablet Take 1 tablet by mouth 2 (two) times daily. (Patient taking differently: Take 1 tablet by mouth once daily.)    traMADoL (ULTRAM) 50 mg tablet 1 tablet as needed.    acetaminophen (TYLENOL) 325 " MG tablet Take 650 mg by mouth once daily.     Spoke with daughter Sánchez via telephone and she was able to give history on patient medications.      Noa Espitia hT.  [phone]                  .

## 2023-01-31 NOTE — ED TRIAGE NOTES
86 yo female to triage for dark tarry stools x 3 -4 days, abdominal pain, weakness, low BP, and told to come to ED for low Hg. NAD, AAOx4, VSS

## 2023-01-31 NOTE — ANESTHESIA POSTPROCEDURE EVALUATION
Anesthesia Post Evaluation    Patient: Palmira Malave    Procedure(s) Performed: Procedure(s) (LRB):  EGD (ESOPHAGOGASTRODUODENOSCOPY) (N/A)    Final Anesthesia Type: general      Patient location during evaluation: GI PACU  Patient participation: Yes- Able to Participate  Level of consciousness: awake and alert (MS back to baseline)  Post-procedure vital signs: reviewed and stable  Pain management: adequate  Airway patency: patent    PONV status at discharge: No PONV  Anesthetic complications: no      Cardiovascular status: hemodynamically stable  Respiratory status: unassisted and spontaneous ventilation  Hydration status: euvolemic  Follow-up not needed.          Vitals Value Taken Time   /58 01/31/23 1348   Temp 36.2 °C (97.2 °F) 01/31/23 1320   Pulse 72 01/31/23 1348   Resp 18 01/31/23 1348   SpO2 97 % 01/31/23 1348   Vitals shown include unvalidated device data.      Event Time   Out of Recovery 01/31/2023 13:48:00         Pain/Barron Score: Pain Rating Prior to Med Admin: 7 (1/31/2023 10:17 AM)  Barron Score: 10 (1/31/2023  1:48 PM)

## 2023-02-01 ENCOUNTER — TELEPHONE (OUTPATIENT)
Dept: GASTROENTEROLOGY | Facility: CLINIC | Age: 88
End: 2023-02-01
Payer: MEDICARE

## 2023-02-01 LAB
ANION GAP SERPL CALC-SCNC: 9 MMOL/L (ref 8–16)
BASOPHILS # BLD AUTO: 0.03 K/UL (ref 0–0.2)
BASOPHILS NFR BLD: 0.6 % (ref 0–1.9)
BUN SERPL-MCNC: 61 MG/DL (ref 8–23)
CALCIUM SERPL-MCNC: 9 MG/DL (ref 8.7–10.5)
CHLORIDE SERPL-SCNC: 111 MMOL/L (ref 95–110)
CO2 SERPL-SCNC: 24 MMOL/L (ref 23–29)
CREAT SERPL-MCNC: 1.7 MG/DL (ref 0.5–1.4)
DIFFERENTIAL METHOD: ABNORMAL
EOSINOPHIL # BLD AUTO: 0.2 K/UL (ref 0–0.5)
EOSINOPHIL NFR BLD: 3.7 % (ref 0–8)
ERYTHROCYTE [DISTWIDTH] IN BLOOD BY AUTOMATED COUNT: 18.5 % (ref 11.5–14.5)
EST. GFR  (NO RACE VARIABLE): 29 ML/MIN/1.73 M^2
GLUCOSE SERPL-MCNC: 74 MG/DL (ref 70–110)
HCT VFR BLD AUTO: 23.1 % (ref 37–48.5)
HGB BLD-MCNC: 7.1 G/DL (ref 12–16)
IMM GRANULOCYTES # BLD AUTO: 0.02 K/UL (ref 0–0.04)
IMM GRANULOCYTES NFR BLD AUTO: 0.4 % (ref 0–0.5)
LYMPHOCYTES # BLD AUTO: 0.8 K/UL (ref 1–4.8)
LYMPHOCYTES NFR BLD: 17.4 % (ref 18–48)
MCH RBC QN AUTO: 29.7 PG (ref 27–31)
MCHC RBC AUTO-ENTMCNC: 30.7 G/DL (ref 32–36)
MCV RBC AUTO: 97 FL (ref 82–98)
MONOCYTES # BLD AUTO: 0.6 K/UL (ref 0.3–1)
MONOCYTES NFR BLD: 13 % (ref 4–15)
NEUTROPHILS # BLD AUTO: 3.1 K/UL (ref 1.8–7.7)
NEUTROPHILS NFR BLD: 64.9 % (ref 38–73)
NRBC BLD-RTO: 0 /100 WBC
PLATELET # BLD AUTO: 137 K/UL (ref 150–450)
PMV BLD AUTO: 10.8 FL (ref 9.2–12.9)
POTASSIUM SERPL-SCNC: 3.7 MMOL/L (ref 3.5–5.1)
RBC # BLD AUTO: 2.39 M/UL (ref 4–5.4)
SODIUM SERPL-SCNC: 144 MMOL/L (ref 136–145)
WBC # BLD AUTO: 4.84 K/UL (ref 3.9–12.7)

## 2023-02-01 PROCEDURE — 99232 SBSQ HOSP IP/OBS MODERATE 35: CPT | Mod: ,,, | Performed by: NURSE PRACTITIONER

## 2023-02-01 PROCEDURE — 63600175 PHARM REV CODE 636 W HCPCS: Performed by: STUDENT IN AN ORGANIZED HEALTH CARE EDUCATION/TRAINING PROGRAM

## 2023-02-01 PROCEDURE — 99232 PR SUBSEQUENT HOSPITAL CARE,LEVL II: ICD-10-PCS | Mod: ,,, | Performed by: NURSE PRACTITIONER

## 2023-02-01 PROCEDURE — 11000001 HC ACUTE MED/SURG PRIVATE ROOM

## 2023-02-01 PROCEDURE — 25000003 PHARM REV CODE 250: Performed by: STUDENT IN AN ORGANIZED HEALTH CARE EDUCATION/TRAINING PROGRAM

## 2023-02-01 PROCEDURE — 85025 COMPLETE CBC W/AUTO DIFF WBC: CPT | Performed by: STUDENT IN AN ORGANIZED HEALTH CARE EDUCATION/TRAINING PROGRAM

## 2023-02-01 PROCEDURE — 80048 BASIC METABOLIC PNL TOTAL CA: CPT | Performed by: STUDENT IN AN ORGANIZED HEALTH CARE EDUCATION/TRAINING PROGRAM

## 2023-02-01 PROCEDURE — C9113 INJ PANTOPRAZOLE SODIUM, VIA: HCPCS | Performed by: STUDENT IN AN ORGANIZED HEALTH CARE EDUCATION/TRAINING PROGRAM

## 2023-02-01 RX ADMIN — PANTOPRAZOLE SODIUM 40 MG: 40 INJECTION, POWDER, FOR SOLUTION INTRAVENOUS at 08:02

## 2023-02-01 RX ADMIN — ALLOPURINOL 100 MG: 100 TABLET ORAL at 08:02

## 2023-02-01 RX ADMIN — ATORVASTATIN CALCIUM 20 MG: 10 TABLET, FILM COATED ORAL at 08:02

## 2023-02-01 RX ADMIN — OXYBUTYNIN CHLORIDE 5 MG: 5 TABLET, EXTENDED RELEASE ORAL at 08:02

## 2023-02-01 NOTE — ASSESSMENT & PLAN NOTE
* No surgery found * 
* No surgery found * Bedside shift change report given to (oncoming nurse) by Dinah Medina [phone] to Mariangel Situ Report included the following information SBAR. 
  
Zone Phone:   
  
  
Significant changes during shift:  none 
  
  
  
Patient Information 
  
Torsten Portillo 
40 y.o. 
10/28/2018 11:56 PM by Morris Andre MD. Kiran Guerrero was admitted from Home 
  
Problem List 
  
       
Patient Active Problem List  
  Diagnosis Date Noted  SIRS [facility]ic inflammatory response syndrome[facility] 10/29/2018  
  
       
Past Medical History:  
Diagnosis Date  Cancer ([facility]    
  
  
  
Core Measures: 
  
CVA: Yes Yes 
  
Activity Status: 
  
OOB to Chair No 
Ambulated this shift No  
Bed Rest No 
  
Supplemental H1: (GW Applicable) 
  
NC No 
NRB No 
Venti-mask No 
On  Liters/min 
  
  
LINES AND DRAINS: 
  
CDVT prophylaxis: 
  
DVT prophylaxis Med- Yes DVT prophylaxis SCD or JUSTA- No  
  
Wounds: (If Applicable) 
  
Wounds- No 
  
Location  
  
Patient Safety: 
  
Falls Score Total Score: 3 Safety Level_______ Bed Alarm On? Yes Sitter? No 
  
Plan for upcoming shift: monitor, possible DC tomorrow, labs drawn and sent 
  
  
  
Discharge Plan: TBD after testing and evals 
  
Active Consults: 
IP CONSULT TO HOSPITALIST 
IP CONSULT TO NEUROLOGY 
IP CONSULT TO ONCOLOGY 
  
 
 
 
 -Hgb noted to be 6.4 on outpatient labs on 01/30   -Was given 1U of pRBC on 01/30 in ED with appropiate response (hgb 7.4 on 01/31).   -GI consulted- s/p EGD on 01/31, which showed friable mucosa noted in duodenal bulb. Likely source of bleeding.   -CBC in AM

## 2023-02-01 NOTE — PROGRESS NOTES
[facility] Progress Note    Patient Complaint: blood in stool    PCP:   Qi Ramon       LOS: 2        Initial History of Present Illness (HPI):  This is a 87 y.o. female consulted to GI service for GI bleed.    PMH  PMHx of HFrEF (EF: 20%, GIIDD, s/p AICD), CAD (on DAPT), CKD3, HTN, HLD.   Admitted for GI Bleed.  Patient complaint of acute onset of severe generalized abdominal pain with associated symptoms of blood in stool describes as melena that began 3 days ago. She denies anything precipitating these symptoms or improving them. She takes plavix daily for CAD, last dose yesterday.  Denies dizziness, lightheadedness, n/v, BRBPR or constipation. Denies smoking, drinking or NSAID use and has an aspirin allergy.  Reports having a colonoscopy years ago and notes from previous providers reflect colonoscopy with polypectomy in 2014. I do not have the records from this procedure.   On admit labs  : hgb 7.1, bun 82, creat 2.2, bnp 4,900, Trop 0.135.    Interval Hx  Reports resolution of abdominal pain. Denies overt bleeding. Hgb stable. S/p EGD on Tuesday with friable duodenal mucosa noted.    Review of Systems   Constitutional:  Negative for activity change, chills, diaphoresis and fever.   HENT:  Negative for congestion, drooling, rhinorrhea, sore throat and trouble swallowing.    Eyes:  Negative for discharge.   Respiratory:  Negative for cough, shortness of breath and wheezing.    Cardiovascular:  Negative for chest pain, palpitations and leg swelling.   Gastrointestinal:  Positive for abdominal pain and blood in stool. Negative for abdominal distention, anal bleeding, constipation, diarrhea, nausea, rectal pain and vomiting.   Endocrine: Negative for cold intolerance and heat intolerance.   Genitourinary:  Negative for frequency, hematuria and urgency.   Musculoskeletal:  Negative for arthralgias.   Skin:  Negative for color change, pallor and rash.   Neurological:  Positive for weakness.  Negative for syncope, facial asymmetry and numbness.   Psychiatric/Behavioral:  Negative for agitation and confusion. The patient is not nervous/anxious.          Medical History:  has a past medical history of Cataract, CHF (congestive heart failure), CKD (chronic kidney disease), stage III, Coronary artery disease, Gout attack, Hypercholesteremia, Hypertension, Renal disorder, and Vaginal delivery.    Surgical History:  has a past surgical history that includes Hysterectomy; Total knee arthroplasty (Bilateral, Lt.= 2005; Rt.=2006); defribillator (Left, 8/2008); Cholecystectomy; Appendectomy; Colonoscopy w/ polypectomy (10 or 11/ 2014); Cardiac defibrillator placement; Oophorectomy; Joint replacement (Bilateral, 2005 & 2006); Eye surgery; Intraocular prosthesis insertion (Left, 2/7/2019); Phacoemulsification of cataract (Left, 2/7/2019); Phacoemulsification of cataract (Right, 2/21/2019); Intraocular prosthesis insertion (Right, 2/21/2019); Cataract extraction w/  intraocular lens implant (Left, 02/07/2019); Cataract extraction w/  intraocular lens implant (Right, 02/21/2019); and Esophagogastroduodenoscopy (N/A, 1/31/2023).    Family History: family history includes Amblyopia in her son; Diabetes in an other family member; Heart attack (age of onset: 88) in her mother; Hyperlipidemia in her mother; Hypertension in her mother and another family member..     Social History:  reports that she has never smoked. She has never used smokeless tobacco. She reports that she does not currently use alcohol. She reports that she does not use drugs.    Review of patient's allergies indicates:   Allergen Reactions    Aspirin Swelling     Swelling and rash    Colace [docusate sodium] Rash     itching    Docusate Other (See Comments) and Rash     itching    Sulfa (sulfonamide antibiotics) Swelling     Swelling and rash  Swelling and rash    Iodine and iodide containing products Rash    Penicillins Rash       Current  Facility-Administered Medications on File Prior to Encounter   Medication Dose Route Frequency Provider Last Rate Last Admin    0.9%  NaCl infusion   Intravenous Continuous Demetrius Velazco MD        phenylephrine HCL 2.5% ophthalmic solution 1 drop  1 drop Right Eye On Call Procedure Demetrius Velazco MD   1 drop at 02/21/19 0725    proparacaine 0.5 % ophthalmic solution 1 drop  1 drop Right Eye On Call Procedure Demetrius Velazco MD   1 drop at 02/21/19 0706    tropicamide 1% ophthalmic solution 1 drop  1 drop Right Eye On Call Procedure Demetrius Velazco MD   1 drop at 02/21/19 0726     Current Outpatient Medications on File Prior to Encounter   Medication Sig Dispense Refill    allopurinol (ZYLOPRIM) 100 MG tablet Take 100 mg by mouth every evening.       clopidogrel (PLAVIX) 75 mg tablet Take 75 mg by mouth once daily. On hold since 11-28-14, for procedure.      cranberry 400 mg Cap Take 1 capsule by mouth 2 (two) times daily.      fish oil-omega-3 fatty acids 300-1,000 mg capsule Take 2 g by mouth once daily. 1 caps every AM & 1 cap every PM.      folic acid/multivit-min/lutein (CENTRUM SILVER ORAL) Take by mouth once daily.      furosemide (LASIX) 80 MG tablet TAKE 1 TABLET BY MOUTH TWICE DAILY AS NEEDED FOR SHORTNESS OF BREATH OR  EDEMA (Patient taking differently: Take 80 mg by mouth once daily.) 180 tablet 3    lovastatin (MEVACOR) 40 MG tablet Take 40 mg by mouth nightly. Takes 2 caps with supper every evening.      metoprolol succinate (TOPROL-XL) 25 MG 24 hr tablet Take 1 tablet (25 mg total) by mouth once daily. (Patient taking differently: Take 25 mg by mouth daily as needed.) 90 tablet 3    mirabegron (MYRBETRIQ) 50 mg Tb24 Take 50 mg by mouth once daily.       NEURONTIN 100 mg capsule Take 100 mg by mouth 2 (two) times daily.      pantoprazole (PROTONIX) 40 MG tablet Take 40 mg by mouth once daily.      sacubitriL-valsartan (ENTRESTO) 24-26 mg per tablet Take 1 tablet by mouth 2  (two) times daily. (Patient taking differently: Take 1 tablet by mouth once daily.) 180 tablet 3    traMADoL (ULTRAM) 50 mg tablet 1 tablet as needed.      acetaminophen (TYLENOL) 325 MG tablet Take 650 mg by mouth once daily.          Objective Findings:    Vital Signs:  Temp:  [97.2 °F (36.2 °C)-98.7 °F (37.1 °C)]   Pulse:  [64-90]   Resp:  [18-20]   BP: ()/(48-70)   SpO2:  [94 %-100 %]   Body mass index is 22.14 kg/m².      Physical Exam  Vitals and nursing note reviewed.   Constitutional:       Appearance: Normal appearance.   HENT:      Head: Normocephalic.      Nose: Nose normal.      Mouth/Throat:      Mouth: Mucous membranes are moist.   Eyes:      Pupils: Pupils are equal, round, and reactive to light.   Cardiovascular:      Heart sounds: Normal heart sounds.   Pulmonary:      Effort: Pulmonary effort is normal.      Breath sounds: Normal breath sounds.   Abdominal:      General: Bowel sounds are normal. There is no distension.      Palpations: Abdomen is soft.      Tenderness: There is no abdominal tenderness.   Musculoskeletal:         General: Normal range of motion.      Cervical back: Normal range of motion.   Skin:     Capillary Refill: Capillary refill takes less than 2 seconds.   Neurological:      General: No focal deficit present.      Mental Status: She is alert and oriented to person, place, and time.   Psychiatric:         Mood and Affect: Mood normal.         Behavior: Behavior normal.         Thought Content: Thought content normal.         Judgment: Judgment normal.             Labs:  Lab Results   Component Value Date    WBC 4.84 02/01/2023    HGB 7.1 (L) 02/01/2023    HCT 23.1 (L) 02/01/2023     (L) 02/01/2023    CHOL 158 06/06/2021    TRIG 95 06/06/2021    HDL 62 06/06/2021    ALT 9 (L) 01/30/2023    AST 18 01/30/2023     02/01/2023    K 3.7 02/01/2023     (H) 02/01/2023    CREATININE 1.7 (H) 02/01/2023    BUN 61 (H) 02/01/2023    CO2 24 02/01/2023    TSH 1.335  2022    INR 1.0 10/10/2020    HGBA1C 5.0 2022             Imagin/30 Chest xray- cardiomegaly and pulmonary edema. Pleural spaces clear, no large focal consolidation.    Endoscopy:  EGD - Normal esophagus.                          - Normal stomach.                          - Bleeding friable duodenal mucosa.                          - No specimens collected.   I have independently reviewed and interpreted the imaging above    Assessment:  Patient is a .87 y.o. y/o .female with  has a past medical history of Cataract, CHF (congestive heart failure), CKD (chronic kidney disease), stage III, Coronary artery disease, Gout attack, Hypercholesteremia, Hypertension, Renal disorder, and Vaginal delivery. Consulted to the GI service for GI bleed.               Recommendations:  Acute blood loss anemia. Abdominal pain. Melena. GI bleed. Current anticoagulant use. CHF    Denies any overt bleeding today, has not had stool since occurrence 3 days ago. Hgb improved from 7.1 to 7.5 independently. Baseline 10.2. Plan for upper endoscopy today. Remain NPO. Hold anticoagulants.    S/p EGD on yesterday, friable mucosa noted in duodenal bulb. Likely source of bleeding. Rec to resume previous diet and consider stopping plavix. Will f/u anemia in GI clinic, office will call with appt.    Will sign off  Thank you so much for allowing us to participate in the care of Palmira Malave . Please contact us if you have any additional questions.    Joaquina Verde NP  Gastroenterology  [facility] [place]

## 2023-02-01 NOTE — TELEPHONE ENCOUNTER
----- Message from Joaquina Verde NP sent at 2/1/2023 12:20 PM CST -----  Regarding: GI clinic appt  Good Afternoon,    Would you please make an appt for this patient in the GI clinic for her anemia once she is discharged from inpatient. Any available provider is fine.      Thanks,  Joaquina Verde NP

## 2023-02-01 NOTE — ASSESSMENT & PLAN NOTE
Advance Care Planning     Date: 02/01/2023  -On chart review, previous discussion and code status was DNR.   -Discussed ACP on 02/01      Code Status  I engaged the the patient in a conversation about the patient's preferences for care  at the very end of life. The patient wishes to have a natural, peaceful death.  Along those lines, the patient does not wish to have CPR or other invasive treatments performed when her heart and/or breathing stops. I communicated to the patient that a DNR order would be placed in her medical record to reflect this preference.  I spent a total of 18 minutes engaging the patient in this advance care planning discussion.              Code Status: DNR

## 2023-02-01 NOTE — ASSESSMENT & PLAN NOTE
- Patient presented with melanotic stools  - Risk factors: on aspirin, plavix.   - Baseline Hgb: 10-11  - Hgb on admission: 7.1. Was 6.4 on 01/30 on outpatient labs  - Etiology: likely upper GI bleed    Plan:  - Monitor for active bleeding  - Consult to GI: s/p EGD on 01/31, which showed friable mucosa noted in duodenal bulb. Likely source of bleeding.   -GI recommend to resume previous diet and consider stopping plavix.   -Will f/u anemia in GI clinic  - Monitor Hgb, if remains stable can likely discharge in AM

## 2023-02-01 NOTE — PLAN OF CARE
Problem: Adult Inpatient Plan of Care  Goal: Plan of Care Review  Outcome: Ongoing, Progressing  Goal: Patient-Specific Goal (Individualized)  Outcome: Ongoing, Progressing  Goal: Absence of Hospital-Acquired Illness or Injury  Outcome: Ongoing, Progressing  Goal: Optimal Comfort and Wellbeing  Outcome: Ongoing, Progressing  Goal: Readiness for Transition of Care  Outcome: Ongoing, Progressing     Problem: Adjustment to Illness (Gastrointestinal Bleeding)  Goal: Optimal Coping with Acute Illness  Outcome: Ongoing, Progressing       Patient remained free from falls/injury throughout shift.  No acute changes in status.  Bed locked in lowest position.  Side rails up x2.  Call bell within reach.  Purposeful rounding maintained throughout shift.

## 2023-02-01 NOTE — PLAN OF CARE
"[facility] - Med Surg  Initial Discharge Assessment       Primary Care Provider: Qi Ramon MD    Admission Diagnosis: Lower gastrointestinal bleed [K92.2]  Abdominal pain [R10.9]  Chest pain [R07.9]  Hypotension [I95.9]    Admission Date: 1/30/2023  Expected Discharge Date:     Discharge Barriers Identified: None    Payor: [employer]ARE / Plan: [employer] COMPLETE / Product Type: Medicare Advantage /     Extended Emergency Contact Information  Primary Emergency Contact: Keyla Morel   [facility]  Home Phone: [phone]  Mobile Phone: [phone]  Relation: Daughter  Secondary Emergency Contact: Usama Malave  Address: [address] [place] [address] [facility]  Home Phone: [phone]  Mobile Phone: [phone]  Relation: Daughter    Discharge Plan A: Home Health  Discharge Plan B: Home with family      [facility]  [place] - [address]  [address]  [place] [address]  Phone: [phone] Fax: [phone]      Initial Assessment (most recent)       Adult Discharge Assessment - 02/01/23 1608          Discharge Assessment    Assessment Type Discharge Planning Assessment     Confirmed/corrected address, phone number and insurance Yes     Confirmed Demographics Correct on Facesheet     Source of Information patient     When was your last doctors appointment? 11/16/22     Communicated MARTELL with patient/caregiver Yes     Reason For Admission "Got dizzy and fell"     People in Home child(karan), adult     Do you expect to return to your current living situation? Yes     Do you have help at home or someone to help you manage your care at home? Yes     Who are your caregiver(s) and their phone number(s)? Adult daughter and son     Prior to hospitilization cognitive status: Alert/Oriented     Current cognitive status: Alert/Oriented     Walking or Climbing Stairs ambulation difficulty, requires equipment     Home Layout " Able to live on 1st floor     Equipment Currently Used at Home rollator     Readmission within 30 days? No     Patient currently being followed by outpatient case management? No     Do you currently have service(s) that help you manage your care at home? No     Do you take prescription medications? Yes     Do you have prescription coverage? Yes     Coverage PHN     Do you have any problems affording any of your prescribed medications? No     Is the patient taking medications as prescribed? yes     Who is going to help you get home at discharge? Son and daughter     How do you get to doctors appointments? family or friend will provide     Are you on dialysis? No     Do you take coumadin? No     Discharge Plan A Home Health     Discharge Plan B Home with family     DME Needed Upon Discharge  none     Discharge Barriers Identified None        OTHER    Name(s) of People in Home Yary

## 2023-02-01 NOTE — TELEPHONE ENCOUNTER
Patient is scheduled on 03/30/2023 @ 9:30 am with Dr. Sotomayor.    Pt is a 59 year old male seen today pre-op for right medial unicompartmental knee replacement with MARYAN Robot. Pt report longstanding knee pain now progressively  worsening.  Patient denies specific fall or trauma, but notes he worked on his feet for 30 years. He localizes the pain mostly medially, and notes swelling, both worse with weightbearing activity including walking long distance and rising from a seated position. Patient has tried physical therapy for twelve weeks without significant improvement. He has a steroid injection to the right knee on 12/28/22 with only two weeks of relief. He takes OTC NSAIDs and Tylenol without significant improvement.  Pt is a 59 year old male seen today pre-op for right medial unicompartmental knee replacement with MARYAN Robot. Pt report longstanding knee pain now progressively  worsening.  Patient denies specific fall or trauma, but notes he worked on his feet for 30 years. He localizes the pain mostly medially, and notes swelling, both worse with weightbearing activity including walking long distance and rising from a seated position. Patient has tried physical therapy for twelve weeks without significant improvement. He has a steroid injection to the right knee on 12/28/22 with only two weeks of relief. He takes OTC NSAIDs and Tylenol without significant improvement. Pt is a 59 year old male seen today pre-op for right medial unicompartmental knee replacement with MARYAN Robot. Pt report longstanding knee pain now progressively  worsening. Patient denies fall or trauma, but notes he worked on his feet for more than 30 years. Pt report pain is localized  mostly medial aspect of his right knee, intermittent  swelling. Knee pain is  worse with weightbearing activity including walking long distance and rising from a seated position. Pt report prior conservative treatment of cortisone injection x1 that was effective for about two weeks and  physical therapy for twelve weeks without significant improvement. Pt currently takes  OTC NSAIDs and Tylenol without significant improvement. Seen today for a scheduled surgery on 6/29/2022 with Dr. Givens. Pt was initially scheduled for this surgery in May 2022, but was cancelled due to elevated WBC as a result of tooth infection. Pt scheduled fro an appointment with his oral surgeon on 6/15/2022 for oral surgery

## 2023-02-01 NOTE — PROGRESS NOTES
[facility] Medicine  Progress Note    Patient Name: Palmira Malave  MRN: [id number]  Patient Class: IP- Inpatient   Admission Date: 1/30/2023  Length of Stay: 2 days  Attending Physician: Dwayne Feliciano DO  Primary Care Provider: Qi Ramon MD        Subjective:     Principal Problem:GI bleed        HPI:  This is a 87 year old female with a PMHx of HFrEF (EF: 20%, GIIDD, s/p AICD), CAD (on DAPT), CKD3, HTN, HLD who presents with melena.     History is obtained via chart review given altered mental status. Patient reportedly had melanotic stools, shortness of breath, abdominal pain and weakness. She had blood work done as outpatient and was told that her hemoglobin was 6 prompting her to present to the ED. The patient is lethargic and falls asleep during the interview. She denied having any symptoms but reported that her daughter reported that the patient had black stools. Additionally, she mentions shaking of her hands that started about 2 weeks ago. In the ED, she was hypotensive (86/47), improving with fluids. Labs showed normocytic anemia (7.1 - baseline 10-11), elevated creatinine (2.2, baseline of 2.0), elevated BNP (>4900), elevated troponin (0.135), negative lactic acid (0.8). CXR showed cardiomegaly and mild pulmonary edema/CHF. She was given Protonix 80 mg IV, NS 1000 ml, zofran and morphine 4 mg IV. The patient was admitted for further management.       Overview/Hospital Course:   87 year old female with a PMHx of HFrEF (EF: 20%, GIIDD, s/p AICD), CAD (on DAPT), CKD3, HTN, HLD admitted on 01/30/2023 for melena with concerns for GI bleed and acute blood loss anemia requiring transfusion. Hgb noted to be 6.4 on outpatient labs on 01/30 and was instructed to come to ED for evaluation. Was given 1U of pRBC on 01/30 with appropiate response (hgb 7.4 on 01/31). GI consulted- s/p EGD on 01/31. Showed friable mucosa noted in duodenal bulb. Likely source of bleeding. GI recommends  discontinuing plavix on discharge and f/u with GI outpatient for anemia workup. Continue to monitor       Interval History:  No acute overnight events.  Patient remained afebrile.  States her abdominal pain has improved and now resolved.  Has not noticed any further episodes of melena.  Alert and oriented x3 this morning.  Still has some fatigue, but improving.      Review of Systems   Constitutional:  Positive for fatigue. Negative for chills and fever.   Eyes:  Negative for visual disturbance.   Respiratory:  Negative for cough and shortness of breath.    Cardiovascular:  Negative for chest pain, palpitations and leg swelling.   Gastrointestinal:  Negative for abdominal pain (resolved), blood in stool (prior to admit but none currently), diarrhea, nausea and vomiting.   Genitourinary:  Negative for difficulty urinating, dysuria and hematuria.   Musculoskeletal:  Negative for joint swelling.   Neurological:  Negative for dizziness, weakness and headaches.   Psychiatric/Behavioral:  Negative for confusion and hallucinations.      Objective:     Vital Signs (Most Recent):  Temp: 98.7 °F (37.1 °C) (02/01/23 1118)  Pulse: 86 (02/01/23 1118)  Resp: 18 (02/01/23 1118)  BP: 107/64 (02/01/23 1118)  SpO2: 95 % (02/01/23 1118)   Vital Signs (24h Range):  Temp:  [98.1 °F (36.7 °C)-98.7 °F (37.1 °C)] 98.7 °F (37.1 °C)  Pulse:  [64-90] 86  Resp:  [18] 18  SpO2:  [94 %-97 %] 95 %  BP: ()/(48-64) 107/64     Weight: 56.7 kg (125 lb)  Body mass index is 22.14 kg/m².    Intake/Output Summary (Last 24 hours) at 2/1/2023 1509  Last data filed at 2/1/2023 1330  Gross per 24 hour   Intake 720 ml   Output 800 ml   Net -80 ml        Physical Exam  Vitals and nursing note reviewed.   Constitutional:       General: She is not in acute distress.     Appearance: She is ill-appearing (chronically ill appearing).   Eyes:      Extraocular Movements: Extraocular movements intact.      Pupils: Pupils are equal, round, and reactive to light.    Cardiovascular:      Rate and Rhythm: Normal rate and regular rhythm.      Heart sounds: Murmur heard.     No gallop.   Pulmonary:      Effort: Pulmonary effort is normal. No respiratory distress.      Breath sounds: Normal breath sounds. No wheezing or rales.   Abdominal:      General: There is no distension.      Palpations: Abdomen is soft.      Tenderness: There is no abdominal tenderness. There is no guarding.   Musculoskeletal:         General: No swelling or tenderness.      Right lower leg: Edema present.      Left lower leg: Edema present.      Comments: Trace edema in bilateral LE   Skin:     General: Skin is warm and dry.   Neurological:      General: No focal deficit present.      Mental Status: She is alert and oriented to person, place, and time.   Psychiatric:         Mood and Affect: Mood normal.         Behavior: Behavior is cooperative.         Thought Content: Thought content normal.       Significant Labs: All pertinent labs within the past 24 hours have been reviewed.    Significant Imaging: I have reviewed all pertinent imaging results/findings within the past 24 hours.      Assessment/Plan:      * GI bleed  - Patient presented with melanotic stools  - Risk factors: on aspirin, plavix.   - Baseline Hgb: 10-11  - Hgb on admission: 7.1. Was 6.4 on 01/30 on outpatient labs  - Etiology: likely upper GI bleed    Plan:  - Monitor for active bleeding  - Consult to GI: s/p EGD on 01/31, which showed friable mucosa noted in duodenal bulb. Likely source of bleeding.   -GI recommend to resume previous diet and consider stopping plavix.   -Will f/u anemia in GI clinic  - Monitor Hgb, if remains stable can likely discharge in AM     Acute blood loss anemia  -Hgb noted to be 6.4 on outpatient labs on 01/30   -Was given 1U of pRBC on 01/30 in ED with appropiate response (hgb 7.4 on 01/31).   -GI consulted- s/p EGD on 01/31, which showed friable mucosa noted in duodenal bulb. Likely source of bleeding.    -CBC in AM     Troponin level elevated  Likely in the setting of GI bleed.   No chest pain. Doubt ACS   Telemetry monitoring      Chronic systolic congestive heart failure  BNP  Recent Labs   Lab 01/30/23 2020   BNP >4,900*     - Patient is on room air. Euvolemic/slightly hypervolemic on examination.   - Doubt exacerbation. Elevated BNP is likely due to entresto.   - BNP chronically elevated  - Resume home medications when able     Coronary artery disease involving native coronary artery of native heart without angina pectoris  Hold aspirin/plavix in the setting of GI bleed.       Essential hypertension  Hold home medications in the setting of hypotension     Hyperlipidemia  Continue statin      Stage 3 chronic kidney disease  - History of CKD stage 3  - Currently not on HD  - Baseline creatinine 2.0 mg/dL    Recent Labs   Lab 01/30/23  1856 01/31/23  0743 02/01/23  0408   BUN 82* 70* 61*   CREATININE 2.2* 1.9* 1.7*       Plan:  - Monitor Cr and BUN  - Monitor for acidosis, hyperkalemia, fluid overload and signs of uremia  - Avoid nephrotoxins  - Daily weights  - Strict I/Os  - Stable     GERD (gastroesophageal reflux disease)  Continue PPI    Goals of care, counseling/discussion    Advance Care Planning     Date: 02/01/2023  -On chart review, previous discussion and code status was DNR.   -Discussed ACP on 02/01      Code Status  I engaged the the patient in a conversation about the patient's preferences for care  at the very end of life. The patient wishes to have a natural, peaceful death.  Along those lines, the patient does not wish to have CPR or other invasive treatments performed when her heart and/or breathing stops. I communicated to the patient that a DNR order would be placed in her medical record to reflect this preference.  I spent a total of 18 minutes engaging the patient in this advance care planning discussion.            Code Status: DNR        VTE Risk Mitigation (From admission, onward)          Ordered     IP VTE HIGH RISK PATIENT  Once         01/30/23 2320     Place sequential compression device  Until discontinued         01/30/23 2319                Discharge Planning   MARTELL:      Code Status: DNR   Is the patient medically ready for discharge?:     Reason for patient still in hospital (select all that apply): Patient trending condition and Consult recommendations                     Dwayne Feliciano DO  Department of Hospital Medicine   [facility]

## 2023-02-01 NOTE — SUBJECTIVE & OBJECTIVE
Interval History:  No acute overnight events.  Patient remained afebrile.  States her abdominal pain has improved and now resolved.  Has not noticed any further episodes of melena.  Alert and oriented x3 this morning.  Still has some fatigue, but improving.      Review of Systems   Constitutional:  Positive for fatigue. Negative for chills and fever.   Eyes:  Negative for visual disturbance.   Respiratory:  Negative for cough and shortness of breath.    Cardiovascular:  Negative for chest pain, palpitations and leg swelling.   Gastrointestinal:  Negative for abdominal pain (resolved), blood in stool (prior to admit but none currently), diarrhea, nausea and vomiting.   Genitourinary:  Negative for difficulty urinating, dysuria and hematuria.   Musculoskeletal:  Negative for joint swelling.   Neurological:  Negative for dizziness, weakness and headaches.   Psychiatric/Behavioral:  Negative for confusion and hallucinations.      Objective:     Vital Signs (Most Recent):  Temp: 98.7 °F (37.1 °C) (02/01/23 1118)  Pulse: 86 (02/01/23 1118)  Resp: 18 (02/01/23 1118)  BP: 107/64 (02/01/23 1118)  SpO2: 95 % (02/01/23 1118)   Vital Signs (24h Range):  Temp:  [98.1 °F (36.7 °C)-98.7 °F (37.1 °C)] 98.7 °F (37.1 °C)  Pulse:  [64-90] 86  Resp:  [18] 18  SpO2:  [94 %-97 %] 95 %  BP: ()/(48-64) 107/64     Weight: 56.7 kg (125 lb)  Body mass index is 22.14 kg/m².    Intake/Output Summary (Last 24 hours) at 2/1/2023 1509  Last data filed at 2/1/2023 1330  Gross per 24 hour   Intake 720 ml   Output 800 ml   Net -80 ml        Physical Exam  Vitals and nursing note reviewed.   Constitutional:       General: She is not in acute distress.     Appearance: She is ill-appearing (chronically ill appearing).   Eyes:      Extraocular Movements: Extraocular movements intact.      Pupils: Pupils are equal, round, and reactive to light.   Cardiovascular:      Rate and Rhythm: Normal rate and regular rhythm.      Heart sounds: Murmur heard.      No gallop.   Pulmonary:      Effort: Pulmonary effort is normal. No respiratory distress.      Breath sounds: Normal breath sounds. No wheezing or rales.   Abdominal:      General: There is no distension.      Palpations: Abdomen is soft.      Tenderness: There is no abdominal tenderness. There is no guarding.   Musculoskeletal:         General: No swelling or tenderness.      Right lower leg: Edema present.      Left lower leg: Edema present.      Comments: Trace edema in bilateral LE   Skin:     General: Skin is warm and dry.   Neurological:      General: No focal deficit present.      Mental Status: She is alert and oriented to person, place, and time.   Psychiatric:         Mood and Affect: Mood normal.         Behavior: Behavior is cooperative.         Thought Content: Thought content normal.       Significant Labs: All pertinent labs within the past 24 hours have been reviewed.    Significant Imaging: I have reviewed all pertinent imaging results/findings within the past 24 hours.

## 2023-02-01 NOTE — ASSESSMENT & PLAN NOTE
- History of CKD stage 3  - Currently not on HD  - Baseline creatinine 2.0 mg/dL    Recent Labs   Lab 01/30/23  1856 01/31/23  0743 02/01/23  0408   BUN 82* 70* 61*   CREATININE 2.2* 1.9* 1.7*       Plan:  - Monitor Cr and BUN  - Monitor for acidosis, hyperkalemia, fluid overload and signs of uremia  - Avoid nephrotoxins  - Daily weights  - Strict I/Os  - Stable

## 2023-02-02 LAB
ANION GAP SERPL CALC-SCNC: 7 MMOL/L (ref 8–16)
BASOPHILS # BLD AUTO: 0.05 K/UL (ref 0–0.2)
BASOPHILS NFR BLD: 0.8 % (ref 0–1.9)
BUN SERPL-MCNC: 59 MG/DL (ref 8–23)
CALCIUM SERPL-MCNC: 9.4 MG/DL (ref 8.7–10.5)
CHLORIDE SERPL-SCNC: 109 MMOL/L (ref 95–110)
CO2 SERPL-SCNC: 23 MMOL/L (ref 23–29)
CREAT SERPL-MCNC: 1.6 MG/DL (ref 0.5–1.4)
DIFFERENTIAL METHOD: ABNORMAL
EOSINOPHIL # BLD AUTO: 0.2 K/UL (ref 0–0.5)
EOSINOPHIL NFR BLD: 2.5 % (ref 0–8)
ERYTHROCYTE [DISTWIDTH] IN BLOOD BY AUTOMATED COUNT: 18.3 % (ref 11.5–14.5)
EST. GFR  (NO RACE VARIABLE): 31 ML/MIN/1.73 M^2
GLUCOSE SERPL-MCNC: 97 MG/DL (ref 70–110)
HCT VFR BLD AUTO: 23 % (ref 37–48.5)
HCT VFR BLD AUTO: 24.3 % (ref 37–48.5)
HGB BLD-MCNC: 7.1 G/DL (ref 12–16)
HGB BLD-MCNC: 7.6 G/DL (ref 12–16)
IMM GRANULOCYTES # BLD AUTO: 0.04 K/UL (ref 0–0.04)
IMM GRANULOCYTES NFR BLD AUTO: 0.7 % (ref 0–0.5)
LYMPHOCYTES # BLD AUTO: 0.8 K/UL (ref 1–4.8)
LYMPHOCYTES NFR BLD: 13.4 % (ref 18–48)
MCH RBC QN AUTO: 30 PG (ref 27–31)
MCHC RBC AUTO-ENTMCNC: 30.9 G/DL (ref 32–36)
MCV RBC AUTO: 97 FL (ref 82–98)
MONOCYTES # BLD AUTO: 0.7 K/UL (ref 0.3–1)
MONOCYTES NFR BLD: 11.9 % (ref 4–15)
NEUTROPHILS # BLD AUTO: 4.3 K/UL (ref 1.8–7.7)
NEUTROPHILS NFR BLD: 70.7 % (ref 38–73)
NRBC BLD-RTO: 0 /100 WBC
PLATELET # BLD AUTO: 120 K/UL (ref 150–450)
PMV BLD AUTO: 10.6 FL (ref 9.2–12.9)
POTASSIUM SERPL-SCNC: 3.8 MMOL/L (ref 3.5–5.1)
RBC # BLD AUTO: 2.37 M/UL (ref 4–5.4)
SODIUM SERPL-SCNC: 139 MMOL/L (ref 136–145)
WBC # BLD AUTO: 6.11 K/UL (ref 3.9–12.7)

## 2023-02-02 PROCEDURE — 85025 COMPLETE CBC W/AUTO DIFF WBC: CPT | Performed by: STUDENT IN AN ORGANIZED HEALTH CARE EDUCATION/TRAINING PROGRAM

## 2023-02-02 PROCEDURE — 25000242 PHARM REV CODE 250 ALT 637 W/ HCPCS: Performed by: STUDENT IN AN ORGANIZED HEALTH CARE EDUCATION/TRAINING PROGRAM

## 2023-02-02 PROCEDURE — 25000003 PHARM REV CODE 250: Performed by: STUDENT IN AN ORGANIZED HEALTH CARE EDUCATION/TRAINING PROGRAM

## 2023-02-02 PROCEDURE — 36415 COLL VENOUS BLD VENIPUNCTURE: CPT | Performed by: STUDENT IN AN ORGANIZED HEALTH CARE EDUCATION/TRAINING PROGRAM

## 2023-02-02 PROCEDURE — 97165 OT EVAL LOW COMPLEX 30 MIN: CPT

## 2023-02-02 PROCEDURE — 11000001 HC ACUTE MED/SURG PRIVATE ROOM

## 2023-02-02 PROCEDURE — 63600175 PHARM REV CODE 636 W HCPCS: Performed by: STUDENT IN AN ORGANIZED HEALTH CARE EDUCATION/TRAINING PROGRAM

## 2023-02-02 PROCEDURE — 25000003 PHARM REV CODE 250: Performed by: NURSE PRACTITIONER

## 2023-02-02 PROCEDURE — 85018 HEMOGLOBIN: CPT | Performed by: STUDENT IN AN ORGANIZED HEALTH CARE EDUCATION/TRAINING PROGRAM

## 2023-02-02 PROCEDURE — 80048 BASIC METABOLIC PNL TOTAL CA: CPT | Performed by: STUDENT IN AN ORGANIZED HEALTH CARE EDUCATION/TRAINING PROGRAM

## 2023-02-02 PROCEDURE — 97161 PT EVAL LOW COMPLEX 20 MIN: CPT

## 2023-02-02 PROCEDURE — 85014 HEMATOCRIT: CPT | Performed by: STUDENT IN AN ORGANIZED HEALTH CARE EDUCATION/TRAINING PROGRAM

## 2023-02-02 RX ORDER — FLUTICASONE PROPIONATE 50 MCG
2 SPRAY, SUSPENSION (ML) NASAL DAILY
Status: DISCONTINUED | OUTPATIENT
Start: 2023-02-02 | End: 2023-02-04 | Stop reason: HOSPADM

## 2023-02-02 RX ORDER — CETIRIZINE HYDROCHLORIDE 5 MG/1
5 TABLET ORAL DAILY
Status: DISCONTINUED | OUTPATIENT
Start: 2023-02-02 | End: 2023-02-02

## 2023-02-02 RX ORDER — POLYETHYLENE GLYCOL 3350, SODIUM SULFATE ANHYDROUS, SODIUM BICARBONATE, SODIUM CHLORIDE, POTASSIUM CHLORIDE 236; 22.74; 6.74; 5.86; 2.97 G/4L; G/4L; G/4L; G/4L; G/4L
2000 POWDER, FOR SOLUTION ORAL ONCE
Status: COMPLETED | OUTPATIENT
Start: 2023-02-02 | End: 2023-02-02

## 2023-02-02 RX ORDER — CETIRIZINE HYDROCHLORIDE 5 MG/1
5 TABLET ORAL DAILY
Status: DISCONTINUED | OUTPATIENT
Start: 2023-02-02 | End: 2023-02-04 | Stop reason: HOSPADM

## 2023-02-02 RX ORDER — POLYETHYLENE GLYCOL 3350, SODIUM SULFATE ANHYDROUS, SODIUM BICARBONATE, SODIUM CHLORIDE, POTASSIUM CHLORIDE 236; 22.74; 6.74; 5.86; 2.97 G/4L; G/4L; G/4L; G/4L; G/4L
2000 POWDER, FOR SOLUTION ORAL ONCE
Status: COMPLETED | OUTPATIENT
Start: 2023-02-03 | End: 2023-02-03

## 2023-02-02 RX ADMIN — ATORVASTATIN CALCIUM 20 MG: 10 TABLET, FILM COATED ORAL at 09:02

## 2023-02-02 RX ADMIN — ALLOPURINOL 100 MG: 100 TABLET ORAL at 08:02

## 2023-02-02 RX ADMIN — ACETAMINOPHEN 650 MG: 325 TABLET ORAL at 08:02

## 2023-02-02 RX ADMIN — FLUTICASONE PROPIONATE 100 MCG: 50 SPRAY, METERED NASAL at 12:02

## 2023-02-02 RX ADMIN — OXYBUTYNIN CHLORIDE 5 MG: 5 TABLET, EXTENDED RELEASE ORAL at 09:02

## 2023-02-02 RX ADMIN — CETIRIZINE HYDROCHLORIDE 5 MG: 5 TABLET ORAL at 12:02

## 2023-02-02 RX ADMIN — ONDANSETRON HYDROCHLORIDE 4 MG: 2 SOLUTION INTRAMUSCULAR; INTRAVENOUS at 12:02

## 2023-02-02 RX ADMIN — POLYETHYLENE GLYCOL 3350, SODIUM SULFATE ANHYDROUS, SODIUM BICARBONATE, SODIUM CHLORIDE, POTASSIUM CHLORIDE 2000 ML: 236; 22.74; 6.74; 5.86; 2.97 POWDER, FOR SOLUTION ORAL at 05:02

## 2023-02-02 NOTE — PLAN OF CARE
Problem: Adult Inpatient Plan of Care  Goal: Plan of Care Review  2/1/2023 1825 by Ciara Aguilar LPN  Outcome: Ongoing, Progressing  Flowsheets (Taken 2/1/2023 1825)  Plan of Care Reviewed With: patient  2/1/2023 1648 by Ciara Aguilar LPN  Outcome: Ongoing, Progressing  Flowsheets (Taken 2/1/2023 1558)  Plan of Care Reviewed With: patient  Goal: Patient-Specific Goal (Individualized)  2/1/2023 1825 by Ciara Aguilar LPN  Outcome: Ongoing, Progressing  2/1/2023 1648 by Ciara Aguilar LPN  Outcome: Ongoing, Progressing  Goal: Absence of Hospital-Acquired Illness or Injury  2/1/2023 1825 by Ciara Aguilar LPN  Outcome: Ongoing, Progressing  2/1/2023 1648 by Ciara Aguilar LPN  Outcome: Ongoing, Progressing  Intervention: Identify and Manage Fall Risk  2/1/2023 1825 by Ciara Aguilar LPN  Flowsneal (Taken 2/1/2023 1825)  Safety Promotion/Fall Prevention:   assistive device/personal item within reach   Fall Risk reviewed with patient/family   bed alarm set   high risk medications identified   medications reviewed   nonskid shoes/socks when out of bed   room near unit station   side rails raised x 2   instructed to call staff for mobility  2/1/2023 1648 by Ciara Aguilar LPN  Flowsneal (Taken 2/1/2023 1558)  Safety Promotion/Fall Prevention:   assistive device/personal item within reach   Fall Risk reviewed with patient/family   high risk medications identified   side rails raised x 2   nonskid shoes/socks when out of bed   medications reviewed   room near unit station   instructed to call staff for mobility  Intervention: Prevent Skin Injury  2/1/2023 1825 by Ciara Aguilar LPN  Flowsheets (Taken 2/1/2023 1825)  Body Position:   position changed independently   weight shifting  Skin Protection:   adhesive use limited   tubing/devices free from skin contact  2/1/2023 1648 by Ciara Aguilar LPN  Flowsheets (Taken 2/1/2023 1558)  Body Position:   position changed independently   weight shifting  Skin Protection:   adhesive use  limited   tubing/devices free from skin contact  Intervention: Prevent and Manage VTE (Venous Thromboembolism) Risk  Flowsheets (Taken 2/1/2023 1825)  Activity Management:   Ankle pumps - L1   Arm raise - L1   Rolling - L1   Straight leg raise - L1  VTE Prevention/Management:   ambulation promoted   bleeding precations maintained   bleeding risk assessed  Intervention: Prevent Infection  Flowsheets (Taken 2/1/2023 1825)  Infection Prevention: hand hygiene promoted  Goal: Optimal Comfort and Wellbeing  2/1/2023 1825 by Ciara Ward, BLAKEN  Outcome: Ongoing, Progressing  2/1/2023 1648 by [facility], LPN  Outcome: Ongoing, Progressing  Goal: Readiness for Transition of Care  2/1/2023 1825 by [facility], N  Outcome: Ongoing, Progressing  2/1/2023 1648 by [facility], LPN  Outcome: Ongoing, Progressing     Problem: Adjustment to Illness (Gastrointestinal Bleeding)  Goal: Optimal Coping with Acute Illness  2/1/2023 1825 by [facility], N  Outcome: Ongoing, Progressing  2/1/2023 1648 by [facility], LPN  Outcome: Ongoing, Progressing     Problem: Bleeding (Gastrointestinal Bleeding)  Goal: Hemostasis  2/1/2023 1825 by [facility], LPN  Outcome: Ongoing, Progressing  2/1/2023 1648 by [facility], LPN  Outcome: Ongoing, Progressing     Problem: Skin Injury Risk Increased  Goal: Skin Health and Integrity  2/1/2023 1825 by [facility], N  Outcome: Ongoing, Progressing  2/1/2023 1648 by [facility], LPN  Outcome: Ongoing, Progressing  Intervention: Optimize Skin Protection  Flowsheets (Taken 2/1/2023 1825)  Pressure Reduction Techniques:   frequent weight shift encouraged   weight shift assistance provided   pressure points protected  Skin Protection:   adhesive use limited   tubing/devices free from skin contact  Head of Bed (HOB) Positioning: HOB at 30-45 degrees     Problem: Fluid and Electrolyte Imbalance (Acute Kidney Injury/Impairment)  Goal: Fluid and Electrolyte Balance  2/1/2023 1825 by Ciara Aguilar,  LPN  Outcome: Ongoing, Progressing  2/1/2023 1648 by [facility], LPN  Outcome: Ongoing, Progressing     Problem: Oral Intake Inadequate (Acute Kidney Injury/Impairment)  Goal: Optimal Nutrition Intake  2/1/2023 1825 by Ciara Aguilar, LPN  Outcome: Ongoing, Progressing  2/1/2023 1648 by [facility], LPN  Outcome: Ongoing, Progressing     Problem: Renal Function Impairment (Acute Kidney Injury/Impairment)  Goal: Effective Renal Function  2/1/2023 1825 by [facility] Aguilar, LPN  Outcome: Ongoing, Progressing  2/1/2023 1648 by [facility], LPN  Outcome: Ongoing, Progressing     Problem: Fall Injury Risk  Goal: Absence of Fall and Fall-Related Injury  2/1/2023 1825 by [facility], LPN  Outcome: Ongoing, Progressing  2/1/2023 1648 by [facility], LPN  Outcome: Ongoing, Progressing  Intervention: Identify and Manage Contributors  Flowsheets (Taken 2/1/2023 1825)  Self-Care Promotion:   independence encouraged   BADL personal objects within reach   BADL personal routines maintained   meal set-up provided   safe use of adaptive equipment encouraged  Medication Review/Management:   high-risk medications identified   medications reviewed  Intervention: Promote Injury-Free Environment  Flowsheets (Taken 2/1/2023 1825)  Safety Promotion/Fall Prevention:   assistive device/personal item within reach   Fall Risk reviewed with patient/family   bed alarm set   high risk medications identified   medications reviewed   nonskid shoes/socks when out of bed   room near unit station   side rails raised x 2   instructed to call staff for mobility   Pt alert able to make needs known,shae meds well,no s/s adverse reaction noted,no bleeding noted this shift,reposition self q 2hrs, no c/o pain POC explained,remains free from falls and pressure injuries,safety maintained,continue monitoring.

## 2023-02-02 NOTE — PT/OT/SLP EVAL
Physical Therapy Evaluation and Discharge Note    Patient Name:  Palmira Malave   MRN:  [id number]    Recommendations:     Discharge Recommendations: home health PT, home health OT (Family supervision/assist PRN)  Discharge Equipment Recommendations: none   Barriers to discharge:  None from PT standpoint.      Assessment:     Palmira Malave is a 87 y.o. female admitted with a medical diagnosis of GI bleed. .  At this time, patient is functioning at/near their prior level of function and does not require further acute PT services.     Recent Surgery: Procedure(s) (LRB):  EGD (ESOPHAGOGASTRODUODENOSCOPY) (N/A) 2 Days Post-Op    Plan:     During this hospitalization, patient does not require further acute PT services.  Please re-consult if situation changes.      Subjective     Chief Complaint: tired of being in the hospital  Patient/Family Comments/goals: to go home  Pain/Comfort:  Pain Rating 1: 0/10    Patients cultural, spiritual, Quaker conflicts given the current situation: no    Living Environment:  Pt lives with her Daughter, home currently being remodeled?  Pt states that they are putting in a lift.     Prior to admission, patients level of function was Modified Independent with ambulation and ADL's.  Equipment used at home: walker, rolling, shower chair.  DME owned (not currently used):  Quad cane .  Upon discharge, patient will have assistance from Daughter.    Objective:     Communicated with nsg prior to session.  Patient found HOB elevated with bed alarm, telemetry, peripheral IV, PureWick, SCD (Avasys) upon PT entry to room.    General Precautions: Standard, fall    Orthopedic Precautions:N/A   Braces: N/A  Respiratory Status: Room air    Exams:  Cognitive Exam:  Patient is oriented to Person and Place  Gross Motor Coordination:  impaired 2/2 weakness and deconditioning  Postural Exam:  Patient presented with the following abnormalities:    -       Rounded shoulders  -       Forward head  -        Abnormal trunk flexion  Sensation:    -       Intact  light/touch B LE's  Skin Integrity/Edema:  -       Skin integrity: Visible skin intact  -       Edema: None noted    RLE ROM: WFL  RLE Strength: WFL  LLE ROM: WFL  LLE Strength: WFL    Functional Mobility:  Bed Mobility:     Scooting: stand by assistance  Supine to Sit: contact guard assistance  Transfers:     Sit to Stand:  contact guard assistance with VC/TC for hand placement with RW  Gait: Gait training with RW and CGA/SBA with VC/TC for increased trunk ext and walker management/safety approx 30'  Balance: Fair+ sit, fair+/fair stand    AM-PAC 6 CLICK MOBILITY  Total Score:18       Treatment and Education:  Evaluation only    AM-PAC 6 CLICK MOBILITY  Total Score:18     Patient left up in chair with all lines intact, call button in reach, and nsg notified.    GOALS:   Multidisciplinary Problems       Physical Therapy Goals          Problem: Physical Therapy    Goal Priority Disciplines Outcome Goal Variances Interventions   Physical Therapy Goal     PT, PT/OT Adequate for Care Transition                         History:     Past Medical History:   Diagnosis Date    Cataract     CHF (congestive heart failure)     CKD (chronic kidney disease), stage III     Coronary artery disease     Gout attack     Hypercholesteremia     Hypertension     Renal disorder     Vaginal delivery     x4       Past Surgical History:   Procedure Laterality Date    APPENDECTOMY      CARDIAC DEFIBRILLATOR PLACEMENT      2015    CATARACT EXTRACTION W/  INTRAOCULAR LENS IMPLANT Left 02/07/2019    Dr. Velazco    CATARACT EXTRACTION W/  INTRAOCULAR LENS IMPLANT Right 02/21/2019    Dr. Velazco    CHOLECYSTECTOMY      COLONOSCOPY W/ POLYPECTOMY  10 or 11/ 2014    per Dr. Myrick    defribrandonlator Left 8/2008    ESOPHAGOGASTRODUODENOSCOPY N/A 1/31/2023    Procedure: EGD (ESOPHAGOGASTRODUODENOSCOPY);  Surgeon: Jordon Sotomayor MD;  Location: [facility];  Service: Endoscopy;  Laterality:  N/A;    EYE SURGERY      HYSTERECTOMY      INTRAOCULAR PROSTHESES INSERTION Left 2/7/2019    Procedure: INSERTION, IOL PROSTHESIS;  Surgeon: Demetrius Velazco MD;  Location: [facility] OR;  Service: Ophthalmology;  Laterality: Left;  RN Phone Pre OP 1-30-19.  Arrival 05:30 AM    INTRAOCULAR PROSTHESES INSERTION Right 2/21/2019    Procedure: INSERTION, IOL PROSTHESIS;  Surgeon: Demetrius Velazco MD;  Location: [facility] OR;  Service: Ophthalmology;  Laterality: Right;    JOINT REPLACEMENT Bilateral 2005 & 2006    2 Knee Replacements=Lt. 1= 2005. Rt. 1= 2006    OOPHORECTOMY      PHACOEMULSIFICATION OF CATARACT Left 2/7/2019    Procedure: PHACOEMULSIFICATION, CATARACT;  Surgeon: Demetrius Velazco MD;  Location: [facility] OR;  Service: Ophthalmology;  Laterality: Left;    PHACOEMULSIFICATION OF CATARACT Right 2/21/2019    Procedure: PHACOEMULSIFICATION, CATARACT;  Surgeon: Demetrius Velazco MD;  Location: [facility] OR;  Service: Ophthalmology;  Laterality: Right;  RN Phone Pre op 2-15-19.  Arrival 05:30 AM    TOTAL KNEE ARTHROPLASTY Bilateral Lt.= 2005; Rt.=2006    Bilateral Knee scope       Time Tracking:     PT Received On: 02/02/23  PT Start Time: 1200     PT Stop Time: 1219  PT Total Time (min): 19 min     Billable Minutes: Evaluation 19 Co-evaluation with OT      02/02/2023

## 2023-02-02 NOTE — PLAN OF CARE
Problem: Physical Therapy  Goal: Physical Therapy Goal  Outcome: Adequate for Care Transition   Initial PT evaluation performed today.  Pt OK for D/C home with family.  Close supervision/assist PRN and HHPT/OT recommended.  PT to sign off.

## 2023-02-02 NOTE — ASSESSMENT & PLAN NOTE
- Patient presented with melanotic stools  - Risk factors: on aspirin, plavix.   - Baseline Hgb: 10-11  - Hgb on admission: 7.1. Was 6.4 on 01/30 on outpatient labs  - Etiology: likely upper GI bleed    Plan:  - Monitor for active bleeding  - Consult to GI: s/p EGD on 01/31, which showed friable mucosa noted in duodenal bulb. Likely source of bleeding.   -GI recommend to resume previous diet and consider stopping plavix.   -Will f/u anemia in GI clinic  -Pt with large BM on 02/02, which appeared to be very dark.Discussed with GI. Monitor overnight. May need colonoscopy   - Monitor Hgb, possible colonoscopy vs capsule endoscopy in the AM

## 2023-02-02 NOTE — PLAN OF CARE
[facility] - Med Surg  Discharge Final Note    Patient clear to discharge from case management stand point. Reviewed follow up with patient, verbalized understanding listed on avs.     Home health referral and plan of care orders faxed to [facility] for in network provider to be arranged.     Primary Care Provider: Qi Ramon MD    Expected Discharge Date: 2/2/2023    Final Discharge Note (most recent)       Final Note - 02/02/23 1507          Final Note    Assessment Type Final Discharge Note     Anticipated Discharge Disposition Home-Health Care [facility]     What phone number can be called within the next 1-3 days to see how you are doing after discharge? [phone]     Hospital Resources/Appts/Education Provided Provided patient/caregiver with written discharge plan information;Appointments scheduled and added to AVS        Post-Acute Status    Post-Acute Authorization Home Health     Home Health Status Set-up Complete/Auth obtained     Coverage PHN     Discharge Delays None known at this time                     Important Message from Medicare  Important Message from Medicare regarding Discharge Appeal Rights: Given to patient/caregiver, Explained to patient/caregiver, Signed/date by patient/caregiver     Date IMM was signed: 02/02/23  Time IMM was signed: 0930    Contact Info       Qi Ramon MD   Specialty: Internal Medicine   Relationship: PCP - [address]  [address]  [place] [address]   Phone: [phone]       Next Steps: Follow up on 2/7/2023    Instructions: @11:30am for a hospital follow up. Dr Ramon office is requesing that you bring a copy of your discharge instructions with you.    [facility]    [address]  [place] [place] [address]   Phone: [phone]       Next Steps: Follow up    Instructions: Home health to be arranged with in network providered.

## 2023-02-02 NOTE — ASSESSMENT & PLAN NOTE
- History of CKD stage 3  - Currently not on HD  - Baseline creatinine 2.0 mg/dL    Recent Labs   Lab 01/31/23  0743 02/01/23  0408 02/02/23  0532   BUN 70* 61* 59*   CREATININE 1.9* 1.7* 1.6*       Plan:  - Monitor Cr and BUN  - Monitor for acidosis, hyperkalemia, fluid overload and signs of uremia  - Avoid nephrotoxins  - Daily weights  - Strict I/Os  - Stable

## 2023-02-02 NOTE — NURSING
Loud foul smell noted in hallway when this nurse entered pt room was informed pt had just had a BM upon assessment dark red foul smelling noted in bedside commode bag.  Notified.

## 2023-02-02 NOTE — PLAN OF CARE
Chart check complete, pt resting comfortably, disoriented to place, time, and situation.  Bed alarm on and avasys at the bedside for safety.  Purewick in place for comfort, no BM this shift. No signs of bleeding throughout the night.  Pt denies pain/N/V.  NO acute distress noted, pt free from falls or injury this shift.  Bed in low position, wheels locked, bed alarm on for safety, call light in reach for assistance, will continue to monitor.      Problem: Adult Inpatient Plan of Care  Goal: Plan of Care Review  Outcome: Ongoing, Progressing     Problem: Adult Inpatient Plan of Care  Goal: Patient-Specific Goal (Individualized)  Outcome: Ongoing, Progressing     Problem: Adult Inpatient Plan of Care  Goal: Absence of Hospital-Acquired Illness or Injury  Outcome: Ongoing, Progressing     Problem: Adult Inpatient Plan of Care  Goal: Optimal Comfort and Wellbeing  Outcome: Ongoing, Progressing     Problem: Adjustment to Illness (Gastrointestinal Bleeding)  Goal: Optimal Coping with Acute Illness  Outcome: Ongoing, Progressing     Problem: Fluid and Electrolyte Imbalance (Acute Kidney Injury/Impairment)  Goal: Fluid and Electrolyte Balance  Outcome: Ongoing, Progressing     Problem: Renal Function Impairment (Acute Kidney Injury/Impairment)  Goal: Effective Renal Function  Outcome: Ongoing, Progressing     Problem: Fall Injury Risk  Goal: Absence of Fall and Fall-Related Injury  Outcome: Ongoing, Progressing

## 2023-02-02 NOTE — SUBJECTIVE & OBJECTIVE
Interval History:  No acute overnight events.  Patient remained afebrile.  States her abdominal pain has resolved.  Alert and oriented x3 this morning.  Still has some fatigue, but improving.      Review of Systems   Constitutional:  Negative for chills, fatigue and fever.   Eyes:  Negative for visual disturbance.   Respiratory:  Negative for cough and shortness of breath.    Cardiovascular:  Negative for chest pain, palpitations and leg swelling.   Gastrointestinal:  Positive for blood in stool (prior to admit but none currently). Negative for abdominal pain (resolved), diarrhea, nausea and vomiting.   Genitourinary:  Negative for difficulty urinating, dysuria and hematuria.   Musculoskeletal:  Negative for joint swelling.   Neurological:  Negative for dizziness, weakness and headaches.   Psychiatric/Behavioral:  Negative for confusion and hallucinations.      Objective:     Vital Signs (Most Recent):  Temp: 98.4 °F (36.9 °C) (02/02/23 1550)  Pulse: 88 (02/02/23 1550)  Resp: 18 (02/02/23 1550)  BP: (!) 106/56 (02/02/23 1550)  SpO2: 100 % (02/02/23 1550)   Vital Signs (24h Range):  Temp:  [97.6 °F (36.4 °C)-98.6 °F (37 °C)] 98.4 °F (36.9 °C)  Pulse:  [82-90] 88  Resp:  [16-20] 18  SpO2:  [93 %-100 %] 100 %  BP: (100-111)/(52-86) 106/56     Weight: 61.2 kg (134 lb 14.7 oz)  Body mass index is 23.9 kg/m².    Intake/Output Summary (Last 24 hours) at 2/2/2023 1602  Last data filed at 2/2/2023 1330  Gross per 24 hour   Intake 1200 ml   Output 750 ml   Net 450 ml        Physical Exam  Vitals and nursing note reviewed.   Constitutional:       General: She is not in acute distress.     Appearance: She is ill-appearing (chronically ill appearing).   Eyes:      Extraocular Movements: Extraocular movements intact.      Pupils: Pupils are equal, round, and reactive to light.   Cardiovascular:      Rate and Rhythm: Normal rate and regular rhythm.      Heart sounds: Murmur heard.     No gallop.   Pulmonary:      Effort: Pulmonary  effort is normal. No respiratory distress.      Breath sounds: Normal breath sounds. No wheezing or rales.   Abdominal:      General: There is no distension.      Palpations: Abdomen is soft.      Tenderness: There is no abdominal tenderness. There is no guarding.   Musculoskeletal:         General: No swelling or tenderness.      Right lower leg: Edema present.      Left lower leg: Edema present.      Comments: Trace edema in bilateral LE   Skin:     General: Skin is warm and dry.   Neurological:      General: No focal deficit present.      Mental Status: She is alert and oriented to person, place, and time.   Psychiatric:         Mood and Affect: Mood normal.         Behavior: Behavior is cooperative.         Thought Content: Thought content normal.       Significant Labs: All pertinent labs within the past 24 hours have been reviewed.    Significant Imaging: I have reviewed all pertinent imaging results/findings within the past 24 hours.

## 2023-02-02 NOTE — PLAN OF CARE
02/02/23 0930   Medicare Message   Important Message from Medicare regarding Discharge Appeal Rights Given to patient/caregiver;Explained to patient/caregiver;Signed/date by patient/caregiver   Date IMM was signed 02/02/23   Time IMM was signed 0930

## 2023-02-02 NOTE — PLAN OF CARE
Problem: Occupational Therapy  Goal: Occupational Therapy Goal  Outcome: Adequate for Care Transition     Initial OT eval completed. OT rec HHOT with family assistance at d/c in order to increase safety and independence with ADLs and all aspects of functional mobility. No DME needs.

## 2023-02-02 NOTE — PT/OT/SLP EVAL
Occupational Therapy   Evaluation and Discharge Note    Name: Palmira Malave  MRN: [id number]  Admitting Diagnosis: GI bleed  Recent Surgery: Procedure(s) (LRB):  EGD (ESOPHAGOGASTRODUODENOSCOPY) (N/A) 2 Days Post-Op    Recommendations:     Discharge Recommendations: home health OT (with family supervision/assistance)  Discharge Equipment Recommendations: none  Barriers to discharge:  None    Assessment:     Palmira Malave is a 87 y.o. female with a medical diagnosis of GI bleed. Pt is a level 3 progressive mobility and appropriate for OOB to chair/toilet using RW with nursing staff. OT rec HHOT with family assistance at d/c in order to increase safety and independence with ADLs and all aspects of functional mobility.     Plan:     Per chart, PT/OT eval pending pt d/c.   Plan of Care Reviewed with: patient    Subjective     Chief Complaint: tired of being in the hospital; reports that her brown shoes are missing and that her dtr does not have them- OT searched the room and notified Ciara tolbetr.   Patient/Family Comments/goals: agreeable to eat lunch in the chair     Occupational Profile:  Living Environment: pt lives with her daughter in an elevated home. Pt unable to recall how many steps to enter, but reports that they are working on placing a lift to enter.   Previous level of function: pt has assistance by daughter for ADLs as needed (dressing, bathing); pt uses RW for ambulation.   Equipment Used at home: walker, rolling, shower chair, cane, quad  Assistance upon Discharge: daughter     Pain/Comfort:  Pain Rating 1: 0/10    Patients cultural, spiritual, Episcopal conflicts given the current situation: no    Objective:     Communicated with: nurseCiara, prior to session.  Patient found HOB elevated with telemetry, bed alarm, peripheral IV, PureWick, SCD (avasys) upon OT entry to room.    General Precautions: Standard, fall  Orthopedic Precautions: N/A  Braces: N/A  Respiratory Status: Room air      Occupational Performance:    Bed Mobility:    Patient completed Scooting with contact guard assistance  Patient completed Supine to Sit with minimum assistance with HOB elevated     Functional Mobility/Transfers:  Patient completed Sit <> Stand Transfer with contact guard assistance  with  rolling walker   Functional Mobility: Gait belt donned prior to transfer for safety during mobility/transfers. Pt completed in-room and hallway functional mobility using RW with CGA with rounded shoulders and forward head. Verbal cueing for body mechanics for stand>sit in the chair. No dizziness reported.      Activities of Daily Living:  Upper Body Dressing: minimum assistance to don back gown while seated unsupported EOB   Lower Body Dressing: minimum assistance to adjust brief while pt stood within RW with SBA/CGA     Cognitive/Visual Perceptual:  Cognitive/Psychosocial Skills:     -       Oriented to: Person and Place   -       Follows Commands/attention:follows most simple commands  -       Communication: clear/fluent  -       Memory: Impaired STM  -       Safety awareness/insight to disability: impaired   -       Mood/Affect/Coping skills/emotional control: Appropriate to situation    Physical Exam:  Balance:    -       seated: SBA; standing: CGA with RW   Postural examination/scapula alignment:    -       Rounded shoulders  -       Forward head  Skin integrity: Visible skin intact  Edema:  no BUE edema noted   Sensation:    -       Intact  light/touch BUE  Dominant hand:    -       Right  Upper Extremity Range of Motion:     -       Right Upper Extremity: WFL  -       Left Upper Extremity: WFL  Upper Extremity Strength:    -       Right Upper Extremity: WFL  -       Left Upper Extremity: WFL   Strength:    -       Right Upper Extremity: WFL  -       Left Upper Extremity: WFL  Fine Motor Coordination:    -       Intact  Left hand, manipulation of objects and Right hand, manipulation of objects  Gross motor  coordination:   WFL    [facility] 6 Click ADL:  AMPA Total Score: 19    Treatment & Education:  Pt educated on OT role/POC.   Importance of OOB activity with staff assistance. Encouraged OOB to the chair daily especially for meals   Safety during functional t/f and mobility   No family present; handout left for dtr on home safety tips   Multiple self-care tasks/functional mobility completed- assistance level noted above   All questions/concerns answered within OT scope of practice       Patient left  seated on pillow reclined in the chair with B/L heels offloaded with tray table in front of pt with lunch set-up  with all lines intact, call button in reach, nurse, Ciara, notified, and Lara, PT, present. Avas[employer] present. Blinds on, lights turned on, tv on, door left open.     GOALS:   Multidisciplinary Problems       Occupational Therapy Goals          Problem: Occupational Therapy    Goal Priority Disciplines Outcome Interventions   Occupational Therapy Goal     OT, PT/OT Adequate for Care Transition                        History:     Past Medical History:   Diagnosis Date    Cataract     CHF (congestive heart failure)     CKD (chronic kidney disease), stage III     Coronary artery disease     Gout attack     Hypercholesteremia     Hypertension     Renal disorder     Vaginal delivery     x4         Past Surgical History:   Procedure Laterality Date    APPENDECTOMY      CARDIAC DEFIBRILLATOR PLACEMENT      2015    CATARACT EXTRACTION W/  INTRAOCULAR LENS IMPLANT Left 02/07/2019    Dr. Velazco    CATARACT EXTRACTION W/  INTRAOCULAR LENS IMPLANT Right 02/21/2019    Dr. Velazco    CHOLECYSTECTOMY      COLONOSCOPY W/ POLYPECTOMY  10 or 11/ 2014    per Dr. Myrick    defribillator Left 8/2008    ESOPHAGOGASTRODUODENOSCOPY N/A 1/31/2023    Procedure: EGD (ESOPHAGOGASTRODUODENOSCOPY);  Surgeon: Jordon Sotomayor MD;  Location: [facility];  Service: Endoscopy;  Laterality: N/A;    EYE SURGERY      HYSTERECTOMY       INTRAOCULAR PROSTHESES INSERTION Left 2/7/2019    Procedure: INSERTION, IOL PROSTHESIS;  Surgeon: Demetrius Velazco MD;  Location: [facility] OR;  Service: Ophthalmology;  Laterality: Left;  RN Phone Pre OP 1-30-19.  Arrival 05:30 AM    INTRAOCULAR PROSTHESES INSERTION Right 2/21/2019    Procedure: INSERTION, IOL PROSTHESIS;  Surgeon: Demetrius Velazco MD;  Location: [facility] OR;  Service: Ophthalmology;  Laterality: Right;    JOINT REPLACEMENT Bilateral 2005 & 2006    2 Knee Replacements=Lt. 1= 2005. Rt. 1= 2006    OOPHORECTOMY      PHACOEMULSIFICATION OF CATARACT Left 2/7/2019    Procedure: PHACOEMULSIFICATION, CATARACT;  Surgeon: Demetrius Velazco MD;  Location: [facility] OR;  Service: Ophthalmology;  Laterality: Left;    PHACOEMULSIFICATION OF CATARACT Right 2/21/2019    Procedure: PHACOEMULSIFICATION, CATARACT;  Surgeon: Demetrius Velazco MD;  Location: [facility] OR;  Service: Ophthalmology;  Laterality: Right;  RN Phone Pre op 2-15-19.  Arrival 05:30 AM    TOTAL KNEE ARTHROPLASTY Bilateral Lt.= 2005; Rt.=2006    Bilateral Knee scope       Time Tracking:     OT Date of Treatment: 02/02/23  OT Start Time: 1202  OT Stop Time: 1217  OT Total Time (min): 15 min    Billable Minutes:Evaluation 15 min (co-eval with PT)    2/2/2023

## 2023-02-02 NOTE — DISCHARGE SUMMARY
[facility] Medicine  Discharge Summary      Patient Name: Palmira Malave  MRN: [id number]  [facility]: [id number]  Patient Class: IP- Inpatient  Admission Date: 1/30/2023  Hospital Length of Stay: 3 days  Discharge Date and Time:  02/02/2023 3:02 PM  Attending Physician: Dwayne Feliciano DO   Discharging Provider: Dwayne Feliciano DO  Primary Care Provider: Qi Ramon MD    Primary Care Team: Networked reference to record PCT     HPI:   This is a 87 year old female with a PMHx of HFrEF (EF: 20%, GIIDD, s/p AICD), CAD (on DAPT), CKD3, HTN, HLD who presents with melena.     History is obtained via chart review given altered mental status. Patient reportedly had melanotic stools, shortness of breath, abdominal pain and weakness. She had blood work done as outpatient and was told that her hemoglobin was 6 prompting her to present to the ED. The patient is lethargic and falls asleep during the interview. She denied having any symptoms but reported that her daughter reported that the patient had black stools. Additionally, she mentions shaking of her hands that started about 2 weeks ago. In the ED, she was hypotensive (86/47), improving with fluids. Labs showed normocytic anemia (7.1 - baseline 10-11), elevated creatinine (2.2, baseline of 2.0), elevated BNP (>4900), elevated troponin (0.135), negative lactic acid (0.8). CXR showed cardiomegaly and mild pulmonary edema/CHF. She was given Protonix 80 mg IV, NS 1000 ml, zofran and morphine 4 mg IV. The patient was admitted for further management.       Procedure(s) (LRB):  EGD (ESOPHAGOGASTRODUODENOSCOPY) (N/A)      Hospital Course:   87 year old female with a PMHx of HFrEF (EF: 20%, GIIDD, s/p AICD), CAD (on DAPT), CKD3, HTN, HLD admitted on 01/30/2023 for melena with concerns for GI bleed and acute blood loss anemia requiring transfusion. Hgb noted to be 6.4 on outpatient labs on 01/30 and was instructed to come to ED for evaluation. Was given 1U of  pRBC on 01/30 with appropiate response (hgb 7.4 on 01/31). GI consulted- s/p EGD on 01/31. Showed friable mucosa noted in duodenal bulb. Likely source of bleeding. GI recommends discontinuing plavix on discharge and f/u with GI outpatient for anemia workup. Hgb remained stable.  No further evidence bleeding.  Patient remained alert and oriented x3.  PT/OT consulted-recommends home health.     Feeling better overall. Denies any melena or blood in stools. Pt denies any fever, headaches, vision changes, chest pain, shortness of breath, palpitations, abdominal pain, nausea, vomiting, or any new weaknesses. Feels ready to go home. Patient's exam on discharge was as follow: Patient is alert and oriented, appears in no acute distress, heart with regular rate and rhythm, lungs clear to asculation with non-labored breathing, abdomen soft, and no new weaknesses or focal deficits seen. Bilateral lower extremities without any edema or calf tenderness.     Patient was counseled regarding any abnormal labs, differential diagnosis, treatment options, risk-benefit, lifestyle changes, prognosis, current condition, and medications. Patient was interactive and attentive.  Patient's questions were answered in a respectful and timely manner. Patient was instructed to follow-up with PCP within 1 week and to continue taking medications as prescribed.  Instructed to also follow up with Gastroenterology outpatient. Also, extensively discussed the risks, benefits, and side effects of patient's medications. Discussed with patient about any medication changes. Patient verbalized understanding and agrees to treatment plan.  Patient is stable for discharge.  Patient has no other questions or concerns at this time.  ED precautions discussed with the patient.    Vital signs are stable. Ambulating without any difficulty. Tolerating p.o. intake without any nausea or vomiting. Afebrile for over 24 hours. Patient is in stable condition and has no  questions or concerns. Patient will be discharge to home with home health and once transportation is secured .   CM/SW to assist with discharge planning.     Vitals:    02/01/23 1937 02/01/23 2341 02/02/23 0337 02/02/23 0656   BP: (!) 108/53 (!) 105/59 (!) 100/57 111/86   BP Location: Left arm Right arm Left arm Left arm   Patient Position: Sitting Lying Lying Lying   Pulse: 88 85 88 88   Resp: 16 18 16 20   Temp: 97.6 °F (36.4 °C) 98.4 °F (36.9 °C) 98.2 °F (36.8 °C) 98 °F (36.7 °C)   TempSrc: Oral Oral Oral Oral   SpO2: 99% 99% (!) 93% 97%   Weight: 61.2 kg (134 lb 14.7 oz)      Height:                  Goals of Care Treatment Preferences:  Code Status: DNR          What is most important right now is to focus on spending time at home, avoiding the hospital, remaining as independent as possible.  Accordingly, we have decided that the best plan to meet the patient's goals includes continuing with treatment.      Consults:   Consults (From admission, onward)        Status Ordering Provider     IP consult to case management  Once        Provider:  (Not yet assigned)    Completed LIZZETH PANTOJA     Inpatient consult to Gastroenterology  Once        Provider:  Joaquina Verde NP    Completed MERVAT KIRAN     Inpatient consult to Gastroenterology  Once        Provider:  Luis Gutierrez MD    Completed KYLIE ELIZABETH          No new Assessment & Plan notes have been filed under this hospital service since the last note was generated.  Service: Hospital Medicine    Final Active Diagnoses:    Diagnosis Date Noted POA    PRINCIPAL PROBLEM:  GI bleed [K92.2] 01/30/2023 Yes    Acute blood loss anemia [D62] 01/31/2023 Yes    Troponin level elevated [R77.8] 11/15/2014 Yes    Chronic systolic congestive heart failure [I50.22]  Yes    Coronary artery disease involving native coronary artery of native heart without angina pectoris [I25.10] 11/15/2014 Yes    Essential hypertension [I10] 11/15/2014 Yes     Chronic     Hyperlipidemia [E78.5] 11/15/2014 Yes     Chronic    Stage 3 chronic kidney disease [N18.30] 11/15/2014 Yes    GERD (gastroesophageal reflux disease) [K21.9] 12/08/2019 Yes     Chronic    Goals of care, counseling/discussion [Z71.89] 04/01/2022 Not Applicable      Problems Resolved During this Admission:       Discharged Condition: stable    Disposition: Home or Self Care    Follow Up:   Follow-up Information     Qi Ramon MD Follow up on 2/7/2023.    Specialty: Internal Medicine  Why: @11:30am for a hospital follow up. Dr Ramon office is requesing that you bring a copy of your discharge instructions with you.  Contact information:  [address] [place] [address] [address]  [address]  [place] [address]  [phone]             [facility] Follow up.    Why: Home health to be arranged with in network providered.  Contact information:  [address]  [facility] [address]  [phone]                     Patient Instructions:      Diet Cardiac     Notify your health care provider if you experience any of the following:  difficulty breathing or increased cough     Notify your health care provider if you experience any of the following:  increased confusion or weakness     Notify your health care provider if you experience any of the following:  persistent dizziness, light-headedness, or visual disturbances     Notify your health care provider if you experience any of the following:  persistent nausea and vomiting or diarrhea     Notify your health care provider if you experience any of the following:  severe uncontrolled pain     Activity as tolerated       Significant Diagnostic Studies: Labs: All labs within the past 24 hours have been reviewed      Recent Results (from the past 100 hour(s))   Comprehensive Metabolic Panel    Collection Time: 01/30/23  6:56 PM   Result Value Ref Range    Sodium 138 136 - 145 mmol/L    Potassium 4.1 3.5 - 5.1 mmol/L    Chloride 105 95 - 110 mmol/L    CO2 23  23 - 29 mmol/L    Glucose 117 (H) 70 - 110 mg/dL    BUN 82 (H) 8 - 23 mg/dL    Creatinine 2.2 (H) 0.5 - 1.4 mg/dL    Calcium 9.2 8.7 - 10.5 mg/dL    Total Protein 6.7 6.0 - 8.4 g/dL    Albumin 3.6 3.5 - 5.2 g/dL    Total Bilirubin 0.7 0.1 - 1.0 mg/dL    Alkaline Phosphatase 68 55 - 135 U/L    AST 18 10 - 40 U/L    ALT 9 (L) 10 - 44 U/L    Anion Gap 10 8 - 16 mmol/L    eGFR 21 (A) >60 mL/min/1.73 m^2   CBC Auto Differential    Collection Time: 01/30/23  6:56 PM   Result Value Ref Range    WBC 5.80 3.90 - 12.70 K/uL    RBC 2.29 (L) 4.00 - 5.40 M/uL    Hemoglobin 7.1 (L) 12.0 - 16.0 g/dL    Hematocrit 22.3 (L) 37.0 - 48.5 %    MCV 97 82 - 98 fL    MCH 31.0 27.0 - 31.0 pg    MCHC 31.8 (L) 32.0 - 36.0 g/dL    RDW 18.1 (H) 11.5 - 14.5 %    Platelets 155 150 - 450 K/uL    MPV 11.0 9.2 - 12.9 fL    Immature Granulocytes 0.5 0.0 - 0.5 %    Gran # (ANC) 4.3 1.8 - 7.7 K/uL    Immature Grans (Abs) 0.03 0.00 - 0.04 K/uL    Lymph # 0.7 (L) 1.0 - 4.8 K/uL    Mono # 0.7 0.3 - 1.0 K/uL    Eos # 0.1 0.0 - 0.5 K/uL    Baso # 0.02 0.00 - 0.20 K/uL    nRBC 1 (A) 0 /100 WBC    Gran % 74.8 (H) 38.0 - 73.0 %    Lymph % 11.6 (L) 18.0 - 48.0 %    Mono % 11.2 4.0 - 15.0 %    Eosinophil % 1.6 0.0 - 8.0 %    Basophil % 0.3 0.0 - 1.9 %    Platelet Estimate Appears normal     Differential Method Automated    Type & Screen    Collection Time: 01/30/23  6:56 PM   Result Value Ref Range    Group & Rh O NEG     Indirect Moiz POS (A)    Lipase    Collection Time: 01/30/23  6:56 PM   Result Value Ref Range    Lipase 30 4 - 60 U/L   Prepare RBC 1 Unit    Collection Time: 01/30/23  6:56 PM   Result Value Ref Range    UNIT NUMBER [id number]     Product Code J6998S30     DISPENSE STATUS TRANSFUSED     CODING SYSTEM [id number]     Unit Blood Type Code 9500     Unit Blood Type O NEG     Unit Expiration [id number]    Antibody identification    Collection Time: 01/30/23  6:56 PM   Result Value Ref Range    Antibody ID POS    Troponin I    Collection Time:  01/30/23  8:20 PM   Result Value Ref Range    Troponin I 0.135 (H) 0.000 - 0.026 ng/mL   Brain natriuretic peptide    Collection Time: 01/30/23  8:20 PM   Result Value Ref Range    BNP >4,900 (H) 0 - 99 pg/mL   Lactic acid, plasma    Collection Time: 01/30/23  9:33 PM   Result Value Ref Range    Lactate (Lactic Acid) 0.8 0.5 - 2.2 mmol/L   Urinalysis    Collection Time: 01/30/23  9:39 PM   Result Value Ref Range    Specimen UA Urine, Clean Catch     Color, UA Yellow Yellow, Straw, Ligia    Appearance, UA Clear Clear    pH, UA 5.0 5.0 - 8.0    Specific Gravity, UA 1.010 1.005 - 1.030    Protein, UA Negative Negative    Glucose, UA Negative Negative    Ketones, UA Negative Negative    Bilirubin (UA) Negative Negative    Occult Blood UA Negative Negative    Nitrite, UA Negative Negative    Urobilinogen, UA Negative <2.0 EU/dL    Leukocytes, UA Negative Negative   CBC auto differential    Collection Time: 01/31/23  6:25 AM   Result Value Ref Range    WBC 6.82 3.90 - 12.70 K/uL    RBC 2.42 (L) 4.00 - 5.40 M/uL    Hemoglobin 7.4 (L) 12.0 - 16.0 g/dL    Hematocrit 23.8 (L) 37.0 - 48.5 %    MCV 98 82 - 98 fL    MCH 30.6 27.0 - 31.0 pg    MCHC 31.1 (L) 32.0 - 36.0 g/dL    RDW 18.8 (H) 11.5 - 14.5 %    Platelets 135 (L) 150 - 450 K/uL    MPV 10.3 9.2 - 12.9 fL    Immature Granulocytes 0.4 0.0 - 0.5 %    Gran # (ANC) 5.2 1.8 - 7.7 K/uL    Immature Grans (Abs) 0.03 0.00 - 0.04 K/uL    Lymph # 0.8 (L) 1.0 - 4.8 K/uL    Mono # 0.7 0.3 - 1.0 K/uL    Eos # 0.1 0.0 - 0.5 K/uL    Baso # 0.04 0.00 - 0.20 K/uL    nRBC 0 0 /100 WBC    Gran % 76.1 (H) 38.0 - 73.0 %    Lymph % 11.3 (L) 18.0 - 48.0 %    Mono % 9.8 4.0 - 15.0 %    Eosinophil % 1.8 0.0 - 8.0 %    Basophil % 0.6 0.0 - 1.9 %    Differential Method Automated    Basic Metabolic Panel    Collection Time: 01/31/23  7:43 AM   Result Value Ref Range    Sodium 144 136 - 145 mmol/L    Potassium 4.0 3.5 - 5.1 mmol/L    Chloride 108 95 - 110 mmol/L    CO2 24 23 - 29 mmol/L    Glucose 87  70 - 110 mg/dL    BUN 70 (H) 8 - 23 mg/dL    Creatinine 1.9 (H) 0.5 - 1.4 mg/dL    Calcium 8.9 8.7 - 10.5 mg/dL    Anion Gap 12 8 - 16 mmol/L    eGFR 25 (A) >60 mL/min/1.73 m^2   CBC auto differential    Collection Time: 01/31/23  7:43 AM   Result Value Ref Range    WBC 6.08 3.90 - 12.70 K/uL    RBC 2.45 (L) 4.00 - 5.40 M/uL    Hemoglobin 7.5 (L) 12.0 - 16.0 g/dL    Hematocrit 23.8 (L) 37.0 - 48.5 %    MCV 97 82 - 98 fL    MCH 30.6 27.0 - 31.0 pg    MCHC 31.5 (L) 32.0 - 36.0 g/dL    RDW 18.7 (H) 11.5 - 14.5 %    Platelets 137 (L) 150 - 450 K/uL    MPV 10.7 9.2 - 12.9 fL    Immature Granulocytes 0.5 0.0 - 0.5 %    Gran # (ANC) 4.6 1.8 - 7.7 K/uL    Immature Grans (Abs) 0.03 0.00 - 0.04 K/uL    Lymph # 0.7 (L) 1.0 - 4.8 K/uL    Mono # 0.6 0.3 - 1.0 K/uL    Eos # 0.1 0.0 - 0.5 K/uL    Baso # 0.02 0.00 - 0.20 K/uL    nRBC 0 0 /100 WBC    Gran % 76.1 (H) 38.0 - 73.0 %    Lymph % 11.2 (L) 18.0 - 48.0 %    Mono % 10.4 4.0 - 15.0 %    Eosinophil % 1.5 0.0 - 8.0 %    Basophil % 0.3 0.0 - 1.9 %    Differential Method Automated    CBC auto differential    Collection Time: 01/31/23  4:16 PM   Result Value Ref Range    WBC 5.27 3.90 - 12.70 K/uL    RBC 2.23 (L) 4.00 - 5.40 M/uL    Hemoglobin 6.9 (L) 12.0 - 16.0 g/dL    Hematocrit 22.2 (L) 37.0 - 48.5 %     (H) 82 - 98 fL    MCH 30.9 27.0 - 31.0 pg    MCHC 31.1 (L) 32.0 - 36.0 g/dL    RDW 19.1 (H) 11.5 - 14.5 %    Platelets 126 (L) 150 - 450 K/uL    MPV 10.1 9.2 - 12.9 fL    Immature Granulocytes 0.9 (H) 0.0 - 0.5 %    Gran # (ANC) 3.9 1.8 - 7.7 K/uL    Immature Grans (Abs) 0.05 (H) 0.00 - 0.04 K/uL    Lymph # 0.7 (L) 1.0 - 4.8 K/uL    Mono # 0.5 0.3 - 1.0 K/uL    Eos # 0.1 0.0 - 0.5 K/uL    Baso # 0.02 0.00 - 0.20 K/uL    nRBC 0 0 /100 WBC    Gran % 74.2 (H) 38.0 - 73.0 %    Lymph % 13.1 (L) 18.0 - 48.0 %    Mono % 9.3 4.0 - 15.0 %    Eosinophil % 2.1 0.0 - 8.0 %    Basophil % 0.4 0.0 - 1.9 %    Platelet Estimate Decreased (A)     Aniso Slight     Hypo Moderate      Differential Method Automated    CBC auto differential    Collection Time: 01/31/23  8:23 PM   Result Value Ref Range    WBC 5.15 3.90 - 12.70 K/uL    RBC 2.30 (L) 4.00 - 5.40 M/uL    Hemoglobin 7.1 (L) 12.0 - 16.0 g/dL    Hematocrit 22.4 (L) 37.0 - 48.5 %    MCV 97 82 - 98 fL    MCH 30.9 27.0 - 31.0 pg    MCHC 31.7 (L) 32.0 - 36.0 g/dL    RDW 19.0 (H) 11.5 - 14.5 %    Platelets 124 (L) 150 - 450 K/uL    MPV 9.9 9.2 - 12.9 fL    Immature Granulocytes 0.4 0.0 - 0.5 %    Gran # (ANC) 3.4 1.8 - 7.7 K/uL    Immature Grans (Abs) 0.02 0.00 - 0.04 K/uL    Lymph # 0.9 (L) 1.0 - 4.8 K/uL    Mono # 0.7 0.3 - 1.0 K/uL    Eos # 0.1 0.0 - 0.5 K/uL    Baso # 0.03 0.00 - 0.20 K/uL    nRBC 0 0 /100 WBC    Gran % 65.4 38.0 - 73.0 %    Lymph % 17.1 (L) 18.0 - 48.0 %    Mono % 13.8 4.0 - 15.0 %    Eosinophil % 2.7 0.0 - 8.0 %    Basophil % 0.6 0.0 - 1.9 %    Differential Method Automated    CBC auto differential    Collection Time: 02/01/23  4:08 AM   Result Value Ref Range    WBC 4.84 3.90 - 12.70 K/uL    RBC 2.39 (L) 4.00 - 5.40 M/uL    Hemoglobin 7.1 (L) 12.0 - 16.0 g/dL    Hematocrit 23.1 (L) 37.0 - 48.5 %    MCV 97 82 - 98 fL    MCH 29.7 27.0 - 31.0 pg    MCHC 30.7 (L) 32.0 - 36.0 g/dL    RDW 18.5 (H) 11.5 - 14.5 %    Platelets 137 (L) 150 - 450 K/uL    MPV 10.8 9.2 - 12.9 fL    Immature Granulocytes 0.4 0.0 - 0.5 %    Gran # (ANC) 3.1 1.8 - 7.7 K/uL    Immature Grans (Abs) 0.02 0.00 - 0.04 K/uL    Lymph # 0.8 (L) 1.0 - 4.8 K/uL    Mono # 0.6 0.3 - 1.0 K/uL    Eos # 0.2 0.0 - 0.5 K/uL    Baso # 0.03 0.00 - 0.20 K/uL    nRBC 0 0 /100 WBC    Gran % 64.9 38.0 - 73.0 %    Lymph % 17.4 (L) 18.0 - 48.0 %    Mono % 13.0 4.0 - 15.0 %    Eosinophil % 3.7 0.0 - 8.0 %    Basophil % 0.6 0.0 - 1.9 %    Differential Method Automated    Basic Metabolic Panel    Collection Time: 02/01/23  4:08 AM   Result Value Ref Range    Sodium 144 136 - 145 mmol/L    Potassium 3.7 3.5 - 5.1 mmol/L    Chloride 111 (H) 95 - 110 mmol/L    CO2 24 23 - 29 mmol/L     Glucose 74 70 - 110 mg/dL    BUN 61 (H) 8 - 23 mg/dL    Creatinine 1.7 (H) 0.5 - 1.4 mg/dL    Calcium 9.0 8.7 - 10.5 mg/dL    Anion Gap 9 8 - 16 mmol/L    eGFR 29 (A) >60 mL/min/1.73 m^2   CBC auto differential    Collection Time: 02/02/23  5:32 AM   Result Value Ref Range    WBC 6.11 3.90 - 12.70 K/uL    RBC 2.37 (L) 4.00 - 5.40 M/uL    Hemoglobin 7.1 (L) 12.0 - 16.0 g/dL    Hematocrit 23.0 (L) 37.0 - 48.5 %    MCV 97 82 - 98 fL    MCH 30.0 27.0 - 31.0 pg    MCHC 30.9 (L) 32.0 - 36.0 g/dL    RDW 18.3 (H) 11.5 - 14.5 %    Platelets 120 (L) 150 - 450 K/uL    MPV 10.6 9.2 - 12.9 fL    Immature Granulocytes 0.7 (H) 0.0 - 0.5 %    Gran # (ANC) 4.3 1.8 - 7.7 K/uL    Immature Grans (Abs) 0.04 0.00 - 0.04 K/uL    Lymph # 0.8 (L) 1.0 - 4.8 K/uL    Mono # 0.7 0.3 - 1.0 K/uL    Eos # 0.2 0.0 - 0.5 K/uL    Baso # 0.05 0.00 - 0.20 K/uL    nRBC 0 0 /100 WBC    Gran % 70.7 38.0 - 73.0 %    Lymph % 13.4 (L) 18.0 - 48.0 %    Mono % 11.9 4.0 - 15.0 %    Eosinophil % 2.5 0.0 - 8.0 %    Basophil % 0.8 0.0 - 1.9 %    Differential Method Automated    Basic Metabolic Panel    Collection Time: 02/02/23  5:32 AM   Result Value Ref Range    Sodium 139 136 - 145 mmol/L    Potassium 3.8 3.5 - 5.1 mmol/L    Chloride 109 95 - 110 mmol/L    CO2 23 23 - 29 mmol/L    Glucose 97 70 - 110 mg/dL    BUN 59 (H) 8 - 23 mg/dL    Creatinine 1.6 (H) 0.5 - 1.4 mg/dL    Calcium 9.4 8.7 - 10.5 mg/dL    Anion Gap 7 (L) 8 - 16 mmol/L    eGFR 31 (A) >60 mL/min/1.73 m^2       Microbiology Results (last 7 days)     ** No results found for the last 168 hours. **          Imaging Results          X-Ray Chest 1 View (Final result)  Result time 01/30/23 20:10:06    Final result by Dru Nielson DO (01/30/23 20:10:06)                 Impression:      Cardiomegaly and mild pulmonary edema/CHF.      Electronically signed by: Dru Nielson  Date:    01/30/2023  Time:    20:10             Narrative:    EXAMINATION:  XR CHEST 1 VIEW    CLINICAL HISTORY:  Unspecified  abdominal pain    TECHNIQUE:  Single frontal view of the chest was performed.    COMPARISON:  12/13/2022.    FINDINGS:  There is a cardiac pacer/AICD, unchanged in position from prior.  There is pulmonary vascular congestion and mild perihilar interstitial prominence compatible with interstitial pulmonary edema/CHF.  The pleural spaces are clear.  There is no large focal consolidation.  The cardiac silhouette is enlarged.  There are calcifications of the aortic arch.  The visualized osseous structures demonstrate degenerative changes.  There is mild scoliotic curvature.                                  Pending Diagnostic Studies:     None         Medications:  Reconciled Home Medications:      Medication List      CHANGE how you take these medications    furosemide 80 MG tablet  Commonly known as: LASIX  TAKE 1 TABLET BY MOUTH TWICE DAILY AS NEEDED FOR SHORTNESS OF BREATH OR  EDEMA  What changed: See the new instructions.        CONTINUE taking these medications    acetaminophen 325 MG tablet  Commonly known as: TYLENOL  Take 650 mg by mouth once daily.     allopurinoL 100 MG tablet  Commonly known as: ZYLOPRIM  Take 100 mg by mouth every evening.     CENTRUM SILVER ORAL  Take by mouth once daily.     cranberry 400 mg Cap  Take 1 capsule by mouth 2 (two) times daily.     fish oil-omega-3 fatty acids 300-1,000 mg capsule  Take 2 g by mouth once daily. 1 caps every AM & 1 cap every PM.     lovastatin 40 MG tablet  Commonly known as: MEVACOR  Take 40 mg by mouth nightly. Takes 2 caps with supper every evening.     mirabegron 50 mg Tb24  Commonly known as: MYRBETRIQ  Take 50 mg by mouth once daily.     NEURONTIN 100 MG capsule  Generic drug: gabapentin  Take 100 mg by mouth 2 (two) times daily.     pantoprazole 40 MG tablet  Commonly known as: PROTONIX  Take 40 mg by mouth once daily.     traMADoL 50 mg tablet  Commonly known as: ULTRAM  1 tablet as needed.        STOP taking these medications    clopidogreL 75 mg  tablet  Commonly known as: PLAVIX     metoprolol succinate 25 MG 24 hr tablet  Commonly known as: TOPROL-XL        ASK your doctor about these medications    ENTRESTO 24-26 mg per tablet  Generic drug: sacubitriL-valsartan  Take 1 tablet by mouth 2 (two) times daily.            Indwelling Lines/Drains at time of discharge:   Lines/Drains/Airways     None                 Time spent on the discharge of patient: Greater than 35 minutes         Dwayne Feliciano DO  Department of Hospital Medicine  [facility]

## 2023-02-02 NOTE — ASSESSMENT & PLAN NOTE
-Hgb noted to be 6.4 on outpatient labs on 01/30   -Was given 1U of pRBC on 01/30 in ED with appropiate response (hgb 7.4 on 01/31).   -GI consulted- s/p EGD on 01/31, which showed friable mucosa noted in duodenal bulb. Likely source of bleeding.   -CBC in AM, Hgb stable  -However, had large BM that appeared very dark on 02/02  -Discussed patient case with GI. Recommends monitoring overnight and prepare for possible colonoscopy versus video capsule endoscopy in the a.m.

## 2023-02-02 NOTE — PLAN OF CARE
[facility] [place] [facility]      HOME HEALTH ORDERS  FACE TO FACE ENCOUNTER    Patient Name: Palmira Malave  YOB: 1935    PCP: Qi Ramon MD   PCP Address: [address] [place] [address] [address] / [place] [address]  PCP Phone Number: [phone]  PCP Fax: [phone]    Encounter Date: 1/30/23    Admit to Home Health    Diagnoses:  Active Hospital Problems    Diagnosis  POA    *GI bleed [K92.2]  Yes     Priority: 1 - High    Acute blood loss anemia [D62]  Yes     Priority: 2     Troponin level elevated [R77.8]  Yes     Priority: 3     Chronic systolic congestive heart failure [I50.22]  Yes     Priority: 4     Coronary artery disease involving native coronary artery of native heart without angina pectoris [I25.10]  Yes     Priority: 5     Essential hypertension [I10]  Yes     Priority: 6      Chronic    Hyperlipidemia [E78.5]  Yes     Priority: 7      Chronic    Stage 3 chronic kidney disease [N18.30]  Yes     Priority: 8     GERD (gastroesophageal reflux disease) [K21.9]  Yes     Priority: 9      Chronic    Goals of care, counseling/discussion [Z71.89]  Not Applicable     Priority: 10       Resolved Hospital Problems   No resolved problems to display.       Follow Up Appointments:  Future Appointments   Date Time Provider Department Center   2/16/2023  1:30 PM Demetrius Velazco MD [facility] OPHTHAL [place]   2/28/2023  9:15 AM Kelechi Richmond MD [facility] CARDIO [place]   3/2/2023  8:00 AM Roni Mike NP [address]   3/30/2023  9:30 AM Jordon Sotomayor MD [facility] GASTRO [facility]       Allergies:  Review of patient's allergies indicates:   Allergen Reactions    Aspirin Swelling     Swelling and rash    Colace [docusate sodium] Rash     itching    Docusate Other (See Comments) and Rash     itching    Sulfa (sulfonamide antibiotics) Swelling     Swelling and rash  Swelling and rash    Iodine and iodide containing products Rash    Penicillins Rash       Medications: Review discharge  medications with patient and family and provide education.    Current Facility-Administered Medications   Medication Dose Route Frequency Provider Last Rate Last Admin    acetaminophen tablet 650 mg  650 mg Oral Q8H PRN Luigi Hamm MD        acetaminophen tablet 650 mg  650 mg Oral Q4H PRN Luigi Hamm MD   650 mg at 01/31/23 0727    albuterol-ipratropium 2.5 mg-0.5 mg/3 mL nebulizer solution 3 mL  3 mL Nebulization Q4H PRN Luigi Hamm MD        allopurinoL tablet 100 mg  100 mg Oral QHS Luigi Hamm MD   100 mg at 02/01/23 2041    aluminum-magnesium hydroxide-simethicone 200-200-20 mg/5 mL suspension 30 mL  30 mL Oral QID PRN Luigi Hamm MD        atorvastatin tablet 20 mg  20 mg Oral Daily Luigi Hamm MD   20 mg at 02/02/23 0935    cetirizine tablet 5 mg  5 mg Oral Daily Dwayne MONTERO Feliciano, DO        dextrose 10% bolus 125 mL 125 mL  12.5 g Intravenous PRN [facility] T. Feliciano, DO        dextrose 10% bolus 250 mL 250 mL  25 g Intravenous PRN [facility] TLuc Feliciano, DO        fluticasone propionate 50 mcg/actuation nasal spray 100 mcg  2 spray Each Nostril Daily Dwayne Feliciano, DO        glucagon (human recombinant) injection 1 mg  1 mg Intramuscular PRN Luigi Hamm MD        glucose chewable tablet 16 g  16 g Oral PRN Luigi Hamm MD        glucose chewable tablet 24 g  24 g Oral PRN Luigi Hamm MD        magnesium oxide tablet 800 mg  800 mg Oral PRN Luigi Hamm MD        magnesium oxide tablet 800 mg  800 mg Oral PRN Luigi Hamm MD        melatonin tablet 6 mg  6 mg Oral Nightly PRN Luigi Hamm MD        naloxone 0.4 mg/mL injection 0.02 mg  0.02 mg Intravenous PRN Luigi Hamm MD        ondansetron injection 4 mg  4 mg Intravenous Q8H PRN Luigi Hamm MD   4 mg at 02/02/23 1239    oxybutynin 24 hr tablet 5 mg  5 mg Oral Daily Luigi Hamm MD   5 mg at 02/02/23 0935    potassium bicarbonate disintegrating tablet 35 mEq  35 mEq Oral PRN Luigi Hamm MD        potassium bicarbonate disintegrating tablet 50 mEq  50 mEq  Oral PRN Luigi Hamm MD        potassium bicarbonate disintegrating tablet 60 mEq  60 mEq Oral PRN Luigi Hamm MD        potassium, sodium phosphates 280-160-250 mg packet 2 packet  2 packet Oral PRN Luigi Hamm MD        potassium, sodium phosphates 280-160-250 mg packet 2 packet  2 packet Oral PRN Luigi Hamm MD        potassium, sodium phosphates 280-160-250 mg packet 2 packet  2 packet Oral PRN Luigi Hamm MD        prochlorperazine injection Soln 5 mg  5 mg Intravenous Q6H PRN Luigi Hamm MD        simethicone chewable tablet 80 mg  1 tablet Oral QID PRN Luigi Hamm MD        sodium chloride 0.9% flush 10 mL  10 mL Intravenous Q12H PRN Luigi Hamm MD         Facility-Administered Medications Ordered in Other Encounters   Medication Dose Route Frequency Provider Last Rate Last Admin    0.9%  NaCl infusion   Intravenous Continuous Demetrius Velazco MD        phenylephrine HCL 2.5% ophthalmic solution 1 drop  1 drop Right Eye On Call Procedure Demetrius Velazco MD   1 drop at 02/21/19 0725    proparacaine 0.5 % ophthalmic solution 1 drop  1 drop Right Eye On Call Procedure Demetrius Velazco MD   1 drop at 02/21/19 0706    tropicamide 1% ophthalmic solution 1 drop  1 drop Right Eye On Call Procedure Demetrius Velazco MD   1 drop at 02/21/19 0726     Current Discharge Medication List        CONTINUE these medications which have NOT CHANGED    Details   allopurinol (ZYLOPRIM) 100 MG tablet Take 100 mg by mouth every evening.       cranberry 400 mg Cap Take 1 capsule by mouth 2 (two) times daily.      fish oil-omega-3 fatty acids 300-1,000 mg capsule Take 2 g by mouth once daily. 1 caps every AM & 1 cap every PM.      folic acid/multivit-min/lutein (CENTRUM SILVER ORAL) Take by mouth once daily.      furosemide (LASIX) 80 MG tablet TAKE 1 TABLET BY MOUTH TWICE DAILY AS NEEDED FOR SHORTNESS OF BREATH OR  EDEMA  Qty: 180 tablet, Refills: 3      lovastatin (MEVACOR) 40 MG tablet Take 40 mg by  mouth nightly. Takes 2 caps with supper every evening.      mirabegron (MYRBETRIQ) 50 mg Tb24 Take 50 mg by mouth once daily.       NEURONTIN 100 mg capsule Take 100 mg by mouth 2 (two) times daily.      pantoprazole (PROTONIX) 40 MG tablet Take 40 mg by mouth once daily.      sacubitriL-valsartan (ENTRESTO) 24-26 mg per tablet Take 1 tablet by mouth 2 (two) times daily.  Qty: 180 tablet, Refills: 3      traMADoL (ULTRAM) 50 mg tablet 1 tablet as needed.      acetaminophen (TYLENOL) 325 MG tablet Take 650 mg by mouth once daily.           STOP taking these medications       clopidogrel (PLAVIX) 75 mg tablet Comments:   Reason for Stopping:         metoprolol succinate (TOPROL-XL) 25 MG 24 hr tablet Comments:   Reason for Stopping:                 I have seen and examined this patient within the last 30 days. My clinical findings that support the need for the home health skilled services and home bound status are the following:no   Weakness/numbness causing balance and gait disturbance due to Weakness/Debility and Anemia making it taxing to leave home.     Diet:   cardiac diet        Referrals/ Consults  Physical Therapy to evaluate and treat. Evaluate for home safety and equipment needs; Establish/upgrade home exercise program. Perform / instruct on therapeutic exercises, gait training, transfer training, and Range of Motion.  Occupational Therapy to evaluate and treat. Evaluate home environment for safety and equipment needs. Perform/Instruct on transfers, ADL training, ROM, and therapeutic exercises.    Activities:   activity as tolerated    Nursing:   Agency to admit patient within 24 hours of hospital discharge unless specified on physician order or at patient request    SN to complete comprehensive assessment including routine vital signs. Instruct on disease process and s/s of complications to report to MD. Review/verify medication list sent home with the patient at time of discharge  and instruct  patient/caregiver as needed. Frequency may be adjusted depending on start of care date.     Skilled nurse to perform up to 3 visits PRN for symptoms related to diagnosis    Notify MD if SBP > 160 or < 90; DBP > 90 or < 50; HR > 120 or < 50; Temp > 101; O2 < 88%;     Ok to schedule additional visits based on staff availability and patient request on consecutive days within the home health episode.    When multiple disciplines ordered:    Start of Care occurs on Sunday - Wednesday schedule remaining discipline evaluations as ordered on separate consecutive days following the start of care.    Thursday SOC -schedule subsequent evaluations Friday and Monday the following week.     Friday - Saturday SOC - schedule subsequent discipline evaluations on consecutive days starting Monday of the following week.    For all post-discharge communication and subsequent orders please contact patient's primary care physician.      Home Health Aide:  Physical Therapy Three times weekly and Occupational Therapy Three times weekly    Wound Care Orders  no    I certify that this patient is confined to her home and needs physical therapy and occupational therapy.

## 2023-02-02 NOTE — PROGRESS NOTES
[facility] Medicine  Progress Note    Patient Name: Palmira Malave  MRN: [id number]  Patient Class: IP- Inpatient   Admission Date: 1/30/2023  Length of Stay: 3 days  Attending Physician: Dwayne Feliciano DO  Primary Care Provider: Qi Ramon MD        Subjective:     Principal Problem:GI bleed        HPI:  This is a 87 year old female with a PMHx of HFrEF (EF: 20%, GIIDD, s/p AICD), CAD (on DAPT), CKD3, HTN, HLD who presents with melena.     History is obtained via chart review given altered mental status. Patient reportedly had melanotic stools, shortness of breath, abdominal pain and weakness. She had blood work done as outpatient and was told that her hemoglobin was 6 prompting her to present to the ED. The patient is lethargic and falls asleep during the interview. She denied having any symptoms but reported that her daughter reported that the patient had black stools. Additionally, she mentions shaking of her hands that started about 2 weeks ago. In the ED, she was hypotensive (86/47), improving with fluids. Labs showed normocytic anemia (7.1 - baseline 10-11), elevated creatinine (2.2, baseline of 2.0), elevated BNP (>4900), elevated troponin (0.135), negative lactic acid (0.8). CXR showed cardiomegaly and mild pulmonary edema/CHF. She was given Protonix 80 mg IV, NS 1000 ml, zofran and morphine 4 mg IV. The patient was admitted for further management.       Overview/Hospital Course:  87 year old female with a PMHx of HFrEF (EF: 20%, GIIDD, s/p AICD), CAD (on DAPT), CKD3, HTN, HLD admitted on 01/30/2023 for melena with concerns for GI bleed and acute blood loss anemia requiring transfusion. Hgb noted to be 6.4 on outpatient labs on 01/30 and was instructed to come to ED for evaluation. Was given 1U of pRBC on 01/30 with appropiate response (hgb 7.4 on 01/31). GI consulted- s/p EGD on 01/31. Showed friable mucosa noted in duodenal bulb. Likely source of bleeding. GI recommends  discontinuing plavix on discharge and f/u with GI outpatient for anemia workup. Hgb remained stable.  No further evidence bleeding.  Patient remained alert and oriented x3.  PT/OT consulted-recommends home health. Patient was anticipated to discharge home on 02/02, but had a large bowel movment which appeared to be very dark and concern for GI bleed. Discussed with GI, will continue to monitor overnight.         Interval History:  No acute overnight events.  Patient remained afebrile.  States her abdominal pain has resolved.  Alert and oriented x3 this morning.  Still has some fatigue, but improving.      Review of Systems   Constitutional:  Negative for chills, fatigue and fever.   Eyes:  Negative for visual disturbance.   Respiratory:  Negative for cough and shortness of breath.    Cardiovascular:  Negative for chest pain, palpitations and leg swelling.   Gastrointestinal:  Positive for blood in stool (prior to admit but none currently). Negative for abdominal pain (resolved), diarrhea, nausea and vomiting.   Genitourinary:  Negative for difficulty urinating, dysuria and hematuria.   Musculoskeletal:  Negative for joint swelling.   Neurological:  Negative for dizziness, weakness and headaches.   Psychiatric/Behavioral:  Negative for confusion and hallucinations.      Objective:     Vital Signs (Most Recent):  Temp: 98.4 °F (36.9 °C) (02/02/23 1550)  Pulse: 88 (02/02/23 1550)  Resp: 18 (02/02/23 1550)  BP: (!) 106/56 (02/02/23 1550)  SpO2: 100 % (02/02/23 1550)   Vital Signs (24h Range):  Temp:  [97.6 °F (36.4 °C)-98.6 °F (37 °C)] 98.4 °F (36.9 °C)  Pulse:  [82-90] 88  Resp:  [16-20] 18  SpO2:  [93 %-100 %] 100 %  BP: (100-111)/(52-86) 106/56     Weight: 61.2 kg (134 lb 14.7 oz)  Body mass index is 23.9 kg/m².    Intake/Output Summary (Last 24 hours) at 2/2/2023 1602  Last data filed at 2/2/2023 1330  Gross per 24 hour   Intake 1200 ml   Output 750 ml   Net 450 ml        Physical Exam  Vitals and nursing note  reviewed.   Constitutional:       General: She is not in acute distress.     Appearance: She is ill-appearing (chronically ill appearing).   Eyes:      Extraocular Movements: Extraocular movements intact.      Pupils: Pupils are equal, round, and reactive to light.   Cardiovascular:      Rate and Rhythm: Normal rate and regular rhythm.      Heart sounds: Murmur heard.     No gallop.   Pulmonary:      Effort: Pulmonary effort is normal. No respiratory distress.      Breath sounds: Normal breath sounds. No wheezing or rales.   Abdominal:      General: There is no distension.      Palpations: Abdomen is soft.      Tenderness: There is no abdominal tenderness. There is no guarding.   Musculoskeletal:         General: No swelling or tenderness.      Right lower leg: Edema present.      Left lower leg: Edema present.      Comments: Trace edema in bilateral LE   Skin:     General: Skin is warm and dry.   Neurological:      General: No focal deficit present.      Mental Status: She is alert and oriented to person, place, and time.   Psychiatric:         Mood and Affect: Mood normal.         Behavior: Behavior is cooperative.         Thought Content: Thought content normal.       Significant Labs: All pertinent labs within the past 24 hours have been reviewed.    Significant Imaging: I have reviewed all pertinent imaging results/findings within the past 24 hours.      Assessment/Plan:      * GI bleed  - Patient presented with melanotic stools  - Risk factors: on aspirin, plavix.   - Baseline Hgb: 10-11  - Hgb on admission: 7.1. Was 6.4 on 01/30 on outpatient labs  - Etiology: likely upper GI bleed    Plan:  - Monitor for active bleeding  - Consult to GI: s/p EGD on 01/31, which showed friable mucosa noted in duodenal bulb. Likely source of bleeding.   -GI recommend to resume previous diet and consider stopping plavix.   -Will f/u anemia in GI clinic  -Pt with large BM on 02/02, which appeared to be very dark.Discussed with  GI. Monitor overnight. May need colonoscopy   - Monitor Hgb, possible colonoscopy vs capsule endoscopy in the AM    Acute blood loss anemia  -Hgb noted to be 6.4 on outpatient labs on 01/30   -Was given 1U of pRBC on 01/30 in ED with appropiate response (hgb 7.4 on 01/31).   -GI consulted- s/p EGD on 01/31, which showed friable mucosa noted in duodenal bulb. Likely source of bleeding.   -CBC in AM, Hgb stable  -However, had large BM that appeared very dark on 02/02  -Discussed patient case with GI. Recommends monitoring overnight and prepare for possible colonoscopy versus video capsule endoscopy in the a.m.    Troponin level elevated  Likely in the setting of GI bleed.   No chest pain. Doubt ACS   Telemetry monitoring      Chronic systolic congestive heart failure  BNP  Recent Labs   Lab 01/30/23 2020   BNP >4,900*     - Patient is on room air. Euvolemic/slightly hypervolemic on examination.   - Doubt exacerbation. Elevated BNP is likely due to entresto.   - BNP chronically elevated  - Resume home medications when able     Coronary artery disease involving native coronary artery of native heart without angina pectoris  Hold aspirin/plavix in the setting of GI bleed.       Essential hypertension  Hold home medications in the setting of hypotension     Hyperlipidemia  Continue statin      Stage 3 chronic kidney disease  - History of CKD stage 3  - Currently not on HD  - Baseline creatinine 2.0 mg/dL    Recent Labs   Lab 01/31/23  0743 02/01/23  0408 02/02/23  0532   BUN 70* 61* 59*   CREATININE 1.9* 1.7* 1.6*       Plan:  - Monitor Cr and BUN  - Monitor for acidosis, hyperkalemia, fluid overload and signs of uremia  - Avoid nephrotoxins  - Daily weights  - Strict I/Os  - Stable     GERD (gastroesophageal reflux disease)  Continue PPI    Goals of care, counseling/discussion    Advance Care Planning     Date: 02/01/2023  -On chart review, previous discussion and code status was DNR.   -Discussed ACP on 02/01      Code  Status  I engaged the the patient in a conversation about the patient's preferences for care  at the very end of life. The patient wishes to have a natural, peaceful death.  Along those lines, the patient does not wish to have CPR or other invasive treatments performed when her heart and/or breathing stops. I communicated to the patient that a DNR order would be placed in her medical record to reflect this preference.  I spent a total of 18 minutes engaging the patient in this advance care planning discussion.            Code Status: DNR        VTE Risk Mitigation (From admission, onward)         Ordered     IP VTE HIGH RISK PATIENT  Once         01/30/23 2320     Place sequential compression device  Until discontinued         01/30/23 2319                Discharge Planning   MARTELL: 2/2/2023     Code Status: DNR   Is the patient medically ready for discharge?:     Reason for patient still in hospital (select all that apply): Patient trending condition, Treatment and Consult recommendations  Discharge Plan A: Home Health   Discharge Delays: None known at this time              Dwayne Feliciano DO  Department of Hospital Medicine   [facility] - Med Surg

## 2023-02-03 ENCOUNTER — ANESTHESIA (OUTPATIENT)
Dept: ENDOSCOPY | Facility: HOSPITAL | Age: 88
DRG: 392 | End: 2023-02-03
Payer: MEDICARE

## 2023-02-03 ENCOUNTER — ANESTHESIA EVENT (OUTPATIENT)
Dept: ENDOSCOPY | Facility: HOSPITAL | Age: 88
DRG: 392 | End: 2023-02-03
Payer: MEDICARE

## 2023-02-03 LAB
ABO + RH BLD: ABNORMAL
ANION GAP SERPL CALC-SCNC: 11 MMOL/L (ref 8–16)
BASOPHILS # BLD AUTO: 0.04 K/UL (ref 0–0.2)
BASOPHILS NFR BLD: 0.7 % (ref 0–1.9)
BLD GP AB SCN CELLS X3 SERPL QL: ABNORMAL
BLD PROD TYP BPU: NORMAL
BLOOD GROUP ANTIBODIES SERPL: NORMAL
BLOOD UNIT EXPIRATION DATE: NORMAL
BLOOD UNIT TYPE CODE: 9500
BLOOD UNIT TYPE: NORMAL
BUN SERPL-MCNC: 49 MG/DL (ref 8–23)
CALCIUM SERPL-MCNC: 9.3 MG/DL (ref 8.7–10.5)
CHLORIDE SERPL-SCNC: 109 MMOL/L (ref 95–110)
CO2 SERPL-SCNC: 21 MMOL/L (ref 23–29)
CODING SYSTEM: NORMAL
CREAT SERPL-MCNC: 1.4 MG/DL (ref 0.5–1.4)
DIFFERENTIAL METHOD: ABNORMAL
DISPENSE STATUS: NORMAL
EOSINOPHIL # BLD AUTO: 0.2 K/UL (ref 0–0.5)
EOSINOPHIL NFR BLD: 3.1 % (ref 0–8)
ERYTHROCYTE [DISTWIDTH] IN BLOOD BY AUTOMATED COUNT: 18.3 % (ref 11.5–14.5)
EST. GFR  (NO RACE VARIABLE): 36 ML/MIN/1.73 M^2
GLUCOSE SERPL-MCNC: 80 MG/DL (ref 70–110)
HCT VFR BLD AUTO: 22.7 % (ref 37–48.5)
HGB BLD-MCNC: 6.9 G/DL (ref 12–16)
IMM GRANULOCYTES # BLD AUTO: 0.02 K/UL (ref 0–0.04)
IMM GRANULOCYTES NFR BLD AUTO: 0.4 % (ref 0–0.5)
LYMPHOCYTES # BLD AUTO: 1.2 K/UL (ref 1–4.8)
LYMPHOCYTES NFR BLD: 20.7 % (ref 18–48)
MCH RBC QN AUTO: 30 PG (ref 27–31)
MCHC RBC AUTO-ENTMCNC: 30.4 G/DL (ref 32–36)
MCV RBC AUTO: 99 FL (ref 82–98)
MONOCYTES # BLD AUTO: 0.7 K/UL (ref 0.3–1)
MONOCYTES NFR BLD: 13 % (ref 4–15)
NEUTROPHILS # BLD AUTO: 3.5 K/UL (ref 1.8–7.7)
NEUTROPHILS NFR BLD: 62.1 % (ref 38–73)
NRBC BLD-RTO: 0 /100 WBC
NUM UNITS TRANS PACKED RBC: NORMAL
PLATELET # BLD AUTO: 126 K/UL (ref 150–450)
PMV BLD AUTO: 11.4 FL (ref 9.2–12.9)
POTASSIUM SERPL-SCNC: 3.6 MMOL/L (ref 3.5–5.1)
RBC # BLD AUTO: 2.3 M/UL (ref 4–5.4)
SODIUM SERPL-SCNC: 141 MMOL/L (ref 136–145)
WBC # BLD AUTO: 5.55 K/UL (ref 3.9–12.7)

## 2023-02-03 PROCEDURE — D9220A PRA ANESTHESIA: ICD-10-PCS | Mod: ANES,,, | Performed by: ANESTHESIOLOGY

## 2023-02-03 PROCEDURE — 86870 RBC ANTIBODY IDENTIFICATION: CPT | Performed by: STUDENT IN AN ORGANIZED HEALTH CARE EDUCATION/TRAINING PROGRAM

## 2023-02-03 PROCEDURE — 25000003 PHARM REV CODE 250: Performed by: STUDENT IN AN ORGANIZED HEALTH CARE EDUCATION/TRAINING PROGRAM

## 2023-02-03 PROCEDURE — 36415 COLL VENOUS BLD VENIPUNCTURE: CPT | Performed by: STUDENT IN AN ORGANIZED HEALTH CARE EDUCATION/TRAINING PROGRAM

## 2023-02-03 PROCEDURE — D9220A PRA ANESTHESIA: ICD-10-PCS | Mod: CRNA,,, | Performed by: NURSE ANESTHETIST, CERTIFIED REGISTERED

## 2023-02-03 PROCEDURE — D9220A PRA ANESTHESIA: Mod: ANES,,, | Performed by: ANESTHESIOLOGY

## 2023-02-03 PROCEDURE — D9220A PRA ANESTHESIA: Mod: CRNA,,, | Performed by: NURSE ANESTHETIST, CERTIFIED REGISTERED

## 2023-02-03 PROCEDURE — P9016 RBC LEUKOCYTES REDUCED: HCPCS | Performed by: STUDENT IN AN ORGANIZED HEALTH CARE EDUCATION/TRAINING PROGRAM

## 2023-02-03 PROCEDURE — 11000001 HC ACUTE MED/SURG PRIVATE ROOM

## 2023-02-03 PROCEDURE — 63600175 PHARM REV CODE 636 W HCPCS: Performed by: STUDENT IN AN ORGANIZED HEALTH CARE EDUCATION/TRAINING PROGRAM

## 2023-02-03 PROCEDURE — 80048 BASIC METABOLIC PNL TOTAL CA: CPT | Performed by: STUDENT IN AN ORGANIZED HEALTH CARE EDUCATION/TRAINING PROGRAM

## 2023-02-03 PROCEDURE — 99900035 HC TECH TIME PER 15 MIN (STAT)

## 2023-02-03 PROCEDURE — 37000009 HC ANESTHESIA EA ADD 15 MINS: Performed by: INTERNAL MEDICINE

## 2023-02-03 PROCEDURE — 86922 COMPATIBILITY TEST ANTIGLOB: CPT | Performed by: STUDENT IN AN ORGANIZED HEALTH CARE EDUCATION/TRAINING PROGRAM

## 2023-02-03 PROCEDURE — 86900 BLOOD TYPING SEROLOGIC ABO: CPT | Performed by: STUDENT IN AN ORGANIZED HEALTH CARE EDUCATION/TRAINING PROGRAM

## 2023-02-03 PROCEDURE — 45378 DIAGNOSTIC COLONOSCOPY: CPT | Performed by: INTERNAL MEDICINE

## 2023-02-03 PROCEDURE — 94761 N-INVAS EAR/PLS OXIMETRY MLT: CPT

## 2023-02-03 PROCEDURE — 45378 PR COLONOSCOPY,DIAGNOSTIC: ICD-10-PCS | Mod: ,,, | Performed by: INTERNAL MEDICINE

## 2023-02-03 PROCEDURE — 45378 DIAGNOSTIC COLONOSCOPY: CPT | Mod: ,,, | Performed by: INTERNAL MEDICINE

## 2023-02-03 PROCEDURE — 25000003 PHARM REV CODE 250: Performed by: NURSE PRACTITIONER

## 2023-02-03 PROCEDURE — 85025 COMPLETE CBC W/AUTO DIFF WBC: CPT | Performed by: STUDENT IN AN ORGANIZED HEALTH CARE EDUCATION/TRAINING PROGRAM

## 2023-02-03 PROCEDURE — 37000008 HC ANESTHESIA 1ST 15 MINUTES: Performed by: INTERNAL MEDICINE

## 2023-02-03 RX ORDER — LIDOCAINE HYDROCHLORIDE 20 MG/ML
INJECTION INTRAVENOUS
Status: DISCONTINUED | OUTPATIENT
Start: 2023-02-03 | End: 2023-02-03

## 2023-02-03 RX ORDER — LIDOCAINE HYDROCHLORIDE 20 MG/ML
INJECTION, SOLUTION EPIDURAL; INFILTRATION; INTRACAUDAL; PERINEURAL
Status: DISPENSED
Start: 2023-02-03 | End: 2023-02-04

## 2023-02-03 RX ORDER — ETOMIDATE 2 MG/ML
INJECTION INTRAVENOUS
Status: DISCONTINUED | OUTPATIENT
Start: 2023-02-03 | End: 2023-02-03

## 2023-02-03 RX ORDER — HYDROCODONE BITARTRATE AND ACETAMINOPHEN 500; 5 MG/1; MG/1
TABLET ORAL
Status: DISCONTINUED | OUTPATIENT
Start: 2023-02-03 | End: 2023-02-04 | Stop reason: HOSPADM

## 2023-02-03 RX ORDER — PROPOFOL 10 MG/ML
INJECTION, EMULSION INTRAVENOUS
Status: DISPENSED
Start: 2023-02-03 | End: 2023-02-04

## 2023-02-03 RX ORDER — PROPOFOL 10 MG/ML
VIAL (ML) INTRAVENOUS
Status: DISCONTINUED | OUTPATIENT
Start: 2023-02-03 | End: 2023-02-03

## 2023-02-03 RX ORDER — PHENYLEPHRINE HCL IN 0.9% NACL 1 MG/10 ML
SYRINGE (ML) INTRAVENOUS
Status: DISPENSED
Start: 2023-02-03 | End: 2023-02-04

## 2023-02-03 RX ORDER — PHENYLEPHRINE HYDROCHLORIDE 10 MG/ML
INJECTION INTRAVENOUS
Status: DISCONTINUED | OUTPATIENT
Start: 2023-02-03 | End: 2023-02-03

## 2023-02-03 RX ORDER — ETOMIDATE 2 MG/ML
INJECTION INTRAVENOUS
Status: DISPENSED
Start: 2023-02-03 | End: 2023-02-04

## 2023-02-03 RX ADMIN — CETIRIZINE HYDROCHLORIDE 5 MG: 5 TABLET ORAL at 08:02

## 2023-02-03 RX ADMIN — SODIUM CHLORIDE: 0.9 INJECTION, SOLUTION INTRAVENOUS at 12:02

## 2023-02-03 RX ADMIN — ACETAMINOPHEN 650 MG: 325 TABLET ORAL at 07:02

## 2023-02-03 RX ADMIN — ETOMIDATE 2 MG: 2 INJECTION, SOLUTION INTRAVENOUS at 01:02

## 2023-02-03 RX ADMIN — OXYBUTYNIN CHLORIDE 5 MG: 5 TABLET, EXTENDED RELEASE ORAL at 08:02

## 2023-02-03 RX ADMIN — ETOMIDATE 1 MG: 2 INJECTION, SOLUTION INTRAVENOUS at 01:02

## 2023-02-03 RX ADMIN — ETOMIDATE 3 MG: 2 INJECTION, SOLUTION INTRAVENOUS at 12:02

## 2023-02-03 RX ADMIN — PROPOFOL 10 MG: 10 INJECTION, EMULSION INTRAVENOUS at 01:02

## 2023-02-03 RX ADMIN — ETOMIDATE 2 MG: 2 INJECTION, SOLUTION INTRAVENOUS at 12:02

## 2023-02-03 RX ADMIN — FLUTICASONE PROPIONATE 100 MCG: 50 SPRAY, METERED NASAL at 08:02

## 2023-02-03 RX ADMIN — ALLOPURINOL 100 MG: 100 TABLET ORAL at 08:02

## 2023-02-03 RX ADMIN — POLYETHYLENE GLYCOL 3350, SODIUM SULFATE ANHYDROUS, SODIUM BICARBONATE, SODIUM CHLORIDE, POTASSIUM CHLORIDE 2000 ML: 236; 22.74; 6.74; 5.86; 2.97 POWDER, FOR SOLUTION ORAL at 02:02

## 2023-02-03 RX ADMIN — ATORVASTATIN CALCIUM 20 MG: 10 TABLET, FILM COATED ORAL at 08:02

## 2023-02-03 RX ADMIN — PHENYLEPHRINE HYDROCHLORIDE 100 MCG: 10 INJECTION INTRAVENOUS at 01:02

## 2023-02-03 RX ADMIN — LIDOCAINE HYDROCHLORIDE 60 MG: 20 INJECTION, SOLUTION INTRAVENOUS at 12:02

## 2023-02-03 NOTE — PROVATION PATIENT INSTRUCTIONS
Discharge Summary/Instructions after an Endoscopic Procedure  Patient Name: Palmira Malave  Patient MRN: [id number]  Patient YOB: 1935  Friday, February 3, 2023  Jordon Sotomayor MD  Dear patient,  As a result of recent federal legislation (The Federal Cures Act), you may   receive lab or pathology results from your procedure in your [facility]   account before your physician is able to contact you. Your physician or   their representative will relay the results to you with their   recommendations at their soonest availability.  Thank you,  RESTRICTIONS:  During your procedure today, you received medications for sedation.  These   medications may affect your judgment, balance and coordination.  Therefore,   for 24 hours, you have the following restrictions:   - DO NOT drive a car, operate machinery, make legal/financial decisions,   sign important papers or drink alcohol.    ACTIVITY:  Today: no heavy lifting, straining or running due to procedural   sedation/anesthesia.  The following day: return to full activity including work.  DIET:  Eat and drink normally unless instructed otherwise.     TREATMENT FOR COMMON SIDE EFFECTS:  - Mild abdominal pain, nausea, belching, bloating or excessive gas:  rest,   eat lightly and use a heating pad.  - Sore Throat: treat with throat lozenges and/or gargle with warm salt   water.  - Because air was used during the procedure, expelling large amounts of air   from your rectum or belching is normal.  - If a bowel prep was taken, you may not have a bowel movement for 1-3 days.    This is normal.  SYMPTOMS TO WATCH FOR AND REPORT TO YOUR PHYSICIAN:  1. Abdominal pain or bloating, other than gas cramps.  2. Chest pain.  3. Back pain.  4. Signs of infection such as: chills or fever occurring within 24 hours   after the procedure.  5. Rectal bleeding, which would show as bright red, maroon, or black stools.   (A tablespoon of blood from the rectum is not serious, especially  if   hemorrhoids are present.)  6. Vomiting.  7. Weakness or dizziness.  GO DIRECTLY TO THE NEAREST EMERGENCY ROOM IF YOU HAVE ANY OF THE FOLLOWING:      Difficulty breathing              Chills and/or fever over 101 F   Persistent vomiting and/or vomiting blood   Severe abdominal pain   Severe chest pain   Black, tarry stools   Bleeding- more than one tablespoon   Any other symptom or condition that you feel may need urgent attention  Your doctor recommends these additional instructions:  If any biopsies were taken, your doctors clinic will contact you in 1 to 2   weeks with any results.  - Return patient to [facility] for ongoing care.   - Resume previous diet and consider discontinuing plavix  - Continue present medications.   - Trend CBC and monitor for signs of bleeding  - CTA to look for active diverticular bleed if recurrent signs of bleeding  - No repeat colonoscopy due to age.  For questions, problems or results please call your physician - Jordon Sotomayor MD at Work:  [phone].  [facility] can be reached at [phone]     IF A COMPLICATION OR EMERGENCY SITUATION ARISES AND YOU ARE UNABLE TO REACH   YOUR PHYSICIAN - GO DIRECTLY TO THE EMERGENCY ROOM.  Jordon Sotomayor MD  2/3/2023 1:53:32 PM  This report has been verified and signed electronically.  Dear patient,  As a result of recent federal legislation (The Federal Cures Act), you may   receive lab or pathology results from your procedure in your [facility]   account before your physician is able to contact you. Your physician or   their representative will relay the results to you with their   recommendations at their soonest availability.  Thank you,  PROVATION

## 2023-02-03 NOTE — PROGRESS NOTES
[facility] Medicine  Progress Note    Patient Name: Palmira Malave  MRN: [id number]  Patient Class: IP- Inpatient   Admission Date: 1/30/2023  Length of Stay: 4 days  Attending Physician: Liza Yeh MD  Primary Care Provider: Qi Ramon MD        Subjective:     Principal Problem:GI bleed        HPI:  This is a 87 year old female with a PMHx of HFrEF (EF: 20%, GIIDD, s/p AICD), CAD (on DAPT), CKD3, HTN, HLD who presents with melena.     History is obtained via chart review given altered mental status. Patient reportedly had melanotic stools, shortness of breath, abdominal pain and weakness. She had blood work done as outpatient and was told that her hemoglobin was 6 prompting her to present to the ED. The patient is lethargic and falls asleep during the interview. She denied having any symptoms but reported that her daughter reported that the patient had black stools. Additionally, she mentions shaking of her hands that started about 2 weeks ago. In the ED, she was hypotensive (86/47), improving with fluids. Labs showed normocytic anemia (7.1 - baseline 10-11), elevated creatinine (2.2, baseline of 2.0), elevated BNP (>4900), elevated troponin (0.135), negative lactic acid (0.8). CXR showed cardiomegaly and mild pulmonary edema/CHF. She was given Protonix 80 mg IV, NS 1000 ml, zofran and morphine 4 mg IV. The patient was admitted for further management.       Overview/Hospital Course:  87 year old female with a PMHx of HFrEF (EF: 20%, GIIDD, s/p AICD), CAD (on DAPT), CKD3, HTN, HLD admitted on 01/30/2023 for melena with concerns for GI bleed and acute blood loss anemia requiring transfusion. Hgb noted to be 6.4 on outpatient labs on 01/30 and was instructed to come to ED for evaluation. Was given 1U of pRBC on 01/30 with appropiate response (hgb 7.4 on 01/31). GI consulted- s/p EGD on 01/31. Showed friable mucosa noted in duodenal bulb. Likely source of bleeding. GI recommends  discontinuing plavix on discharge and f/u with GI outpatient for anemia workup. Hgb remained stable.  No further evidence bleeding.  Patient remained alert and oriented x3.  PT/OT consulted-recommends home health. Patient was anticipated to discharge home on 02/02, but had a large bowel movment which appeared to be very dark and concern for GI bleed. Discussed with GI, plan for colonoscopy on 02/03. Hgb dropped to 6.9 on 02/03, will transfuse one unit pRBC.         Interval History:  No acute overnight events.  Patient remained afebrile.  States her abdominal pain has resolved.  Alert and oriented x3 this morning.  Completed half of the bowel prep. Nurse reported bowel movements were with blood throughout the night.     Review of Systems   Constitutional:  Negative for chills, fatigue and fever.   Eyes:  Negative for visual disturbance.   Respiratory:  Negative for cough and shortness of breath.    Cardiovascular:  Negative for chest pain, palpitations and leg swelling.   Gastrointestinal:  Positive for blood in stool (prior to admit but none currently). Negative for abdominal pain (resolved), diarrhea, nausea and vomiting.   Genitourinary:  Negative for difficulty urinating, dysuria and hematuria.   Musculoskeletal:  Negative for joint swelling.   Neurological:  Negative for dizziness, weakness and headaches.   Psychiatric/Behavioral:  Negative for confusion and hallucinations.      Objective:     Vital Signs (Most Recent):  Temp: 98.7 °F (37.1 °C) (02/03/23 1158)  Pulse: 98 (02/03/23 1158)  Resp: (!) 24 (02/03/23 1158)  BP: 109/66 (02/03/23 1204)  SpO2: 98 % (02/03/23 1158)   Vital Signs (24h Range):  Temp:  [97.7 °F (36.5 °C)-98.7 °F (37.1 °C)] 98.7 °F (37.1 °C)  Pulse:  [] 98  Resp:  [18-24] 24  SpO2:  [94 %-100 %] 98 %  BP: (104-118)/(56-68) 109/66     Weight: 61.3 kg (135 lb 2.3 oz)  Body mass index is 23.94 kg/m².    Intake/Output Summary (Last 24 hours) at 2/3/2023 4644  Last data filed at 2/3/2023  0326  Gross per 24 hour   Intake 1920 ml   Output --   Net 1920 ml        Physical Exam  Vitals and nursing note reviewed.   Constitutional:       General: She is not in acute distress.     Appearance: She is ill-appearing (chronically ill appearing).   Eyes:      Extraocular Movements: Extraocular movements intact.      Pupils: Pupils are equal, round, and reactive to light.   Cardiovascular:      Rate and Rhythm: Normal rate and regular rhythm.      Heart sounds: Murmur heard.     No gallop.   Pulmonary:      Effort: Pulmonary effort is normal. No respiratory distress.      Breath sounds: Normal breath sounds. No wheezing or rales.   Abdominal:      General: There is no distension.      Palpations: Abdomen is soft.      Tenderness: There is no abdominal tenderness. There is no guarding.   Musculoskeletal:         General: No swelling or tenderness.      Right lower leg: Edema present.      Left lower leg: Edema present.      Comments: Trace edema in bilateral LE   Skin:     General: Skin is warm and dry.   Neurological:      General: No focal deficit present.      Mental Status: She is alert and oriented to person, place, and time.   Psychiatric:         Mood and Affect: Mood normal.         Behavior: Behavior is cooperative.         Thought Content: Thought content normal.       Significant Labs: All pertinent labs within the past 24 hours have been reviewed.    Significant Imaging: I have reviewed all pertinent imaging results/findings within the past 24 hours.      Assessment/Plan:      * GI bleed  - Patient presented with melanotic stools  - Risk factors: on aspirin, plavix.   - Baseline Hgb: 10-11  - Hgb on admission: 7.1. Was 6.4 on 01/30 on outpatient labs  - Etiology: likely upper GI bleed    Plan:  - Monitor for active bleeding  - Consult to GI: s/p EGD on 01/31, which showed friable mucosa noted in duodenal bulb. Likely source of bleeding.   -GI recommend to resume previous diet and consider stopping  plavix.   -Will f/u anemia in GI clinic  -Pt with large BM on 02/02, which appeared to be very dark.Discussed with GI. Monitor overnight. May need colonoscopy   - Monitor Hgb. Hgb 6.9 on 02/03. Tranfused 1U pRBC  -GI plan for colonoscopy 02/03    Acute blood loss anemia  -Hgb noted to be 6.4 on outpatient labs on 01/30   -Was given 1U of pRBC on 01/30 in ED with appropiate response (hgb 7.4 on 01/31).   -GI consulted- s/p EGD on 01/31, which showed friable mucosa noted in duodenal bulb. Likely source of bleeding.   -However, had large BM that appeared very dark on 02/02  -Discussed patient case with GI. Recommends monitoring overnight and prepare for possible colonoscopy versus video capsule endoscopy in the a.m.  -GI plans for colonoscopy on 02/03    Troponin level elevated  Likely in the setting of GI bleed.   No chest pain. Doubt ACS   Telemetry monitoring      Chronic systolic congestive heart failure  BNP  Recent Labs   Lab 01/30/23 2020   BNP >4,900*     - Patient is on room air. Euvolemic/slightly hypervolemic on examination.   - Doubt exacerbation. Elevated BNP is likely due to entresto.   - BNP chronically elevated  - Resume home medications when able     Coronary artery disease involving native coronary artery of native heart without angina pectoris  Hold aspirin/plavix in the setting of GI bleed.       Essential hypertension  Hold home medications in the setting of hypotension     Hyperlipidemia  Continue statin      Stage 3 chronic kidney disease  - History of CKD stage 3  - Currently not on HD  - Baseline creatinine 2.0 mg/dL    Recent Labs   Lab 02/01/23  0408 02/02/23  0532 02/03/23  0354   BUN 61* 59* 49*   CREATININE 1.7* 1.6* 1.4       Plan:  - Monitor Cr and BUN  - Monitor for acidosis, hyperkalemia, fluid overload and signs of uremia  - Avoid nephrotoxins  - Daily weights  - Strict I/Os  - Stable     GERD (gastroesophageal reflux disease)  Continue PPI    Goals of care,  counseling/discussion    Advance Care Planning     Date: 02/01/2023  -On chart review, previous discussion and code status was DNR.   -Discussed ACP on 02/01      Code Status  I engaged the the patient in a conversation about the patient's preferences for care  at the very end of life. The patient wishes to have a natural, peaceful death.  Along those lines, the patient does not wish to have CPR or other invasive treatments performed when her heart and/or breathing stops. I communicated to the patient that a DNR order would be placed in her medical record to reflect this preference.  I spent a total of 18 minutes engaging the patient in this advance care planning discussion.            Code Status: DNR        VTE Risk Mitigation (From admission, onward)         Ordered     IP VTE HIGH RISK PATIENT  Once         01/30/23 2320     Place sequential compression device  Until discontinued         01/30/23 2319                Discharge Planning   MARTELL: 2/2/2023     Code Status: DNR   Is the patient medically ready for discharge?:     Reason for patient still in hospital (select all that apply): Patient trending condition, Treatment and Consult recommendations  Discharge Plan A: Home Health   Discharge Delays: None known at this time              Dwayne Feliciano DO  Department of Hospital Medicine   [facility]

## 2023-02-03 NOTE — PLAN OF CARE
Chart check complete, pt AAOx4, able to verbalize needs.  IV flushed and saline locked.  Bowel prep in progress throughout the night with multiple dark bloody BM's throughout the night.  Pt able to transfer to the commode with assist x1.  Avasys and bed alarm on for safety.  No acute distress noted, pt free from falls or injury this shift.  Bed in low position, wheels locked, bed alarm on, call light in reach for assistance, will continue to monitor.      Problem: Adult Inpatient Plan of Care  Goal: Plan of Care Review  Outcome: Ongoing, Progressing     Problem: Adult Inpatient Plan of Care  Goal: Patient-Specific Goal (Individualized)  Outcome: Ongoing, Progressing     Problem: Adult Inpatient Plan of Care  Goal: Absence of Hospital-Acquired Illness or Injury  Outcome: Ongoing, Progressing     Problem: Adult Inpatient Plan of Care  Goal: Optimal Comfort and Wellbeing  Outcome: Ongoing, Progressing     Problem: Bleeding (Gastrointestinal Bleeding)  Goal: Hemostasis  Outcome: Ongoing, Progressing     Problem: Renal Function Impairment (Acute Kidney Injury/Impairment)  Goal: Effective Renal Function  Outcome: Ongoing, Progressing     Problem: Fall Injury Risk  Goal: Absence of Fall and Fall-Related Injury  Outcome: Ongoing, Progressing

## 2023-02-03 NOTE — NURSING
Returned to room from PACU via stretcher, no distress noted, denies any pain at this time, transferred to bed w/o incident, safety maintained.

## 2023-02-03 NOTE — OR NURSING
Patient transfer back to the unit  alert and oriented. Report given to her nurse Crystal. Blood infusion stopped at 1354 and discontinued.

## 2023-02-03 NOTE — TRANSFER OF CARE
"Anesthesia Transfer of Care Note    Patient: Palmira Malave    Procedure(s) Performed: Procedure(s) (LRB):  COLONOSCOPY (N/A)    Patient location: GI    Anesthesia Type: general    Transport from OR: Transported from OR on room air with adequate spontaneous ventilation    Post pain: adequate analgesia    Post assessment: no apparent anesthetic complications and tolerated procedure well    Post vital signs: stable    Level of consciousness: awake and alert    Nausea/Vomiting: no nausea/vomiting    Complications: none    Transfer of care protocol was followed      Last vitals:   Visit Vitals  BP (!) 112/59   Pulse 91   Temp 36.8 °C (98.3 °F) (Axillary)   Resp 18   Ht 5' 3" (1.6 m)   Wt 61.3 kg (135 lb 2.3 oz)   SpO2 99%   Breastfeeding No   BMI 23.94 kg/m²     "

## 2023-02-03 NOTE — ANESTHESIA PREPROCEDURE EVALUATION
"                                                                                                             02/03/2023  Palmira Malave is a 87 y.o., female.      Pre-op Assessment    I have reviewed the Patient Summary Reports.     I have reviewed the Nursing Notes.       Review of Systems  Anesthesia Hx:  No problems with previous Anesthesia  Denies Family Hx of Anesthesia complications.   Denies Personal Hx of Anesthesia complications.   Social:  Non-Smoker, No Alcohol Use    Hematology/Oncology:         -- Anemia: Hematology Comments: Hgb 6.9; pt currently getting 1u PRBC    Plavix-last dose 1/30   Cardiovascular:   Exercise tolerance: poor Pacemaker Hypertension Valvular problems/Murmurs, AS CAD   CHF 7/3/2022 Echo: EF 20%; grade 2 diastolic dysfunction; mild AS (PAUL 1.28cm3)   Pulmonary:   Shortness of breath Pulmonary edema; BNP >4,900   Renal/:   Chronic Renal Disease, CKD    Hepatic/GI:   GERD No recent N/V   Neurological:   "memory problems" in the last 6 months per daughter Dementia    Endocrine:  Endocrine Normal        Physical Exam  General: Cooperative  Frail; only oriented to self  Airway:  Mallampati: II   Mouth Opening: Normal  TM Distance: 4 - 6 cm  Tongue: Normal    Dental:    Chest/Lungs:  Normal Respiratory Rate    Heart:  Rate: Normal      Wt Readings from Last 3 Encounters:   02/03/23 61.3 kg (135 lb 2.3 oz)   01/18/23 58 kg (127 lb 13.9 oz)   01/10/23 58.1 kg (128 lb 1.4 oz)     Temp Readings from Last 3 Encounters:   02/03/23 37.1 °C (98.7 °F) (Oral)   01/10/23 36.7 °C (98 °F)   01/09/23 36.8 °C (98.3 °F)     BP Readings from Last 3 Encounters:   02/03/23 109/66   01/18/23 (!) 108/58   01/10/23 (!) 104/48     Pulse Readings from Last 3 Encounters:   02/03/23 98   01/18/23 70   01/10/23 76     Lab Results   Component Value Date    WBC 5.55 02/03/2023    HGB 6.9 (L) 02/03/2023    HCT 22.7 (L) 02/03/2023    MCV 99 (H) 02/03/2023     (L) 02/03/2023       CMP  Sodium   Date Value Ref " Range Status   02/03/2023 141 136 - 145 mmol/L Final     Potassium   Date Value Ref Range Status   02/03/2023 3.6 3.5 - 5.1 mmol/L Final     Chloride   Date Value Ref Range Status   02/03/2023 109 95 - 110 mmol/L Final     CO2   Date Value Ref Range Status   02/03/2023 21 (L) 23 - 29 mmol/L Final     Glucose   Date Value Ref Range Status   02/03/2023 80 70 - 110 mg/dL Final     BUN   Date Value Ref Range Status   02/03/2023 49 (H) 8 - 23 mg/dL Final     Creatinine   Date Value Ref Range Status   02/03/2023 1.4 0.5 - 1.4 mg/dL Final     Calcium   Date Value Ref Range Status   02/03/2023 9.3 8.7 - 10.5 mg/dL Final     Total Protein   Date Value Ref Range Status   01/30/2023 6.7 6.0 - 8.4 g/dL Final     Albumin   Date Value Ref Range Status   01/30/2023 3.6 3.5 - 5.2 g/dL Final     Total Bilirubin   Date Value Ref Range Status   01/30/2023 0.7 0.1 - 1.0 mg/dL Final     Comment:     For infants and newborns, interpretation of results should be based  on gestational age, weight and in agreement with clinical  observations.    Premature Infant recommended reference ranges:  Up to 24 hours.............<8.0 mg/dL  Up to 48 hours............<12.0 mg/dL  3-5 days..................<15.0 mg/dL  6-29 days.................<15.0 mg/dL       Alkaline Phosphatase   Date Value Ref Range Status   01/30/2023 68 55 - 135 U/L Final     AST   Date Value Ref Range Status   01/30/2023 18 10 - 40 U/L Final     ALT   Date Value Ref Range Status   01/30/2023 9 (L) 10 - 44 U/L Final     Anion Gap   Date Value Ref Range Status   02/03/2023 11 8 - 16 mmol/L Final     eGFR   Date Value Ref Range Status   02/03/2023 36 (A) >60 mL/min/1.73 m^2 Final         Anesthesia Plan  Type of Anesthesia, risks & benefits discussed:    Anesthesia Type: Gen Natural Airway, MAC  Intra-op Monitoring Plan: Standard ASA Monitors  Post Op Pain Control Plan: multimodal analgesia  Induction:  IV  Informed Consent: Informed consent signed with the Patient representative  and all parties understand the risks and agree with anesthesia plan.  All questions answered.   ASA Score: 4  Day of Surgery Review of History & Physical: H&P Update referred to the surgeon/provider.  Anesthesia Plan Notes: Informed consent obtained from patient's daughter, Usama Malave, and witnessed by 2 RNs. Daughter agrees to suspend DNR status during perioperative period.     Ready For Surgery From Anesthesia Perspective.     .

## 2023-02-03 NOTE — PLAN OF CARE
Per physician, discharge canceled on yesterday. GI consulted. Per PHN, patient placed with  [facility]. [place] to continue to follow for dc needs.    02/03/23 0805   Discharge Reassessment   Assessment Type Discharge Planning Reassessment   Did the patient's condition or plan change since previous assessment? Yes   Discharge Plan A Home Health   DME Needed Upon Discharge  none   Discharge Barriers Identified None   Why the patient remains in the hospital Requires continued medical care   Post-Acute Status   Post-Acute Authorization Home Health   Home Health Status Set-up Complete/Auth obtained   Coverage PHN   Discharge Delays None known at this time

## 2023-02-03 NOTE — ASSESSMENT & PLAN NOTE
-Hgb noted to be 6.4 on outpatient labs on 01/30   -Was given 1U of pRBC on 01/30 in ED with appropiate response (hgb 7.4 on 01/31).   -GI consulted- s/p EGD on 01/31, which showed friable mucosa noted in duodenal bulb. Likely source of bleeding.   -However, had large BM that appeared very dark on 02/02  -Discussed patient case with GI. Recommends monitoring overnight and prepare for possible colonoscopy versus video capsule endoscopy in the a.m.  -GI plans for colonoscopy on 02/03

## 2023-02-03 NOTE — ASSESSMENT & PLAN NOTE
- History of CKD stage 3  - Currently not on HD  - Baseline creatinine 2.0 mg/dL    Recent Labs   Lab 02/01/23  0408 02/02/23  0532 02/03/23  0354   BUN 61* 59* 49*   CREATININE 1.7* 1.6* 1.4       Plan:  - Monitor Cr and BUN  - Monitor for acidosis, hyperkalemia, fluid overload and signs of uremia  - Avoid nephrotoxins  - Daily weights  - Strict I/Os  - Stable

## 2023-02-03 NOTE — ANESTHESIA POSTPROCEDURE EVALUATION
Anesthesia Post Evaluation    Patient: Palmira Malave    Procedure(s) Performed: Procedure(s) (LRB):  COLONOSCOPY (N/A)    Final Anesthesia Type: general      Patient location during evaluation: GI PACU  Patient participation: Yes- Able to Participate  Level of consciousness: awake and alert  Post-procedure vital signs: reviewed and stable  Pain management: adequate  Airway patency: patent    PONV status at discharge: No PONV  Anesthetic complications: no      Cardiovascular status: hemodynamically stable  Respiratory status: unassisted and spontaneous ventilation  Hydration status: euvolemic  Follow-up not needed.          Vitals Value Taken Time   /53 02/03/23 1358   Temp 36.8 °C (98.3 °F) 02/03/23 1342   Pulse 80 02/03/23 1353   Resp 21 02/03/23 1353   SpO2 100 % 02/03/23 1353         No case tracking events are documented in the log.      Pain/Barron Score: Pain Rating Prior to Med Admin: 3 (2/2/2023  8:23 PM)  Barron Score: 5 (2/3/2023  1:58 PM)

## 2023-02-03 NOTE — ASSESSMENT & PLAN NOTE
- Patient presented with melanotic stools  - Risk factors: on aspirin, plavix.   - Baseline Hgb: 10-11  - Hgb on admission: 7.1. Was 6.4 on 01/30 on outpatient labs  - Etiology: likely upper GI bleed    Plan:  - Monitor for active bleeding  - Consult to GI: s/p EGD on 01/31, which showed friable mucosa noted in duodenal bulb. Likely source of bleeding.   -GI recommend to resume previous diet and consider stopping plavix.   -Will f/u anemia in GI clinic  -Pt with large BM on 02/02, which appeared to be very dark.Discussed with GI. Monitor overnight. May need colonoscopy   - Monitor Hgb. Hgb 6.9 on 02/03. Tranfused 1U pRBC  -GI plan for colonoscopy 02/03

## 2023-02-03 NOTE — SUBJECTIVE & OBJECTIVE
Interval History:  No acute overnight events.  Patient remained afebrile.  States her abdominal pain has resolved.  Alert and oriented x3 this morning.  Completed half of the bowel prep. Nurse reported bowel movements were with blood throughout the night.     Review of Systems   Constitutional:  Negative for chills, fatigue and fever.   Eyes:  Negative for visual disturbance.   Respiratory:  Negative for cough and shortness of breath.    Cardiovascular:  Negative for chest pain, palpitations and leg swelling.   Gastrointestinal:  Positive for blood in stool (prior to admit but none currently). Negative for abdominal pain (resolved), diarrhea, nausea and vomiting.   Genitourinary:  Negative for difficulty urinating, dysuria and hematuria.   Musculoskeletal:  Negative for joint swelling.   Neurological:  Negative for dizziness, weakness and headaches.   Psychiatric/Behavioral:  Negative for confusion and hallucinations.      Objective:     Vital Signs (Most Recent):  Temp: 98.7 °F (37.1 °C) (02/03/23 1158)  Pulse: 98 (02/03/23 1158)  Resp: (!) 24 (02/03/23 1158)  BP: 109/66 (02/03/23 1204)  SpO2: 98 % (02/03/23 1158)   Vital Signs (24h Range):  Temp:  [97.7 °F (36.5 °C)-98.7 °F (37.1 °C)] 98.7 °F (37.1 °C)  Pulse:  [] 98  Resp:  [18-24] 24  SpO2:  [94 %-100 %] 98 %  BP: (104-118)/(56-68) 109/66     Weight: 61.3 kg (135 lb 2.3 oz)  Body mass index is 23.94 kg/m².    Intake/Output Summary (Last 24 hours) at 2/3/2023 1239  Last data filed at 2/3/2023 0326  Gross per 24 hour   Intake 1920 ml   Output --   Net 1920 ml        Physical Exam  Vitals and nursing note reviewed.   Constitutional:       General: She is not in acute distress.     Appearance: She is ill-appearing (chronically ill appearing).   Eyes:      Extraocular Movements: Extraocular movements intact.      Pupils: Pupils are equal, round, and reactive to light.   Cardiovascular:      Rate and Rhythm: Normal rate and regular rhythm.      Heart sounds:  Murmur heard.     No gallop.   Pulmonary:      Effort: Pulmonary effort is normal. No respiratory distress.      Breath sounds: Normal breath sounds. No wheezing or rales.   Abdominal:      General: There is no distension.      Palpations: Abdomen is soft.      Tenderness: There is no abdominal tenderness. There is no guarding.   Musculoskeletal:         General: No swelling or tenderness.      Right lower leg: Edema present.      Left lower leg: Edema present.      Comments: Trace edema in bilateral LE   Skin:     General: Skin is warm and dry.   Neurological:      General: No focal deficit present.      Mental Status: She is alert and oriented to person, place, and time.   Psychiatric:         Mood and Affect: Mood normal.         Behavior: Behavior is cooperative.         Thought Content: Thought content normal.       Significant Labs: All pertinent labs within the past 24 hours have been reviewed.    Significant Imaging: I have reviewed all pertinent imaging results/findings within the past 24 hours.

## 2023-02-03 NOTE — PLAN OF CARE
Problem: Adult Inpatient Plan of Care  Goal: Plan of Care Review  Outcome: Ongoing, Progressing  Flowsheets (Taken 2/2/2023 1842)  Plan of Care Reviewed With:   patient   family  Goal: Patient-Specific Goal (Individualized)  Outcome: Ongoing, Progressing  Goal: Absence of Hospital-Acquired Illness or Injury  Outcome: Ongoing, Progressing  Intervention: Identify and Manage Fall Risk  Flowsheets (Taken 2/2/2023 1842)  Safety Promotion/Fall Prevention:   assistive device/personal item within reach   high risk medications identified   Fall Risk reviewed with patient/family   in recliner, wheels locked   nonskid shoes/socks when out of bed   medications reviewed   room near unit station   side rails raised x 2   instructed to call staff for mobility  Intervention: Prevent Skin Injury  Flowsheets (Taken 2/2/2023 1842)  Body Position:   position changed independently   weight shifting  Skin Protection:   adhesive use limited   tubing/devices free from skin contact  Intervention: Prevent and Manage VTE (Venous Thromboembolism) Risk  Flowsheets (Taken 2/2/2023 1842)  Activity Management:   Ankle pumps - L1   Arm raise - L1   Up in chair - L3   Sitting at edge of bed - L2   Up to bedside commode - L3  VTE Prevention/Management:   ambulation promoted   bleeding precations maintained   bleeding risk assessed  Range of Motion: active ROM (range of motion) encouraged  Intervention: Prevent Infection  Flowsheets (Taken 2/2/2023 1842)  Infection Prevention: hand hygiene promoted  Goal: Optimal Comfort and Wellbeing  Outcome: Ongoing, Progressing  Goal: Readiness for Transition of Care  Outcome: Ongoing, Progressing     Problem: Adjustment to Illness (Gastrointestinal Bleeding)  Goal: Optimal Coping with Acute Illness  Outcome: Ongoing, Progressing     Problem: Bleeding (Gastrointestinal Bleeding)  Goal: Hemostasis  Outcome: Ongoing, Progressing     Problem: Skin Injury Risk Increased  Goal: Skin Health and Integrity  Outcome:  Ongoing, Progressing  Intervention: Optimize Skin Protection  Flowsheets (Taken 2/2/2023 1842)  Pressure Reduction Techniques:   frequent weight shift encouraged   weight shift assistance provided  Skin Protection:   adhesive use limited   tubing/devices free from skin contact  Head of Bed (HOB) Positioning: HOB at 30-45 degrees     Problem: Fluid and Electrolyte Imbalance (Acute Kidney Injury/Impairment)  Goal: Fluid and Electrolyte Balance  Outcome: Ongoing, Progressing     Problem: Oral Intake Inadequate (Acute Kidney Injury/Impairment)  Goal: Optimal Nutrition Intake  Outcome: Ongoing, Progressing     Problem: Renal Function Impairment (Acute Kidney Injury/Impairment)  Goal: Effective Renal Function  Outcome: Ongoing, Progressing     Problem: Fall Injury Risk  Goal: Absence of Fall and Fall-Related Injury  Outcome: Ongoing, Progressing  Intervention: Identify and Manage Contributors  Flowsheets (Taken 2/2/2023 1842)  Self-Care Promotion:   independence encouraged   safe use of adaptive equipment encouraged   BADL personal objects within reach   BADL personal routines maintained   meal set-up provided  Medication Review/Management:   medications reviewed   high-risk medications identified  Intervention: Promote Injury-Free Environment  Flowsheets (Taken 2/2/2023 1842)  Safety Promotion/Fall Prevention:   assistive device/personal item within reach   high risk medications identified   Fall Risk reviewed with patient/family   in recliner, wheels locked   nonskid shoes/socks when out of bed   medications reviewed   room near unit station   side rails raised x 2   instructed to call staff for mobility

## 2023-02-04 VITALS
WEIGHT: 135.13 LBS | DIASTOLIC BLOOD PRESSURE: 77 MMHG | BODY MASS INDEX: 23.94 KG/M2 | SYSTOLIC BLOOD PRESSURE: 111 MMHG | OXYGEN SATURATION: 99 % | RESPIRATION RATE: 17 BRPM | HEIGHT: 63 IN | HEART RATE: 91 BPM | TEMPERATURE: 98 F

## 2023-02-04 LAB
ANION GAP SERPL CALC-SCNC: 12 MMOL/L (ref 8–16)
BASOPHILS # BLD AUTO: 0.04 K/UL (ref 0–0.2)
BASOPHILS NFR BLD: 0.6 % (ref 0–1.9)
BUN SERPL-MCNC: 44 MG/DL (ref 8–23)
CALCIUM SERPL-MCNC: 9.3 MG/DL (ref 8.7–10.5)
CHLORIDE SERPL-SCNC: 108 MMOL/L (ref 95–110)
CO2 SERPL-SCNC: 21 MMOL/L (ref 23–29)
CREAT SERPL-MCNC: 1.4 MG/DL (ref 0.5–1.4)
DIFFERENTIAL METHOD: ABNORMAL
EOSINOPHIL # BLD AUTO: 0.1 K/UL (ref 0–0.5)
EOSINOPHIL NFR BLD: 2 % (ref 0–8)
ERYTHROCYTE [DISTWIDTH] IN BLOOD BY AUTOMATED COUNT: 18.9 % (ref 11.5–14.5)
EST. GFR  (NO RACE VARIABLE): 36 ML/MIN/1.73 M^2
GLUCOSE SERPL-MCNC: 84 MG/DL (ref 70–110)
HCT VFR BLD AUTO: 24.9 % (ref 37–48.5)
HGB BLD-MCNC: 8.1 G/DL (ref 12–16)
IMM GRANULOCYTES # BLD AUTO: 0.02 K/UL (ref 0–0.04)
IMM GRANULOCYTES NFR BLD AUTO: 0.3 % (ref 0–0.5)
LYMPHOCYTES # BLD AUTO: 0.9 K/UL (ref 1–4.8)
LYMPHOCYTES NFR BLD: 13.4 % (ref 18–48)
MCH RBC QN AUTO: 30.3 PG (ref 27–31)
MCHC RBC AUTO-ENTMCNC: 32.5 G/DL (ref 32–36)
MCV RBC AUTO: 93 FL (ref 82–98)
MONOCYTES # BLD AUTO: 0.6 K/UL (ref 0.3–1)
MONOCYTES NFR BLD: 8.8 % (ref 4–15)
NEUTROPHILS # BLD AUTO: 4.9 K/UL (ref 1.8–7.7)
NEUTROPHILS NFR BLD: 74.9 % (ref 38–73)
NRBC BLD-RTO: 0 /100 WBC
PLATELET # BLD AUTO: 116 K/UL (ref 150–450)
PMV BLD AUTO: 10.7 FL (ref 9.2–12.9)
POTASSIUM SERPL-SCNC: 4.2 MMOL/L (ref 3.5–5.1)
RBC # BLD AUTO: 2.67 M/UL (ref 4–5.4)
SODIUM SERPL-SCNC: 141 MMOL/L (ref 136–145)
WBC # BLD AUTO: 6.57 K/UL (ref 3.9–12.7)

## 2023-02-04 PROCEDURE — 25000003 PHARM REV CODE 250: Performed by: STUDENT IN AN ORGANIZED HEALTH CARE EDUCATION/TRAINING PROGRAM

## 2023-02-04 PROCEDURE — 80048 BASIC METABOLIC PNL TOTAL CA: CPT | Performed by: STUDENT IN AN ORGANIZED HEALTH CARE EDUCATION/TRAINING PROGRAM

## 2023-02-04 PROCEDURE — 85025 COMPLETE CBC W/AUTO DIFF WBC: CPT | Performed by: STUDENT IN AN ORGANIZED HEALTH CARE EDUCATION/TRAINING PROGRAM

## 2023-02-04 RX ORDER — HYDROCODONE BITARTRATE AND ACETAMINOPHEN 5; 325 MG/1; MG/1
1 TABLET ORAL EVERY 8 HOURS PRN
Qty: 9 TABLET | Refills: 0 | Status: ON HOLD | OUTPATIENT
Start: 2023-02-04 | End: 2023-02-10 | Stop reason: HOSPADM

## 2023-02-04 RX ORDER — HYDROCODONE BITARTRATE AND ACETAMINOPHEN 5; 325 MG/1; MG/1
1 TABLET ORAL EVERY 6 HOURS PRN
Status: DISCONTINUED | OUTPATIENT
Start: 2023-02-04 | End: 2023-02-04 | Stop reason: HOSPADM

## 2023-02-04 RX ADMIN — ACETAMINOPHEN 650 MG: 325 TABLET ORAL at 08:02

## 2023-02-04 RX ADMIN — ATORVASTATIN CALCIUM 20 MG: 10 TABLET, FILM COATED ORAL at 08:02

## 2023-02-04 RX ADMIN — HYDROCODONE BITARTRATE AND ACETAMINOPHEN 1 TABLET: 5; 325 TABLET ORAL at 08:02

## 2023-02-04 RX ADMIN — FLUTICASONE PROPIONATE 100 MCG: 50 SPRAY, METERED NASAL at 08:02

## 2023-02-04 RX ADMIN — OXYBUTYNIN CHLORIDE 5 MG: 5 TABLET, EXTENDED RELEASE ORAL at 08:02

## 2023-02-04 RX ADMIN — CETIRIZINE HYDROCHLORIDE 5 MG: 5 TABLET ORAL at 08:02

## 2023-02-04 NOTE — DISCHARGE SUMMARY
[facility] Medicine  Discharge Summary      Patient Name: Palmira Malave  MRN: [id number]  JN: [id number]  Patient Class: IP- Inpatient  Admission Date: 1/30/2023  Hospital Length of Stay: 5 days  Discharge Date and Time:  02/04/2023 1:20 PM  Attending Physician: Dwayne Feliciano DO   Discharging Provider: Leighton Jiang III, MD  Primary Care Provider: Qi Ramon MD    Primary Care Team: Networked reference to record PCT     HPI:   This is a 87 year old female with a PMHx of HFrEF (EF: 20%, GIIDD, s/p AICD), CAD (on DAPT), CKD3, HTN, HLD who presents with melena.     History is obtained via chart review given altered mental status. Patient reportedly had melanotic stools, shortness of breath, abdominal pain and weakness. She had blood work done as outpatient and was told that her hemoglobin was 6 prompting her to present to the ED. The patient is lethargic and falls asleep during the interview. She denied having any symptoms but reported that her daughter reported that the patient had black stools. Additionally, she mentions shaking of her hands that started about 2 weeks ago. In the ED, she was hypotensive (86/47), improving with fluids. Labs showed normocytic anemia (7.1 - baseline 10-11), elevated creatinine (2.2, baseline of 2.0), elevated BNP (>4900), elevated troponin (0.135), negative lactic acid (0.8). CXR showed cardiomegaly and mild pulmonary edema/CHF. She was given Protonix 80 mg IV, NS 1000 ml, zofran and morphine 4 mg IV. The patient was admitted for further management.       Procedure(s) (LRB):  COLONOSCOPY (N/A)      Hospital Course:   87 year old female with a PMHx of HFrEF (EF: 20%, GIIDD, s/p AICD), CAD (on DAPT), CKD3, HTN, HLD admitted on 01/30/2023 for melena with concerns for GI bleed and acute blood loss anemia requiring transfusion. Hgb noted to be 6.4 on outpatient labs on 01/30 and was instructed to come to ED for evaluation. Was given 1U of pRBC on 01/30 with  appropiate response (hgb 7.4 on 01/31). GI consulted- s/p EGD on 01/31. Showed friable mucosa noted in duodenal bulb. Likely source of bleeding. GI recommends discontinuing plavix on discharge and f/u with GI outpatient for anemia workup. Hgb remained stable.  No further evidence bleeding.  Patient remained alert and oriented x3.  PT/OT consulted-recommends home health. Patient was anticipated to discharge home on 02/02, but had a large bowel movment which appeared to be very dark and concern for GI bleed. Discussed with GI, plan for colonoscopy on 02/03. Hgb dropped to 6.9 on 02/03 and given 1 unit prbc. Blood counts stable after the unit the next morning. Pt c/o back pain in the morning which pt states has been going on for several weeks and improved with pain medications. Thoracolumbar xr with degenerative changes, but no acute findings. Pt denied any red flag sxs including fever, chills, numbness/weakness of the extremities, saddle paraesthesias, neck pain, or ha. Pt asking to be d/c home. We will send her with short rx for pain medication for back pain and home health pt/ot/nursing. Pt noted to have pulmonary nodule of xr and referral for pulm sent. Pt also needs to f/u with pcp and GI after d/c.           Goals of Care Treatment Preferences:  Code Status: DNR          What is most important right now is to focus on spending time at home, avoiding the hospital, remaining as independent as possible.  Accordingly, we have decided that the best plan to meet the patient's goals includes continuing with treatment.      Consults:   Consults (From admission, onward)        Status Ordering Provider     IP consult to case management  Once        Provider:  (Not yet assigned)    Completed LIZZETH PANTOJA     Inpatient consult to Gastroenterology  Once        Provider:  Joaquina Verde NP    Completed MERVAT KIRAN     Inpatient consult to Gastroenterology  Once        Provider:  Luis Gutierrez MD    Completed CLARA  KYLIE RIBEIRO          No new Assessment & Plan notes have been filed under this hospital service since the last note was generated.  Service: Hospital Medicine    Final Active Diagnoses:    Diagnosis Date Noted POA    PRINCIPAL PROBLEM:  GI bleed [K92.2] 01/30/2023 Yes    Acute blood loss anemia [D62] 01/31/2023 Yes    Goals of care, counseling/discussion [Z71.89] 04/01/2022 Not Applicable    GERD (gastroesophageal reflux disease) [K21.9] 12/08/2019 Yes     Chronic    Chronic systolic congestive heart failure [I50.22]  Yes    Troponin level elevated [R77.8] 11/15/2014 Yes    Stage 3 chronic kidney disease [N18.30] 11/15/2014 Yes    Hyperlipidemia [E78.5] 11/15/2014 Yes     Chronic    Essential hypertension [I10] 11/15/2014 Yes     Chronic    Coronary artery disease involving native coronary artery of native heart without angina pectoris [I25.10] 11/15/2014 Yes      Problems Resolved During this Admission:       Discharged Condition: fair    Disposition: Home or Self Care    Follow Up:   Follow-up Information     Qi Ramon MD Follow up on 2/7/2023.    Specialty: Internal Medicine  Why: @11:30am for a hospital follow up. Dr Ramon office is requesing that you bring a copy of your discharge instructions with you.  Contact information:  [address] [place] [address]  [address]  [place] [address] [phone]             [facility] Follow up.    Specialty: Home Health Services  Why: Home Health  Contact information:  [address] [facility]  [address]  [place] [id number] [phone]                       Patient Instructions:      Ambulatory referral/consult to Gastroenterology   Standing Status: Future   Referral Priority: Routine Referral Type: Consultation   Referral Reason: Specialty Services Required   Requested Specialty: Gastroenterology   Number of Visits Requested: 1     Ambulatory referral/consult to [facility]   Standing Status: Future   Referral Priority: Urgent Referral  2 Type: Consultation   Referral Reason: Specialty Services Required   Number of Visits Requested: 1     Ambulatory referral/consult to Pulmonology   Standing Status: Future   Referral Priority: Routine Referral Type: Consultation   Referral Reason: Specialty Services Required   Requested Specialty: Pulmonary Disease   Number of Visits Requested: 1     Diet Cardiac     Diet diabetic     Notify your health care provider if you experience any of the following:  difficulty breathing or increased cough     Notify your health care provider if you experience any of the following:  increased confusion or weakness     Notify your health care provider if you experience any of the following:  persistent dizziness, light-headedness, or visual disturbances     Notify your health care provider if you experience any of the following:  persistent nausea and vomiting or diarrhea     Notify your health care provider if you experience any of the following:  severe uncontrolled pain     Notify your health care provider if you experience any of the following:  worsening rash     Notify your health care provider if you experience any of the following:  severe persistent headache     Notify your health care provider if you experience any of the following:  redness, tenderness, or signs of infection (pain, swelling, redness, odor or green/yellow discharge around incision site)     Notify your health care provider if you experience any of the following:  temperature >100.4     Activity as tolerated       Significant Diagnostic Studies: Labs: All labs within the past 24 hours have been reviewed    Pending Diagnostic Studies:     None         Medications:  Reconciled Home Medications:      Medication List      START taking these medications    HYDROcodone-acetaminophen 5-325 mg per tablet  Commonly known as: NORCO  Take 1 tablet by mouth every 8 (eight) hours as needed for Pain.        CHANGE how you take these medications    furosemide 80 MG  tablet  Commonly known as: LASIX  TAKE 1 TABLET BY MOUTH TWICE DAILY AS NEEDED FOR SHORTNESS OF BREATH OR  EDEMA  What changed: See the new instructions.        CONTINUE taking these medications    acetaminophen 325 MG tablet  Commonly known as: TYLENOL  Take 650 mg by mouth once daily.     allopurinoL 100 MG tablet  Commonly known as: ZYLOPRIM  Take 100 mg by mouth every evening.     CENTRUM SILVER ORAL  Take by mouth once daily.     cranberry 400 mg Cap  Take 1 capsule by mouth 2 (two) times daily.     fish oil-omega-3 fatty acids 300-1,000 mg capsule  Take 2 g by mouth once daily. 1 caps every AM & 1 cap every PM.     lovastatin 40 MG tablet  Commonly known as: MEVACOR  Take 40 mg by mouth nightly. Takes 2 caps with supper every evening.     mirabegron 50 mg Tb24  Commonly known as: MYRBETRIQ  Take 50 mg by mouth once daily.     NEURONTIN 100 MG capsule  Generic drug: gabapentin  Take 100 mg by mouth 2 (two) times daily.     pantoprazole 40 MG tablet  Commonly known as: PROTONIX  Take 40 mg by mouth once daily.        STOP taking these medications    clopidogreL 75 mg tablet  Commonly known as: PLAVIX     metoprolol succinate 25 MG 24 hr tablet  Commonly known as: TOPROL-XL     traMADoL 50 mg tablet  Commonly known as: ULTRAM        ASK your doctor about these medications    ENTRESTO 24-26 mg per tablet  Generic drug: sacubitriL-valsartan  Take 1 tablet by mouth 2 (two) times daily.            Indwelling Lines/Drains at time of discharge:   Lines/Drains/Airways     None                 Time spent on the discharge of patient: >35 minutes         Leighton Jiang III, MD  Department of Hospital Medicine  [facility]

## 2023-02-04 NOTE — PLAN OF CARE
Problem: Adjustment to Illness (Gastrointestinal Bleeding)  Goal: Optimal Coping with Acute Illness  Outcome: Ongoing, Progressing     Problem: Skin Injury Risk Increased  Goal: Skin Health and Integrity  Outcome: Ongoing, Progressing

## 2023-02-04 NOTE — PLAN OF CARE
[facility] - Med Surg      HOME HEALTH ORDERS  FACE TO FACE ENCOUNTER    Patient Name: Palmira Malave  YOB: 1935    PCP: Qi Ramon MD   PCP Address: [address] [place] [address] [address] / [place] [address]  PCP Phone Number: [phone]  PCP Fax: [phone]    Encounter Date: 1/30/23    Admit to Home Health    Diagnoses:  Active Hospital Problems    Diagnosis  POA    *GI bleed [K92.2]  Yes    Acute blood loss anemia [D62]  Yes    Goals of care, counseling/discussion [Z71.89]  Not Applicable    GERD (gastroesophageal reflux disease) [K21.9]  Yes     Chronic    Chronic systolic congestive heart failure [I50.22]  Yes    Troponin level elevated [R77.8]  Yes    Stage 3 chronic kidney disease [N18.30]  Yes    Hyperlipidemia [E78.5]  Yes     Chronic    Essential hypertension [I10]  Yes     Chronic    Coronary artery disease involving native coronary artery of native heart without angina pectoris [I25.10]  Yes      Resolved Hospital Problems   No resolved problems to display.       Follow Up Appointments:  Future Appointments   Date Time Provider Department Center   2/16/2023  1:30 PM Demetrius Velazco MD [facility] OPHTHAL [place]   2/28/2023  9:15 AM Kelechi Richmond MD [facility] CARDIO [place]   3/2/2023  8:00 AM Roni Mike NP [address]   3/30/2023  9:30 AM Jordon Sotomayor MD [facility] GASTRO [facility]       Allergies:  Review of patient's allergies indicates:   Allergen Reactions    Aspirin Swelling     Swelling and rash    Colace [docusate sodium] Rash     itching    Docusate Other (See Comments) and Rash     itching    Sulfa (sulfonamide antibiotics) Swelling     Swelling and rash  Swelling and rash    Iodine and iodide containing products Rash    Penicillins Rash       Medications: Review discharge medications with patient and family and provide education.    Current Facility-Administered Medications   Medication Dose Route Frequency Provider Last Rate Last Admin    0.9%  NaCl  infusion (for blood administration)   Intravenous Q24H PRN Dwayne MONTERO Feliciano, DO        acetaminophen tablet 650 mg  650 mg Oral Q8H PRN Luigi Hamm MD        acetaminophen tablet 650 mg  650 mg Oral Q4H PRN Luigi Hamm MD   650 mg at 02/04/23 0825    albuterol-ipratropium 2.5 mg-0.5 mg/3 mL nebulizer solution 3 mL  3 mL Nebulization Q4H PRN Luigi Hamm MD        allopurinoL tablet 100 mg  100 mg Oral QHS Luigi Hamm MD   100 mg at 02/03/23 2039    aluminum-magnesium hydroxide-simethicone 200-200-20 mg/5 mL suspension 30 mL  30 mL Oral QID PRN Luigi Hamm MD        atorvastatin tablet 20 mg  20 mg Oral Daily Luigi Hamm MD   20 mg at 02/04/23 0825    cetirizine tablet 5 mg  5 mg Oral Daily SariRaúl MONTERO Feliciano, DO   5 mg at 02/04/23 0825    dextrose 10% bolus 125 mL 125 mL  12.5 g Intravenous PRN [facility]        dextrose 10% bolus 250 mL 250 mL  25 g Intravenous PRN [facility]        fluticasone propionate 50 mcg/actuation nasal spray 100 mcg  2 spray Each Nostril Daily SariRaúl Feliciano, DO   100 mcg at 02/04/23 0828    glucagon (human recombinant) injection 1 mg  1 mg Intramuscular PRN Luigi Hamm MD        glucose chewable tablet 16 g  16 g Oral PRN Luigi Hamm MD        glucose chewable tablet 24 g  24 g Oral PRN Luigi Hamm MD        HYDROcodone-acetaminophen 5-325 mg per tablet 1 tablet  1 tablet Oral Q6H PRN Leighton Jiang III, MD   1 tablet at 02/04/23 0848    magnesium oxide tablet 800 mg  800 mg Oral PRN Luigi Hamm MD        magnesium oxide tablet 800 mg  800 mg Oral PRN Luigi Hamm MD        melatonin tablet 6 mg  6 mg Oral Nightly PRN Luigi Hamm MD        naloxone 0.4 mg/mL injection 0.02 mg  0.02 mg Intravenous PRN Luigi Hamm MD        ondansetron injection 4 mg  4 mg Intravenous Q8H PRN Luigi Hamm MD   4 mg at 02/02/23 1239    oxybutynin 24 hr tablet 5 mg  5 mg Oral Daily Luigi Hamm MD   5 mg at 02/04/23 0825    potassium bicarbonate disintegrating tablet 35 mEq  35 mEq  Oral PRN Luigi Hamm MD        potassium bicarbonate disintegrating tablet 50 mEq  50 mEq Oral PRN Luigi Hamm MD        potassium bicarbonate disintegrating tablet 60 mEq  60 mEq Oral PRN Luigi Hamm MD        potassium, sodium phosphates 280-160-250 mg packet 2 packet  2 packet Oral PRN Luigi Hamm MD        potassium, sodium phosphates 280-160-250 mg packet 2 packet  2 packet Oral PRN Luigi Hamm MD        potassium, sodium phosphates 280-160-250 mg packet 2 packet  2 packet Oral PRN Luigi Hamm MD        prochlorperazine injection Soln 5 mg  5 mg Intravenous Q6H PRN Luigi Hamm MD        simethicone chewable tablet 80 mg  1 tablet Oral QID PRN Luigi Hamm MD        sodium chloride 0.9% flush 10 mL  10 mL Intravenous Q12H PRN Luigi Hamm MD         Facility-Administered Medications Ordered in Other Encounters   Medication Dose Route Frequency Provider Last Rate Last Admin    0.9%  NaCl infusion   Intravenous Continuous Demetrius Velazco MD        phenylephrine HCL 2.5% ophthalmic solution 1 drop  1 drop Right Eye On Call Procedure Demetrius Velazco MD   1 drop at 02/21/19 0725    proparacaine 0.5 % ophthalmic solution 1 drop  1 drop Right Eye On Call Procedure Demetrius Velazco MD   1 drop at 02/21/19 0706    tropicamide 1% ophthalmic solution 1 drop  1 drop Right Eye On Call Procedure Demetrius Velazco MD   1 drop at 02/21/19 0726     Current Discharge Medication List        CONTINUE these medications which have NOT CHANGED    Details   allopurinol (ZYLOPRIM) 100 MG tablet Take 100 mg by mouth every evening.       cranberry 400 mg Cap Take 1 capsule by mouth 2 (two) times daily.      fish oil-omega-3 fatty acids 300-1,000 mg capsule Take 2 g by mouth once daily. 1 caps every AM & 1 cap every PM.      folic acid/multivit-min/lutein (CENTRUM SILVER ORAL) Take by mouth once daily.      furosemide (LASIX) 80 MG tablet TAKE 1 TABLET BY MOUTH TWICE DAILY AS NEEDED FOR SHORTNESS OF BREATH  OR  EDEMA  Qty: 180 tablet, Refills: 3      lovastatin (MEVACOR) 40 MG tablet Take 40 mg by mouth nightly. Takes 2 caps with supper every evening.      mirabegron (MYRBETRIQ) 50 mg Tb24 Take 50 mg by mouth once daily.       NEURONTIN 100 mg capsule Take 100 mg by mouth 2 (two) times daily.      pantoprazole (PROTONIX) 40 MG tablet Take 40 mg by mouth once daily.      sacubitriL-valsartan (ENTRESTO) 24-26 mg per tablet Take 1 tablet by mouth 2 (two) times daily.  Qty: 180 tablet, Refills: 3      traMADoL (ULTRAM) 50 mg tablet 1 tablet as needed.      acetaminophen (TYLENOL) 325 MG tablet Take 650 mg by mouth once daily.           STOP taking these medications       clopidogrel (PLAVIX) 75 mg tablet Comments:   Reason for Stopping:         metoprolol succinate (TOPROL-XL) 25 MG 24 hr tablet Comments:   Reason for Stopping:                 I have seen and examined this patient within the last 30 days. My clinical findings that support the need for the home health skilled services and home bound status are the following:no   Weakness/numbness causing balance and gait disturbance due to Weakness/Debility making it taxing to leave home.     Diet:   cardiac diet      Referrals/ Consults  Physical Therapy to evaluate and treat. Evaluate for home safety and equipment needs; Establish/upgrade home exercise program. Perform / instruct on therapeutic exercises, gait training, transfer training, and Range of Motion.  Occupational Therapy to evaluate and treat. Evaluate home environment for safety and equipment needs. Perform/Instruct on transfers, ADL training, ROM, and therapeutic exercises.   to evaluate for community resources/long-range planning.  Aide to provide assistance with personal care, ADLs, and vital signs.    Activities:   activity as tolerated    Nursing:   Agency to admit patient within 24 hours of hospital discharge unless specified on physician order or at patient request    SN to complete  comprehensive assessment including routine vital signs. Instruct on disease process and s/s of complications to report to MD. Review/verify medication list sent home with the patient at time of discharge  and instruct patient/caregiver as needed. Frequency may be adjusted depending on start of care date.     Skilled nurse to perform up to 3 visits PRN for symptoms related to diagnosis    Ok to schedule additional visits based on staff availability and patient request on consecutive days within the home health episode.    When multiple disciplines ordered:    Start of Care occurs on Sunday - Wednesday schedule remaining discipline evaluations as ordered on separate consecutive days following the start of care.    Thursday SOC -schedule subsequent evaluations Friday and Monday the following week.     Friday - Saturday SOC - schedule subsequent discipline evaluations on consecutive days starting Monday of the following week.        Home Health Aide:  Nursing Three times weekly, Physical Therapy Three times weekly, Occupational Therapy Three times weekly, Medical Social Work Weekly, and Home Health Aide Three times weekly      I certify that this patient is confined to her home and needs intermittent skilled nursing care, physical therapy, and occupational therapy.

## 2023-02-04 NOTE — NURSING
[facility]  Nurses Note -- 4 Eyes      2/4/2023       Skin assessed on: Saturday      [x] No Pressure Injuries Present    [x]Prevention Measures Documented    [x] Yes LDA  for Pressure Injury Previously documented     [] Yes New Pressure Injury Discovered   [] LDA for New Pressure Injury Added      Attending RN:  Natasha Lynch RN

## 2023-02-04 NOTE — NURSING
Notified by tele monitor of 6 beat run of vtach and multiple pvcs. Ekg done at bedside. Atrial Sensed Ventricular paced rhythm with pvcs. Notified Freda Bingham. No new orders given at this time.

## 2023-02-04 NOTE — NURSING
Patient discharged per MD order.  IV removed.  Catheter tip intact.  No distress noted.  Discharge instructions explained.  AVS given to patient and her daughter.  Patient verbalized understanding.  VSS.  Afebrile.  No complaints of pain, N/V, diarrhea or SOB.  No new Rx  .  Patient left with belongings to Main Entrance via wheelchair per staff transport.  Family with patient.

## 2023-02-04 NOTE — PLAN OF CARE
[facility] - Med Surg  Discharge Final Note      Patient clear to discharge from case management stand point. Follow up appointments scheduled and listed on avs. Updates clinicals sent to [facility] via care port. Pt stated her daughter will be taking her home.   Primary Care Provider: Qi Ramon MD    Expected Discharge Date: 2/4/2023    Final Discharge Note (most recent)       Final Note - 02/04/23 1223          Final Note    Assessment Type Final Discharge Note     Anticipated Discharge Disposition Home-Health Care [facility]    What phone number can be called within the next 1-3 days to see how you are doing after discharge? [phone]     Hospital Resources/Appts/Education Provided Provided patient/caregiver with written discharge plan information;Appointments scheduled and added to AVS        Post-Acute Status    Post-Acute Authorization Home Health     Home Health Status Set-up Complete/Auth obtained     Coverage PHN     Discharge Delays None known at this time                     Important Message from Medicare  Important Message from Medicare regarding Discharge Appeal Rights: Given to patient/caregiver, Explained to patient/caregiver, Signed/date by patient/caregiver     Date IMM was signed: 02/02/23  Time IMM was signed: 0930    Contact Info       Qi Ramon MD   Specialty: Internal Medicine   Relationship: PCP - [address]  [address]  [place] [address]   Phone: [phone]       Next Steps: Follow up on 2/7/2023    Instructions: @11:30am for a hospital follow up. Dr Ramon office is requesing that you bring a copy of your discharge instructions with you.    [facility]   Specialty: Home Health Services    [address]  [address]  [facility] [address]   Phone: [phone]       Next Steps: Follow up    Instructions: Home Health

## 2023-02-06 ENCOUNTER — NURSE TRIAGE (OUTPATIENT)
Dept: ADMINISTRATIVE | Facility: CLINIC | Age: 88
End: 2023-02-06
Payer: MEDICARE

## 2023-02-06 ENCOUNTER — PATIENT OUTREACH (OUTPATIENT)
Dept: ADMINISTRATIVE | Facility: CLINIC | Age: 88
End: 2023-02-06
Payer: MEDICARE

## 2023-02-06 ENCOUNTER — HOSPITAL ENCOUNTER (INPATIENT)
Facility: HOSPITAL | Age: 88
LOS: 4 days | Discharge: HOME HEALTH | DRG: 377 | End: 2023-02-10
Attending: EMERGENCY MEDICINE | Admitting: INTERNAL MEDICINE
Payer: MEDICARE

## 2023-02-06 DIAGNOSIS — K92.1 MELENA: ICD-10-CM

## 2023-02-06 DIAGNOSIS — D64.9 SYMPTOMATIC ANEMIA: ICD-10-CM

## 2023-02-06 DIAGNOSIS — K92.2 GASTROINTESTINAL HEMORRHAGE, UNSPECIFIED GASTROINTESTINAL HEMORRHAGE TYPE: Primary | ICD-10-CM

## 2023-02-06 DIAGNOSIS — R53.83 FATIGUE: ICD-10-CM

## 2023-02-06 DIAGNOSIS — I95.9 HYPOTENSION, UNSPECIFIED HYPOTENSION TYPE: ICD-10-CM

## 2023-02-06 DIAGNOSIS — I49.3 FREQUENT PVCS: ICD-10-CM

## 2023-02-06 DIAGNOSIS — R07.9 CHEST PAIN: ICD-10-CM

## 2023-02-06 LAB
ABO + RH BLD: ABNORMAL
ALBUMIN SERPL BCP-MCNC: 3.3 G/DL (ref 3.5–5.2)
ALP SERPL-CCNC: 65 U/L (ref 55–135)
ALT SERPL W/O P-5'-P-CCNC: 17 U/L (ref 10–44)
ANION GAP SERPL CALC-SCNC: 10 MMOL/L (ref 8–16)
AST SERPL-CCNC: 25 U/L (ref 10–40)
BASOPHILS # BLD AUTO: 0.02 K/UL (ref 0–0.2)
BASOPHILS NFR BLD: 0.3 % (ref 0–1.9)
BILIRUB SERPL-MCNC: 0.8 MG/DL (ref 0.1–1)
BILIRUB UR QL STRIP: NEGATIVE
BLD GP AB SCN CELLS X3 SERPL QL: ABNORMAL
BLOOD GROUP ANTIBODIES SERPL: NORMAL
BNP SERPL-MCNC: >4900 PG/ML (ref 0–99)
BUN SERPL-MCNC: 51 MG/DL (ref 8–23)
CALCIUM SERPL-MCNC: 9.3 MG/DL (ref 8.7–10.5)
CHLORIDE SERPL-SCNC: 110 MMOL/L (ref 95–110)
CLARITY UR: ABNORMAL
CO2 SERPL-SCNC: 22 MMOL/L (ref 23–29)
COLOR UR: YELLOW
CREAT SERPL-MCNC: 1.9 MG/DL (ref 0.5–1.4)
DIFFERENTIAL METHOD: ABNORMAL
EOSINOPHIL # BLD AUTO: 0.1 K/UL (ref 0–0.5)
EOSINOPHIL NFR BLD: 2.4 % (ref 0–8)
ERYTHROCYTE [DISTWIDTH] IN BLOOD BY AUTOMATED COUNT: 18.5 % (ref 11.5–14.5)
EST. GFR  (NO RACE VARIABLE): 25 ML/MIN/1.73 M^2
GLUCOSE SERPL-MCNC: 100 MG/DL (ref 70–110)
GLUCOSE UR QL STRIP: NEGATIVE
HCT VFR BLD AUTO: 26.4 % (ref 37–48.5)
HGB BLD-MCNC: 8.1 G/DL (ref 12–16)
HGB UR QL STRIP: NEGATIVE
IMM GRANULOCYTES # BLD AUTO: 0.03 K/UL (ref 0–0.04)
IMM GRANULOCYTES NFR BLD AUTO: 0.5 % (ref 0–0.5)
INR PPP: 1.2 (ref 0.8–1.2)
KETONES UR QL STRIP: NEGATIVE
LACTATE SERPL-SCNC: 1.6 MMOL/L (ref 0.5–2.2)
LEUKOCYTE ESTERASE UR QL STRIP: NEGATIVE
LIPASE SERPL-CCNC: 27 U/L (ref 4–60)
LYMPHOCYTES # BLD AUTO: 0.8 K/UL (ref 1–4.8)
LYMPHOCYTES NFR BLD: 13.5 % (ref 18–48)
MAGNESIUM SERPL-MCNC: 2.2 MG/DL (ref 1.6–2.6)
MCH RBC QN AUTO: 30.3 PG (ref 27–31)
MCHC RBC AUTO-ENTMCNC: 30.7 G/DL (ref 32–36)
MCV RBC AUTO: 99 FL (ref 82–98)
MONOCYTES # BLD AUTO: 0.6 K/UL (ref 0.3–1)
MONOCYTES NFR BLD: 10.4 % (ref 4–15)
NEUTROPHILS # BLD AUTO: 4.2 K/UL (ref 1.8–7.7)
NEUTROPHILS NFR BLD: 72.9 % (ref 38–73)
NITRITE UR QL STRIP: NEGATIVE
NRBC BLD-RTO: 0 /100 WBC
PH UR STRIP: 5 [PH] (ref 5–8)
PLATELET # BLD AUTO: 131 K/UL (ref 150–450)
PMV BLD AUTO: 11 FL (ref 9.2–12.9)
POCT GLUCOSE: 103 MG/DL (ref 70–110)
POTASSIUM SERPL-SCNC: 4.1 MMOL/L (ref 3.5–5.1)
PROT SERPL-MCNC: 6 G/DL (ref 6–8.4)
PROT UR QL STRIP: NEGATIVE
PROTHROMBIN TIME: 12.1 SEC (ref 9–12.5)
RBC # BLD AUTO: 2.67 M/UL (ref 4–5.4)
SODIUM SERPL-SCNC: 142 MMOL/L (ref 136–145)
SP GR UR STRIP: 1.01 (ref 1–1.03)
TROPONIN I SERPL DL<=0.01 NG/ML-MCNC: 0.17 NG/ML (ref 0–0.03)
URN SPEC COLLECT METH UR: ABNORMAL
UROBILINOGEN UR STRIP-ACNC: NEGATIVE EU/DL
WBC # BLD AUTO: 5.79 K/UL (ref 3.9–12.7)

## 2023-02-06 PROCEDURE — 93010 EKG 12-LEAD: ICD-10-PCS | Mod: ,,, | Performed by: INTERNAL MEDICINE

## 2023-02-06 PROCEDURE — 85025 COMPLETE CBC W/AUTO DIFF WBC: CPT | Performed by: EMERGENCY MEDICINE

## 2023-02-06 PROCEDURE — 85610 PROTHROMBIN TIME: CPT | Performed by: EMERGENCY MEDICINE

## 2023-02-06 PROCEDURE — 99285 EMERGENCY DEPT VISIT HI MDM: CPT | Mod: 25

## 2023-02-06 PROCEDURE — 83880 ASSAY OF NATRIURETIC PEPTIDE: CPT | Performed by: EMERGENCY MEDICINE

## 2023-02-06 PROCEDURE — C9113 INJ PANTOPRAZOLE SODIUM, VIA: HCPCS | Performed by: EMERGENCY MEDICINE

## 2023-02-06 PROCEDURE — 86922 COMPATIBILITY TEST ANTIGLOB: CPT | Performed by: EMERGENCY MEDICINE

## 2023-02-06 PROCEDURE — 84484 ASSAY OF TROPONIN QUANT: CPT | Performed by: EMERGENCY MEDICINE

## 2023-02-06 PROCEDURE — 12000002 HC ACUTE/MED SURGE SEMI-PRIVATE ROOM

## 2023-02-06 PROCEDURE — 86900 BLOOD TYPING SEROLOGIC ABO: CPT | Performed by: EMERGENCY MEDICINE

## 2023-02-06 PROCEDURE — 82962 GLUCOSE BLOOD TEST: CPT

## 2023-02-06 PROCEDURE — 93010 ELECTROCARDIOGRAM REPORT: CPT | Mod: ,,, | Performed by: INTERNAL MEDICINE

## 2023-02-06 PROCEDURE — 86870 RBC ANTIBODY IDENTIFICATION: CPT | Performed by: EMERGENCY MEDICINE

## 2023-02-06 PROCEDURE — 83605 ASSAY OF LACTIC ACID: CPT | Performed by: EMERGENCY MEDICINE

## 2023-02-06 PROCEDURE — 81003 URINALYSIS AUTO W/O SCOPE: CPT | Performed by: EMERGENCY MEDICINE

## 2023-02-06 PROCEDURE — 83690 ASSAY OF LIPASE: CPT | Performed by: EMERGENCY MEDICINE

## 2023-02-06 PROCEDURE — 63600175 PHARM REV CODE 636 W HCPCS: Performed by: EMERGENCY MEDICINE

## 2023-02-06 PROCEDURE — 93005 ELECTROCARDIOGRAM TRACING: CPT

## 2023-02-06 PROCEDURE — 96361 HYDRATE IV INFUSION ADD-ON: CPT

## 2023-02-06 PROCEDURE — 96374 THER/PROPH/DIAG INJ IV PUSH: CPT

## 2023-02-06 PROCEDURE — 83735 ASSAY OF MAGNESIUM: CPT | Performed by: EMERGENCY MEDICINE

## 2023-02-06 PROCEDURE — 80053 COMPREHEN METABOLIC PANEL: CPT | Performed by: EMERGENCY MEDICINE

## 2023-02-06 RX ORDER — FUROSEMIDE 10 MG/ML
40 INJECTION INTRAMUSCULAR; INTRAVENOUS
Status: COMPLETED | OUTPATIENT
Start: 2023-02-07 | End: 2023-02-07

## 2023-02-06 RX ORDER — PANTOPRAZOLE SODIUM 40 MG/10ML
80 INJECTION, POWDER, LYOPHILIZED, FOR SOLUTION INTRAVENOUS
Status: COMPLETED | OUTPATIENT
Start: 2023-02-06 | End: 2023-02-06

## 2023-02-06 RX ORDER — HYDROCODONE BITARTRATE AND ACETAMINOPHEN 500; 5 MG/1; MG/1
TABLET ORAL
Status: DISCONTINUED | OUTPATIENT
Start: 2023-02-07 | End: 2023-02-10 | Stop reason: HOSPADM

## 2023-02-06 RX ADMIN — PANTOPRAZOLE SODIUM 80 MG: 40 INJECTION, POWDER, FOR SOLUTION INTRAVENOUS at 10:02

## 2023-02-06 RX ADMIN — SODIUM CHLORIDE, POTASSIUM CHLORIDE, SODIUM LACTATE AND CALCIUM CHLORIDE 500 ML: 600; 310; 30; 20 INJECTION, SOLUTION INTRAVENOUS at 08:02

## 2023-02-06 NOTE — TELEPHONE ENCOUNTER
"Pt's daughter calls on behalf of pt c/o rectal bleeding. Pt was recently d/c'ed from the hospital on 2/4 and had a colonoscopy on 2/3. Pt's daughter states, "I went to change her and when I pulled down her Pull-Up, I noticed some blood in the diaper. It was dark red. Whenever I wiped her, I didn't see any more blood."     Care Advice recommends that pt be taken to ED now. Pt's daughter verbalizes understanding and is instructed to call back if pt develops any new/worsening sxs, questions, or concerns.   Reason for Disposition   [1] Colonoscopy AND [2] in past 72 hours    Additional Information   Negative: Shock suspected (e.g., cold/pale/clammy skin, too weak to stand, low BP, rapid pulse)   Negative: Difficult to awaken or acting confused (e.g., disoriented, slurred speech)   Negative: Passed out (i.e., lost consciousness, collapsed and was not responding)   Negative: [1] Vomiting AND [2] contains red blood or black ("coffee ground") material  (Exception: few red streaks in vomit that only happened once)   Negative: Sounds like a life-threatening emergency to the triager   Negative: SEVERE rectal bleeding (large blood clots; constant or on and off bleeding)   Negative: SEVERE dizziness (e.g., unable to stand, requires support to walk, feels like passing out now)   Negative: [1] MODERATE rectal bleeding (small blood clots, passing blood without stool, or toilet water turns red) AND [2] more than once a day   Negative: Pale skin (pallor) of new-onset or worsening   Negative: Black or tarry bowel movements (Exception: chronic-unchanged black-grey bowel movements AND is taking iron pills or Pepto-bismol)   Negative: [1] Constant abdominal pain AND [2] present > 2 hours   Negative: Rectal foreign body (i.e., now or within past week;  inserted or swallowed)   Negative: High-risk adult (e.g., prior surgery on aorta, abdominal aortic aneurysm)   Negative: Taking Coumadin (warfarin) or other strong blood thinner, or known " bleeding disorder (e.g., thrombocytopenia)   Negative: Known cirrhosis of the liver (or history of liver failure or ascites)    Protocols used: Rectal Bleeding-A-AH

## 2023-02-06 NOTE — Clinical Note
Diagnosis: Symptomatic anemia [[id number]]   Admitting Provider:: ANGEL BARNES [[id number]]   Future Attending Provider: LIZZETH PANTOJA [[id number]]   Reason for IP Medical Treatment  (Clinical interventions that can only be accomplished in the IP setting? ) :: hypotension, GI bleed, anemia, CHF, NSTEMI, HINA   Estimated Length of Stay:: 2 midnights   I certify that Inpatient services for greater than or equal to 2 midnights are medically necessary:: Yes   Plans for Post-Acute care--if anticipated (pick the single best option):: A. No post acute care anticipated at this time

## 2023-02-06 NOTE — PROGRESS NOTES
C3 nurse spoke with Palmira Malave's daughter Usama for a TCC post hospital discharge follow up call. The patient has a scheduled HOSFU appointment with Qi Ramon MD on 02/07/23 @ 9507.

## 2023-02-07 PROBLEM — Z95.810 ICD (IMPLANTABLE CARDIOVERTER-DEFIBRILLATOR) IN PLACE: Status: RESOLVED | Noted: 2019-02-08 | Resolved: 2023-02-07

## 2023-02-07 PROBLEM — D64.9 SYMPTOMATIC ANEMIA: Status: ACTIVE | Noted: 2023-02-07

## 2023-02-07 PROBLEM — I95.9 HYPOTENSION: Status: ACTIVE | Noted: 2023-02-07

## 2023-02-07 PROBLEM — I50.23 ACUTE ON CHRONIC SYSTOLIC CONGESTIVE HEART FAILURE: Status: RESOLVED | Noted: 2018-10-02 | Resolved: 2023-02-07

## 2023-02-07 LAB
ALBUMIN SERPL BCP-MCNC: 3.1 G/DL (ref 3.5–5.2)
ALP SERPL-CCNC: 55 U/L (ref 55–135)
ALT SERPL W/O P-5'-P-CCNC: 16 U/L (ref 10–44)
ANION GAP SERPL CALC-SCNC: 9 MMOL/L (ref 8–16)
AST SERPL-CCNC: 38 U/L (ref 10–40)
BASOPHILS # BLD AUTO: 0.02 K/UL (ref 0–0.2)
BASOPHILS # BLD AUTO: 0.02 K/UL (ref 0–0.2)
BASOPHILS # BLD AUTO: 0.03 K/UL (ref 0–0.2)
BASOPHILS NFR BLD: 0.2 % (ref 0–1.9)
BASOPHILS NFR BLD: 0.3 % (ref 0–1.9)
BASOPHILS NFR BLD: 0.4 % (ref 0–1.9)
BILIRUB SERPL-MCNC: 3.9 MG/DL (ref 0.1–1)
BLD PROD TYP BPU: NORMAL
BLOOD UNIT EXPIRATION DATE: NORMAL
BLOOD UNIT TYPE CODE: 9500
BLOOD UNIT TYPE: NORMAL
BUN SERPL-MCNC: 45 MG/DL (ref 8–23)
CALCIUM SERPL-MCNC: 8.5 MG/DL (ref 8.7–10.5)
CHLORIDE SERPL-SCNC: 110 MMOL/L (ref 95–110)
CO2 SERPL-SCNC: 23 MMOL/L (ref 23–29)
CODING SYSTEM: NORMAL
CREAT SERPL-MCNC: 1.6 MG/DL (ref 0.5–1.4)
CROSSMATCH INTERPRETATION: NORMAL
DIFFERENTIAL METHOD: ABNORMAL
DISPENSE STATUS: NORMAL
EOSINOPHIL # BLD AUTO: 0.1 K/UL (ref 0–0.5)
EOSINOPHIL NFR BLD: 0.7 % (ref 0–8)
EOSINOPHIL NFR BLD: 0.8 % (ref 0–8)
EOSINOPHIL NFR BLD: 1.2 % (ref 0–8)
ERYTHROCYTE [DISTWIDTH] IN BLOOD BY AUTOMATED COUNT: 17.5 % (ref 11.5–14.5)
ERYTHROCYTE [DISTWIDTH] IN BLOOD BY AUTOMATED COUNT: 18.3 % (ref 11.5–14.5)
ERYTHROCYTE [DISTWIDTH] IN BLOOD BY AUTOMATED COUNT: 18.4 % (ref 11.5–14.5)
EST. GFR  (NO RACE VARIABLE): 31 ML/MIN/1.73 M^2
GLUCOSE SERPL-MCNC: 79 MG/DL (ref 70–110)
HCT VFR BLD AUTO: 24.1 % (ref 37–48.5)
HCT VFR BLD AUTO: 24.7 % (ref 37–48.5)
HCT VFR BLD AUTO: 25 % (ref 37–48.5)
HGB BLD-MCNC: 7.8 G/DL (ref 12–16)
HGB BLD-MCNC: 7.8 G/DL (ref 12–16)
HGB BLD-MCNC: 8 G/DL (ref 12–16)
IMM GRANULOCYTES # BLD AUTO: 0.02 K/UL (ref 0–0.04)
IMM GRANULOCYTES # BLD AUTO: 0.03 K/UL (ref 0–0.04)
IMM GRANULOCYTES # BLD AUTO: 0.07 K/UL (ref 0–0.04)
IMM GRANULOCYTES NFR BLD AUTO: 0.3 % (ref 0–0.5)
IMM GRANULOCYTES NFR BLD AUTO: 0.4 % (ref 0–0.5)
IMM GRANULOCYTES NFR BLD AUTO: 0.8 % (ref 0–0.5)
LYMPHOCYTES # BLD AUTO: 0.4 K/UL (ref 1–4.8)
LYMPHOCYTES # BLD AUTO: 0.5 K/UL (ref 1–4.8)
LYMPHOCYTES # BLD AUTO: 0.5 K/UL (ref 1–4.8)
LYMPHOCYTES NFR BLD: 5 % (ref 18–48)
LYMPHOCYTES NFR BLD: 5.7 % (ref 18–48)
LYMPHOCYTES NFR BLD: 7.8 % (ref 18–48)
MAGNESIUM SERPL-MCNC: 1.9 MG/DL (ref 1.6–2.6)
MCH RBC QN AUTO: 30.2 PG (ref 27–31)
MCH RBC QN AUTO: 30.3 PG (ref 27–31)
MCH RBC QN AUTO: 31.1 PG (ref 27–31)
MCHC RBC AUTO-ENTMCNC: 31.6 G/DL (ref 32–36)
MCHC RBC AUTO-ENTMCNC: 32 G/DL (ref 32–36)
MCHC RBC AUTO-ENTMCNC: 32.4 G/DL (ref 32–36)
MCV RBC AUTO: 95 FL (ref 82–98)
MCV RBC AUTO: 96 FL (ref 82–98)
MCV RBC AUTO: 96 FL (ref 82–98)
MONOCYTES # BLD AUTO: 0.3 K/UL (ref 0.3–1)
MONOCYTES # BLD AUTO: 0.6 K/UL (ref 0.3–1)
MONOCYTES # BLD AUTO: 0.6 K/UL (ref 0.3–1)
MONOCYTES NFR BLD: 3.6 % (ref 4–15)
MONOCYTES NFR BLD: 8.7 % (ref 4–15)
MONOCYTES NFR BLD: 9.3 % (ref 4–15)
NEUTROPHILS # BLD AUTO: 4.9 K/UL (ref 1.8–7.7)
NEUTROPHILS # BLD AUTO: 6.3 K/UL (ref 1.8–7.7)
NEUTROPHILS # BLD AUTO: 7.7 K/UL (ref 1.8–7.7)
NEUTROPHILS NFR BLD: 81.1 % (ref 38–73)
NEUTROPHILS NFR BLD: 84.8 % (ref 38–73)
NEUTROPHILS NFR BLD: 88.9 % (ref 38–73)
NRBC BLD-RTO: 0 /100 WBC
NRBC BLD-RTO: 0 /100 WBC
NRBC BLD-RTO: 1 /100 WBC
PHOSPHATE SERPL-MCNC: 2.9 MG/DL (ref 2.7–4.5)
PLATELET # BLD AUTO: 97 K/UL (ref 150–450)
PLATELET # BLD AUTO: 98 K/UL (ref 150–450)
PLATELET # BLD AUTO: 98 K/UL (ref 150–450)
PMV BLD AUTO: 10 FL (ref 9.2–12.9)
PMV BLD AUTO: 10.3 FL (ref 9.2–12.9)
PMV BLD AUTO: 11 FL (ref 9.2–12.9)
POTASSIUM SERPL-SCNC: 4.1 MMOL/L (ref 3.5–5.1)
PROT SERPL-MCNC: 5.4 G/DL (ref 6–8.4)
RBC # BLD AUTO: 2.51 M/UL (ref 4–5.4)
RBC # BLD AUTO: 2.58 M/UL (ref 4–5.4)
RBC # BLD AUTO: 2.64 M/UL (ref 4–5.4)
SODIUM SERPL-SCNC: 142 MMOL/L (ref 136–145)
TRANS ERYTHROCYTES VOL PATIENT: NORMAL ML
TROPONIN I SERPL DL<=0.01 NG/ML-MCNC: 0.12 NG/ML (ref 0–0.03)
TROPONIN I SERPL DL<=0.01 NG/ML-MCNC: 0.13 NG/ML (ref 0–0.03)
WBC # BLD AUTO: 6.05 K/UL (ref 3.9–12.7)
WBC # BLD AUTO: 7.37 K/UL (ref 3.9–12.7)
WBC # BLD AUTO: 8.62 K/UL (ref 3.9–12.7)

## 2023-02-07 PROCEDURE — 97165 OT EVAL LOW COMPLEX 30 MIN: CPT

## 2023-02-07 PROCEDURE — 36415 COLL VENOUS BLD VENIPUNCTURE: CPT | Performed by: HOSPITALIST

## 2023-02-07 PROCEDURE — 36415 COLL VENOUS BLD VENIPUNCTURE: CPT | Performed by: INTERNAL MEDICINE

## 2023-02-07 PROCEDURE — C9113 INJ PANTOPRAZOLE SODIUM, VIA: HCPCS | Performed by: INTERNAL MEDICINE

## 2023-02-07 PROCEDURE — 99223 PR INITIAL HOSPITAL CARE,LEVL III: ICD-10-PCS | Mod: ,,, | Performed by: NURSE PRACTITIONER

## 2023-02-07 PROCEDURE — P9021 RED BLOOD CELLS UNIT: HCPCS | Performed by: EMERGENCY MEDICINE

## 2023-02-07 PROCEDURE — 84100 ASSAY OF PHOSPHORUS: CPT | Performed by: INTERNAL MEDICINE

## 2023-02-07 PROCEDURE — 97161 PT EVAL LOW COMPLEX 20 MIN: CPT

## 2023-02-07 PROCEDURE — 21400001 HC TELEMETRY ROOM

## 2023-02-07 PROCEDURE — 25000003 PHARM REV CODE 250: Performed by: HOSPITALIST

## 2023-02-07 PROCEDURE — 80053 COMPREHEN METABOLIC PANEL: CPT | Performed by: INTERNAL MEDICINE

## 2023-02-07 PROCEDURE — 25000003 PHARM REV CODE 250: Performed by: INTERNAL MEDICINE

## 2023-02-07 PROCEDURE — 99223 1ST HOSP IP/OBS HIGH 75: CPT | Mod: ,,, | Performed by: NURSE PRACTITIONER

## 2023-02-07 PROCEDURE — 84484 ASSAY OF TROPONIN QUANT: CPT | Performed by: INTERNAL MEDICINE

## 2023-02-07 PROCEDURE — 63600175 PHARM REV CODE 636 W HCPCS: Performed by: EMERGENCY MEDICINE

## 2023-02-07 PROCEDURE — 85025 COMPLETE CBC W/AUTO DIFF WBC: CPT | Performed by: HOSPITALIST

## 2023-02-07 PROCEDURE — 83735 ASSAY OF MAGNESIUM: CPT | Performed by: INTERNAL MEDICINE

## 2023-02-07 PROCEDURE — 63600175 PHARM REV CODE 636 W HCPCS: Performed by: HOSPITALIST

## 2023-02-07 PROCEDURE — 63600175 PHARM REV CODE 636 W HCPCS: Performed by: INTERNAL MEDICINE

## 2023-02-07 PROCEDURE — 36430 TRANSFUSION BLD/BLD COMPNT: CPT

## 2023-02-07 RX ORDER — SODIUM CHLORIDE 0.9 % (FLUSH) 0.9 %
10 SYRINGE (ML) INJECTION EVERY 12 HOURS PRN
Status: DISCONTINUED | OUTPATIENT
Start: 2023-02-07 | End: 2023-02-10 | Stop reason: HOSPADM

## 2023-02-07 RX ORDER — OXYBUTYNIN CHLORIDE 5 MG/1
5 TABLET, EXTENDED RELEASE ORAL DAILY
Status: DISCONTINUED | OUTPATIENT
Start: 2023-02-07 | End: 2023-02-07

## 2023-02-07 RX ORDER — PANTOPRAZOLE SODIUM 40 MG/10ML
40 INJECTION, POWDER, LYOPHILIZED, FOR SOLUTION INTRAVENOUS 2 TIMES DAILY
Status: DISCONTINUED | OUTPATIENT
Start: 2023-02-07 | End: 2023-02-07

## 2023-02-07 RX ORDER — IBUPROFEN 200 MG
24 TABLET ORAL
Status: DISCONTINUED | OUTPATIENT
Start: 2023-02-07 | End: 2023-02-10 | Stop reason: HOSPADM

## 2023-02-07 RX ORDER — TALC
6 POWDER (GRAM) TOPICAL NIGHTLY PRN
Status: DISCONTINUED | OUTPATIENT
Start: 2023-02-07 | End: 2023-02-10 | Stop reason: HOSPADM

## 2023-02-07 RX ORDER — ONDANSETRON 2 MG/ML
4 INJECTION INTRAMUSCULAR; INTRAVENOUS EVERY 8 HOURS PRN
Status: DISCONTINUED | OUTPATIENT
Start: 2023-02-07 | End: 2023-02-10 | Stop reason: HOSPADM

## 2023-02-07 RX ORDER — PROCHLORPERAZINE EDISYLATE 5 MG/ML
5 INJECTION INTRAMUSCULAR; INTRAVENOUS EVERY 6 HOURS PRN
Status: DISCONTINUED | OUTPATIENT
Start: 2023-02-07 | End: 2023-02-10 | Stop reason: HOSPADM

## 2023-02-07 RX ORDER — ACETAMINOPHEN 325 MG/1
650 TABLET ORAL EVERY 4 HOURS PRN
Status: DISCONTINUED | OUTPATIENT
Start: 2023-02-07 | End: 2023-02-10 | Stop reason: HOSPADM

## 2023-02-07 RX ORDER — ATORVASTATIN CALCIUM 40 MG/1
80 TABLET, FILM COATED ORAL NIGHTLY
Status: DISCONTINUED | OUTPATIENT
Start: 2023-02-07 | End: 2023-02-10 | Stop reason: HOSPADM

## 2023-02-07 RX ORDER — FUROSEMIDE 10 MG/ML
40 INJECTION INTRAMUSCULAR; INTRAVENOUS
Status: DISCONTINUED | OUTPATIENT
Start: 2023-02-07 | End: 2023-02-07

## 2023-02-07 RX ORDER — MIDODRINE HYDROCHLORIDE 5 MG/1
10 TABLET ORAL 3 TIMES DAILY
Status: DISCONTINUED | OUTPATIENT
Start: 2023-02-07 | End: 2023-02-10 | Stop reason: HOSPADM

## 2023-02-07 RX ORDER — PANTOPRAZOLE SODIUM 40 MG/1
40 TABLET, DELAYED RELEASE ORAL 2 TIMES DAILY
Status: DISCONTINUED | OUTPATIENT
Start: 2023-02-07 | End: 2023-02-10 | Stop reason: HOSPADM

## 2023-02-07 RX ORDER — IBUPROFEN 200 MG
16 TABLET ORAL
Status: DISCONTINUED | OUTPATIENT
Start: 2023-02-07 | End: 2023-02-10 | Stop reason: HOSPADM

## 2023-02-07 RX ORDER — HYDROCODONE BITARTRATE AND ACETAMINOPHEN 5; 325 MG/1; MG/1
1 TABLET ORAL EVERY 6 HOURS PRN
Status: DISCONTINUED | OUTPATIENT
Start: 2023-02-07 | End: 2023-02-10 | Stop reason: HOSPADM

## 2023-02-07 RX ORDER — GLUCAGON 1 MG
1 KIT INJECTION
Status: DISCONTINUED | OUTPATIENT
Start: 2023-02-07 | End: 2023-02-10 | Stop reason: HOSPADM

## 2023-02-07 RX ORDER — NALOXONE HCL 0.4 MG/ML
0.02 VIAL (ML) INJECTION
Status: DISCONTINUED | OUTPATIENT
Start: 2023-02-07 | End: 2023-02-10 | Stop reason: HOSPADM

## 2023-02-07 RX ORDER — FUROSEMIDE 10 MG/ML
20 INJECTION INTRAMUSCULAR; INTRAVENOUS
Status: DISCONTINUED | OUTPATIENT
Start: 2023-02-07 | End: 2023-02-08

## 2023-02-07 RX ORDER — ALLOPURINOL 100 MG/1
100 TABLET ORAL NIGHTLY
Status: DISCONTINUED | OUTPATIENT
Start: 2023-02-07 | End: 2023-02-10 | Stop reason: HOSPADM

## 2023-02-07 RX ADMIN — MIDODRINE HYDROCHLORIDE 10 MG: 5 TABLET ORAL at 08:02

## 2023-02-07 RX ADMIN — PANTOPRAZOLE SODIUM 40 MG: 40 TABLET, DELAYED RELEASE ORAL at 10:02

## 2023-02-07 RX ADMIN — MIDODRINE HYDROCHLORIDE 10 MG: 5 TABLET ORAL at 06:02

## 2023-02-07 RX ADMIN — PANTOPRAZOLE SODIUM 40 MG: 40 TABLET, DELAYED RELEASE ORAL at 08:02

## 2023-02-07 RX ADMIN — HYDROCODONE BITARTRATE AND ACETAMINOPHEN 1 TABLET: 5; 325 TABLET ORAL at 07:02

## 2023-02-07 RX ADMIN — ALLOPURINOL 100 MG: 100 TABLET ORAL at 04:02

## 2023-02-07 RX ADMIN — FUROSEMIDE 20 MG: 10 INJECTION, SOLUTION INTRAMUSCULAR; INTRAVENOUS at 09:02

## 2023-02-07 RX ADMIN — ALLOPURINOL 100 MG: 100 TABLET ORAL at 08:02

## 2023-02-07 RX ADMIN — FUROSEMIDE 40 MG: 10 INJECTION, SOLUTION INTRAMUSCULAR; INTRAVENOUS at 12:02

## 2023-02-07 RX ADMIN — FUROSEMIDE 20 MG: 10 INJECTION, SOLUTION INTRAMUSCULAR; INTRAVENOUS at 08:02

## 2023-02-07 RX ADMIN — MIDODRINE HYDROCHLORIDE 10 MG: 5 TABLET ORAL at 03:02

## 2023-02-07 RX ADMIN — ATORVASTATIN CALCIUM 80 MG: 40 TABLET, FILM COATED ORAL at 04:02

## 2023-02-07 RX ADMIN — HYDROCODONE BITARTRATE AND ACETAMINOPHEN 1 TABLET: 5; 325 TABLET ORAL at 05:02

## 2023-02-07 RX ADMIN — ATORVASTATIN CALCIUM 80 MG: 40 TABLET, FILM COATED ORAL at 08:02

## 2023-02-07 RX ADMIN — PANTOPRAZOLE SODIUM 40 MG: 40 INJECTION, POWDER, FOR SOLUTION INTRAVENOUS at 09:02

## 2023-02-07 NOTE — SUBJECTIVE & OBJECTIVE
Past Medical History:   Diagnosis Date    Cataract     CHF (congestive heart failure)     CKD (chronic kidney disease), stage III     Coronary artery disease     Gout attack     Hypercholesteremia     Hypertension     Renal disorder     Vaginal delivery     x4       Past Surgical History:   Procedure Laterality Date    APPENDECTOMY      CARDIAC DEFIBRILLATOR PLACEMENT      2015    CATARACT EXTRACTION W/  INTRAOCULAR LENS IMPLANT Left 02/07/2019    Dr. Velazco    CATARACT EXTRACTION W/  INTRAOCULAR LENS IMPLANT Right 02/21/2019    Dr. Velazco    CHOLECYSTECTOMY      COLONOSCOPY N/A 2/3/2023    Procedure: COLONOSCOPY;  Surgeon: Jordon Sotomayor MD;  Location: [facility] [place] ENDO;  Service: Endoscopy;  Laterality: N/A;    COLONOSCOPY W/ POLYPECTOMY  10 or 11/ 2014    per Dr. Elen ceja Left 8/2008    ESOPHAGOGASTRODUODENOSCOPY N/A 1/31/2023    Procedure: EGD (ESOPHAGOGASTRODUODENOSCOPY);  Surgeon: Jordon Sotomayor MD;  Location: [facility] [place] ENDO;  Service: Endoscopy;  Laterality: N/A;    EYE SURGERY      HYSTERECTOMY      INTRAOCULAR PROSTHESES INSERTION Left 2/7/2019    Procedure: INSERTION, IOL PROSTHESIS;  Surgeon: Demetrius Velazco MD;  Location: [facility] [place] OR;  Service: Ophthalmology;  Laterality: Left;  RN Phone Pre OP 1-30-19.  Arrival 05:30 AM    INTRAOCULAR PROSTHESES INSERTION Right 2/21/2019    Procedure: INSERTION, IOL PROSTHESIS;  Surgeon: Demetrius Velazco MD;  Location: [facility] [place] OR;  Service: Ophthalmology;  Laterality: Right;    JOINT REPLACEMENT Bilateral 2005 & 2006    2 Knee Replacements=Lt. 1= 2005. Rt. 1= 2006    OOPHORECTOMY      PHACOEMULSIFICATION OF CATARACT Left 2/7/2019    Procedure: PHACOEMULSIFICATION, CATARACT;  Surgeon: Demetrius Velazco MD;  Location: [facility] [place] OR;  Service: Ophthalmology;  Laterality: Left;    PHACOEMULSIFICATION OF CATARACT Right 2/21/2019    Procedure: PHACOEMULSIFICATION, CATARACT;  Surgeon: Demetrius Velazco MD;  Location: [facility] [place] OR;  Service:  Ophthalmology;  Laterality: Right;  RN Phone Pre op 2-15-19.  Arrival 05:30 AM    TOTAL KNEE ARTHROPLASTY Bilateral Lt.= 2005; Rt.=2006    Bilateral Knee scope       Review of patient's allergies indicates:   Allergen Reactions    Aspirin Swelling     Swelling and rash    Colace [docusate sodium] Rash     itching    Docusate Other (See Comments) and Rash     itching    Sulfa (sulfonamide antibiotics) Swelling     Swelling and rash  Swelling and rash    Iodine and iodide containing products Rash    Penicillins Rash       Current Facility-Administered Medications on File Prior to Encounter   Medication    0.9%  NaCl infusion    phenylephrine HCL 2.5% ophthalmic solution 1 drop    proparacaine 0.5 % ophthalmic solution 1 drop    tropicamide 1% ophthalmic solution 1 drop     Current Outpatient Medications on File Prior to Encounter   Medication Sig    acetaminophen (TYLENOL) 325 MG tablet Take 325 mg by mouth once daily.    allopurinol (ZYLOPRIM) 100 MG tablet Take 100 mg by mouth every evening.     cranberry 400 mg Cap Take 1 capsule by mouth 2 (two) times daily.    fish oil-omega-3 fatty acids 300-1,000 mg capsule Take 2 g by mouth once daily. 1 caps every AM & 1 cap every PM.    folic acid/multivit-min/lutein (CENTRUM SILVER ORAL) Take by mouth once daily.    furosemide (LASIX) 80 MG tablet TAKE 1 TABLET BY MOUTH TWICE DAILY AS NEEDED FOR SHORTNESS OF BREATH OR  EDEMA (Patient taking differently: Take 80 mg by mouth once daily.)    HYDROcodone-acetaminophen (NORCO) 5-325 mg per tablet Take 1 tablet by mouth every 8 (eight) hours as needed for Pain. (Patient not taking: Reported on 2/6/2023)    HYDROcodone-acetaminophen (NORCO) 5-325 mg per tablet Take 1 tablet by mouth every 6 (six) hours as needed for Pain. (Patient not taking: Reported on 2/6/2023)    lovastatin (MEVACOR) 40 MG tablet Take 40 mg by mouth nightly. Takes 2 caps with supper every evening.    mirabegron (MYRBETRIQ) 50 mg Tb24 Take 50 mg by mouth once  daily.     NEURONTIN 100 mg capsule Take 100 mg by mouth 2 (two) times daily.    ondansetron (ZOFRAN-ODT) 4 MG TbDL Take 1 tablet (4 mg total) by mouth every 8 (eight) hours as needed (nausea). (Patient not taking: Reported on 2/6/2023)    pantoprazole (PROTONIX) 40 MG tablet Take 40 mg by mouth once daily.    sacubitriL-valsartan (ENTRESTO) 24-26 mg per tablet Take 1 tablet by mouth 2 (two) times daily. (Patient taking differently: Take 1 tablet by mouth once daily.)     Family History       Problem Relation (Age of Onset)    Amblyopia Son    Diabetes Other    Heart attack Mother (88)    Hyperlipidemia Mother    Hypertension Mother, Other          Tobacco Use    Smoking status: Never    Smokeless tobacco: Never   Substance and Sexual Activity    Alcohol use: Not Currently    Drug use: No    Sexual activity: Not Currently     Partners: Male     Review of Systems   Constitutional:  Positive for fatigue. Negative for chills and fever.   HENT:  Negative for congestion.    Eyes:  Negative for visual disturbance.   Respiratory:  Negative for cough and shortness of breath.    Cardiovascular:  Negative for chest pain.   Gastrointestinal:  Positive for anal bleeding and blood in stool. Negative for abdominal pain, constipation, diarrhea, nausea and vomiting.   Genitourinary:  Negative for dysuria.   Musculoskeletal:  Negative for back pain.        +leg cramping.    Skin:  Negative for rash.   Allergic/Immunologic: Negative for immunocompromised state.   Neurological:  Positive for weakness and light-headedness. Negative for dizziness.   Hematological:  Bruises/bleeds easily.   Psychiatric/Behavioral:  Negative for confusion. The patient is not nervous/anxious.    Objective:     Vital Signs (Most Recent):  Temp: 98.3 °F (36.8 °C) (02/06/23 1817)  Pulse: 83 (02/06/23 2258)  Resp: 20 (02/06/23 2258)  BP: (!) 108/52 (02/06/23 2248)  SpO2: 96 % (02/06/23 2258)   Vital Signs (24h Range):  Temp:  [98.3 °F (36.8 °C)] 98.3 °F (36.8  °C)  Pulse:  [78-88] 83  Resp:  [16-20] 20  SpO2:  [96 %-100 %] 96 %  BP: ()/(49-73) 108/52     Weight: 55.3 kg (122 lb)  Body mass index is 21.61 kg/m².    Physical Exam  Vitals and nursing note reviewed.   Constitutional:       General: She is not in acute distress.     Appearance: She is well-developed. She is not diaphoretic.   HENT:      Head: Normocephalic and atraumatic.      Mouth/Throat:      Pharynx: No oropharyngeal exudate.      Comments: Poor dentition.   Eyes:      General: No scleral icterus.        Right eye: No discharge.         Left eye: No discharge.      Extraocular Movements: EOM normal.      Conjunctiva/sclera: Conjunctivae normal.      Pupils: Pupils are equal, round, and reactive to light.   Neck:      Thyroid: No thyromegaly.      Vascular: No JVD.      Trachea: No tracheal deviation.   Cardiovascular:      Rate and Rhythm: Normal rate and regular rhythm.      Heart sounds: Murmur heard.   Systolic murmur is present with a grade of 1/6.     No friction rub. No gallop.   Pulmonary:      Effort: Pulmonary effort is normal. No respiratory distress.      Breath sounds: Normal breath sounds. No stridor. No wheezing or rales.      Comments: Left upper chest wall AICD in place.   Chest:      Chest wall: No tenderness.   Abdominal:      General: Bowel sounds are normal. There is no distension.      Palpations: Abdomen is soft. There is no mass.      Tenderness: There is no abdominal tenderness. There is no guarding or rebound.   Musculoskeletal:         General: No tenderness. Normal range of motion.      Cervical back: Normal range of motion and neck supple.      Right lower leg: Edema present.      Left lower leg: Edema present.      Comments: 1+ pitting edema to BLE.    Lymphadenopathy:      Cervical: No cervical adenopathy.   Skin:     General: Skin is warm and dry.      Coloration: Skin is not pale.      Findings: Bruising present. No erythema or rash.      Comments: Easy bruising noted.     Neurological:      Mental Status: She is alert and oriented to person, place, and time.      Cranial Nerves: No cranial nerve deficit.      Motor: No abnormal muscle tone.      Coordination: Coordination normal.      Deep Tendon Reflexes: Reflexes are normal and symmetric. Reflexes normal.      Comments: Bilateral total knee replacement scars.    Psychiatric:         Behavior: Behavior normal.         Thought Content: Thought content normal.         Judgment: Judgment normal.         CRANIAL NERVES     CN III, IV, VI   Pupils are equal, round, and reactive to light.  Extraocular motions are normal.      Significant Labs: All pertinent labs within the past 24 hours have been reviewed.  Recent Results (from the past 12 hour(s))   POCT glucose    Collection Time: 02/06/23  7:48 PM   Result Value Ref Range    POCT Glucose 103 70 - 110 mg/dL   Comprehensive Metabolic Panel    Collection Time: 02/06/23  7:53 PM   Result Value Ref Range    Sodium 142 136 - 145 mmol/L    Potassium 4.1 3.5 - 5.1 mmol/L    Chloride 110 95 - 110 mmol/L    CO2 22 (L) 23 - 29 mmol/L    Glucose 100 70 - 110 mg/dL    BUN 51 (H) 8 - 23 mg/dL    Creatinine 1.9 (H) 0.5 - 1.4 mg/dL    Calcium 9.3 8.7 - 10.5 mg/dL    Total Protein 6.0 6.0 - 8.4 g/dL    Albumin 3.3 (L) 3.5 - 5.2 g/dL    Total Bilirubin 0.8 0.1 - 1.0 mg/dL    Alkaline Phosphatase 65 55 - 135 U/L    AST 25 10 - 40 U/L    ALT 17 10 - 44 U/L    Anion Gap 10 8 - 16 mmol/L    eGFR 25 (A) >60 mL/min/1.73 m^2   CBC Auto Differential    Collection Time: 02/06/23  7:53 PM   Result Value Ref Range    WBC 5.79 3.90 - 12.70 K/uL    RBC 2.67 (L) 4.00 - 5.40 M/uL    Hemoglobin 8.1 (L) 12.0 - 16.0 g/dL    Hematocrit 26.4 (L) 37.0 - 48.5 %    MCV 99 (H) 82 - 98 fL    MCH 30.3 27.0 - 31.0 pg    MCHC 30.7 (L) 32.0 - 36.0 g/dL    RDW 18.5 (H) 11.5 - 14.5 %    Platelets 131 (L) 150 - 450 K/uL    MPV 11.0 9.2 - 12.9 fL    Immature Granulocytes 0.5 0.0 - 0.5 %    Gran # (ANC) 4.2 1.8 - 7.7 K/uL    Immature  Grans (Abs) 0.03 0.00 - 0.04 K/uL    Lymph # 0.8 (L) 1.0 - 4.8 K/uL    Mono # 0.6 0.3 - 1.0 K/uL    Eos # 0.1 0.0 - 0.5 K/uL    Baso # 0.02 0.00 - 0.20 K/uL    nRBC 0 0 /100 WBC    Gran % 72.9 38.0 - 73.0 %    Lymph % 13.5 (L) 18.0 - 48.0 %    Mono % 10.4 4.0 - 15.0 %    Eosinophil % 2.4 0.0 - 8.0 %    Basophil % 0.3 0.0 - 1.9 %    Differential Method Automated    Type & Screen    Collection Time: 02/06/23  7:53 PM   Result Value Ref Range    Group & Rh O NEG     Indirect Moiz POS (A)    Protime-INR    Collection Time: 02/06/23  7:53 PM   Result Value Ref Range    Prothrombin Time 12.1 9.0 - 12.5 sec    INR 1.2 0.8 - 1.2   Magnesium    Collection Time: 02/06/23  7:53 PM   Result Value Ref Range    Magnesium 2.2 1.6 - 2.6 mg/dL   Lipase    Collection Time: 02/06/23  7:53 PM   Result Value Ref Range    Lipase 27 4 - 60 U/L   BNP    Collection Time: 02/06/23  7:53 PM   Result Value Ref Range    BNP >4,900 (H) 0 - 99 pg/mL   Troponin I    Collection Time: 02/06/23  7:53 PM   Result Value Ref Range    Troponin I 0.169 (H) 0.000 - 0.026 ng/mL   Antibody identification    Collection Time: 02/06/23  7:53 PM   Result Value Ref Range    Antibody ID POS    Lactic acid, plasma    Collection Time: 02/06/23  8:34 PM   Result Value Ref Range    Lactate (Lactic Acid) 1.6 0.5 - 2.2 mmol/L   Urinalysis, Reflex to Urine Culture Urine, Clean Catch    Collection Time: 02/06/23 10:30 PM    Specimen: Urine   Result Value Ref Range    Specimen UA Urine, Clean Catch     Color, UA Yellow Yellow, Straw, Ligia    Appearance, UA Hazy (A) Clear    pH, UA 5.0 5.0 - 8.0    Specific Gravity, UA 1.010 1.005 - 1.030    Protein, UA Negative Negative    Glucose, UA Negative Negative    Ketones, UA Negative Negative    Bilirubin (UA) Negative Negative    Occult Blood UA Negative Negative    Nitrite, UA Negative Negative    Urobilinogen, UA Negative <2.0 EU/dL    Leukocytes, UA Negative Negative        Significant Imaging: I have reviewed all pertinent  imaging results/findings within the past 24 hours.    CT abdomen pelvis w/out Contrast:   Impression:     No acute findings on CT abdomen and pelvis without contrast.     Mesenteric edema, anasarca and small free pelvic fluid.     Colonic diverticulosis.     Cardiomegaly, pulmonary vascular congestion, and bilateral small pleural effusions.  Overall findings are concerning for CHF.        Electronically signed by: Sachi Warren  Date:                                            02/06/2023  Time:                                           22:52    CXR:   FINDINGS:  Single portable chest view is submitted.  Cardiac pacemaker again noted.  The heart size appears enlarged, the appearance of the cardiomediastinal silhouette is stable when accounting for difference in position.  Central pulmonary vascular prominence again noted, bilateral perihilar infiltrate again noted with mild progression.  Bilateral pattern of interstitial and ground-glass infiltrate noted with mild progression.  There may be small pleural effusions, there is no large pleural effusion and there is no pneumothorax.  The osseous structures demonstrate chronic change.     Impression:     Mild progression of radiographic findings, as discussed above.        Electronically signed by: Fernando Galvan  Date:                                            02/06/2023  Time:                                           21:09        CT abdomen and pelvis from 2/4/23:   Impression:     No acute findings on CT abdomen and pelvis without contrast.     Colonic diverticulosis.     Small free pelvic fluid.     Cardiomegaly.  Right pleural effusion.  Ground-glass opacities in the lung parenchyma.  Anasarca.  Findings may suggest mild CHF.        Electronically signed by: Sachi Warren  Date:                                            02/04/2023  Time:                                           22:28      Colonoscopy from 2/3/23:   Impression:              - Friability with  contact bleeding in the cecum.   - Blood in the entire examined colon.   - A tattoo was seen in the sigmoid colon.   - Diverticulosis in the sigmoid colon.   - Internal hemorrhoids.   - No specimens collected.     Recommendation:          - Return patient to [facility] for [facility] care.   - Resume previous diet and consider discontinuing plavix   - Continue present medications.   - Trend CBC and monitor for signs of bleeding   - CTA to look for active diverticular bleed if recurrent signs of bleeding   - No repeat colonoscopy due to age.     Jordon Sotomayor MD   2/3/2023 1:53:32 PM

## 2023-02-07 NOTE — H&P
[facility]  [facility] Medicine  History & Physical    Patient Name: Palmira Malave  MRN: [id number]  Patient Class: IP- Inpatient  Admission Date: 2/6/2023  Attending Physician: Dwayne Feliciano DO   Primary Care Provider: Qi Ramon MD         Patient information was obtained from patient, past medical records and ER records.     Subjective:     Principal Problem:GI hemorrhage    Chief Complaint:   Chief Complaint   Patient presents with    Rectal Bleeding     The patient's daughter reports that patient had one episode of dark red rectal bleeding x 1 weeks. She also reports that the patient had rectal had rectal bleeding last week, causing her to have to come to the hospital to have blood transferred. Denies that patient is more tired that usual. Patient did reports that she had abdominal pain while passing the blood. Denies sob, chest pain.         HPI:   Ms Malave is a 88yo lady with a past medical history of HFrEF (EF: 20%, GIIDD, s/p AICD), CAD (on DAPT), CKD3, HTN, HLD    She was admitted on 01/30/2023 for melena with concerns for GI bleed and acute blood loss anemia requiring transfusion. Hgb noted to be 6.4 on outpatient labs on 01/30 and was instructed to come to ED for evaluation. Was given 1U of pRBC on 01/30 with appropiate response (hgb 7.4 on 01/31). GI consulted- s/p EGD on 01/31. Showed friable mucosa noted in duodenal bulb. Likely source of bleeding. GI recommends discontinuing plavix on discharge and f/u with GI outpatient for anemia workup.     She returned to ED on 2/4/23 w/ lower abdominal pain w/ radiation to her back, and lightheadedness, after being discharged.   ED workup notable for hemoglobin 8.7, CMP with creatinine 1.7, BUN 47, lipase 15, CT abdomen pelvis without any acute findings present, colonic diverticulosis present, small free pelvic fluid present.  Patient presentation consistent with suspected abdominal pain likely secondary to recent colonoscopy. Patient  "treated symptomatically with complete improvement and discharged.       She returned to the ED today for concerns of rectal bleeding, generalized abdominal pain that worsens when using the restroom, fatigue, decreased appetite, and lightheadedness. Patient reports she has still been experiencing persistent rectal bleeding and noted an episode of "black" melena yesterday that "my daughter said was brown, but I saw that it was black". She denies any rectal bleeding currently. She states she has mostly been bedridden recently as she "gets lightheaded when I get out of bed". She denies any weakness/dizziness/lightheadedness when she moves around in her bed. She further endorses leg cramping. She does report decreased PO intake. She denies any somatic pain currently, dysuria, chest pain, dyspnea, or other associated symptoms. She denies any recent discharges/shocks from her AICD.     During my assessment, patient is alert and conversant, but her story would change at times. She appears pleasantly demented.     Patient's daughter told ED:    Since her discharge 3 days ago, she has been more fatigued, weak, falling asleep more often, and had another bloody bowel movement that appeared to be black melena in her diaper. They noticed her BP was dropping at home even though it runs low at baseline. She notes her Entresto Rx was withheld upon her last admission. Her rectal exam in the ED was hemoccult positive.     In the ED, her VS were BP (!) 107/58   Pulse 85   Temp 98.4 °F (36.9 °C) (Oral)   Resp (!) 27   Ht 5' 3" (1.6 m)   Wt 55.3 kg (122 lb)   SpO2 99%   Breastfeeding No   BMI 21.61 kg/m²     In the ED, she was treated with:   Medications   0.9%  NaCl infusion (for blood administration) (has no administration in time range)   lactated ringers bolus 500 mL (0 mLs Intravenous Stopped 2/6/23 2130)   pantoprazole injection 80 mg (80 mg Intravenous Given 2/6/23 2212)   furosemide injection 40 mg (40 mg Intravenous " Given 2/7/23 0044)       Past Medical History:   Diagnosis Date    Cataract     CHF (congestive heart failure)     CKD (chronic kidney disease), stage III     Coronary artery disease     Gout attack     Hypercholesteremia     Hypertension     Renal disorder     Vaginal delivery     x4       Past Surgical History:   Procedure Laterality Date    APPENDECTOMY      CARDIAC DEFIBRILLATOR PLACEMENT      2015    CATARACT EXTRACTION W/  INTRAOCULAR LENS IMPLANT Left 02/07/2019    Dr. Velazco    CATARACT EXTRACTION W/  INTRAOCULAR LENS IMPLANT Right 02/21/2019    Dr. Velazco    CHOLECYSTECTOMY      COLONOSCOPY N/A 2/3/2023    Procedure: COLONOSCOPY;  Surgeon: Jordon Sotomayor MD;  Location: [facility] END[facility];  Service: Endoscopy;  Laterality: N/A;    COLONOSCOPY W/ POLYPECTOMY  10 or 11/ 2014    per Dr. Elen ceja Left 8/2008    ESOPHAGOGASTRODUODENOSCOPY N/A 1/31/2023    Procedure: EGD (ESOPHAGOGASTRODUODENOSCOPY);  Surgeon: Jordon Sotomayor MD;  Location: [facility] ENDO;  Service: Endoscopy;  Laterality: N/A;    EYE SURGERY      HYSTERECTOMY      INTRAOCULAR PROSTHESES INSERTION Left 2/7/2019    Procedure: INSERTION, IOL PROSTHESIS;  Surgeon: Demetrius Velazco MD;  Location: [facility] OR;  Service: Ophthalmology;  Laterality: Left;  RN Phone Pre OP 1-30-19.  Arrival 05:30 AM    INTRAOCULAR PROSTHESES INSERTION Right 2/21/2019    Procedure: INSERTION, IOL PROSTHESIS;  Surgeon: Demetrius Velazco MD;  Location: [facility] OR;  Service: Ophthalmology;  Laterality: Right;    JOINT REPLACEMENT Bilateral 2005 & 2006    2 Knee Replacements=Lt. 1= 2005. Rt. 1= 2006    OOPHORECTOMY      PHACOEMULSIFICATION OF CATARACT Left 2/7/2019    Procedure: PHACOEMULSIFICATION, CATARACT;  Surgeon: Demetrius Velazco MD;  Location: [facility] OR;  Service: Ophthalmology;  Laterality: Left;    PHACOEMULSIFICATION OF CATARACT Right 2/21/2019    Procedure: PHACOEMULSIFICATION, CATARACT;  Surgeon: Demetrius FULLER  MD Mora;  Location: [facility];  Service: Ophthalmology;  Laterality: Right;  RN Phone Pre op 2-15-19.  Arrival 05:30 AM    TOTAL KNEE ARTHROPLASTY Bilateral Lt.= 2005; Rt.=2006    Bilateral Knee scope       Review of patient's allergies indicates:   Allergen Reactions    Aspirin Swelling     Swelling and rash    Colace [docusate sodium] Rash     itching    Docusate Other (See Comments) and Rash     itching    Sulfa (sulfonamide antibiotics) Swelling     Swelling and rash  Swelling and rash    Iodine and iodide containing products Rash    Penicillins Rash       Current Facility-Administered Medications on File Prior to Encounter   Medication    0.9%  NaCl infusion    phenylephrine HCL 2.5% ophthalmic solution 1 drop    proparacaine 0.5 % ophthalmic solution 1 drop    tropicamide 1% ophthalmic solution 1 drop     Current Outpatient Medications on File Prior to Encounter   Medication Sig    acetaminophen (TYLENOL) 325 MG tablet Take 325 mg by mouth once daily.    allopurinol (ZYLOPRIM) 100 MG tablet Take 100 mg by mouth every evening.     cranberry 400 mg Cap Take 1 capsule by mouth 2 (two) times daily.    fish oil-omega-3 fatty acids 300-1,000 mg capsule Take 2 g by mouth once daily. 1 caps every AM & 1 cap every PM.    folic acid/multivit-min/lutein (CENTRUM SILVER ORAL) Take by mouth once daily.    furosemide (LASIX) 80 MG tablet TAKE 1 TABLET BY MOUTH TWICE DAILY AS NEEDED FOR SHORTNESS OF BREATH OR  EDEMA (Patient taking differently: Take 80 mg by mouth once daily.)    HYDROcodone-acetaminophen (NORCO) 5-325 mg per tablet Take 1 tablet by mouth every 8 (eight) hours as needed for Pain. (Patient not taking: Reported on 2/6/2023)    HYDROcodone-acetaminophen (NORCO) 5-325 mg per tablet Take 1 tablet by mouth every 6 (six) hours as needed for Pain. (Patient not taking: Reported on 2/6/2023)    lovastatin (MEVACOR) 40 MG tablet Take 40 mg by mouth nightly. Takes 2 caps with supper every  evening.    mirabegron (MYRBETRIQ) 50 mg Tb24 Take 50 mg by mouth once daily.     NEURONTIN 100 mg capsule Take 100 mg by mouth 2 (two) times daily.    ondansetron (ZOFRAN-ODT) 4 MG TbDL Take 1 tablet (4 mg total) by mouth every 8 (eight) hours as needed (nausea). (Patient not taking: Reported on 2/6/2023)    pantoprazole (PROTONIX) 40 MG tablet Take 40 mg by mouth once daily.    sacubitriL-valsartan (ENTRESTO) 24-26 mg per tablet Take 1 tablet by mouth 2 (two) times daily. (Patient taking differently: Take 1 tablet by mouth once daily.)     Family History       Problem Relation (Age of Onset)    Amblyopia Son    Diabetes Other    Heart attack Mother (88)    Hyperlipidemia Mother    Hypertension Mother, Other          Tobacco Use    Smoking status: Never    Smokeless tobacco: Never   Substance and Sexual Activity    Alcohol use: Not Currently    Drug use: No    Sexual activity: Not Currently     Partners: Male     Review of Systems   Constitutional:  Positive for fatigue. Negative for chills and fever.   HENT:  Negative for congestion.    Eyes:  Negative for visual disturbance.   Respiratory:  Negative for cough and shortness of breath.    Cardiovascular:  Negative for chest pain.   Gastrointestinal:  Positive for anal bleeding and blood in stool. Negative for abdominal pain, constipation, diarrhea, nausea and vomiting.   Genitourinary:  Negative for dysuria.   Musculoskeletal:  Negative for back pain.        +leg cramping.    Skin:  Negative for rash.   Allergic/Immunologic: Negative for immunocompromised state.   Neurological:  Positive for weakness and light-headedness. Negative for dizziness.   Hematological:  Bruises/bleeds easily.   Psychiatric/Behavioral:  Negative for confusion. The patient is not nervous/anxious.    Objective:     Vital Signs (Most Recent):  Temp: 98.3 °F (36.8 °C) (02/06/23 1817)  Pulse: 83 (02/06/23 2258)  Resp: 20 (02/06/23 2258)  BP: (!) 108/52 (02/06/23 2248)  SpO2: 96 %  (02/06/23 8202)   Vital Signs (24h Range):  Temp:  [98.3 °F (36.8 °C)] 98.3 °F (36.8 °C)  Pulse:  [78-88] 83  Resp:  [16-20] 20  SpO2:  [96 %-100 %] 96 %  BP: ()/(49-73) 108/52     Weight: 55.3 kg (122 lb)  Body mass index is 21.61 kg/m².    Physical Exam  Vitals and nursing note reviewed.   Constitutional:       General: She is not in acute distress.     Appearance: She is well-developed. She is not diaphoretic.   HENT:      Head: Normocephalic and atraumatic.      Mouth/Throat:      Pharynx: No oropharyngeal exudate.      Comments: Poor dentition.   Eyes:      General: No scleral icterus.        Right eye: No discharge.         Left eye: No discharge.      Extraocular Movements: EOM normal.      Conjunctiva/sclera: Conjunctivae normal.      Pupils: Pupils are equal, round, and reactive to light.   Neck:      Thyroid: No thyromegaly.      Vascular: No JVD.      Trachea: No tracheal deviation.   Cardiovascular:      Rate and Rhythm: Normal rate and regular rhythm.      Heart sounds: Murmur heard.   Systolic murmur is present with a grade of 1/6.     No friction rub. No gallop.   Pulmonary:      Effort: Pulmonary effort is normal. No respiratory distress.      Breath sounds: Normal breath sounds. No stridor. No wheezing or rales.      Comments: Left upper chest wall AICD in place.   Chest:      Chest wall: No tenderness.   Abdominal:      General: Bowel sounds are normal. There is no distension.      Palpations: Abdomen is soft. There is no mass.      Tenderness: There is no abdominal tenderness. There is no guarding or rebound.   Musculoskeletal:         General: No tenderness. Normal range of motion.      Cervical back: Normal range of motion and neck supple.      Right lower leg: Edema present.      Left lower leg: Edema present.      Comments: 1+ pitting edema to BLE.    Lymphadenopathy:      Cervical: No cervical adenopathy.   Skin:     General: Skin is warm and dry.      Coloration: Skin is not pale.       Findings: Bruising present. No erythema or rash.      Comments: Easy bruising noted.    Neurological:      Mental Status: She is alert and oriented to person, place, and time.      Cranial Nerves: No cranial nerve deficit.      Motor: No abnormal muscle tone.      Coordination: Coordination normal.      Deep Tendon Reflexes: Reflexes are normal and symmetric. Reflexes normal.      Comments: Bilateral total knee replacement scars.    Psychiatric:         Behavior: Behavior normal.         Thought Content: Thought content normal.         Judgment: Judgment normal.         CRANIAL NERVES     CN III, IV, VI   Pupils are equal, round, and reactive to light.  Extraocular motions are normal.      Significant Labs: All pertinent labs within the past 24 hours have been reviewed.  Recent Results (from the past 12 hour(s))   POCT glucose    Collection Time: 02/06/23  7:48 PM   Result Value Ref Range    POCT Glucose 103 70 - 110 mg/dL   Comprehensive Metabolic Panel    Collection Time: 02/06/23  7:53 PM   Result Value Ref Range    Sodium 142 136 - 145 mmol/L    Potassium 4.1 3.5 - 5.1 mmol/L    Chloride 110 95 - 110 mmol/L    CO2 22 (L) 23 - 29 mmol/L    Glucose 100 70 - 110 mg/dL    BUN 51 (H) 8 - 23 mg/dL    Creatinine 1.9 (H) 0.5 - 1.4 mg/dL    Calcium 9.3 8.7 - 10.5 mg/dL    Total Protein 6.0 6.0 - 8.4 g/dL    Albumin 3.3 (L) 3.5 - 5.2 g/dL    Total Bilirubin 0.8 0.1 - 1.0 mg/dL    Alkaline Phosphatase 65 55 - 135 U/L    AST 25 10 - 40 U/L    ALT 17 10 - 44 U/L    Anion Gap 10 8 - 16 mmol/L    eGFR 25 (A) >60 mL/min/1.73 m^2   CBC Auto Differential    Collection Time: 02/06/23  7:53 PM   Result Value Ref Range    WBC 5.79 3.90 - 12.70 K/uL    RBC 2.67 (L) 4.00 - 5.40 M/uL    Hemoglobin 8.1 (L) 12.0 - 16.0 g/dL    Hematocrit 26.4 (L) 37.0 - 48.5 %    MCV 99 (H) 82 - 98 fL    MCH 30.3 27.0 - 31.0 pg    MCHC 30.7 (L) 32.0 - 36.0 g/dL    RDW 18.5 (H) 11.5 - 14.5 %    Platelets 131 (L) 150 - 450 K/uL    MPV 11.0 9.2 - 12.9 fL     Immature Granulocytes 0.5 0.0 - 0.5 %    Gran # (ANC) 4.2 1.8 - 7.7 K/uL    Immature Grans (Abs) 0.03 0.00 - 0.04 K/uL    Lymph # 0.8 (L) 1.0 - 4.8 K/uL    Mono # 0.6 0.3 - 1.0 K/uL    Eos # 0.1 0.0 - 0.5 K/uL    Baso # 0.02 0.00 - 0.20 K/uL    nRBC 0 0 /100 WBC    Gran % 72.9 38.0 - 73.0 %    Lymph % 13.5 (L) 18.0 - 48.0 %    Mono % 10.4 4.0 - 15.0 %    Eosinophil % 2.4 0.0 - 8.0 %    Basophil % 0.3 0.0 - 1.9 %    Differential Method Automated    Type & Screen    Collection Time: 02/06/23  7:53 PM   Result Value Ref Range    Group & Rh O NEG     Indirect Moiz POS (A)    Protime-INR    Collection Time: 02/06/23  7:53 PM   Result Value Ref Range    Prothrombin Time 12.1 9.0 - 12.5 sec    INR 1.2 0.8 - 1.2   Magnesium    Collection Time: 02/06/23  7:53 PM   Result Value Ref Range    Magnesium 2.2 1.6 - 2.6 mg/dL   Lipase    Collection Time: 02/06/23  7:53 PM   Result Value Ref Range    Lipase 27 4 - 60 U/L   BNP    Collection Time: 02/06/23  7:53 PM   Result Value Ref Range    BNP >4,900 (H) 0 - 99 pg/mL   Troponin I    Collection Time: 02/06/23  7:53 PM   Result Value Ref Range    Troponin I 0.169 (H) 0.000 - 0.026 ng/mL   Antibody identification    Collection Time: 02/06/23  7:53 PM   Result Value Ref Range    Antibody ID POS    Lactic acid, plasma    Collection Time: 02/06/23  8:34 PM   Result Value Ref Range    Lactate (Lactic Acid) 1.6 0.5 - 2.2 mmol/L   Urinalysis, Reflex to Urine Culture Urine, Clean Catch    Collection Time: 02/06/23 10:30 PM    Specimen: Urine   Result Value Ref Range    Specimen UA Urine, Clean Catch     Color, UA Yellow Yellow, Straw, Ligia    Appearance, UA Hazy (A) Clear    pH, UA 5.0 5.0 - 8.0    Specific Gravity, UA 1.010 1.005 - 1.030    Protein, UA Negative Negative    Glucose, UA Negative Negative    Ketones, UA Negative Negative    Bilirubin (UA) Negative Negative    Occult Blood UA Negative Negative    Nitrite, UA Negative Negative    Urobilinogen, UA Negative <2.0 EU/dL     Leukocytes, UA Negative Negative        Significant Imaging: I have reviewed all pertinent imaging results/findings within the past 24 hours.    CT abdomen pelvis w/out Contrast:   Impression:     No acute findings on CT abdomen and pelvis without contrast.     Mesenteric edema, anasarca and small free pelvic fluid.     Colonic diverticulosis.     Cardiomegaly, pulmonary vascular congestion, and bilateral small pleural effusions.  Overall findings are concerning for CHF.        Electronically signed by: Sachi Warren  Date:                                            02/06/2023  Time:                                           22:52    CXR:   FINDINGS:  Single portable chest view is submitted.  Cardiac pacemaker again noted.  The heart size appears enlarged, the appearance of the cardiomediastinal silhouette is stable when accounting for difference in position.  Central pulmonary vascular prominence again noted, bilateral perihilar infiltrate again noted with mild progression.  Bilateral pattern of interstitial and ground-glass infiltrate noted with mild progression.  There may be small pleural effusions, there is no large pleural effusion and there is no pneumothorax.  The osseous structures demonstrate chronic change.     Impression:     Mild progression of radiographic findings, as discussed above.        Electronically signed by: Fernando Galvan  Date:                                            02/06/2023  Time:                                           21:09        CT abdomen and pelvis from 2/4/23:   Impression:     No acute findings on CT abdomen and pelvis without contrast.     Colonic diverticulosis.     Small free pelvic fluid.     Cardiomegaly.  Right pleural effusion.  Ground-glass opacities in the lung parenchyma.  Anasarca.  Findings may suggest mild CHF.        Electronically signed by: Sachi Warren  Date:                                            02/04/2023  Time:                                    "        22:28      Colonoscopy from 2/3/23:   Impression:              - Friability with contact bleeding in the cecum.   - Blood in the entire examined colon.   - A tattoo was seen in the sigmoid colon.   - Diverticulosis in the sigmoid colon.   - Internal hemorrhoids.   - No specimens collected.     Recommendation:          - Return patient to [facility] for [facility] care.   - Resume previous diet and consider discontinuing plavix   - Continue present medications.   - Trend CBC and monitor for signs of bleeding   - CTA to look for active diverticular bleed if recurrent signs of bleeding   - No repeat colonoscopy due to age.     Jordon Sotomayor MD   2/3/2023 1:53:32 PM     Assessment/Plan:     * GI hemorrhage  She had abnormalities on both colonoscopy and EGD last admission, so could be either  GI had recommended possible VCE vs CTA GI bleed study   -I see no evidence currently to suggest acute lower GI bleeding   -She is in HINA and has "iodine containing product" allergy, making CTA risky  The patient tells me her stools, "have been black as best I can tell, though my daughter says brown."   -The patient has a poor memory of events and is mixed up on her hospital visits, so I am not sure how reliable this is   -Her daughter did, however, also report worsening weakness and continued melanotic stools for the past week  She got Protonix 80mg iv x 1 in the ED, and I will continue Protonix 40mg iv q12 hours  Consult GI to see if she needs re-scoping  NPO, hold all anti-PLT and anticoagulant meds  Serial CBC  Move to MICU if worsens or more hypotensive      Symptomatic anemia  Given severe CHF and history of CAD, will try to keep her Hg closer to 8-9g range  She has been dizzy, weak, hypotensive with continued bleeding at home  Transfuse 1U PRBC for now      Hypotension  She runs low per family, as is expected with EF of 20%  This is complicated by severe anemia and GI bleeding in face of Entresto   -Holding Entresto " for HINA  Judicious use of Lasix with blood transfusion, as CXR and CT do show some CHF and edema      Acute on chronic combined systolic and diastolic congestive heart failure  Patient is identified as having Combined Systolic and Diastolic heart failure that is Acute on chronic. CHF is currently uncontrolled due to Continued edema of extremities, Hepatic congestion/ascites and JVD, Rales/crackles on pulmonary exam and Pulmonary edema/pleural effusion on CXR. Latest ECHO performed and demonstrates- Results for orders placed during the hospital encounter of 07/02/22    Echo    Interpretation Summary  · The left ventricle is moderately enlarged with eccentric hypertrophy and severely decreased systolic function.  · The estimated ejection fraction is 20%.  · Grade II left ventricular diastolic dysfunction.  · Normal right ventricular size with normal right ventricular systolic function.  · Severe left atrial enlargement.  · There is mild aortic valve stenosis.  · Aortic valve area is 1.28 cm2; peak velocity is 1.85 m/s; mean gradient is 7 mmHg.  · Moderate mitral regurgitation.  · Mild tricuspid regurgitation.  · Normal central venous pressure (3 mmHg).  · The estimated PA systolic pressure is 19 mmHg.  . Continue ACE/ARB and Furosemide and monitor clinical status closely. Monitor on telemetry. Patient is off CHF pathway.  Monitor strict Is&Os and daily weights.  Place on fluid restriction of 1.5 L. Continue to stress to patient importance of self efficacy and  on diet for CHF. Last BNP reviewed- and noted below   Recent Labs   Lab 02/06/23 1953   BNP >4,900*   .      Acute renal failure superimposed on stage 3b chronic kidney disease  Holding Entresto for now  Likely due to CHF, ARB, hypotension and anemia  Follow up after 1U blood and IV lasix to see if perfusion can improve     Latest Reference Range & Units 02/03/23 03:54 02/04/23 05:45 02/04/23 20:41 02/06/23 19:53   Creatinine 0.5 - 1.4 mg/dL 1.4 1.4 1.7  "(H) 1.9 (H)          Coronary artery disease involving native coronary artery of native heart without angina pectoris  I cannot find a C in Epic, though in Cardiology notes this is carried through:   -"catheter 2011 reported nonobstructive CAD"  She has not had PCI/stenting as far as I can discern  She had been on Plavix for this long term due to ASA allergy   -Plavix was stopped on the last admit due to GIB    She is on Mevacor (statin) and Omega-3 acids, but no BB or nitrate  She denies ever having any angina or CP      Biventricular AICD in place  Noted  Placed on telemetry      Elevated troponin level not due to acute coronary syndrome  She is near baseline  Suspect chronic demand leak from anemia and CHF in face of HINA on CKD IIIb  Will trend  No anticoagulation or anti-PLT therapy due to GIB     Latest Reference Range & Units 12/04/22 00:12 12/13/22 17:10 01/30/23 20:20 02/06/23 19:53   Troponin I 0.000 - 0.026 ng/mL 0.178 (H) 0.174 (H) 0.135 (H) 0.169 (H)          VTE Risk Mitigation (From admission, onward)         Ordered     Place DALE hose  Until discontinued         02/07/23 0239     Reason for No Pharmacological VTE Prophylaxis  Once        Question:  Reasons:  Answer:  Active Bleeding    02/07/23 0239     IP VTE HIGH RISK PATIENT  Once         02/07/23 0239     Place sequential compression device  Until discontinued         02/07/23 0239                 INaima, scribed for, and in the presence of, ANGEL Bautista MD. I performed the scribed service and the documentation accurately describes the services I performed. I attest to the accuracy of the note.      I, ANGEL Bautista, personally performed the services described in this documentation. All medical record entries made by the scribe were at my direction and in my presence. I have reviewed the chart and agree that the record reflects my personal performance and is accurate and complete.    PRABHU Bautista MD  Department of Hospital " Medicine   [facility] - Emergency Dept

## 2023-02-07 NOTE — ASSESSMENT & PLAN NOTE
Patient is identified as having Combined Systolic and Diastolic heart failure that is Acute on chronic. CHF is currently uncontrolled due to Continued edema of extremities, Hepatic congestion/ascites and JVD, Rales/crackles on pulmonary exam and Pulmonary edema/pleural effusion on CXR. Latest ECHO performed and demonstrates- Results for orders placed during the hospital encounter of 07/02/22    Echo    Interpretation Summary  · The left ventricle is moderately enlarged with eccentric hypertrophy and severely decreased systolic function.  · The estimated ejection fraction is 20%.  · Grade II left ventricular diastolic dysfunction.  · Normal right ventricular size with normal right ventricular systolic function.  · Severe left atrial enlargement.  · There is mild aortic valve stenosis.  · Aortic valve area is 1.28 cm2; peak velocity is 1.85 m/s; mean gradient is 7 mmHg.  · Moderate mitral regurgitation.  · Mild tricuspid regurgitation.  · Normal central venous pressure (3 mmHg).  · The estimated PA systolic pressure is 19 mmHg.  . Continue ACE/ARB and Furosemide and monitor clinical status closely. Monitor on telemetry. Patient is off CHF pathway.  Monitor strict Is&Os and daily weights.  Place on fluid restriction of 1.5 L. Continue to stress to patient importance of self efficacy and  on diet for CHF. Last BNP reviewed- and noted below   Recent Labs   Lab 02/06/23 1953   BNP >4,900*   .

## 2023-02-07 NOTE — HPI
"Ms Malave is a 86yo lady with a past medical history of HFrEF (EF: 20%, GIIDD, s/p AICD), CAD (on DAPT), CKD3, HTN, HLD    She was admitted on 01/30/2023 for melena with concerns for GI bleed and acute blood loss anemia requiring transfusion. Hgb noted to be 6.4 on outpatient labs on 01/30 and was instructed to come to ED for evaluation. Was given 1U of pRBC on 01/30 with appropiate response (hgb 7.4 on 01/31). GI consulted- s/p EGD on 01/31. Showed friable mucosa noted in duodenal bulb. Likely source of bleeding. GI recommends discontinuing plavix on discharge and f/u with GI outpatient for anemia workup.     She returned to ED on 2/4/23 w/ lower abdominal pain w/ radiation to her back, and lightheadedness, after being discharged.   ED workup notable for hemoglobin 8.7, CMP with creatinine 1.7, BUN 47, lipase 15, CT abdomen pelvis without any acute findings present, colonic diverticulosis present, small free pelvic fluid present.  Patient presentation consistent with suspected abdominal pain likely secondary to recent colonoscopy. Patient treated symptomatically with complete improvement and discharged.       She returned to the ED today for concerns of rectal bleeding, generalized abdominal pain that worsens when using the restroom, fatigue, decreased appetite, and lightheadedness. Patient reports she has still been experiencing persistent rectal bleeding and noted an episode of "black" melena yesterday that "my daughter said was brown, but I saw that it was black". She denies any rectal bleeding currently. She states she has mostly been bedridden recently as she "gets lightheaded when I get out of bed". She denies any weakness/dizziness/lightheadedness when she moves around in her bed. She further endorses leg cramping. She does report decreased PO intake. She denies any somatic pain currently, dysuria, chest pain, dyspnea, or other associated symptoms. She denies any recent discharges/shocks from her AICD. " "    During my assessment, patient is alert and conversant, but her story would change at times. She appears pleasantly demented.     Patient's daughter told ED:    Since her discharge 3 days ago, she has been more fatigued, weak, falling asleep more often, and had another bloody bowel movement that appeared to be black melena in her diaper. They noticed her BP was dropping at home even though it runs low at baseline. She notes her Entresto Rx was withheld upon her last admission. Her rectal exam in the ED was hemoccult positive.     In the ED, her VS were BP (!) 107/58   Pulse 85   Temp 98.4 °F (36.9 °C) (Oral)   Resp (!) 27   Ht 5' 3" (1.6 m)   Wt 55.3 kg (122 lb)   SpO2 99%   Breastfeeding No   BMI 21.61 kg/m²     In the ED, she was treated with:   Medications   0.9%  NaCl infusion (for blood administration) (has no administration in time range)   lactated ringers bolus 500 mL (0 mLs Intravenous Stopped 2/6/23 2130)   pantoprazole injection 80 mg (80 mg Intravenous Given 2/6/23 2212)   furosemide injection 40 mg (40 mg Intravenous Given 2/7/23 0044)     "

## 2023-02-07 NOTE — ASSESSMENT & PLAN NOTE
"I cannot find a LHC in Epic, though in Cardiology notes this is carried through:   -"catheter 2011 reported nonobstructive CAD"  She has not had PCI/stenting as far as I can discern  She had been on Plavix for this long term due to ASA allergy   -Plavix was stopped on the last admit due to GIB    She is on Mevacor (statin) and Omega-3 acids, but no BB or nitrate  She denies ever having any angina or CP    "

## 2023-02-07 NOTE — ASSESSMENT & PLAN NOTE
She runs low per family, as is expected with EF of 20%  This is complicated by severe anemia and GI bleeding in face of Entresto   -Holding Entresto for HINA  Judicious use of Lasix with blood transfusion, as CXR and CT do show some CHF and edema

## 2023-02-07 NOTE — NURSING
4 eyes on skin completed with CHERYL Anthony.RN. Skin tear noted to L inner thigh. Area cleansed with NS. Mepilex applied.

## 2023-02-07 NOTE — PLAN OF CARE
[facility] - Telemetry  Initial Discharge Assessment       Primary Care Provider: Qi Ramon MD    Admission Diagnosis: Fatigue [R53.83]  Symptomatic anemia [D64.9]    Admission Date: 2/6/2023  Expected Discharge Date:     Discharge Barriers Identified: None    Payor: [employer]ED MEDICARE / Plan: [employer] SECURE COMPLETE / Product Type: Medicare Advantage /     Extended Emergency Contact Information  Primary Emergency Contact: Usama Malave  Address: [address] [place] [address] [facility]  Home Phone: [phone]  Mobile Phone: [phone]  Relation: Daughter  Secondary Emergency Contact: Keyla Morel   [facility]  Home Phone: [phone]  Mobile Phone: [phone]  Relation: Daughter    Discharge Plan A: Home Health, Home with family  Discharge Plan B: Home Health, Home with family      [facility]  [place] - [address]  [address]  [place] [address]  Phone: [phone] Fax: [phone]    [facility] [address]  [address] [place] [address]  Phone: [phone] Fax: [phone]    [id number] - [place] - [address] AT [facility] [address]  [address]  [place] [address]  Phone: [phone] Fax: [phone]      Initial Assessment (most recent)       Adult Discharge Assessment - 02/07/23 1611          Discharge Assessment    Assessment Type Discharge Planning Assessment     Confirmed/corrected address, phone number and insurance Yes     Confirmed Demographics Correct on Facesheet     Source of Information family     If unable to respond/provide information was family/caregiver contacted? No Contact Information Available     When was your last doctors appointment? --   Patient's daughter stated patient had doctor's appointment today but had to cancel due to hospital admit.    Communicated MARTELL with patient/caregiver Date not available/Unable to  determine     Reason For Admission Fatigue     People in Home child(karan), adult     Facility Arrived From: Home     Do you expect to return to your current living situation? Yes     Do you have help at home or someone to help you manage your care at home? Yes     Who are your caregiver(s) and their phone number(s)? Usama Malave (Daughter)   [phone]     Equipment Currently Used at Home walker, rolling;cane, straight;wheelchair;bedside commode;shower chair     Readmission within 30 days? Yes     Patient currently being followed by outpatient case management? No     Do you currently have service(s) that help you manage your care at home? Yes     Name and Contact number of agency [facility]     Is the pt/caregiver preference to resume services with current agency Yes     Do you take prescription medications? Yes     Do you have prescription coverage? Yes     Coverage PHN     Do you have any problems affording any of your prescribed medications? No     Is the patient taking medications as prescribed? yes     Who is going to help you get home at discharge? Usama Malave (Daughter)   [phone]     How do you get to doctors appointments? family or friend will provide     Are you on dialysis? No     Do you take coumadin? No     Discharge Plan A Home Health;Home with family     Discharge Plan B Home Health;Home with family     DME Needed Upon Discharge  other (see comments)   TBD    Discharge Plan discussed with: Adult children     Discharge Barriers Identified None        Housing Stability    In the last 12 months, was there a time when you were not able to pay the mortgage or rent on time? No     In the last 12 months, was there a time when you did not have a steady place to sleep or slept in a shelter (including now)? No        Transportation Needs    In the past 12 months, has lack of transportation kept you from medical appointments or from getting medications? No     In the past 12 months, has  lack of transportation kept you from meetings, work, or from getting things needed for daily living? No        Food Insecurity    Within the past 12 months, you worried that your food would run out before you got the money to buy more. Never true     Within the past 12 months, the food you bought just didn't last and you didn't have money to get more. Never true        Social Connections    In a typical week, how many times do you talk on the phone with family, friends, or neighbors? More than three times a week     How often do you get together with friends or relatives? More than three times a week        Alcohol Use    Q1: How often do you have a drink containing alcohol? Never     Q2: How many drinks containing alcohol do you have on a typical day when you are drinking? Patient does not drink     Q3: How often do you have six or more drinks on one occasion? Never        OTHER    Name(s) of People in Home Usama Malave (Daughter)   [phone]                   Patient's daughter provided  with information re: patient. Patient's daughter stated patient lives with her and her family assist patient with her ADLs. She stated patient's son and daughter also sits with patient and provides transportation to patient's doctor appointments. Patient's daughter stated patient had a PCP appointment today but miss due to being in hospital. Patient's daughter stated request for morning appointment at discharge with PCP. Patient's daughter stated she would like for patient to return home with Home Health services at discharge and family will provide transportation at discharge.

## 2023-02-07 NOTE — CONSULTS
[facility] Consultation Note    Patient Complaint: blood in stool    PCP:   Qi Ramon       LOS: 1        Initial History of Present Illness (HPI):  This is a 87 y.o. female consulted to GI service for GI bleed. PMH HFrEF (EF: 20%, GIIDD, s/p AICD), CAD (on DAPT), CKD3, HTN, HLD. Admitted for GI hemorrhage. Patient complaint of acute recurrence of dark red blood in stool with associated symptoms of fatigue, weakness and abdominal cramping that occurred last night. Patient with recent discharge from inpatient on 2/4 with same complaint. Underwent colonoscopy 2/3 and diverticulosis noted with blood in entire colon. Encouraged to stop plavix, patient reports she is no longer on plavix.  Labs 8.1 now 7.8. CT unremarkable.      Review of Systems   Constitutional:  Positive for fatigue. Negative for activity change, chills, diaphoresis and fever.   HENT:  Negative for congestion, drooling, rhinorrhea, sore throat and trouble swallowing.    Eyes:  Negative for discharge.   Respiratory:  Negative for cough, shortness of breath and wheezing.    Cardiovascular:  Negative for chest pain, palpitations and leg swelling.   Gastrointestinal:  Positive for blood in stool. Negative for abdominal distention, abdominal pain, anal bleeding, constipation, diarrhea, nausea, rectal pain and vomiting.   Endocrine: Negative for cold intolerance and heat intolerance.   Genitourinary:  Negative for frequency, hematuria and urgency.   Musculoskeletal:  Negative for arthralgias.   Skin:  Negative for color change, pallor and rash.   Neurological:  Positive for weakness. Negative for syncope, facial asymmetry and numbness.   Psychiatric/Behavioral:  Negative for agitation and confusion. The patient is not nervous/anxious.          Medical History:  has a past medical history of Cataract, CHF (congestive heart failure), CKD (chronic kidney disease), stage III, Coronary artery disease, Gout attack, Hypercholesteremia,  Hypertension, Renal disorder, and Vaginal delivery.    Surgical History:  has a past surgical history that includes Hysterectomy; Total knee arthroplasty (Bilateral, Lt.= 2005; Rt.=2006); defribillator (Left, 8/2008); Cholecystectomy; Appendectomy; Colonoscopy w/ polypectomy (10 or 11/ 2014); Cardiac defibrillator placement; Oophorectomy; Joint replacement (Bilateral, 2005 & 2006); Eye surgery; Intraocular prosthesis insertion (Left, 2/7/2019); Phacoemulsification of cataract (Left, 2/7/2019); Phacoemulsification of cataract (Right, 2/21/2019); Intraocular prosthesis insertion (Right, 2/21/2019); Cataract extraction w/  intraocular lens implant (Left, 02/07/2019); Cataract extraction w/  intraocular lens implant (Right, 02/21/2019); Esophagogastroduodenoscopy (N/A, 1/31/2023); and Colonoscopy (N/A, 2/3/2023).    Family History: family history includes Amblyopia in her son; Diabetes in an other family member; Heart attack (age of onset: 88) in her mother; Hyperlipidemia in her mother; Hypertension in her mother and another family member..     Social History:  reports that she has never smoked. She has never used smokeless tobacco. She reports that she does not currently use alcohol. She reports that she does not use drugs.    Review of patient's allergies indicates:   Allergen Reactions    Aspirin Swelling     Swelling and rash    Colace [docusate sodium] Rash     itching    Docusate Other (See Comments) and Rash     itching    Sulfa (sulfonamide antibiotics) Swelling     Swelling and rash  Swelling and rash    Iodine and iodide containing products Rash    Penicillins Rash       Current Facility-Administered Medications on File Prior to Encounter   Medication Dose Route Frequency Provider Last Rate Last Admin    0.9%  NaCl infusion   Intravenous Continuous Demetrius Velazco MD        phenylephrine HCL 2.5% ophthalmic solution 1 drop  1 drop Right Eye On Call Procedure Demetrius Velazco MD   1 drop at  02/21/19 0725    proparacaine 0.5 % ophthalmic solution 1 drop  1 drop Right Eye On Call Procedure Demetrius Velazco MD   1 drop at 02/21/19 0706    tropicamide 1% ophthalmic solution 1 drop  1 drop Right Eye On Call Procedure Demetrius Velazco MD   1 drop at 02/21/19 0726     Current Outpatient Medications on File Prior to Encounter   Medication Sig Dispense Refill    acetaminophen (TYLENOL) 325 MG tablet Take 325 mg by mouth once daily.      allopurinol (ZYLOPRIM) 100 MG tablet Take 100 mg by mouth every evening.       cranberry 400 mg Cap Take 1 capsule by mouth 2 (two) times daily.      fish oil-omega-3 fatty acids 300-1,000 mg capsule Take 2 g by mouth once daily. 1 caps every AM & 1 cap every PM.      folic acid/multivit-min/lutein (CENTRUM SILVER ORAL) Take by mouth once daily.      furosemide (LASIX) 80 MG tablet TAKE 1 TABLET BY MOUTH TWICE DAILY AS NEEDED FOR SHORTNESS OF BREATH OR  EDEMA (Patient taking differently: Take 80 mg by mouth once daily.) 180 tablet 3    HYDROcodone-acetaminophen (NORCO) 5-325 mg per tablet Take 1 tablet by mouth every 8 (eight) hours as needed for Pain. (Patient not taking: Reported on 2/6/2023) 9 tablet 0    HYDROcodone-acetaminophen (NORCO) 5-325 mg per tablet Take 1 tablet by mouth every 6 (six) hours as needed for Pain. (Patient not taking: Reported on 2/6/2023) 8 tablet 0    lovastatin (MEVACOR) 40 MG tablet Take 40 mg by mouth nightly. Takes 2 caps with supper every evening.      mirabegron (MYRBETRIQ) 50 mg Tb24 Take 50 mg by mouth once daily.       NEURONTIN 100 mg capsule Take 100 mg by mouth 2 (two) times daily.      ondansetron (ZOFRAN-ODT) 4 MG TbDL Take 1 tablet (4 mg total) by mouth every 8 (eight) hours as needed (nausea). (Patient not taking: Reported on 2/6/2023) 20 tablet 0    pantoprazole (PROTONIX) 40 MG tablet Take 40 mg by mouth once daily.      sacubitriL-valsartan (ENTRESTO) 24-26 mg per tablet Take 1 tablet by mouth 2 (two) times daily.  (Patient taking differently: Take 1 tablet by mouth once daily.) 180 tablet 3        Objective Findings:    Vital Signs:  Temp:  [97.7 °F (36.5 °C)-98.4 °F (36.9 °C)]   Pulse:  [78-97]   Resp:  [16-27]   BP: ()/(49-73)   SpO2:  [96 %-100 %]   Body mass index is 22.53 kg/m².      Physical Exam  Vitals and nursing note reviewed.   Constitutional:       Appearance: Normal appearance.   HENT:      Head: Normocephalic.      Nose: Nose normal.      Mouth/Throat:      Mouth: Mucous membranes are moist.   Eyes:      Pupils: Pupils are equal, round, and reactive to light.   Cardiovascular:      Heart sounds: Normal heart sounds.   Pulmonary:      Effort: Pulmonary effort is normal.      Breath sounds: Normal breath sounds.   Abdominal:      General: Bowel sounds are normal. There is no distension.      Palpations: Abdomen is soft.      Tenderness: There is no abdominal tenderness.   Musculoskeletal:         General: Normal range of motion.      Cervical back: Normal range of motion.   Skin:     General: Skin is warm and dry.      Capillary Refill: Capillary refill takes less than 2 seconds.   Neurological:      General: No focal deficit present.      Mental Status: She is alert. Mental status is at baseline.   Psychiatric:         Mood and Affect: Mood normal.         Behavior: Behavior normal.         Thought Content: Thought content normal.         Judgment: Judgment normal.             Labs:  Lab Results   Component Value Date    WBC 8.62 2023    HGB 7.8 (L) 2023    HCT 24.7 (L) 2023    PLT 98 (L) 2023    CHOL 158 2021    TRIG 95 2021    HDL 62 2021    ALT 16 2023    AST 38 2023     2023    K 4.1 2023     2023    CREATININE 1.6 (H) 2023    BUN 45 (H) 2023    CO2 23 2023    TSH 1.335 2022    INR 1.2 2023    HGBA1C 5.0 2022             Imagin/6 CT abdomen pelvis-There is no gross abdominal  adenopathy.  There is mesenteric edema and anasarca.There are no pelvic masses or adenopathy.  There is small free pelvic fluid.  The uterus is surgically absent.      Endoscopy: 2/2023 Colonoscopy- - Friability with contact bleeding in the cecum.                          - Blood in the entire examined colon.                          - A tattoo was seen in the sigmoid colon.                          - Diverticulosis in the sigmoid colon.                          - Internal hemorrhoids.                          - No specimens collected.   1/31/23 EGD- Normal esophagus.                          - Normal stomach.                          - Bleeding friable duodenal mucosa.                          - No specimens collected.     I have independently reviewed and interpreted the imaging above    Assessment:  Patient is a .87 y.o. y/o .female with  has a past medical history of Cataract, CHF (congestive heart failure), CKD (chronic kidney disease), stage III, Coronary artery disease, Gout attack, Hypercholesteremia, Hypertension, Renal disorder, and Vaginal delivery. Consulted to the GI service for GI bleed.               Recommendations:  Melena. Symptomatic anemia.GI bleed. Diverticulosis.    Continue to monitor counts. Transfuse if below 7. If counts should drop abruptly or patient experience increased occurrences of overt bleeding rec CTA and possible role for video capsule endoscopy. OK to resume diet.    Thank you so much for allowing us to participate in the care of Palmira Malave . Please contact us if you have any additional questions.    Joaquina Verde NP  Gastroenterology  [facility] - Med Surg

## 2023-02-07 NOTE — PT/OT/SLP EVAL
Occupational Therapy   Evaluation    Name: Plamira Malave  MRN: [id number]  Admitting Diagnosis: GI hemorrhage  Recent Surgery: * No surgery found *      Recommendations:     Discharge Recommendations: home health OT  Discharge Equipment Recommendations:  none  Barriers to discharge:  Other (Comment)    Assessment:     Palmira Malave is a 87 y.o. female with a medical diagnosis of GI hemorrhage.  She presents with slight fatique secondary too just finishing evaluation with PT. Performance deficits affecting function: weakness, impaired endurance, impaired self care skills, impaired functional mobility, impaired balance, gait instability, decreased lower extremity function.      Rehab Prognosis: Fair; patient would benefit from acute skilled OT services to address these deficits and reach maximum level of function.       Plan:     Patient to be seen 3 x/week to address the above listed problems via self-care/home management, therapeutic activities, therapeutic exercises  Plan of Care Expires:    Plan of Care Reviewed with: patient    Subjective     Chief Complaint: Fatique and weakness  Patient/Family Comments/goals: to go home    Occupational Profile:  Living Environment: Pt's daughter Juan C resides with her with several steps to enter. Pt states that the plan is get get a something added for the steps (ramp?)  Previous level of function: Pt requires assistance with self care. Pt states about 25% help  Roles and Routines: home wth family, daughter prepares meals and house keeping  Equipment Used at Home: shower chair, bedside commode, walker, rolling  Assistance upon Discharge: daughter reside with pt and son is available to come when he can    Pain/Comfort:  Pain Rating 1: 0/10    Patients cultural, spiritual, Sabianist conflicts given the current situation:      Objective:     Communicated with: PT prior to session.  Patient found supine with peripheral IV, telemetry upon OT entry to room.    General  Refill refused with note that this is a bridge supply to accommodate outside physician's absence and clinic's refusal to cover scripts. Precautions: Standard, fall  Orthopedic Precautions: N/A  Braces: N/A  Respiratory Status: Room air    Occupational Performance:    Bed Mobility:    Patient completed Rolling/Turning to Right with minimum assistance      Activities of Daily Living:  Feeding:  minimum assistance in bed   Grooming: minimum assistance with oral hygiene    Cognitive/Visual Perceptual:  Visual/Perceptual:      -Intact      Physical Exam:  Upper Extremity Range of Motion:     -       Right Upper Extremity: WFL  -       Left Upper Extremity: WFL  Upper Extremity Strength:    -       Right Upper Extremity: WFL  -       Left Upper Extremity: WFL    AMPAC 6 Click ADL:  AMPAC Total Score: 18    Treatment & Education:  -Education on Roll of OT  -Home safety  -Bruising noted right Lower leg, left elbow    Patient left supine with  nurse Godfrey notified    GOALS:   Multidisciplinary Problems       Occupational Therapy Goals          Problem: Occupational Therapy    Goal Priority Disciplines Outcome Interventions   Occupational Therapy Goal     OT, PT/OT Ongoing, Progressing    Description: Goals to be met by: 2/15/2023     Patient will increase functional independence with ADLs by performing:    UE Dressing with Contact Guard Assistance.  Grooming while seated with Contact Guard Assistance.  Toileting from bedside commode with Contact Guard Assistance for hygiene and clothing management.   Supine to sit with Contact Guard Assistance.  Stand pivot transfers with Contact Guard Assistance.  Toilet transfer to bedside commode with Contact Guard Assistance.  Increased functional strength to [facility] for self care skills .                         History:     Past Medical History:   Diagnosis Date    Cataract     CHF (congestive heart failure)     CKD (chronic kidney disease), stage III     Coronary artery disease     Gout attack     Hypercholesteremia     Hypertension     Renal disorder     Vaginal delivery     x4         Past Surgical History:   Procedure  Laterality Date    APPENDECTOMY      CARDIAC DEFIBRILLATOR PLACEMENT      2015    CATARACT EXTRACTION W/  INTRAOCULAR LENS IMPLANT Left 02/07/2019    Dr. Velazco    CATARACT EXTRACTION W/  INTRAOCULAR LENS IMPLANT Right 02/21/2019    Dr. Velazco    CHOLECYSTECTOMY      COLONOSCOPY N/A 2/3/2023    Procedure: COLONOSCOPY;  Surgeon: Jordon Sotomayor MD;  Location: [facility] ENDO;  Service: Endoscopy;  Laterality: N/A;    COLONOSCOPY W/ POLYPECTOMY  10 or 11/ 2014    per Dr. Elen ceja Left 8/2008    ESOPHAGOGASTRODUODENOSCOPY N/A 1/31/2023    Procedure: EGD (ESOPHAGOGASTRODUODENOSCOPY);  Surgeon: Jordon Sotomayor MD;  Location: [facility] ENDO;  Service: Endoscopy;  Laterality: N/A;    EYE SURGERY      HYSTERECTOMY      INTRAOCULAR PROSTHESES INSERTION Left 2/7/2019    Procedure: INSERTION, IOL PROSTHESIS;  Surgeon: Demetrius Velazco MD;  Location: [facility] OR;  Service: Ophthalmology;  Laterality: Left;  RN Phone Pre OP 1-30-19.  Arrival 05:30 AM    INTRAOCULAR PROSTHESES INSERTION Right 2/21/2019    Procedure: INSERTION, IOL PROSTHESIS;  Surgeon: Demetrius Velazco MD;  Location: [facility] OR;  Service: Ophthalmology;  Laterality: Right;    JOINT REPLACEMENT Bilateral 2005 & 2006    2 Knee Replacements=Lt. 1= 2005. Rt. 1= 2006    OOPHORECTOMY      PHACOEMULSIFICATION OF CATARACT Left 2/7/2019    Procedure: PHACOEMULSIFICATION, CATARACT;  Surgeon: Demetrius Velazco MD;  Location: [facility] OR;  Service: Ophthalmology;  Laterality: Left;    PHACOEMULSIFICATION OF CATARACT Right 2/21/2019    Procedure: PHACOEMULSIFICATION, CATARACT;  Surgeon: Demetrius Velazco MD;  Location: [facility] OR;  Service: Ophthalmology;  Laterality: Right;  RN Phone Pre op 2-15-19.  Arrival 05:30 AM    TOTAL KNEE ARTHROPLASTY Bilateral Lt.= 2005; Rt.=2006    Bilateral Knee scope       Time Tracking:     OT Date of Treatment: 02/07/23  OT Start Time: 1430  OT Stop Time: 1446  OT Total Time (min): 16 min    Billable  Minutes:Evaluation 16    2/7/2023

## 2023-02-07 NOTE — CARE UPDATE
Patient has been seen and examinated,patient with with a past medical history of HFrEF (EF: 20%, GIIDD, s/p AICD), CAD (on DAPT), CKD3, HTN, HLD,came with GI bleeding,anemia of acute blood lost,received pack of RBC,GI is consulted,since patient already had recent EGD and colonoscopy,with finding of gastric mucase friable and diverticulosis and no sign of active bleeding,GI has no pan  for endoscopy,started on diet,consulted PT,OT and will monitor patient closely with GI bleeding pathway with repeat CBC.

## 2023-02-07 NOTE — PLAN OF CARE
Problem: Occupational Therapy  Goal: Occupational Therapy Goal  Description: Goals to be met by: 2/15/2023     Patient will increase functional independence with ADLs by performing:    UE Dressing with Contact Guard Assistance.  Grooming while seated with Contact Guard Assistance.  Toileting from bedside commode with Contact Guard Assistance for hygiene and clothing management.   Supine to sit with Contact Guard Assistance.  Stand pivot transfers with Contact Guard Assistance.  Toilet transfer to bedside commode with Contact Guard Assistance.  Increased functional strength to [facility] for self care skills .    Outcome: Ongoing, Progressing    REC: home health OT

## 2023-02-07 NOTE — ASSESSMENT & PLAN NOTE
"She had abnormalities on both colonoscopy and EGD last admission, so could be either  GI had recommended possible VCE vs CTA GI bleed study   -I see no evidence currently to suggest acute lower GI bleeding   -She is in [place] and has "iodine containing product" allergy, making CTA risky  The patient tells me her stools, "have been black as best I can tell, though my daughter says brown."   -The patient has a poor memory of events and is mixed up on her hospital visits, so I am not sure how reliable this is   -Her daughter did, however, also report worsening weakness and continued melanotic stools for the past week  She got Protonix 80mg iv x 1 in the ED, and I will continue Protonix 40mg iv q12 hours  Consult GI to see if she needs re-scoping  NPO, hold all anti-PLT and anticoagulant meds  Serial CBC  Move to MICU if worsens or more hypotensive    "

## 2023-02-07 NOTE — PLAN OF CARE
Problem: Physical Therapy  Goal: Physical Therapy Goal  Description: Goals to be met by: 23     Patient will increase functional independence with mobility by performin. Pt to be (S) with bed mobility.  2. Pt to transfer with SBA.  3. Pt to ambulate 50' with RW SBA.  4. Pt to go up/down 5 steps using (B) HRs and CGA.  5. Pt to be (I) with HEP.    Outcome: Ongoing, Progressing   Initial eval completed, see in chart for details.

## 2023-02-07 NOTE — ED PROVIDER NOTES
"Encounter Date: 2/6/2023    SCRIBE #1 NOTE: I, Rox Elfego, am scribing for, and in the presence of,  Gabbie Schreiber MD. I have scribed the following portions of the note - Other sections scribed: HPI, ROS, PE, MDM.   SCRIBE #2 NOTE: I, GIBRAN KHANNA, am scribing for, and in the presence of,  Gabbie Schreiber MD. I have scribed the following portions of the note - Other sections scribed: HPI, ROS, PE, MDM.   History     Chief Complaint   Patient presents with    Rectal Bleeding     The patient's daughter reports that patient had one episode of dark red rectal bleeding x 1 weeks. She also reports that the patient had rectal had rectal bleeding last week, causing her to have to come to the hospital to have blood transferred. Denies that patient is more tired that usual. Patient did reports that she had abdominal pain while passing the blood. Denies sob, chest pain.      Palmira Malave is a 87 y.o. female, with a PMHx of cataracts, CHF (ef 20%), CKD (stage III), CAD, gout attack, hypercholesteremia, HTN, and CKD, who presents to the ED with rectal bleeding for one week. Patient's daughter reports that she had one episode of melena last week, prompting her to come into this ED facility on 01/30/2023 for a blood transfusion x2. She further reports that the patient was discharged on 02/04/2023 and started having abdominal pain secondary to colonoscopy and endoscopy. She states that blood was found "6 mm in" with friable tissue. Patient reports symptoms of radiating, generalized abdominal pain that worsens when using the restroom, fatigue, decreased appetite, and feeling light-headedness. Patient's daughter states that the patient was recently taken off of Plavix. She notes that the patient's blood pressure runs low and endorses checking it every day she states they hold her Entresto if her blood pressure systolic is less than 100 which they have to do 1-2 times per week..  Daughter presented today as mother had not had " any bowel movements since discharge and had a very large black bowel movement today.  She would associated abdominal pain with this.  She is noted her to be more fatigued and tired over the last few days.  She noticed her blood pressure to be low.  No other exacerbating or alleviating factors. Denies any nausea, emesis, dysuria, SOB, CP, fever, or other associated symptoms. Endorses compliance with Entresto. Patient denies any EtOH, tobacco, or illicit drug use. Patient reports a PSHx of appendectomy, Cardiac defibrillator placement, cataract extraction, cholecystectomy, colonoscopy, hysterectomy, joint replacement, oophorectomy, and total knee arthroplasty. She notes a drug allergy to Aspirin, Colace, Docusate, Sulfa, Iodine, and Penicillins.     === Results for orders placed during the hospital encounter of 07/02/22 ===    Echo    - Interpretation Summary -  · The left ventricle is moderately enlarged with eccentric hypertrophy and severely decreased systolic function.  · The estimated ejection fraction is 20%.  · Grade II left ventricular diastolic dysfunction.  · Normal right ventricular size with normal right ventricular systolic function.  · Severe left atrial enlargement.  · There is mild aortic valve stenosis.  · Aortic valve area is 1.28 cm2; peak velocity is 1.85 m/s; mean gradient is 7 mmHg.  · Moderate mitral regurgitation.  · Mild tricuspid regurgitation.  · Normal central venous pressure (3 mmHg).  · The estimated PA systolic pressure is 19 mmHg.        The history is provided by the patient and a relative. No  was used.   Review of patient's allergies indicates:   Allergen Reactions    Aspirin Swelling     Swelling and rash    Colace [docusate sodium] Rash     itching    Docusate Other (See Comments) and Rash     itching    Sulfa (sulfonamide antibiotics) Swelling     Swelling and rash  Swelling and rash    Iodine and iodide containing products Rash    Penicillins Rash     Past  Medical History:   Diagnosis Date    Cataract     CHF (congestive heart failure)     CKD (chronic kidney disease), stage III     Coronary artery disease     Gout attack     Hypercholesteremia     Hypertension     Renal disorder     Vaginal delivery     x4     Past Surgical History:   Procedure Laterality Date    APPENDECTOMY      CARDIAC DEFIBRILLATOR PLACEMENT      2015    CATARACT EXTRACTION W/  INTRAOCULAR LENS IMPLANT Left 02/07/2019    Dr. Velazco    CATARACT EXTRACTION W/  INTRAOCULAR LENS IMPLANT Right 02/21/2019    Dr. Velazco    CHOLECYSTECTOMY      COLONOSCOPY N/A 2/3/2023    Procedure: COLONOSCOPY;  Surgeon: Jordon Sotomayor MD;  Location: [facility] ENDO;  Service: Endoscopy;  Laterality: N/A;    COLONOSCOPY W/ POLYPECTOMY  10 or 11/ 2014    per Dr. Myrick    defribillator Left 8/2008    ESOPHAGOGASTRODUODENOSCOPY N/A 1/31/2023    Procedure: EGD (ESOPHAGOGASTRODUODENOSCOPY);  Surgeon: Jordon Sotomayor MD;  Location: [facility] ENDO;  Service: Endoscopy;  Laterality: N/A;    EYE SURGERY      HYSTERECTOMY      INTRAOCULAR PROSTHESES INSERTION Left 2/7/2019    Procedure: INSERTION, IOL PROSTHESIS;  Surgeon: Demetrius Velazco MD;  Location: [facility] OR;  Service: Ophthalmology;  Laterality: Left;  RN Phone Pre OP 1-30-19.  Arrival 05:30 AM    INTRAOCULAR PROSTHESES INSERTION Right 2/21/2019    Procedure: INSERTION, IOL PROSTHESIS;  Surgeon: Demetrius Velazco MD;  Location: [facility] OR;  Service: Ophthalmology;  Laterality: Right;    JOINT REPLACEMENT Bilateral 2005 & 2006    2 Knee Replacements=Lt. 1= 2005. Rt. 1= 2006    OOPHORECTOMY      PHACOEMULSIFICATION OF CATARACT Left 2/7/2019    Procedure: PHACOEMULSIFICATION, CATARACT;  Surgeon: Demetrius Velazco MD;  Location: [facility] OR;  Service: Ophthalmology;  Laterality: Left;    PHACOEMULSIFICATION OF CATARACT Right 2/21/2019    Procedure: PHACOEMULSIFICATION, CATARACT;  Surgeon: Demetrius Velazco MD;  Location: [facility] OR;  Service: Ophthalmology;   Laterality: Right;  RN Phone Pre op 2-15-19.  Arrival 05:30 AM    TOTAL KNEE ARTHROPLASTY Bilateral Lt.= 2005; Rt.=2006    Bilateral Knee scope     Family History   Problem Relation Age of Onset    Heart attack Mother 88    Hypertension Mother     Hyperlipidemia Mother     Diabetes Other     Hypertension Other     Amblyopia Son     Blindness Neg Hx     Cataracts Neg Hx     Glaucoma Neg Hx     Macular degeneration Neg Hx     Retinal detachment Neg Hx     Strabismus Neg Hx      Social History     Tobacco Use    Smoking status: Never    Smokeless tobacco: Never   Substance Use Topics    Alcohol use: Not Currently    Drug use: No     Review of Systems   Constitutional:  Positive for appetite change and fatigue. Negative for chills, diaphoresis and fever.   Eyes:  Negative for photophobia and visual disturbance.   Respiratory:  Negative for cough and shortness of breath.    Cardiovascular:  Negative for chest pain and leg swelling.   Gastrointestinal:  Positive for abdominal pain and anal bleeding. Negative for blood in stool, constipation, diarrhea, nausea and vomiting.   Genitourinary:  Negative for dysuria, flank pain, frequency, hematuria and urgency.   Musculoskeletal:  Negative for neck pain and neck stiffness.   Skin:  Negative for rash and wound.   Neurological:  Positive for light-headedness. Negative for weakness, numbness and headaches.   Psychiatric/Behavioral:  Negative for confusion and suicidal ideas.    All other systems reviewed and are negative.    Physical Exam     Initial Vitals   BP Pulse Resp Temp SpO2   02/06/23 1817 02/06/23 1817 02/06/23 1817 02/06/23 1817 02/06/23 2004   (!) 101/59 88 18 98.3 °F (36.8 °C) 100 %      MAP       --                Physical Exam    Nursing note and vitals reviewed.  Constitutional: She appears well-developed and well-nourished. She is not diaphoretic. No distress.   Appears fatigued, falling asleep on exam.     HENT:   Head: Normocephalic and atraumatic.    Mouth/Throat: Oropharynx is clear and moist. No oropharyngeal exudate.   Eyes: Conjunctivae and EOM are normal. Pupils are equal, round, and reactive to light. Right eye exhibits no discharge. Left eye exhibits no discharge.   Subconjunctival pallor   Neck: Neck supple. No JVD present.   Normal range of motion.  Cardiovascular:  Normal rate, regular rhythm, normal heart sounds and intact distal pulses.     Exam reveals no gallop and no friction rub.       No murmur heard.  Pulmonary/Chest: Breath sounds normal. No respiratory distress. She has no wheezes. She has no rhonchi. She has no rales.   Abdominal: Abdomen is soft. Bowel sounds are normal. She exhibits no distension. There is abdominal tenderness (mild LLQ). There is no rebound and no guarding.   Genitourinary:    Genitourinary Comments: RN present for rectal exam, no impaction.  Melena stool that was FOBT positive in rectal vault. decreased rectal tone.     Musculoskeletal:         General: No tenderness or edema.      Cervical back: Normal range of motion and neck supple.      Comments: Defibrillator placement to left chest wall.      Lymphadenopathy:     She has no cervical adenopathy.   Neurological: She is alert. No cranial nerve deficit.   Moves all extremities and carries on conversation. CN- II: PERRL; III/IV/VI: EOMI w/out evidence of nystagmus; V: no deficits appreciated to light touch bilateral face; VII: no facial weakness, no facial asymmetry. Eyebrow raise symmetric. Smile symmetric; IX/X: palate midline, and raises symmetrically; XI: shoulder shrug 5/5 bilaterally; XII: tongue is midline w/out asymmetry. Strength 5/5 to bilateral upper and lower extremities, sensation intact to light touch.  Has confusion/dementia   Skin: Skin is warm and dry. Capillary refill takes less than 2 seconds.   Psychiatric: She has a normal mood and affect. Thought content normal.       ED Course   Procedures  Labs Reviewed   COMPREHENSIVE METABOLIC PANEL -  Abnormal; Notable for the following components:       Result Value    CO2 22 (*)     BUN 51 (*)     Creatinine 1.9 (*)     Albumin 3.3 (*)     eGFR 25 (*)     All other components within normal limits   CBC W/ AUTO DIFFERENTIAL - Abnormal; Notable for the following components:    RBC 2.67 (*)     Hemoglobin 8.1 (*)     Hematocrit 26.4 (*)     MCV 99 (*)     MCHC 30.7 (*)     RDW 18.5 (*)     Platelets 131 (*)     Lymph # 0.8 (*)     Lymph % 13.5 (*)     All other components within normal limits   URINALYSIS, REFLEX TO URINE CULTURE - Abnormal; Notable for the following components:    Appearance, UA Hazy (*)     All other components within normal limits    Narrative:     Specimen Source->Urine   B-TYPE NATRIURETIC PEPTIDE - Abnormal; Notable for the following components:    BNP >4,900 (*)     All other components within normal limits   TROPONIN I - Abnormal; Notable for the following components:    Troponin I 0.169 (*)     All other components within normal limits   TYPE & SCREEN - Abnormal; Notable for the following components:    Indirect Moiz POS (*)     All other components within normal limits   PROTIME-INR   LACTIC ACID, PLASMA   LIPASE   MAGNESIUM   MAGNESIUM   LIPASE   TROPONIN I   B-TYPE NATRIURETIC PEPTIDE   CBC W/ AUTO DIFFERENTIAL   ANTIBODY IDENTIFICATION   POCT GLUCOSE          Imaging Results              CT Abdomen Pelvis  Without Contrast (Final result)  Result time 02/06/23 22:52:21      Final result by Sachi Warren MD (02/06/23 22:52:21)                   Impression:      No acute findings on CT abdomen and pelvis without contrast.    Mesenteric edema, anasarca and small free pelvic fluid.    Colonic diverticulosis.    Cardiomegaly, pulmonary vascular congestion, and bilateral small pleural effusions.  Overall findings are concerning for CHF.      Electronically signed by: Sachi Warren  Date:    02/06/2023  Time:    22:52               Narrative:    EXAMINATION:  CT ABDOMEN PELVIS  WITHOUT    CLINICAL HISTORY:  Abdominal pain.    TECHNIQUE:  5 mm unenhanced axial images from the lung bases through the greater trochanters were performed.  Coronal and sagittal reformatted images were provided.    COMPARISON:  02/04/2023    FINDINGS:  Within the limits of a noncontrast examination, the liver, spleen, pancreas, kidneys, and adrenal glands are unremarkable.  The gallbladder is surgically absent.    There is no gross abdominal adenopathy.  There is mesenteric edema and anasarca.    There are no pelvic masses or adenopathy.  There is small free pelvic fluid.  The uterus is surgically absent.    At the lung bases, there are small bilateral pleural effusions.  The heart is enlarged.  There is pulmonary vascular congestion.  There is a left subclavian pacer.    Mild dextroscoliosis is present.  There is grade 1 anterolisthesis of L4 on L5.  There is multilevel degenerative change of the spine.  There is significant degenerative changes of the hips.                                       X-Ray Chest AP Portable (Final result)  Result time 02/06/23 21:09:33      Final result by Fernando Galvan MD (02/06/23 21:09:33)                   Impression:      Mild progression of radiographic findings, as discussed above.      Electronically signed by: Fernando Galvan  Date:    02/06/2023  Time:    21:09               Narrative:    EXAMINATION:  XR CHEST AP PORTABLE    CLINICAL HISTORY:  hypotension;    TECHNIQUE:  Single frontal view of the chest was performed.    COMPARISON:  Chest radiograph January 30, 2023    FINDINGS:  Single portable chest view is submitted.  Cardiac pacemaker again noted.  The heart size appears enlarged, the appearance of the cardiomediastinal silhouette is stable when accounting for difference in position.  Central pulmonary vascular prominence again noted, bilateral perihilar infiltrate again noted with mild progression.  Bilateral pattern of interstitial and ground-glass infiltrate  noted with mild progression.  There may be small pleural effusions, there is no large pleural effusion and there is no pneumothorax.  The osseous structures demonstrate chronic change.                                       Medications   0.9%  NaCl infusion (for blood administration) (has no administration in time range)   pantoprazole injection 40 mg (has no administration in time range)   allopurinoL tablet 100 mg (has no administration in time range)   atorvastatin tablet 80 mg (has no administration in time range)   oxybutynin 24 hr tablet 5 mg (has no administration in time range)   furosemide injection 40 mg (has no administration in time range)   sodium chloride 0.9% flush 10 mL (has no administration in time range)   naloxone 0.4 mg/mL injection 0.02 mg (has no administration in time range)   glucose chewable tablet 16 g (has no administration in time range)   glucose chewable tablet 24 g (has no administration in time range)   glucagon (human recombinant) injection 1 mg (has no administration in time range)   dextrose 10% bolus 125 mL 125 mL (has no administration in time range)   dextrose 10% bolus 250 mL 250 mL (has no administration in time range)   acetaminophen tablet 650 mg (has no administration in time range)   HYDROcodone-acetaminophen 5-325 mg per tablet 1 tablet (has no administration in time range)   ondansetron injection 4 mg (has no administration in time range)   prochlorperazine injection Soln 5 mg (has no administration in time range)   melatonin tablet 6 mg (has no administration in time range)   lactated ringers bolus 500 mL (0 mLs Intravenous Stopped 2/6/23 2130)   pantoprazole injection 80 mg (80 mg Intravenous Given 2/6/23 2212)   furosemide injection 40 mg (40 mg Intravenous Given 2/7/23 0044)     Medical Decision Making:   History:   Old Medical Records: I decided to obtain old medical records.  Old Records Summarized: other records.       <> Summary of Records: Patient seen on  "02/04/2023 at [facility] ED for lower abdominal pain after being discharged from colonoscopy. Based on chart, "Patient presentation consistent with suspected abdominal pain likely secondary to recent colonoscopy however no further complications noted from this, hemoglobin appears to be trending upwards, vital signs otherwise stable." Diagnoses and treatment discussed with patient and daughter before discharge.   Independently Interpreted Test(s):   I have ordered and independently interpreted EKG Reading(s) - see summary below  Clinical Tests:   Lab Tests: Ordered and Reviewed  Radiological Study: Ordered and Reviewed  Medical Tests: Ordered and Reviewed     MDM  87 year old who presents 2/2 upper gi bleeding with abdominal pain, melena. Rectal exam showed melena, FBOT positive. Ordered CBC, CMP, type and screen, LFTS, lipase which showed anemia to 8.1, lower than most recent but improved from 6 earlier this week. Patient protonix bolus.    Patient initially hypotensive to 80/50, improved with 1L IVF given in ED (500 ml ordered but 1L given). Patient in no respiratory distress after this.     Recent EGD and colonoscopy, please review results in chart.     Also considered but less likely:     AAA: location inconsistent, no bruits, no history of HTN  Cholecystitis: location inconsistent, no relation with meals, negative ortizs  SBO: normal BM and flatus. No vomiting  Appendicitis: location inconsistent, no fever, no rebound/guarding  Mesenteric ischemia: HPI inconsistent, does not coincide with meals, other dx more likely  Kidney stone: no radiation to back or cva tenderness, no dysuria, no hematuria  Pyelonephritis: no cva tenderness, no dysuria, no fever  Pancreatitis: no history of alcohol abuse, unlikely gallstone obstructing, location inconsistent  Diverticulitis: age and location not most common, no history of diverticulitis, no fever, no wbc    Lactic WNL, UA without any signs of infection. Crea " elevated from recent discharge of 1.4 to 1.9 (was elevated higher than that at last admission).     BNP > 4900, similar to last admission.   Troponin elevated to 0.169, similar to last admission. Patient was taken off her plavix.       CT without contrast without acute findings to describe patient's LLQ abd tenderness. +CHF.    Patient consented for blood at bedside and daughter also signed for patient and in agreement.     Given patient CAD, significant heart history, hypotension and active blood loss will give 1 unit of pRBC over 4 hours with lasix 40 mg per Dr. Bautista's request.     Patient and daughter in agreement with plan and admission to hospital for monitoring, blood, and further care.        Scribe Attestation:   Scribe #1: I performed the above scribed service and the documentation accurately describes the services I performed. I attest to the accuracy of the note.      ED Course as of 02/07/23 0347   Mon Feb 06, 2023   2155 EKG 12-lead  Paced rhythm with frequent PVCs at 83.  Right bundle-branch block.  Given this no clear ST elevation or significant depression.  QTC is 587.  Difficult to ascertain given paced rhythm.  T-wave inversions in V1.   [JT]      ED Course User Index  [JT] Gabbie Schreiber MD               Scribe attestation: I, Gabbie Schreiber, personally performed the services described in this documentation. All medical record entries made by the scribe were at my direction and in my presence.  I have reviewed the chart and agree that the record reflects my personal performance and is accurate and complete. 's   Clinical Impression:   Final diagnoses:  [R53.83] Fatigue  [D64.9] Symptomatic anemia  [K92.1] Melena (Primary)  [I95.9] Hypotension, unspecified hypotension type  [I49.3] Frequent PVCs        ED Disposition Condition    Admit Stable                Gabbie Schrebier MD  02/07/23 0351

## 2023-02-07 NOTE — PLAN OF CARE
Problem: Adult Inpatient Plan of Care  Goal: Plan of Care Review  Outcome: Ongoing, Progressing     Problem: Fluid and Electrolyte Imbalance (Acute Kidney Injury/Impairment)  Goal: Fluid and Electrolyte Balance  Outcome: Ongoing, Progressing     Problem: Impaired Wound Healing  Goal: Optimal Wound Healing  Outcome: Ongoing, Progressing     Problem: Fall Injury Risk  Goal: Absence of Fall and Fall-Related Injury  Outcome: Ongoing, Progressing     Problem: Skin Injury Risk Increased  Goal: Skin Health and Integrity  Outcome: Ongoing, Progressing

## 2023-02-07 NOTE — ED NOTES
Dr Bautista notified that pt will be going to the floor soon. He will be coming to see her next before she goes up

## 2023-02-07 NOTE — ASSESSMENT & PLAN NOTE
Holding Entresto for now  Likely due to CHF, ARB, hypotension and anemia  Follow up after 1U blood and IV lasix to see if perfusion can improve     Latest Reference Range & Units 02/03/23 03:54 02/04/23 05:45 02/04/23 20:41 02/06/23 19:53   Creatinine 0.5 - 1.4 mg/dL 1.4 1.4 1.7 (H) 1.9 (H)

## 2023-02-07 NOTE — ASSESSMENT & PLAN NOTE
She is near baseline  Suspect chronic demand leak from anemia and CHF in face of HINA on CKD IIIb  Will trend  No anticoagulation or anti-PLT therapy due to GIB     Latest Reference Range & Units 12/04/22 00:12 12/13/22 17:10 01/30/23 20:20 02/06/23 19:53   Troponin I 0.000 - 0.026 ng/mL 0.178 (H) 0.174 (H) 0.135 (H) 0.169 (H)

## 2023-02-07 NOTE — ED NOTES
Patient c/o blood in stool x1 today. Was recently in hospital and had blood transfusion d/t low hemaglobin. Patient currently c/o abdominal pain.

## 2023-02-07 NOTE — ED NOTES
Call from lab that states that we are short on O neg blood and awaiting blood from the main campus.

## 2023-02-07 NOTE — ASSESSMENT & PLAN NOTE
Given severe CHF and history of CAD, will try to keep her Hg closer to 8-9g range  She has been dizzy, weak, hypotensive with continued bleeding at home  Transfuse 1U PRBC for now

## 2023-02-07 NOTE — PT/OT/SLP EVAL
"Physical Therapy Evaluation    Patient Name:  Palmira Malave   MRN:  [id number]    Recommendations:     Discharge Recommendations: home health PT   Discharge Equipment Recommendations: none       Assessment:     Palmira Malave is a 87 y.o. female admitted with a medical diagnosis of GI hemorrhage.  She presents with the following impairments/functional limitations: weakness, impaired endurance, impaired functional mobility, gait instability, decreased lower extremity function, pain.    Rehab Prognosis: Good; patient would benefit from acute skilled PT services to address these deficits and reach maximum level of function.    Recent Surgery: * No surgery found *      Plan:     During this hospitalization, patient to be seen 5 x/week to address the identified rehab impairments via gait training, therapeutic activities, therapeutic exercises and progress toward the following goals:    Plan of Care Expires:  02/14/23    Subjective     Chief Complaint: "My legs hurt"  Patient/Family Comments/goals: Pt agreeable to PT evaluation.  Pain/Comfort:  Pain Rating 1: 10/10  Location - Side 1: Bilateral  Location 1: leg  Pain Addressed 1: Reposition    Patients cultural, spiritual, Oriental orthodox conflicts given the current situation: no    Living Environment:  PTA pt lived with her daughter in a 1 story house with 5 steps to enter (B) HR.  Prior to admission, patients level of function was mod I however, reports having difficulty with stairs.  Equipment used at home: walker, rolling, shower chair, bedside commode.  DME owned (not currently used): none.  Upon discharge, patient will have assistance from family.    Objective:     Communicated with Pily JUAREZ prior to session.  Patient found supine with bed alarm, PureWick  upon PT entry to room.    General Precautions: Standard,    Orthopedic Precautions:N/A   Braces: N/A  Respiratory Status: Room air    Exams:  Cognitive Exam:  Patient is oriented to Person, Place, and " Situation  RLE ROM: WFL  RLE Strength: WFL  LLE ROM: WFL  LLE Strength: WFL    Functional Mobility:  Bed Mobility:     Supine to Sit: minimum assistance  Sit to Supine: minimum assistance  Transfers:     Sit to Stand:  minimum assistance with rolling walker  Gait: 20' w/RW CGA.      AM-PAC 6 CLICK MOBILITY  Total Score:17       Treatment & Education:  Educated on role of PT and POC,  Pt ambulated in room, stood with RW to have pad changed on bed then assisted back to bed.    Patient left HOB elevated with all lines intact, call button in reach, bed alarm on, and all needs in reach .    GOALS:   Multidisciplinary Problems       Physical Therapy Goals          Problem: Physical Therapy    Goal Priority Disciplines Outcome Goal Variances Interventions   Physical Therapy Goal     PT, PT/OT Ongoing, Progressing     Description: Goals to be met by: 23     Patient will increase functional independence with mobility by performin. Pt to be (S) with bed mobility.  2. Pt to transfer with SBA.  3. Pt to ambulate 50' with RW SBA.  4. Pt to go up/down 5 steps using (B) HRs and CGA.  5. Pt to be (I) with HEP.                         History:     Past Medical History:   Diagnosis Date    Cataract     CHF (congestive heart failure)     CKD (chronic kidney disease), stage III     Coronary artery disease     Gout attack     Hypercholesteremia     Hypertension     Renal disorder     Vaginal delivery     x4       Past Surgical History:   Procedure Laterality Date    APPENDECTOMY      CARDIAC DEFIBRILLATOR PLACEMENT          CATARACT EXTRACTION W/  INTRAOCULAR LENS IMPLANT Left 2019    Dr. Velazco    CATARACT EXTRACTION W/  INTRAOCULAR LENS IMPLANT Right 2019    Dr. Velazco    CHOLECYSTECTOMY      COLONOSCOPY N/A 2/3/2023    Procedure: COLONOSCOPY;  Surgeon: Jordon Sotomayor MD;  Location: [facility];  Service: Endoscopy;  Laterality: N/A;    COLONOSCOPY W/ POLYPECTOMY  10 or 2014    per Dr. Myrick     defribillator Left 8/2008    ESOPHAGOGASTRODUODENOSCOPY N/A 1/31/2023    Procedure: EGD (ESOPHAGOGASTRODUODENOSCOPY);  Surgeon: Jordon Sotomayor MD;  Location: [facility] ENDO;  Service: Endoscopy;  Laterality: N/A;    EYE SURGERY      HYSTERECTOMY      INTRAOCULAR PROSTHESES INSERTION Left 2/7/2019    Procedure: INSERTION, IOL PROSTHESIS;  Surgeon: Demetrius Velazco MD;  Location: [facility] OR;  Service: Ophthalmology;  Laterality: Left;  RN Phone Pre OP 1-30-19.  Arrival 05:30 AM    INTRAOCULAR PROSTHESES INSERTION Right 2/21/2019    Procedure: INSERTION, IOL PROSTHESIS;  Surgeon: Demetrius Velazco MD;  Location: [facility] OR;  Service: Ophthalmology;  Laterality: Right;    JOINT REPLACEMENT Bilateral 2005 & 2006    2 Knee Replacements=Lt. 1= 2005. Rt. 1= 2006    OOPHORECTOMY      PHACOEMULSIFICATION OF CATARACT Left 2/7/2019    Procedure: PHACOEMULSIFICATION, CATARACT;  Surgeon: Demetrius Velazco MD;  Location: [facility] OR;  Service: Ophthalmology;  Laterality: Left;    PHACOEMULSIFICATION OF CATARACT Right 2/21/2019    Procedure: PHACOEMULSIFICATION, CATARACT;  Surgeon: Demetrius Velazco MD;  Location: [facility] OR;  Service: Ophthalmology;  Laterality: Right;  RN Phone Pre op 2-15-19.  Arrival 05:30 AM    TOTAL KNEE ARTHROPLASTY Bilateral Lt.= 2005; Rt.=2006    Bilateral Knee scope       Time Tracking:     PT Received On: 02/07/23  PT Start Time: 1400     PT Stop Time: 1417  PT Total Time (min): 17 min     Billable Minutes: Evaluation 17      02/07/2023

## 2023-02-08 LAB
ALBUMIN SERPL BCP-MCNC: 3 G/DL (ref 3.5–5.2)
ALP SERPL-CCNC: 64 U/L (ref 55–135)
ALT SERPL W/O P-5'-P-CCNC: 12 U/L (ref 10–44)
ANION GAP SERPL CALC-SCNC: 10 MMOL/L (ref 8–16)
AST SERPL-CCNC: 21 U/L (ref 10–40)
BASOPHILS # BLD AUTO: 0.02 K/UL (ref 0–0.2)
BASOPHILS NFR BLD: 0.4 % (ref 0–1.9)
BILIRUB SERPL-MCNC: 2.8 MG/DL (ref 0.1–1)
BUN SERPL-MCNC: 41 MG/DL (ref 8–23)
CALCIUM SERPL-MCNC: 8.7 MG/DL (ref 8.7–10.5)
CHLORIDE SERPL-SCNC: 106 MMOL/L (ref 95–110)
CO2 SERPL-SCNC: 27 MMOL/L (ref 23–29)
CREAT SERPL-MCNC: 1.4 MG/DL (ref 0.5–1.4)
DIFFERENTIAL METHOD: ABNORMAL
EOSINOPHIL # BLD AUTO: 0.2 K/UL (ref 0–0.5)
EOSINOPHIL NFR BLD: 2.8 % (ref 0–8)
ERYTHROCYTE [DISTWIDTH] IN BLOOD BY AUTOMATED COUNT: 17.9 % (ref 11.5–14.5)
EST. GFR  (NO RACE VARIABLE): 36 ML/MIN/1.73 M^2
GLUCOSE SERPL-MCNC: 75 MG/DL (ref 70–110)
HCT VFR BLD AUTO: 25.8 % (ref 37–48.5)
HGB BLD-MCNC: 8.1 G/DL (ref 12–16)
IMM GRANULOCYTES # BLD AUTO: 0.02 K/UL (ref 0–0.04)
IMM GRANULOCYTES NFR BLD AUTO: 0.4 % (ref 0–0.5)
LYMPHOCYTES # BLD AUTO: 0.8 K/UL (ref 1–4.8)
LYMPHOCYTES NFR BLD: 14.4 % (ref 18–48)
MAGNESIUM SERPL-MCNC: 1.9 MG/DL (ref 1.6–2.6)
MCH RBC QN AUTO: 29.6 PG (ref 27–31)
MCHC RBC AUTO-ENTMCNC: 31.4 G/DL (ref 32–36)
MCV RBC AUTO: 94 FL (ref 82–98)
MONOCYTES # BLD AUTO: 0.7 K/UL (ref 0.3–1)
MONOCYTES NFR BLD: 13.5 % (ref 4–15)
NEUTROPHILS # BLD AUTO: 3.7 K/UL (ref 1.8–7.7)
NEUTROPHILS NFR BLD: 68.5 % (ref 38–73)
NRBC BLD-RTO: 0 /100 WBC
PHOSPHATE SERPL-MCNC: 2.8 MG/DL (ref 2.7–4.5)
PLATELET # BLD AUTO: 108 K/UL (ref 150–450)
PMV BLD AUTO: 10.8 FL (ref 9.2–12.9)
POTASSIUM SERPL-SCNC: 3.6 MMOL/L (ref 3.5–5.1)
PROT SERPL-MCNC: 5.4 G/DL (ref 6–8.4)
RBC # BLD AUTO: 2.74 M/UL (ref 4–5.4)
SODIUM SERPL-SCNC: 143 MMOL/L (ref 136–145)
WBC # BLD AUTO: 5.41 K/UL (ref 3.9–12.7)

## 2023-02-08 PROCEDURE — 21400001 HC TELEMETRY ROOM

## 2023-02-08 PROCEDURE — 25000003 PHARM REV CODE 250: Performed by: HOSPITALIST

## 2023-02-08 PROCEDURE — 97530 THERAPEUTIC ACTIVITIES: CPT

## 2023-02-08 PROCEDURE — 99233 PR SUBSEQUENT HOSPITAL CARE,LEVL III: ICD-10-PCS | Mod: ,,, | Performed by: NURSE PRACTITIONER

## 2023-02-08 PROCEDURE — 25000003 PHARM REV CODE 250: Performed by: INTERNAL MEDICINE

## 2023-02-08 PROCEDURE — 83735 ASSAY OF MAGNESIUM: CPT | Performed by: INTERNAL MEDICINE

## 2023-02-08 PROCEDURE — 85025 COMPLETE CBC W/AUTO DIFF WBC: CPT | Performed by: HOSPITALIST

## 2023-02-08 PROCEDURE — 84100 ASSAY OF PHOSPHORUS: CPT | Performed by: INTERNAL MEDICINE

## 2023-02-08 PROCEDURE — 36415 COLL VENOUS BLD VENIPUNCTURE: CPT | Performed by: INTERNAL MEDICINE

## 2023-02-08 PROCEDURE — 80053 COMPREHEN METABOLIC PANEL: CPT | Performed by: INTERNAL MEDICINE

## 2023-02-08 PROCEDURE — 99233 SBSQ HOSP IP/OBS HIGH 50: CPT | Mod: ,,, | Performed by: NURSE PRACTITIONER

## 2023-02-08 PROCEDURE — 97535 SELF CARE MNGMENT TRAINING: CPT

## 2023-02-08 RX ORDER — FUROSEMIDE 40 MG/1
40 TABLET ORAL DAILY
Status: DISCONTINUED | OUTPATIENT
Start: 2023-02-08 | End: 2023-02-09

## 2023-02-08 RX ADMIN — PANTOPRAZOLE SODIUM 40 MG: 40 TABLET, DELAYED RELEASE ORAL at 08:02

## 2023-02-08 RX ADMIN — PANTOPRAZOLE SODIUM 40 MG: 40 TABLET, DELAYED RELEASE ORAL at 10:02

## 2023-02-08 RX ADMIN — MIDODRINE HYDROCHLORIDE 10 MG: 5 TABLET ORAL at 02:02

## 2023-02-08 RX ADMIN — HYDROCODONE BITARTRATE AND ACETAMINOPHEN 1 TABLET: 5; 325 TABLET ORAL at 02:02

## 2023-02-08 RX ADMIN — MIDODRINE HYDROCHLORIDE 10 MG: 5 TABLET ORAL at 10:02

## 2023-02-08 RX ADMIN — FUROSEMIDE 40 MG: 40 TABLET ORAL at 08:02

## 2023-02-08 RX ADMIN — ALLOPURINOL 100 MG: 100 TABLET ORAL at 10:02

## 2023-02-08 RX ADMIN — ATORVASTATIN CALCIUM 80 MG: 40 TABLET, FILM COATED ORAL at 10:02

## 2023-02-08 NOTE — PT/OT/SLP PROGRESS
Physical Therapy Treatment    Patient Name:  Palmira Malave   MRN:  [id number]    Recommendations:     Discharge Recommendations: home health PT  Discharge Equipment Recommendations: none    Assessment:     Palmira Malave is a 87 y.o. female admitted with a medical diagnosis of GI hemorrhage.  She presents with the following impairments/functional limitations: impaired functional mobility, gait instability, impaired cognition.    Rehab Prognosis: Fair; patient would benefit from acute skilled PT services to address these deficits and reach maximum level of function.    Recent Surgery: * No surgery found *      Plan:     During this hospitalization, patient to be seen 5 x/week to address the identified rehab impairments via gait training, therapeutic activities, therapeutic exercises and progress toward the following goals:    Plan of Care Expires:  02/14/23    Subjective     Chief Complaint: I don't feel good  Patient/Family Comments/goals: Pt agreeable to PT treatment   Pain/Comfort:  Pain Rating 1: 0/10      Objective:     Communicated with nurseMarielos prior to session.  Patient found up in chair with telemetry, chair check upon PT entry to room.     General Precautions: Standard,    Orthopedic Precautions: N/A  Braces: N/A  Respiratory Status: Room air     Functional Mobility:  Bed Mobility:     Sit to Supine: minimum assistance  Transfers:     Sit to Stand:  minimum assistance with rolling walker  Gait: 3 steps to bed with min/mod A /c RW as pt got a little confused about where she was going once standing.  Pt able to scoot self to HOB using HRs.      AM-PAC 6 CLICK MOBILITY  Turning over in bed (including adjusting bedclothes, sheets and blankets)?: 3  Sitting down on and standing up from a chair with arms (e.g., wheelchair, bedside commode, etc.): 3  Moving from lying on back to sitting on the side of the bed?: 3  Moving to and from a bed to a chair (including a wheelchair)?: 3  Need to walk in hospital  room?: 2  Climbing 3-5 steps with a railing?: 2  Basic Mobility Total Score: 16       Treatment & Education:  PT attempted instruction of LE exs however, pt unable to follow through therefore. Assisted back to bed.  Pt found with tray table away from her, Purewick disconnected and incontinent of urine, nurse advised and PCT to follow to assist patient with getting cleaned.    Patient left right sidelying with call button in reach, bed alarm on, nurse notified, and all needs in reach .  GOALS:   Multidisciplinary Problems       Physical Therapy Goals          Problem: Physical Therapy    Goal Priority Disciplines Outcome Goal Variances Interventions   Physical Therapy Goal     PT, PT/OT Ongoing, Progressing     Description: Goals to be met by: 23     Patient will increase functional independence with mobility by performin. Pt to be (S) with bed mobility.  2. Pt to transfer with SBA.  3. Pt to ambulate 50' with RW SBA.  4. Pt to go up/down 5 steps using (B) HRs and CGA.  5. Pt to be (I) with HEP.                         Time Tracking:     PT Received On: 23  PT Start Time: 1018     PT Stop Time: 1030  PT Total Time (min): 12 min     Billable Minutes: Therapeutic Activity 12    Treatment Type: Treatment  PT/PTA: PT           2023

## 2023-02-08 NOTE — PLAN OF CARE
Problem: Fall Injury Risk  Goal: Absence of Fall and Fall-Related Injury  Outcome: Ongoing, Progressing  Intervention: Identify and Manage Contributors  Flowsheets (Taken 2/7/2023 1806)  Self-Care Promotion:   independence encouraged   BADL personal objects within reach   safe use of adaptive equipment encouraged   other (see comments)   BADL personal routines maintained   meal set-up provided  Medication Review/Management: medications reviewed  Intervention: Promote Injury-Free Environment  Flowsheets (Taken 2/7/2023 1806)  Safety Promotion/Fall Prevention:   assistive device/personal item within reach   bed alarm set   family to remain at bedside   lighting adjusted   medications reviewed   nonskid shoes/socks when out of bed   side rails raised x 2   instructed to call staff for mobility

## 2023-02-08 NOTE — PLAN OF CARE
Problem: Adult Inpatient Plan of Care  Goal: Plan of Care Review  Outcome: Ongoing, Progressing     Problem: Fluid and Electrolyte Imbalance (Acute Kidney Injury/Impairment)  Goal: Fluid and Electrolyte Balance  Outcome: Ongoing, Progressing     Problem: Impaired Wound Healing  Goal: Optimal Wound Healing  Outcome: Ongoing, Progressing     Problem: Fall Injury Risk  Goal: Absence of Fall and Fall-Related Injury  Outcome: Ongoing, Progressing     Problem: Adjustment to Illness (Gastrointestinal Bleeding)  Goal: Optimal Coping with Acute Illness  Outcome: Ongoing, Progressing

## 2023-02-08 NOTE — HOSPITAL COURSE
patient with with a past medical history of HFrEF (EF: 20%, GIIDD, s/p AICD), CAD (on DAPT), CKD3, HTN, HLD,came with GI bleeding,anemia of acute blood lost,received pack of RBC,HH is improved,GI is consulted,since patient already had recent EGD and colonoscopy,with finding of gastric mucase friable and diverticulosis and no sign of active bleeding,GI has no pan  for endoscopy,started on diet,consulted PT,OT and monitored patient closely with GI bleeding pathway with repeat CBC.her HH remains stable,but still has occasional BRBPR,but remains stable,all likely duo to diverticulosis,spoke with daughter,updated her,instructed back to ER if see any active bleeding,she agree and underhand instruction,out patient GI referral was done,HH was  arranged,patient was discharged home with HH and follow up PCP,GI follow up.

## 2023-02-08 NOTE — PLAN OF CARE
Problem: Occupational Therapy  Goal: Occupational Therapy Goal  Description: Goals to be met by: 2/15/2023     Patient will increase functional independence with ADLs by performing:    UE Dressing with Contact Guard Assistance.  Grooming while seated with Contact Guard Assistance.  Toileting from bedside commode with Contact Guard Assistance for hygiene and clothing management.   Supine to sit with Contact Guard Assistance.  Stand pivot transfers with Contact Guard Assistance.  Toilet transfer to bedside commode with Contact Guard Assistance.  Increased functional strength to [facility] [place] for self care skills .    Outcome: Ongoing, Progressing    REC: Home Health OT/ No DME needed at this time

## 2023-02-08 NOTE — PROGRESS NOTES
"[facility] Medicine  Progress Note    Patient Name: Palmira Malave  MRN: [id number]  Patient Class: IP- Inpatient   Admission Date: 2/6/2023  Length of Stay: 2 days  Attending Physician: Edilberto Chowdhury MD  Primary Care Provider: Qi Ramon MD        Subjective:     Principal Problem:GI hemorrhage        HPI:  Ms Malave is a 88yo lady with a past medical history of HFrEF (EF: 20%, GIIDD, s/p AICD), CAD (on DAPT), CKD3, HTN, HLD    She was admitted on 01/30/2023 for melena with concerns for GI bleed and acute blood loss anemia requiring transfusion. Hgb noted to be 6.4 on outpatient labs on 01/30 and was instructed to come to ED for evaluation. Was given 1U of pRBC on 01/30 with appropiate response (hgb 7.4 on 01/31). GI consulted- s/p EGD on 01/31. Showed friable mucosa noted in duodenal bulb. Likely source of bleeding. GI recommends discontinuing plavix on discharge and f/u with GI outpatient for anemia workup.     She returned to ED on 2/4/23 w/ lower abdominal pain w/ radiation to her back, and lightheadedness, after being discharged.   ED workup notable for hemoglobin 8.7, CMP with creatinine 1.7, BUN 47, lipase 15, CT abdomen pelvis without any acute findings present, colonic diverticulosis present, small free pelvic fluid present.  Patient presentation consistent with suspected abdominal pain likely secondary to recent colonoscopy. Patient treated symptomatically with complete improvement and discharged.       She returned to the ED today for concerns of rectal bleeding, generalized abdominal pain that worsens when using the restroom, fatigue, decreased appetite, and lightheadedness. Patient reports she has still been experiencing persistent rectal bleeding and noted an episode of "black" melena yesterday that "my daughter said was brown, but I saw that it was black". She denies any rectal bleeding currently. She states she has mostly been bedridden recently as she "gets " "lightheaded when I get out of bed". She denies any weakness/dizziness/lightheadedness when she moves around in her bed. She further endorses leg cramping. She does report decreased PO intake. She denies any somatic pain currently, dysuria, chest pain, dyspnea, or other associated symptoms. She denies any recent discharges/shocks from her AICD.     During my assessment, patient is alert and conversant, but her story would change at times. She appears pleasantly demented.     Patient's daughter told ED:    Since her discharge 3 days ago, she has been more fatigued, weak, falling asleep more often, and had another bloody bowel movement that appeared to be black melena in her diaper. They noticed her BP was dropping at home even though it runs low at baseline. She notes her Entresto Rx was withheld upon her last admission. Her rectal exam in the ED was hemoccult positive.     In the ED, her VS were BP (!) 107/58   Pulse 85   Temp 98.4 °F (36.9 °C) (Oral)   Resp (!) 27   Ht 5' 3" (1.6 m)   Wt 55.3 kg (122 lb)   SpO2 99%   Breastfeeding No   BMI 21.61 kg/m²     In the ED, she was treated with:   Medications   0.9%  NaCl infusion (for blood administration) (has no administration in time range)   lactated ringers bolus 500 mL (0 mLs Intravenous Stopped 2/6/23 2130)   pantoprazole injection 80 mg (80 mg Intravenous Given 2/6/23 2212)   furosemide injection 40 mg (40 mg Intravenous Given 2/7/23 0044)       Overview/Hospital Course:  patient with with a past medical history of HFrEF (EF: 20%, GIIDD, s/p AICD), CAD (on DAPT), CKD3, HTN, HLD,came with GI bleeding,anemia of acute blood lost,received pack of RBC,GI is consulted,since patient already had recent EGD and colonoscopy,with finding of gastric mucase friable and diverticulosis and no sign of active bleeding,GI has no pan  for endoscopy,started on diet,consulted PT,OT and will monitor patient closely with GI bleeding pathway with repeat CBC.her HH is stable at " this time,will continue monitor.      Past Medical History:   Diagnosis Date    Cataract     CHF (congestive heart failure)     CKD (chronic kidney disease), stage III     Coronary artery disease     Gout attack     Hypercholesteremia     Hypertension     Renal disorder     Vaginal delivery     x4       Past Surgical History:   Procedure Laterality Date    APPENDECTOMY      CARDIAC DEFIBRILLATOR PLACEMENT      2015    CATARACT EXTRACTION W/  INTRAOCULAR LENS IMPLANT Left 02/07/2019    Dr. Velazco    CATARACT EXTRACTION W/  INTRAOCULAR LENS IMPLANT Right 02/21/2019    Dr. Velazco    CHOLECYSTECTOMY      COLONOSCOPY N/A 2/3/2023    Procedure: COLONOSCOPY;  Surgeon: Jordon Sotomayor MD;  Location: [facility] ENDO;  Service: Endoscopy;  Laterality: N/A;    COLONOSCOPY W/ POLYPECTOMY  10 or 11/ 2014    per Dr. Elen ceja Left 8/2008    ESOPHAGOGASTRODUODENOSCOPY N/A 1/31/2023    Procedure: EGD (ESOPHAGOGASTRODUODENOSCOPY);  Surgeon: Jordon Sotomayor MD;  Location: [facility] ENDO;  Service: Endoscopy;  Laterality: N/A;    EYE SURGERY      HYSTERECTOMY      INTRAOCULAR PROSTHESES INSERTION Left 2/7/2019    Procedure: INSERTION, IOL PROSTHESIS;  Surgeon: Demetrius Velazco MD;  Location: [facility] OR;  Service: Ophthalmology;  Laterality: Left;  RN Phone Pre OP 1-30-19.  Arrival 05:30 AM    INTRAOCULAR PROSTHESES INSERTION Right 2/21/2019    Procedure: INSERTION, IOL PROSTHESIS;  Surgeon: Demetrius Velazco MD;  Location: [facility] OR;  Service: Ophthalmology;  Laterality: Right;    JOINT REPLACEMENT Bilateral 2005 & 2006    2 Knee Replacements=Lt. 1= 2005. Rt. 1= 2006    OOPHORECTOMY      PHACOEMULSIFICATION OF CATARACT Left 2/7/2019    Procedure: PHACOEMULSIFICATION, CATARACT;  Surgeon: Demetrius Velazco MD;  Location: [facility] OR;  Service: Ophthalmology;  Laterality: Left;    PHACOEMULSIFICATION OF CATARACT Right 2/21/2019    Procedure: PHACOEMULSIFICATION, CATARACT;  Surgeon:  Demetrius Vealzco MD;  Location: [facility];  Service: Ophthalmology;  Laterality: Right;  RN Phone Pre op 2-15-19.  Arrival 05:30 AM    TOTAL KNEE ARTHROPLASTY Bilateral Lt.= 2005; Rt.=2006    Bilateral Knee scope       Review of patient's allergies indicates:   Allergen Reactions    Aspirin Swelling     Swelling and rash    Colace [docusate sodium] Rash     itching    Docusate Other (See Comments) and Rash     itching    Sulfa (sulfonamide antibiotics) Swelling     Swelling and rash  Swelling and rash    Iodine and iodide containing products Rash    Penicillins Rash       Current Facility-Administered Medications on File Prior to Encounter   Medication    0.9%  NaCl infusion    phenylephrine HCL 2.5% ophthalmic solution 1 drop    proparacaine 0.5 % ophthalmic solution 1 drop    tropicamide 1% ophthalmic solution 1 drop     Current Outpatient Medications on File Prior to Encounter   Medication Sig    acetaminophen (TYLENOL) 325 MG tablet Take 325 mg by mouth once daily.    allopurinol (ZYLOPRIM) 100 MG tablet Take 100 mg by mouth every evening.     cranberry 400 mg Cap Take 1 capsule by mouth 2 (two) times daily.    fish oil-omega-3 fatty acids 300-1,000 mg capsule Take 2 g by mouth once daily. 1 caps every AM & 1 cap every PM.    folic acid/multivit-min/lutein (CENTRUM SILVER ORAL) Take by mouth once daily.    furosemide (LASIX) 80 MG tablet TAKE 1 TABLET BY MOUTH TWICE DAILY AS NEEDED FOR SHORTNESS OF BREATH OR  EDEMA (Patient taking differently: Take 80 mg by mouth once daily.)    HYDROcodone-acetaminophen (NORCO) 5-325 mg per tablet Take 1 tablet by mouth every 8 (eight) hours as needed for Pain. (Patient not taking: Reported on 2/6/2023)    HYDROcodone-acetaminophen (NORCO) 5-325 mg per tablet Take 1 tablet by mouth every 6 (six) hours as needed for Pain. (Patient not taking: Reported on 2/6/2023)    lovastatin (MEVACOR) 40 MG tablet Take 40 mg by mouth nightly. Takes 2 caps with supper  every evening.    mirabegron (MYRBETRIQ) 50 mg Tb24 Take 50 mg by mouth once daily.     NEURONTIN 100 mg capsule Take 100 mg by mouth 2 (two) times daily.    ondansetron (ZOFRAN-ODT) 4 MG TbDL Take 1 tablet (4 mg total) by mouth every 8 (eight) hours as needed (nausea). (Patient not taking: Reported on 2/6/2023)    pantoprazole (PROTONIX) 40 MG tablet Take 40 mg by mouth once daily.    sacubitriL-valsartan (ENTRESTO) 24-26 mg per tablet Take 1 tablet by mouth 2 (two) times daily. (Patient taking differently: Take 1 tablet by mouth once daily.)     Family History       Problem Relation (Age of Onset)    Amblyopia Son    Diabetes Other    Heart attack Mother (88)    Hyperlipidemia Mother    Hypertension Mother, Other          Tobacco Use    Smoking status: Never    Smokeless tobacco: Never   Substance and Sexual Activity    Alcohol use: Not Currently    Drug use: No    Sexual activity: Not Currently     Partners: Male     Review of Systems   Constitutional:  Positive for fatigue. Negative for chills and fever.   HENT:  Negative for congestion.    Eyes:  Negative for visual disturbance.   Respiratory:  Negative for cough and shortness of breath.    Cardiovascular:  Negative for chest pain.   Gastrointestinal:  Positive for anal bleeding and blood in stool. Negative for abdominal pain, constipation, diarrhea, nausea and vomiting.   Genitourinary:  Negative for dysuria.   Musculoskeletal:  Negative for back pain.        +leg cramping.    Skin:  Negative for rash.   Allergic/Immunologic: Negative for immunocompromised state.   Neurological:  Positive for weakness and light-headedness. Negative for dizziness.   Hematological:  Bruises/bleeds easily.   Psychiatric/Behavioral:  Negative for confusion. The patient is not nervous/anxious.    Objective:     Vital Signs (Most Recent):  Temp: 97.5 °F (36.4 °C) (02/08/23 0724)  Pulse: 81 (02/08/23 0724)  Resp: 18 (02/08/23 0724)  BP: 132/60 (02/08/23 0724)  SpO2: 97 %  (02/08/23 0724)   Vital Signs (24h Range):  Temp:  [97.5 °F (36.4 °C)-97.8 °F (36.6 °C)] 97.5 °F (36.4 °C)  Pulse:  [67-82] 81  Resp:  [16-19] 18  SpO2:  [96 %-99 %] 97 %  BP: (106-132)/(51-67) 132/60     Weight: 57.7 kg (127 lb 3.3 oz)  Body mass index is 22.53 kg/m².    Physical Exam  Vitals and nursing note reviewed.   Constitutional:       General: She is not in acute distress.     Appearance: She is well-developed. She is not diaphoretic.   HENT:      Head: Normocephalic and atraumatic.      Mouth/Throat:      Pharynx: No oropharyngeal exudate.      Comments: Poor dentition.   Eyes:      General: No scleral icterus.        Right eye: No discharge.         Left eye: No discharge.      Extraocular Movements: EOM normal.      Conjunctiva/sclera: Conjunctivae normal.      Pupils: Pupils are equal, round, and reactive to light.   Neck:      Thyroid: No thyromegaly.      Vascular: No JVD.      Trachea: No tracheal deviation.   Cardiovascular:      Rate and Rhythm: Normal rate and regular rhythm.      Heart sounds: Murmur heard.   Systolic murmur is present with a grade of 1/6.     No friction rub. No gallop.   Pulmonary:      Effort: Pulmonary effort is normal. No respiratory distress.      Breath sounds: Normal breath sounds. No stridor. No wheezing or rales.      Comments: Left upper chest wall AICD in place.   Chest:      Chest wall: No tenderness.   Abdominal:      General: Bowel sounds are normal. There is no distension.      Palpations: Abdomen is soft. There is no mass.      Tenderness: There is no abdominal tenderness. There is no guarding or rebound.   Musculoskeletal:         General: No tenderness. Normal range of motion.      Cervical back: Normal range of motion and neck supple.      Right lower leg: Edema present.      Left lower leg: Edema present.      Comments: 1+ pitting edema to BLE.    Lymphadenopathy:      Cervical: No cervical adenopathy.   Skin:     General: Skin is warm and dry.       Coloration: Skin is not pale.      Findings: Bruising present. No erythema or rash.      Comments: Easy bruising noted.    Neurological:      Mental Status: She is alert and oriented to person, place, and time.      Cranial Nerves: No cranial nerve deficit.      Motor: No abnormal muscle tone.      Coordination: Coordination normal.      Deep Tendon Reflexes: Reflexes are normal and symmetric. Reflexes normal.      Comments: Bilateral total knee replacement scars.    Psychiatric:         Behavior: Behavior normal.         Thought Content: Thought content normal.         Judgment: Judgment normal.         CRANIAL NERVES     CN III, IV, VI   Pupils are equal, round, and reactive to light.  Extraocular motions are normal.      Significant Labs: All pertinent labs within the past 24 hours have been reviewed.  Recent Results (from the past 12 hour(s))   Comprehensive Metabolic Panel (CMP)    Collection Time: 02/08/23  4:30 AM   Result Value Ref Range    Sodium 143 136 - 145 mmol/L    Potassium 3.6 3.5 - 5.1 mmol/L    Chloride 106 95 - 110 mmol/L    CO2 27 23 - 29 mmol/L    Glucose 75 70 - 110 mg/dL    BUN 41 (H) 8 - 23 mg/dL    Creatinine 1.4 0.5 - 1.4 mg/dL    Calcium 8.7 8.7 - 10.5 mg/dL    Total Protein 5.4 (L) 6.0 - 8.4 g/dL    Albumin 3.0 (L) 3.5 - 5.2 g/dL    Total Bilirubin 2.8 (H) 0.1 - 1.0 mg/dL    Alkaline Phosphatase 64 55 - 135 U/L    AST 21 10 - 40 U/L    ALT 12 10 - 44 U/L    Anion Gap 10 8 - 16 mmol/L    eGFR 36 (A) >60 mL/min/1.73 m^2   Magnesium    Collection Time: 02/08/23  4:30 AM   Result Value Ref Range    Magnesium 1.9 1.6 - 2.6 mg/dL   Phosphorus    Collection Time: 02/08/23  4:30 AM   Result Value Ref Range    Phosphorus 2.8 2.7 - 4.5 mg/dL   CBC auto differential    Collection Time: 02/08/23  4:30 AM   Result Value Ref Range    WBC 5.41 3.90 - 12.70 K/uL    RBC 2.74 (L) 4.00 - 5.40 M/uL    Hemoglobin 8.1 (L) 12.0 - 16.0 g/dL    Hematocrit 25.8 (L) 37.0 - 48.5 %    MCV 94 82 - 98 fL    MCH 29.6  27.0 - 31.0 pg    MCHC 31.4 (L) 32.0 - 36.0 g/dL    RDW 17.9 (H) 11.5 - 14.5 %    Platelets 108 (L) 150 - 450 K/uL    MPV 10.8 9.2 - 12.9 fL    Immature Granulocytes 0.4 0.0 - 0.5 %    Gran # (ANC) 3.7 1.8 - 7.7 K/uL    Immature Grans (Abs) 0.02 0.00 - 0.04 K/uL    Lymph # 0.8 (L) 1.0 - 4.8 K/uL    Mono # 0.7 0.3 - 1.0 K/uL    Eos # 0.2 0.0 - 0.5 K/uL    Baso # 0.02 0.00 - 0.20 K/uL    nRBC 0 0 /100 WBC    Gran % 68.5 38.0 - 73.0 %    Lymph % 14.4 (L) 18.0 - 48.0 %    Mono % 13.5 4.0 - 15.0 %    Eosinophil % 2.8 0.0 - 8.0 %    Basophil % 0.4 0.0 - 1.9 %    Differential Method Automated         Significant Imaging: I have reviewed all pertinent imaging results/findings within the past 24 hours.    CT abdomen pelvis w/out Contrast:   Impression:     No acute findings on CT abdomen and pelvis without contrast.     Mesenteric edema, anasarca and small free pelvic fluid.     Colonic diverticulosis.     Cardiomegaly, pulmonary vascular congestion, and bilateral small pleural effusions.  Overall findings are concerning for CHF.        Electronically signed by: Sachi Warren  Date:                                            02/06/2023  Time:                                           22:52    CXR:   FINDINGS:  Single portable chest view is submitted.  Cardiac pacemaker again noted.  The heart size appears enlarged, the appearance of the cardiomediastinal silhouette is stable when accounting for difference in position.  Central pulmonary vascular prominence again noted, bilateral perihilar infiltrate again noted with mild progression.  Bilateral pattern of interstitial and ground-glass infiltrate noted with mild progression.  There may be small pleural effusions, there is no large pleural effusion and there is no pneumothorax.  The osseous structures demonstrate chronic change.     Impression:     Mild progression of radiographic findings, as discussed above.        Electronically signed by: Fernando Galvan  Date:             "                                02/06/2023  Time:                                           21:09        CT abdomen and pelvis from 2/4/23:   Impression:     No acute findings on CT abdomen and pelvis without contrast.     Colonic diverticulosis.     Small free pelvic fluid.     Cardiomegaly.  Right pleural effusion.  Ground-glass opacities in the lung parenchyma.  Anasarca.  Findings may suggest mild CHF.        Electronically signed by: Sachi Warren  Date:                                            02/04/2023  Time:                                           22:28      Colonoscopy from 2/3/23:   Impression:              - Friability with contact bleeding in the cecum.   - Blood in the entire examined colon.   - A tattoo was seen in the sigmoid colon.   - Diverticulosis in the sigmoid colon.   - Internal hemorrhoids.   - No specimens collected.     Recommendation:          - Return patient to [facility] for [facility] care.   - Resume previous diet and consider discontinuing plavix   - Continue present medications.   - Trend CBC and monitor for signs of bleeding   - CTA to look for active diverticular bleed if recurrent signs of bleeding   - No repeat colonoscopy due to age.     Jordon Sotomayor MD   2/3/2023 1:53:32 PM       Assessment/Plan:      * GI hemorrhage  She had abnormalities on both colonoscopy and EGD last admission, so could be either  GI had recommended possible VCE vs CTA GI bleed study   -I see no evidence currently to suggest acute lower GI bleeding   -She is in HINA and has "iodine containing product" allergy, making CTA risky  The patient tells me her stools, "have been black as best I can tell, though my daughter says brown."   -The patient has a poor memory of events and is mixed up on her hospital visits, so I am not sure how reliable this is   -Her daughter did, however, also report worsening weakness and continued melanotic stools for the past week  She got Protonix 80mg iv x 1 in the ED, " and I will continue Protonix 40mg iv q12 hours  Consult GI to see if she needs re-scoping   hold all anti-PLT and anticoagulant meds  Serial CBC  Move to MICU if worsens or more hypotensive.  No plan for intervention by GI at this time.her HH is stable at this time,will continue monitor.      Hypotension  She runs low per family, as is expected with EF of 20%  This is complicated by severe anemia and GI bleeding in face of Entresto   -Holding Entresto for HINA  Judicious use of Lasix with blood transfusion, as CXR and CT do show some CHF and edema      Symptomatic anemia  Given severe CHF and history of CAD, will try to keep her Hg closer to 8-9g range  She has been dizzy, weak, hypotensive with continued bleeding at home  Transfuse 1U PRBC for now      Acute on chronic combined systolic and diastolic congestive heart failure  Patient is identified as having Combined Systolic and Diastolic heart failure that is Acute on chronic. CHF is currently uncontrolled due to Continued edema of extremities, Hepatic congestion/ascites and JVD, Rales/crackles on pulmonary exam and Pulmonary edema/pleural effusion on CXR. Latest ECHO performed and demonstrates- Results for orders placed during the hospital encounter of 07/02/22    Echo    Interpretation Summary  · The left ventricle is moderately enlarged with eccentric hypertrophy and severely decreased systolic function.  · The estimated ejection fraction is 20%.  · Grade II left ventricular diastolic dysfunction.  · Normal right ventricular size with normal right ventricular systolic function.  · Severe left atrial enlargement.  · There is mild aortic valve stenosis.  · Aortic valve area is 1.28 cm2; peak velocity is 1.85 m/s; mean gradient is 7 mmHg.  · Moderate mitral regurgitation.  · Mild tricuspid regurgitation.  · Normal central venous pressure (3 mmHg).  · The estimated PA systolic pressure is 19 mmHg.  . Continue ACE/ARB and Furosemide and monitor clinical status  "closely. Monitor on telemetry. Patient is off CHF pathway.  Monitor strict Is&Os and daily weights.  Place on fluid restriction of 1.5 L. Continue to stress to patient importance of self efficacy and  on diet for CHF. Last BNP reviewed- and noted below   Recent Labs   Lab 02/06/23 1953   BNP >4,900*   .      Biventricular AICD in place  Noted  Placed on telemetry      Stage 3 chronic kidney disease  Will monitor.      Acute renal failure superimposed on stage 3b chronic kidney disease  Holding Entresto for now  Likely due to CHF, ARB, hypotension and anemia  Follow up after 1U blood and IV lasix to see if perfusion can improve     Latest Reference Range & Units 02/03/23 03:54 02/04/23 05:45 02/04/23 20:41 02/06/23 19:53   Creatinine 0.5 - 1.4 mg/dL 1.4 1.4 1.7 (H) 1.9 (H)          Coronary artery disease involving native coronary artery of native heart without angina pectoris  I cannot find a LHC in Epic, though in Cardiology notes this is carried through:   -"catheter 2011 reported nonobstructive CAD"  She has not had PCI/stenting as far as I can discern  She had been on Plavix for this long term due to ASA allergy   -Plavix was stopped on the last admit due to GIB    She is on Mevacor (statin) and Omega-3 acids, but no BB or nitrate  She denies ever having any angina or CP      Essential hypertension  Will monitor.      Elevated troponin level not due to acute coronary syndrome  She is near baseline  Suspect chronic demand leak from anemia and CHF in face of HINA on CKD IIIb  Will trend  No anticoagulation or anti-PLT therapy due to GIB     Latest Reference Range & Units 12/04/22 00:12 12/13/22 17:10 01/30/23 20:20 02/06/23 19:53   Troponin I 0.000 - 0.026 ng/mL 0.178 (H) 0.174 (H) 0.135 (H) 0.169 (H)            VTE Risk Mitigation (From admission, onward)         Ordered     Place DALE hose  Until discontinued         02/07/23 8367     Reason for No Pharmacological VTE Prophylaxis  Once        Question:  " Reasons:  Answer:  Active Bleeding    02/07/23 0239     IP VTE HIGH RISK PATIENT  Once         02/07/23 0239     Place sequential compression device  Until discontinued         02/07/23 0239                Discharge Planning   MARTELL:      Code Status: DNR   Is the patient medically ready for discharge?:     Reason for patient still in hospital (select all that apply): Patient trending condition  Discharge Plan A: Home Health, Home with family                  Edilberto Chowdhury MD  Department of [facility] Medicine   [facility]

## 2023-02-08 NOTE — NURSING
This nurse and tech helped patient up in chair as she was uncomfortable lying in bed. Patient tolerated well. Family to remain at the bedside. Call light within reach, and instructed to call whenever she is ready to get back in bed.    Will continue to monitor.

## 2023-02-08 NOTE — PLAN OF CARE
Problem: Physical Therapy  Goal: Physical Therapy Goal  Description: Goals to be met by: 23     Patient will increase functional independence with mobility by performin. Pt to be (S) with bed mobility.  2. Pt to transfer with SBA.  3. Pt to ambulate 50' with RW SBA.  4. Pt to go up/down 5 steps using (B) HRs and CGA.  5. Pt to be (I) with HEP.    Outcome: Ongoing, Progressing   Pt found in chair, pt unable to follow commands, appears to be perseverating today.  Pt reports not feeling well, assisted back to bed, nurse advised.

## 2023-02-08 NOTE — SUBJECTIVE & OBJECTIVE
Past Medical History:   Diagnosis Date    Cataract     CHF (congestive heart failure)     CKD (chronic kidney disease), stage III     Coronary artery disease     Gout attack     Hypercholesteremia     Hypertension     Renal disorder     Vaginal delivery     x4       Past Surgical History:   Procedure Laterality Date    APPENDECTOMY      CARDIAC DEFIBRILLATOR PLACEMENT      2015    CATARACT EXTRACTION W/  INTRAOCULAR LENS IMPLANT Left 02/07/2019    Dr. Velazco    CATARACT EXTRACTION W/  INTRAOCULAR LENS IMPLANT Right 02/21/2019    Dr. Velazco    CHOLECYSTECTOMY      COLONOSCOPY N/A 2/3/2023    Procedure: COLONOSCOPY;  Surgeon: Jordon Sotomayor MD;  Location: [facility] ENDO;  Service: Endoscopy;  Laterality: N/A;    COLONOSCOPY W/ POLYPECTOMY  10 or 11/ 2014    per Dr. Elen ceja Left 8/2008    ESOPHAGOGASTRODUODENOSCOPY N/A 1/31/2023    Procedure: EGD (ESOPHAGOGASTRODUODENOSCOPY);  Surgeon: Jordon Sotomayor MD;  Location: [facility] ENDO;  Service: Endoscopy;  Laterality: N/A;    EYE SURGERY      HYSTERECTOMY      INTRAOCULAR PROSTHESES INSERTION Left 2/7/2019    Procedure: INSERTION, IOL PROSTHESIS;  Surgeon: Demetrius Velazco MD;  Location: [facility] OR;  Service: Ophthalmology;  Laterality: Left;  RN Phone Pre OP 1-30-19.  Arrival 05:30 AM    INTRAOCULAR PROSTHESES INSERTION Right 2/21/2019    Procedure: INSERTION, IOL PROSTHESIS;  Surgeon: Demetrius Velazco MD;  Location: [facility] OR;  Service: Ophthalmology;  Laterality: Right;    JOINT REPLACEMENT Bilateral 2005 & 2006    2 Knee Replacements=Lt. 1= 2005. Rt. 1= 2006    OOPHORECTOMY      PHACOEMULSIFICATION OF CATARACT Left 2/7/2019    Procedure: PHACOEMULSIFICATION, CATARACT;  Surgeon: Demetrius Velazco MD;  Location: [facility] OR;  Service: Ophthalmology;  Laterality: Left;    PHACOEMULSIFICATION OF CATARACT Right 2/21/2019    Procedure: PHACOEMULSIFICATION, CATARACT;  Surgeon: Demetrius Velazco MD;  Location: [facility] OR;  Service:  Ophthalmology;  Laterality: Right;  RN Phone Pre op 2-15-19.  Arrival 05:30 AM    TOTAL KNEE ARTHROPLASTY Bilateral Lt.= 2005; Rt.=2006    Bilateral Knee scope       Review of patient's allergies indicates:   Allergen Reactions    Aspirin Swelling     Swelling and rash    Colace [docusate sodium] Rash     itching    Docusate Other (See Comments) and Rash     itching    Sulfa (sulfonamide antibiotics) Swelling     Swelling and rash  Swelling and rash    Iodine and iodide containing products Rash    Penicillins Rash       Current Facility-Administered Medications on File Prior to Encounter   Medication    0.9%  NaCl infusion    phenylephrine HCL 2.5% ophthalmic solution 1 drop    proparacaine 0.5 % ophthalmic solution 1 drop    tropicamide 1% ophthalmic solution 1 drop     Current Outpatient Medications on File Prior to Encounter   Medication Sig    acetaminophen (TYLENOL) 325 MG tablet Take 325 mg by mouth once daily.    allopurinol (ZYLOPRIM) 100 MG tablet Take 100 mg by mouth every evening.     cranberry 400 mg Cap Take 1 capsule by mouth 2 (two) times daily.    fish oil-omega-3 fatty acids 300-1,000 mg capsule Take 2 g by mouth once daily. 1 caps every AM & 1 cap every PM.    folic acid/multivit-min/lutein (CENTRUM SILVER ORAL) Take by mouth once daily.    furosemide (LASIX) 80 MG tablet TAKE 1 TABLET BY MOUTH TWICE DAILY AS NEEDED FOR SHORTNESS OF BREATH OR  EDEMA (Patient taking differently: Take 80 mg by mouth once daily.)    HYDROcodone-acetaminophen (NORCO) 5-325 mg per tablet Take 1 tablet by mouth every 8 (eight) hours as needed for Pain. (Patient not taking: Reported on 2/6/2023)    HYDROcodone-acetaminophen (NORCO) 5-325 mg per tablet Take 1 tablet by mouth every 6 (six) hours as needed for Pain. (Patient not taking: Reported on 2/6/2023)    lovastatin (MEVACOR) 40 MG tablet Take 40 mg by mouth nightly. Takes 2 caps with supper every evening.    mirabegron (MYRBETRIQ) 50 mg Tb24 Take 50 mg by mouth once  daily.     NEURONTIN 100 mg capsule Take 100 mg by mouth 2 (two) times daily.    ondansetron (ZOFRAN-ODT) 4 MG TbDL Take 1 tablet (4 mg total) by mouth every 8 (eight) hours as needed (nausea). (Patient not taking: Reported on 2/6/2023)    pantoprazole (PROTONIX) 40 MG tablet Take 40 mg by mouth once daily.    sacubitriL-valsartan (ENTRESTO) 24-26 mg per tablet Take 1 tablet by mouth 2 (two) times daily. (Patient taking differently: Take 1 tablet by mouth once daily.)     Family History       Problem Relation (Age of Onset)    Amblyopia Son    Diabetes Other    Heart attack Mother (88)    Hyperlipidemia Mother    Hypertension Mother, Other          Tobacco Use    Smoking status: Never    Smokeless tobacco: Never   Substance and Sexual Activity    Alcohol use: Not Currently    Drug use: No    Sexual activity: Not Currently     Partners: Male     Review of Systems   Constitutional:  Positive for fatigue. Negative for chills and fever.   HENT:  Negative for congestion.    Eyes:  Negative for visual disturbance.   Respiratory:  Negative for cough and shortness of breath.    Cardiovascular:  Negative for chest pain.   Gastrointestinal:  Positive for anal bleeding and blood in stool. Negative for abdominal pain, constipation, diarrhea, nausea and vomiting.   Genitourinary:  Negative for dysuria.   Musculoskeletal:  Negative for back pain.        +leg cramping.    Skin:  Negative for rash.   Allergic/Immunologic: Negative for immunocompromised state.   Neurological:  Positive for weakness and light-headedness. Negative for dizziness.   Hematological:  Bruises/bleeds easily.   Psychiatric/Behavioral:  Negative for confusion. The patient is not nervous/anxious.    Objective:     Vital Signs (Most Recent):  Temp: 97.5 °F (36.4 °C) (02/08/23 0724)  Pulse: 81 (02/08/23 0724)  Resp: 18 (02/08/23 0724)  BP: 132/60 (02/08/23 0724)  SpO2: 97 % (02/08/23 0724)   Vital Signs (24h Range):  Temp:  [97.5 °F (36.4 °C)-97.8 °F (36.6 °C)]  97.5 °F (36.4 °C)  Pulse:  [67-82] 81  Resp:  [16-19] 18  SpO2:  [96 %-99 %] 97 %  BP: (106-132)/(51-67) 132/60     Weight: 57.7 kg (127 lb 3.3 oz)  Body mass index is 22.53 kg/m².    Physical Exam  Vitals and nursing note reviewed.   Constitutional:       General: She is not in acute distress.     Appearance: She is well-developed. She is not diaphoretic.   HENT:      Head: Normocephalic and atraumatic.      Mouth/Throat:      Pharynx: No oropharyngeal exudate.      Comments: Poor dentition.   Eyes:      General: No scleral icterus.        Right eye: No discharge.         Left eye: No discharge.      Extraocular Movements: EOM normal.      Conjunctiva/sclera: Conjunctivae normal.      Pupils: Pupils are equal, round, and reactive to light.   Neck:      Thyroid: No thyromegaly.      Vascular: No JVD.      Trachea: No tracheal deviation.   Cardiovascular:      Rate and Rhythm: Normal rate and regular rhythm.      Heart sounds: Murmur heard.   Systolic murmur is present with a grade of 1/6.     No friction rub. No gallop.   Pulmonary:      Effort: Pulmonary effort is normal. No respiratory distress.      Breath sounds: Normal breath sounds. No stridor. No wheezing or rales.      Comments: Left upper chest wall AICD in place.   Chest:      Chest wall: No tenderness.   Abdominal:      General: Bowel sounds are normal. There is no distension.      Palpations: Abdomen is soft. There is no mass.      Tenderness: There is no abdominal tenderness. There is no guarding or rebound.   Musculoskeletal:         General: No tenderness. Normal range of motion.      Cervical back: Normal range of motion and neck supple.      Right lower leg: Edema present.      Left lower leg: Edema present.      Comments: 1+ pitting edema to BLE.    Lymphadenopathy:      Cervical: No cervical adenopathy.   Skin:     General: Skin is warm and dry.      Coloration: Skin is not pale.      Findings: Bruising present. No erythema or rash.      Comments:  Easy bruising noted.    Neurological:      Mental Status: She is alert and oriented to person, place, and time.      Cranial Nerves: No cranial nerve deficit.      Motor: No abnormal muscle tone.      Coordination: Coordination normal.      Deep Tendon Reflexes: Reflexes are normal and symmetric. Reflexes normal.      Comments: Bilateral total knee replacement scars.    Psychiatric:         Behavior: Behavior normal.         Thought Content: Thought content normal.         Judgment: Judgment normal.         CRANIAL NERVES     CN III, IV, VI   Pupils are equal, round, and reactive to light.  Extraocular motions are normal.      Significant Labs: All pertinent labs within the past 24 hours have been reviewed.  Recent Results (from the past 12 hour(s))   Comprehensive Metabolic Panel (CMP)    Collection Time: 02/08/23  4:30 AM   Result Value Ref Range    Sodium 143 136 - 145 mmol/L    Potassium 3.6 3.5 - 5.1 mmol/L    Chloride 106 95 - 110 mmol/L    CO2 27 23 - 29 mmol/L    Glucose 75 70 - 110 mg/dL    BUN 41 (H) 8 - 23 mg/dL    Creatinine 1.4 0.5 - 1.4 mg/dL    Calcium 8.7 8.7 - 10.5 mg/dL    Total Protein 5.4 (L) 6.0 - 8.4 g/dL    Albumin 3.0 (L) 3.5 - 5.2 g/dL    Total Bilirubin 2.8 (H) 0.1 - 1.0 mg/dL    Alkaline Phosphatase 64 55 - 135 U/L    AST 21 10 - 40 U/L    ALT 12 10 - 44 U/L    Anion Gap 10 8 - 16 mmol/L    eGFR 36 (A) >60 mL/min/1.73 m^2   Magnesium    Collection Time: 02/08/23  4:30 AM   Result Value Ref Range    Magnesium 1.9 1.6 - 2.6 mg/dL   Phosphorus    Collection Time: 02/08/23  4:30 AM   Result Value Ref Range    Phosphorus 2.8 2.7 - 4.5 mg/dL   CBC auto differential    Collection Time: 02/08/23  4:30 AM   Result Value Ref Range    WBC 5.41 3.90 - 12.70 K/uL    RBC 2.74 (L) 4.00 - 5.40 M/uL    Hemoglobin 8.1 (L) 12.0 - 16.0 g/dL    Hematocrit 25.8 (L) 37.0 - 48.5 %    MCV 94 82 - 98 fL    MCH 29.6 27.0 - 31.0 pg    MCHC 31.4 (L) 32.0 - 36.0 g/dL    RDW 17.9 (H) 11.5 - 14.5 %    Platelets 108 (L)  150 - 450 K/uL    MPV 10.8 9.2 - 12.9 fL    Immature Granulocytes 0.4 0.0 - 0.5 %    Gran # (ANC) 3.7 1.8 - 7.7 K/uL    Immature Grans (Abs) 0.02 0.00 - 0.04 K/uL    Lymph # 0.8 (L) 1.0 - 4.8 K/uL    Mono # 0.7 0.3 - 1.0 K/uL    Eos # 0.2 0.0 - 0.5 K/uL    Baso # 0.02 0.00 - 0.20 K/uL    nRBC 0 0 /100 WBC    Gran % 68.5 38.0 - 73.0 %    Lymph % 14.4 (L) 18.0 - 48.0 %    Mono % 13.5 4.0 - 15.0 %    Eosinophil % 2.8 0.0 - 8.0 %    Basophil % 0.4 0.0 - 1.9 %    Differential Method Automated         Significant Imaging: I have reviewed all pertinent imaging results/findings within the past 24 hours.    CT abdomen pelvis w/out Contrast:   Impression:     No acute findings on CT abdomen and pelvis without contrast.     Mesenteric edema, anasarca and small free pelvic fluid.     Colonic diverticulosis.     Cardiomegaly, pulmonary vascular congestion, and bilateral small pleural effusions.  Overall findings are concerning for CHF.        Electronically signed by: Sachi Warren  Date:                                            02/06/2023  Time:                                           22:52    CXR:   FINDINGS:  Single portable chest view is submitted.  Cardiac pacemaker again noted.  The heart size appears enlarged, the appearance of the cardiomediastinal silhouette is stable when accounting for difference in position.  Central pulmonary vascular prominence again noted, bilateral perihilar infiltrate again noted with mild progression.  Bilateral pattern of interstitial and ground-glass infiltrate noted with mild progression.  There may be small pleural effusions, there is no large pleural effusion and there is no pneumothorax.  The osseous structures demonstrate chronic change.     Impression:     Mild progression of radiographic findings, as discussed above.        Electronically signed by: Fernando Galvan  Date:                                            02/06/2023  Time:                                            21:09        CT abdomen and pelvis from 2/4/23:   Impression:     No acute findings on CT abdomen and pelvis without contrast.     Colonic diverticulosis.     Small free pelvic fluid.     Cardiomegaly.  Right pleural effusion.  Ground-glass opacities in the lung parenchyma.  Anasarca.  Findings may suggest mild CHF.        Electronically signed by: Sachi Warren  Date:                                            02/04/2023  Time:                                           22:28      Colonoscopy from 2/3/23:   Impression:              - Friability with contact bleeding in the cecum.   - Blood in the entire examined colon.   - A tattoo was seen in the sigmoid colon.   - Diverticulosis in the sigmoid colon.   - Internal hemorrhoids.   - No specimens collected.     Recommendation:          - Return patient to [facility] for [facility] care.   - Resume previous diet and consider discontinuing plavix   - Continue present medications.   - Trend CBC and monitor for signs of bleeding   - CTA to look for active diverticular bleed if recurrent signs of bleeding   - No repeat colonoscopy due to age.     Jordon Sotomayor MD   2/3/2023 1:53:32 PM

## 2023-02-08 NOTE — ASSESSMENT & PLAN NOTE
"She had abnormalities on both colonoscopy and EGD last admission, so could be either  GI had recommended possible VCE vs CTA GI bleed study   -I see no evidence currently to suggest acute lower GI bleeding   -She is in [place] and has "iodine containing product" allergy, making CTA risky  The patient tells me her stools, "have been black as best I can tell, though my daughter says brown."   -The patient has a poor memory of events and is mixed up on her hospital visits, so I am not sure how reliable this is   -Her daughter did, however, also report worsening weakness and continued melanotic stools for the past week  She got Protonix 80mg iv x 1 in the ED, and I will continue Protonix 40mg iv q12 hours  Consult GI to see if she needs re-scoping   hold all anti-PLT and anticoagulant meds  Serial CBC  Move to MICU if worsens or more hypotensive.  No plan for intervention by GI at this time.her HH is stable at this time,will continue monitor.    "

## 2023-02-08 NOTE — PT/OT/SLP PROGRESS
Occupational Therapy   Treatment    Name: Palmira Malave  MRN: [id number]  Admitting Diagnosis:  GI hemorrhage       Recommendations:     Discharge Recommendations: home health OT  Discharge Equipment Recommendations:  none  Barriers to discharge:  Other (Comment)    Assessment:     Palmira Malave is a 87 y.o. female with a medical diagnosis of GI hemorrhage.  She presents with good family support as per care management and patient report. Performance deficits affecting function are weakness, impaired endurance, impaired self care skills, impaired balance, gait instability, impaired functional mobility, decreased upper extremity function, decreased ROM, decreased lower extremity function, impaired skin.     Rehab Prognosis:  Fair; patient would benefit from acute skilled OT services to address these deficits and reach maximum level of function.       Plan:     Patient to be seen 3 x/week to address the above listed problems via self-care/home management, therapeutic activities, therapeutic exercises  Plan of Care Expires:    Plan of Care Reviewed with: patient    Subjective     Pain/Comfort:  Pain Rating 1: 0/10    Objective:     Communicated with: nurse Arceo prior to session.  Patient found supine with peripheral IV, telemetry, PureWick upon OT entry to room.    General Precautions: Standard, fall    Orthopedic Precautions:N/A  Braces: N/A  Respiratory Status: Room air     Occupational Performance:     Bed Mobility:    Patient completed Rolling/Turning to Left with  minimum assistance  Patient completed Scooting/Bridging with minimum assistance  Patient completed Supine to Sit with minimum assistance     Functional Mobility/Transfers:  Patient completed Sit <> Stand Transfer with minimum assistance  with  rolling walker   Patient completed Bed <> Chair Transfer using Stand Pivot technique with minimum assistance with rolling walker  Functional Mobility: pt able to sit a sink  in bedside to perform oral hygiene  and grooming skills    Activities of Daily Living:  Feeding:  supervision    Grooming: stand by assistance    Upper Body Dressing: minimum assistance        AMPAC 6 Click ADL: 18    Treatment & Education:  -education on importance of Out of bed activity  -Orientation only to person  -home safety skills address  -Chair alarm placement in chair    Patient left  cjair  with chair alarm on and nurse notified    GOALS:   Multidisciplinary Problems       Occupational Therapy Goals          Problem: Occupational Therapy    Goal Priority Disciplines Outcome Interventions   Occupational Therapy Goal     OT, PT/OT Ongoing, Progressing    Description: Goals to be met by: 2/15/2023     Patient will increase functional independence with ADLs by performing:    UE Dressing with Contact Guard Assistance.  Grooming while seated with Contact Guard Assistance.  Toileting from bedside commode with Contact Guard Assistance for hygiene and clothing management.   Supine to sit with Contact Guard Assistance.  Stand pivot transfers with Contact Guard Assistance.  Toilet transfer to bedside commode with Contact Guard Assistance.  Increased functional strength to WFL for self care skills .                         Time Tracking:     OT Date of Treatment: 02/08/23  OT Start Time: 0837  OT Stop Time: 0915  OT Total Time (min): 38 min    Billable Minutes:Self Care/Home Management 38    OT/MAVERICK: OT          2/8/2023

## 2023-02-08 NOTE — PLAN OF CARE
Problem: Adult Inpatient Plan of Care  Goal: Plan of Care Review  Outcome: Ongoing, Progressing     Problem: Fluid and Electrolyte Imbalance (Acute Kidney Injury/Impairment)  Goal: Fluid and Electrolyte Balance  Outcome: Ongoing, Progressing     Problem: Fall Injury Risk  Goal: Absence of Fall and Fall-Related Injury  Outcome: Ongoing, Progressing     Problem: Adjustment to Illness (Gastrointestinal Bleeding)  Goal: Optimal Coping with Acute Illness  Outcome: Ongoing, Progressing

## 2023-02-08 NOTE — NURSING
Report received from night nurse CHAGO Stewart. Telesitter at the bedside. Visualized patient and assessed patient's overall condition and appearance. No acute distress noted. Will continue to monitor

## 2023-02-08 NOTE — PROGRESS NOTES
[facility] Progress Note    Patient Complaint: blood in stool    PCP:   Qi Ramon       LOS: 2        Initial History of Present Illness (HPI):  This is a 87 y.o. female consulted to GI service for GI bleed. PMH HFrEF (EF: 20%, GIIDD, s/p AICD), CAD (on DAPT), CKD3, HTN, HLD. Admitted for GI hemorrhage. Patient complaint of acute recurrence of dark red blood in stool with associated symptoms of fatigue, weakness and abdominal cramping that occurred last night. Patient with recent discharge from inpatient on 2/4 with same complaint. Underwent colonoscopy 2/3 and diverticulosis noted with blood in entire colon. Encouraged to stop plavix, patient reports she is no longer on plavix.  Labs 8.1 now 7.8. CT unremarkable.    Interval Hx  No overt bleeding. Hgb stable. Tolerating diet.      Review of Systems   Constitutional:  Positive for fatigue. Negative for activity change, chills, diaphoresis and fever.   HENT:  Negative for congestion, drooling, rhinorrhea, sore throat and trouble swallowing.    Eyes:  Negative for discharge.   Respiratory:  Negative for cough, shortness of breath and wheezing.    Cardiovascular:  Negative for chest pain, palpitations and leg swelling.   Gastrointestinal:  Positive for blood in stool. Negative for abdominal distention, abdominal pain, anal bleeding, constipation, diarrhea, nausea, rectal pain and vomiting.   Endocrine: Negative for cold intolerance and heat intolerance.   Genitourinary:  Negative for frequency, hematuria and urgency.   Musculoskeletal:  Negative for arthralgias.   Skin:  Negative for color change, pallor and rash.   Neurological:  Positive for weakness. Negative for syncope, facial asymmetry and numbness.   Psychiatric/Behavioral:  Negative for agitation and confusion. The patient is not nervous/anxious.          Medical History:  has a past medical history of Cataract, CHF (congestive heart failure), CKD (chronic kidney disease), stage III,  Coronary artery disease, Gout attack, Hypercholesteremia, Hypertension, Renal disorder, and Vaginal delivery.    Surgical History:  has a past surgical history that includes Hysterectomy; Total knee arthroplasty (Bilateral, Lt.= 2005; Rt.=2006); defribillator (Left, 8/2008); Cholecystectomy; Appendectomy; Colonoscopy w/ polypectomy (10 or 11/ 2014); Cardiac defibrillator placement; Oophorectomy; Joint replacement (Bilateral, 2005 & 2006); Eye surgery; Intraocular prosthesis insertion (Left, 2/7/2019); Phacoemulsification of cataract (Left, 2/7/2019); Phacoemulsification of cataract (Right, 2/21/2019); Intraocular prosthesis insertion (Right, 2/21/2019); Cataract extraction w/  intraocular lens implant (Left, 02/07/2019); Cataract extraction w/  intraocular lens implant (Right, 02/21/2019); Esophagogastroduodenoscopy (N/A, 1/31/2023); and Colonoscopy (N/A, 2/3/2023).    Family History: family history includes Amblyopia in her son; Diabetes in an other family member; Heart attack (age of onset: 88) in her mother; Hyperlipidemia in her mother; Hypertension in her mother and another family member..     Social History:  reports that she has never smoked. She has never used smokeless tobacco. She reports that she does not currently use alcohol. She reports that she does not use drugs.    Review of patient's allergies indicates:   Allergen Reactions    Aspirin Swelling     Swelling and rash    Colace [docusate sodium] Rash     itching    Docusate Other (See Comments) and Rash     itching    Sulfa (sulfonamide antibiotics) Swelling     Swelling and rash  Swelling and rash    Iodine and iodide containing products Rash    Penicillins Rash       Current Facility-Administered Medications on File Prior to Encounter   Medication Dose Route Frequency Provider Last Rate Last Admin    0.9%  NaCl infusion   Intravenous Continuous Demetrius Velazco MD        phenylephrine HCL 2.5% ophthalmic solution 1 drop  1 drop Right Eye On  Call Procedure Demetrius Velazco MD   1 drop at 02/21/19 0725    proparacaine 0.5 % ophthalmic solution 1 drop  1 drop Right Eye On Call Procedure Demetrius Velazco MD   1 drop at 02/21/19 0706    tropicamide 1% ophthalmic solution 1 drop  1 drop Right Eye On Call Procedure Demetrius Velazco MD   1 drop at 02/21/19 0726     Current Outpatient Medications on File Prior to Encounter   Medication Sig Dispense Refill    acetaminophen (TYLENOL) 325 MG tablet Take 325 mg by mouth once daily.      allopurinol (ZYLOPRIM) 100 MG tablet Take 100 mg by mouth every evening.       cranberry 400 mg Cap Take 1 capsule by mouth 2 (two) times daily.      fish oil-omega-3 fatty acids 300-1,000 mg capsule Take 2 g by mouth once daily. 1 caps every AM & 1 cap every PM.      folic acid/multivit-min/lutein (CENTRUM SILVER ORAL) Take by mouth once daily.      furosemide (LASIX) 80 MG tablet TAKE 1 TABLET BY MOUTH TWICE DAILY AS NEEDED FOR SHORTNESS OF BREATH OR  EDEMA (Patient taking differently: Take 80 mg by mouth once daily.) 180 tablet 3    HYDROcodone-acetaminophen (NORCO) 5-325 mg per tablet Take 1 tablet by mouth every 8 (eight) hours as needed for Pain. (Patient not taking: Reported on 2/6/2023) 9 tablet 0    HYDROcodone-acetaminophen (NORCO) 5-325 mg per tablet Take 1 tablet by mouth every 6 (six) hours as needed for Pain. (Patient not taking: Reported on 2/6/2023) 8 tablet 0    lovastatin (MEVACOR) 40 MG tablet Take 40 mg by mouth nightly. Takes 2 caps with supper every evening.      mirabegron (MYRBETRIQ) 50 mg Tb24 Take 50 mg by mouth once daily.       NEURONTIN 100 mg capsule Take 100 mg by mouth 2 (two) times daily.      ondansetron (ZOFRAN-ODT) 4 MG TbDL Take 1 tablet (4 mg total) by mouth every 8 (eight) hours as needed (nausea). (Patient not taking: Reported on 2/6/2023) 20 tablet 0    pantoprazole (PROTONIX) 40 MG tablet Take 40 mg by mouth once daily.      sacubitriL-valsartan (ENTRESTO) 24-26 mg per  tablet Take 1 tablet by mouth 2 (two) times daily. (Patient taking differently: Take 1 tablet by mouth once daily.) 180 tablet 3        Objective Findings:    Vital Signs:  Temp:  [97.5 °F (36.4 °C)-97.8 °F (36.6 °C)]   Pulse:  [67-82]   Resp:  [16-19]   BP: (106-132)/(51-67)   SpO2:  [96 %-99 %]   Body mass index is 22.53 kg/m².      Physical Exam  Vitals and nursing note reviewed.   Constitutional:       Appearance: Normal appearance.   HENT:      Head: Normocephalic.      Nose: Nose normal.      Mouth/Throat:      Mouth: Mucous membranes are moist.   Eyes:      Pupils: Pupils are equal, round, and reactive to light.   Cardiovascular:      Heart sounds: Normal heart sounds.   Pulmonary:      Effort: Pulmonary effort is normal.      Breath sounds: Normal breath sounds.   Abdominal:      General: Bowel sounds are normal. There is no distension.      Palpations: Abdomen is soft.      Tenderness: There is no abdominal tenderness.   Musculoskeletal:         General: Normal range of motion.      Cervical back: Normal range of motion.   Skin:     General: Skin is warm and dry.      Capillary Refill: Capillary refill takes less than 2 seconds.   Neurological:      General: No focal deficit present.      Mental Status: She is alert. Mental status is at baseline.   Psychiatric:         Mood and Affect: Mood normal.         Behavior: Behavior normal.         Thought Content: Thought content normal.         Judgment: Judgment normal.             Labs:  Lab Results   Component Value Date    WBC 5.41 2023    HGB 8.1 (L) 2023    HCT 25.8 (L) 2023     (L) 2023    CHOL 158 2021    TRIG 95 2021    HDL 62 2021    ALT 12 2023    AST 21 2023     2023    K 3.6 2023     2023    CREATININE 1.4 2023    BUN 41 (H) 2023    CO2 27 2023    TSH 1.335 2022    INR 1.2 2023    HGBA1C 5.0 2022             Imagin/6 CT  abdomen pelvis-There is no gross abdominal adenopathy.  There is mesenteric edema and anasarca.There are no pelvic masses or adenopathy.  There is small free pelvic fluid.  The uterus is surgically absent.      Endoscopy: 2/2023 Colonoscopy- - Friability with contact bleeding in the cecum.                          - Blood in the entire examined colon.                          - A tattoo was seen in the sigmoid colon.                          - Diverticulosis in the sigmoid colon.                          - Internal hemorrhoids.                          - No specimens collected.   1/31/23 EGD- Normal esophagus.                          - Normal stomach.                          - Bleeding friable duodenal mucosa.                          - No specimens collected.     I have independently reviewed and interpreted the imaging above    Assessment:  Patient is a .87 y.o. y/o .female with  has a past medical history of Cataract, CHF (congestive heart failure), CKD (chronic kidney disease), stage III, Coronary artery disease, Gout attack, Hypercholesteremia, Hypertension, Renal disorder, and Vaginal delivery. Consulted to the GI service for GI bleed.               Recommendations:  Melena. Symptomatic anemia.GI bleed. Diverticulosis.    Continue to monitor counts. Transfuse if below 7. If counts should drop abruptly or patient experience increased occurrences of overt bleeding rec CTA and possible role for video capsule endoscopy. OK to resume diet.    Hgb stable. No episodes of bleeding. Ok to d/c from gi standpoint. F/u in clinic March 30.    Will sign off  Thank you so much for allowing us to participate in the care of Palmira Malave . Please contact us if you have any additional questions.    Joaquina Verde NP  Gastroenterology  [facility] - Med Surg

## 2023-02-09 LAB
ALBUMIN SERPL BCP-MCNC: 2.9 G/DL (ref 3.5–5.2)
ALP SERPL-CCNC: 59 U/L (ref 55–135)
ALT SERPL W/O P-5'-P-CCNC: 11 U/L (ref 10–44)
ANION GAP SERPL CALC-SCNC: 7 MMOL/L (ref 8–16)
AST SERPL-CCNC: 20 U/L (ref 10–40)
BASOPHILS # BLD AUTO: 0.02 K/UL (ref 0–0.2)
BASOPHILS NFR BLD: 0.3 % (ref 0–1.9)
BILIRUB SERPL-MCNC: 1.6 MG/DL (ref 0.1–1)
BUN SERPL-MCNC: 39 MG/DL (ref 8–23)
CALCIUM SERPL-MCNC: 9 MG/DL (ref 8.7–10.5)
CHLORIDE SERPL-SCNC: 105 MMOL/L (ref 95–110)
CO2 SERPL-SCNC: 27 MMOL/L (ref 23–29)
CREAT SERPL-MCNC: 1.5 MG/DL (ref 0.5–1.4)
DIFFERENTIAL METHOD: ABNORMAL
EOSINOPHIL # BLD AUTO: 0.1 K/UL (ref 0–0.5)
EOSINOPHIL NFR BLD: 2.1 % (ref 0–8)
ERYTHROCYTE [DISTWIDTH] IN BLOOD BY AUTOMATED COUNT: 18.3 % (ref 11.5–14.5)
EST. GFR  (NO RACE VARIABLE): 34 ML/MIN/1.73 M^2
GLUCOSE SERPL-MCNC: 73 MG/DL (ref 70–110)
HCT VFR BLD AUTO: 24.5 % (ref 37–48.5)
HGB BLD-MCNC: 7.6 G/DL (ref 12–16)
IMM GRANULOCYTES # BLD AUTO: 0.04 K/UL (ref 0–0.04)
IMM GRANULOCYTES NFR BLD AUTO: 0.6 % (ref 0–0.5)
LYMPHOCYTES # BLD AUTO: 1 K/UL (ref 1–4.8)
LYMPHOCYTES NFR BLD: 15.1 % (ref 18–48)
MAGNESIUM SERPL-MCNC: 1.7 MG/DL (ref 1.6–2.6)
MCH RBC QN AUTO: 29.7 PG (ref 27–31)
MCHC RBC AUTO-ENTMCNC: 31 G/DL (ref 32–36)
MCV RBC AUTO: 96 FL (ref 82–98)
MONOCYTES # BLD AUTO: 1 K/UL (ref 0.3–1)
MONOCYTES NFR BLD: 14.1 % (ref 4–15)
NEUTROPHILS # BLD AUTO: 4.6 K/UL (ref 1.8–7.7)
NEUTROPHILS NFR BLD: 67.8 % (ref 38–73)
NRBC BLD-RTO: 0 /100 WBC
PHOSPHATE SERPL-MCNC: 2.6 MG/DL (ref 2.7–4.5)
PLATELET # BLD AUTO: 109 K/UL (ref 150–450)
PMV BLD AUTO: 10.7 FL (ref 9.2–12.9)
POTASSIUM SERPL-SCNC: 3.7 MMOL/L (ref 3.5–5.1)
PROT SERPL-MCNC: 5.2 G/DL (ref 6–8.4)
RBC # BLD AUTO: 2.56 M/UL (ref 4–5.4)
SODIUM SERPL-SCNC: 139 MMOL/L (ref 136–145)
WBC # BLD AUTO: 6.76 K/UL (ref 3.9–12.7)

## 2023-02-09 PROCEDURE — 21400001 HC TELEMETRY ROOM

## 2023-02-09 PROCEDURE — 97116 GAIT TRAINING THERAPY: CPT

## 2023-02-09 PROCEDURE — 83735 ASSAY OF MAGNESIUM: CPT | Performed by: INTERNAL MEDICINE

## 2023-02-09 PROCEDURE — 85025 COMPLETE CBC W/AUTO DIFF WBC: CPT | Performed by: HOSPITALIST

## 2023-02-09 PROCEDURE — 25000003 PHARM REV CODE 250: Performed by: HOSPITALIST

## 2023-02-09 PROCEDURE — 25000003 PHARM REV CODE 250: Performed by: INTERNAL MEDICINE

## 2023-02-09 PROCEDURE — 84100 ASSAY OF PHOSPHORUS: CPT | Performed by: INTERNAL MEDICINE

## 2023-02-09 PROCEDURE — 36415 COLL VENOUS BLD VENIPUNCTURE: CPT | Performed by: INTERNAL MEDICINE

## 2023-02-09 PROCEDURE — 80053 COMPREHEN METABOLIC PANEL: CPT | Performed by: INTERNAL MEDICINE

## 2023-02-09 RX ADMIN — PANTOPRAZOLE SODIUM 40 MG: 40 TABLET, DELAYED RELEASE ORAL at 08:02

## 2023-02-09 RX ADMIN — HYDROCODONE BITARTRATE AND ACETAMINOPHEN 1 TABLET: 5; 325 TABLET ORAL at 08:02

## 2023-02-09 RX ADMIN — HYDROCODONE BITARTRATE AND ACETAMINOPHEN 1 TABLET: 5; 325 TABLET ORAL at 10:02

## 2023-02-09 RX ADMIN — FUROSEMIDE 40 MG: 40 TABLET ORAL at 08:02

## 2023-02-09 RX ADMIN — MIDODRINE HYDROCHLORIDE 10 MG: 5 TABLET ORAL at 08:02

## 2023-02-09 RX ADMIN — ALLOPURINOL 100 MG: 100 TABLET ORAL at 08:02

## 2023-02-09 RX ADMIN — ATORVASTATIN CALCIUM 80 MG: 40 TABLET, FILM COATED ORAL at 08:02

## 2023-02-09 RX ADMIN — MIDODRINE HYDROCHLORIDE 10 MG: 5 TABLET ORAL at 02:02

## 2023-02-09 NOTE — ASSESSMENT & PLAN NOTE
"She had abnormalities on both colonoscopy and EGD last admission, so could be either  GI had recommended possible VCE vs CTA GI bleed study   -I see no evidence currently to suggest acute lower GI bleeding   -She is in [facility] and has "iodine containing product" allergy, making CTA risky  The patient tells me her stools, "have been black as best I can tell, though my daughter says brown."   -The patient has a poor memory of events and is mixed up on her hospital visits, so I am not sure how reliable this is   -Her daughter did, however, also report worsening weakness and continued melanotic stools for the past week  She got Protonix 80mg iv x 1 in the ED, and I will continue Protonix 40mg iv q12 hours  Consult GI to see if she needs re-scoping   hold all anti-PLT and anticoagulant meds  Serial CBC  Move to MICU if worsens or more hypotensive.  No plan for intervention by GI at this time.her HH is stable at this time,will continue monitor.still has melena.    "

## 2023-02-09 NOTE — PLAN OF CARE
Problem: Physical Therapy  Goal: Physical Therapy Goal  Description: Goals to be met by: 23     Patient will increase functional independence with mobility by performin. Pt to be (S) with bed mobility.  2. Pt to transfer with SBA.  3. Pt to ambulate 50' with RW SBA.  4. Pt to go up/down 5 steps using (B) HRs and CGA.  5. Pt to be (I) with HEP.    Outcome: Ongoing, Progressing   Pt more alert and able to participate, returned to bed /p tx per nurse request.

## 2023-02-09 NOTE — NURSING
Report received from night nurse CHAGO Sommers. Visualized patient and assessed patient's overall condition and appearance. No acute distress noted. Will continue to monitor

## 2023-02-09 NOTE — PT/OT/SLP PROGRESS
Physical Therapy Treatment    Patient Name:  Palmira Malave   MRN:  [id number]    Recommendations:     Discharge Recommendations: home health PT  Discharge Equipment Recommendations: none    Assessment:     Palmira Malave is a 87 y.o. female admitted with a medical diagnosis of GI hemorrhage.  She presents with the following impairments/functional limitations: impaired endurance, impaired functional mobility, gait instability, decreased lower extremity function .    Rehab Prognosis: Fair; patient would benefit from acute skilled PT services to address these deficits and reach maximum level of function.    Recent Surgery: * No surgery found *      Plan:     During this hospitalization, patient to be seen 5 x/week to address the identified rehab impairments via gait training, therapeutic activities, therapeutic exercises and progress toward the following goals:    Plan of Care Expires:  02/14/23    Subjective     Chief Complaint: None  Patient/Family Comments/goals: Pt agreeable to PT treatment today.  Pain/Comfort:  Pain Rating 1: 0/10      Objective:     Communicated with nurse this am, requested that pt not be left in chair today.  Patient found right sidelying, asleep with PureWick, telemetry, bed alarm upon PT entry to room. Pt easily aroused.    General Precautions: Standard,    Orthopedic Precautions: N/A  Braces: N/A  Respiratory Status: Room air     Functional Mobility:  Bed Mobility:     Supine to Sit: minimum assistance  Sit to Supine: stand by assistance and minimum assistance  Transfers:     Sit to Stand:  minimum assistance with rolling walker  Gait: 50' w/RW CGA/min A for turns.      AM-PAC 6 CLICK MOBILITY  Turning over in bed (including adjusting bedclothes, sheets and blankets)?: 3  Sitting down on and standing up from a chair with arms (e.g., wheelchair, bedside commode, etc.): 3  Moving from lying on back to sitting on the side of the bed?: 3  Moving to and from a bed to a chair (including a  wheelchair)?: 3  Need to walk in hospital room?: 3  Climbing 3-5 steps with a railing?: 3  Basic Mobility Total Score: 18       Treatment & Education:  Pt transferred from bed, ambulated in hallway; audible crepitus possibly (L) hip.  Pt returned to supine in bed.    Patient left supine with call button in reach and PCT present reattaching monitor leads.    GOALS:   Multidisciplinary Problems       Physical Therapy Goals          Problem: Physical Therapy    Goal Priority Disciplines Outcome Goal Variances Interventions   Physical Therapy Goal     PT, PT/OT Ongoing, Progressing     Description: Goals to be met by: 23     Patient will increase functional independence with mobility by performin. Pt to be (S) with bed mobility.  2. Pt to transfer with SBA.  3. Pt to ambulate 50' with RW SBA.  4. Pt to go up/down 5 steps using (B) HRs and CGA.  5. Pt to be (I) with HEP.                         Time Tracking:     PT Received On: 23  PT Start Time: 1302     PT Stop Time: 1318  PT Total Time (min): 16 min     Billable Minutes: Gait Training 16    Treatment Type: Treatment  PT/PTA: PT           2023

## 2023-02-09 NOTE — PLAN OF CARE
One episode of rectal bleeding overnight reported by nurse assistant.    Problem: Adult Inpatient Plan of Care  Goal: Plan of Care Review  Outcome: Ongoing, Progressing  Goal: Patient-Specific Goal (Individualized)  Outcome: Ongoing, Progressing  Goal: Absence of Hospital-Acquired Illness or Injury  Outcome: Ongoing, Progressing  Goal: Optimal Comfort and Wellbeing  Outcome: Ongoing, Progressing  Goal: Readiness for Transition of Care  Outcome: Ongoing, Progressing     Problem: Fluid and Electrolyte Imbalance (Acute Kidney Injury/Impairment)  Goal: Fluid and Electrolyte Balance  Outcome: Ongoing, Progressing     Problem: Oral Intake Inadequate (Acute Kidney Injury/Impairment)  Goal: Optimal Nutrition Intake  Outcome: Ongoing, Progressing     Problem: Renal Function Impairment (Acute Kidney Injury/Impairment)  Goal: Effective Renal Function  Outcome: Ongoing, Progressing     Problem: Impaired Wound Healing  Goal: Optimal Wound Healing  Outcome: Ongoing, Progressing     Problem: Fall Injury Risk  Goal: Absence of Fall and Fall-Related Injury  Outcome: Ongoing, Progressing     Problem: Skin Injury Risk Increased  Goal: Skin Health and Integrity  Outcome: Ongoing, Progressing     Problem: Adjustment to Illness (Gastrointestinal Bleeding)  Goal: Optimal Coping with Acute Illness  Outcome: Ongoing, Progressing     Problem: Bleeding (Gastrointestinal Bleeding)  Goal: Hemostasis  Outcome: Ongoing, Progressing

## 2023-02-09 NOTE — NURSING
Bedside Report given to night nurse CHAGO Sommers. Walking rounds completed. Visualized and assessed patient NAD noted. Safety precautions maintained and call light within reach.     Chart check completed.

## 2023-02-09 NOTE — SUBJECTIVE & OBJECTIVE
Past Medical History:   Diagnosis Date    Cataract     CHF (congestive heart failure)     CKD (chronic kidney disease), stage III     Coronary artery disease     Gout attack     Hypercholesteremia     Hypertension     Renal disorder     Vaginal delivery     x4       Past Surgical History:   Procedure Laterality Date    APPENDECTOMY      CARDIAC DEFIBRILLATOR PLACEMENT      2015    CATARACT EXTRACTION W/  INTRAOCULAR LENS IMPLANT Left 02/07/2019    Dr. Velazco    CATARACT EXTRACTION W/  INTRAOCULAR LENS IMPLANT Right 02/21/2019    Dr. Velazco    CHOLECYSTECTOMY      COLONOSCOPY N/A 2/3/2023    Procedure: COLONOSCOPY;  Surgeon: Jordon Sotomayor MD;  Location: [facility] ENDO;  Service: Endoscopy;  Laterality: N/A;    COLONOSCOPY W/ POLYPECTOMY  10 or 11/ 2014    per Dr. Elen ceja Left 8/2008    ESOPHAGOGASTRODUODENOSCOPY N/A 1/31/2023    Procedure: EGD (ESOPHAGOGASTRODUODENOSCOPY);  Surgeon: Jordon Sotomayor MD;  Location: [facility] ENDO;  Service: Endoscopy;  Laterality: N/A;    EYE SURGERY      HYSTERECTOMY      INTRAOCULAR PROSTHESES INSERTION Left 2/7/2019    Procedure: INSERTION, IOL PROSTHESIS;  Surgeon: Demetrius Velazco MD;  Location: [facility] OR;  Service: Ophthalmology;  Laterality: Left;  RN Phone Pre OP 1-30-19.  Arrival 05:30 AM    INTRAOCULAR PROSTHESES INSERTION Right 2/21/2019    Procedure: INSERTION, IOL PROSTHESIS;  Surgeon: Demetrius Velazco MD;  Location: [facility] OR;  Service: Ophthalmology;  Laterality: Right;    JOINT REPLACEMENT Bilateral 2005 & 2006    2 Knee Replacements=Lt. 1= 2005. Rt. 1= 2006    OOPHORECTOMY      PHACOEMULSIFICATION OF CATARACT Left 2/7/2019    Procedure: PHACOEMULSIFICATION, CATARACT;  Surgeon: Demetrius Velazco MD;  Location: [facility] OR;  Service: Ophthalmology;  Laterality: Left;    PHACOEMULSIFICATION OF CATARACT Right 2/21/2019    Procedure: PHACOEMULSIFICATION, CATARACT;  Surgeon: Demetrius Velazco MD;  Location: [facility] OR;  Service:  Ophthalmology;  Laterality: Right;  RN Phone Pre op 2-15-19.  Arrival 05:30 AM    TOTAL KNEE ARTHROPLASTY Bilateral Lt.= 2005; Rt.=2006    Bilateral Knee scope       Review of patient's allergies indicates:   Allergen Reactions    Aspirin Swelling     Swelling and rash    Colace [docusate sodium] Rash     itching    Docusate Other (See Comments) and Rash     itching    Sulfa (sulfonamide antibiotics) Swelling     Swelling and rash  Swelling and rash    Iodine and iodide containing products Rash    Penicillins Rash       Current Facility-Administered Medications on File Prior to Encounter   Medication    0.9%  NaCl infusion    phenylephrine HCL 2.5% ophthalmic solution 1 drop    proparacaine 0.5 % ophthalmic solution 1 drop    tropicamide 1% ophthalmic solution 1 drop     Current Outpatient Medications on File Prior to Encounter   Medication Sig    acetaminophen (TYLENOL) 325 MG tablet Take 325 mg by mouth once daily.    allopurinol (ZYLOPRIM) 100 MG tablet Take 100 mg by mouth every evening.     cranberry 400 mg Cap Take 1 capsule by mouth 2 (two) times daily.    fish oil-omega-3 fatty acids 300-1,000 mg capsule Take 2 g by mouth once daily. 1 caps every AM & 1 cap every PM.    folic acid/multivit-min/lutein (CENTRUM SILVER ORAL) Take by mouth once daily.    furosemide (LASIX) 80 MG tablet TAKE 1 TABLET BY MOUTH TWICE DAILY AS NEEDED FOR SHORTNESS OF BREATH OR  EDEMA (Patient taking differently: Take 80 mg by mouth once daily.)    HYDROcodone-acetaminophen (NORCO) 5-325 mg per tablet Take 1 tablet by mouth every 8 (eight) hours as needed for Pain. (Patient not taking: Reported on 2/6/2023)    HYDROcodone-acetaminophen (NORCO) 5-325 mg per tablet Take 1 tablet by mouth every 6 (six) hours as needed for Pain. (Patient not taking: Reported on 2/6/2023)    lovastatin (MEVACOR) 40 MG tablet Take 40 mg by mouth nightly. Takes 2 caps with supper every evening.    mirabegron (MYRBETRIQ) 50 mg Tb24 Take 50 mg by mouth once  daily.     NEURONTIN 100 mg capsule Take 100 mg by mouth 2 (two) times daily.    ondansetron (ZOFRAN-ODT) 4 MG TbDL Take 1 tablet (4 mg total) by mouth every 8 (eight) hours as needed (nausea). (Patient not taking: Reported on 2/6/2023)    pantoprazole (PROTONIX) 40 MG tablet Take 40 mg by mouth once daily.    sacubitriL-valsartan (ENTRESTO) 24-26 mg per tablet Take 1 tablet by mouth 2 (two) times daily. (Patient taking differently: Take 1 tablet by mouth once daily.)     Family History       Problem Relation (Age of Onset)    Amblyopia Son    Diabetes Other    Heart attack Mother (88)    Hyperlipidemia Mother    Hypertension Mother, Other          Tobacco Use    Smoking status: Never    Smokeless tobacco: Never   Substance and Sexual Activity    Alcohol use: Not Currently    Drug use: No    Sexual activity: Not Currently     Partners: Male     Review of Systems   Constitutional:  Positive for fatigue. Negative for chills and fever.   HENT:  Negative for congestion.    Eyes:  Negative for visual disturbance.   Respiratory:  Negative for cough and shortness of breath.    Cardiovascular:  Negative for chest pain.   Gastrointestinal:  Positive for anal bleeding and blood in stool. Negative for abdominal pain, constipation, diarrhea, nausea and vomiting.   Genitourinary:  Negative for dysuria.   Musculoskeletal:  Negative for back pain.        +leg cramping.    Skin:  Negative for rash.   Allergic/Immunologic: Negative for immunocompromised state.   Neurological:  Positive for weakness and light-headedness. Negative for dizziness.   Hematological:  Bruises/bleeds easily.   Psychiatric/Behavioral:  Negative for confusion. The patient is not nervous/anxious.    Objective:     Vital Signs (Most Recent):  Temp: 97.8 °F (36.6 °C) (02/09/23 0726)  Pulse: 81 (02/09/23 0726)  Resp: 18 (02/09/23 1016)  BP: (!) 104/51 (02/09/23 0726)  SpO2: 99 % (02/09/23 0726)   Vital Signs (24h Range):  Temp:  [97.5 °F (36.4 °C)-98.7 °F (37.1  °C)] 97.8 °F (36.6 °C)  Pulse:  [75-89] 81  Resp:  [17-19] 18  SpO2:  [96 %-99 %] 99 %  BP: ()/(51-61) 104/51     Weight: 57.7 kg (127 lb 3.3 oz)  Body mass index is 22.53 kg/m².    Physical Exam  Vitals and nursing note reviewed.   Constitutional:       General: She is not in acute distress.     Appearance: She is well-developed. She is not diaphoretic.   HENT:      Head: Normocephalic and atraumatic.      Mouth/Throat:      Pharynx: No oropharyngeal exudate.      Comments: Poor dentition.   Eyes:      General: No scleral icterus.        Right eye: No discharge.         Left eye: No discharge.      Extraocular Movements: EOM normal.      Conjunctiva/sclera: Conjunctivae normal.      Pupils: Pupils are equal, round, and reactive to light.   Neck:      Thyroid: No thyromegaly.      Vascular: No JVD.      Trachea: No tracheal deviation.   Cardiovascular:      Rate and Rhythm: Normal rate and regular rhythm.      Heart sounds: Murmur heard.   Systolic murmur is present with a grade of 1/6.     No friction rub. No gallop.   Pulmonary:      Effort: Pulmonary effort is normal. No respiratory distress.      Breath sounds: Normal breath sounds. No stridor. No wheezing or rales.      Comments: Left upper chest wall AICD in place.   Chest:      Chest wall: No tenderness.   Abdominal:      General: Bowel sounds are normal. There is no distension.      Palpations: Abdomen is soft. There is no mass.      Tenderness: There is no abdominal tenderness. There is no guarding or rebound.   Musculoskeletal:         General: No tenderness. Normal range of motion.      Cervical back: Normal range of motion and neck supple.      Right lower leg: Edema present.      Left lower leg: Edema present.      Comments: 1+ pitting edema to BLE.    Lymphadenopathy:      Cervical: No cervical adenopathy.   Skin:     General: Skin is warm and dry.      Coloration: Skin is not pale.      Findings: Bruising present. No erythema or rash.       Comments: Easy bruising noted.    Neurological:      Mental Status: She is alert and oriented to person, place, and time.      Cranial Nerves: No cranial nerve deficit.      Motor: No abnormal muscle tone.      Coordination: Coordination normal.      Deep Tendon Reflexes: Reflexes are normal and symmetric. Reflexes normal.      Comments: Bilateral total knee replacement scars.    Psychiatric:         Behavior: Behavior normal.         Thought Content: Thought content normal.         Judgment: Judgment normal.         CRANIAL NERVES     CN III, IV, VI   Pupils are equal, round, and reactive to light.  Extraocular motions are normal.      Significant Labs: All pertinent labs within the past 24 hours have been reviewed.  Recent Results (from the past 12 hour(s))   Comprehensive Metabolic Panel (CMP)    Collection Time: 02/09/23  5:30 AM   Result Value Ref Range    Sodium 139 136 - 145 mmol/L    Potassium 3.7 3.5 - 5.1 mmol/L    Chloride 105 95 - 110 mmol/L    CO2 27 23 - 29 mmol/L    Glucose 73 70 - 110 mg/dL    BUN 39 (H) 8 - 23 mg/dL    Creatinine 1.5 (H) 0.5 - 1.4 mg/dL    Calcium 9.0 8.7 - 10.5 mg/dL    Total Protein 5.2 (L) 6.0 - 8.4 g/dL    Albumin 2.9 (L) 3.5 - 5.2 g/dL    Total Bilirubin 1.6 (H) 0.1 - 1.0 mg/dL    Alkaline Phosphatase 59 55 - 135 U/L    AST 20 10 - 40 U/L    ALT 11 10 - 44 U/L    Anion Gap 7 (L) 8 - 16 mmol/L    eGFR 34 (A) >60 mL/min/1.73 m^2   Magnesium    Collection Time: 02/09/23  5:30 AM   Result Value Ref Range    Magnesium 1.7 1.6 - 2.6 mg/dL   Phosphorus    Collection Time: 02/09/23  5:30 AM   Result Value Ref Range    Phosphorus 2.6 (L) 2.7 - 4.5 mg/dL   CBC auto differential    Collection Time: 02/09/23  5:30 AM   Result Value Ref Range    WBC 6.76 3.90 - 12.70 K/uL    RBC 2.56 (L) 4.00 - 5.40 M/uL    Hemoglobin 7.6 (L) 12.0 - 16.0 g/dL    Hematocrit 24.5 (L) 37.0 - 48.5 %    MCV 96 82 - 98 fL    MCH 29.7 27.0 - 31.0 pg    MCHC 31.0 (L) 32.0 - 36.0 g/dL    RDW 18.3 (H) 11.5 - 14.5 %     Platelets 109 (L) 150 - 450 K/uL    MPV 10.7 9.2 - 12.9 fL    Immature Granulocytes 0.6 (H) 0.0 - 0.5 %    Gran # (ANC) 4.6 1.8 - 7.7 K/uL    Immature Grans (Abs) 0.04 0.00 - 0.04 K/uL    Lymph # 1.0 1.0 - 4.8 K/uL    Mono # 1.0 0.3 - 1.0 K/uL    Eos # 0.1 0.0 - 0.5 K/uL    Baso # 0.02 0.00 - 0.20 K/uL    nRBC 0 0 /100 WBC    Gran % 67.8 38.0 - 73.0 %    Lymph % 15.1 (L) 18.0 - 48.0 %    Mono % 14.1 4.0 - 15.0 %    Eosinophil % 2.1 0.0 - 8.0 %    Basophil % 0.3 0.0 - 1.9 %    Differential Method Automated         Significant Imaging: I have reviewed all pertinent imaging results/findings within the past 24 hours.    CT abdomen pelvis w/out Contrast:   Impression:     No acute findings on CT abdomen and pelvis without contrast.     Mesenteric edema, anasarca and small free pelvic fluid.     Colonic diverticulosis.     Cardiomegaly, pulmonary vascular congestion, and bilateral small pleural effusions.  Overall findings are concerning for CHF.        Electronically signed by: Sachi Warren  Date:                                            02/06/2023  Time:                                           22:52    CXR:   FINDINGS:  Single portable chest view is submitted.  Cardiac pacemaker again noted.  The heart size appears enlarged, the appearance of the cardiomediastinal silhouette is stable when accounting for difference in position.  Central pulmonary vascular prominence again noted, bilateral perihilar infiltrate again noted with mild progression.  Bilateral pattern of interstitial and ground-glass infiltrate noted with mild progression.  There may be small pleural effusions, there is no large pleural effusion and there is no pneumothorax.  The osseous structures demonstrate chronic change.     Impression:     Mild progression of radiographic findings, as discussed above.        Electronically signed by: Fernando Galvan  Date:                                            02/06/2023  Time:                                            21:09        CT abdomen and pelvis from 2/4/23:   Impression:     No acute findings on CT abdomen and pelvis without contrast.     Colonic diverticulosis.     Small free pelvic fluid.     Cardiomegaly.  Right pleural effusion.  Ground-glass opacities in the lung parenchyma.  Anasarca.  Findings may suggest mild CHF.        Electronically signed by: Sachi Warren  Date:                                            02/04/2023  Time:                                           22:28      Colonoscopy from 2/3/23:   Impression:              - Friability with contact bleeding in the cecum.   - Blood in the entire examined colon.   - A tattoo was seen in the sigmoid colon.   - Diverticulosis in the sigmoid colon.   - Internal hemorrhoids.   - No specimens collected.     Recommendation:          - [facility] patient to [facility] care.   - Resume previous diet and consider discontinuing plavix   - Continue present medications.   - Trend CBC and monitor for signs of bleeding   - CTA to look for active diverticular bleed if recurrent signs of bleeding   - No repeat colonoscopy due to age.     Jordon Sotomayor MD   2/3/2023 1:53:32 PM

## 2023-02-09 NOTE — PROGRESS NOTES
"[facility] Medicine  Progress Note    Patient Name: Palmira Malave  MRN: [id number]  Patient Class: IP- Inpatient   Admission Date: 2/6/2023  Length of Stay: 3 days  Attending Physician: Edilberto Chowdhury MD  Primary Care Provider: Qi Ramon MD        Subjective:     Principal Problem:GI hemorrhage        HPI:  Ms Malave is a 86yo lady with a past medical history of HFrEF (EF: 20%, GIIDD, s/p AICD), CAD (on DAPT), CKD3, HTN, HLD    She was admitted on 01/30/2023 for melena with concerns for GI bleed and acute blood loss anemia requiring transfusion. Hgb noted to be 6.4 on outpatient labs on 01/30 and was instructed to come to ED for evaluation. Was given 1U of pRBC on 01/30 with appropiate response (hgb 7.4 on 01/31). GI consulted- s/p EGD on 01/31. Showed friable mucosa noted in duodenal bulb. Likely source of bleeding. GI recommends discontinuing plavix on discharge and f/u with GI outpatient for anemia workup.     She returned to ED on 2/4/23 w/ lower abdominal pain w/ radiation to her back, and lightheadedness, after being discharged.   ED workup notable for hemoglobin 8.7, CMP with creatinine 1.7, BUN 47, lipase 15, CT abdomen pelvis without any acute findings present, colonic diverticulosis present, small free pelvic fluid present.  Patient presentation consistent with suspected abdominal pain likely secondary to recent colonoscopy. Patient treated symptomatically with complete improvement and discharged.       She returned to the ED today for concerns of rectal bleeding, generalized abdominal pain that worsens when using the restroom, fatigue, decreased appetite, and lightheadedness. Patient reports she has still been experiencing persistent rectal bleeding and noted an episode of "black" melena yesterday that "my daughter said was brown, but I saw that it was black". She denies any rectal bleeding currently. She states she has mostly been bedridden recently as she "gets " "lightheaded when I get out of bed". She denies any weakness/dizziness/lightheadedness when she moves around in her bed. She further endorses leg cramping. She does report decreased PO intake. She denies any somatic pain currently, dysuria, chest pain, dyspnea, or other associated symptoms. She denies any recent discharges/shocks from her AICD.     During my assessment, patient is alert and conversant, but her story would change at times. She appears pleasantly demented.     Patient's daughter told ED:    Since her discharge 3 days ago, she has been more fatigued, weak, falling asleep more often, and had another bloody bowel movement that appeared to be black melena in her diaper. They noticed her BP was dropping at home even though it runs low at baseline. She notes her Entresto Rx was withheld upon her last admission. Her rectal exam in the ED was hemoccult positive.     In the ED, her VS were BP (!) 107/58   Pulse 85   Temp 98.4 °F (36.9 °C) (Oral)   Resp (!) 27   Ht 5' 3" (1.6 m)   Wt 55.3 kg (122 lb)   SpO2 99%   Breastfeeding No   BMI 21.61 kg/m²     In the ED, she was treated with:   Medications   0.9%  NaCl infusion (for blood administration) (has no administration in time range)   lactated ringers bolus 500 mL (0 mLs Intravenous Stopped 2/6/23 2130)   pantoprazole injection 80 mg (80 mg Intravenous Given 2/6/23 2212)   furosemide injection 40 mg (40 mg Intravenous Given 2/7/23 0044)       Overview/Hospital Course:  patient with with a past medical history of HFrEF (EF: 20%, GIIDD, s/p AICD), CAD (on DAPT), CKD3, HTN, HLD,came with GI bleeding,anemia of acute blood lost,received pack of RBC,GI is consulted,since patient already had recent EGD and colonoscopy,with finding of gastric mucase friable and diverticulosis and no sign of active bleeding,GI has no pan  for endoscopy,started on diet,consulted PT,OT and will monitor patient closely with GI bleeding pathway with repeat CBC.her HH is stable at " this time,will continue monitor.still has melena.      Past Medical History:   Diagnosis Date    Cataract     CHF (congestive heart failure)     CKD (chronic kidney disease), stage III     Coronary artery disease     Gout attack     Hypercholesteremia     Hypertension     Renal disorder     Vaginal delivery     x4       Past Surgical History:   Procedure Laterality Date    APPENDECTOMY      CARDIAC DEFIBRILLATOR PLACEMENT      2015    CATARACT EXTRACTION W/  INTRAOCULAR LENS IMPLANT Left 02/07/2019    Dr. Velazco    CATARACT EXTRACTION W/  INTRAOCULAR LENS IMPLANT Right 02/21/2019    Dr. Velazco    CHOLECYSTECTOMY      COLONOSCOPY N/A 2/3/2023    Procedure: COLONOSCOPY;  Surgeon: Jordon Sotomayor MD;  Location: [facility] ENDO;  Service: Endoscopy;  Laterality: N/A;    COLONOSCOPY W/ POLYPECTOMY  10 or 11/ 2014    per Dr. Elen ceja Left 8/2008    ESOPHAGOGASTRODUODENOSCOPY N/A 1/31/2023    Procedure: EGD (ESOPHAGOGASTRODUODENOSCOPY);  Surgeon: Jordon Sotomayor MD;  Location: [facility] ENDO;  Service: Endoscopy;  Laterality: N/A;    EYE SURGERY      HYSTERECTOMY      INTRAOCULAR PROSTHESES INSERTION Left 2/7/2019    Procedure: INSERTION, IOL PROSTHESIS;  Surgeon: Demetrius Velazco MD;  Location: [facility] OR;  Service: Ophthalmology;  Laterality: Left;  RN Phone Pre OP 1-30-19.  Arrival 05:30 AM    INTRAOCULAR PROSTHESES INSERTION Right 2/21/2019    Procedure: INSERTION, IOL PROSTHESIS;  Surgeon: Demetrius Velazco MD;  Location: [facility] OR;  Service: Ophthalmology;  Laterality: Right;    JOINT REPLACEMENT Bilateral 2005 & 2006    2 Knee Replacements=Lt. 1= 2005. Rt. 1= 2006    OOPHORECTOMY      PHACOEMULSIFICATION OF CATARACT Left 2/7/2019    Procedure: PHACOEMULSIFICATION, CATARACT;  Surgeon: Demetrius Velazco MD;  Location: [facility] OR;  Service: Ophthalmology;  Laterality: Left;    PHACOEMULSIFICATION OF CATARACT Right 2/21/2019    Procedure: PHACOEMULSIFICATION,  CATARACT;  Surgeon: Demetrius Velazco MD;  Location: [facility];  Service: Ophthalmology;  Laterality: Right;  RN Phone Pre op 2-15-19.  Arrival 05:30 AM    TOTAL KNEE ARTHROPLASTY Bilateral Lt.= 2005; Rt.=2006    Bilateral Knee scope       Review of patient's allergies indicates:   Allergen Reactions    Aspirin Swelling     Swelling and rash    Colace [docusate sodium] Rash     itching    Docusate Other (See Comments) and Rash     itching    Sulfa (sulfonamide antibiotics) Swelling     Swelling and rash  Swelling and rash    Iodine and iodide containing products Rash    Penicillins Rash       Current Facility-Administered Medications on File Prior to Encounter   Medication    0.9%  NaCl infusion    phenylephrine HCL 2.5% ophthalmic solution 1 drop    proparacaine 0.5 % ophthalmic solution 1 drop    tropicamide 1% ophthalmic solution 1 drop     Current Outpatient Medications on File Prior to Encounter   Medication Sig    acetaminophen (TYLENOL) 325 MG tablet Take 325 mg by mouth once daily.    allopurinol (ZYLOPRIM) 100 MG tablet Take 100 mg by mouth every evening.     cranberry 400 mg Cap Take 1 capsule by mouth 2 (two) times daily.    fish oil-omega-3 fatty acids 300-1,000 mg capsule Take 2 g by mouth once daily. 1 caps every AM & 1 cap every PM.    folic acid/multivit-min/lutein (CENTRUM SILVER ORAL) Take by mouth once daily.    furosemide (LASIX) 80 MG tablet TAKE 1 TABLET BY MOUTH TWICE DAILY AS NEEDED FOR SHORTNESS OF BREATH OR  EDEMA (Patient taking differently: Take 80 mg by mouth once daily.)    HYDROcodone-acetaminophen (NORCO) 5-325 mg per tablet Take 1 tablet by mouth every 8 (eight) hours as needed for Pain. (Patient not taking: Reported on 2/6/2023)    HYDROcodone-acetaminophen (NORCO) 5-325 mg per tablet Take 1 tablet by mouth every 6 (six) hours as needed for Pain. (Patient not taking: Reported on 2/6/2023)    lovastatin (MEVACOR) 40 MG tablet Take 40 mg by mouth nightly. Takes  2 caps with supper every evening.    mirabegron (MYRBETRIQ) 50 mg Tb24 Take 50 mg by mouth once daily.     NEURONTIN 100 mg capsule Take 100 mg by mouth 2 (two) times daily.    ondansetron (ZOFRAN-ODT) 4 MG TbDL Take 1 tablet (4 mg total) by mouth every 8 (eight) hours as needed (nausea). (Patient not taking: Reported on 2/6/2023)    pantoprazole (PROTONIX) 40 MG tablet Take 40 mg by mouth once daily.    sacubitriL-valsartan (ENTRESTO) 24-26 mg per tablet Take 1 tablet by mouth 2 (two) times daily. (Patient taking differently: Take 1 tablet by mouth once daily.)     Family History       Problem Relation (Age of Onset)    Amblyopia Son    Diabetes Other    Heart attack Mother (88)    Hyperlipidemia Mother    Hypertension Mother, Other          Tobacco Use    Smoking status: Never    Smokeless tobacco: Never   Substance and Sexual Activity    Alcohol use: Not Currently    Drug use: No    Sexual activity: Not Currently     Partners: Male     Review of Systems   Constitutional:  Positive for fatigue. Negative for chills and fever.   HENT:  Negative for congestion.    Eyes:  Negative for visual disturbance.   Respiratory:  Negative for cough and shortness of breath.    Cardiovascular:  Negative for chest pain.   Gastrointestinal:  Positive for anal bleeding and blood in stool. Negative for abdominal pain, constipation, diarrhea, nausea and vomiting.   Genitourinary:  Negative for dysuria.   Musculoskeletal:  Negative for back pain.        +leg cramping.    Skin:  Negative for rash.   Allergic/Immunologic: Negative for immunocompromised state.   Neurological:  Positive for weakness and light-headedness. Negative for dizziness.   Hematological:  Bruises/bleeds easily.   Psychiatric/Behavioral:  Negative for confusion. The patient is not nervous/anxious.    Objective:     Vital Signs (Most Recent):  Temp: 97.8 °F (36.6 °C) (02/09/23 0726)  Pulse: 81 (02/09/23 0726)  Resp: 18 (02/09/23 1016)  BP: (!) 104/51  (02/09/23 0726)  SpO2: 99 % (02/09/23 0726)   Vital Signs (24h Range):  Temp:  [97.5 °F (36.4 °C)-98.7 °F (37.1 °C)] 97.8 °F (36.6 °C)  Pulse:  [75-89] 81  Resp:  [17-19] 18  SpO2:  [96 %-99 %] 99 %  BP: ()/(51-61) 104/51     Weight: 57.7 kg (127 lb 3.3 oz)  Body mass index is 22.53 kg/m².    Physical Exam  Vitals and nursing note reviewed.   Constitutional:       General: She is not in acute distress.     Appearance: She is well-developed. She is not diaphoretic.   HENT:      Head: Normocephalic and atraumatic.      Mouth/Throat:      Pharynx: No oropharyngeal exudate.      Comments: Poor dentition.   Eyes:      General: No scleral icterus.        Right eye: No discharge.         Left eye: No discharge.      Extraocular Movements: EOM normal.      Conjunctiva/sclera: Conjunctivae normal.      Pupils: Pupils are equal, round, and reactive to light.   Neck:      Thyroid: No thyromegaly.      Vascular: No JVD.      Trachea: No tracheal deviation.   Cardiovascular:      Rate and Rhythm: Normal rate and regular rhythm.      Heart sounds: Murmur heard.   Systolic murmur is present with a grade of 1/6.     No friction rub. No gallop.   Pulmonary:      Effort: Pulmonary effort is normal. No respiratory distress.      Breath sounds: Normal breath sounds. No stridor. No wheezing or rales.      Comments: Left upper chest wall AICD in place.   Chest:      Chest wall: No tenderness.   Abdominal:      General: Bowel sounds are normal. There is no distension.      Palpations: Abdomen is soft. There is no mass.      Tenderness: There is no abdominal tenderness. There is no guarding or rebound.   Musculoskeletal:         General: No tenderness. Normal range of motion.      Cervical back: Normal range of motion and neck supple.      Right lower leg: Edema present.      Left lower leg: Edema present.      Comments: 1+ pitting edema to BLE.    Lymphadenopathy:      Cervical: No cervical adenopathy.   Skin:     General: Skin is  warm and dry.      Coloration: Skin is not pale.      Findings: Bruising present. No erythema or rash.      Comments: Easy bruising noted.    Neurological:      Mental Status: She is alert and oriented to person, place, and time.      Cranial Nerves: No cranial nerve deficit.      Motor: No abnormal muscle tone.      Coordination: Coordination normal.      Deep Tendon Reflexes: Reflexes are normal and symmetric. Reflexes normal.      Comments: Bilateral total knee replacement scars.    Psychiatric:         Behavior: Behavior normal.         Thought Content: Thought content normal.         Judgment: Judgment normal.         CRANIAL NERVES     CN III, IV, VI   Pupils are equal, round, and reactive to light.  Extraocular motions are normal.      Significant Labs: All pertinent labs within the past 24 hours have been reviewed.  Recent Results (from the past 12 hour(s))   Comprehensive Metabolic Panel (CMP)    Collection Time: 02/09/23  5:30 AM   Result Value Ref Range    Sodium 139 136 - 145 mmol/L    Potassium 3.7 3.5 - 5.1 mmol/L    Chloride 105 95 - 110 mmol/L    CO2 27 23 - 29 mmol/L    Glucose 73 70 - 110 mg/dL    BUN 39 (H) 8 - 23 mg/dL    Creatinine 1.5 (H) 0.5 - 1.4 mg/dL    Calcium 9.0 8.7 - 10.5 mg/dL    Total Protein 5.2 (L) 6.0 - 8.4 g/dL    Albumin 2.9 (L) 3.5 - 5.2 g/dL    Total Bilirubin 1.6 (H) 0.1 - 1.0 mg/dL    Alkaline Phosphatase 59 55 - 135 U/L    AST 20 10 - 40 U/L    ALT 11 10 - 44 U/L    Anion Gap 7 (L) 8 - 16 mmol/L    eGFR 34 (A) >60 mL/min/1.73 m^2   Magnesium    Collection Time: 02/09/23  5:30 AM   Result Value Ref Range    Magnesium 1.7 1.6 - 2.6 mg/dL   Phosphorus    Collection Time: 02/09/23  5:30 AM   Result Value Ref Range    Phosphorus 2.6 (L) 2.7 - 4.5 mg/dL   CBC auto differential    Collection Time: 02/09/23  5:30 AM   Result Value Ref Range    WBC 6.76 3.90 - 12.70 K/uL    RBC 2.56 (L) 4.00 - 5.40 M/uL    Hemoglobin 7.6 (L) 12.0 - 16.0 g/dL    Hematocrit 24.5 (L) 37.0 - 48.5 %     MCV 96 82 - 98 fL    MCH 29.7 27.0 - 31.0 pg    MCHC 31.0 (L) 32.0 - 36.0 g/dL    RDW 18.3 (H) 11.5 - 14.5 %    Platelets 109 (L) 150 - 450 K/uL    MPV 10.7 9.2 - 12.9 fL    Immature Granulocytes 0.6 (H) 0.0 - 0.5 %    Gran # (ANC) 4.6 1.8 - 7.7 K/uL    Immature Grans (Abs) 0.04 0.00 - 0.04 K/uL    Lymph # 1.0 1.0 - 4.8 K/uL    Mono # 1.0 0.3 - 1.0 K/uL    Eos # 0.1 0.0 - 0.5 K/uL    Baso # 0.02 0.00 - 0.20 K/uL    nRBC 0 0 /100 WBC    Gran % 67.8 38.0 - 73.0 %    Lymph % 15.1 (L) 18.0 - 48.0 %    Mono % 14.1 4.0 - 15.0 %    Eosinophil % 2.1 0.0 - 8.0 %    Basophil % 0.3 0.0 - 1.9 %    Differential Method Automated         Significant Imaging: I have reviewed all pertinent imaging results/findings within the past 24 hours.    CT abdomen pelvis w/out Contrast:   Impression:     No acute findings on CT abdomen and pelvis without contrast.     Mesenteric edema, anasarca and small free pelvic fluid.     Colonic diverticulosis.     Cardiomegaly, pulmonary vascular congestion, and bilateral small pleural effusions.  Overall findings are concerning for CHF.        Electronically signed by: Sachi Warren  Date:                                            02/06/2023  Time:                                           22:52    CXR:   FINDINGS:  Single portable chest view is submitted.  Cardiac pacemaker again noted.  The heart size appears enlarged, the appearance of the cardiomediastinal silhouette is stable when accounting for difference in position.  Central pulmonary vascular prominence again noted, bilateral perihilar infiltrate again noted with mild progression.  Bilateral pattern of interstitial and ground-glass infiltrate noted with mild progression.  There may be small pleural effusions, there is no large pleural effusion and there is no pneumothorax.  The osseous structures demonstrate chronic change.     Impression:     Mild progression of radiographic findings, as discussed above.        Electronically signed by:  "Fernando Sargentzier  Date:                                            02/06/2023  Time:                                           21:09        CT abdomen and pelvis from 2/4/23:   Impression:     No acute findings on CT abdomen and pelvis without contrast.     Colonic diverticulosis.     Small free pelvic fluid.     Cardiomegaly.  Right pleural effusion.  Ground-glass opacities in the lung parenchyma.  Anasarca.  Findings may suggest mild CHF.        Electronically signed by: Sachi Warren  Date:                                            02/04/2023  Time:                                           22:28      Colonoscopy from 2/3/23:   Impression:              - Friability with contact bleeding in the cecum.   - Blood in the entire examined colon.   - A tattoo was seen in the sigmoid colon.   - Diverticulosis in the sigmoid colon.   - Internal hemorrhoids.   - No specimens collected.     Recommendation:          - Return patient to [facility] for ongoing care.   - Resume previous diet and consider discontinuing plavix   - Continue present medications.   - Trend CBC and monitor for signs of bleeding   - CTA to look for active diverticular bleed if recurrent signs of bleeding   - No repeat colonoscopy due to age.     Jordon Sotomayor MD   2/3/2023 1:53:32 PM       Assessment/Plan:      * GI hemorrhage  She had abnormalities on both colonoscopy and EGD last admission, so could be either  GI had recommended possible VCE vs CTA GI bleed study   -I see no evidence currently to suggest acute lower GI bleeding   -She is in HINA and has "iodine containing product" allergy, making CTA risky  The patient tells me her stools, "have been black as best I can tell, though my daughter says brown."   -The patient has a poor memory of events and is mixed up on her hospital visits, so I am not sure how reliable this is   -Her daughter did, however, also report worsening weakness and continued melanotic stools for the past week  She got " Protonix 80mg iv x 1 in the ED, and I will continue Protonix 40mg iv q12 hours  Consult GI to see if she needs re-scoping   hold all anti-PLT and anticoagulant meds  Serial CBC  Move to MICU if worsens or more hypotensive.  No plan for intervention by GI at this time.her HH is stable at this time,will continue monitor.still has melena.      Hypotension  She runs low per family, as is expected with EF of 20%  This is complicated by severe anemia and GI bleeding in face of Entresto   -Holding Entresto for HINA  Judicious use of Lasix with blood transfusion, as CXR and CT do show some CHF and edema      Symptomatic anemia  Given severe CHF and history of CAD, will try to keep her Hg closer to 8-9g range  She has been dizzy, weak, hypotensive with continued bleeding at home  Transfuse 1U PRBC for now      Acute on chronic combined systolic and diastolic congestive heart failure  Patient is identified as having Combined Systolic and Diastolic heart failure that is Acute on chronic. CHF is currently uncontrolled due to Continued edema of extremities, Hepatic congestion/ascites and JVD, Rales/crackles on pulmonary exam and Pulmonary edema/pleural effusion on CXR. Latest ECHO performed and demonstrates- Results for orders placed during the hospital encounter of 07/02/22    Echo    Interpretation Summary  · The left ventricle is moderately enlarged with eccentric hypertrophy and severely decreased systolic function.  · The estimated ejection fraction is 20%.  · Grade II left ventricular diastolic dysfunction.  · Normal right ventricular size with normal right ventricular systolic function.  · Severe left atrial enlargement.  · There is mild aortic valve stenosis.  · Aortic valve area is 1.28 cm2; peak velocity is 1.85 m/s; mean gradient is 7 mmHg.  · Moderate mitral regurgitation.  · Mild tricuspid regurgitation.  · Normal central venous pressure (3 mmHg).  · The estimated PA systolic pressure is 19 mmHg.  . Continue ACE/ARB  "and Furosemide and monitor clinical status closely. Monitor on telemetry. Patient is off CHF pathway.  Monitor strict Is&Os and daily weights.  Place on fluid restriction of 1.5 L. Continue to stress to patient importance of self efficacy and  on diet for CHF. Last BNP reviewed- and noted below   Recent Labs   Lab 02/06/23 1953   BNP >4,900*   .      Biventricular AICD in place  Noted  Placed on telemetry      Stage 3 chronic kidney disease  Will monitor.      Acute renal failure superimposed on stage 3b chronic kidney disease  Holding Entresto for now  Likely due to CHF, ARB, hypotension and anemia  Follow up after 1U blood and IV lasix to see if perfusion can improve     Latest Reference Range & Units 02/03/23 03:54 02/04/23 05:45 02/04/23 20:41 02/06/23 19:53   Creatinine 0.5 - 1.4 mg/dL 1.4 1.4 1.7 (H) 1.9 (H)          Coronary artery disease involving native coronary artery of native heart without angina pectoris  I cannot find a LHC in Epic, though in Cardiology notes this is carried through:   -"catheter 2011 reported nonobstructive CAD"  She has not had PCI/stenting as far as I can discern  She had been on Plavix for this long term due to ASA allergy   -Plavix was stopped on the last admit due to GIB    She is on Mevacor (statin) and Omega-3 acids, but no BB or nitrate  She denies ever having any angina or CP      Essential hypertension  Will monitor.      Elevated troponin level not due to acute coronary syndrome  She is near baseline  Suspect chronic demand leak from anemia and CHF in face of HINA on CKD IIIb  Will trend  No anticoagulation or anti-PLT therapy due to GIB     Latest Reference Range & Units 12/04/22 00:12 12/13/22 17:10 01/30/23 20:20 02/06/23 19:53   Troponin I 0.000 - 0.026 ng/mL 0.178 (H) 0.174 (H) 0.135 (H) 0.169 (H)            VTE Risk Mitigation (From admission, onward)         Ordered     Place DALE hose  Until discontinued         02/07/23 7948     Reason for No Pharmacological " VTE Prophylaxis  Once        Question:  Reasons:  Answer:  Active Bleeding    02/07/23 0239     IP VTE HIGH RISK PATIENT  Once         02/07/23 0239     Place sequential compression device  Until discontinued         02/07/23 0239                Discharge Planning   MARTELL:      Code Status: DNR   Is the patient medically ready for discharge?:     Reason for patient still in hospital (select all that apply): Patient trending condition  Discharge Plan A: Home Health, Home with family                  Edilberto Chowdhury MD  Department of [facility] Medicine   [facility]

## 2023-02-10 ENCOUNTER — NURSE TRIAGE (OUTPATIENT)
Dept: ADMINISTRATIVE | Facility: CLINIC | Age: 88
End: 2023-02-10
Payer: MEDICARE

## 2023-02-10 VITALS
RESPIRATION RATE: 18 BRPM | OXYGEN SATURATION: 98 % | SYSTOLIC BLOOD PRESSURE: 108 MMHG | BODY MASS INDEX: 22.54 KG/M2 | TEMPERATURE: 99 F | HEIGHT: 63 IN | HEART RATE: 85 BPM | DIASTOLIC BLOOD PRESSURE: 55 MMHG | WEIGHT: 127.19 LBS

## 2023-02-10 LAB
BASOPHILS # BLD AUTO: 0.03 K/UL (ref 0–0.2)
BASOPHILS NFR BLD: 0.4 % (ref 0–1.9)
DIFFERENTIAL METHOD: ABNORMAL
EOSINOPHIL # BLD AUTO: 0.1 K/UL (ref 0–0.5)
EOSINOPHIL NFR BLD: 1.6 % (ref 0–8)
ERYTHROCYTE [DISTWIDTH] IN BLOOD BY AUTOMATED COUNT: 17.6 % (ref 11.5–14.5)
HCT VFR BLD AUTO: 24.8 % (ref 37–48.5)
HGB BLD-MCNC: 7.7 G/DL (ref 12–16)
IMM GRANULOCYTES # BLD AUTO: 0.03 K/UL (ref 0–0.04)
IMM GRANULOCYTES NFR BLD AUTO: 0.4 % (ref 0–0.5)
LYMPHOCYTES # BLD AUTO: 1.1 K/UL (ref 1–4.8)
LYMPHOCYTES NFR BLD: 15.8 % (ref 18–48)
MCH RBC QN AUTO: 29.7 PG (ref 27–31)
MCHC RBC AUTO-ENTMCNC: 31 G/DL (ref 32–36)
MCV RBC AUTO: 96 FL (ref 82–98)
MONOCYTES # BLD AUTO: 1 K/UL (ref 0.3–1)
MONOCYTES NFR BLD: 13.7 % (ref 4–15)
NEUTROPHILS # BLD AUTO: 4.7 K/UL (ref 1.8–7.7)
NEUTROPHILS NFR BLD: 68.1 % (ref 38–73)
NRBC BLD-RTO: 0 /100 WBC
PLATELET # BLD AUTO: 112 K/UL (ref 150–450)
PMV BLD AUTO: 10.9 FL (ref 9.2–12.9)
RBC # BLD AUTO: 2.59 M/UL (ref 4–5.4)
WBC # BLD AUTO: 6.95 K/UL (ref 3.9–12.7)

## 2023-02-10 PROCEDURE — 25000003 PHARM REV CODE 250: Performed by: HOSPITALIST

## 2023-02-10 PROCEDURE — 85025 COMPLETE CBC W/AUTO DIFF WBC: CPT | Performed by: HOSPITALIST

## 2023-02-10 PROCEDURE — 36415 COLL VENOUS BLD VENIPUNCTURE: CPT | Performed by: HOSPITALIST

## 2023-02-10 PROCEDURE — 25000003 PHARM REV CODE 250: Performed by: INTERNAL MEDICINE

## 2023-02-10 RX ORDER — MIDODRINE HYDROCHLORIDE 10 MG/1
10 TABLET ORAL 3 TIMES DAILY
Qty: 90 TABLET | Refills: 0 | Status: SHIPPED | OUTPATIENT
Start: 2023-02-10 | End: 2023-02-10 | Stop reason: SDUPTHER

## 2023-02-10 RX ORDER — FUROSEMIDE 40 MG/1
40 TABLET ORAL DAILY
Qty: 30 TABLET | Refills: 0 | Status: SHIPPED | OUTPATIENT
Start: 2023-02-10 | End: 2023-03-12

## 2023-02-10 RX ORDER — MIDODRINE HYDROCHLORIDE 10 MG/1
10 TABLET ORAL 3 TIMES DAILY
Qty: 90 TABLET | Refills: 0 | Status: SHIPPED | OUTPATIENT
Start: 2023-02-10 | End: 2023-03-12

## 2023-02-10 RX ORDER — HYDROCODONE BITARTRATE AND ACETAMINOPHEN 5; 325 MG/1; MG/1
1 TABLET ORAL EVERY 6 HOURS PRN
Qty: 15 TABLET | Refills: 0 | Status: SHIPPED | OUTPATIENT
Start: 2023-02-10

## 2023-02-10 RX ADMIN — MIDODRINE HYDROCHLORIDE 10 MG: 5 TABLET ORAL at 03:02

## 2023-02-10 RX ADMIN — MIDODRINE HYDROCHLORIDE 10 MG: 5 TABLET ORAL at 09:02

## 2023-02-10 RX ADMIN — PANTOPRAZOLE SODIUM 40 MG: 40 TABLET, DELAYED RELEASE ORAL at 09:02

## 2023-02-10 RX ADMIN — ACETAMINOPHEN 650 MG: 325 TABLET ORAL at 03:02

## 2023-02-10 RX ADMIN — HYDROCODONE BITARTRATE AND ACETAMINOPHEN 1 TABLET: 5; 325 TABLET ORAL at 10:02

## 2023-02-10 NOTE — PLAN OF CARE
Problem: Adult Inpatient Plan of Care  Goal: Plan of Care Review  Outcome: Unable to Meet, Plan Revised  Flowsheets (Taken 2/10/2023 1747)  Plan of Care Reviewed With: patient

## 2023-02-10 NOTE — PLAN OF CARE
Home Health Plan of Care faxed to N. GERARDO noted on face sheet that patient currently active with [facility] services.     GERARDO received call from Yeny ([facility]). Yeny stated that patient is resuming HH with [facility] services.

## 2023-02-10 NOTE — PLAN OF CARE
02/10/23 1031   Medicare Message   Important Message from Medicare regarding Discharge Appeal Rights Given to patient/caregiver;Explained to patient/caregiver   Date IMM was signed 02/10/23   Time IMM was signed 1026     GERARDO spoke with patient's daughter Usama via phone, explaining IMM to her. SW informed that a copy of IMM can be left at bedside with patient.

## 2023-02-10 NOTE — PLAN OF CARE
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 [Z95.810]  Yes     Chronic    Coronary artery disease involving native coronary artery of native heart without angina pectoris [I25.10]  Yes    Acute renal failure superimposed on stage 3b chronic kidney disease [N17.9, N18.32]  Yes    Elevated troponin level not due to acute coronary syndrome [R77.8]  Yes    Stage 3 chronic kidney disease [N18.30]  Yes    Essential hypertension [I10]  Yes     Chronic      Resolved Hospital Problems    Diagnosis Date Resolved POA    ICD (implantable cardioverter-defibrillator) in place [Z95.810] 02/07/2023 Yes    Acute on chronic systolic congestive heart failure [I50.23] 02/07/2023 Yes       Follow Up Appointments:  Future Appointments   Date Time Provider Department Center   2/16/2023  1:30 PM Demetrius Velazco MD [facility] OPHTHAL [place]   2/22/2023  8:00 AM Roni Mike NP [facility] C3HV [place]   2/28/2023  9:15 AM Kelechi Richmond MD [facility] CARDIO [place]   3/2/2023  8:00 AM Roni Mike NP [facility] C[facility] [place]   3/30/2023  9:30 AM Jordon Sotomayor MD [facility]       Allergies:  Review of patient's allergies indicates:   Allergen Reactions    Aspirin Swelling     Swelling and rash    Colace [docusate sodium] Rash     itching    Docusate Other (See Comments) and Rash     itching    Sulfa (sulfonamide antibiotics) Swelling     Swelling and rash  Swelling and rash     Iodine and iodide containing products Rash    Penicillins Rash       Medications: Review discharge medications with patient and family and provide education.    Current Facility-Administered Medications   Medication Dose Route Frequency Provider Last Rate Last Admin    0.9%  NaCl infusion (for blood administration)   Intravenous Q24H PRN ANGEL Bautista MD        acetaminophen tablet 650 mg  650 mg Oral Q4H PRN W. Vasyl Bautista MD        allopurinoL tablet 100 mg  100 mg Oral QHS W. Vasyl Bautista MD   100 mg at 02/09/23 2024    atorvastatin tablet 80 mg  80 mg Oral QHS W. Vasyl Bautista MD   80 mg at 02/09/23 2024    dextrose 10% bolus 125 mL 125 mL  12.5 g Intravenous PRN ANGEL Bautista MD        dextrose 10% bolus 250 mL 250 mL  25 g Intravenous PRN WLuc Bautista MD        glucagon (human recombinant) injection 1 mg  1 mg Intramuscular PRN W. Vasyl Bautista MD        glucose chewable tablet 16 g  16 g Oral PRN W. Vasyl Bautista MD        glucose chewable tablet 24 g  24 g Oral PRN W. Vasyl Bautista MD        HYDROcodone-acetaminophen 5-325 mg per tablet 1 tablet  1 tablet Oral Q6H PRN WLuc Bautista MD   1 tablet at 02/09/23 2024    melatonin tablet 6 mg  6 mg Oral Nightly PRN WLuc Bautista MD        midodrine tablet 10 mg  10 mg Oral TID Edilberto Chowdhury MD   10 mg at 02/09/23 2024    naloxone 0.4 mg/mL injection 0.02 mg  0.02 mg Intravenous PRN W. Vasyl Bautista MD        ondansetron injection 4 mg  4 mg Intravenous Q8H PRN ANGEL Bautista MD        pantoprazole EC tablet 40 mg  40 mg Oral BID Edilberto Chowdhury MD   40 mg at 02/09/23 2024    prochlorperazine injection Soln 5 mg  5 mg Intravenous Q6H PRN WLuc Bautista MD        sodium chloride 0.9% flush 10 mL  10 mL Intravenous Q12H PRN ANGEL Buatista MD         Facility-Administered Medications Ordered in Other Encounters   Medication Dose Route Frequency Provider Last Rate Last Admin    0.9%  NaCl infusion   Intravenous Continuous  Demetrius Velazco MD        phenylephrine HCL 2.5% ophthalmic solution 1 drop  1 drop Right Eye On Call Procedure Demetrius Velazco MD   1 drop at 02/21/19 0725    proparacaine 0.5 % ophthalmic solution 1 drop  1 drop Right Eye On Call Procedure Demetrius Velazco MD   1 drop at 02/21/19 0706    tropicamide 1% ophthalmic solution 1 drop  1 drop Right Eye On Call Procedure Demetrius Velazco MD   1 drop at 02/21/19 0726     Current Discharge Medication List        START taking these medications    Details   midodrine (PROAMATINE) 10 MG tablet Take 1 tablet (10 mg total) by mouth 3 (three) times daily.  Qty: 90 tablet, Refills: 0           CONTINUE these medications which have NOT CHANGED    Details   acetaminophen (TYLENOL) 325 MG tablet Take 325 mg by mouth once daily.      allopurinol (ZYLOPRIM) 100 MG tablet Take 100 mg by mouth every evening.       cranberry 400 mg Cap Take 1 capsule by mouth 2 (two) times daily.      fish oil-omega-3 fatty acids 300-1,000 mg capsule Take 2 g by mouth once daily. 1 caps every AM & 1 cap every PM.      folic acid/multivit-min/lutein (CENTRUM SILVER ORAL) Take by mouth once daily.      HYDROcodone-acetaminophen (NORCO) 5-325 mg per tablet Take 1 tablet by mouth every 6 (six) hours as needed for Pain.  Qty: 8 tablet, Refills: 0    Comments: Quantity prescribed more than 7 day supply? No      lovastatin (MEVACOR) 40 MG tablet Take 40 mg by mouth nightly. Takes 2 caps with supper every evening.      mirabegron (MYRBETRIQ) 50 mg Tb24 Take 50 mg by mouth once daily.       NEURONTIN 100 mg capsule Take 100 mg by mouth 2 (two) times daily.      ondansetron (ZOFRAN-ODT) 4 MG TbDL Take 1 tablet (4 mg total) by mouth every 8 (eight) hours as needed (nausea).  Qty: 20 tablet, Refills: 0      pantoprazole (PROTONIX) 40 MG tablet Take 40 mg by mouth once daily.           STOP taking these medications       furosemide (LASIX) 80 MG tablet Comments:   Reason for Stopping:          sacubitriL-valsartan (ENTRESTO) 24-26 mg per tablet Comments:   Reason for Stopping:                 I have seen and examined this patient within the last 30 days. My clinical findings that support the need for the home health skilled services and home bound status are the following:no   Weakness/numbness causing balance and gait disturbance due to Weakness/Debility making it taxing to leave home.     Diet:   cardiac diet          Referrals/ Consults  Physical Therapy to evaluate and treat. Evaluate for home safety and equipment needs; Establish/upgrade home exercise program. Perform / instruct on therapeutic exercises, gait training, transfer training, and Range of Motion.  Occupational Therapy to evaluate and treat. Evaluate home environment for safety and equipment needs. Perform/Instruct on transfers, ADL training, ROM, and therapeutic exercises.    Activities:   activity as tolerated    Nursing:   Agency to admit patient within 24 hours of hospital discharge unless specified on physician order or at patient request    SN to complete comprehensive assessment including routine vital signs. Instruct on disease process and s/s of complications to report to MD. Review/verify medication list sent home with the patient at time of discharge  and instruct patient/caregiver as needed. Frequency may be adjusted depending on start of care date.     Skilled nurse to perform up to 3 visits PRN for symptoms related to diagnosis    Notify MD if SBP > 160 or < 90; DBP > 90 or < 50; HR > 120 or < 50; Temp > 101; O2 < 88%; Other:       Ok to schedule additional visits based on staff availability and patient request on consecutive days within the home health episode.    When multiple disciplines ordered:    Start of Care occurs on Sunday - Wednesday schedule remaining discipline evaluations as ordered on separate consecutive days following the start of care.    Thursday SOC -schedule subsequent evaluations Friday and Monday the  following week.     Friday - Saturday SOC - schedule subsequent discipline evaluations on consecutive days starting Monday of the following week.    For all post-discharge communication and subsequent orders please contact patient's primary care physician. If unable to reach primary care physician or do not receive response within 30 minutes, please contact  [facility]  for clinical staff order clarification    Miscellaneous   Routine Skin for Bedridden Patients: Instruct patient/caregiver to apply moisture barrier cream to all skin folds and wet areas in perineal area daily and after baths and all bowel movements.    Home Health Aide:  Nursing Twice weekly, Physical Therapy Twice weekly, and Occupational Therapy Twice weekly    Wound Care Orders  no    I certify that this patient is confined to her home and needs intermittent skilled nursing care, physical therapy, and occupational therapy.

## 2023-02-10 NOTE — PLAN OF CARE
Patient with continued confusion. No bloody stools witnessed overnight.    Problem: Adult Inpatient Plan of Care  Goal: Plan of Care Review  Outcome: Ongoing, Progressing  Goal: Patient-Specific Goal (Individualized)  Outcome: Ongoing, Progressing  Goal: Absence of Hospital-Acquired Illness or Injury  Outcome: Ongoing, Progressing  Goal: Optimal Comfort and Wellbeing  Outcome: Ongoing, Progressing  Goal: Readiness for Transition of Care  Outcome: Ongoing, Progressing     Problem: Fluid and Electrolyte Imbalance (Acute Kidney Injury/Impairment)  Goal: Fluid and Electrolyte Balance  Outcome: Ongoing, Progressing     Problem: Oral Intake Inadequate (Acute Kidney Injury/Impairment)  Goal: Optimal Nutrition Intake  Outcome: Ongoing, Progressing     Problem: Renal Function Impairment (Acute Kidney Injury/Impairment)  Goal: Effective Renal Function  Outcome: Ongoing, Progressing     Problem: Impaired Wound Healing  Goal: Optimal Wound Healing  Outcome: Ongoing, Progressing     Problem: Fall Injury Risk  Goal: Absence of Fall and Fall-Related Injury  Outcome: Ongoing, Progressing     Problem: Skin Injury Risk Increased  Goal: Skin Health and Integrity  Outcome: Ongoing, Progressing     Problem: Adjustment to Illness (Gastrointestinal Bleeding)  Goal: Optimal Coping with Acute Illness  Outcome: Ongoing, Progressing     Problem: Bleeding (Gastrointestinal Bleeding)  Goal: Hemostasis  Outcome: Ongoing, Progressing

## 2023-02-10 NOTE — NURSING
Patient is discharged on room air, no distress noted. Tele and IV removed with tip intact. Called daughter to update her on Discharge. Will  after 230pm

## 2023-02-10 NOTE — DISCHARGE SUMMARY
"[facility] Medicine  Discharge Summary      Patient Name: Palmira Malave  MRN: [id number]  JN: [id number]  Patient Class: IP- Inpatient  Admission Date: 2/6/2023  Hospital Length of Stay: 4 days  Discharge Date and Time:  02/10/2023 10:31 AM  Attending Physician: Edilberto Chowdhury MD   Discharging Provider: Edilberto Chowdhury MD  Primary Care Provider: Qi Ramon MD    Primary Care Team: Networked reference to record PCT     HPI:   Ms Malave is a 86yo lady with a past medical history of HFrEF (EF: 20%, GIIDD, s/p AICD), CAD (on DAPT), CKD3, HTN, HLD    She was admitted on 01/30/2023 for melena with concerns for GI bleed and acute blood loss anemia requiring transfusion. Hgb noted to be 6.4 on outpatient labs on 01/30 and was instructed to come to ED for evaluation. Was given 1U of pRBC on 01/30 with appropiate response (hgb 7.4 on 01/31). GI consulted- s/p EGD on 01/31. Showed friable mucosa noted in duodenal bulb. Likely source of bleeding. GI recommends discontinuing plavix on discharge and f/u with GI outpatient for anemia workup.     She returned to ED on 2/4/23 w/ lower abdominal pain w/ radiation to her back, and lightheadedness, after being discharged.   ED workup notable for hemoglobin 8.7, CMP with creatinine 1.7, BUN 47, lipase 15, CT abdomen pelvis without any acute findings present, colonic diverticulosis present, small free pelvic fluid present.  Patient presentation consistent with suspected abdominal pain likely secondary to recent colonoscopy. Patient treated symptomatically with complete improvement and discharged.       She returned to the ED today for concerns of rectal bleeding, generalized abdominal pain that worsens when using the restroom, fatigue, decreased appetite, and lightheadedness. Patient reports she has still been experiencing persistent rectal bleeding and noted an episode of "black" melena yesterday that "my daughter said was brown, but I saw " "that it was black". She denies any rectal bleeding currently. She states she has mostly been bedridden recently as she "gets lightheaded when I get out of bed". She denies any weakness/dizziness/lightheadedness when she moves around in her bed. She further endorses leg cramping. She does report decreased PO intake. She denies any somatic pain currently, dysuria, chest pain, dyspnea, or other associated symptoms. She denies any recent discharges/shocks from her AICD.     During my assessment, patient is alert and conversant, but her story would change at times. She appears pleasantly demented.     Patient's daughter told ED:    Since her discharge 3 days ago, she has been more fatigued, weak, falling asleep more often, and had another bloody bowel movement that appeared to be black melena in her diaper. They noticed her BP was dropping at home even though it runs low at baseline. She notes her Entresto Rx was withheld upon her last admission. Her rectal exam in the ED was hemoccult positive.     In the ED, her VS were BP (!) 107/58   Pulse 85   Temp 98.4 °F (36.9 °C) (Oral)   Resp (!) 27   Ht 5' 3" (1.6 m)   Wt 55.3 kg (122 lb)   SpO2 99%   Breastfeeding No   BMI 21.61 kg/m²     In the ED, she was treated with:   Medications   0.9%  NaCl infusion (for blood administration) (has no administration in time range)   lactated ringers bolus 500 mL (0 mLs Intravenous Stopped 2/6/23 2130)   pantoprazole injection 80 mg (80 mg Intravenous Given 2/6/23 2212)   furosemide injection 40 mg (40 mg Intravenous Given 2/7/23 0044)       * No surgery found *      Hospital Course:   patient with with a past medical history of HFrEF (EF: 20%, GIIDD, s/p AICD), CAD (on DAPT), CKD3, HTN, HLD,came with GI bleeding,anemia of acute blood lost,received pack of RBC,HH is improved,GI is consulted,since patient already had recent EGD and colonoscopy,with finding of gastric mucase friable and diverticulosis and no sign of active " bleeding,GI has no pan  for endoscopy,started on diet,consulted PT,OT and monitored patient closely with GI bleeding pathway with repeat CBC.her HH remains stable,but still has occasional BRBPR,but remains stable,all likely duo to diverticulosis,spoke with daughter,updated her,instructed back to ER if see any active bleeding,she agree and underhand instruction,out patient GI referral was done,HH was  arranged,patient was discharged home with HH and follow up PCP,GI follow up.       Goals of Care Treatment Preferences:  Code Status: DNR          What is most important right now is to focus on spending time at home, avoiding the hospital, remaining as independent as possible.  Accordingly, we have decided that the best plan to meet the patient's goals includes continuing with treatment.      Consults:   Consults (From admission, onward)        Status Ordering Provider     Inpatient consult to Gastroenterology  Once        Provider:  (Not yet assigned)    ROOPA Young     Inpatient consult to Gastroenterology  Once        Provider:  Glen Mckinley MD    Completed ANGEL BARNES          No new Assessment & Plan notes have been filed under this hospital service since the last note was generated.  Service: Hospital Medicine    Final Active Diagnoses:    Diagnosis Date Noted POA    PRINCIPAL PROBLEM:  GI hemorrhage [K92.2] 01/30/2023 Yes    Symptomatic anemia [D64.9] 02/07/2023 Yes    Hypotension [I95.9] 02/07/2023 Yes    Acute on chronic combined systolic and diastolic congestive heart failure [I50.43] 06/09/2020 Yes    Biventricular AICD in place [Z95.810] 12/08/2019 Yes     Chronic    Coronary artery disease involving native coronary artery of native heart without angina pectoris [I25.10] 11/15/2014 Yes    Acute renal failure superimposed on stage 3b chronic kidney disease [N17.9, N18.32] 11/15/2014 Yes    Elevated troponin level not due to acute coronary syndrome [R77.8] 11/15/2014 Yes     Stage 3 chronic kidney disease [N18.30] 11/15/2014 Yes    Essential hypertension [I10] 11/15/2014 Yes     Chronic      Problems Resolved During this Admission:    Diagnosis Date Noted Date Resolved POA    ICD (implantable cardioverter-defibrillator) in place [Z95.810] 02/08/2019 02/07/2023 Yes    Acute on chronic systolic congestive heart failure [I50.23] 10/02/2018 02/07/2023 Yes       Discharged Condition: stable    Disposition: Home-Health Care [facility]    Follow Up:   Follow-up Information     Qi Ramon MD Follow up on 2/17/2023.    Specialty: Internal Medicine  Why: @12:30pm for a hospital follow up. Dr. Ramon Office is requesting that you bring your discharge summary with you.  Contact information:  [address] [place] [address]  [address]  [place] [address]  [phone]             [facility] Follow [facility].    Specialty: Home Health Services  Why: Home Health  Contact information:  [address] [facility]  [address]  [place] [address]  [phone]                       Patient Instructions:      Ambulatory referral/consult to Gastroenterology   Standing Status: Future   Referral Priority: Routine Referral Type: Consultation   Referral Reason: Specialty Services Required   Requested Specialty: Gastroenterology   Number of Visits Requested: 1     Activity as tolerated       Significant Diagnostic Studies: Labs:   BMP:   Recent Labs   Lab 02/09/23  0530   GLU 73      K 3.7      CO2 27   BUN 39*   CREATININE 1.5*   CALCIUM 9.0   MG 1.7   , CMP   Recent Labs   Lab 02/09/23  0530      K 3.7      CO2 27   GLU 73   BUN 39*   CREATININE 1.5*   CALCIUM 9.0   PROT 5.2*   ALBUMIN 2.9*   BILITOT 1.6*   ALKPHOS 59   AST 20   ALT 11   ANIONGAP 7*    and CBC   Recent Labs   Lab 02/09/23  0530 02/10/23  0416   WBC 6.76 6.95   HGB 7.6* 7.7*   HCT 24.5* 24.8*   * 112*     Radiology: X-Ray: CXR: X-Ray Chest 1 View (CXR): No results found for this visit on 02/06/23. and X-Ray Chest PA and  Lateral (CXR): No results found for this visit on 02/06/23.    Pending Diagnostic Studies:     None         Medications:  Reconciled Home Medications:      Medication List      START taking these medications    midodrine 10 MG tablet  Commonly known as: PROAMATINE  Take 1 tablet (10 mg total) by mouth 3 (three) times daily.        CHANGE how you take these medications    furosemide 40 MG tablet  Commonly known as: LASIX  Take 1 tablet (40 mg total) by mouth once daily.  What changed:   · medication strength  · See the new instructions.        CONTINUE taking these medications    acetaminophen 325 MG tablet  Commonly known as: TYLENOL  Take 325 mg by mouth once daily.     allopurinoL 100 MG tablet  Commonly known as: ZYLOPRIM  Take 100 mg by mouth every evening.     CENTRUM SILVER ORAL  Take by mouth once daily.     cranberry 400 mg Cap  Take 1 capsule by mouth 2 (two) times daily.     fish oil-omega-3 fatty acids 300-1,000 mg capsule  Take 2 g by mouth once daily. 1 caps every AM & 1 cap every PM.     lovastatin 40 MG tablet  Commonly known as: MEVACOR  Take 40 mg by mouth nightly. Takes 2 caps with supper every evening.     mirabegron 50 mg Tb24  Commonly known as: MYRBETRIQ  Take 50 mg by mouth once daily.     NEURONTIN 100 MG capsule  Generic drug: gabapentin  Take 100 mg by mouth 2 (two) times daily.     pantoprazole 40 MG tablet  Commonly known as: PROTONIX  Take 40 mg by mouth once daily.        STOP taking these medications    ENTRESTO 24-26 mg per tablet  Generic drug: sacubitriL-valsartan     HYDROcodone-acetaminophen 5-325 mg per tablet  Commonly known as: NORCO        ASK your doctor about these medications    HYDROcodone-acetaminophen 5-325 mg per tablet  Commonly known as: NORCO  Take 1 tablet by mouth every 6 (six) hours as needed for Pain.     ondansetron 4 MG Tbdl  Commonly known as: ZOFRAN-ODT  Take 1 tablet (4 mg total) by mouth every 8 (eight) hours as needed (nausea).            Indwelling  Lines/Drains at time of discharge:   Lines/Drains/Airways     None                 Time spent on the discharge of patient: over 30  minutes         Edilberto Chowdhury MD  Department of Hospital Medicine  [facility]

## 2023-02-10 NOTE — NURSING
Discharge instructions reviewed with Family x3 at bedside. Questions asked/ answered, understanding verbalized. Prescriptions delivered at bedside.

## 2023-02-10 NOTE — PLAN OF CARE
[facility] - Telemetry  Discharge Final Note    Primary Care Provider: Qi Ramon MD    Expected Discharge Date: 2/10/2023    Final Discharge Note (most recent)       Final Note - 02/10/23 1032          Final Note    Assessment Type Final Discharge Note     Anticipated Discharge Disposition Home-Health Care [facility]     What phone number can be called within the next 1-3 days to see how you are doing after discharge? [phone]     Hospital Resources/Appts/Education Provided Appointments scheduled and added to AVS;Appointments scheduled by Navigator/Coordinator        Post-Acute Status    Post-Acute Authorization Home Health     Home Health Status Set-up Complete/Auth obtained     Coverage PHN     Discharge Delays None known at this time                     Important Message from Medicare  Important Message from Medicare regarding Discharge Appeal Rights: Given to patient/caregiver, Explained to patient/caregiver     Date IMM was signed: 02/10/23  Time IMM was signed: 1026    Contact Info       Qi Ramon MD   Specialty: Internal Medicine   Relationship: PCP - [address]  [address]  [place] [address]   Phone: [phone]       Next Steps: Follow up on 2/17/2023    Instructions: @12:30pm for a hospital follow up. Dr. Ramon Office is requesting that you bring your discharge summary with you.    [facility]   Specialty: Home Health Services    [address] [facility] [address] [facility]  [address]  [place] [address]   Phone: [phone]       Next Steps: Follow up    Instructions: Home Health          SW spoke with patient's daughter Usama re: patient's disposition home. GERARDO inquired if patient's daughter would be the person picking patient up from hospital? Patient's daughter stated she would be and that she does not get off until 2:00 pm. GERARDO stated ok. Patient's daughter inquired if patient's HH being resumed? GERARDO stated he confirmed with Yeny (N) that [facility] will resume HH services for  patient. Patient's daughter stated great.     SW informed nurse Sandi that patient cleared from case management standpoint.

## 2023-02-11 NOTE — PROGRESS NOTES
Received a message from on call nurse stating the the patient was discharged earlier today and does not have her midodrine with her. Patient's daughter requested for prescription to be sent to [facility]'s pharmacy. Midodrine prescription originally was prescribed by Dr. Chowdhury.  Will send a new prescription to the patient's pharmacy of choice.

## 2023-02-11 NOTE — TELEPHONE ENCOUNTER
Spoke with Usama (daughter) who stats patient was discharged today from hospital.  She states patient was prescribed midodrine at discharge. Medication was not in her bag.  Daughter wants to know if medication could be sent to [facility] on [address]. Prescription was prescribed by Dr. Chowdhury.  Spoke with Patricia at the [facility] who states a secure chat message would need to be sent to Dr. Luigi Hamm (on call provider).   Secure chat message was sent to Dr. Hamm.  Prescription was sent to pharmacy.  Usama was notified and has no further needs.  Advised Usama to call back if further assistance is needed.    Patient does not have an [facility] PCP.    Reason for Disposition   [1] Caller has URGENT medicine question about med that PCP or specialist prescribed AND [2] triager unable to answer question    Protocols used: Medication Question Call-A-

## 2023-02-16 ENCOUNTER — OFFICE VISIT (OUTPATIENT)
Dept: OPHTHALMOLOGY | Facility: CLINIC | Age: 88
End: 2023-02-16
Payer: MEDICARE

## 2023-02-16 DIAGNOSIS — H26.491 PCO (POSTERIOR CAPSULAR OPACIFICATION), RIGHT: Primary | ICD-10-CM

## 2023-02-16 DIAGNOSIS — Z96.1 PSEUDOPHAKIA: ICD-10-CM

## 2023-02-16 DIAGNOSIS — I10 ESSENTIAL HYPERTENSION: ICD-10-CM

## 2023-02-16 DIAGNOSIS — H02.826 CYST OF EYELID, LEFT: ICD-10-CM

## 2023-02-16 PROCEDURE — 1159F PR MEDICATION LIST DOCUMENTED IN MEDICAL RECORD: ICD-10-PCS | Mod: CPTII,S$GLB,, | Performed by: OPHTHALMOLOGY

## 2023-02-16 PROCEDURE — 3288F FALL RISK ASSESSMENT DOCD: CPT | Mod: CPTII,S$GLB,, | Performed by: OPHTHALMOLOGY

## 2023-02-16 PROCEDURE — 1159F MED LIST DOCD IN RCRD: CPT | Mod: CPTII,S$GLB,, | Performed by: OPHTHALMOLOGY

## 2023-02-16 PROCEDURE — 1101F PR PT FALLS ASSESS DOC 0-1 FALLS W/OUT INJ PAST YR: ICD-10-PCS | Mod: CPTII,S$GLB,, | Performed by: OPHTHALMOLOGY

## 2023-02-16 PROCEDURE — 99999 PR PBB SHADOW E&M-EST. PATIENT-LVL III: ICD-10-PCS | Mod: PBBFAC,,, | Performed by: OPHTHALMOLOGY

## 2023-02-16 PROCEDURE — 99999 PR PBB SHADOW E&M-EST. PATIENT-LVL III: CPT | Mod: PBBFAC,,, | Performed by: OPHTHALMOLOGY

## 2023-02-16 PROCEDURE — 92014 PR EYE EXAM, EST PATIENT,COMPREHESV: ICD-10-PCS | Mod: S$GLB,,, | Performed by: OPHTHALMOLOGY

## 2023-02-16 PROCEDURE — 1126F PR PAIN SEVERITY QUANTIFIED, NO PAIN PRESENT: ICD-10-PCS | Mod: CPTII,S$GLB,, | Performed by: OPHTHALMOLOGY

## 2023-02-16 PROCEDURE — 1160F PR REVIEW ALL MEDS BY PRESCRIBER/CLIN PHARMACIST DOCUMENTED: ICD-10-PCS | Mod: CPTII,S$GLB,, | Performed by: OPHTHALMOLOGY

## 2023-02-16 PROCEDURE — 1160F RVW MEDS BY RX/DR IN RCRD: CPT | Mod: CPTII,S$GLB,, | Performed by: OPHTHALMOLOGY

## 2023-02-16 PROCEDURE — 3288F PR FALLS RISK ASSESSMENT DOCUMENTED: ICD-10-PCS | Mod: CPTII,S$GLB,, | Performed by: OPHTHALMOLOGY

## 2023-02-16 PROCEDURE — 1126F AMNT PAIN NOTED NONE PRSNT: CPT | Mod: CPTII,S$GLB,, | Performed by: OPHTHALMOLOGY

## 2023-02-16 PROCEDURE — 92014 COMPRE OPH EXAM EST PT 1/>: CPT | Mod: S$GLB,,, | Performed by: OPHTHALMOLOGY

## 2023-02-16 PROCEDURE — 1101F PT FALLS ASSESS-DOCD LE1/YR: CPT | Mod: CPTII,S$GLB,, | Performed by: OPHTHALMOLOGY

## 2023-02-16 NOTE — PROGRESS NOTES
Subjective:       Patient ID: Palmira Malave is a 87 y.o. female.    Chief Complaint: Annual Exam (Palmira Malave is a 88 y/o female )    HPI     Annual Exam     Additional comments: Palmira Malave is a 88 y/o female            Comments    Pt here for annual eye exam   Pt state no complaints at this time   VA stable. Denies f/f.       1. Pco(posterior capsular opacification ), right   2. Essential hypertension  3. Pseudophakia               Last edited by Indigo Nolasco on 2/16/2023  1:53 PM.             Assessment:       1. PCO (posterior capsular opacification), right    2. Cyst of eyelid, left    3. Essential hypertension    4. Pseudophakia          Plan:       Early PCO OD- Not Visually Significant.   Left lateral canthal cyst-Stable.  HTN-No retinopathy OU.      Control HTN.  RTC 1 yr.

## 2023-02-28 NOTE — ANESTHESIA POSTPROCEDURE EVALUATION
"Anesthesia Post Evaluation    Patient: Palmira Malave    Procedure(s) Performed: Procedure(s) (LRB):  PHACOEMULSIFICATION, CATARACT (Right)  INSERTION, IOL PROSTHESIS (Right)    Final Anesthesia Type: MAC  Patient location during evaluation: OPS  Patient participation: Yes- Able to Participate  Level of consciousness: awake and alert  Post-procedure vital signs: reviewed and stable  Pain management: adequate  Airway patency: patent  PONV status at discharge: No PONV  Anesthetic complications: no      Cardiovascular status: hemodynamically stable  Respiratory status: unassisted and spontaneous ventilation  Hydration status: euvolemic  Follow-up not needed.        Visit Vitals  BP (!) 142/66 (BP Location: Right arm, Patient Position: Lying)   Pulse 70   Temp 36.3 °C (97.3 °F) (Oral)   Resp 16   Ht 5' 3" (1.6 m)   Wt 76.3 kg (168 lb 3 oz)   SpO2 96%   Breastfeeding? No   BMI 29.79 kg/m²       Pain/Barron Score: No Data Recorded      "
Quality 110: Preventive Care And Screening: Influenza Immunization: Influenza Immunization not Administered because Patient Refused.
Detail Level: Detailed

## 2023-03-15 ENCOUNTER — DOCUMENT SCAN (OUTPATIENT)
Dept: HOME HEALTH SERVICES | Facility: HOSPITAL | Age: 88
End: 2023-03-15
Payer: MEDICARE

## 2023-03-17 ENCOUNTER — DOCUMENT SCAN (OUTPATIENT)
Dept: HOME HEALTH SERVICES | Facility: HOSPITAL | Age: 88
End: 2023-03-17
Payer: MEDICARE

## 2023-03-24 ENCOUNTER — EXTERNAL HOME HEALTH (OUTPATIENT)
Dept: HOME HEALTH SERVICES | Facility: HOSPITAL | Age: 88
End: 2023-03-24
Payer: MEDICARE

## 2023-08-16 NOTE — ASSESSMENT & PLAN NOTE
Known NICM (apparent neg cath 2011)  S/p MDT BiV ICD, appears to be normally functioning, no recent shocks  Pt states sxs have improved with diuresis, but not completely resolved  Change lasix to PO  Cont coreg 12.5mg bid  Cont BiDil 1 tab bid  No plan for isch eval/cath at this juncture (esvin with CKD4)   (2) 7 to less than 13 years old

## 2024-05-16 NOTE — DISCHARGE INSTRUCTIONS
Addended by: REINA ROPER on: 5/16/2024 03:30 PM     Modules accepted: Orders     Continue to follow up with your primary care doctor. Return to the ED for new or worsening symptoms

## 2024-05-29 NOTE — HOSPITAL COURSE
Ms. Palmira Malave was placed in observation further evaluation and treatment of shortness of breath due to acute on chronic systolic heart failure.  She had held her home Lasix for a cataract procedure.  She received Lasix with improvement to her usual baseline.  She was seen and evaluated by Cardiology and her defibrillator was interrogated by [employer].  No acute abnormality or malfunction identified. Cardiology recommends outpatient follow-up.  Findings and plan discussed with the patient, all questions answered, she agrees and endorses understanding.  Discharged home in stable condition.   No

## (undated) DEVICE — KIT EYE PIC PACK WB

## (undated) DEVICE — NDL 18GA X1 1/2 REG BEVEL

## (undated) DEVICE — SYR 10CC LUER LOCK

## (undated) DEVICE — CARTRIDGE LENS D

## (undated) DEVICE — GLOVE BIOGEL ECLIPSE SZ 7.5

## (undated) DEVICE — NDL DISP. CYSTOTOME(BULK PACK)

## (undated) DEVICE — NDL RETROBULBAR .60 X 38MM

## (undated) DEVICE — SOL IRR STRL WATER 500ML

## (undated) DEVICE — HANDPIECE TRANSFORMER I/A

## (undated) DEVICE — NDL FILTER MICRON 18G 1 1/2

## (undated) DEVICE — GAUZE SPONGE 4X4 12PLY

## (undated) DEVICE — SYR 3CC LUER LOC

## (undated) DEVICE — KIT CUSTOM BASIC EYE ST / MEA

## (undated) DEVICE — SHEILD & GARTERS FOX METAL EYE

## (undated) DEVICE — SEE MEDLINE ITEM 157117